# Patient Record
Sex: MALE | Race: WHITE | Employment: OTHER | URBAN - METROPOLITAN AREA
[De-identification: names, ages, dates, MRNs, and addresses within clinical notes are randomized per-mention and may not be internally consistent; named-entity substitution may affect disease eponyms.]

---

## 2023-04-01 LAB
ALANINE AMINOTRANSFERASE (SGPT) (U/L) IN SER/PLAS: 15 U/L (ref 10–52)
ANION GAP IN SER/PLAS: 12 MMOL/L (ref 10–20)
ASPARTATE AMINOTRANSFERASE (SGOT) (U/L) IN SER/PLAS: 20 U/L (ref 9–39)
CALCIUM (MG/DL) IN SER/PLAS: 8.7 MG/DL (ref 8.6–10.3)
CARBON DIOXIDE, TOTAL (MMOL/L) IN SER/PLAS: 27 MMOL/L (ref 21–32)
CHLORIDE (MMOL/L) IN SER/PLAS: 106 MMOL/L (ref 98–107)
CHOLESTEROL (MG/DL) IN SER/PLAS: 131 MG/DL (ref 0–199)
CHOLESTEROL IN HDL (MG/DL) IN SER/PLAS: 45.6 MG/DL
CHOLESTEROL/HDL RATIO: 2.9
CREATININE (MG/DL) IN SER/PLAS: 1.09 MG/DL (ref 0.5–1.3)
DIGOXIN (NG/ML) IN SER/PLAS: 0.62 NG/ML (ref 0.8–2)
ERYTHROCYTE DISTRIBUTION WIDTH (RATIO) BY AUTOMATED COUNT: 14.6 % (ref 11.5–14.5)
ERYTHROCYTE MEAN CORPUSCULAR HEMOGLOBIN CONCENTRATION (G/DL) BY AUTOMATED: 33 G/DL (ref 32–36)
ERYTHROCYTE MEAN CORPUSCULAR VOLUME (FL) BY AUTOMATED COUNT: 94 FL (ref 80–100)
ERYTHROCYTES (10*6/UL) IN BLOOD BY AUTOMATED COUNT: 4.29 X10E12/L (ref 4.5–5.9)
GFR MALE: 75 ML/MIN/1.73M2
GLUCOSE (MG/DL) IN SER/PLAS: 86 MG/DL (ref 74–99)
HEMATOCRIT (%) IN BLOOD BY AUTOMATED COUNT: 40.3 % (ref 41–52)
HEMOGLOBIN (G/DL) IN BLOOD: 13.3 G/DL (ref 13.5–17.5)
LDL: 39 MG/DL (ref 0–99)
LEUKOCYTES (10*3/UL) IN BLOOD BY AUTOMATED COUNT: 9 X10E9/L (ref 4.4–11.3)
NON HDL CHOLESTEROL: 85 MG/DL
PLATELETS (10*3/UL) IN BLOOD AUTOMATED COUNT: 140 X10E9/L (ref 150–450)
POTASSIUM (MMOL/L) IN SER/PLAS: 4.7 MMOL/L (ref 3.5–5.3)
SODIUM (MMOL/L) IN SER/PLAS: 140 MMOL/L (ref 136–145)
TRIGLYCERIDE (MG/DL) IN SER/PLAS: 232 MG/DL (ref 0–149)
UREA NITROGEN (MG/DL) IN SER/PLAS: 18 MG/DL (ref 6–23)
VLDL: 46 MG/DL (ref 0–40)

## 2023-08-14 ENCOUNTER — PATIENT OUTREACH (OUTPATIENT)
Dept: PRIMARY CARE | Facility: CLINIC | Age: 68
End: 2023-08-14

## 2023-08-14 ENCOUNTER — TELEPHONE (OUTPATIENT)
Dept: PRIMARY CARE | Facility: CLINIC | Age: 68
End: 2023-08-14

## 2023-08-14 NOTE — TELEPHONE ENCOUNTER
----- Message from Anand Jones RN sent at 8/14/2023  1:17 PM EDT -----  Regarding: Unsuccessful discharge outreach after 2 attempts.  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  Impression 5: Paroxysmal Atrial Fibrillation  Plan for Impression 5: Continued on home Digoxin and Toprol-XL and Eliquis  Impression 6: Hyponatremia  Impression 7: HFrEF, Coronary Artery Disease  Impression 8: Diverticulitis  Impression 9: GERD     Date of discharge: 13 Aug 23      Unsuccessful attempts x2 to reach patient for PCP Follow-up  Please have office staff reach out to patient and schedule an appointment within 7-13 days from discharge date.

## 2023-08-14 NOTE — PROGRESS NOTES
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  Impression 5: Paroxysmal Atrial Fibrillation  Plan for Impression 5: Continued on home Digoxin and Toprol-XL and Eliquis  Impression 6: Hyponatremia  Impression 7: HFrEF, Coronary Artery Disease  Impression 8: Diverticulitis  Impression 9: GERD  Admission Date:  Discharge Date:     PCP Appointment Date: Tasked to PCP office for scheduling.   Specialist Appointment Date: N/A  Hospital Encounter and Summary: Linked    No contact on discharge outreach. Tasked to PCP office for scheduling. Will attempt contact again in 2 wks.

## 2023-08-29 ENCOUNTER — PATIENT OUTREACH (OUTPATIENT)
Dept: PRIMARY CARE | Facility: CLINIC | Age: 68
End: 2023-08-29

## 2023-09-07 ENCOUNTER — TELEPHONE (OUTPATIENT)
Dept: PRIMARY CARE | Facility: CLINIC | Age: 68
End: 2023-09-07

## 2023-09-07 DIAGNOSIS — I10 PRIMARY HYPERTENSION: ICD-10-CM

## 2023-09-07 RX ORDER — CLOPIDOGREL BISULFATE 75 MG/1
75 TABLET ORAL DAILY
Qty: 90 TABLET | Refills: 0 | Status: SHIPPED | OUTPATIENT
Start: 2023-09-07 | End: 2023-09-26 | Stop reason: ALTCHOICE

## 2023-09-07 RX ORDER — FENOFIBRATE 145 MG/1
1 TABLET, FILM COATED ORAL DAILY
COMMUNITY
Start: 2023-06-22 | End: 2023-09-26 | Stop reason: ALTCHOICE

## 2023-09-07 RX ORDER — SACUBITRIL AND VALSARTAN 24; 26 MG/1; MG/1
TABLET, FILM COATED ORAL 2 TIMES DAILY
COMMUNITY
Start: 2019-02-06 | End: 2023-10-18 | Stop reason: SDUPTHER

## 2023-09-07 RX ORDER — CLOPIDOGREL BISULFATE 75 MG/1
1 TABLET ORAL DAILY
COMMUNITY
Start: 2023-08-13 | End: 2023-09-07 | Stop reason: SDUPTHER

## 2023-09-07 RX ORDER — SPIRONOLACTONE 25 MG/1
12.5 TABLET ORAL DAILY
COMMUNITY
Start: 2019-02-06 | End: 2023-10-18 | Stop reason: SDUPTHER

## 2023-09-07 RX ORDER — METOPROLOL SUCCINATE 100 MG/1
1 TABLET, EXTENDED RELEASE ORAL DAILY
COMMUNITY
Start: 2023-08-13 | End: 2023-10-11 | Stop reason: SDUPTHER

## 2023-09-07 RX ORDER — ROSUVASTATIN CALCIUM 5 MG/1
1 TABLET, COATED ORAL DAILY
COMMUNITY
Start: 2021-08-26 | End: 2023-10-18 | Stop reason: SDUPTHER

## 2023-09-07 RX ORDER — PANTOPRAZOLE SODIUM 40 MG/1
1 TABLET, DELAYED RELEASE ORAL DAILY
COMMUNITY
Start: 2023-08-01 | End: 2023-09-26 | Stop reason: ALTCHOICE

## 2023-09-07 RX ORDER — DIGOXIN 125 MCG
1 TABLET ORAL DAILY
COMMUNITY
Start: 2019-02-06 | End: 2023-10-18 | Stop reason: SDUPTHER

## 2023-09-07 RX ORDER — APIXABAN 5 MG/1
5 TABLET, FILM COATED ORAL 2 TIMES DAILY
COMMUNITY
End: 2024-03-11 | Stop reason: ALTCHOICE

## 2023-09-07 RX ORDER — LOPERAMIDE HCL 2 MG
TABLET ORAL EVERY 4 HOURS PRN
COMMUNITY
Start: 2023-08-01 | End: 2023-09-26 | Stop reason: ALTCHOICE

## 2023-09-07 NOTE — TELEPHONE ENCOUNTER
Pt left vm asking for refill of his clopidigrel 75mg take 1 daily. Pt also asking to reschedule his apt coming up this month.    Kevin [phone]

## 2023-09-13 ENCOUNTER — APPOINTMENT (OUTPATIENT)
Dept: PRIMARY CARE | Facility: CLINIC | Age: 68
End: 2023-09-13

## 2023-09-22 PROBLEM — I77.4 CELIAC ARTERY STENOSIS: Status: ACTIVE | Noted: 2023-09-22

## 2023-09-22 PROBLEM — R10.13 EPIGASTRIC ABDOMINAL PAIN OF UNKNOWN ETIOLOGY: Status: ACTIVE | Noted: 2023-09-22

## 2023-09-22 PROBLEM — I77.1 CELIAC ARTERY STENOSIS (CMS-HCC): Status: ACTIVE | Noted: 2023-09-22

## 2023-09-22 PROBLEM — E78.5 HLD (HYPERLIPIDEMIA): Status: ACTIVE | Noted: 2023-09-22

## 2023-09-22 PROBLEM — I50.30 HEART FAILURE WITH PRESERVED EJECTION FRACTION (MULTI): Status: ACTIVE | Noted: 2023-09-22

## 2023-09-22 PROBLEM — R59.0 MEDIASTINAL LYMPHADENOPATHY: Status: ACTIVE | Noted: 2023-09-22

## 2023-09-22 PROBLEM — I25.10 CAD (CORONARY ARTERY DISEASE): Status: ACTIVE | Noted: 2023-09-22

## 2023-09-22 PROBLEM — K55.1 SUPERIOR MESENTERIC ARTERY STENOSIS (MULTI): Status: ACTIVE | Noted: 2023-09-22

## 2023-09-22 PROBLEM — R10.9 ABDOMINAL PAIN: Status: ACTIVE | Noted: 2023-09-22

## 2023-09-22 PROBLEM — I10 ESSENTIAL HYPERTENSION: Status: ACTIVE | Noted: 2023-09-22

## 2023-09-22 PROBLEM — H61.20 CERUMEN IMPACTION: Status: ACTIVE | Noted: 2023-09-22

## 2023-09-22 PROBLEM — R91.1 PULMONARY NODULE: Status: ACTIVE | Noted: 2023-09-22

## 2023-09-22 PROBLEM — C34.90: Status: ACTIVE | Noted: 2023-09-22

## 2023-09-22 PROBLEM — C34.90 MALIGNANT NEOPLASM OF BRONCHUS AND LUNG (MULTI): Status: ACTIVE | Noted: 2023-09-22

## 2023-09-22 PROBLEM — R10.13 CHRONIC EPIGASTRIC PAIN: Status: ACTIVE | Noted: 2023-09-22

## 2023-09-22 PROBLEM — R11.2 INTRACTABLE NAUSEA AND VOMITING: Status: ACTIVE | Noted: 2023-09-22

## 2023-09-22 PROBLEM — R91.8 LUNG NODULES: Status: ACTIVE | Noted: 2023-09-22

## 2023-09-22 PROBLEM — G89.29 CHRONIC EPIGASTRIC PAIN: Status: ACTIVE | Noted: 2023-09-22

## 2023-09-22 PROBLEM — K55.059: Status: ACTIVE | Noted: 2023-09-22

## 2023-09-22 PROBLEM — H26.9 CATARACT: Status: ACTIVE | Noted: 2023-09-22

## 2023-09-22 PROBLEM — F17.210 CIGARETTE SMOKER: Status: ACTIVE | Noted: 2023-09-22

## 2023-09-22 PROBLEM — I48.91 ATRIAL FIBRILLATION (MULTI): Status: ACTIVE | Noted: 2023-09-22

## 2023-09-22 PROBLEM — N39.0 URINARY TRACT INFECTION, ACUTE: Status: ACTIVE | Noted: 2023-09-22

## 2023-09-22 PROBLEM — I50.22 CHRONIC SYSTOLIC CONGESTIVE HEART FAILURE (MULTI): Status: ACTIVE | Noted: 2023-09-22

## 2023-09-22 PROBLEM — R91.8 MASS OF LOWER LOBE OF LEFT LUNG: Status: ACTIVE | Noted: 2023-09-22

## 2023-09-22 PROBLEM — I71.21 ASCENDING AORTIC ANEURYSM (CMS-HCC): Status: ACTIVE | Noted: 2023-09-22

## 2023-09-22 PROBLEM — K55.069 SUPERIOR MESENTERIC ARTERY THROMBOSIS (MULTI): Status: ACTIVE | Noted: 2023-09-22

## 2023-09-22 PROBLEM — I25.10 MILD CAD: Status: ACTIVE | Noted: 2023-09-22

## 2023-09-26 ENCOUNTER — OFFICE VISIT (OUTPATIENT)
Dept: PRIMARY CARE | Facility: CLINIC | Age: 68
End: 2023-09-26
Payer: MEDICARE

## 2023-09-26 VITALS
HEIGHT: 72 IN | BODY MASS INDEX: 25.87 KG/M2 | WEIGHT: 191 LBS | DIASTOLIC BLOOD PRESSURE: 86 MMHG | HEART RATE: 64 BPM | SYSTOLIC BLOOD PRESSURE: 124 MMHG

## 2023-09-26 DIAGNOSIS — I25.10 MILD CAD: ICD-10-CM

## 2023-09-26 DIAGNOSIS — Z12.5 PROSTATE CANCER SCREENING: ICD-10-CM

## 2023-09-26 DIAGNOSIS — Z00.00 WELCOME TO MEDICARE PREVENTIVE VISIT: ICD-10-CM

## 2023-09-26 DIAGNOSIS — I50.22 CHRONIC SYSTOLIC CONGESTIVE HEART FAILURE (MULTI): Primary | ICD-10-CM

## 2023-09-26 DIAGNOSIS — I77.1 CELIAC ARTERY STENOSIS (CMS-HCC): Primary | ICD-10-CM

## 2023-09-26 DIAGNOSIS — I50.30 HEART FAILURE WITH PRESERVED EJECTION FRACTION, UNSPECIFIED HF CHRONICITY (MULTI): ICD-10-CM

## 2023-09-26 DIAGNOSIS — I10 PRIMARY HYPERTENSION: ICD-10-CM

## 2023-09-26 DIAGNOSIS — E78.2 MIXED HYPERLIPIDEMIA: ICD-10-CM

## 2023-09-26 DIAGNOSIS — Z23 NEED FOR PNEUMOCOCCAL 20-VALENT CONJUGATE VACCINATION: ICD-10-CM

## 2023-09-26 DIAGNOSIS — I25.10 CORONARY ARTERY DISEASE INVOLVING NATIVE CORONARY ARTERY, UNSPECIFIED WHETHER ANGINA PRESENT, UNSPECIFIED WHETHER NATIVE OR TRANSPLANTED HEART: ICD-10-CM

## 2023-09-26 DIAGNOSIS — I10 ESSENTIAL HYPERTENSION: ICD-10-CM

## 2023-09-26 DIAGNOSIS — Z12.11 COLON CANCER SCREENING: ICD-10-CM

## 2023-09-26 PROBLEM — N39.0 URINARY TRACT INFECTION, ACUTE: Status: RESOLVED | Noted: 2023-09-22 | Resolved: 2023-09-26

## 2023-09-26 PROBLEM — G47.00 INSOMNIA: Status: ACTIVE | Noted: 2023-09-26

## 2023-09-26 PROBLEM — H61.20 CERUMEN IMPACTION: Status: RESOLVED | Noted: 2023-09-22 | Resolved: 2023-09-26

## 2023-09-26 PROCEDURE — 1159F MED LIST DOCD IN RCRD: CPT | Performed by: CLINICAL NURSE SPECIALIST

## 2023-09-26 PROCEDURE — G0009 ADMIN PNEUMOCOCCAL VACCINE: HCPCS | Performed by: CLINICAL NURSE SPECIALIST

## 2023-09-26 PROCEDURE — 1036F TOBACCO NON-USER: CPT | Performed by: CLINICAL NURSE SPECIALIST

## 2023-09-26 PROCEDURE — 99214 OFFICE O/P EST MOD 30 MIN: CPT | Performed by: CLINICAL NURSE SPECIALIST

## 2023-09-26 PROCEDURE — 1160F RVW MEDS BY RX/DR IN RCRD: CPT | Performed by: CLINICAL NURSE SPECIALIST

## 2023-09-26 PROCEDURE — 3074F SYST BP LT 130 MM HG: CPT | Performed by: CLINICAL NURSE SPECIALIST

## 2023-09-26 PROCEDURE — G0444 DEPRESSION SCREEN ANNUAL: HCPCS | Performed by: CLINICAL NURSE SPECIALIST

## 2023-09-26 PROCEDURE — 90677 PCV20 VACCINE IM: CPT | Performed by: CLINICAL NURSE SPECIALIST

## 2023-09-26 PROCEDURE — 1170F FXNL STATUS ASSESSED: CPT | Performed by: CLINICAL NURSE SPECIALIST

## 2023-09-26 PROCEDURE — 3079F DIAST BP 80-89 MM HG: CPT | Performed by: CLINICAL NURSE SPECIALIST

## 2023-09-26 PROCEDURE — G0439 PPPS, SUBSEQ VISIT: HCPCS | Performed by: CLINICAL NURSE SPECIALIST

## 2023-09-26 RX ORDER — CLOPIDOGREL BISULFATE 75 MG/1
TABLET ORAL DAILY
COMMUNITY
End: 2023-10-11 | Stop reason: SDUPTHER

## 2023-09-26 ASSESSMENT — ENCOUNTER SYMPTOMS
CHILLS: 0
BLOOD IN STOOL: 0
CHEST TIGHTNESS: 0
APPETITE CHANGE: 0
MYALGIAS: 0
NECK PAIN: 0
FATIGUE: 1
COUGH: 0
SORE THROAT: 0
LOSS OF SENSATION IN FEET: 0
HEADACHES: 0
DIZZINESS: 0
TROUBLE SWALLOWING: 0
ARTHRALGIAS: 0
BACK PAIN: 0
NAUSEA: 0
OCCASIONAL FEELINGS OF UNSTEADINESS: 0
WOUND: 0
VOMITING: 0
FEVER: 0
PALPITATIONS: 0
WHEEZING: 0
EYE PAIN: 0
UNEXPECTED WEIGHT CHANGE: 0
SEIZURES: 0
CONSTIPATION: 0
ACTIVITY CHANGE: 0
BRUISES/BLEEDS EASILY: 0
HEMATURIA: 0
PHOTOPHOBIA: 0
CONFUSION: 0
JOINT SWELLING: 0
SHORTNESS OF BREATH: 0
ABDOMINAL PAIN: 0
POLYDIPSIA: 0
DEPRESSION: 0
DIARRHEA: 0
SLEEP DISTURBANCE: 1
DYSURIA: 0
FLANK PAIN: 0

## 2023-09-26 ASSESSMENT — ACTIVITIES OF DAILY LIVING (ADL)
MANAGING_FINANCES: INDEPENDENT
BATHING: INDEPENDENT
GROCERY_SHOPPING: INDEPENDENT
DRESSING: INDEPENDENT
TAKING_MEDICATION: INDEPENDENT
DOING_HOUSEWORK: INDEPENDENT

## 2023-09-26 ASSESSMENT — COLUMBIA-SUICIDE SEVERITY RATING SCALE - C-SSRS
2. HAVE YOU ACTUALLY HAD ANY THOUGHTS OF KILLING YOURSELF?: NO
1. IN THE PAST MONTH, HAVE YOU WISHED YOU WERE DEAD OR WISHED YOU COULD GO TO SLEEP AND NOT WAKE UP?: NO
6. HAVE YOU EVER DONE ANYTHING, STARTED TO DO ANYTHING, OR PREPARED TO DO ANYTHING TO END YOUR LIFE?: NO

## 2023-09-26 ASSESSMENT — PATIENT HEALTH QUESTIONNAIRE - PHQ9
1. LITTLE INTEREST OR PLEASURE IN DOING THINGS: NOT AT ALL
SUM OF ALL RESPONSES TO PHQ9 QUESTIONS 1 AND 2: 0
2. FEELING DOWN, DEPRESSED OR HOPELESS: NOT AT ALL

## 2023-09-26 NOTE — PROGRESS NOTES
Subjective   Patient ID: Kevin Rubi is a 67 y.o. male who presents for Welcome To Medicare (Welcome to medicare ).  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 Recent 3cm Left lung mass.     Here today as a follow up appointment, due for a Welcome to Medicare. Last seen in 2021. Recently admitted to  with Left Lung Mass, diagnosed with malignant neoplasm of bronchus or lung. Bronch during admission.     Biopsy was concerning for non small cell lung cancer. Plan to follow up with Oncology outpatient. Follow up scheduled for tomorrow.     Has continued on medications as prescribed.     Patient states that he has been having a hard time falling asleep. Patient has been trying to lay down around 9:30-10. Trouble falling asleep, finally fell asleep around midnight.     Review of Systems   Constitutional:  Positive for fatigue. Negative for activity change, appetite change, chills, fever and unexpected weight change.   HENT:  Negative for ear pain, hearing loss, nosebleeds, sore throat, tinnitus and trouble swallowing.    Eyes:  Negative for photophobia, pain and visual disturbance.   Respiratory:  Negative for cough, chest tightness, shortness of breath and wheezing.    Cardiovascular:  Negative for chest pain, palpitations and leg swelling.   Gastrointestinal:  Negative for abdominal pain, blood in stool, constipation, diarrhea, nausea and vomiting.   Endocrine: Negative for cold intolerance, heat intolerance, polydipsia and polyuria.   Genitourinary:  Negative for dysuria, flank pain and hematuria.   Musculoskeletal:  Negative for arthralgias, back pain, joint swelling, myalgias and neck pain.   Skin:  Negative for pallor, rash and wound.   Allergic/Immunologic: Negative for immunocompromised state.   Neurological:  Negative for dizziness, seizures and headaches.   Hematological:  Does not bruise/bleed easily.    Psychiatric/Behavioral:  Positive for sleep disturbance. Negative for confusion.        Objective   Physical Exam  Vitals and nursing note reviewed.   Constitutional:       General: He is not in acute distress.     Appearance: Normal appearance.   HENT:      Head: Normocephalic.      Nose: Nose normal.   Eyes:      Conjunctiva/sclera: Conjunctivae normal.   Neck:      Vascular: No carotid bruit.   Cardiovascular:      Rate and Rhythm: Normal rate and regular rhythm.      Pulses: Normal pulses.      Heart sounds: Normal heart sounds.   Pulmonary:      Effort: Pulmonary effort is normal.      Breath sounds: Normal breath sounds.   Abdominal:      General: Bowel sounds are normal.      Palpations: Abdomen is soft.   Musculoskeletal:         General: Normal range of motion.      Cervical back: Normal range of motion.   Skin:     General: Skin is warm and dry.   Neurological:      Mental Status: He is alert and oriented to person, place, and time. Mental status is at baseline.   Psychiatric:         Mood and Affect: Mood normal.         Behavior: Behavior normal.         Assessment/Plan       Additional lab work ordered at [facility] today.     HFpEF, pAfib, Hypertension: Following with Cardiology. Blood pressure controlled at [facility] today. Continue to monitor. Continue medications as prescribed.  CAD, Celiac Artery Stenosis: Continue to follow with Specialists as previously determined.   Hyperlipidemia: Updated lab work ordered. Will follow up pending results.   Medicare Wellness: Routine and age appropriate recommendations discussed with the patient today and patient verbalized understanding of the recommendations.  Questions answered.  Age appropriate immunizations and preventative screenings discussed with the patient and ordered as appropriate. Labs updated and ordered as indicated. Recommend healthy diet and daily exercise to maintain healthy body weight.   Insomnia: Discussed Melatonin.     Prostate Cancer Screening:  Ordered.    Flu Vaccine: September 2023.   Prevnar: September 2023.   COVID Vaccine: January 2022.   Medicare Wellness: September 2023.   Cologuard ordered.   Discussed Tdap, Shingrix, COVID, RSV Vaccinations.

## 2023-10-02 ENCOUNTER — ANCILLARY PROCEDURE (OUTPATIENT)
Dept: RADIOLOGY | Facility: CLINIC | Age: 68
End: 2023-10-02
Payer: MEDICARE

## 2023-10-02 DIAGNOSIS — C34.90 MALIGNANT NEOPLASM OF UNSPECIFIED PART OF UNSPECIFIED BRONCHUS OR LUNG (MULTI): ICD-10-CM

## 2023-10-02 PROCEDURE — 70553 MRI BRAIN STEM W/O & W/DYE: CPT | Performed by: RADIOLOGY

## 2023-10-02 PROCEDURE — 70553 MRI BRAIN STEM W/O & W/DYE: CPT

## 2023-10-02 PROCEDURE — 2500000004 HC RX 250 GENERAL PHARMACY W/ HCPCS (ALT 636 FOR OP/ED): Performed by: INTERNAL MEDICINE

## 2023-10-02 PROCEDURE — A9575 INJ GADOTERATE MEGLUMI 0.1ML: HCPCS | Performed by: INTERNAL MEDICINE

## 2023-10-02 RX ADMIN — GADOTERATE MEGLUMINE 17 ML: 376.9 INJECTION INTRAVENOUS at 14:33

## 2023-10-04 ENCOUNTER — OFFICE VISIT (OUTPATIENT)
Dept: HEMATOLOGY/ONCOLOGY | Facility: CLINIC | Age: 68
End: 2023-10-04
Payer: MEDICARE

## 2023-10-04 VITALS
SYSTOLIC BLOOD PRESSURE: 135 MMHG | OXYGEN SATURATION: 97 % | TEMPERATURE: 98.1 F | DIASTOLIC BLOOD PRESSURE: 83 MMHG | WEIGHT: 190.04 LBS | HEART RATE: 62 BPM | RESPIRATION RATE: 18 BRPM | BODY MASS INDEX: 25.77 KG/M2

## 2023-10-04 DIAGNOSIS — C34.32 PRIMARY CANCER OF LEFT LOWER LOBE OF LUNG (MULTI): Primary | ICD-10-CM

## 2023-10-04 PROCEDURE — 1036F TOBACCO NON-USER: CPT | Performed by: INTERNAL MEDICINE

## 2023-10-04 PROCEDURE — 3075F SYST BP GE 130 - 139MM HG: CPT | Performed by: INTERNAL MEDICINE

## 2023-10-04 PROCEDURE — 99215 OFFICE O/P EST HI 40 MIN: CPT | Performed by: INTERNAL MEDICINE

## 2023-10-04 PROCEDURE — 1159F MED LIST DOCD IN RCRD: CPT | Performed by: INTERNAL MEDICINE

## 2023-10-04 PROCEDURE — 1126F AMNT PAIN NOTED NONE PRSNT: CPT | Performed by: INTERNAL MEDICINE

## 2023-10-04 PROCEDURE — 3079F DIAST BP 80-89 MM HG: CPT | Performed by: INTERNAL MEDICINE

## 2023-10-04 PROCEDURE — 1160F RVW MEDS BY RX/DR IN RCRD: CPT | Performed by: INTERNAL MEDICINE

## 2023-10-04 RX ORDER — FAMOTIDINE 10 MG/ML
20 INJECTION INTRAVENOUS ONCE AS NEEDED
Status: CANCELLED | OUTPATIENT
Start: 2023-10-23

## 2023-10-04 RX ORDER — PROCHLORPERAZINE EDISYLATE 5 MG/ML
10 INJECTION INTRAMUSCULAR; INTRAVENOUS EVERY 6 HOURS PRN
Status: CANCELLED | OUTPATIENT
Start: 2023-10-23

## 2023-10-04 RX ORDER — ALBUTEROL SULFATE 0.83 MG/ML
3 SOLUTION RESPIRATORY (INHALATION) AS NEEDED
Status: CANCELLED | OUTPATIENT
Start: 2023-10-23

## 2023-10-04 RX ORDER — PROCHLORPERAZINE MALEATE 10 MG
10 TABLET ORAL EVERY 6 HOURS PRN
Status: CANCELLED | OUTPATIENT
Start: 2023-10-23

## 2023-10-04 RX ORDER — EPINEPHRINE 0.3 MG/.3ML
0.3 INJECTION SUBCUTANEOUS EVERY 5 MIN PRN
Status: CANCELLED | OUTPATIENT
Start: 2023-10-23

## 2023-10-04 RX ORDER — CYANOCOBALAMIN 1000 UG/ML
1000 INJECTION, SOLUTION INTRAMUSCULAR; SUBCUTANEOUS ONCE
Status: CANCELLED | OUTPATIENT
Start: 2023-10-23

## 2023-10-04 RX ORDER — PALONOSETRON 0.05 MG/ML
0.25 INJECTION, SOLUTION INTRAVENOUS ONCE
Status: CANCELLED | OUTPATIENT
Start: 2023-10-23

## 2023-10-04 RX ORDER — DIPHENHYDRAMINE HYDROCHLORIDE 50 MG/ML
50 INJECTION INTRAMUSCULAR; INTRAVENOUS AS NEEDED
Status: CANCELLED | OUTPATIENT
Start: 2023-10-23

## 2023-10-04 ASSESSMENT — ENCOUNTER SYMPTOMS
LOSS OF SENSATION IN FEET: 0
DEPRESSION: 0
OCCASIONAL FEELINGS OF UNSTEADINESS: 0

## 2023-10-04 ASSESSMENT — PAIN SCALES - GENERAL: PAINLEVEL: 0-NO PAIN

## 2023-10-05 ENCOUNTER — SOCIAL WORK (OUTPATIENT)
Dept: CASE MANAGEMENT | Facility: HOSPITAL | Age: 68
End: 2023-10-05

## 2023-10-05 NOTE — PROGRESS NOTES
SW met with patient, spouse an an adult son in the clinic following an appointment with Dr. Longoria. Treatment plan being finalized. Patient will have concurrent chemotherapy and radiation therapy. Radiation to be performed at [facility]; [facility].     SW introduced self and explained role. Mr. Rubi is a 67 year old gentleman alert oriented X 3 and independent in self care. Resides in a single family home with spouse. Retired in April 2023; was a ; spouse also retired. Patient has 2 adult sons who reside locally. We discussed options for patient when radiation begins. We discussed the possibility of patient and spouse staying at [facility]. SW to make referral. Information on [facility] provided. Referral faxed today. Spouse also inquired about getting transportation to the [facility] from [place] when radiation begins as she does not drive on the freeway. SW offered to investigate.     SW encouraged patient and family to take this one day at a time in order to attempt to reduce anxiety. We discussed being gently with self as this process is new and can be frightening. Patient not easily engaged; with overall sad affect. We discussed the importance of asking questions in order to further understanding of diagnosis and treatment plan and to become comfortable with treatment team.    Support offered. SW provided my contact information and will continue to follow for ongoing support and resource development. NPV with radiation scheduled for 10.19.2023. Initial chemotherapy 10.23.2023 with the hope of radiation starting 11.6 or 11.13.2023.

## 2023-10-11 ENCOUNTER — TELEPHONE (OUTPATIENT)
Dept: RADIATION ONCOLOGY | Facility: HOSPITAL | Age: 68
End: 2023-10-11

## 2023-10-11 DIAGNOSIS — I77.1 CELIAC ARTERY STENOSIS (CMS-HCC): ICD-10-CM

## 2023-10-11 RX ORDER — CLOPIDOGREL BISULFATE 75 MG/1
75 TABLET ORAL DAILY
Qty: 90 TABLET | Refills: 1 | Status: ON HOLD | OUTPATIENT
Start: 2023-10-11 | End: 2024-04-16

## 2023-10-11 RX ORDER — CLOPIDOGREL BISULFATE 75 MG/1
75 TABLET ORAL DAILY
Qty: 90 TABLET | Refills: 1 | Status: CANCELLED | OUTPATIENT
Start: 2023-10-11 | End: 2024-04-08

## 2023-10-11 NOTE — TELEPHONE ENCOUNTER
Called pt to remind of NPV appointment on 10/19/23 at 1:30. Pt's phone went to voicemail left number if needs to reschedule.

## 2023-10-14 RX ORDER — ALBUTEROL SULFATE 0.83 MG/ML
3 SOLUTION RESPIRATORY (INHALATION) AS NEEDED
Status: CANCELLED | OUTPATIENT
Start: 2023-11-13

## 2023-10-14 RX ORDER — PALONOSETRON 0.05 MG/ML
0.25 INJECTION, SOLUTION INTRAVENOUS ONCE
Status: CANCELLED | OUTPATIENT
Start: 2023-11-13

## 2023-10-14 RX ORDER — DIPHENHYDRAMINE HYDROCHLORIDE 50 MG/ML
50 INJECTION INTRAMUSCULAR; INTRAVENOUS AS NEEDED
Status: CANCELLED | OUTPATIENT
Start: 2023-11-13

## 2023-10-14 RX ORDER — HEPARIN 100 UNIT/ML
500 SYRINGE INTRAVENOUS AS NEEDED
Status: CANCELLED | OUTPATIENT
Start: 2023-10-16

## 2023-10-14 RX ORDER — FAMOTIDINE 10 MG/ML
20 INJECTION INTRAVENOUS ONCE AS NEEDED
Status: CANCELLED | OUTPATIENT
Start: 2023-11-13

## 2023-10-14 RX ORDER — HEPARIN SODIUM,PORCINE/PF 10 UNIT/ML
50 SYRINGE (ML) INTRAVENOUS AS NEEDED
Status: CANCELLED | OUTPATIENT
Start: 2023-10-16

## 2023-10-14 RX ORDER — PROCHLORPERAZINE EDISYLATE 5 MG/ML
10 INJECTION INTRAMUSCULAR; INTRAVENOUS EVERY 6 HOURS PRN
Status: CANCELLED | OUTPATIENT
Start: 2023-11-13

## 2023-10-14 RX ORDER — EPINEPHRINE 0.3 MG/.3ML
0.3 INJECTION SUBCUTANEOUS EVERY 5 MIN PRN
Status: CANCELLED | OUTPATIENT
Start: 2023-11-13

## 2023-10-14 RX ORDER — PROCHLORPERAZINE MALEATE 10 MG
10 TABLET ORAL EVERY 6 HOURS PRN
Status: CANCELLED | OUTPATIENT
Start: 2023-11-13

## 2023-10-14 ASSESSMENT — ENCOUNTER SYMPTOMS
NERVOUS/ANXIOUS: 1
NEUROLOGICAL NEGATIVE: 1
DEPRESSION: 1
CHEST TIGHTNESS: 0
HEMATURIA: 0
GASTROINTESTINAL NEGATIVE: 1
FATIGUE: 1
COUGH: 0
DIAPHORESIS: 1
SORE THROAT: 0
MUSCULOSKELETAL NEGATIVE: 1
HEMATOLOGIC/LYMPHATIC NEGATIVE: 1
APPETITE CHANGE: 0
FEVER: 1
SHORTNESS OF BREATH: 0

## 2023-10-14 NOTE — PROGRESS NOTES
Patient ID: Kevin Rubi is a 67 y.o. male    Primary Care Provider: Chen Olea, RUKHSANA-CNS    DIAGNOSIS AND STAGING  cT3pN3 NSCLC (adenocarcinoma, TTF1+) of the LLL - dx on 09/15/23  Primary tumor measures 3.5 cm (+satellite nodule)  2R+ for adenocarcinoma cells        SITES OF DISEASE  LLL  Mediastinal nodes (including contralateral)  Has a couple of other spiculated nodules (0.8 cm WILDA and 0.7 cm RUL) that are potential synchronous primaries and will be addressed when needed      MOLECULAR GENOMICS  KRAS G12D mutation     PD-L1 TPS 95%        PRIOR THERAPIES  None         CURRENT THERAPY  Anticipating carboplatin/pemetrexed with concurrent RT        CURRENT ONCOLOGICAL PROBLEMS  Fatigue         HISTORY OF PRESENT ILLNESS:  Former smoker, (quit in 2000), who while undergoing w/u for abdominal pain related to SMA occlusion, was found to have a LLL nodule -   Based on his records he was noted to have abnormal imaging back in 2021, was seen by pulmonary and was told to complete w/u, including PET scan and potential biopsy but did not follow recommendations.    A CT chest on 09/12/23 showed a 3.5 cm spiculated LLL nodule abutting the pericardium. There was also a satellite nodule suspicious for disease and a couple of other spiculated nodules in the WILDA and contralateral right lung.4R node measured 1.4 cm and level 7 2.4 cm.   PET scan on 08/04/23 showed no findings concerning for extra-thoracic disease. LLL nodule was FDG avid as well as multiple mediastinal nodes (including contralateral nodes). The WILDA nodule has mild hypermetabolic activity.  An EBUS procedure on 09/15/23 demonstrated 2R to be involved with adenocarcinoma cells, TTF1+. KRAS G12D +, TPS 95%.  The pt sees medical oncology on 09/27/23 -   States he is feeling well and denies any systemic sx related to this malignancy - including fatigue and weight loss. The abdominal pain resolved after vascular procedure/stenting performed and he is now on  Xarelto and Plavix. Denies any h/a or cough. Denies dyspnea.   States he is very stressed about this ongoing dx and his wife corroborates it.            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  HFpEF - EF 60%   Atrial fibrillation - on Eliquis  Ascending aortic aneurysm   PUD (gastric)  HLD        SOCIAL HISTORY  + Tobacco - quit in  - mostly 1/2 ppd starting at age 22  Occasional ETOH intake  Worked as a  for 35 years    for 43 years and has 2 children - son who is 39 yo and daughter who is 35  He has just retired        CURRENT MEDS REVIEWED     ALLERGIES  NKDA     FAMILY HISTORY  Mother seems to have had H/N or lung cancer - unclear   Father may also have had cancer   1 sister  from complications of GSW  1 brother  of CA (?liver CA)    SUBJECTIVE:  Doing well, aside from anxiety related to diagnosis and concerns regarding demands in therapy and burden inflicted on family members   Here with wife Priya and son -   Both very supportive   Endorses fatigue that does not affect ADLs and denies new symptoms     Review of Systems   Constitutional:  Positive for diaphoresis, fatigue and fever. Negative for appetite change.   HENT:   Negative for mouth sores and sore throat.    Respiratory:  Negative for chest tightness, cough and shortness of breath.    Cardiovascular:  Positive for chest pain.   Gastrointestinal: Negative.    Genitourinary:  Negative for hematuria.    Musculoskeletal: Negative.    Skin: Negative.    Neurological: Negative.    Hematological: Negative.    Psychiatric/Behavioral:  Positive for depression. The patient is nervous/anxious.           OBJECTIVE:    Vitals:    10/04/23 1357   BP: 135/83   Pulse: 62   Resp: 18   Temp: 36.7 °C (98.1 °F)   SpO2: 97%      Body surface area is 2.09 meters squared.     Physical Exam  Constitutional:       Appearance: Normal appearance. He is normal weight.   HENT:      Head: Normocephalic and atraumatic.       Nose: Nose normal.   Eyes:      Extraocular Movements: Extraocular movements intact.      Conjunctiva/sclera: Conjunctivae normal.   Cardiovascular:      Rate and Rhythm: Normal rate and regular rhythm.   Pulmonary:      Effort: Pulmonary effort is normal.      Breath sounds: Normal breath sounds.   Abdominal:      General: Abdomen is flat. Bowel sounds are normal.      Palpations: Abdomen is soft.   Musculoskeletal:      Cervical back: Normal range of motion and neck supple.   Skin:     General: Skin is warm and dry.   Neurological:      General: No focal deficit present.      Mental Status: He is alert and oriented to person, place, and time. Mental status is at baseline.   Psychiatric:         Behavior: Behavior normal.       Diagnostic Results         Results:  Labs:  Lab Results   Component Value Date    WBC CANCELED 09/16/2023    HGB CANCELED 09/16/2023    HCT CANCELED 09/16/2023    MCV CANCELED 09/16/2023    PLT CANCELED 09/16/2023      Lab Results   Component Value Date    NEUTROABS 21.97 (H) 08/09/2023        Brain MRI 10/02/23:  IMPRESSION:  No abnormal intracranial enhancing mass lesion to suggest brain  metastasis is noted.      There is an 8 mm focus of nonspecific abnormal diminished bone marrow  signal on the T1 images demonstrating a component of enhancement on  the post gadolinium images along the left frontal bone as seen on  axial slice 10 of 27. The lesion is nonspecific and could be benign  or malignant in etiology. Given the patient's clinical history, an  osseous metastasis can not be excluded. Correlation with any previous  outside MRI studies if available would be helpful to assess stability  of this finding. If none are available, a follow-up MRI in 3 months  would be recommended.      There is moderate brain parenchymal volume loss.      There is an area of encephalomalacia/gliosis along the lateral left  frontal lobe.      There are scattered nonspecific white matter changes within  the  cerebral hemispheres bilaterally which while nonspecific, given the  patient's age, likely represent sequelae of more remote small-vessel  ischemic change.    Thoracic TB Recs:  67, former smoker - quit in 2000  CT chest 09/12/23: 3.5 cm LLL nodule, abutting the pericardium + satellite nodule. There are other nodules, including a 9 mm spiculated lesion in the WILDA c/f synchronous primary lung CA (present as mostly a GGO back in 2021)  PET reviewed   EBUS: TTF1+ adenocarcinoma, p40-, KRAS G12D, TPS 95%  2R +     Impression/Plan:   cT3 pN3 cMx   MRI bran is pending - if negative for ICM, proceed with concurrent chemo-RT and consider BX/SBRT WILDA nodule in the near future     Assessment/Plan   Discussed with patient his final stage: IIIC ([id number]) NSCLC of the LLL -   Decision made at  to treated with definitive intent -   Referrals for rad onc provided. Sees Dr. Perrin on 10/29   Consent provided for 2-3 cycles of carboplatin and pemetrexed -   Anticipating C1 on 10/23 -   We anticipate C2 at [facility] with concurrent RT on 11/13  Not a candidate for CASE 1522 due to other potential synchronous primary lung cancers.    SMA occlusion s/p stenting of celiac artery 08/10/23:  On Eliquis and Plavix     Atrial fibrillation, HFpEF 60%  On Digoxin, metoprolol, spironolactone, Entresto    Hyperlipidemia:  On Spenser Longoria MD, MS  Thoracic Medical Oncology   [address] [place] [address]  Phone: [phone]

## 2023-10-16 ENCOUNTER — LAB (OUTPATIENT)
Dept: LAB | Facility: LAB | Age: 68
End: 2023-10-16
Payer: MEDICARE

## 2023-10-16 DIAGNOSIS — I48.91 ATRIAL FIBRILLATION, UNSPECIFIED TYPE (MULTI): ICD-10-CM

## 2023-10-16 DIAGNOSIS — C34.32 PRIMARY CANCER OF LEFT LOWER LOBE OF LUNG (MULTI): ICD-10-CM

## 2023-10-16 LAB
BASOPHILS # BLD AUTO: 0.07 X10*3/UL (ref 0–0.1)
BASOPHILS NFR BLD AUTO: 0.9 %
EOSINOPHIL # BLD AUTO: 0.26 X10*3/UL (ref 0–0.7)
EOSINOPHIL NFR BLD AUTO: 3.2 %
ERYTHROCYTE [DISTWIDTH] IN BLOOD BY AUTOMATED COUNT: 15.3 % (ref 11.5–14.5)
HBV CORE AB SER QL: NONREACTIVE
HBV SURFACE AB SER-ACNC: <3.1 MIU/ML
HBV SURFACE AG SERPL QL IA: NONREACTIVE
HCT VFR BLD AUTO: 39.6 % (ref 41–52)
HGB BLD-MCNC: 12.6 G/DL (ref 13.5–17.5)
IMM GRANULOCYTES # BLD AUTO: 0.03 X10*3/UL (ref 0–0.7)
IMM GRANULOCYTES NFR BLD AUTO: 0.4 % (ref 0–0.9)
LYMPHOCYTES # BLD AUTO: 2.46 X10*3/UL (ref 1.2–4.8)
LYMPHOCYTES NFR BLD AUTO: 30.6 %
MCH RBC QN AUTO: 28.5 PG (ref 26–34)
MCHC RBC AUTO-ENTMCNC: 31.8 G/DL (ref 32–36)
MCV RBC AUTO: 90 FL (ref 80–100)
MONOCYTES # BLD AUTO: 0.59 X10*3/UL (ref 0.1–1)
MONOCYTES NFR BLD AUTO: 7.3 %
NEUTROPHILS # BLD AUTO: 4.62 X10*3/UL (ref 1.2–7.7)
NEUTROPHILS NFR BLD AUTO: 57.6 %
NRBC BLD-RTO: 0 /100 WBCS (ref 0–0)
PLATELET # BLD AUTO: 192 X10*3/UL (ref 150–450)
PMV BLD AUTO: 11.6 FL (ref 7.5–11.5)
RBC # BLD AUTO: 4.42 X10*6/UL (ref 4.5–5.9)
WBC # BLD AUTO: 8 X10*3/UL (ref 4.4–11.3)

## 2023-10-16 PROCEDURE — 86706 HEP B SURFACE ANTIBODY: CPT

## 2023-10-16 PROCEDURE — 86704 HEP B CORE ANTIBODY TOTAL: CPT

## 2023-10-16 PROCEDURE — 85025 COMPLETE CBC W/AUTO DIFF WBC: CPT

## 2023-10-16 PROCEDURE — 87340 HEPATITIS B SURFACE AG IA: CPT

## 2023-10-16 PROCEDURE — 80162 ASSAY OF DIGOXIN TOTAL: CPT

## 2023-10-16 PROCEDURE — 36415 COLL VENOUS BLD VENIPUNCTURE: CPT

## 2023-10-17 ENCOUNTER — TELEPHONE (OUTPATIENT)
Dept: PRIMARY CARE | Facility: CLINIC | Age: 68
End: 2023-10-17

## 2023-10-17 DIAGNOSIS — I50.22 CHRONIC SYSTOLIC CONGESTIVE HEART FAILURE (MULTI): ICD-10-CM

## 2023-10-17 LAB — NONINV COLON CA DNA+OCC BLD SCRN STL QL: POSITIVE

## 2023-10-17 RX ORDER — FENOFIBRATE 145 MG/1
145 TABLET, FILM COATED ORAL DAILY
COMMUNITY
Start: 2023-10-17

## 2023-10-17 RX ORDER — SACUBITRIL AND VALSARTAN 24; 26 MG/1; MG/1
1 TABLET, FILM COATED ORAL 2 TIMES DAILY
Qty: 180 TABLET | Refills: 0 | OUTPATIENT
Start: 2023-10-17 | End: 2024-01-15

## 2023-10-17 NOTE — TELEPHONE ENCOUNTER
----- Message from RUKHSANA Fried-CNS sent at 10/17/2023 11:25 AM EDT -----  Cologuard is positive. Will need to discuss with Oncology when it is appropriate for him to consider Colonoscopy pending timing of his treatments.

## 2023-10-18 ENCOUNTER — OFFICE VISIT (OUTPATIENT)
Dept: CARDIOLOGY | Facility: CLINIC | Age: 68
End: 2023-10-18
Payer: MEDICARE

## 2023-10-18 VITALS
DIASTOLIC BLOOD PRESSURE: 50 MMHG | WEIGHT: 195 LBS | SYSTOLIC BLOOD PRESSURE: 98 MMHG | BODY MASS INDEX: 26.45 KG/M2 | HEART RATE: 68 BPM

## 2023-10-18 DIAGNOSIS — I25.10 MILD CAD: ICD-10-CM

## 2023-10-18 DIAGNOSIS — I48.91 ATRIAL FIBRILLATION, UNSPECIFIED TYPE (MULTI): ICD-10-CM

## 2023-10-18 DIAGNOSIS — E78.5 HYPERLIPIDEMIA, UNSPECIFIED HYPERLIPIDEMIA TYPE: ICD-10-CM

## 2023-10-18 DIAGNOSIS — I50.22 CHRONIC SYSTOLIC CONGESTIVE HEART FAILURE (MULTI): ICD-10-CM

## 2023-10-18 DIAGNOSIS — Q23.1 BICUSPID AORTIC VALVE (HHS-HCC): ICD-10-CM

## 2023-10-18 DIAGNOSIS — I48.91 ATRIAL FIBRILLATION, UNSPECIFIED TYPE (MULTI): Primary | ICD-10-CM

## 2023-10-18 DIAGNOSIS — I71.21 ASCENDING AORTIC ANEURYSM, UNSPECIFIED WHETHER RUPTURED (CMS-HCC): ICD-10-CM

## 2023-10-18 PROBLEM — Q23.81 BICUSPID AORTIC VALVE: Status: ACTIVE | Noted: 2023-10-18

## 2023-10-18 LAB — DIGOXIN SERPL-MCNC: 0.58 NG/ML (ref 0.8–?)

## 2023-10-18 PROCEDURE — 3074F SYST BP LT 130 MM HG: CPT | Performed by: NURSE PRACTITIONER

## 2023-10-18 PROCEDURE — 99214 OFFICE O/P EST MOD 30 MIN: CPT | Performed by: NURSE PRACTITIONER

## 2023-10-18 PROCEDURE — 1036F TOBACCO NON-USER: CPT | Performed by: NURSE PRACTITIONER

## 2023-10-18 PROCEDURE — 1159F MED LIST DOCD IN RCRD: CPT | Performed by: NURSE PRACTITIONER

## 2023-10-18 PROCEDURE — 3078F DIAST BP <80 MM HG: CPT | Performed by: NURSE PRACTITIONER

## 2023-10-18 PROCEDURE — 1126F AMNT PAIN NOTED NONE PRSNT: CPT | Performed by: NURSE PRACTITIONER

## 2023-10-18 PROCEDURE — 1160F RVW MEDS BY RX/DR IN RCRD: CPT | Performed by: NURSE PRACTITIONER

## 2023-10-18 RX ORDER — METOPROLOL SUCCINATE 25 MG/1
25 TABLET, EXTENDED RELEASE ORAL DAILY
COMMUNITY
End: 2023-10-18 | Stop reason: SDUPTHER

## 2023-10-18 RX ORDER — METOPROLOL SUCCINATE 25 MG/1
25 TABLET, EXTENDED RELEASE ORAL DAILY
Qty: 90 TABLET | Refills: 3 | Status: SHIPPED | OUTPATIENT
Start: 2023-10-18 | End: 2024-10-17

## 2023-10-18 RX ORDER — DIGOXIN 125 MCG
125 TABLET ORAL DAILY
Qty: 90 TABLET | Refills: 1 | Status: SHIPPED | OUTPATIENT
Start: 2023-10-18 | End: 2024-03-18

## 2023-10-18 RX ORDER — ROSUVASTATIN CALCIUM 5 MG/1
5 TABLET, COATED ORAL DAILY
Qty: 90 TABLET | Refills: 1 | Status: SHIPPED | OUTPATIENT
Start: 2023-10-18 | End: 2024-10-17

## 2023-10-18 RX ORDER — METOPROLOL SUCCINATE 50 MG/1
50 TABLET, EXTENDED RELEASE ORAL DAILY
COMMUNITY
End: 2023-10-18 | Stop reason: SDUPTHER

## 2023-10-18 RX ORDER — METOPROLOL SUCCINATE 50 MG/1
50 TABLET, EXTENDED RELEASE ORAL DAILY
Qty: 90 TABLET | Refills: 3 | Status: SHIPPED | OUTPATIENT
Start: 2023-10-18 | End: 2024-06-06 | Stop reason: HOSPADM

## 2023-10-18 RX ORDER — DIGOXIN 125 MCG
125 TABLET ORAL DAILY
Qty: 30 TABLET | Refills: 0 | Status: SHIPPED | OUTPATIENT
Start: 2023-10-18 | End: 2023-10-18 | Stop reason: SDUPTHER

## 2023-10-18 RX ORDER — SACUBITRIL AND VALSARTAN 24; 26 MG/1; MG/1
1 TABLET, FILM COATED ORAL 2 TIMES DAILY
Qty: 180 TABLET | Refills: 1 | Status: SHIPPED | OUTPATIENT
Start: 2023-10-18 | End: 2024-06-03 | Stop reason: SDUPTHER

## 2023-10-18 RX ORDER — SPIRONOLACTONE 25 MG/1
12.5 TABLET ORAL DAILY
Qty: 45 TABLET | Refills: 1 | Status: SHIPPED | OUTPATIENT
Start: 2023-10-18 | End: 2024-06-03

## 2023-10-18 ASSESSMENT — ENCOUNTER SYMPTOMS
PALPITATIONS: 0
NAUSEA: 0
NEAR-SYNCOPE: 0
COUGH: 0
ALTERED MENTAL STATUS: 0
HEMATOCHEZIA: 0
SHORTNESS OF BREATH: 0
DYSPNEA ON EXERTION: 0
WHEEZING: 0
ORTHOPNEA: 0
CHILLS: 0
FEVER: 0
SYNCOPE: 0
HEMATURIA: 0
VOMITING: 0
IRREGULAR HEARTBEAT: 0

## 2023-10-18 NOTE — PROGRESS NOTES
Chief Complaint/Reason for Visit:  No chief complaint on file. 3 month follow up    History Of Present Illness:    Kevin Rubi is a 68 y.o. male that presents to the office for 3 month follow up.  Taking medications as prescribed.     Patient reports that he has been newly diagnosed with lung cancer and will be undergoing chemotherapy and radiation soon.  He states there are no surgical procedures planned at this time.  He presents today for follow-up and medication refills with his wife.  They report that they have been approved for financial assistance for Eliquis and are working on approval for Entresto. Overall feeling well.    Past Medical History:  He has a past medical history of Impacted cerumen, bilateral (08/14/2016), Otitis media, unspecified, right ear (08/14/2016), Personal history of peptic ulcer disease, and Tinnitus, bilateral (08/14/2016).    Past Surgical History:  He has a past surgical history that includes Other surgical history (02/13/2019); Other surgical history (02/13/2019); and CT angio abdomen pelvis w and or wo IV IV contrast (8/8/2023).      Social History:  He reports that he has never smoked. He has never used smokeless tobacco. He reports that he does not drink alcohol and does not use drugs.    Family History:  No family history on file.     Allergies:  Patient has no known allergies.    Review of Systems   Constitutional: Negative for chills and fever.   Cardiovascular:  Negative for chest pain, dyspnea on exertion, irregular heartbeat, leg swelling, near-syncope, orthopnea, palpitations and syncope.   Respiratory:  Negative for cough, shortness of breath and wheezing.    Gastrointestinal:  Negative for hematochezia, melena, nausea and vomiting.   Genitourinary:  Negative for hematuria.   Psychiatric/Behavioral:  Negative for altered mental status.        Objective      Vitals reviewed.   Constitutional:       Appearance: Healthy appearance.   Pulmonary:      Effort:  Pulmonary effort is normal.      Breath sounds: Normal breath sounds.   Cardiovascular:      PMI at left midclavicular line. Normal rate. Regular rhythm. S1 with normal intensity. S2 with normal intensity.       Murmurs: There is no murmur.   Edema:     Peripheral edema absent.   Abdominal:      General: Bowel sounds are normal.   Skin:     General: Skin is warm and dry.   Psychiatric:         Attention and Perception: Attention normal.         Mood and Affect: Mood normal.         Behavior: Behavior is cooperative.         Current Outpatient Medications   Medication Instructions    apixaban (Eliquis) 5 mg tablet TAKE 1 TABLET BY MOUTH TWO TIMES A DAY    clopidogrel (PLAVIX) 75 mg, oral, Daily    digoxin (Lanoxin) 125 MCG tablet 1 tablet, oral, Daily    Eliquis 5 mg, oral, 2 times daily    Entresto 24-26 mg tablet oral, 2 times daily    fenofibrate (Tricor) 145 mg tablet     metoprolol succinate XL (Toprol-XL) 100 mg 24 hr tablet TAKE 1 TABLET BY MOUTH ONCE DAILY    metoprolol succinate XL (TOPROL-XL) 25 mg, oral, Daily, Do not crush or chew.    metoprolol succinate XL (TOPROL-XL) 50 mg, oral, Daily, Do not crush or chew.    rosuvastatin (Crestor) 5 mg tablet 1 tablet, oral, Daily    spironolactone (ALDACTONE) 12.5 mg, oral, Daily        Last Labs:  CBC -  Lab Results   Component Value Date    WBC 8.0 10/16/2023    HGB 12.6 (L) 10/16/2023    HCT 39.6 (L) 10/16/2023    MCV 90 10/16/2023     10/16/2023       CMP -  Lab Results   Component Value Date    CALCIUM CANCELED 09/16/2023    PHOS CANCELED 09/16/2023    PROT 6.0 (L) 09/12/2023    ALBUMIN CANCELED 09/16/2023    AST 15 09/12/2023    ALT 11 09/12/2023    ALKPHOS 51 09/12/2023    BILITOT 0.4 09/12/2023       LIPID PANEL -   Lab Results   Component Value Date    CHOL 131 04/01/2023    TRIG 232 (H) 04/01/2023    HDL 45.6 04/01/2023    CHHDL 2.9 04/01/2023    LDLF 39 04/01/2023    VLDL 46 (H) 04/01/2023    Critical access hospital 85 04/01/2023       RENAL FUNCTION PANEL -   Lab  Results   Component Value Date    GLUCOSE CANCELED 09/16/2023    NA CANCELED 09/16/2023    K CANCELED 09/16/2023    CL CANCELED 09/16/2023    CO2 CANCELED 09/16/2023    ANIONGAP CANCELED 09/16/2023    BUN CANCELED 09/16/2023    CREATININE CANCELED 09/16/2023    GFRMALE CANCELED 09/16/2023    CALCIUM CANCELED 09/16/2023    PHOS CANCELED 09/16/2023    ALBUMIN CANCELED 09/16/2023        Lab Results   Component Value Date    HGBA1C 7.3 (A) 08/08/2023       Reviewed BMP 9/15/23  No results found for this or any previous visit.     Last Cardiology Tests:    Echo 6/14/19:   LVEF 60%. Probable bicuspid aortic valve with mild AS      Visit Vitals  BP 98/50   Pulse 68   Wt 88.5 kg (195 lb)   BMI 26.45 kg/m²   Smoking Status Never   BSA 2.12 m²       Assessment/Plan   There were no encounter diagnoses.    1. Chronic systolic heart failure  NICM EF 20% > repeat TTE 6/14/19 EF 60%, stage I DD  Patient initially had an ejection fraction of 20% which was suspected as alcohol related cardiomyopathy.   Continue current GDMT: entresto, toprol XL, spironolactone, digoxin  Check digoxin level     2. Mild nonobstructive CAD  [facility] in 2019 with mild CAD  not on ASA d/t taking Eliquis  Continue rosuvastatin 5 mg daily     3. Afib, paroxysmal  Continue BB/digoxin for rate control, and apixaban of OAC  Plan is to rate control.  Patient has declined rhythm control strategy in the past.  Check digoxin level - given 30 days of digoxin > needs lab work prior to further refills  [id number] score is at least 3 (h/o CHF, HTN, age)  Continue apixaban 5 mg BID      4. Ascending aortic aneurysm (also has bicuspid aortic valve)  CTA chest in 2021 with ascending aorta 4.4 x 4.3 cm  Check repeat TTE for surveillance     5. Bicuspid aortic valve  Noted to have bicuspid aortic valve in 2019 on TTE with mild AS  Repeat echocardiogram     6. Newly diagnosed lung cancer  Management per oncology - patient reports plan for chemo and radiation.    Tressa MICHELE  RUKHSANA Khalil-CNP

## 2023-10-18 NOTE — PATIENT INSTRUCTIONS
Recommend Mediterranean style of eating  Continue current medications  Check echocardiogram  Check lab work for your digoxin level  Follow-up with Dr. Rosario in 6 months  If you have any questions or cardiac concerns, please call our office at [phone].

## 2023-10-19 ENCOUNTER — HOSPITAL ENCOUNTER (OUTPATIENT)
Dept: RADIATION ONCOLOGY | Facility: HOSPITAL | Age: 68
Setting detail: RADIATION/ONCOLOGY SERIES
Discharge: STILL ADMITTED OR ADMINISTRATIVE | End: 2023-10-19
Payer: MEDICARE

## 2023-10-19 VITALS
RESPIRATION RATE: 18 BRPM | DIASTOLIC BLOOD PRESSURE: 81 MMHG | BODY MASS INDEX: 26.46 KG/M2 | HEIGHT: 72 IN | OXYGEN SATURATION: 94 % | WEIGHT: 195.33 LBS | SYSTOLIC BLOOD PRESSURE: 128 MMHG | TEMPERATURE: 97 F | HEART RATE: 97 BPM

## 2023-10-19 DIAGNOSIS — C34.32 PRIMARY CANCER OF LEFT LOWER LOBE OF LUNG (MULTI): Primary | Chronic | ICD-10-CM

## 2023-10-19 DIAGNOSIS — C34.90 MALIGNANT NEOPLASM OF BRONCHUS AND LUNG (MULTI): ICD-10-CM

## 2023-10-19 PROCEDURE — 99205 OFFICE O/P NEW HI 60 MIN: CPT | Performed by: RADIOLOGY

## 2023-10-19 PROCEDURE — 99215 OFFICE O/P EST HI 40 MIN: CPT | Mod: GC | Performed by: RADIOLOGY

## 2023-10-19 ASSESSMENT — PATIENT HEALTH QUESTIONNAIRE - PHQ9
1. LITTLE INTEREST OR PLEASURE IN DOING THINGS: SEVERAL DAYS
4. FEELING TIRED OR HAVING LITTLE ENERGY: SEVERAL DAYS
5. POOR APPETITE OR OVEREATING: NOT AT ALL
3. TROUBLE FALLING OR STAYING ASLEEP OR SLEEPING TOO MUCH: MORE THAN HALF THE DAYS
2. FEELING DOWN, DEPRESSED OR HOPELESS: MORE THAN HALF THE DAYS
8. MOVING OR SPEAKING SO SLOWLY THAT OTHER PEOPLE COULD HAVE NOTICED. OR THE OPPOSITE, BEING SO FIGETY OR RESTLESS THAT YOU HAVE BEEN MOVING AROUND A LOT MORE THAN USUAL: NOT AT ALL
9. THOUGHTS THAT YOU WOULD BE BETTER OFF DEAD, OR OF HURTING YOURSELF: NOT AT ALL
SUM OF ALL RESPONSES TO PHQ9 QUESTIONS 1 AND 2: 3
10. IF YOU CHECKED OFF ANY PROBLEMS, HOW DIFFICULT HAVE THESE PROBLEMS MADE IT FOR YOU TO DO YOUR WORK, TAKE CARE OF THINGS AT HOME, OR GET ALONG WITH OTHER PEOPLE: NOT DIFFICULT AT ALL
7. TROUBLE CONCENTRATING ON THINGS, SUCH AS READING THE NEWSPAPER OR WATCHING TELEVISION: NOT AT ALL
SUM OF ALL RESPONSES TO PHQ QUESTIONS 1-9: 8
6. FEELING BAD ABOUT YOURSELF - OR THAT YOU ARE A FAILURE OR HAVE LET YOURSELF OR YOUR FAMILY DOWN: MORE THAN HALF THE DAYS

## 2023-10-19 ASSESSMENT — COLUMBIA-SUICIDE SEVERITY RATING SCALE - C-SSRS
6. HAVE YOU EVER DONE ANYTHING, STARTED TO DO ANYTHING, OR PREPARED TO DO ANYTHING TO END YOUR LIFE?: NO
2. HAVE YOU ACTUALLY HAD ANY THOUGHTS OF KILLING YOURSELF?: NO
1. IN THE PAST MONTH, HAVE YOU WISHED YOU WERE DEAD OR WISHED YOU COULD GO TO SLEEP AND NOT WAKE UP?: NO

## 2023-10-19 ASSESSMENT — PAIN SCALES - GENERAL: PAINLEVEL: 0-NO PAIN

## 2023-10-19 NOTE — PROGRESS NOTES
Radiation Oncology Outpatient Consult    Patient Name:  Kevin Rubi  MRN:  [id number]  :  1955    Referring Provider: Janell Longoria MD  Primary Care Provider: DANIEL Fried  Care Team: Patient Care Team:  DANIEL Fried as PCP - General (Internal Medicine)  DANIEL Fried as PCP - [facility] PCP  Wisam Latham MD as PCP - [employer] PCP  Janell Longoria MD as Consulting Physician (Hematology and Oncology)    Date of Service: 10/19/2023     SUBJECTIVE  History of Present Illness:  Kevin Rubi is a 68 y.o. male who was referred by Janell Longoria MD for a consultation to the [facility] Department of Radiation Oncology.  He is presenting for evaluation and management of Primary cancer of left lower lobe of lung (CMS/HCC), Clinical: Stage IIIC (cT3, cN3, cM0) .     Mr. Up was noted to have abnormal imaging back in , was seen by pulmonary and was told to complete w/u, including PET scan and potential biopsy but did not follow recommendations.       He developed abdominal pain in 2023. He was seen at [facility] where he underwent a CT of his chest, abdomen and pelvis, and was noted to have a 3.7 x 2.9 x 2.9 cm irregular mass in the left lower lobe medially along the left heart border suspicious for bronchogenic malignancy. Enlarged subcarinal lymph nodes suspicious of metastatic disease were also noted.     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     He was admitted again to [facility] for a  bronchoscopy with EBUS on 09/15/2023. Pathology from this procedure demonstrated minute fragments of non-small cell carcinoma consistent with adenocarcinoma with a PD-L1 of 95% and a KRAS G12D mutation. Biopsy of a 2R lymph node demonstrated malignant cells from an adenocarcinoma. Biopsy of an 11R lymph node did not demonstrate any malignant cells.     He underwent a repeat CT chest with IV contrast on 09/12/2023 that demonstrated enlargement of a 3.35 x 3.4 x 2.5 cm left lower lobe pulmonary mass, enlargement of mediastinal lymph nodes concerning for josiah metastases, and similar appearance of multiple bilateral speculated pulmonary nodules and patchy bilateral nodular and ground-glass opacities.     MRI brain on 09/27/2023 that did not demonstrate any evidence of intracranial metastatic disease.     He was seen by medical oncology on 10/04/2023 and was staged as a [id number], stage IIIC NSCLC adenocarcinoma of the left lower lobe with recommendation with chemotherapy carboplatin and pemetrexed along with radiation.     He now presents for outpatient radiation oncology consultation with his wife. He reports a persistent non-productive cough. He denies any chest pain, shortness of breath, weight loss, fatigue, abdominal pain, back pain, headaches, vision changes, nausea, vomiting, fevers, or chills.     Prior Radiotherapy:  No    Current Systemic Treatment:  No     Presence of Pacemaker or ICD:  No    Past Medical History:    Past Medical History:   Diagnosis Date    Aortic aneurysm (CMS/HCC)     Atrial fibrillation (CMS/HCC)     Coronary artery disease     Hyperlipidemia     Hypertension     Lung cancer (CMS/HCC)     Personal history of peptic ulcer disease     History of gastric ulcer    Superior mesenteric artery stenosis (CMS/HCC)     Tinnitus, bilateral 08/14/2016    Tinnitus of both ears         Past Surgical History:    Past Surgical History:   Procedure Laterality Date    CT ABDOMEN PELVIS ANGIOGRAM W AND/OR  WO IV CONTRAST  08/08/2023    CT ABDOMEN PELVIS ANGIOGRAM W AND/OR WO IV CONTRAST 8/8/2023 [facility] CT    OTHER SURGICAL HISTORY  02/13/2019    Esophagogastroduodenoscopy    OTHER SURGICAL HISTORY  02/13/2019    Stomach surgery    SUPERIOR MESTENTERIC ARTERY STENT          Family History:  Cancer-related family history includes Cancer in his mother.    Social History:    Social History     Tobacco Use    Smoking status: Former     Packs/day: 0.50     Years: 30.00     Additional pack years: 0.00     Total pack years: 15.00     Types: Cigarettes     Quit date: 2013     Years since quitting: 10.8    Smokeless tobacco: Never   Vaping Use    Vaping Use: Never used   Substance Use Topics    Alcohol use: Not Currently    Drug use: Never       Allergies:  No Known Allergies     Medications:    Current Outpatient Medications:     clopidogrel (Plavix) 75 mg tablet, Take 1 tablet (75 mg) by mouth once daily., Disp: 90 tablet, Rfl: 1    digoxin (Lanoxin) 125 MCG tablet, Take 1 tablet (125 mcg) by mouth once daily for 180 doses., Disp: 90 tablet, Rfl: 1    Eliquis 5 mg tablet, Take 1 tablet (5 mg) by mouth 2 times a day., Disp: , Rfl:     Entresto 24-26 mg tablet, Take 1 tablet by mouth 2 times a day., Disp: 180 tablet, Rfl: 1    fenofibrate (Tricor) 145 mg tablet, , Disp: , Rfl:     metoprolol succinate XL (Toprol-XL) 25 mg 24 hr tablet, Take 1 tablet (25 mg) by mouth once daily. Do not crush or chew., Disp: 90 tablet, Rfl: 3    metoprolol succinate XL (Toprol-XL) 50 mg 24 hr tablet, Take 1 tablet (50 mg) by mouth once daily. Do not crush or chew., Disp: 90 tablet, Rfl: 3    rosuvastatin (Crestor) 5 mg tablet, Take 1 tablet (5 mg) by mouth once daily., Disp: 90 tablet, Rfl: 1    spironolactone (Aldactone) 25 mg tablet, Take 0.5 tablets (12.5 mg) by mouth once daily., Disp: 45 tablet, Rfl: 1    apixaban (Eliquis) 5 mg tablet, Take 1 tablet (5 mg) by mouth 2 times a day. (Patient not taking: Reported on 10/19/2023), Disp: 180 tablet,  Rfl: 1      Review of Systems:  Review of Systems   All other systems reviewed and are negative.    The patient's current pain level was assessed.  They report currently having a pain of 0 out of 10.  They feel their pain is under control without the use of pain medications.    Performance Status:  The Karnofsky performance scale today is 90, Able to carry on normal activity; minor signs or symptoms of disease (ECOG equivalent 0).      OBJECTIVE  Physical Exam:  /81 (BP Location: Right arm, Patient Position: Sitting, BP Cuff Size: Large adult)   Pulse 97   Temp 36.1 °C (97 °F) (Skin)   Resp 18   Ht 1.829 m (6')   Wt 88.6 kg (195 lb 5.2 oz)   SpO2 94%   BMI 26.49 kg/m²    Physical Exam  Constitutional:       Appearance: Normal appearance. He is not ill-appearing.   HENT:      Head: Normocephalic and atraumatic.      Mouth/Throat:      Mouth: Mucous membranes are moist.   Eyes:      Extraocular Movements: Extraocular movements intact.      Pupils: Pupils are equal, round, and reactive to light.   Cardiovascular:      Rate and Rhythm: Normal rate and regular rhythm.   Pulmonary:      Effort: Pulmonary effort is normal.      Breath sounds: Normal breath sounds.   Abdominal:      General: Abdomen is flat. Bowel sounds are normal.      Palpations: Abdomen is soft.   Musculoskeletal:         General: Normal range of motion.      Cervical back: Normal range of motion and neck supple.      Right lower leg: No edema.      Left lower leg: No edema.   Skin:     General: Skin is warm.   Neurological:      General: No focal deficit present.      Mental Status: He is alert.   Psychiatric:         Mood and Affect: Mood normal.        Laboratory Review:  There are no laboratory contraindications to radiation therapy.    The pertinent lab results were reviewed and discussed with the patient.  Notably,     Results from last 7 days   Lab Units 10/16/23  0814   WBC AUTO x10*3/uL 8.0   HEMOGLOBIN g/dL 12.6*   HEMATOCRIT %  39.6*   PLATELETS AUTO x10*3/uL 192   NEUTROS PCT AUTO % 57.6   LYMPHS PCT AUTO % 30.6   MONOS PCT AUTO % 7.3   EOS PCT AUTO % 3.2      Pathology Review:  The pertinent pathology results were reviewed and discussed with the patient.  Notably,     Accession #: [id number]            Pathologist:                   Asha Lange MD, Ph.D.  Date of Procedure:    9/15/2023  Date Received:          9/15/2023  Date Reported           9/21/2023  Submitting Physician:   JACKIE TURNER MD  Location:                    [facility]  Other External #    Procedures/Addenda Present    FINAL DIAGNOSIS  2R,  CORE BIOPSY:  -- MINUTE FRAGMENTS OF NON-SMALL CELL CARCINOMA CONSISTENT WITH ADENOCARCINOMA.   SEE NOTE  Note:  Neoplastic cells are immunoreactive with TTF-1.    Viable tumor nuclei %:  <20  Comments:  (note if necrosis percentage is >20%)      Addendum Diagnosis  PD-L1 22C3  by Immunohistochemistry with Interpretation, pembrolizumab  (KEYTRUDA)  Interpretation: High Expression  Tumor Proportion Score (TPS): 95%    Imaging:  The pertinent imaging results were reviewed and discussed with the patient.  Notably,    MRI brain 09/27/2023  IMPRESSION:  No abnormal intracranial enhancing mass lesion to suggest brain metastasis is noted. There is an 8 mm focus of nonspecific abnormal diminished bone marrow signal on the T1 images demonstrating a component of enhancement on the post gadolinium images along the left frontal bone as seen on axial slice 10 of 27. The lesion is nonspecific and could be benign or malignant in etiology. Given the patient's clinical history, an osseous metastasis can not be excluded. Correlation with any previous outside MRI studies if available would be helpful to assess stability  of this finding. If none are available, a follow-up MRI in 3 months would be recommended. There is moderate brain parenchymal volume loss. There is an area of encephalomalacia/gliosis along the lateral left frontal lobe.  There are  scattered nonspecific white matter changes within the cerebral hemispheres bilaterally which while nonspecific, given the patient's age, likely represent sequelae of more remote small-vessel ischemic change.       CT chest 09/12/2023  IMPRESSION:  1. Enlargement of a 3.5 x 3.4 x 2.5 cm left lower lobe pulmonary mass in keeping with suspected primary pulmonary neoplasm.  2. Enlargement of mediastinal lymph nodes concerning for josiah metastases.  3. Similar appearance of multiple bilateral spiculated pulmonary nodules and patchy bilateral nodular and ground-glass opacities.  4. Severe coronary artery calcifications. The study is not optimized for evaluation of coronary arteries.  5. Ectatic ascending thoracic aorta moderate atherosclerotic  calcifications.    NM PET CT lung SPN 08/03/2023  IMPRESSION:  1. Intensely hypermetabolic left lower lobe mass compatible with a primary lung malignancy.  2. Hypermetabolic left hilar, mediastinal, and right supraclavicular lymphadenopathy compatible with metastatic josiah disease.  3. Nonspecific mild hypermetabolic activity within two subcentimeter left upper lobe nodular opacities which have been present dating back to 01/24/2019. Slow growing malignancy is not excluded.  4. Indeterminate subcentimeter mildly hypermetabolic focus in the right parotid gland which could reflect a parotid lesion or intraparotid lymph node.     ASSESSMENT:  Kevin Rubi is a 68 y.o. male with Primary cancer of left lower lobe of lung (CMS/HCC), Clinical: Stage IIIC (cT3, cN3, cM0). He now presents for outpatient radiation oncology consultation to discuss radiation treatment for his locally advanced lung cancer. He is being planned for chemotherapy with carboplatin and pemetrexed with cycle 1 to commence on 10/23/2023 at  [facility]. Cycles 2 and 3 are being planned to be concurrent with his radiation treatment.     We discussed radiation treatment for lung cancer. We discussed that radiation  treatment is a locally directed treatment designed to address both disease in the lungs and lymph nodes.     We also discussed the technical aspects of radiation treatment, including obtaining a radiation treatment planning CT scan for target volume delineation. We discussed the technical challenges posed by his tumor. We discussed that location of his tumor in his left lower lobe along with contralateral high mediastinal/ supraclavicular disease might lead to the treatment of a large area of the lung with low dose radiation when using a conventional x-ray based treatment plan. We discussed the possibility of proton beam therapy as a means to reduce doses to the OARs, and have already engaged our physics team to determine if proton beam therapy will have any advantages for this patient.    The patient [place] [facility] given his history of Afib, and thus we will treat him with radiation treatment [place] [facility]. He has already been made aware of lodging through [facility]. We informed him that radiation treatment is given on a daily basis Monday through Friday, and we anticipate about six weeks of treatment to achieve a total dose of 60 Gy.    We discussed the potential side effects of radiation treatment, including fatigue, cough, shortness of breath, esophagitis, chest pain. These side effects may lead to weight loss, and thus we encouraged him to pursue a high caloric intake to offset any weight loss. We discussed late effects of radiation treatment, including the increased likelihood of developing radiation induced pneumonitis.    His wife and him had the opportunity to ask many questions. All of their questions were answered to their satisfaction, and informed consent was obtained today.    PLAN:   -Schedule radiation treatment planning CT scan.  -Plan to treat to 60 Gy in 30 fractions over the course of 6 weeks with five daily treatments given Monday through Friday.  -Confirm patient has  accommodations at [facility]    NCCN Guidelines were applicable to guide this patients treatment plan.     Thank you for allowing us to participate in the care of this kind patient.  Patient seen and discussed with attending physician Dr. Aydin Rich MD PGY5 Radiation Oncology   For  Madelyn Perrin MD, M  [facility]  Faculty, [employer]  [email or web address]  Our [place]: “To Heal, To Teach, To Discover.”  RN partner: [email or web address]    Phone (scheduling): [phone]/ [email or web address]  Phone (office): [phone]  Phone (after hours): [phone]      ATTENDING ADDENDUM:  I saw and evaluated the patient with the resident. I personally obtained the key and critical portions of the history and physical exam and directly counseled the patient of the treatment plan. I reviewed the resident documentation and discussed the patient with the resident. I agree with the resident's medical decision making as documented in the note.

## 2023-10-23 ENCOUNTER — LAB (OUTPATIENT)
Dept: LAB | Facility: LAB | Age: 68
End: 2023-10-23
Payer: MEDICARE

## 2023-10-23 ENCOUNTER — TELEPHONE (OUTPATIENT)
Dept: RADIATION ONCOLOGY | Facility: HOSPITAL | Age: 68
End: 2023-10-23

## 2023-10-23 ENCOUNTER — TELEPHONE (OUTPATIENT)
Dept: PRIMARY CARE | Facility: CLINIC | Age: 68
End: 2023-10-23

## 2023-10-23 ENCOUNTER — INFUSION (OUTPATIENT)
Dept: HEMATOLOGY/ONCOLOGY | Facility: CLINIC | Age: 68
End: 2023-10-23
Payer: MEDICARE

## 2023-10-23 ENCOUNTER — OFFICE VISIT (OUTPATIENT)
Dept: HEMATOLOGY/ONCOLOGY | Facility: CLINIC | Age: 68
End: 2023-10-23
Payer: MEDICARE

## 2023-10-23 VITALS
TEMPERATURE: 97.3 F | HEART RATE: 82 BPM | DIASTOLIC BLOOD PRESSURE: 60 MMHG | SYSTOLIC BLOOD PRESSURE: 108 MMHG | BODY MASS INDEX: 26.52 KG/M2 | OXYGEN SATURATION: 97 % | RESPIRATION RATE: 18 BRPM | WEIGHT: 195.55 LBS

## 2023-10-23 DIAGNOSIS — C34.32 PRIMARY CANCER OF LEFT LOWER LOBE OF LUNG (MULTI): ICD-10-CM

## 2023-10-23 DIAGNOSIS — C34.90: ICD-10-CM

## 2023-10-23 DIAGNOSIS — Z00.00 WELCOME TO MEDICARE PREVENTIVE VISIT: ICD-10-CM

## 2023-10-23 DIAGNOSIS — E78.2 MIXED HYPERLIPIDEMIA: ICD-10-CM

## 2023-10-23 DIAGNOSIS — Z12.5 PROSTATE CANCER SCREENING: ICD-10-CM

## 2023-10-23 DIAGNOSIS — Z12.11 COLON CANCER SCREENING: ICD-10-CM

## 2023-10-23 DIAGNOSIS — R73.09 ELEVATED GLUCOSE: ICD-10-CM

## 2023-10-23 DIAGNOSIS — Z51.11 ENCOUNTER FOR ANTINEOPLASTIC CHEMOTHERAPY: Primary | ICD-10-CM

## 2023-10-23 DIAGNOSIS — I10 PRIMARY HYPERTENSION: ICD-10-CM

## 2023-10-23 DIAGNOSIS — I25.10 CORONARY ARTERY DISEASE INVOLVING NATIVE CORONARY ARTERY, UNSPECIFIED WHETHER ANGINA PRESENT, UNSPECIFIED WHETHER NATIVE OR TRANSPLANTED HEART: ICD-10-CM

## 2023-10-23 DIAGNOSIS — C34.90: Primary | ICD-10-CM

## 2023-10-23 DIAGNOSIS — I77.1 CELIAC ARTERY STENOSIS (CMS-HCC): ICD-10-CM

## 2023-10-23 LAB
ALBUMIN SERPL BCP-MCNC: 4.1 G/DL (ref 3.4–5)
ALP SERPL-CCNC: 47 U/L (ref 33–136)
ALT SERPL W P-5'-P-CCNC: 10 U/L (ref 10–52)
ANION GAP SERPL CALC-SCNC: 14 MMOL/L (ref 10–20)
AST SERPL W P-5'-P-CCNC: 15 U/L (ref 9–39)
BASOPHILS # BLD AUTO: 0.07 X10*3/UL (ref 0–0.1)
BASOPHILS NFR BLD AUTO: 0.8 %
BILIRUB SERPL-MCNC: 0.5 MG/DL (ref 0–1.2)
BUN SERPL-MCNC: 21 MG/DL (ref 6–23)
CALCIUM SERPL-MCNC: 9.1 MG/DL (ref 8.6–10.3)
CHLORIDE SERPL-SCNC: 106 MMOL/L (ref 98–107)
CHOLEST SERPL-MCNC: 140 MG/DL (ref 0–199)
CHOLESTEROL/HDL RATIO: 3.4
CO2 SERPL-SCNC: 23 MMOL/L (ref 21–32)
CREAT SERPL-MCNC: 1.24 MG/DL (ref 0.5–1.3)
EOSINOPHIL # BLD AUTO: 0.28 X10*3/UL (ref 0–0.7)
EOSINOPHIL NFR BLD AUTO: 3.1 %
ERYTHROCYTE [DISTWIDTH] IN BLOOD BY AUTOMATED COUNT: 15.1 % (ref 11.5–14.5)
GFR SERPL CREATININE-BSD FRML MDRD: 64 ML/MIN/1.73M*2
GLUCOSE SERPL-MCNC: 179 MG/DL (ref 74–99)
HCT VFR BLD AUTO: 39 % (ref 41–52)
HDLC SERPL-MCNC: 41.3 MG/DL
HGB BLD-MCNC: 12.2 G/DL (ref 13.5–17.5)
IMM GRANULOCYTES # BLD AUTO: 0.05 X10*3/UL (ref 0–0.7)
IMM GRANULOCYTES NFR BLD AUTO: 0.6 % (ref 0–0.9)
LDLC SERPL CALC-MCNC: 74 MG/DL
LYMPHOCYTES # BLD AUTO: 2.34 X10*3/UL (ref 1.2–4.8)
LYMPHOCYTES NFR BLD AUTO: 26.3 %
MCH RBC QN AUTO: 27.9 PG (ref 26–34)
MCHC RBC AUTO-ENTMCNC: 31.3 G/DL (ref 32–36)
MCV RBC AUTO: 89 FL (ref 80–100)
MONOCYTES # BLD AUTO: 0.55 X10*3/UL (ref 0.1–1)
MONOCYTES NFR BLD AUTO: 6.2 %
NEUTROPHILS # BLD AUTO: 5.61 X10*3/UL (ref 1.2–7.7)
NEUTROPHILS NFR BLD AUTO: 63 %
NON HDL CHOLESTEROL: 99 MG/DL (ref 0–149)
NRBC BLD-RTO: 0 /100 WBCS (ref 0–0)
PLATELET # BLD AUTO: 194 X10*3/UL (ref 150–450)
PMV BLD AUTO: 11.4 FL (ref 7.5–11.5)
POTASSIUM SERPL-SCNC: 4.5 MMOL/L (ref 3.5–5.3)
PROT SERPL-MCNC: 6.8 G/DL (ref 6.4–8.2)
PSA SERPL-MCNC: 0.21 NG/ML
RBC # BLD AUTO: 4.37 X10*6/UL (ref 4.5–5.9)
SODIUM SERPL-SCNC: 138 MMOL/L (ref 136–145)
TRIGL SERPL-MCNC: 122 MG/DL (ref 0–149)
TSH SERPL-ACNC: 2.66 MIU/L (ref 0.44–3.98)
VIT B12 SERPL-MCNC: 178 PG/ML (ref 211–911)
VLDL: 24 MG/DL (ref 0–40)
WBC # BLD AUTO: 8.9 X10*3/UL (ref 4.4–11.3)

## 2023-10-23 PROCEDURE — 2500000004 HC RX 250 GENERAL PHARMACY W/ HCPCS (ALT 636 FOR OP/ED): Performed by: INTERNAL MEDICINE

## 2023-10-23 PROCEDURE — 96375 TX/PRO/DX INJ NEW DRUG ADDON: CPT | Mod: INF

## 2023-10-23 PROCEDURE — 1160F RVW MEDS BY RX/DR IN RCRD: CPT | Performed by: NURSE PRACTITIONER

## 2023-10-23 PROCEDURE — 96367 TX/PROPH/DG ADDL SEQ IV INF: CPT

## 2023-10-23 PROCEDURE — G0103 PSA SCREENING: HCPCS

## 2023-10-23 PROCEDURE — 36415 COLL VENOUS BLD VENIPUNCTURE: CPT

## 2023-10-23 PROCEDURE — 1036F TOBACCO NON-USER: CPT | Performed by: NURSE PRACTITIONER

## 2023-10-23 PROCEDURE — 84075 ASSAY ALKALINE PHOSPHATASE: CPT | Performed by: NURSE PRACTITIONER

## 2023-10-23 PROCEDURE — 80061 LIPID PANEL: CPT | Performed by: CLINICAL NURSE SPECIALIST

## 2023-10-23 PROCEDURE — 84443 ASSAY THYROID STIM HORMONE: CPT

## 2023-10-23 PROCEDURE — 2500000004 HC RX 250 GENERAL PHARMACY W/ HCPCS (ALT 636 FOR OP/ED): Mod: JZ,JG | Performed by: INTERNAL MEDICINE

## 2023-10-23 PROCEDURE — 1159F MED LIST DOCD IN RCRD: CPT | Performed by: NURSE PRACTITIONER

## 2023-10-23 PROCEDURE — 99212 OFFICE O/P EST SF 10 MIN: CPT | Performed by: NURSE PRACTITIONER

## 2023-10-23 PROCEDURE — 96413 CHEMO IV INFUSION 1 HR: CPT

## 2023-10-23 PROCEDURE — 82607 VITAMIN B-12: CPT

## 2023-10-23 PROCEDURE — 96372 THER/PROPH/DIAG INJ SC/IM: CPT

## 2023-10-23 PROCEDURE — 1126F AMNT PAIN NOTED NONE PRSNT: CPT | Performed by: NURSE PRACTITIONER

## 2023-10-23 PROCEDURE — 85025 COMPLETE CBC W/AUTO DIFF WBC: CPT | Performed by: INTERNAL MEDICINE

## 2023-10-23 PROCEDURE — 96411 CHEMO IV PUSH ADDL DRUG: CPT

## 2023-10-23 RX ORDER — PROCHLORPERAZINE MALEATE 10 MG
10 TABLET ORAL EVERY 6 HOURS PRN
Status: DISCONTINUED | OUTPATIENT
Start: 2023-10-23 | End: 2023-10-23 | Stop reason: HOSPADM

## 2023-10-23 RX ORDER — FAMOTIDINE 10 MG/ML
20 INJECTION INTRAVENOUS ONCE AS NEEDED
Status: DISCONTINUED | OUTPATIENT
Start: 2023-10-23 | End: 2023-10-23 | Stop reason: HOSPADM

## 2023-10-23 RX ORDER — PALONOSETRON 0.05 MG/ML
0.25 INJECTION, SOLUTION INTRAVENOUS ONCE
Status: COMPLETED | OUTPATIENT
Start: 2023-10-23 | End: 2023-10-23

## 2023-10-23 RX ORDER — EPINEPHRINE 0.3 MG/.3ML
0.3 INJECTION SUBCUTANEOUS EVERY 5 MIN PRN
Status: DISCONTINUED | OUTPATIENT
Start: 2023-10-23 | End: 2023-10-23 | Stop reason: HOSPADM

## 2023-10-23 RX ORDER — DIPHENHYDRAMINE HYDROCHLORIDE 50 MG/ML
50 INJECTION INTRAMUSCULAR; INTRAVENOUS AS NEEDED
Status: DISCONTINUED | OUTPATIENT
Start: 2023-10-23 | End: 2023-10-23 | Stop reason: HOSPADM

## 2023-10-23 RX ORDER — CYANOCOBALAMIN 1000 UG/ML
1000 INJECTION, SOLUTION INTRAMUSCULAR; SUBCUTANEOUS ONCE
Status: COMPLETED | OUTPATIENT
Start: 2023-10-23 | End: 2023-10-23

## 2023-10-23 RX ORDER — ALBUTEROL SULFATE 0.83 MG/ML
3 SOLUTION RESPIRATORY (INHALATION) AS NEEDED
Status: DISCONTINUED | OUTPATIENT
Start: 2023-10-23 | End: 2023-10-23 | Stop reason: HOSPADM

## 2023-10-23 RX ORDER — PROCHLORPERAZINE EDISYLATE 5 MG/ML
10 INJECTION INTRAMUSCULAR; INTRAVENOUS EVERY 6 HOURS PRN
Status: DISCONTINUED | OUTPATIENT
Start: 2023-10-23 | End: 2023-10-23 | Stop reason: HOSPADM

## 2023-10-23 RX ADMIN — PEMETREXED DISODIUM 1000 MG: 100 INJECTION, POWDER, LYOPHILIZED, FOR SOLUTION INTRAVENOUS at 15:21

## 2023-10-23 RX ADMIN — PALONOSETRON HYDROCHLORIDE 250 MCG: 0.25 INJECTION INTRAVENOUS at 14:03

## 2023-10-23 RX ADMIN — CYANOCOBALAMIN 1000 MCG: 1000 INJECTION INTRAMUSCULAR; SUBCUTANEOUS at 14:09

## 2023-10-23 RX ADMIN — CARBOPLATIN 508.5 MG: 10 INJECTION, SOLUTION INTRAVENOUS at 15:37

## 2023-10-23 RX ADMIN — DEXAMETHASONE SODIUM PHOSPHATE 12 MG: 10 INJECTION, SOLUTION INTRAMUSCULAR; INTRAVENOUS at 14:05

## 2023-10-23 RX ADMIN — FOSAPREPITANT 150 MG: 150 INJECTION, POWDER, LYOPHILIZED, FOR SOLUTION INTRAVENOUS at 14:36

## 2023-10-23 ASSESSMENT — PAIN SCALES - GENERAL: PAINLEVEL: 0-NO PAIN

## 2023-10-23 NOTE — TELEPHONE ENCOUNTER
Called pt to remind of ct sim appointment on 10/26/23 at 12:30. Pt's phone went to voicemail left number if needs to reschedule.

## 2023-10-23 NOTE — PROGRESS NOTES
Oncology History   Primary cancer of left lower lobe of lung (CMS/HCC)   10/4/2023 Initial Diagnosis    Primary cancer of left lower lobe of lung (CMS/HCC)     10/4/2023 Cancer Staged    Staging form: Lung, AJCC 8th Edition, Clinical stage from 10/4/2023: Stage IIIC (cT3, cN3, cM0) - Signed by Janell Longoria MD on 10/14/2023, Prognostic indicators: KRAS G12D mutant   PD-L1 95%     10/23/2023 -  Chemotherapy    PEMEtrexed / CARBOplatin, 21 Day Cycles - Thoracic       Patient ID: Kevin Rubi is a 67 y.o. male     Primary Care Provider: RUKHSANA Fried-CNS     DIAGNOSIS AND STAGING  cT3pN3 NSCLC (adenocarcinoma, TTF1+) of the LLL - dx on 09/15/23  Primary tumor measures 3.5 cm (+satellite nodule)  2R+ for adenocarcinoma cells        SITES OF DISEASE  LLL  Mediastinal nodes (including contralateral)  Has a couple of other spiculated nodules (0.8 cm WILDA and 0.7 cm RUL) that are potential synchronous primaries and will be addressed when needed      MOLECULAR GENOMICS  KRAS G12D mutation      PD-L1 TPS 95%        PRIOR THERAPIES  None         CURRENT THERAPY  Anticipating carboplatin/pemetrexed with concurrent RT         CURRENT ONCOLOGICAL PROBLEMS  Fatigue         HISTORY OF PRESENT ILLNESS:  Former smoker, (quit in 2000), who while undergoing w/u for abdominal pain related to SMA occlusion, was found to have a LLL nodule -   Based on his records he was noted to have abnormal imaging back in 2021, was seen by pulmonary and was told to complete w/u, including PET scan and potential biopsy but did not follow recommendations.    A CT chest on 09/12/23 showed a 3.5 cm spiculated LLL nodule abutting the pericardium. There was also a satellite nodule suspicious for disease and a couple of other spiculated nodules in the WILDA and contralateral right lung.4R node measured 1.4 cm and level 7 2.4 cm.   PET scan on 08/04/23 showed no findings concerning for extra-thoracic disease. LLL nodule was FDG avid as well as  multiple mediastinal nodes (including contralateral nodes). The WILDA nodule has mild hypermetabolic activity.  An EBUS procedure on 09/15/23 demonstrated 2R to be involved with adenocarcinoma cells, TTF1+. KRAS G12D +, TPS 95%.  The pt sees medical oncology on 23 -   States he is feeling well and denies any systemic sx related to this malignancy - including fatigue and weight loss. The abdominal pain resolved after vascular procedure/stenting performed and he is now on Xarelto and Plavix. Denies any h/a or cough. Denies dyspnea.   States he is very stressed about this ongoing dx and his wife corroborates it.            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  HFpEF - EF 60%   Atrial fibrillation - on Eliquis  Ascending aortic aneurysm   PUD (gastric)  HLD        SOCIAL HISTORY  + Tobacco - quit in  - mostly 1/2 ppd starting at age 22  Occasional ETOH intake  Worked as a  for 35 years    for 43 years and has 2 children - son who is 39 yo and daughter who is 35  He has just retired         CURRENT MEDS REVIEWED     ALLERGIES  NKDA     FAMILY HISTORY  Mother seems to have had H/N or lung cancer - unclear   Father may also have had cancer   1 sister  from complications of GSW  1 brother  of CA (?liver CA)         Chief Concern: Phone visit follow-up prior to treatment     HPI Spoke to patient/spouse via phone visit for follow-up prior to treatment today with Carboplatin/Pemetrexed, prior to starting concurrent RT, CT Sim anticipated 10/26/23. He is feeling well for treatment. Denies pain. Denies fatigue. States breathing is good. Denies N/V/D/C. No other concerns or complaints.         Meds (Current):    Current Outpatient Medications:     apixaban (Eliquis) 5 mg tablet, Take 1 tablet (5 mg) by mouth 2 times a day. (Patient not taking: Reported on 10/19/2023), Disp: 180 tablet, Rfl: 1    clopidogrel (Plavix) 75 mg tablet, Take 1 tablet (75 mg) by mouth  once daily., Disp: 90 tablet, Rfl: 1    digoxin (Lanoxin) 125 MCG tablet, Take 1 tablet (125 mcg) by mouth once daily for 180 doses., Disp: 90 tablet, Rfl: 1    Eliquis 5 mg tablet, Take 1 tablet (5 mg) by mouth 2 times a day., Disp: , Rfl:     Entresto 24-26 mg tablet, Take 1 tablet by mouth 2 times a day., Disp: 180 tablet, Rfl: 1    fenofibrate (Tricor) 145 mg tablet, , Disp: , Rfl:     metoprolol succinate XL (Toprol-XL) 25 mg 24 hr tablet, Take 1 tablet (25 mg) by mouth once daily. Do not crush or chew., Disp: 90 tablet, Rfl: 3    metoprolol succinate XL (Toprol-XL) 50 mg 24 hr tablet, Take 1 tablet (50 mg) by mouth once daily. Do not crush or chew., Disp: 90 tablet, Rfl: 3    rosuvastatin (Crestor) 5 mg tablet, Take 1 tablet (5 mg) by mouth once daily., Disp: 90 tablet, Rfl: 1    spironolactone (Aldactone) 25 mg tablet, Take 0.5 tablets (12.5 mg) by mouth once daily., Disp: 45 tablet, Rfl: 1  No current facility-administered medications for this visit.    Facility-Administered Medications Ordered in Other Visits:     albuterol 2.5 mg /3 mL (0.083 %) nebulizer solution 3 mL, 3 mL, nebulization, PRN, Janell Longoria MD    CARBOplatin (Paraplatin) 508.5 mg in sodium chloride 0.9% 150.85 mL IV, 508.5 mg, intravenous, Once, Janell Longoria MD    dextrose 5 % in water (D5W) bolus, 500 mL, intravenous, PRN, Janell Longoria MD    diphenhydrAMINE (BENADryl) injection 50 mg, 50 mg, intravenous, PRN, Janell Longoria MD    EPINEPHrine (Epipen) injection syringe 0.3 mg, 0.3 mg, intramuscular, q5 min PRN, Janell Longoria MD    famotidine PF (Pepcid) injection 20 mg, 20 mg, intravenous, Once PRN, Janell Longoria MD    fosaprepitant (Emend) 150 mg in sodium chloride 0.9% 250 mL IV, 150 mg, intravenous, Once, Janell Longoria MD, 150 mg at 10/23/23 1436    methylPREDNISolone sod succinate (PF) (SOLU-Medrol) 40 mg/mL injection 40 mg, 40 mg, intravenous, PRN, Janell Longoria MD    PEMEtrexed disodium (Alimta) 1,000 mg in sodium  chloride 0.9% 140 mL IV, 500 mg/m2 (Treatment Plan Recorded), intravenous, Once, Janell Longoria MD    prochlorperazine (Compazine) injection 10 mg, 10 mg, intravenous, q6h PRN, Janell Longoria MD    prochlorperazine (Compazine) tablet 10 mg, 10 mg, oral, q6h PRN, Janell Longoria MD    sodium chloride 0.9 % bolus 500 mL, 500 mL, intravenous, PRN, Janell Longoria MD    No Known Allergies    Review of Systems - Oncology 12 point ROS was obtained and negative unless otherwise mentioned in the above HPI.      Objective   BSA: There is no height or weight on file to calculate BSA.  There were no vitals taken for this visit.       Physical Exam Not completed d/t the nature of this visit.    Results:  Labs:  Lab Results   Component Value Date    WBC 8.9 10/23/2023    HGB 12.2 (L) 10/23/2023    HCT 39.0 (L) 10/23/2023    MCV 89 10/23/2023     10/23/2023      Lab Results   Component Value Date    NEUTROABS 5.61 10/23/2023      Lab Results   Component Value Date    GLUCOSE 179 (H) 10/23/2023    CALCIUM 9.1 10/23/2023     10/23/2023    K 4.5 10/23/2023    CO2 23 10/23/2023     10/23/2023    BUN 21 10/23/2023    CREATININE 1.24 10/23/2023    MG CANCELED 09/16/2023     Lab Results   Component Value Date    ALT 10 10/23/2023    AST 15 10/23/2023    ALKPHOS 47 10/23/2023    BILITOT 0.5 10/23/2023      Lab Results   Component Value Date    TSH 2.66 10/23/2023         MR brain w and wo IV contrast    Result Date: 10/2/2023  Impression: No abnormal intracranial enhancing mass lesion to suggest brain metastasis is noted.   There is an 8 mm focus of nonspecific abnormal diminished bone marrow signal on the T1 images demonstrating a component of enhancement on the post gadolinium images along the left frontal bone as seen on axial slice 10 of 27. The lesion is nonspecific and could be benign or malignant in etiology. Given the patient's clinical history, an osseous metastasis can not be excluded. Correlation with any  previous outside MRI studies if available would be helpful to assess stability of this finding. If none are available, a follow-up MRI in 3 months would be recommended.   There is moderate brain parenchymal volume loss.   There is an area of encephalomalacia/gliosis along the lateral left frontal lobe.   There are scattered nonspecific white matter changes within the cerebral hemispheres bilaterally which while nonspecific, given the patient's age, likely represent sequelae of more remote small-vessel ischemic change.   Critical Finding:  See findings. Notification was initiated on 10/2/2023 at 3:10 pm by  Rito Fitzpatrick.  (**-YCF-**)       The study was interpreted at [facility].   Signed by: Rito Fitzpatrick 10/2/2023 3:10 PM Dictation workstation:   [id number]      === Results for orders placed in visit on 10/02/23 ===    MR brain w and wo IV contrast [[id number]] 10/02/2023    Status: Normal  No abnormal intracranial enhancing mass lesion to suggest brain  metastasis is noted.    There is an 8 mm focus of nonspecific abnormal diminished bone marrow  signal on the T1 images demonstrating a component of enhancement on  the post gadolinium images along the left frontal bone as seen on  axial slice 10 of 27. The lesion is nonspecific and could be benign  or malignant in etiology. Given the patient's clinical history, an  osseous metastasis can not be excluded. Correlation with any previous  outside MRI studies if available would be helpful to assess stability  of this finding. If none are available, a follow-up MRI in 3 months  would be recommended.    There is moderate brain parenchymal volume loss.    There is an area of encephalomalacia/gliosis along the lateral left  frontal lobe.    There are scattered nonspecific white matter changes within the  cerebral hemispheres bilaterally which while nonspecific, given the  patient's age, likely represent sequelae of more remote  small-vessel  ischemic change.    Critical Finding:  See findings. Notification was initiated on  10/2/2023 at 3:10 pm by  Rito Fitzpatrick.  (**-YCF-**)        The study was interpreted at [facility].    Signed by: Rito Fitzpatrick 10/2/2023 3:10 PM  Dictation workstation:   [id number]    Assessment/Plan      Kevin Rubi is a 68 y.o. male here for follow up     Discussed with patient his final stage: IIIC ([id number]) NSCLC of the LLL -   Decision made at  to treated with definitive intent -   Referrals for rad onc provided. Sees Dr. Perrin on 10/29   Consent provided for 2-3 cycles of carboplatin and pemetrexed -    C1 on 10/23  We anticipate C2 at [facility] with concurrent RT on 11/13  Not a candidate for CASE 1522 due to other potential synchronous primary lung cancers.     SMA occlusion s/p stenting of celiac artery 08/10/23:  On Eliquis and Plavix      Atrial fibrillation, HFpEF 60%  On Digoxin, metoprolol, spironolactone, Entresto     Hyperlipidemia:  On Crestor

## 2023-10-23 NOTE — PROGRESS NOTES
Pt here for first dose alimta/carboplatin infusion. Tolerated well, discharged in stable condition, no further questions or concerns. Has schedule for follow up. Provided education on signs/symptoms of hypersensitivity, nausea management, and fever and when to call/report to ED. Pt and wife verbalized understanding.

## 2023-10-23 NOTE — TELEPHONE ENCOUNTER
----- Message from RUKHSANA Fried-CNS sent at 10/23/2023 12:55 PM EDT -----  B12 is low. Start/increase Vitamin B12 OTC. Glucose is elevated, would like to check A1C.

## 2023-10-24 NOTE — TELEPHONE ENCOUNTER
Left message for patient to return call. Not have been able to get ahold of patient or a return call.

## 2023-10-26 ENCOUNTER — HOSPITAL ENCOUNTER (OUTPATIENT)
Dept: RADIOLOGY | Facility: EXTERNAL LOCATION | Age: 68
Discharge: HOME | End: 2023-10-26

## 2023-10-26 DIAGNOSIS — C34.92 MALIGNANT NEOPLASM OF UNSPECIFIED PART OF LEFT BRONCHUS OR LUNG (MULTI): ICD-10-CM

## 2023-11-02 ENCOUNTER — OFFICE VISIT (OUTPATIENT)
Dept: HEMATOLOGY/ONCOLOGY | Facility: HOSPITAL | Age: 68
End: 2023-11-02
Payer: MEDICARE

## 2023-11-02 DIAGNOSIS — C34.32 PRIMARY CANCER OF LEFT LOWER LOBE OF LUNG (MULTI): ICD-10-CM

## 2023-11-02 DIAGNOSIS — Z51.11 ENCOUNTER FOR ANTINEOPLASTIC CHEMOTHERAPY: ICD-10-CM

## 2023-11-02 PROCEDURE — 1036F TOBACCO NON-USER: CPT | Performed by: NURSE PRACTITIONER

## 2023-11-02 PROCEDURE — 1126F AMNT PAIN NOTED NONE PRSNT: CPT | Performed by: NURSE PRACTITIONER

## 2023-11-02 PROCEDURE — 99441 PR PHYS/QHP TELEPHONE EVALUATION 5-10 MIN: CPT | Performed by: NURSE PRACTITIONER

## 2023-11-02 PROCEDURE — 1160F RVW MEDS BY RX/DR IN RCRD: CPT | Performed by: NURSE PRACTITIONER

## 2023-11-02 PROCEDURE — 1159F MED LIST DOCD IN RCRD: CPT | Performed by: NURSE PRACTITIONER

## 2023-11-02 NOTE — PROGRESS NOTES
[facility] - Medical Oncology Follow-Up Visit    Patient ID: Kevin Rubi is a 68 y.o. male      Current therapy: cyanocobalamin (Vitamin B-12) injection 1,000 mcg, 1,000 mcg, intramuscular, Once, 1 of 4 cycles    Administration: 1,000 mcg (10/23/2023)        fosaprepitant (Emend) 150 mg in sodium chloride 0.9% 250 mL IV, 150 mg, intravenous, Once, 1 of 6 cycles    Administration: 150 mg (10/23/2023)        CARBOplatin (Paraplatin) 508.5 mg in sodium chloride 0.9% 150.85 mL IV, 508.5 mg (100 % of original dose 508.5 mg), intravenous, Once, 1 of 6 cycles    Dose modification: 508.5 mg (original dose 508.5 mg, Cycle 1, Reason: Protocol Driven, Comment: NA)    Administration: 508.5 mg (10/23/2023)        palonosetron (Aloxi) injection 250 mcg, 250 mcg, intravenous, Once, 1 of 6 cycles    Administration: 250 mcg (10/23/2023)        PEMEtrexed disodium (Alimta) 1,000 mg in sodium chloride 0.9% 140 mL IV, 500 mg/m2 = 1,000 mg, intravenous, Once, 1 of 6 cycles    Administration: 1,000 mg (10/23/2023)      Oncologic History:   DIAGNOSIS AND STAGING  cT3pN3 NSCLC (adenocarcinoma, TTF1+) of the LLL - dx on 09/15/23  Primary tumor measures 3.5 cm (+satellite nodule)  2R+ for adenocarcinoma cells        SITES OF DISEASE  LLL  Mediastinal nodes (including contralateral)  Has a couple of other spiculated nodules (0.8 cm WILDA and 0.7 cm RUL) that are potential synchronous primaries and will be addressed when needed      MOLECULAR GENOMICS  KRAS G12D mutation      PD-L1 TPS 95%        PRIOR THERAPIES  None         CURRENT THERAPY  Anticipating carboplatin/pemetrexed with concurrent RT         CURRENT ONCOLOGICAL PROBLEMS  Fatigue         HISTORY OF PRESENT ILLNESS:  Former smoker, (quit in 2000), who while undergoing w/u for abdominal pain related to SMA occlusion, was found to have a LLL nodule -   Based on his records he was noted to have abnormal imaging back in 2021, was seen by pulmonary  and was told to complete w/u, including PET scan and potential biopsy but did not follow recommendations.    A CT chest on 23 showed a 3.5 cm spiculated LLL nodule abutting the pericardium. There was also a satellite nodule suspicious for disease and a couple of other spiculated nodules in the WILDA and contralateral right lung.4R node measured 1.4 cm and level 7 2.4 cm.   PET scan on 23 showed no findings concerning for extra-thoracic disease. LLL nodule was FDG avid as well as multiple mediastinal nodes (including contralateral nodes). The WILDA nodule has mild hypermetabolic activity.  An EBUS procedure on 09/15/23 demonstrated 2R to be involved with adenocarcinoma cells, TTF1+. KRAS G12D +, TPS 95%.  The pt sees medical oncology on 23 -   States he is feeling well and denies any systemic sx related to this malignancy - including fatigue and weight loss. The abdominal pain resolved after vascular procedure/stenting performed and he is now on Xarelto and Plavix. Denies any h/a or cough. Denies dyspnea.   States he is very stressed about this ongoing dx and his wife corroborates it.            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  HFpEF - EF 60%   Atrial fibrillation - on Eliquis  Ascending aortic aneurysm   PUD (gastric)  HLD        SOCIAL HISTORY  + Tobacco - quit in  - mostly 1/2 ppd starting at age 22  Occasional ETOH intake  Worked as a  for 35 years    for 43 years and has 2 children - son who is 41 yo and daughter who is 35  He has just retired         CURRENT MEDS REVIEWED     ALLERGIES  NKDA     FAMILY HISTORY  Mother seems to have had H/N or lung cancer - unclear   Father may also have had cancer   1 sister  from complications of GSW  1 brother  of CA (?liver CA)           Chief Concern: Phone visit follow-up on concurrent chemo/RT with Carboplatin/Pemetrexed.    HPI Spoke to patient/wife Priya for follow-up. S/p C1 Carbo/Pem  on 10/23/23 prior to start of concurrent chemo/RT (anticipated start 11/13/23) He is stressed about having to travel back and forth for treatment since he has  along commute. Social work is currently working on lodging at [employer] and will reach out to patient when details are finalized. Breathing stable. No GI symptoms. No fever, chills, or cold symptoms.       Meds (Current):    Current Outpatient Medications:     apixaban (Eliquis) 5 mg tablet, Take 1 tablet (5 mg) by mouth 2 times a day. (Patient not taking: Reported on 10/19/2023), Disp: 180 tablet, Rfl: 1    clopidogrel (Plavix) 75 mg tablet, Take 1 tablet (75 mg) by mouth once daily., Disp: 90 tablet, Rfl: 1    digoxin (Lanoxin) 125 MCG tablet, Take 1 tablet (125 mcg) by mouth once daily for 180 doses., Disp: 90 tablet, Rfl: 1    Eliquis 5 mg tablet, Take 1 tablet (5 mg) by mouth 2 times a day., Disp: , Rfl:     Entresto 24-26 mg tablet, Take 1 tablet by mouth 2 times a day., Disp: 180 tablet, Rfl: 1    fenofibrate (Tricor) 145 mg tablet, , Disp: , Rfl:     metoprolol succinate XL (Toprol-XL) 25 mg 24 hr tablet, Take 1 tablet (25 mg) by mouth once daily. Do not crush or chew., Disp: 90 tablet, Rfl: 3    metoprolol succinate XL (Toprol-XL) 50 mg 24 hr tablet, Take 1 tablet (50 mg) by mouth once daily. Do not crush or chew., Disp: 90 tablet, Rfl: 3    rosuvastatin (Crestor) 5 mg tablet, Take 1 tablet (5 mg) by mouth once daily., Disp: 90 tablet, Rfl: 1    spironolactone (Aldactone) 25 mg tablet, Take 0.5 tablets (12.5 mg) by mouth once daily., Disp: 45 tablet, Rfl: 1    Review of Systems - Oncology 12 point ROS was obtained and negative unless otherwise mentioned in the above HPI.      Objective   BSA: There is no height or weight on file to calculate BSA.  Wt Readings from Last 5 Encounters:   10/23/23 88.7 kg (195 lb 8.8 oz)   10/19/23 88.6 kg (195 lb 5.2 oz)   10/18/23 88.5 kg (195 lb)   10/04/23 86.2 kg (190 lb 0.6 oz)   10/02/23 86.2 kg (190 lb)      There were no vitals taken for this visit.         Physical Exam Not performed due to visit type.      Results:  Labs:  Lab Results   Component Value Date    WBC 8.9 10/23/2023    HGB 12.2 (L) 10/23/2023    HCT 39.0 (L) 10/23/2023    MCV 89 10/23/2023     10/23/2023      Lab Results   Component Value Date    NEUTROABS 5.61 10/23/2023      Lab Results   Component Value Date    GLUCOSE 179 (H) 10/23/2023    CALCIUM 9.1 10/23/2023     10/23/2023    K 4.5 10/23/2023    CO2 23 10/23/2023     10/23/2023    BUN 21 10/23/2023    CREATININE 1.24 10/23/2023    MG CANCELED 09/16/2023     Lab Results   Component Value Date    ALT 10 10/23/2023    AST 15 10/23/2023    ALKPHOS 47 10/23/2023    BILITOT 0.5 10/23/2023      Lab Results   Component Value Date    TSH 2.66 10/23/2023       Imaging:           === Results for orders placed in visit on 10/02/23 ===    MR brain w and wo IV contrast [[id number]] 10/02/2023    Status: Normal  No abnormal intracranial enhancing mass lesion to suggest brain  metastasis is noted.    There is an 8 mm focus of nonspecific abnormal diminished bone marrow  signal on the T1 images demonstrating a component of enhancement on  the post gadolinium images along the left frontal bone as seen on  axial slice 10 of 27. The lesion is nonspecific and could be benign  or malignant in etiology. Given the patient's clinical history, an  osseous metastasis can not be excluded. Correlation with any previous  outside MRI studies if available would be helpful to assess stability  of this finding. If none are available, a follow-up MRI in 3 months  would be recommended.    There is moderate brain parenchymal volume loss.    There is an area of encephalomalacia/gliosis along the lateral left  frontal lobe.    There are scattered nonspecific white matter changes within the  cerebral hemispheres bilaterally which while nonspecific, given the  patient's age, likely represent sequelae of more remote  small-vessel  ischemic change.    Critical Finding:  See findings. Notification was initiated on  10/2/2023 at 3:10 pm by  Rito Fitzpatrick.  (**-YCF-**)        The study was interpreted at [facility].    Signed by: Rito Fitzpatrick 10/2/2023 3:10 PM  Dictation workstation:   [id number]    Assessment/Plan      Kevin Rubi is a 68 y.o. male here for follow up     Discussed with patient his final stage: IIIC ([id number]) NSCLC of the LLL -   Decision made at  to treated with definitive intent -   Referrals for rad onc provided. Sees Dr. Perrin on 10/29   Consent provided for 2-3 cycles of carboplatin and pemetrexed -    C1 on 10/23  We anticipate C2 at [facility] with concurrent RT on 11/13  Not a candidate for CASE 1522 due to other potential synchronous primary lung cancers.     SMA occlusion s/p stenting of celiac artery 08/10/23:  On Eliquis and Plavix      Atrial fibrillation, HFpEF 60%  On Digoxin, metoprolol, spironolactone, Entresto     Hyperlipidemia:  On Crestor

## 2023-11-07 ENCOUNTER — OFFICE VISIT (OUTPATIENT)
Dept: BEHAVIORAL HEALTH | Facility: HOSPITAL | Age: 68
End: 2023-11-07
Payer: MEDICARE

## 2023-11-07 VITALS
OXYGEN SATURATION: 100 % | BODY MASS INDEX: 25.35 KG/M2 | HEART RATE: 73 BPM | RESPIRATION RATE: 20 BRPM | DIASTOLIC BLOOD PRESSURE: 90 MMHG | WEIGHT: 186.95 LBS | SYSTOLIC BLOOD PRESSURE: 138 MMHG | TEMPERATURE: 96.8 F

## 2023-11-07 DIAGNOSIS — C34.90 MALIGNANT NEOPLASM OF BRONCHUS AND LUNG (MULTI): ICD-10-CM

## 2023-11-07 DIAGNOSIS — F41.9 ANXIETY: ICD-10-CM

## 2023-11-07 PROCEDURE — 99215 OFFICE O/P EST HI 40 MIN: CPT

## 2023-11-07 PROCEDURE — 3080F DIAST BP >= 90 MM HG: CPT

## 2023-11-07 PROCEDURE — 1126F AMNT PAIN NOTED NONE PRSNT: CPT

## 2023-11-07 PROCEDURE — 99205 OFFICE O/P NEW HI 60 MIN: CPT

## 2023-11-07 PROCEDURE — 1036F TOBACCO NON-USER: CPT

## 2023-11-07 PROCEDURE — 3075F SYST BP GE 130 - 139MM HG: CPT

## 2023-11-07 PROCEDURE — 1160F RVW MEDS BY RX/DR IN RCRD: CPT

## 2023-11-07 PROCEDURE — 1159F MED LIST DOCD IN RCRD: CPT

## 2023-11-07 RX ORDER — GABAPENTIN 300 MG/1
300 CAPSULE ORAL NIGHTLY
Qty: 60 CAPSULE | Refills: 2 | Status: SHIPPED | OUTPATIENT
Start: 2023-11-07 | End: 2024-11-06

## 2023-11-07 ASSESSMENT — ENCOUNTER SYMPTOMS
HYPERACTIVE: 0
HALLUCINATIONS: 0
STRIDOR: 0
SPEECH DIFFICULTY: 0
WEAKNESS: 0
APPETITE CHANGE: 1
DIARRHEA: 0
AGITATION: 0
CONSTIPATION: 0
DIZZINESS: 0
DECREASED CONCENTRATION: 0
ABDOMINAL PAIN: 0
SEIZURES: 0
ACTIVITY CHANGE: 1
ALLERGIC/IMMUNOLOGIC NEGATIVE: 1
CONFUSION: 0
MYALGIAS: 1
NUMBNESS: 0
SHORTNESS OF BREATH: 1
APNEA: 0
NAUSEA: 0
PALPITATIONS: 1
SLEEP DISTURBANCE: 1
CHOKING: 0
FACIAL ASYMMETRY: 0
ARTHRALGIAS: 1
FATIGUE: 1
ENDOCRINE NEGATIVE: 1
HEMATOLOGIC/LYMPHATIC NEGATIVE: 1
EYES NEGATIVE: 1
CHEST TIGHTNESS: 0
COUGH: 1
HEADACHES: 1
DYSPHORIC MOOD: 1
NERVOUS/ANXIOUS: 1
ABDOMINAL DISTENTION: 0
BACK PAIN: 0
VOMITING: 0
TREMORS: 0

## 2023-11-07 ASSESSMENT — PAIN SCALES - GENERAL: PAINLEVEL: 0-NO PAIN

## 2023-11-07 NOTE — PROGRESS NOTES
"Subjective   Patient ID: Kevin Rubi is a 68 y.o. male with past medical history of HTN, HLD, CADm afib, GERD, and LLL cancer presenting to psych-oncology for evaluation and treatment of anxiety    Chief Complaint - Anxiety     Patient lives in a single family home in [place] with  his wife Magi. He an Chichi have been  for 43 years and have 2 son's Christopher (age 43) and Anand (33). Christopher lives in [place] and Anand lives in [place]. Each of the boys ae  and have 2 children each.     Ganesh  - lives in home Magi 43 years.  2 boys - Christopher - 43 - Minojuju Michel lives in [place] - 33.   - 2 kids each     Patient reports that he is undergoing significant stress and he experiences daily worries, higher than usual baseline anxiety, and daily worries. He denies panic attacks.     Patient reports that his mood is \"a bit low\" but he denies suicidal or homicidal ideation. He denies experiencing hallucinatory phenomena and he doesn't appear to be responding to internal stimuli. Patient denies symptoms of hypomania or dylan and he exhibits no signs for concern during today's assessment.     Patient reports difficult falling asleep and he experiences occasional night time awakenings. He describes his sleep as non-restorative.     Patient has lost weight and patient reports that his appetite is down.     Background History     Patient was born in [place] to parents Stephen and Marli and the family relocated to [facility] when he was 8 years old.    Siblings - oldest sister isacc - - suicide at 25 Sister - Gail is the youngest of the kids.    Bill is 67 - liver problems     Patient had friends growing up and he did OK in school. No major issues during his family upbringing.     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     Following his  service, he returned to [place] and went to [employer] school, transitioned to his new career, and  at age 28. " "    Substance Use History     Patient reports that he was a heavy beer drinker and estimates that he averaged 6 drinks per day. He has now tapered off of alcohol.      Patient is a forme smoker an smoked 1/2 packs of cigarettes per day.    Limited caffeine and no other drug use history     Abuse History     Denies sexual, physical, emotional, or verbal abuse history     Famly Psychiatric History     Sister - depression and completed suicide    Psychiatric History     ? PTSD   Anxiety     Mental Status Examination  General Appearance: Well groomed, appropriate eye contact.  Gait/Station: Within normal limits  Speech: Normal rate, volume, prosody  Mood: \"Anxious\"  Affect: Blunted  Thought Process: Linear, goal directed  Thought Associations: No loosening of associations  Thought Content: normal  Perception: No perceptual abnormalities noted  Level of Consciousness: Alert  Orientation: Alert and oriented to person, place, time and situation  Attention and Concentration: Intact  Recent Memory: Intact as evidenced by ability to recall details from the past 24 hours   Remote Memory: Intact as evidenced by ability to recall previous medical issues   Executive function: Intact  Language: Naming intact  Fund of knowledge: Good  Insight:  Intact   Judgment: Intact, as evidenced by help-seeking behavior, ability to reason through medical decision making, and compliance with treatment recommendations  Anxiety  Presents for initial visit. Onset was 1 to 6 months ago. The problem has been gradually worsening. Symptoms include excessive worry, nervous/anxious behavior, palpitations, restlessness and shortness of breath. Patient reports no chest pain, compulsions, confusion, decreased concentration, depressed mood, dizziness, feeling of choking, insomnia, irritability, muscle tension, nausea, obsessions, panic or suicidal ideas. Symptoms occur most days. The severity of symptoms is moderate. The quality of sleep is fair. Nighttime " awakenings: occasional.       Review of Systems   Constitutional:  Positive for activity change, appetite change and fatigue. Negative for irritability.   HENT: Negative.     Eyes: Negative.    Respiratory:  Positive for cough and shortness of breath. Negative for apnea, choking, chest tightness and stridor.    Cardiovascular:  Positive for palpitations. Negative for chest pain.   Gastrointestinal:  Negative for abdominal distention, abdominal pain, constipation, diarrhea, nausea and vomiting.   Endocrine: Negative.    Genitourinary: Negative.    Musculoskeletal:  Positive for arthralgias and myalgias. Negative for back pain and gait problem.   Skin:  Negative for pallor.   Allergic/Immunologic: Negative.    Neurological:  Positive for headaches. Negative for dizziness, tremors, seizures, syncope, facial asymmetry, speech difficulty, weakness and numbness.   Hematological: Negative.    Psychiatric/Behavioral:  Positive for dysphoric mood and sleep disturbance. Negative for agitation, behavioral problems, confusion, decreased concentration, hallucinations, self-injury and suicidal ideas. The patient is nervous/anxious. The patient does not have insomnia and is not hyperactive.      Lab Review:   Lab on 10/23/2023   Component Date Value    Cholesterol 10/23/2023 140     HDL-Cholesterol 10/23/2023 41.3     Cholesterol/HDL Ratio 10/23/2023 3.4     LDL Calculated 10/23/2023 74     VLDL 10/23/2023 24     Triglycerides 10/23/2023 122     Non HDL Cholesterol 10/23/2023 99     Thyroid Stimulating Horm* 10/23/2023 2.66     Vitamin B12 10/23/2023 178 (L)     Prostate Specific Antige* 10/23/2023 0.21     WBC 10/23/2023 8.9     nRBC 10/23/2023 0.0     RBC 10/23/2023 4.37 (L)     Hemoglobin 10/23/2023 12.2 (L)     Hematocrit 10/23/2023 39.0 (L)     MCV 10/23/2023 89     MCH 10/23/2023 27.9     MCHC 10/23/2023 31.3 (L)     RDW 10/23/2023 15.1 (H)     Platelets 10/23/2023 194     MPV 10/23/2023 11.4     Neutrophils % 10/23/2023  63.0     Immature Granulocytes %,* 10/23/2023 0.6     Lymphocytes % 10/23/2023 26.3     Monocytes % 10/23/2023 6.2     Eosinophils % 10/23/2023 3.1     Basophils % 10/23/2023 0.8     Neutrophils Absolute 10/23/2023 5.61     Immature Granulocytes Ab* 10/23/2023 0.05     Lymphocytes Absolute 10/23/2023 2.34     Monocytes Absolute 10/23/2023 0.55     Eosinophils Absolute 10/23/2023 0.28     Basophils Absolute 10/23/2023 0.07     Glucose 10/23/2023 179 (H)     Sodium 10/23/2023 138     Potassium 10/23/2023 4.5     Chloride 10/23/2023 106     Bicarbonate 10/23/2023 23     Anion Gap 10/23/2023 14     Urea Nitrogen 10/23/2023 21     Creatinine 10/23/2023 1.24     eGFR 10/23/2023 64     Calcium 10/23/2023 9.1     Albumin 10/23/2023 4.1     Alkaline Phosphatase 10/23/2023 47     Total Protein 10/23/2023 6.8     AST 10/23/2023 15     Bilirubin, Total 10/23/2023 0.5     ALT 10/23/2023 10    Lab on 10/16/2023   Component Date Value    WBC 10/16/2023 8.0     nRBC 10/16/2023 0.0     RBC 10/16/2023 4.42 (L)     Hemoglobin 10/16/2023 12.6 (L)     Hematocrit 10/16/2023 39.6 (L)     MCV 10/16/2023 90     MCH 10/16/2023 28.5     MCHC 10/16/2023 31.8 (L)     RDW 10/16/2023 15.3 (H)     Platelets 10/16/2023 192     MPV 10/16/2023 11.6 (H)     Neutrophils % 10/16/2023 57.6     Immature Granulocytes %,* 10/16/2023 0.4     Lymphocytes % 10/16/2023 30.6     Monocytes % 10/16/2023 7.3     Eosinophils % 10/16/2023 3.2     Basophils % 10/16/2023 0.9     Neutrophils Absolute 10/16/2023 4.62     Immature Granulocytes Ab* 10/16/2023 0.03     Lymphocytes Absolute 10/16/2023 2.46     Monocytes Absolute 10/16/2023 0.59     Eosinophils Absolute 10/16/2023 0.26     Basophils Absolute 10/16/2023 0.07     Hepatitis B Surface AG 10/16/2023 Nonreactive     Hepatitis B Core AB- Tot* 10/16/2023 Nonreactive     Hepatitis B Surface AB 10/16/2023 <3.1     Digoxin  10/16/2023 0.58 (L)    Office Visit on 09/26/2023   Component Date Value    NONINV COLON CA  DNA+OCC * 10/10/2023 Positive (A)        Assessment/Plan   Safety : no remote or recent history of SI, no prior SA, no self-harm; RF alcohol use history, cancer diagnosis and treatment course, family member suicide, male gender, PF connected family and friends. Risk : moderate   Diagnoses and all orders for this visit: patient describes significant anxiety due to cancer diagnosis and uncertainty with regard to future events, difficult treatment course, etc. Patient having some difficulty falling asleep. Will start night time gabapentin since patient does not want to start antidepressant therapy and gabapentin can be useful for treating anxiety and to help him sleep. Will follow up in one month to reassess.     Greater than 50% of appointment consisted of supportive therapy, care coordination, counseling, and psych-education.   Anxiety  -     gabapentin (Neurontin) 300 mg capsule; Take 1 capsule (300 mg) by mouth once daily at bedtime.     Follow up in one month    Time     2 minute chart review  55 minutes direct patient contact  10 minutes for charting.     Total time 67 minutes

## 2023-11-08 ENCOUNTER — OFFICE VISIT (OUTPATIENT)
Dept: HEMATOLOGY/ONCOLOGY | Facility: CLINIC | Age: 68
End: 2023-11-08
Payer: MEDICARE

## 2023-11-08 VITALS
SYSTOLIC BLOOD PRESSURE: 121 MMHG | RESPIRATION RATE: 18 BRPM | TEMPERATURE: 97.7 F | WEIGHT: 191.58 LBS | DIASTOLIC BLOOD PRESSURE: 69 MMHG | HEART RATE: 84 BPM | BODY MASS INDEX: 25.98 KG/M2 | OXYGEN SATURATION: 100 %

## 2023-11-08 DIAGNOSIS — C34.32 PRIMARY CANCER OF LEFT LOWER LOBE OF LUNG (MULTI): Primary | ICD-10-CM

## 2023-11-08 DIAGNOSIS — Z51.11 ENCOUNTER FOR ANTINEOPLASTIC CHEMOTHERAPY: ICD-10-CM

## 2023-11-08 PROCEDURE — 3074F SYST BP LT 130 MM HG: CPT | Performed by: NURSE PRACTITIONER

## 2023-11-08 PROCEDURE — 1159F MED LIST DOCD IN RCRD: CPT | Performed by: NURSE PRACTITIONER

## 2023-11-08 PROCEDURE — 1036F TOBACCO NON-USER: CPT | Performed by: NURSE PRACTITIONER

## 2023-11-08 PROCEDURE — 1160F RVW MEDS BY RX/DR IN RCRD: CPT | Performed by: NURSE PRACTITIONER

## 2023-11-08 PROCEDURE — 99215 OFFICE O/P EST HI 40 MIN: CPT | Performed by: NURSE PRACTITIONER

## 2023-11-08 PROCEDURE — 1126F AMNT PAIN NOTED NONE PRSNT: CPT | Performed by: NURSE PRACTITIONER

## 2023-11-08 PROCEDURE — 3078F DIAST BP <80 MM HG: CPT | Performed by: NURSE PRACTITIONER

## 2023-11-08 ASSESSMENT — PAIN SCALES - GENERAL: PAINLEVEL: 0-NO PAIN

## 2023-11-08 NOTE — PROGRESS NOTES
[facility] - Medical Oncology Follow-Up Visit    Patient ID: Kevin Rubi is a 68 y.o. male     Current therapy: cyanocobalamin (Vitamin B-12) injection 1,000 mcg, 1,000 mcg, intramuscular, Once, 1 of 4 cycles    Administration: 1,000 mcg (10/23/2023)        fosaprepitant (Emend) 150 mg in sodium chloride 0.9% 250 mL IV, 150 mg, intravenous, Once, 1 of 6 cycles    Administration: 150 mg (10/23/2023)        CARBOplatin (Paraplatin) 508.5 mg in sodium chloride 0.9% 150.85 mL IV, 508.5 mg (100 % of original dose 508.5 mg), intravenous, Once, 1 of 6 cycles    Dose modification: 508.5 mg (original dose 508.5 mg, Cycle 1, Reason: Protocol Driven, Comment: NA)    Administration: 508.5 mg (10/23/2023)        palonosetron (Aloxi) injection 250 mcg, 250 mcg, intravenous, Once, 1 of 6 cycles    Administration: 250 mcg (10/23/2023)        PEMEtrexed disodium (Alimta) 1,000 mg in sodium chloride 0.9% 140 mL IV, 500 mg/m2 = 1,000 mg, intravenous, Once, 1 of 6 cycles    Administration: 1,000 mg (10/23/2023)      Oncologic History:   Oncologic History:   DIAGNOSIS AND STAGING  cT3pN3 NSCLC (adenocarcinoma, TTF1+) of the LLL - dx on 09/15/23  Primary tumor measures 3.5 cm (+satellite nodule)  2R+ for adenocarcinoma cells        SITES OF DISEASE  LLL  Mediastinal nodes (including contralateral)  Has a couple of other spiculated nodules (0.8 cm WILDA and 0.7 cm RUL) that are potential synchronous primaries and will be addressed when needed      MOLECULAR GENOMICS  KRAS G12D mutation      PD-L1 TPS 95%        PRIOR THERAPIES  None         CURRENT THERAPY  Anticipating carboplatin/pemetrexed with concurrent RT ; Anticipated start date 11/13/23        CURRENT ONCOLOGICAL PROBLEMS  Fatigue         HISTORY OF PRESENT ILLNESS:  Former smoker, (quit in 2000), who while undergoing w/u for abdominal pain related to SMA occlusion, was found to have a LLL nodule -   Based on his records he was noted to have  abnormal imaging back in , was seen by pulmonary and was told to complete w/u, including PET scan and potential biopsy but did not follow recommendations.    A CT chest on 23 showed a 3.5 cm spiculated LLL nodule abutting the pericardium. There was also a satellite nodule suspicious for disease and a couple of other spiculated nodules in the WILDA and contralateral right lung.4R node measured 1.4 cm and level 7 2.4 cm.   PET scan on 23 showed no findings concerning for extra-thoracic disease. LLL nodule was FDG avid as well as multiple mediastinal nodes (including contralateral nodes). The WILDA nodule has mild hypermetabolic activity.  An EBUS procedure on 09/15/23 demonstrated 2R to be involved with adenocarcinoma cells, TTF1+. KRAS G12D +, TPS 95%.  The pt sees medical oncology on 23 -   States he is feeling well and denies any systemic sx related to this malignancy - including fatigue and weight loss. The abdominal pain resolved after vascular procedure/stenting performed and he is now on Xarelto and Plavix. Denies any h/a or cough. Denies dyspnea.   States he is very stressed about this ongoing dx and his wife corroborates it.            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  HFpEF - EF 60%   Atrial fibrillation - on Eliquis  Ascending aortic aneurysm   PUD (gastric)  HLD        SOCIAL HISTORY  + Tobacco - quit in  - mostly 1/2 ppd starting at age 22  Occasional ETOH intake  Worked as a  for 35 years    for 43 years and has 2 children - son who is 41 yo and daughter who is 35  He has just retired         CURRENT MEDS REVIEWED     ALLERGIES  NKDA     FAMILY HISTORY  Mother seems to have had H/N or lung cancer - unclear   Father may also have had cancer   1 sister  from complications of GSW  1 brother  of CA (?liver CA)              Chief Concern: Here in clinic for follow-up prior to treatment with concurrent chemo/RT with  "Carbo/Taxol (21 day cycles) s/p C1 prior to RT start  HPI \"Antonino\" is here today in clinic with his wife Priya for follow-up prior to treatment with concurrent chemo/RT with Carbo/Taxol starting 11/13/23. He is s/p C1 Carbo/Taxol 10/23/23 given prior to RT start. He is feeling well for treatment. Breathing stable. No GI symptoms. No fever, chills, or colds symptoms. No other concerns or complaints.      Meds (Current):    Current Outpatient Medications:     apixaban (Eliquis) 5 mg tablet, Take 1 tablet (5 mg) by mouth 2 times a day., Disp: 180 tablet, Rfl: 1    clopidogrel (Plavix) 75 mg tablet, Take 1 tablet (75 mg) by mouth once daily., Disp: 90 tablet, Rfl: 1    digoxin (Lanoxin) 125 MCG tablet, Take 1 tablet (125 mcg) by mouth once daily for 180 doses., Disp: 90 tablet, Rfl: 1    Eliquis 5 mg tablet, Take 1 tablet (5 mg) by mouth 2 times a day., Disp: , Rfl:     Entresto 24-26 mg tablet, Take 1 tablet by mouth 2 times a day., Disp: 180 tablet, Rfl: 1    fenofibrate (Tricor) 145 mg tablet, , Disp: , Rfl:     gabapentin (Neurontin) 300 mg capsule, Take 1 capsule (300 mg) by mouth once daily at bedtime., Disp: 60 capsule, Rfl: 2    metoprolol succinate XL (Toprol-XL) 25 mg 24 hr tablet, Take 1 tablet (25 mg) by mouth once daily. Do not crush or chew., Disp: 90 tablet, Rfl: 3    metoprolol succinate XL (Toprol-XL) 50 mg 24 hr tablet, Take 1 tablet (50 mg) by mouth once daily. Do not crush or chew., Disp: 90 tablet, Rfl: 3    rosuvastatin (Crestor) 5 mg tablet, Take 1 tablet (5 mg) by mouth once daily., Disp: 90 tablet, Rfl: 1    spironolactone (Aldactone) 25 mg tablet, Take 0.5 tablets (12.5 mg) by mouth once daily., Disp: 45 tablet, Rfl: 1    Review of Systems - Oncology 12 point ROS was obtained and negative unless otherwise mentioned in the above HPI.      Objective   BSA: 2.1 meters squared  Wt Readings from Last 5 Encounters:   11/08/23 86.9 kg (191 lb 9.3 oz)   11/07/23 84.8 kg (186 lb 15.2 oz)   10/23/23 88.7 " kg (195 lb 8.8 oz)   10/19/23 88.6 kg (195 lb 5.2 oz)   10/18/23 88.5 kg (195 lb)     /69   Pulse 84   Temp 36.5 °C (97.7 °F)   Resp 18   Wt 86.9 kg (191 lb 9.3 oz)   SpO2 100%   BMI 25.98 kg/m²          Physical Exam  Constitutional:       Appearance: Normal appearance.   Eyes:      Pupils: Pupils are equal, round, and reactive to light.   Cardiovascular:      Rate and Rhythm: Normal rate and regular rhythm.   Pulmonary:      Effort: Pulmonary effort is normal.      Breath sounds: Normal breath sounds.   Abdominal:      Palpations: Abdomen is soft.   Musculoskeletal:         General: Normal range of motion.   Skin:     General: Skin is warm and dry.   Neurological:      General: No focal deficit present.      Mental Status: He is alert and oriented to person, place, and time.   Psychiatric:         Mood and Affect: Mood normal.         Behavior: Behavior normal.          Results:  Labs:  Lab Results   Component Value Date    WBC 8.9 10/23/2023    HGB 12.2 (L) 10/23/2023    HCT 39.0 (L) 10/23/2023    MCV 89 10/23/2023     10/23/2023      Lab Results   Component Value Date    NEUTROABS 5.61 10/23/2023      Lab Results   Component Value Date    GLUCOSE 179 (H) 10/23/2023    CALCIUM 9.1 10/23/2023     10/23/2023    K 4.5 10/23/2023    CO2 23 10/23/2023     10/23/2023    BUN 21 10/23/2023    CREATININE 1.24 10/23/2023    MG CANCELED 09/16/2023     Lab Results   Component Value Date    ALT 10 10/23/2023    AST 15 10/23/2023    ALKPHOS 47 10/23/2023    BILITOT 0.5 10/23/2023      Lab Results   Component Value Date    TSH 2.66 10/23/2023       Imaging:           === Results for orders placed in visit on 10/02/23 ===    MR brain w and wo IV contrast [[id number]] 10/02/2023    Status: Normal  No abnormal intracranial enhancing mass lesion to suggest brain  metastasis is noted.    There is an 8 mm focus of nonspecific abnormal diminished bone marrow  signal on the T1 images demonstrating a component  of enhancement on  the post gadolinium images along the left frontal bone as seen on  axial slice 10 of 27. The lesion is nonspecific and could be benign  or malignant in etiology. Given the patient's clinical history, an  osseous metastasis can not be excluded. Correlation with any previous  outside MRI studies if available would be helpful to assess stability  of this finding. If none are available, a follow-up MRI in 3 months  would be recommended.    There is moderate brain parenchymal volume loss.    There is an area of encephalomalacia/gliosis along the lateral left  frontal lobe.    There are scattered nonspecific white matter changes within the  cerebral hemispheres bilaterally which while nonspecific, given the  patient's age, likely represent sequelae of more remote small-vessel  ischemic change.    Critical Finding:  See findings. Notification was initiated on  10/2/2023 at 3:10 pm by  Rito Fitzpatrick.  (**-YCF-**)        The study was interpreted at [facility].    Signed by: Rito Fitzpatrick 10/2/2023 3:10 PM  Dictation workstation:   [id number]    Assessment/Plan      Kevin Rubi is a 68 y.o. male here for follow up     Discussed with patient his final stage: IIIC ([id number]) NSCLC of the LLL -   Decision made at  to treated with definitive intent -   Referrals for rad onc provided. Sees Dr. Perrin on 10/29   Consent provided for 2-3 cycles of carboplatin and pemetrexed -    C1 on 10/23  We anticipate C2 at [facility] with concurrent RT on 11/13  Not a candidate for CASE 1522 due to other potential synchronous primary lung cancers.  Following with onco-psych (SHANELLE Brown)        SMA occlusion s/p stenting of celiac artery 08/10/23:  On Eliquis and Plavix      Atrial fibrillation, HFpEF 60%  On Digoxin, metoprolol, spironolactone, Entresto     Hyperlipidemia:  On Crestor

## 2023-11-09 ENCOUNTER — DOCUMENTATION (OUTPATIENT)
Dept: CASE MANAGEMENT | Facility: HOSPITAL | Age: 68
End: 2023-11-09

## 2023-11-09 NOTE — PROGRESS NOTES
RACHEL contacted [facility] confirming his stay from 11/13-thru mid December. Patient is aware an will be there on 11/13/23.

## 2023-11-10 ENCOUNTER — HOSPITAL ENCOUNTER (OUTPATIENT)
Dept: RADIATION ONCOLOGY | Facility: HOSPITAL | Age: 68
Setting detail: RADIATION/ONCOLOGY SERIES
Discharge: HOME | End: 2023-11-10
Payer: MEDICARE

## 2023-11-10 PROCEDURE — 77338 DESIGN MLC DEVICE FOR IMRT: CPT | Performed by: RADIOLOGY

## 2023-11-10 PROCEDURE — 77301 RADIOTHERAPY DOSE PLAN IMRT: CPT | Performed by: RADIOLOGY

## 2023-11-10 PROCEDURE — 77300 RADIATION THERAPY DOSE PLAN: CPT | Performed by: RADIOLOGY

## 2023-11-10 PROCEDURE — 77293 RESPIRATOR MOTION MGMT SIMUL: CPT | Performed by: RADIOLOGY

## 2023-11-13 ENCOUNTER — APPOINTMENT (OUTPATIENT)
Dept: RADIATION ONCOLOGY | Facility: HOSPITAL | Age: 68
End: 2023-11-13
Payer: MEDICARE

## 2023-11-13 ENCOUNTER — RADIATION ONCOLOGY OTV (OUTPATIENT)
Dept: RADIATION ONCOLOGY | Facility: HOSPITAL | Age: 68
End: 2023-11-13

## 2023-11-13 ENCOUNTER — INFUSION (OUTPATIENT)
Dept: HEMATOLOGY/ONCOLOGY | Facility: HOSPITAL | Age: 68
End: 2023-11-13
Payer: MEDICARE

## 2023-11-13 VITALS
DIASTOLIC BLOOD PRESSURE: 75 MMHG | BODY MASS INDEX: 25.38 KG/M2 | HEART RATE: 63 BPM | TEMPERATURE: 97.2 F | SYSTOLIC BLOOD PRESSURE: 141 MMHG | RESPIRATION RATE: 18 BRPM | HEIGHT: 72 IN | WEIGHT: 187.4 LBS | OXYGEN SATURATION: 98 %

## 2023-11-13 DIAGNOSIS — C34.32 PRIMARY CANCER OF LEFT LOWER LOBE OF LUNG (MULTI): ICD-10-CM

## 2023-11-13 LAB
ALBUMIN SERPL BCP-MCNC: 4.2 G/DL (ref 3.4–5)
ALP SERPL-CCNC: 52 U/L (ref 33–136)
ALT SERPL W P-5'-P-CCNC: 22 U/L (ref 10–52)
ANION GAP SERPL CALC-SCNC: 14 MMOL/L (ref 10–20)
AST SERPL W P-5'-P-CCNC: 27 U/L (ref 9–39)
BASOPHILS # BLD AUTO: 0.05 X10*3/UL (ref 0–0.1)
BASOPHILS NFR BLD AUTO: 0.7 %
BILIRUB SERPL-MCNC: 0.3 MG/DL (ref 0–1.2)
BUN SERPL-MCNC: 19 MG/DL (ref 6–23)
CALCIUM SERPL-MCNC: 9.7 MG/DL (ref 8.6–10.3)
CHLORIDE SERPL-SCNC: 103 MMOL/L (ref 98–107)
CO2 SERPL-SCNC: 23 MMOL/L (ref 21–32)
CREAT SERPL-MCNC: 1.07 MG/DL (ref 0.5–1.3)
EOSINOPHIL # BLD AUTO: 0.06 X10*3/UL (ref 0–0.7)
EOSINOPHIL NFR BLD AUTO: 0.8 %
ERYTHROCYTE [DISTWIDTH] IN BLOOD BY AUTOMATED COUNT: 15.4 % (ref 11.5–14.5)
GFR SERPL CREATININE-BSD FRML MDRD: 77 ML/MIN/1.73M*2
GLUCOSE SERPL-MCNC: 248 MG/DL (ref 74–99)
HCT VFR BLD AUTO: 35.5 % (ref 41–52)
HGB BLD-MCNC: 11.6 G/DL (ref 13.5–17.5)
IMM GRANULOCYTES # BLD AUTO: 0.1 X10*3/UL (ref 0–0.7)
IMM GRANULOCYTES NFR BLD AUTO: 1.3 % (ref 0–0.9)
LYMPHOCYTES # BLD AUTO: 2.07 X10*3/UL (ref 1.2–4.8)
LYMPHOCYTES NFR BLD AUTO: 27.6 %
MCH RBC QN AUTO: 28.6 PG (ref 26–34)
MCHC RBC AUTO-ENTMCNC: 32.7 G/DL (ref 32–36)
MCV RBC AUTO: 88 FL (ref 80–100)
MONOCYTES # BLD AUTO: 0.66 X10*3/UL (ref 0.1–1)
MONOCYTES NFR BLD AUTO: 8.8 %
NEUTROPHILS # BLD AUTO: 4.55 X10*3/UL (ref 1.2–7.7)
NEUTROPHILS NFR BLD AUTO: 60.8 %
NRBC BLD-RTO: 0 /100 WBCS (ref 0–0)
PLATELET # BLD AUTO: 258 X10*3/UL (ref 150–450)
POTASSIUM SERPL-SCNC: 4.4 MMOL/L (ref 3.5–5.3)
PROT SERPL-MCNC: 7.3 G/DL (ref 6.4–8.2)
RBC # BLD AUTO: 4.05 X10*6/UL (ref 4.5–5.9)
SODIUM SERPL-SCNC: 136 MMOL/L (ref 136–145)
WBC # BLD AUTO: 7.5 X10*3/UL (ref 4.4–11.3)

## 2023-11-13 PROCEDURE — 96375 TX/PRO/DX INJ NEW DRUG ADDON: CPT | Mod: INF

## 2023-11-13 PROCEDURE — 96365 THER/PROPH/DIAG IV INF INIT: CPT | Mod: INF

## 2023-11-13 PROCEDURE — 96413 CHEMO IV INFUSION 1 HR: CPT

## 2023-11-13 PROCEDURE — 36415 COLL VENOUS BLD VENIPUNCTURE: CPT

## 2023-11-13 PROCEDURE — 2500000004 HC RX 250 GENERAL PHARMACY W/ HCPCS (ALT 636 FOR OP/ED): Mod: JZ | Performed by: INTERNAL MEDICINE

## 2023-11-13 PROCEDURE — 80053 COMPREHEN METABOLIC PANEL: CPT

## 2023-11-13 PROCEDURE — 85025 COMPLETE CBC W/AUTO DIFF WBC: CPT

## 2023-11-13 PROCEDURE — 2500000004 HC RX 250 GENERAL PHARMACY W/ HCPCS (ALT 636 FOR OP/ED): Performed by: INTERNAL MEDICINE

## 2023-11-13 PROCEDURE — 96411 CHEMO IV PUSH ADDL DRUG: CPT

## 2023-11-13 PROCEDURE — 96409 CHEMO IV PUSH SNGL DRUG: CPT

## 2023-11-13 RX ORDER — EPINEPHRINE 0.3 MG/.3ML
0.3 INJECTION SUBCUTANEOUS EVERY 5 MIN PRN
Status: DISCONTINUED | OUTPATIENT
Start: 2023-11-13 | End: 2023-11-13 | Stop reason: HOSPADM

## 2023-11-13 RX ORDER — FAMOTIDINE 10 MG/ML
20 INJECTION INTRAVENOUS ONCE AS NEEDED
Status: DISCONTINUED | OUTPATIENT
Start: 2023-11-13 | End: 2023-11-13 | Stop reason: HOSPADM

## 2023-11-13 RX ORDER — DIPHENHYDRAMINE HYDROCHLORIDE 50 MG/ML
50 INJECTION INTRAMUSCULAR; INTRAVENOUS AS NEEDED
Status: DISCONTINUED | OUTPATIENT
Start: 2023-11-13 | End: 2023-11-13 | Stop reason: HOSPADM

## 2023-11-13 RX ORDER — ALBUTEROL SULFATE 0.83 MG/ML
3 SOLUTION RESPIRATORY (INHALATION) AS NEEDED
Status: DISCONTINUED | OUTPATIENT
Start: 2023-11-13 | End: 2023-11-13 | Stop reason: HOSPADM

## 2023-11-13 RX ORDER — PROCHLORPERAZINE EDISYLATE 5 MG/ML
10 INJECTION INTRAMUSCULAR; INTRAVENOUS EVERY 6 HOURS PRN
Status: DISCONTINUED | OUTPATIENT
Start: 2023-11-13 | End: 2023-11-13 | Stop reason: HOSPADM

## 2023-11-13 RX ORDER — PALONOSETRON 0.05 MG/ML
0.25 INJECTION, SOLUTION INTRAVENOUS ONCE
Status: COMPLETED | OUTPATIENT
Start: 2023-11-13 | End: 2023-11-13

## 2023-11-13 RX ORDER — PROCHLORPERAZINE MALEATE 10 MG
10 TABLET ORAL EVERY 6 HOURS PRN
Status: DISCONTINUED | OUTPATIENT
Start: 2023-11-13 | End: 2023-11-13 | Stop reason: HOSPADM

## 2023-11-13 RX ADMIN — CARBOPLATIN 508.5 MG: 600 INJECTION, SOLUTION INTRAVENOUS at 13:21

## 2023-11-13 RX ADMIN — DEXAMETHASONE SODIUM PHOSPHATE 12 MG: 10 INJECTION, SOLUTION INTRAMUSCULAR; INTRAVENOUS at 12:15

## 2023-11-13 RX ADMIN — PALONOSETRON HYDROCHLORIDE 250 MCG: 0.25 INJECTION INTRAVENOUS at 12:15

## 2023-11-13 RX ADMIN — PEMETREXED DISODIUM 1000 MG: 500 INJECTION, POWDER, LYOPHILIZED, FOR SOLUTION INTRAVENOUS at 13:07

## 2023-11-13 RX ADMIN — FOSAPREPITANT DIMEGLUMINE 150 MG: 150 INJECTION, POWDER, LYOPHILIZED, FOR SOLUTION INTRAVENOUS at 12:15

## 2023-11-13 ASSESSMENT — PAIN SCALES - GENERAL: PAINLEVEL: 0-NO PAIN

## 2023-11-13 NOTE — PROGRESS NOTES
Radiation Oncology On Treatment Visit    Patient Name:  Kevin Rubi  MRN:  [id number]  :  1955    Referring Provider: Sabrina Longoria MD  Primary Care Provider: DANIEL Freid  Care Team: Patient Care Team:  DANIEL Fried as PCP - General (Internal Medicine)  DANIEL Fried as PCP - [facility] PCP  Wisam Latham MD as PCP - [employer] PCP  Janell Longoria MD as Consulting Physician (Hematology and Oncology)    Date of Service: 2023     Diagnosis:   Specialty Problems          Radiation Oncology Problems    Primary cancer of left lower lobe of lung (CMS/HCC)        Malignant neoplasm of bronchus and lung (CMS/HCC)        Primary malignant neoplasm of lung of unknown cell type (CMS/HCC)         Treatment Summary:  Radiation Treatments       Active   Left lung_Med (Started on 2023)   Most recent fraction: 200 cGy given on 2023   Total given: 200 cGy / 6,000 cGy  (1 of 30 fractions)   Elapsed Days: 0   Technique: IMRT                   SUBJECTIVE: Mr. Rubi received his first fraction of radiation today. To receive 1st cycle of chemotherapy today. Reports slightly decreased appetite but denies weight loss. Denies other symptoms. [facility].     OBJECTIVE:   Vital Signs:  /75 (BP Location: Right arm, Patient Position: Sitting, BP Cuff Size: Adult)   Pulse 63   Temp 36.2 °C (97.2 °F) (Temporal)   Resp 18   Ht 1.829 m (6')   Wt 85 kg (187 lb 6.4 oz)   SpO2 98%   BMI 25.42 kg/m²    Pain Scale: The patient's current pain level was assessed.  They report currently having a pain of 0 out of 10.    Other Pertinent Findings: Sitting comfortably. Alert, oriented. No wheezing. Breathing well on room air.    Toxicity Assessment          2023    09:39   Toxicity Assessment   Adverse Events Reviewed (WDL) No (Exceptions to WDL)   Treatment Site Thoracic   Anorexia Grade 0   Anxiety Grade 0   Dehydration Grade 0   Depression  Grade 0   Dermatitis Radiation Grade 0   Diarrhea Grade 0   Fatigue Grade 0   Nausea Grade 0   Pain Grade 0   Vomiting Grade 0   Constipation Grade 0   Dyspepsia Grade 0   Dysphagia Grade 0   Esophagitis Grade 0   Mucositis Oral Grade 0   Pneumonitis Grade 0   Aspiration Grade 0   Hoarseness Grade 0   Laryngeal Edema Grade 0   Myocardial Infarction Grade 0   Pericardial Effusion Grade 0   Pericarditis Grade 0   Esophageal Obstruction Grade 0   Esophageal Pain Grade 0   Esophageal Stenosis Grade 0   Cough Grade 0   Dyspnea Grade 0   Epistaxis Grade 0   Hiccups Grade 0   Hypoxia Grade 0   Pulmonary Fibrosis Grade 0   Lymphedema Grade 0   Thromboembolic Event Grade 0   Hot Flashes Grade 0        Assessment / Plan:  The patient is tolerating radiation therapy as anticipated. Patient was informed of treatment schedule during Thanksgiving week. Continue per current treatment plan.       The patient was assessed and plan discussed with the attending radiation oncologist Dr. Perrin.    Shanda Tong MD  PGY-2 Radiation Oncology Resident  For  Madelyn Perrin MD, [facility] [place]  Faculty, [employer]  [email or web address]  Our [place]: “To Heal, To Teach, To Discover.”  RN partner: [email or web address]    Phone (scheduling): [phone]/ [email or web address]  Phone (office): [phone]  Phone (after hours): [phone]      ATTENDING ADDENDUM:  I saw and evaluated the patient with the resident. I personally obtained the key and critical portions of the history and physical exam, reviewed IGRT/dosimetry and directly counseled the patient of the treatment plan. I reviewed the resident documentation and discussed the patient with the resident. I agree with the resident/fellow's medical decision making as documented in the note.

## 2023-11-14 ENCOUNTER — HOSPITAL ENCOUNTER (OUTPATIENT)
Dept: RADIATION ONCOLOGY | Facility: HOSPITAL | Age: 68
Setting detail: RADIATION/ONCOLOGY SERIES
Discharge: HOME | End: 2023-11-14
Payer: MEDICARE

## 2023-11-14 DIAGNOSIS — C34.32 MALIGNANT NEOPLASM OF LOWER LOBE, LEFT BRONCHUS OR LUNG (MULTI): ICD-10-CM

## 2023-11-14 DIAGNOSIS — Z51.0 ENCOUNTER FOR ANTINEOPLASTIC RADIATION THERAPY: ICD-10-CM

## 2023-11-14 LAB
RAD ONC MSQ ACTUAL FRACTIONS DELIVERED: 2
RAD ONC MSQ ACTUAL SESSION DELIVERED DOSE: 200 CGRAY
RAD ONC MSQ ACTUAL TOTAL DOSE: 400 CGRAY
RAD ONC MSQ ELAPSED DAYS: 1
RAD ONC MSQ LAST DATE: NORMAL
RAD ONC MSQ PRESCRIBED FRACTIONAL DOSE: 200 CGRAY
RAD ONC MSQ PRESCRIBED NUMBER OF FRACTIONS: 30
RAD ONC MSQ PRESCRIBED TECHNIQUE: NORMAL
RAD ONC MSQ PRESCRIBED TOTAL DOSE: 6000 CGRAY
RAD ONC MSQ PRESCRIPTION PATTERN COMMENT: NORMAL
RAD ONC MSQ START DATE: NORMAL
RAD ONC MSQ TREATMENT COURSE NUMBER: 1
RAD ONC MSQ TREATMENT SITE: NORMAL

## 2023-11-14 PROCEDURE — 77014 CHG CT GUIDANCE RADIATION THERAPY FLDS PLACEMENT: CPT | Performed by: RADIOLOGY

## 2023-11-14 PROCEDURE — 77336 RADIATION PHYSICS CONSULT: CPT | Performed by: RADIOLOGY

## 2023-11-14 PROCEDURE — 77386 HC INTENSITY-MODULATED RADIATION THERAPY (IMRT), COMPLEX: CPT | Performed by: RADIOLOGY

## 2023-11-15 ENCOUNTER — HOSPITAL ENCOUNTER (OUTPATIENT)
Dept: RADIATION ONCOLOGY | Facility: HOSPITAL | Age: 68
Setting detail: RADIATION/ONCOLOGY SERIES
Discharge: HOME | End: 2023-11-15
Payer: MEDICARE

## 2023-11-15 DIAGNOSIS — C34.32 MALIGNANT NEOPLASM OF LOWER LOBE, LEFT BRONCHUS OR LUNG (MULTI): ICD-10-CM

## 2023-11-15 DIAGNOSIS — Z51.0 ENCOUNTER FOR ANTINEOPLASTIC RADIATION THERAPY: ICD-10-CM

## 2023-11-15 LAB
RAD ONC MSQ ACTUAL FRACTIONS DELIVERED: 3
RAD ONC MSQ ACTUAL SESSION DELIVERED DOSE: 200 CGRAY
RAD ONC MSQ ACTUAL TOTAL DOSE: 600 CGRAY
RAD ONC MSQ ELAPSED DAYS: 2
RAD ONC MSQ LAST DATE: NORMAL
RAD ONC MSQ PRESCRIBED FRACTIONAL DOSE: 200 CGRAY
RAD ONC MSQ PRESCRIBED NUMBER OF FRACTIONS: 30
RAD ONC MSQ PRESCRIBED TECHNIQUE: NORMAL
RAD ONC MSQ PRESCRIBED TOTAL DOSE: 6000 CGRAY
RAD ONC MSQ PRESCRIPTION PATTERN COMMENT: NORMAL
RAD ONC MSQ START DATE: NORMAL
RAD ONC MSQ TREATMENT COURSE NUMBER: 1
RAD ONC MSQ TREATMENT SITE: NORMAL

## 2023-11-15 PROCEDURE — 77386 HC INTENSITY-MODULATED RADIATION THERAPY (IMRT), COMPLEX: CPT | Performed by: RADIOLOGY

## 2023-11-15 PROCEDURE — 77014 CHG CT GUIDANCE RADIATION THERAPY FLDS PLACEMENT: CPT | Performed by: RADIOLOGY

## 2023-11-16 ENCOUNTER — HOSPITAL ENCOUNTER (OUTPATIENT)
Dept: RADIATION ONCOLOGY | Facility: HOSPITAL | Age: 68
Setting detail: RADIATION/ONCOLOGY SERIES
Discharge: HOME | End: 2023-11-16
Payer: MEDICARE

## 2023-11-16 ENCOUNTER — OFFICE VISIT (OUTPATIENT)
Dept: HEMATOLOGY/ONCOLOGY | Facility: HOSPITAL | Age: 68
End: 2023-11-16
Payer: MEDICARE

## 2023-11-16 VITALS
HEIGHT: 71 IN | HEART RATE: 72 BPM | OXYGEN SATURATION: 99 % | TEMPERATURE: 97.5 F | DIASTOLIC BLOOD PRESSURE: 57 MMHG | BODY MASS INDEX: 26.14 KG/M2 | WEIGHT: 186.73 LBS | SYSTOLIC BLOOD PRESSURE: 116 MMHG | RESPIRATION RATE: 20 BRPM

## 2023-11-16 DIAGNOSIS — C34.32 MALIGNANT NEOPLASM OF LOWER LOBE, LEFT BRONCHUS OR LUNG (MULTI): ICD-10-CM

## 2023-11-16 DIAGNOSIS — Z51.11 ENCOUNTER FOR CHEMOTHERAPY MANAGEMENT: Primary | ICD-10-CM

## 2023-11-16 DIAGNOSIS — Z51.0 ENCOUNTER FOR ANTINEOPLASTIC RADIATION THERAPY: ICD-10-CM

## 2023-11-16 DIAGNOSIS — C34.32 PRIMARY CANCER OF LEFT LOWER LOBE OF LUNG (MULTI): ICD-10-CM

## 2023-11-16 LAB
RAD ONC MSQ ACTUAL FRACTIONS DELIVERED: 4
RAD ONC MSQ ACTUAL SESSION DELIVERED DOSE: 200 CGRAY
RAD ONC MSQ ACTUAL TOTAL DOSE: 800 CGRAY
RAD ONC MSQ ELAPSED DAYS: 3
RAD ONC MSQ LAST DATE: NORMAL
RAD ONC MSQ PRESCRIBED FRACTIONAL DOSE: 200 CGRAY
RAD ONC MSQ PRESCRIBED NUMBER OF FRACTIONS: 30
RAD ONC MSQ PRESCRIBED TECHNIQUE: NORMAL
RAD ONC MSQ PRESCRIBED TOTAL DOSE: 6000 CGRAY
RAD ONC MSQ PRESCRIPTION PATTERN COMMENT: NORMAL
RAD ONC MSQ START DATE: NORMAL
RAD ONC MSQ TREATMENT COURSE NUMBER: 1
RAD ONC MSQ TREATMENT SITE: NORMAL

## 2023-11-16 PROCEDURE — 77386 HC INTENSITY-MODULATED RADIATION THERAPY (IMRT), COMPLEX: CPT | Performed by: RADIOLOGY

## 2023-11-16 PROCEDURE — 1036F TOBACCO NON-USER: CPT | Performed by: NURSE PRACTITIONER

## 2023-11-16 PROCEDURE — 77014 CHG CT GUIDANCE RADIATION THERAPY FLDS PLACEMENT: CPT | Performed by: RADIOLOGY

## 2023-11-16 PROCEDURE — 99214 OFFICE O/P EST MOD 30 MIN: CPT | Performed by: NURSE PRACTITIONER

## 2023-11-16 PROCEDURE — 1159F MED LIST DOCD IN RCRD: CPT | Performed by: NURSE PRACTITIONER

## 2023-11-16 PROCEDURE — 3074F SYST BP LT 130 MM HG: CPT | Performed by: NURSE PRACTITIONER

## 2023-11-16 PROCEDURE — 3078F DIAST BP <80 MM HG: CPT | Performed by: NURSE PRACTITIONER

## 2023-11-16 PROCEDURE — 1160F RVW MEDS BY RX/DR IN RCRD: CPT | Performed by: NURSE PRACTITIONER

## 2023-11-16 PROCEDURE — 1126F AMNT PAIN NOTED NONE PRSNT: CPT | Performed by: NURSE PRACTITIONER

## 2023-11-16 ASSESSMENT — ENCOUNTER SYMPTOMS
OCCASIONAL FEELINGS OF UNSTEADINESS: 0
DEPRESSION: 0
LOSS OF SENSATION IN FEET: 0

## 2023-11-16 ASSESSMENT — COLUMBIA-SUICIDE SEVERITY RATING SCALE - C-SSRS
1. IN THE PAST MONTH, HAVE YOU WISHED YOU WERE DEAD OR WISHED YOU COULD GO TO SLEEP AND NOT WAKE UP?: NO
6. HAVE YOU EVER DONE ANYTHING, STARTED TO DO ANYTHING, OR PREPARED TO DO ANYTHING TO END YOUR LIFE?: NO
2. HAVE YOU ACTUALLY HAD ANY THOUGHTS OF KILLING YOURSELF?: NO

## 2023-11-16 ASSESSMENT — PATIENT HEALTH QUESTIONNAIRE - PHQ9
2. FEELING DOWN, DEPRESSED OR HOPELESS: NOT AT ALL
SUM OF ALL RESPONSES TO PHQ9 QUESTIONS 1 AND 2: 0
1. LITTLE INTEREST OR PLEASURE IN DOING THINGS: NOT AT ALL

## 2023-11-16 ASSESSMENT — PAIN SCALES - GENERAL: PAINLEVEL: 0-NO PAIN

## 2023-11-16 NOTE — PROGRESS NOTES
[facility] - Medical Oncology Follow-Up Visit    Patient ID: Kevin Rubi is a 68 y.o. male      Current therapy: cyanocobalamin (Vitamin B-12) injection 1,000 mcg, 1,000 mcg, intramuscular, Once, 1 of 4 cycles    Administration: 1,000 mcg (10/23/2023)        fosaprepitant (Emend) 150 mg in sodium chloride 0.9% 250 mL IV, 150 mg, intravenous, Once, 2 of 6 cycles    Administration: 150 mg (10/23/2023), 150 mg (11/13/2023)        CARBOplatin (Paraplatin) 508.5 mg in sodium chloride 0.9% 150.85 mL IV, 508.5 mg (100 % of original dose 508.5 mg), intravenous, Once, 2 of 6 cycles    Dose modification: 508.5 mg (original dose 508.5 mg, Cycle 1, Reason: Protocol Driven, Comment: NA)    Administration: 508.5 mg (10/23/2023), 508.5 mg (11/13/2023)        palonosetron (Aloxi) injection 250 mcg, 250 mcg, intravenous, Once, 2 of 6 cycles    Administration: 250 mcg (10/23/2023), 250 mcg (11/13/2023)        PEMEtrexed disodium (Alimta) 1,000 mg in sodium chloride 0.9% 140 mL IV, 500 mg/m2 = 1,000 mg, intravenous, Once, 2 of 6 cycles    Administration: 1,000 mg (10/23/2023), 1,000 mg (11/13/2023)      Oncologic History:      Chief Concern: Here in clinic for follow-up on concurrent chemo/RT with Carboplatin/Pemetrexed (21 day cycles)     HPI Patient is here today in clinic with his wife for follow-up on concurrent chemo/RT with Carboplatin/Pemetrexed (21 day cycles). He is on week 1 RT, s/p C2 Carbo/Pem (C2 11/13/23). He is feeling well overall. Breathing stable. Endorses mild fatigue. No GI symptoms He is eating/drinking ok. Staying very active, he and his wife are walking 3 miles/day. No fever, chills, or cold symptoms. No other concerns or complaints.       Meds (Current):    Current Outpatient Medications:     apixaban (Eliquis) 5 mg tablet, Take 1 tablet (5 mg) by mouth 2 times a day., Disp: 180 tablet, Rfl: 1    clopidogrel (Plavix) 75 mg tablet, Take 1 tablet (75 mg) by mouth once  "daily., Disp: 90 tablet, Rfl: 1    digoxin (Lanoxin) 125 MCG tablet, Take 1 tablet (125 mcg) by mouth once daily for 180 doses., Disp: 90 tablet, Rfl: 1    Entresto 24-26 mg tablet, Take 1 tablet by mouth 2 times a day., Disp: 180 tablet, Rfl: 1    fenofibrate (Tricor) 145 mg tablet, , Disp: , Rfl:     gabapentin (Neurontin) 300 mg capsule, Take 1 capsule (300 mg) by mouth once daily at bedtime., Disp: 60 capsule, Rfl: 2    metoprolol succinate XL (Toprol-XL) 25 mg 24 hr tablet, Take 1 tablet (25 mg) by mouth once daily. Do not crush or chew., Disp: 90 tablet, Rfl: 3    metoprolol succinate XL (Toprol-XL) 50 mg 24 hr tablet, Take 1 tablet (50 mg) by mouth once daily. Do not crush or chew., Disp: 90 tablet, Rfl: 3    rosuvastatin (Crestor) 5 mg tablet, Take 1 tablet (5 mg) by mouth once daily., Disp: 90 tablet, Rfl: 1    spironolactone (Aldactone) 25 mg tablet, Take 0.5 tablets (12.5 mg) by mouth once daily., Disp: 45 tablet, Rfl: 1    Eliquis 5 mg tablet, Take 1 tablet (5 mg) by mouth 2 times a day., Disp: , Rfl:     Review of Systems - Oncology 12 point ROS was obtained and negative unless otherwise mentioned in the above HPI.      Objective   BSA: 2.05 meters squared  Wt Readings from Last 5 Encounters:   11/16/23 84.7 kg (186 lb 11.7 oz)   11/13/23 85 kg (187 lb 6.4 oz)   11/08/23 86.9 kg (191 lb 9.3 oz)   11/07/23 84.8 kg (186 lb 15.2 oz)   10/23/23 88.7 kg (195 lb 8.8 oz)     /57   Pulse 72   Temp 36.4 °C (97.5 °F) (Core)   Resp 20   Ht 1.793 m (5' 10.59\")   Wt 84.7 kg (186 lb 11.7 oz)   SpO2 99%   BMI 26.35 kg/m²          Physical Exam  Constitutional:       Appearance: Normal appearance.   Eyes:      Pupils: Pupils are equal, round, and reactive to light.   Cardiovascular:      Rate and Rhythm: Normal rate and regular rhythm.   Pulmonary:      Effort: Pulmonary effort is normal.      Breath sounds: Normal breath sounds.   Abdominal:      General: Bowel sounds are normal.      Palpations: Abdomen " is soft.   Musculoskeletal:         General: Normal range of motion.   Skin:     General: Skin is warm and dry.   Neurological:      General: No focal deficit present.      Mental Status: He is alert and oriented to person, place, and time.   Psychiatric:         Mood and Affect: Mood normal.         Behavior: Behavior normal.          Results:  Labs:  Lab Results   Component Value Date    WBC 7.5 11/13/2023    HGB 11.6 (L) 11/13/2023    HCT 35.5 (L) 11/13/2023    MCV 88 11/13/2023     11/13/2023      Lab Results   Component Value Date    NEUTROABS 4.55 11/13/2023      Lab Results   Component Value Date    GLUCOSE 248 (H) 11/13/2023    CALCIUM 9.7 11/13/2023     11/13/2023    K 4.4 11/13/2023    CO2 23 11/13/2023     11/13/2023    BUN 19 11/13/2023    CREATININE 1.07 11/13/2023    MG CANCELED 09/16/2023     Lab Results   Component Value Date    ALT 22 11/13/2023    AST 27 11/13/2023    ALKPHOS 52 11/13/2023    BILITOT 0.3 11/13/2023      Lab Results   Component Value Date    TSH 2.66 10/23/2023       Imaging:           === Results for orders placed in visit on 10/02/23 ===    MR brain w and wo IV contrast [[id number]] 10/02/2023    Status: Normal  No abnormal intracranial enhancing mass lesion to suggest brain  metastasis is noted.    There is an 8 mm focus of nonspecific abnormal diminished bone marrow  signal on the T1 images demonstrating a component of enhancement on  the post gadolinium images along the left frontal bone as seen on  axial slice 10 of 27. The lesion is nonspecific and could be benign  or malignant in etiology. Given the patient's clinical history, an  osseous metastasis can not be excluded. Correlation with any previous  outside MRI studies if available would be helpful to assess stability  of this finding. If none are available, a follow-up MRI in 3 months  would be recommended.    There is moderate brain parenchymal volume loss.    There is an area of encephalomalacia/gliosis  along the lateral left  frontal lobe.    There are scattered nonspecific white matter changes within the  cerebral hemispheres bilaterally which while nonspecific, given the  patient's age, likely represent sequelae of more remote small-vessel  ischemic change.    Critical Finding:  See findings. Notification was initiated on  10/2/2023 at 3:10 pm by  Rito Fitzpatrick.  (**-YCF-**)        The study was interpreted at [facility].    Signed by: Rito Fitzpatrick 10/2/2023 3:10 PM  Dictation workstation:   [id number]    Assessment/Plan      Keivn Rubi is a 68 y.o. 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 Sees Dr. Perrin on 10/29   Consent provided for 2-3 cycles of carboplatin and pemetrexed -    C1 on 10/23  We anticipate C2 at [facility] with concurrent RT on 11/13  Not a candidate for CASE 1522 due to other potential synchronous primary lung cancers.  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

## 2023-11-17 ENCOUNTER — HOSPITAL ENCOUNTER (OUTPATIENT)
Dept: RADIATION ONCOLOGY | Facility: HOSPITAL | Age: 68
Setting detail: RADIATION/ONCOLOGY SERIES
Discharge: HOME | End: 2023-11-17
Payer: MEDICARE

## 2023-11-17 DIAGNOSIS — C34.32 MALIGNANT NEOPLASM OF LOWER LOBE, LEFT BRONCHUS OR LUNG (MULTI): ICD-10-CM

## 2023-11-17 DIAGNOSIS — Z51.0 ENCOUNTER FOR ANTINEOPLASTIC RADIATION THERAPY: ICD-10-CM

## 2023-11-17 LAB
RAD ONC MSQ ACTUAL FRACTIONS DELIVERED: 5
RAD ONC MSQ ACTUAL SESSION DELIVERED DOSE: 200 CGRAY
RAD ONC MSQ ACTUAL TOTAL DOSE: 1000 CGRAY
RAD ONC MSQ ELAPSED DAYS: 4
RAD ONC MSQ LAST DATE: NORMAL
RAD ONC MSQ PRESCRIBED FRACTIONAL DOSE: 200 CGRAY
RAD ONC MSQ PRESCRIBED NUMBER OF FRACTIONS: 30
RAD ONC MSQ PRESCRIBED TECHNIQUE: NORMAL
RAD ONC MSQ PRESCRIBED TOTAL DOSE: 6000 CGRAY
RAD ONC MSQ PRESCRIPTION PATTERN COMMENT: NORMAL
RAD ONC MSQ START DATE: NORMAL
RAD ONC MSQ TREATMENT COURSE NUMBER: 1
RAD ONC MSQ TREATMENT SITE: NORMAL

## 2023-11-17 PROCEDURE — 77014 CHG CT GUIDANCE RADIATION THERAPY FLDS PLACEMENT: CPT | Performed by: RADIOLOGY

## 2023-11-17 PROCEDURE — 77386 HC INTENSITY-MODULATED RADIATION THERAPY (IMRT), COMPLEX: CPT | Performed by: RADIOLOGY

## 2023-11-17 PROCEDURE — 77300 RADIATION THERAPY DOSE PLAN: CPT | Performed by: RADIOLOGY

## 2023-11-19 ENCOUNTER — HOSPITAL ENCOUNTER (OUTPATIENT)
Dept: RADIATION ONCOLOGY | Facility: HOSPITAL | Age: 68
Setting detail: RADIATION/ONCOLOGY SERIES
Discharge: HOME | End: 2023-11-19
Payer: MEDICARE

## 2023-11-19 DIAGNOSIS — C34.32 MALIGNANT NEOPLASM OF LOWER LOBE, LEFT BRONCHUS OR LUNG (MULTI): ICD-10-CM

## 2023-11-19 DIAGNOSIS — Z51.0 ENCOUNTER FOR ANTINEOPLASTIC RADIATION THERAPY: ICD-10-CM

## 2023-11-19 LAB
RAD ONC MSQ ACTUAL FRACTIONS DELIVERED: 6
RAD ONC MSQ ACTUAL SESSION DELIVERED DOSE: 200 CGRAY
RAD ONC MSQ ACTUAL TOTAL DOSE: 1200 CGRAY
RAD ONC MSQ ELAPSED DAYS: 6
RAD ONC MSQ LAST DATE: NORMAL
RAD ONC MSQ PRESCRIBED FRACTIONAL DOSE: 200 CGRAY
RAD ONC MSQ PRESCRIBED NUMBER OF FRACTIONS: 7
RAD ONC MSQ PRESCRIBED TECHNIQUE: NORMAL
RAD ONC MSQ PRESCRIBED TOTAL DOSE: 1400 CGRAY
RAD ONC MSQ PRESCRIPTION PATTERN COMMENT: NORMAL
RAD ONC MSQ START DATE: NORMAL
RAD ONC MSQ TREATMENT COURSE NUMBER: 1
RAD ONC MSQ TREATMENT SITE: NORMAL

## 2023-11-19 PROCEDURE — 77014 CHG CT GUIDANCE RADIATION THERAPY FLDS PLACEMENT: CPT | Performed by: STUDENT IN AN ORGANIZED HEALTH CARE EDUCATION/TRAINING PROGRAM

## 2023-11-19 PROCEDURE — 77386 HC INTENSITY-MODULATED RADIATION THERAPY (IMRT), COMPLEX: CPT | Performed by: RADIOLOGY

## 2023-11-20 ENCOUNTER — HOSPITAL ENCOUNTER (OUTPATIENT)
Dept: RADIATION ONCOLOGY | Facility: HOSPITAL | Age: 68
Setting detail: RADIATION/ONCOLOGY SERIES
Discharge: HOME | End: 2023-11-20
Payer: MEDICARE

## 2023-11-20 DIAGNOSIS — C34.32 PRIMARY CANCER OF LEFT LOWER LOBE OF LUNG (MULTI): Primary | Chronic | ICD-10-CM

## 2023-11-20 DIAGNOSIS — C34.32 MALIGNANT NEOPLASM OF LOWER LOBE, LEFT BRONCHUS OR LUNG (MULTI): ICD-10-CM

## 2023-11-20 DIAGNOSIS — Z51.0 ENCOUNTER FOR ANTINEOPLASTIC RADIATION THERAPY: ICD-10-CM

## 2023-11-20 LAB
RAD ONC MSQ ACTUAL FRACTIONS DELIVERED: 7
RAD ONC MSQ ACTUAL SESSION DELIVERED DOSE: 200 CGRAY
RAD ONC MSQ ACTUAL TOTAL DOSE: 1400 CGRAY
RAD ONC MSQ ELAPSED DAYS: 7
RAD ONC MSQ LAST DATE: NORMAL
RAD ONC MSQ PRESCRIBED FRACTIONAL DOSE: 200 CGRAY
RAD ONC MSQ PRESCRIBED NUMBER OF FRACTIONS: 7
RAD ONC MSQ PRESCRIBED TECHNIQUE: NORMAL
RAD ONC MSQ PRESCRIBED TOTAL DOSE: 1400 CGRAY
RAD ONC MSQ PRESCRIPTION PATTERN COMMENT: NORMAL
RAD ONC MSQ START DATE: NORMAL
RAD ONC MSQ TREATMENT COURSE NUMBER: 1
RAD ONC MSQ TREATMENT SITE: NORMAL

## 2023-11-20 PROCEDURE — 77014 CHG CT GUIDANCE RADIATION THERAPY FLDS PLACEMENT: CPT | Performed by: RADIOLOGY

## 2023-11-20 PROCEDURE — 77386 HC INTENSITY-MODULATED RADIATION THERAPY (IMRT), COMPLEX: CPT | Performed by: RADIOLOGY

## 2023-11-20 RX ORDER — SUCRALFATE 1 G/10ML
1 SUSPENSION ORAL AS NEEDED
Qty: 300 ML | Refills: 1 | Status: SHIPPED | OUTPATIENT
Start: 2023-11-20 | End: 2024-04-09 | Stop reason: WASHOUT

## 2023-11-20 ASSESSMENT — ENCOUNTER SYMPTOMS
RESTLESSNESS: 1
IRRITABILITY: 0
THOUGHT CONTENT - OBSESSIONS: 0
PANIC: 0
MUSCLE TENSION: 0
FEELING OF CHOKING: 0
INSOMNIA: 0
COMPULSIONS: 0
DEPRESSED MOOD: 0

## 2023-11-20 NOTE — PROGRESS NOTES
Radiation Oncology On Treatment Visit    Patient Name:  Kevin Rubi  MRN:  [id number]  :  1955    Referring Provider: Janell Longoria MD  Primary Care Provider: DANIEL Fried  Care Team: Patient Care Team:  DANIEL Fried as PCP - General (Internal Medicine)  DANIEL Fried as PCP - [facility] PCP  Wisam Latham MD as PCP - [employer] PCP  Janell Longoria MD as Consulting Physician (Hematology and Oncology)    Date of Service: 2023     Diagnosis:   Specialty Problems          Radiation Oncology Problems    Primary cancer of left lower lobe of lung (CMS/HCC)        Malignant neoplasm of bronchus and lung (CMS/HCC)        Primary malignant neoplasm of lung of unknown cell type (CMS/HCC)         Treatment Summary:  Left lung_Med (Started on 2023)   Most recent fraction: 200 cGy given on 2023   Total given: 1400 cGy / 6,000 cGy  (7 of 30 fractions)   Elapsed Days: 0   Technique: IMRT        SUBJECTIVE: Mr. Rubi presents for on-treatment visit accompanied by his wife. He continues to endorse loss of appetite and endorses mild fatigue today. Per his wife he only has been eating one meal a day. Endorses mild sore throat and feels food going down his esophagus when he eats. Denies pain when eating or drinking.      OBJECTIVE:   Vital Signs:  There were no vitals taken for this visit.   Pain Scale: The patient's current pain level was assessed.  They report currently having a pain of 0 out of 10.    Other Pertinent Findings:     Toxicity Assessment          2023    09:39 2023    12:12   Toxicity Assessment   Adverse Events Reviewed (WDL) No (Exceptions to WDL) Yes (Within Defined Limits)   Treatment Site Thoracic Thoracic   Anorexia Grade 0 Grade 3       eats a meal a day. and grazes the rest of the day.   Anxiety Grade 0 Grade 2   Dehydration Grade 0 Grade 0   Depression Grade 0 Grade 0   Dermatitis Radiation Grade 0 Grade 0    Diarrhea Grade 0 Grade 0   Fatigue Grade 0 Grade 1       pt states he is tired   Nausea Grade 0 Grade 0   Pain Grade 0 Grade 1       Pain  in fingertips and toes   Treatment Related Secondary Malignancy  Grade 0   Tumor Pain  Grade 0   Vomiting Grade 0 Grade 0   Constipation Grade 0    Dyspepsia Grade 0    Dysphagia Grade 0    Esophagitis Grade 0    Mucositis Oral Grade 0    Pneumonitis Grade 0 Grade 0   Aspiration Grade 0 Grade 0   Hoarseness Grade 0 Grade 0   Laryngeal Edema Grade 0 Grade 0   Myocardial Infarction Grade 0    Pericardial Effusion Grade 0    Pericarditis Grade 0    Esophageal Obstruction Grade 0    Esophageal Pain Grade 0    Esophageal Stenosis Grade 0    Cough Grade 0    Dyspnea Grade 0    Epistaxis Grade 0    Hiccups Grade 0    Hypoxia Grade 0    Pulmonary Fibrosis Grade 0    Lymphedema Grade 0    Thromboembolic Event Grade 0    Hot Flashes Grade 0         Assessment / Plan:  The patient is tolerating radiation therapy as anticipated. He was advised to use over the counter cough drops or throat drops for sore throat and to have smaller meals more frequently throughout the day. Was advised to eat softer consistency foods and to use carafate suspension ordered today if eating or drinking becomes uncomfortable. Continue per current treatment plan.       The patient was assessed and plan discussed with the attending radiation oncologist Dr. Durand.    Shanda Tong MD  PGY-2 Radiation Oncology Resident  On-call pager [phone]  Available on Epic Secure Chat    I personally saw and evaluated the patient with the resident and agree with documentation as per above. Well appearing, NAD on exam. Continue RT.    Lien Durand MD  11/20/2023  , Radiation Oncology

## 2023-11-21 NOTE — PATIENT INSTRUCTIONS
1. Reviewed diagnostic impression including subjective and objective data and provided education about generalized anxiety disorder, etiology, treatment recommendations including medication, therapy, course of treatment and prognosis. Patient amendable to treatment plan.    2. Recommendations - Consider antidepressant therapy and/or therapy     START: 300 mg Gabapentin (Neurontin) - take one tablet at bedtime for anxiety/insomnia    3. Reviewed r/b/a, possible side effects of the medication(s). Client is aware benefit/risks     4. Labs reviewed and discussed - no new orders    5. You can look up therapists in your area by accessing the following web site and putting in your zip code.   <[email or web address]>    6. Follow up with physical health providers as scheduled    7. Follow up in a month.   .   May follow up sooner if experiences worsening symptoms by calling  Psychiatry at [phone] or [phone] ([place])    Patient verbalized an understanding to call [facility] at [phone] ([facility]), [phone], or 369/go to the nearest emergency room if experiences thoughts of harm to self or others.

## 2023-11-22 ENCOUNTER — HOSPITAL ENCOUNTER (OUTPATIENT)
Dept: RADIATION ONCOLOGY | Facility: HOSPITAL | Age: 68
Setting detail: RADIATION/ONCOLOGY SERIES
Discharge: HOME | End: 2023-11-22
Payer: MEDICARE

## 2023-11-22 DIAGNOSIS — Z51.0 ENCOUNTER FOR ANTINEOPLASTIC RADIATION THERAPY: ICD-10-CM

## 2023-11-22 DIAGNOSIS — C34.32 MALIGNANT NEOPLASM OF LOWER LOBE, LEFT BRONCHUS OR LUNG (MULTI): ICD-10-CM

## 2023-11-22 LAB
RAD ONC MSQ ACTUAL FRACTIONS DELIVERED: 2
RAD ONC MSQ ACTUAL SESSION DELIVERED DOSE: 200 CGRAY
RAD ONC MSQ ACTUAL TOTAL DOSE: 400 CGRAY
RAD ONC MSQ ELAPSED DAYS: 1
RAD ONC MSQ LAST DATE: NORMAL
RAD ONC MSQ PRESCRIBED FRACTIONAL DOSE: 200 CGRAY
RAD ONC MSQ PRESCRIBED NUMBER OF FRACTIONS: 23
RAD ONC MSQ PRESCRIBED TECHNIQUE: NORMAL
RAD ONC MSQ PRESCRIBED TOTAL DOSE: 4600 CGRAY
RAD ONC MSQ PRESCRIPTION PATTERN COMMENT: NORMAL
RAD ONC MSQ START DATE: NORMAL
RAD ONC MSQ TREATMENT COURSE NUMBER: 1
RAD ONC MSQ TREATMENT SITE: NORMAL

## 2023-11-22 PROCEDURE — 77386 HC INTENSITY-MODULATED RADIATION THERAPY (IMRT), COMPLEX: CPT | Performed by: RADIOLOGY

## 2023-11-22 PROCEDURE — 77014 CHG CT GUIDANCE RADIATION THERAPY FLDS PLACEMENT: CPT | Performed by: RADIOLOGY

## 2023-11-27 ENCOUNTER — HOSPITAL ENCOUNTER (OUTPATIENT)
Dept: RADIATION ONCOLOGY | Facility: HOSPITAL | Age: 68
Setting detail: RADIATION/ONCOLOGY SERIES
Discharge: HOME | End: 2023-11-27
Payer: MEDICARE

## 2023-11-27 DIAGNOSIS — C34.32 MALIGNANT NEOPLASM OF LOWER LOBE, LEFT BRONCHUS OR LUNG (MULTI): ICD-10-CM

## 2023-11-27 DIAGNOSIS — Z51.0 ENCOUNTER FOR ANTINEOPLASTIC RADIATION THERAPY: ICD-10-CM

## 2023-11-27 LAB
RAD ONC MSQ ACTUAL FRACTIONS DELIVERED: 3
RAD ONC MSQ ACTUAL SESSION DELIVERED DOSE: 200 CGRAY
RAD ONC MSQ ACTUAL TOTAL DOSE: 600 CGRAY
RAD ONC MSQ ELAPSED DAYS: 6
RAD ONC MSQ LAST DATE: NORMAL
RAD ONC MSQ PRESCRIBED FRACTIONAL DOSE: 200 CGRAY
RAD ONC MSQ PRESCRIBED NUMBER OF FRACTIONS: 23
RAD ONC MSQ PRESCRIBED TECHNIQUE: NORMAL
RAD ONC MSQ PRESCRIBED TOTAL DOSE: 4600 CGRAY
RAD ONC MSQ PRESCRIPTION PATTERN COMMENT: NORMAL
RAD ONC MSQ START DATE: NORMAL
RAD ONC MSQ TREATMENT COURSE NUMBER: 1
RAD ONC MSQ TREATMENT SITE: NORMAL

## 2023-11-27 PROCEDURE — 77014 CHG CT GUIDANCE RADIATION THERAPY FLDS PLACEMENT: CPT | Performed by: RADIOLOGY

## 2023-11-27 PROCEDURE — 77386 HC INTENSITY-MODULATED RADIATION THERAPY (IMRT), COMPLEX: CPT | Performed by: RADIOLOGY

## 2023-11-28 ENCOUNTER — HOSPITAL ENCOUNTER (OUTPATIENT)
Dept: RADIATION ONCOLOGY | Facility: HOSPITAL | Age: 68
Setting detail: RADIATION/ONCOLOGY SERIES
Discharge: HOME | End: 2023-11-28
Payer: MEDICARE

## 2023-11-28 DIAGNOSIS — Z51.0 ENCOUNTER FOR ANTINEOPLASTIC RADIATION THERAPY: ICD-10-CM

## 2023-11-28 DIAGNOSIS — C34.32 MALIGNANT NEOPLASM OF LOWER LOBE, LEFT BRONCHUS OR LUNG (MULTI): ICD-10-CM

## 2023-11-28 LAB
RAD ONC MSQ ACTUAL FRACTIONS DELIVERED: 4
RAD ONC MSQ ACTUAL SESSION DELIVERED DOSE: 200 CGRAY
RAD ONC MSQ ACTUAL TOTAL DOSE: 800 CGRAY
RAD ONC MSQ ELAPSED DAYS: 7
RAD ONC MSQ LAST DATE: NORMAL
RAD ONC MSQ PRESCRIBED FRACTIONAL DOSE: 200 CGRAY
RAD ONC MSQ PRESCRIBED NUMBER OF FRACTIONS: 23
RAD ONC MSQ PRESCRIBED TECHNIQUE: NORMAL
RAD ONC MSQ PRESCRIBED TOTAL DOSE: 4600 CGRAY
RAD ONC MSQ PRESCRIPTION PATTERN COMMENT: NORMAL
RAD ONC MSQ START DATE: NORMAL
RAD ONC MSQ TREATMENT COURSE NUMBER: 1
RAD ONC MSQ TREATMENT SITE: NORMAL

## 2023-11-28 PROCEDURE — 77386 HC INTENSITY-MODULATED RADIATION THERAPY (IMRT), COMPLEX: CPT | Performed by: RADIOLOGY

## 2023-11-28 PROCEDURE — 77014 CHG CT GUIDANCE RADIATION THERAPY FLDS PLACEMENT: CPT | Performed by: RADIOLOGY

## 2023-11-29 ENCOUNTER — NUTRITION (OUTPATIENT)
Dept: HEMATOLOGY/ONCOLOGY | Facility: HOSPITAL | Age: 68
End: 2023-11-29

## 2023-11-29 ENCOUNTER — HOSPITAL ENCOUNTER (OUTPATIENT)
Dept: RADIATION ONCOLOGY | Facility: HOSPITAL | Age: 68
Setting detail: RADIATION/ONCOLOGY SERIES
Discharge: HOME | End: 2023-11-29
Payer: MEDICARE

## 2023-11-29 ENCOUNTER — RADIATION ONCOLOGY OTV (OUTPATIENT)
Dept: RADIATION ONCOLOGY | Facility: HOSPITAL | Age: 68
End: 2023-11-29

## 2023-11-29 VITALS
BODY MASS INDEX: 25.02 KG/M2 | SYSTOLIC BLOOD PRESSURE: 114 MMHG | OXYGEN SATURATION: 97 % | HEART RATE: 55 BPM | DIASTOLIC BLOOD PRESSURE: 66 MMHG | WEIGHT: 184.75 LBS | RESPIRATION RATE: 18 BRPM | HEIGHT: 72 IN | TEMPERATURE: 96.8 F

## 2023-11-29 DIAGNOSIS — Z51.0 ENCOUNTER FOR ANTINEOPLASTIC RADIATION THERAPY: ICD-10-CM

## 2023-11-29 DIAGNOSIS — Z51.0 ENCOUNTER FOR ANTINEOPLASTIC RADIATION THERAPY: Primary | ICD-10-CM

## 2023-11-29 DIAGNOSIS — C34.32 MALIGNANT NEOPLASM OF LOWER LOBE, LEFT BRONCHUS OR LUNG (MULTI): ICD-10-CM

## 2023-11-29 LAB
RAD ONC MSQ ACTUAL FRACTIONS DELIVERED: 5
RAD ONC MSQ ACTUAL SESSION DELIVERED DOSE: 200 CGRAY
RAD ONC MSQ ACTUAL TOTAL DOSE: 1000 CGRAY
RAD ONC MSQ ELAPSED DAYS: 8
RAD ONC MSQ LAST DATE: NORMAL
RAD ONC MSQ PRESCRIBED FRACTIONAL DOSE: 200 CGRAY
RAD ONC MSQ PRESCRIBED NUMBER OF FRACTIONS: 23
RAD ONC MSQ PRESCRIBED TECHNIQUE: NORMAL
RAD ONC MSQ PRESCRIBED TOTAL DOSE: 4600 CGRAY
RAD ONC MSQ PRESCRIPTION PATTERN COMMENT: NORMAL
RAD ONC MSQ START DATE: NORMAL
RAD ONC MSQ TREATMENT COURSE NUMBER: 1
RAD ONC MSQ TREATMENT SITE: NORMAL

## 2023-11-29 PROCEDURE — 77014 CHG CT GUIDANCE RADIATION THERAPY FLDS PLACEMENT: CPT | Performed by: RADIOLOGY

## 2023-11-29 PROCEDURE — 77427 RADIATION TX MANAGEMENT X5: CPT | Performed by: RADIOLOGY

## 2023-11-29 PROCEDURE — 77336 RADIATION PHYSICS CONSULT: CPT | Performed by: RADIOLOGY

## 2023-11-29 PROCEDURE — 77386 HC INTENSITY-MODULATED RADIATION THERAPY (IMRT), COMPLEX: CPT | Performed by: RADIOLOGY

## 2023-11-29 ASSESSMENT — PAIN SCALES - GENERAL: PAINLEVEL: 0-NO PAIN

## 2023-11-29 NOTE — PROGRESS NOTES
Radiation Oncology On Treatment Visit    Patient Name:  Kevin Rubi  MRN:  [id number]  :  1955    Referring Provider: Sabrina Longoria MD  Primary Care Provider: RUKHSANA Fried-CNS  Care Team: Patient Care Team:  RUKHSANA Fried-CNS as PCP - General (Internal Medicine)  Wisam Latham MD as PCP - [employer] PCP  Janell Longoria MD as Consulting Physician (Hematology and Oncology)    Date of Service: 2023     Diagnosis:   Specialty Problems          Radiation Oncology Problems    Primary cancer of left lower lobe of lung (CMS/HCC)        Malignant neoplasm of bronchus and lung (CMS/HCC)        Primary malignant neoplasm of lung of unknown cell type (CMS/HCC)         Treatment Summary: Replan needed from 8th fraction due to Tumor motion changes of left lower lobe primary  Radiation Treatments       Active   Replan Lt Lung/Med (Started on 2023)   Most recent fraction: 200 cGy given on 2023   Total given: 1,000 cGy / 4,600 cGy  (5 of 23 fractions)   Elapsed Days: 8   Technique: IMRT              Previous   Lt Lung/Med (Started on 2023)   Most recent fraction: 200 cGy given on 2023   Total given: 1,400 cGy / 6,000 cGy  (7 of 30 fractions)   Elapsed Days: 7   Technique: IMRT           SUBJECTIVE: Mr. Rubi endorses a sensation of food getting stuck in his chest while eating. Carafate liquid did not provide relief. Also endorses bilateral breast tenderness, mild fatigue, and some muscle aches in his right calf.      OBJECTIVE:   Vital Signs:  /66 (BP Location: Right arm, Patient Position: Sitting, BP Cuff Size: Adult)   Pulse 55   Temp 36 °C (96.8 °F) (Temporal)   Resp 18   Ht 1.829 m (6')   Wt 83.8 kg (184 lb 11.9 oz)   SpO2 97%   BMI 25.06 kg/m²    Pain Scale: The patient's current pain level was assessed.  They report currently having a pain of 0 out of 10.    Other Pertinent Findings: Sitting comfortably. Alert, oriented. No wheezing,  SoB. Ambulatory.    Toxicity Assessment          11/13/2023    09:39 11/20/2023    12:12 11/29/2023    15:23   Toxicity Assessment   Adverse Events Reviewed (WDL) No (Exceptions to WDL) Yes (Within Defined Limits) No (Exceptions to WDL)   Treatment Site Thoracic Thoracic Thoracic   Anorexia Grade 0 Grade 3       eats a meal a day. and grazes the rest of the day. Grade 1       pt. c/o little appetite; dietician to see pt   Anxiety Grade 0 Grade 2    Dehydration Grade 0 Grade 0 Grade 0   Depression Grade 0 Grade 0    Dermatitis Radiation Grade 0 Grade 0 Grade 0   Diarrhea Grade 0 Grade 0    Fatigue Grade 0 Grade 1       pt states he is tired Grade 1       pt. continues to c/o feeling fatigued   Nausea Grade 0 Grade 0 Grade 0   Pain Grade 0 Grade 1       Pain  in fingertips and toes Grade 0   Treatment Related Secondary Malignancy  Grade 0    Tumor Pain  Grade 0    Vomiting Grade 0 Grade 0    Constipation Grade 0     Dyspepsia Grade 0     Dysphagia Grade 0     Esophagitis Grade 0     Mucositis Oral Grade 0     Pneumonitis Grade 0 Grade 0    Aspiration Grade 0 Grade 0    Hoarseness Grade 0 Grade 0    Laryngeal Edema Grade 0 Grade 0    Myocardial Infarction Grade 0     Pericardial Effusion Grade 0     Pericarditis Grade 0     Esophageal Obstruction Grade 0  Grade 1       pt. c/o feeling like food is getting stuck in his esophagus; pt. tried Sucralfate but stated it didn't help   Esophageal Pain Grade 0     Esophageal Stenosis Grade 0     Cough Grade 0  Grade 1       mild occasional nonproductive cough   Dyspnea Grade 0  Grade 0   Epistaxis Grade 0     Hiccups Grade 0     Hypoxia Grade 0     Pulmonary Fibrosis Grade 0     Lymphedema Grade 0     Thromboembolic Event Grade 0     Hot Flashes Grade 0          Assessment / Plan:  The patient is tolerating radiation therapy as anticipated.  Suspect that bilateral breast aches are due to weight loss, and that calf pain may be due to dehydration. No concerns for DVT. Magic  mouthwash order sent to pharmacy. Continue per current treatment plan.       The patient was assessed and plan discussed with the attending radiation oncologist Dr. Perrin.    Shanda Tong MD  PGY-2 Radiation Oncology Resident  On-call pager [phone]  Available on Epic Secure Chat  For  Madelyn Perrin MD, MMM  [facility]  Faculty, [employer]  [email or web address]  Our [place]: “To Heal, To Teach, To Discover.”  RN partner: [email or web address]    Phone (scheduling): [phone]/ [email or web address]  Phone (office): [phone]  Phone (after hours): [phone]      ATTENDING ADDENDUM:  I saw and evaluated the patient with the resident. I personally obtained the key and critical portions of the history and physical exam, reviewed IGRT/dosimetry and directly counseled the patient of the treatment plan. I reviewed the resident documentation and discussed the patient with the resident. I agree with the resident/fellow's medical decision making as documented in the note.

## 2023-11-29 NOTE — PROGRESS NOTES
"NUTRITION Assessment NOTE  Reason for Visit:  Kevin Rubi is a 68 y.o. male with NSCLC of the LLL who presents for nutrition assessment.    One more infusion of carboplatin. Spoke with patient and wife in radiation. Has completed 12/30 treatments.     Recommend soft, high calorie diet in the meantime. Maintain fluid intake. Recommend ONS once per day. Will reevaluate in 1 week to see how magic mouth wash is improving pain.    Provided contact information.    Lab Results   Component Value Date/Time    GLUCOSE 248 (H) 11/13/2023 1051     11/13/2023 1051    K 4.4 11/13/2023 1051     11/13/2023 1051    CO2 23 11/13/2023 1051    ANIONGAP 14 11/13/2023 1051    BUN 19 11/13/2023 1051    CREATININE 1.07 11/13/2023 1051    EGFR 77 11/13/2023 1051    CALCIUM 9.7 11/13/2023 1051    ALBUMIN 4.2 11/13/2023 1051    ALKPHOS 52 11/13/2023 1051    PROT 7.3 11/13/2023 1051    AST 27 11/13/2023 1051    BILITOT 0.3 11/13/2023 1051    ALT 22 11/13/2023 1051     No results found for: \"VITD25\"    Nutrition Assessment     Anthropometrics:  Anthropometrics  IBW/kg (Dietitian Calculated): 80.9 kg  Percent of IBW: 103.6 %  Weight Change  Weight History / % Weight Change: 6% weight loss in 1 month (10/23).  Significant Weight Loss: Yes  Interpretation of Weight Loss: >5% in 1 month        Wt Readings from Last 10 Encounters:   11/29/23 83.8 kg (184 lb 11.9 oz)   11/20/23 84 kg (185 lb 3 oz)   11/16/23 84.7 kg (186 lb 11.7 oz)   11/13/23 85 kg (187 lb 6.4 oz)   11/08/23 86.9 kg (191 lb 9.3 oz)   11/07/23 84.8 kg (186 lb 15.2 oz)   10/23/23 88.7 kg (195 lb 8.8 oz)   10/19/23 88.6 kg (195 lb 5.2 oz)   10/18/23 88.5 kg (195 lb)   10/04/23 86.2 kg (190 lb 0.6 oz)        Food And Nutrient Intake:  Food and Nutrient History  Food and Nutrient History: Patient has started experiencing esophagitis following 2nd infusion. Patient avoiding bread. Primarily eating soup, mashed potatoes, some cereal but it has began to irritate, cream of " wheat. Avoiding hot foods.  Energy Intake: Poor < 50 %  Fluid Intake: Drinking water often.  Food Allergy: NKFA.  GI Symptoms: early satiety  Oral Problems: esophagitis, dysphagia (States it feels like sandpaper; sporadic pain. Experiences swallowing difficult no  matter pain level.)     Food Preparation  Cooking: Spouse/Significant Other  Grocery Shopping: Spouse/Significant Other    Food Supplement Intake  Oral Nutrition Supplements: Ensure (Once per day. Patient does not like texture.)      Nutrition Focused Physical Exam:  Subcutaneous Fat Loss  Orbital Fat Pads: Defer  Buccal Fat Pads: Defer  Triceps: Defer  Ribs: Defer  Muscle Wasting  Temporalis: Defer  Pectoralis (Clavicular Region): Defer  Deltoid/Trapezius: Defer  Interosseous: Defer  Trapezius/Infraspinatus/Supraspinatus (Scapular Region): Defer  Quadriceps: Defer  Gastrocnemius: Defer        Energy Needs  Estimated Energy Needs  Total Energy Estimated Needs (kCal): 2400 kCal  Method for Estimating Needs: 30 kcal/kg IBW  Estimated Fluid Needs  Total Fluid Estimated Needs (mL): 2400 mL  Method for Estimating Needs: 1 ml/kcal or per team  Estimated Protein Needs  Total Protein Estimated Needs (g): 80 g  Method for Estimating Needs: 1 g/kg IBW        Nutrition Diagnosis   Malnutrition Diagnosis  Patient has Malnutrition Diagnosis: No  Nutrition Diagnosis  Patient has Nutrition Diagnosis: Yes  Diagnosis Status (1): New  Nutrition Diagnosis 1: Swallowing difficulity  Related to (1): esophagitis from radiation treatment  As Evidenced by (1): reports from patient and unintended weight loss.    Nutrition Interventions/Recommendations   Food and Nutrition Delivery  Meals & Snacks: Energy-modified diet, Texture-Modified Diet, Modify schedule of foods/fluids  Goals: High calorie soft foods; eat every 2 hours.  Medical Food Supplement: Ensure  Goals: Ensure or Fair Life 2x/day.  Coordination of Nutrition Care by a Nutrition Professional  Collaboration and referral of  nutrition care: Team meeting involving nutrition professional  Goals: Patient will include Magic Mouth Wash in mouth care.        There are no Patient Instructions on file for this visit.    Nutrition Monitoring and Evaluation   Food/Nutrient Related History Monitoring  Monitoring and Evaluation Plan: Energy intake, Meal/snack pattern  Energy Intake: Estimated energy intake  Meal/Snack Pattern: Estimated meal and snack pattern  Nutrition Focused Physical Findings  Monitoring and Evaluation Plan: Digestive System  Digestive System:  (Swallowing)        Time Spent  Prep time on day of patient encounter: 10 minutes  Time spent directly with patient, family or caregiver: 15 minutes  Additional Time Spent on Patient Care Activities: 0 minutes  Documentation Time: 20 minutes  Other Time Spent: 0 minutes  Total: 45 minutes

## 2023-11-30 ENCOUNTER — PHARMACY VISIT (OUTPATIENT)
Dept: PHARMACY | Facility: CLINIC | Age: 68
End: 2023-11-30
Payer: COMMERCIAL

## 2023-11-30 ENCOUNTER — OFFICE VISIT (OUTPATIENT)
Dept: HEMATOLOGY/ONCOLOGY | Facility: HOSPITAL | Age: 68
End: 2023-11-30
Payer: MEDICARE

## 2023-11-30 ENCOUNTER — HOSPITAL ENCOUNTER (OUTPATIENT)
Dept: RADIATION ONCOLOGY | Facility: HOSPITAL | Age: 68
Setting detail: RADIATION/ONCOLOGY SERIES
Discharge: HOME | End: 2023-11-30
Payer: MEDICARE

## 2023-11-30 VITALS
HEART RATE: 77 BPM | OXYGEN SATURATION: 100 % | SYSTOLIC BLOOD PRESSURE: 122 MMHG | RESPIRATION RATE: 20 BRPM | DIASTOLIC BLOOD PRESSURE: 72 MMHG | TEMPERATURE: 97 F | WEIGHT: 184.08 LBS | BODY MASS INDEX: 24.97 KG/M2

## 2023-11-30 DIAGNOSIS — C34.32 MALIGNANT NEOPLASM OF LOWER LOBE, LEFT BRONCHUS OR LUNG (MULTI): ICD-10-CM

## 2023-11-30 DIAGNOSIS — Z51.0 ENCOUNTER FOR ANTINEOPLASTIC RADIATION THERAPY: ICD-10-CM

## 2023-11-30 DIAGNOSIS — C34.32 PRIMARY CANCER OF LEFT LOWER LOBE OF LUNG (MULTI): ICD-10-CM

## 2023-11-30 DIAGNOSIS — Z51.11 ENCOUNTER FOR CHEMOTHERAPY MANAGEMENT: Primary | ICD-10-CM

## 2023-11-30 LAB
RAD ONC MSQ ACTUAL FRACTIONS DELIVERED: 6
RAD ONC MSQ ACTUAL SESSION DELIVERED DOSE: 200 CGRAY
RAD ONC MSQ ACTUAL TOTAL DOSE: 1200 CGRAY
RAD ONC MSQ ELAPSED DAYS: 9
RAD ONC MSQ LAST DATE: NORMAL
RAD ONC MSQ PRESCRIBED FRACTIONAL DOSE: 200 CGRAY
RAD ONC MSQ PRESCRIBED NUMBER OF FRACTIONS: 23
RAD ONC MSQ PRESCRIBED TECHNIQUE: NORMAL
RAD ONC MSQ PRESCRIBED TOTAL DOSE: 4600 CGRAY
RAD ONC MSQ PRESCRIPTION PATTERN COMMENT: NORMAL
RAD ONC MSQ START DATE: NORMAL
RAD ONC MSQ TREATMENT COURSE NUMBER: 1
RAD ONC MSQ TREATMENT SITE: NORMAL

## 2023-11-30 PROCEDURE — 99214 OFFICE O/P EST MOD 30 MIN: CPT | Performed by: NURSE PRACTITIONER

## 2023-11-30 PROCEDURE — 3074F SYST BP LT 130 MM HG: CPT | Performed by: NURSE PRACTITIONER

## 2023-11-30 PROCEDURE — 1159F MED LIST DOCD IN RCRD: CPT | Performed by: NURSE PRACTITIONER

## 2023-11-30 PROCEDURE — 1160F RVW MEDS BY RX/DR IN RCRD: CPT | Performed by: NURSE PRACTITIONER

## 2023-11-30 PROCEDURE — 77386 HC INTENSITY-MODULATED RADIATION THERAPY (IMRT), COMPLEX: CPT | Performed by: RADIOLOGY

## 2023-11-30 PROCEDURE — 3078F DIAST BP <80 MM HG: CPT | Performed by: NURSE PRACTITIONER

## 2023-11-30 PROCEDURE — 1125F AMNT PAIN NOTED PAIN PRSNT: CPT | Performed by: NURSE PRACTITIONER

## 2023-11-30 PROCEDURE — 1036F TOBACCO NON-USER: CPT | Performed by: NURSE PRACTITIONER

## 2023-11-30 PROCEDURE — RXMED WILLOW AMBULATORY MEDICATION CHARGE

## 2023-11-30 PROCEDURE — 77014 CHG CT GUIDANCE RADIATION THERAPY FLDS PLACEMENT: CPT | Performed by: RADIOLOGY

## 2023-11-30 ASSESSMENT — PAIN SCALES - GENERAL: PAINLEVEL: 0-NO PAIN

## 2023-11-30 ASSESSMENT — ENCOUNTER SYMPTOMS
LOSS OF SENSATION IN FEET: 0
DEPRESSION: 0
OCCASIONAL FEELINGS OF UNSTEADINESS: 0

## 2023-12-01 ENCOUNTER — HOSPITAL ENCOUNTER (OUTPATIENT)
Dept: RADIATION ONCOLOGY | Facility: HOSPITAL | Age: 68
Setting detail: RADIATION/ONCOLOGY SERIES
Discharge: HOME | End: 2023-12-01
Payer: MEDICARE

## 2023-12-01 DIAGNOSIS — C34.32 MALIGNANT NEOPLASM OF LOWER LOBE, LEFT BRONCHUS OR LUNG (MULTI): ICD-10-CM

## 2023-12-01 DIAGNOSIS — Z51.0 ENCOUNTER FOR ANTINEOPLASTIC RADIATION THERAPY: ICD-10-CM

## 2023-12-01 LAB
RAD ONC MSQ ACTUAL FRACTIONS DELIVERED: 7
RAD ONC MSQ ACTUAL SESSION DELIVERED DOSE: 200 CGRAY
RAD ONC MSQ ACTUAL TOTAL DOSE: 1400 CGRAY
RAD ONC MSQ ELAPSED DAYS: 10
RAD ONC MSQ LAST DATE: NORMAL
RAD ONC MSQ PRESCRIBED FRACTIONAL DOSE: 200 CGRAY
RAD ONC MSQ PRESCRIBED NUMBER OF FRACTIONS: 23
RAD ONC MSQ PRESCRIBED TECHNIQUE: NORMAL
RAD ONC MSQ PRESCRIBED TOTAL DOSE: 4600 CGRAY
RAD ONC MSQ PRESCRIPTION PATTERN COMMENT: NORMAL
RAD ONC MSQ START DATE: NORMAL
RAD ONC MSQ TREATMENT COURSE NUMBER: 1
RAD ONC MSQ TREATMENT SITE: NORMAL

## 2023-12-01 PROCEDURE — 77386 HC INTENSITY-MODULATED RADIATION THERAPY (IMRT), COMPLEX: CPT | Performed by: RADIOLOGY

## 2023-12-01 PROCEDURE — 77014 CHG CT GUIDANCE RADIATION THERAPY FLDS PLACEMENT: CPT | Performed by: RADIOLOGY

## 2023-12-04 ENCOUNTER — INFUSION (OUTPATIENT)
Dept: HEMATOLOGY/ONCOLOGY | Facility: HOSPITAL | Age: 68
End: 2023-12-04
Payer: MEDICARE

## 2023-12-04 ENCOUNTER — HOSPITAL ENCOUNTER (OUTPATIENT)
Dept: RADIATION ONCOLOGY | Facility: HOSPITAL | Age: 68
Setting detail: RADIATION/ONCOLOGY SERIES
Discharge: HOME | End: 2023-12-04
Payer: MEDICARE

## 2023-12-04 VITALS
TEMPERATURE: 97.5 F | HEART RATE: 85 BPM | OXYGEN SATURATION: 100 % | RESPIRATION RATE: 18 BRPM | HEIGHT: 71 IN | DIASTOLIC BLOOD PRESSURE: 48 MMHG | WEIGHT: 183.64 LBS | SYSTOLIC BLOOD PRESSURE: 100 MMHG | BODY MASS INDEX: 25.71 KG/M2

## 2023-12-04 DIAGNOSIS — C34.32 PRIMARY CANCER OF LEFT LOWER LOBE OF LUNG (MULTI): ICD-10-CM

## 2023-12-04 DIAGNOSIS — Z51.0 ENCOUNTER FOR ANTINEOPLASTIC RADIATION THERAPY: ICD-10-CM

## 2023-12-04 DIAGNOSIS — C34.32 MALIGNANT NEOPLASM OF LOWER LOBE, LEFT BRONCHUS OR LUNG (MULTI): ICD-10-CM

## 2023-12-04 LAB
ALBUMIN SERPL BCP-MCNC: 3.9 G/DL (ref 3.4–5)
ALP SERPL-CCNC: 43 U/L (ref 33–136)
ALT SERPL W P-5'-P-CCNC: 15 U/L (ref 10–52)
ANION GAP SERPL CALC-SCNC: 13 MMOL/L (ref 10–20)
AST SERPL W P-5'-P-CCNC: 16 U/L (ref 9–39)
BASOPHILS # BLD AUTO: 0.01 X10*3/UL (ref 0–0.1)
BASOPHILS NFR BLD AUTO: 0.3 %
BILIRUB SERPL-MCNC: 0.3 MG/DL (ref 0–1.2)
BUN SERPL-MCNC: 21 MG/DL (ref 6–23)
CALCIUM SERPL-MCNC: 9.2 MG/DL (ref 8.6–10.3)
CHLORIDE SERPL-SCNC: 103 MMOL/L (ref 98–107)
CO2 SERPL-SCNC: 24 MMOL/L (ref 21–32)
CREAT SERPL-MCNC: 1.05 MG/DL (ref 0.5–1.3)
EOSINOPHIL # BLD AUTO: 0.08 X10*3/UL (ref 0–0.7)
EOSINOPHIL NFR BLD AUTO: 2.2 %
ERYTHROCYTE [DISTWIDTH] IN BLOOD BY AUTOMATED COUNT: 15.4 % (ref 11.5–14.5)
GFR SERPL CREATININE-BSD FRML MDRD: 78 ML/MIN/1.73M*2
GLUCOSE SERPL-MCNC: 250 MG/DL (ref 74–99)
HCT VFR BLD AUTO: 30.4 % (ref 41–52)
HGB BLD-MCNC: 9.9 G/DL (ref 13.5–17.5)
IMM GRANULOCYTES # BLD AUTO: 0.01 X10*3/UL (ref 0–0.7)
IMM GRANULOCYTES NFR BLD AUTO: 0.3 % (ref 0–0.9)
LYMPHOCYTES # BLD AUTO: 0.68 X10*3/UL (ref 1.2–4.8)
LYMPHOCYTES NFR BLD AUTO: 18.4 %
MCH RBC QN AUTO: 28.7 PG (ref 26–34)
MCHC RBC AUTO-ENTMCNC: 32.6 G/DL (ref 32–36)
MCV RBC AUTO: 88 FL (ref 80–100)
MONOCYTES # BLD AUTO: 0.45 X10*3/UL (ref 0.1–1)
MONOCYTES NFR BLD AUTO: 12.2 %
NEUTROPHILS # BLD AUTO: 2.47 X10*3/UL (ref 1.2–7.7)
NEUTROPHILS NFR BLD AUTO: 66.6 %
NRBC BLD-RTO: 0 /100 WBCS (ref 0–0)
PLATELET # BLD AUTO: 192 X10*3/UL (ref 150–450)
POTASSIUM SERPL-SCNC: 4 MMOL/L (ref 3.5–5.3)
PROT SERPL-MCNC: 6.8 G/DL (ref 6.4–8.2)
RAD ONC MSQ ACTUAL FRACTIONS DELIVERED: 8
RAD ONC MSQ ACTUAL SESSION DELIVERED DOSE: 200 CGRAY
RAD ONC MSQ ACTUAL TOTAL DOSE: 1600 CGRAY
RAD ONC MSQ ELAPSED DAYS: 13
RAD ONC MSQ LAST DATE: NORMAL
RAD ONC MSQ PRESCRIBED FRACTIONAL DOSE: 200 CGRAY
RAD ONC MSQ PRESCRIBED NUMBER OF FRACTIONS: 23
RAD ONC MSQ PRESCRIBED TECHNIQUE: NORMAL
RAD ONC MSQ PRESCRIBED TOTAL DOSE: 4600 CGRAY
RAD ONC MSQ PRESCRIPTION PATTERN COMMENT: NORMAL
RAD ONC MSQ START DATE: NORMAL
RAD ONC MSQ TREATMENT COURSE NUMBER: 1
RAD ONC MSQ TREATMENT SITE: NORMAL
RBC # BLD AUTO: 3.45 X10*6/UL (ref 4.5–5.9)
SODIUM SERPL-SCNC: 136 MMOL/L (ref 136–145)
WBC # BLD AUTO: 3.7 X10*3/UL (ref 4.4–11.3)

## 2023-12-04 PROCEDURE — 96413 CHEMO IV INFUSION 1 HR: CPT

## 2023-12-04 PROCEDURE — 2500000004 HC RX 250 GENERAL PHARMACY W/ HCPCS (ALT 636 FOR OP/ED): Performed by: STUDENT IN AN ORGANIZED HEALTH CARE EDUCATION/TRAINING PROGRAM

## 2023-12-04 PROCEDURE — 2500000004 HC RX 250 GENERAL PHARMACY W/ HCPCS (ALT 636 FOR OP/ED): Mod: JZ | Performed by: STUDENT IN AN ORGANIZED HEALTH CARE EDUCATION/TRAINING PROGRAM

## 2023-12-04 PROCEDURE — 77014 CHG CT GUIDANCE RADIATION THERAPY FLDS PLACEMENT: CPT | Performed by: RADIOLOGY

## 2023-12-04 PROCEDURE — 84075 ASSAY ALKALINE PHOSPHATASE: CPT

## 2023-12-04 PROCEDURE — 36415 COLL VENOUS BLD VENIPUNCTURE: CPT

## 2023-12-04 PROCEDURE — 77386 HC INTENSITY-MODULATED RADIATION THERAPY (IMRT), COMPLEX: CPT | Performed by: RADIOLOGY

## 2023-12-04 PROCEDURE — 85025 COMPLETE CBC W/AUTO DIFF WBC: CPT

## 2023-12-04 PROCEDURE — 96411 CHEMO IV PUSH ADDL DRUG: CPT

## 2023-12-04 PROCEDURE — 96367 TX/PROPH/DG ADDL SEQ IV INF: CPT

## 2023-12-04 PROCEDURE — 96375 TX/PRO/DX INJ NEW DRUG ADDON: CPT | Mod: INF

## 2023-12-04 RX ORDER — EPINEPHRINE 0.3 MG/.3ML
0.3 INJECTION SUBCUTANEOUS EVERY 5 MIN PRN
Status: CANCELLED | OUTPATIENT
Start: 2023-12-04

## 2023-12-04 RX ORDER — PROCHLORPERAZINE MALEATE 10 MG
10 TABLET ORAL EVERY 6 HOURS PRN
Status: DISCONTINUED | OUTPATIENT
Start: 2023-12-04 | End: 2023-12-04 | Stop reason: HOSPADM

## 2023-12-04 RX ORDER — PALONOSETRON 0.05 MG/ML
0.25 INJECTION, SOLUTION INTRAVENOUS ONCE
Status: COMPLETED | OUTPATIENT
Start: 2023-12-04 | End: 2023-12-04

## 2023-12-04 RX ORDER — DIPHENHYDRAMINE HYDROCHLORIDE 50 MG/ML
50 INJECTION INTRAMUSCULAR; INTRAVENOUS AS NEEDED
Status: CANCELLED | OUTPATIENT
Start: 2023-12-04

## 2023-12-04 RX ORDER — FAMOTIDINE 10 MG/ML
20 INJECTION INTRAVENOUS ONCE AS NEEDED
Status: CANCELLED | OUTPATIENT
Start: 2023-12-04

## 2023-12-04 RX ORDER — PROCHLORPERAZINE EDISYLATE 5 MG/ML
10 INJECTION INTRAMUSCULAR; INTRAVENOUS EVERY 6 HOURS PRN
Status: DISCONTINUED | OUTPATIENT
Start: 2023-12-04 | End: 2023-12-04 | Stop reason: HOSPADM

## 2023-12-04 RX ORDER — ALBUTEROL SULFATE 0.83 MG/ML
3 SOLUTION RESPIRATORY (INHALATION) AS NEEDED
Status: CANCELLED | OUTPATIENT
Start: 2023-12-04

## 2023-12-04 RX ADMIN — CARBOPLATIN 508.5 MG: 10 INJECTION INTRAVENOUS at 13:02

## 2023-12-04 RX ADMIN — FOSAPREPITANT 150 MG: 150 INJECTION, POWDER, LYOPHILIZED, FOR SOLUTION INTRAVENOUS at 12:08

## 2023-12-04 RX ADMIN — DEXAMETHASONE SODIUM PHOSPHATE 12 MG: 10 INJECTION, SOLUTION INTRAMUSCULAR; INTRAVENOUS at 11:45

## 2023-12-04 RX ADMIN — PEMETREXED DISODIUM 1000 MG: 500 INJECTION, POWDER, LYOPHILIZED, FOR SOLUTION INTRAVENOUS at 12:45

## 2023-12-04 RX ADMIN — PALONOSETRON HYDROCHLORIDE 250 MCG: 0.25 INJECTION INTRAVENOUS at 11:42

## 2023-12-05 ENCOUNTER — HOSPITAL ENCOUNTER (OUTPATIENT)
Dept: RADIATION ONCOLOGY | Facility: HOSPITAL | Age: 68
Setting detail: RADIATION/ONCOLOGY SERIES
Discharge: HOME | End: 2023-12-05
Payer: MEDICARE

## 2023-12-05 DIAGNOSIS — C34.32 MALIGNANT NEOPLASM OF LOWER LOBE, LEFT BRONCHUS OR LUNG (MULTI): ICD-10-CM

## 2023-12-05 DIAGNOSIS — Z51.0 ENCOUNTER FOR ANTINEOPLASTIC RADIATION THERAPY: ICD-10-CM

## 2023-12-05 LAB
RAD ONC MSQ ACTUAL FRACTIONS DELIVERED: 10
RAD ONC MSQ ACTUAL FRACTIONS DELIVERED: 9
RAD ONC MSQ ACTUAL SESSION DELIVERED DOSE: 200 CGRAY
RAD ONC MSQ ACTUAL SESSION DELIVERED DOSE: 200 CGRAY
RAD ONC MSQ ACTUAL TOTAL DOSE: 1800 CGRAY
RAD ONC MSQ ACTUAL TOTAL DOSE: 2000 CGRAY
RAD ONC MSQ ELAPSED DAYS: 14
RAD ONC MSQ ELAPSED DAYS: 14
RAD ONC MSQ LAST DATE: NORMAL
RAD ONC MSQ LAST DATE: NORMAL
RAD ONC MSQ PRESCRIBED FRACTIONAL DOSE: 200 CGRAY
RAD ONC MSQ PRESCRIBED FRACTIONAL DOSE: 200 CGRAY
RAD ONC MSQ PRESCRIBED NUMBER OF FRACTIONS: 23
RAD ONC MSQ PRESCRIBED NUMBER OF FRACTIONS: 23
RAD ONC MSQ PRESCRIBED TECHNIQUE: NORMAL
RAD ONC MSQ PRESCRIBED TECHNIQUE: NORMAL
RAD ONC MSQ PRESCRIBED TOTAL DOSE: 4600 CGRAY
RAD ONC MSQ PRESCRIBED TOTAL DOSE: 4600 CGRAY
RAD ONC MSQ PRESCRIPTION PATTERN COMMENT: NORMAL
RAD ONC MSQ PRESCRIPTION PATTERN COMMENT: NORMAL
RAD ONC MSQ START DATE: NORMAL
RAD ONC MSQ START DATE: NORMAL
RAD ONC MSQ TREATMENT COURSE NUMBER: 1
RAD ONC MSQ TREATMENT COURSE NUMBER: 1
RAD ONC MSQ TREATMENT SITE: NORMAL
RAD ONC MSQ TREATMENT SITE: NORMAL

## 2023-12-05 PROCEDURE — 77014 CHG CT GUIDANCE RADIATION THERAPY FLDS PLACEMENT: CPT | Performed by: RADIOLOGY

## 2023-12-05 PROCEDURE — 77386 HC INTENSITY-MODULATED RADIATION THERAPY (IMRT), COMPLEX: CPT | Performed by: RADIOLOGY

## 2023-12-06 ENCOUNTER — RADIATION ONCOLOGY OTV (OUTPATIENT)
Dept: RADIATION ONCOLOGY | Facility: HOSPITAL | Age: 68
End: 2023-12-06

## 2023-12-06 ENCOUNTER — HOSPITAL ENCOUNTER (OUTPATIENT)
Dept: RADIATION ONCOLOGY | Facility: HOSPITAL | Age: 68
Setting detail: RADIATION/ONCOLOGY SERIES
Discharge: HOME | End: 2023-12-06
Payer: MEDICARE

## 2023-12-06 ENCOUNTER — NUTRITION (OUTPATIENT)
Dept: HEMATOLOGY/ONCOLOGY | Facility: HOSPITAL | Age: 68
End: 2023-12-06

## 2023-12-06 VITALS
OXYGEN SATURATION: 96 % | SYSTOLIC BLOOD PRESSURE: 104 MMHG | DIASTOLIC BLOOD PRESSURE: 64 MMHG | WEIGHT: 184.75 LBS | RESPIRATION RATE: 18 BRPM | HEART RATE: 86 BPM | HEIGHT: 70 IN | TEMPERATURE: 96.8 F | BODY MASS INDEX: 26.45 KG/M2

## 2023-12-06 PROCEDURE — 77014 CHG CT GUIDANCE RADIATION THERAPY FLDS PLACEMENT: CPT | Performed by: RADIOLOGY

## 2023-12-06 PROCEDURE — 77386 HC INTENSITY-MODULATED RADIATION THERAPY (IMRT), COMPLEX: CPT | Performed by: RADIOLOGY

## 2023-12-06 PROCEDURE — 77427 RADIATION TX MANAGEMENT X5: CPT | Performed by: RADIOLOGY

## 2023-12-06 PROCEDURE — 77336 RADIATION PHYSICS CONSULT: CPT | Performed by: RADIOLOGY

## 2023-12-06 ASSESSMENT — PAIN SCALES - GENERAL: PAINLEVEL: 0-NO PAIN

## 2023-12-06 NOTE — PROGRESS NOTES
"NUTRITION Follow-up NOTE    Nutrition Assessment     Reason for Visit:  Kevin Rubi is a 68 y.o. male with NSCLC of the LLL who presents for nutrition assessment.     Patient is finished with infusion. Has completed 19/30 treatments.    Patient continues to force himself to eat despite esophagitis.     Recommend soft, high calorie diet in the meantime. Maintain fluid intake. Recommend ONS once per day.     Will keep an eye on weight to ensure intake remains acceptable.     Lab Results   Component Value Date/Time    GLUCOSE 250 (H) 12/04/2023 0957     12/04/2023 0957    K 4.0 12/04/2023 0957     12/04/2023 0957    CO2 24 12/04/2023 0957    ANIONGAP 13 12/04/2023 0957    BUN 21 12/04/2023 0957    CREATININE 1.05 12/04/2023 0957    EGFR 78 12/04/2023 0957    CALCIUM 9.2 12/04/2023 0957    ALBUMIN 3.9 12/04/2023 0957    ALKPHOS 43 12/04/2023 0957    PROT 6.8 12/04/2023 0957    AST 16 12/04/2023 0957    BILITOT 0.3 12/04/2023 0957    ALT 15 12/04/2023 0957     No results found for: \"VITD25\"    Anthropometrics:  Anthropometrics  IBW/kg (Dietitian Calculated): 80.9 kg  Percent of IBW: 101.9 %  Weight Change  Weight History / % Weight Change: 3% weight loss in 1 month (11/7).  Significant Weight Loss: No        Wt Readings from Last 10 Encounters:   12/07/23 82.5 kg (181 lb 14.1 oz)   12/06/23 83.8 kg (184 lb 11.9 oz)   12/04/23 83.3 kg (183 lb 10.3 oz)   11/30/23 83.5 kg (184 lb 1.4 oz)   11/29/23 83.8 kg (184 lb 11.9 oz)   11/20/23 84 kg (185 lb 3 oz)   11/16/23 84.7 kg (186 lb 11.7 oz)   11/13/23 85 kg (187 lb 6.4 oz)   11/08/23 86.9 kg (191 lb 9.3 oz)   11/07/23 84.8 kg (186 lb 15.2 oz)        Food And Nutrient Intake:  Food and Nutrient History  Food and Nutrient History: Patient continues to experience esophagitis. Has been using BMX intermittently, but not interested in using consistently. Still primarily eating soft foods such as mashed potatoes and soup. Eating 3 times perday with snacks in " between. Today ate a cinnamon roll.  Energy Intake: Fair 50-75 %  Fluid Intake: Drinking water often.  Food Allergy: NKFA.  GI Symptoms: early satiety  Oral Problems: esophagitis, dysphagia (States it feels like sandpaper; sporadic pain. Experiences swallowing difficult no  matter pain level.)     Food Preparation  Cooking: Spouse/Significant Other  Grocery Shopping: Spouse/Significant Other    Food Supplement Intake  Oral Nutrition Supplements: Ensure (Once per day, but patient doesn't like the texture.)    Nutrition Focused Physical Exam Findings:  defer: Tx                        Energy Needs  Estimated Energy Needs  Total Energy Estimated Needs (kCal): 2400 kCal  Method for Estimating Needs: 30 kcal/kg IBW  Estimated Fluid Needs  Total Fluid Estimated Needs (mL): 2400 mL  Method for Estimating Needs: 1 ml/kcal or per team  Estimated Protein Needs  Total Protein Estimated Needs (g): 80 g  Method for Estimating Needs: 1 g/kg IBW        Nutrition Diagnosis   Malnutrition Diagnosis  Patient has Malnutrition Diagnosis: No    Nutrition Diagnosis  Patient has Nutrition Diagnosis: Yes  Diagnosis Status (1): Ongoing  Nutrition Diagnosis 1: Swallowing difficulity  Related to (1): esophagitis from radiation treatment  As Evidenced by (1): reports from patient and unintended weight loss.    Nutrition Interventions/Recommendations   Food and Nutrition Delivery  Food and Nutrition Delivery  Meals & Snacks: Energy-modified diet, Texture-Modified Diet, Modify schedule of foods/fluids  Goals: High calorie soft foods; eat every 2 hours.  Medical Food Supplement: Ensure  Goals: Ensure or Fair Life 2x/day.    Nutrition Education       Coordination of Care  Coordination of Nutrition Care by a Nutrition Professional  Collaboration and referral of nutrition care: Team meeting involving nutrition professional  Goals: Patient will include Magic Mouth Wash in mouth care.    There are no Patient Instructions on file for this  visit.    Nutrition Monitoring and Evaluation   Food/Nutrient Related History Monitoring  Monitoring and Evaluation Plan: Energy intake, Meal/snack pattern  Energy Intake: Estimated energy intake  Meal/Snack Pattern: Estimated meal and snack pattern  Nutrition Focused Physical Findings  Monitoring and Evaluation Plan: Digestive System  Digestive System:  (Swallowing)        Time Spent  Prep time on day of patient encounter: 5 minutes  Time spent directly with patient, family or caregiver: 10 minutes  Additional Time Spent on Patient Care Activities: 0 minutes  Documentation Time: 10 minutes  Other Time Spent: 0 minutes  Total: 25 minutes        Readiness to Change : Good  Level of Understanding : Good  Anticipated Compliant : Good

## 2023-12-06 NOTE — PROGRESS NOTES
"Radiation Oncology On Treatment Visit    Patient Name:  Kevin Rubi  MRN:  [id number]  :  1955    Referring Provider: Sabrina Longoria MD  Primary Care Provider: RUKHSANA Fried-CNS  Care Team: Patient Care Team:  BRENDON FriedCNS as PCP - General (Internal Medicine)  Wisam Latham MD as PCP - [employer] PCP  Janell Longoria MD as Consulting Physician (Hematology and Oncology)  Madelyn Perrin MD as Radiation Oncologist (Radiation Oncology)    Date of Service: 2023     Diagnosis:   Specialty Problems          Radiation Oncology Problems    Primary cancer of left lower lobe of lung (CMS/HCC)        Malignant neoplasm of bronchus and lung (CMS/HCC)        Primary malignant neoplasm of lung of unknown cell type (CMS/HCC)         Treatment Summary:  Radiation Treatments       Active   Replan Lt Lung/Med (Started on 2023)   Most recent fraction: 200 cGy given on 2023   Total given: 2,200 cGy / 4,600 cGy  (11 of 23 fractions)   Elapsed Days: 14   Technique: IMRT           Completed   Left lung_Med (Started on 2023)   Most recent fraction: 200 cGy given on 2023   Total given: 1,400 cGy / 1,400 cGy  (7 of 7 fractions)   Elapsed Days: 7   Technique: IMRT                   SUBJECTIVE: Tolerating well. Mild esophagitis symptoms. Used Magic mouthwash, some effect. Doesn't feel need to use it regularly. Breathing stable. No new aches/pains. Looking forward to going home for this weekend.      OBJECTIVE:   Vital Signs:  /64 (BP Location: Right arm, Patient Position: Sitting, BP Cuff Size: Adult)   Pulse 86   Temp 36 °C (96.8 °F) (Skin)   Resp 18   Ht 1.778 m (5' 10\")   Wt 83.8 kg (184 lb 11.9 oz)   SpO2 96%   BMI 26.51 kg/m²    Pain Scale: The patient's current pain level was assessed.  They report currently having a pain of 0 out of 10.    Other Pertinent Findings: Sitting comfortably. Alert, oriented. No wheezing, SoB. Ambulatory.     Toxicity " Assessment          11/13/2023    09:39 11/20/2023    12:12 11/29/2023    15:23 12/6/2023    14:38   Toxicity Assessment   Adverse Events Reviewed (WDL) No (Exceptions to WDL) Yes (Within Defined Limits) No (Exceptions to WDL) No (Exceptions to WDL)   Treatment Site Thoracic Thoracic Thoracic Thoracic   Anorexia Grade 0 Grade 3       eats a meal a day. and grazes the rest of the day. Grade 1       pt. c/o little appetite; dietician to see pt Grade 1       pt. with decreased appetite   Anxiety Grade 0 Grade 2     Dehydration Grade 0 Grade 0 Grade 0    Depression Grade 0 Grade 0     Dermatitis Radiation Grade 0 Grade 0 Grade 0 Grade 0   Diarrhea Grade 0 Grade 0     Fatigue Grade 0 Grade 1       pt states he is tired Grade 1       pt. continues to c/o feeling fatigued Grade 0   Nausea Grade 0 Grade 0 Grade 0 Grade 0   Pain Grade 0 Grade 1       Pain  in fingertips and toes Grade 0 Grade 0   Treatment Related Secondary Malignancy  Grade 0     Tumor Pain  Grade 0     Vomiting Grade 0 Grade 0     Constipation Grade 0      Dyspepsia Grade 0      Dysphagia Grade 0      Esophagitis Grade 0      Mucositis Oral Grade 0      Pneumonitis Grade 0 Grade 0     Aspiration Grade 0 Grade 0     Hoarseness Grade 0 Grade 0     Laryngeal Edema Grade 0 Grade 0     Myocardial Infarction Grade 0      Pericardial Effusion Grade 0      Pericarditis Grade 0      Esophageal Obstruction Grade 0  Grade 1       pt. c/o feeling like food is getting stuck in his esophagus; pt. tried Sucralfate but stated it didn't help    Esophageal Pain Grade 0   Grade 1       pt. with intermittent esophagitis pain; pt. taking BMX as needed   Esophageal Stenosis Grade 0      Cough Grade 0  Grade 1       mild occasional nonproductive cough Grade 0   Dyspnea Grade 0  Grade 0 Grade 0   Epistaxis Grade 0      Hiccups Grade 0      Hypoxia Grade 0   Grade 0   Pulmonary Fibrosis Grade 0      Lymphedema Grade 0      Thromboembolic Event Grade 0      Hot Flashes Grade 0            Assessment / Plan:  Dosimetry/IGRT reviewed.  The patient is tolerating radiation therapy as anticipated.  Continue per current treatment plan.         Madelyn Perrin MD, MMM  [facility]  Faculty, [employer] [facility] [employer] [facility] [employer]  [email or web address]  Our [place]: “To Heal, To Teach, To Discover.”  RN partner: [email or web address]    Phone (scheduling): [phone]/ [email or web address]  Phone (office): [phone]  Phone (after hours): [phone]

## 2023-12-07 ENCOUNTER — OFFICE VISIT (OUTPATIENT)
Dept: HEMATOLOGY/ONCOLOGY | Facility: HOSPITAL | Age: 68
End: 2023-12-07
Payer: MEDICARE

## 2023-12-07 ENCOUNTER — HOSPITAL ENCOUNTER (OUTPATIENT)
Dept: RADIATION ONCOLOGY | Facility: HOSPITAL | Age: 68
Setting detail: RADIATION/ONCOLOGY SERIES
Discharge: HOME | End: 2023-12-07
Payer: MEDICARE

## 2023-12-07 VITALS
SYSTOLIC BLOOD PRESSURE: 100 MMHG | OXYGEN SATURATION: 99 % | DIASTOLIC BLOOD PRESSURE: 60 MMHG | TEMPERATURE: 97.3 F | RESPIRATION RATE: 20 BRPM | WEIGHT: 181.88 LBS | BODY MASS INDEX: 26.1 KG/M2 | HEART RATE: 92 BPM

## 2023-12-07 DIAGNOSIS — Z51.0 ENCOUNTER FOR ANTINEOPLASTIC RADIATION THERAPY: ICD-10-CM

## 2023-12-07 DIAGNOSIS — C34.32 PRIMARY CANCER OF LEFT LOWER LOBE OF LUNG (MULTI): Primary | ICD-10-CM

## 2023-12-07 DIAGNOSIS — C34.32 MALIGNANT NEOPLASM OF LOWER LOBE, LEFT BRONCHUS OR LUNG (MULTI): ICD-10-CM

## 2023-12-07 LAB
RAD ONC MSQ ACTUAL FRACTIONS DELIVERED: 12
RAD ONC MSQ ACTUAL SESSION DELIVERED DOSE: 200 CGRAY
RAD ONC MSQ ACTUAL TOTAL DOSE: 2400 CGRAY
RAD ONC MSQ ELAPSED DAYS: 16
RAD ONC MSQ LAST DATE: NORMAL
RAD ONC MSQ PRESCRIBED FRACTIONAL DOSE: 200 CGRAY
RAD ONC MSQ PRESCRIBED NUMBER OF FRACTIONS: 23
RAD ONC MSQ PRESCRIBED TECHNIQUE: NORMAL
RAD ONC MSQ PRESCRIBED TOTAL DOSE: 4600 CGRAY
RAD ONC MSQ PRESCRIPTION PATTERN COMMENT: NORMAL
RAD ONC MSQ START DATE: NORMAL
RAD ONC MSQ TREATMENT COURSE NUMBER: 1
RAD ONC MSQ TREATMENT SITE: NORMAL

## 2023-12-07 PROCEDURE — 77014 CHG CT GUIDANCE RADIATION THERAPY FLDS PLACEMENT: CPT | Performed by: RADIOLOGY

## 2023-12-07 PROCEDURE — 99215 OFFICE O/P EST HI 40 MIN: CPT | Performed by: INTERNAL MEDICINE

## 2023-12-07 PROCEDURE — 1126F AMNT PAIN NOTED NONE PRSNT: CPT | Performed by: INTERNAL MEDICINE

## 2023-12-07 PROCEDURE — 3074F SYST BP LT 130 MM HG: CPT | Performed by: INTERNAL MEDICINE

## 2023-12-07 PROCEDURE — 1036F TOBACCO NON-USER: CPT | Performed by: INTERNAL MEDICINE

## 2023-12-07 PROCEDURE — 99215 OFFICE O/P EST HI 40 MIN: CPT | Mod: GC | Performed by: INTERNAL MEDICINE

## 2023-12-07 PROCEDURE — 1159F MED LIST DOCD IN RCRD: CPT | Performed by: INTERNAL MEDICINE

## 2023-12-07 PROCEDURE — 1160F RVW MEDS BY RX/DR IN RCRD: CPT | Performed by: INTERNAL MEDICINE

## 2023-12-07 PROCEDURE — 77386 HC INTENSITY-MODULATED RADIATION THERAPY (IMRT), COMPLEX: CPT | Performed by: RADIOLOGY

## 2023-12-07 PROCEDURE — 3078F DIAST BP <80 MM HG: CPT | Performed by: INTERNAL MEDICINE

## 2023-12-07 ASSESSMENT — PATIENT HEALTH QUESTIONNAIRE - PHQ9
SUM OF ALL RESPONSES TO PHQ9 QUESTIONS 1 AND 2: 0
1. LITTLE INTEREST OR PLEASURE IN DOING THINGS: NOT AT ALL
2. FEELING DOWN, DEPRESSED OR HOPELESS: NOT AT ALL

## 2023-12-07 ASSESSMENT — ENCOUNTER SYMPTOMS
DEPRESSION: 0
LOSS OF SENSATION IN FEET: 0
OCCASIONAL FEELINGS OF UNSTEADINESS: 0

## 2023-12-07 ASSESSMENT — PAIN SCALES - GENERAL: PAINLEVEL: 0-NO PAIN

## 2023-12-07 NOTE — PROGRESS NOTES
Patient ID: Kevin Rubi is a 68 y.o. male    Primary Care Provider: Chen Olea, APRN-CNS    DIAGNOSIS AND STAGING   cT3pN3 NSCLC (adenocarcinoma, TTF1+) of the LLL - dx on 09/15/23   Primary tumor measures 3.5 cm (+satellite nodule)   2R+ for adenocarcinoma cells     SITES OF DISEASE   LLL   Mediastinal nodes (including contralateral)    Has a couple of other spiculated nodules (0.8 cm WILDA and 0.7 cm RUL) that are potential  synchronous primaries and will be addressed when needed      MOLECULAR GENOMICS   KRAS G12D mutation    PD-L1 TPS 95%     PRIOR THERAPIES   None     CURRENT THERAPY  Carboplatin/pemetrexed with concurrent RT    C1 10/23, C2 11/13, C3 12/4  RT goal of 30 fractions, 60 Gy     CURRENT ONCOLOGICAL PROBLEMS   Fatigue     HISTORY OF PRESENT ILLNESS   Former smoker, (quit in 2000), who while undergoing w/u for abdominal pain related to SMA occlusion, was found to have a LLL nodule -   Based on his records he was noted to have abnormal imaging back in 2021, was seen by pulmonary and was told to complete w/u, including PET scan and potential biopsy but did not follow recommendations.    A CT chest on 09/12/23 showed a 3.5 cm spiculated LLL nodule abutting the pericardium. There was also a satellite nodule suspicious for disease and a couple of other spiculated nodules in the WILDA and contralateral right lung.4R node measured 1.4 cm and level 7 2.4 cm.   PET scan on 08/04/23 showed no findings concerning for extra-thoracic disease. LLL nodule was FDG avid as well as multiple mediastinal nodes (including contralateral nodes). The WILDA nodule has mild hypermetabolic activity.  An EBUS procedure on 09/15/23 demonstrated 2R to be involved with adenocarcinoma cells, TTF1+. KRAS G12D +, TPS 95%.  The pt sees medical oncology on 09/27/23 -   States he is feeling well and denies any systemic sx related to this malignancy - including fatigue and weight loss. The abdominal pain resolved after vascular  procedure/stenting performed and he is now on Xarelto and Plavix. Denies any h/a or cough. Denies dyspnea.     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 States that he has some fatigue with treatment and the few days after. Has intermittent nerve discomfort which he describes as a few minute episodes with pain and burning in fingers, has not caused him to drop anything and has not been severe but is noticeable, comes and goes several times a day. Also with worse mood per pt and spouse, feels sad and often as a burden, does not like to talk to others about his cancer but is opening up to others at [facility] where they are staying for 30 RT treatments - misses being home but they try to go on weekends. No fevers or chills, no night sweats, no CP or SOB, no n/v/d does have hard stools but 1 formed bm/day. No rashes or edema.     A 13 point review of systems was performed, with significant findings documented above in subjective history.    OBJECTIVE:  Vitals:    23 1011   BP: 100/60   Pulse: 92   Resp: 20   Temp: 36.3 °C (97.3 °F)   SpO2: 99%      Body surface area is 2.02 meters squared.     Wt Readings  from Last 5 Encounters:   12/07/23 82.5 kg (181 lb 14.1 oz)   12/06/23 83.8 kg (184 lb 11.9 oz)   12/04/23 83.3 kg (183 lb 10.3 oz)   11/30/23 83.5 kg (184 lb 1.4 oz)   11/29/23 83.8 kg (184 lb 11.9 oz)       ECOGSCORE: 1- Restricted in physically strenuous activity.  Carries out light duty.  GEN: NAD  HEENT: NC/AT, MMM  CV: RRR no m/r/g  Chest: CTAB  GI: soft, NTND  MSK: no edema  Skin: no obvious rashes  Neuro: AOx3, moves all limbs, face symmetric      Diagnostic Results   Results:  Labs:  Lab Results   Component Value Date    WBC 3.7 (L) 12/04/2023    HGB 9.9 (L) 12/04/2023    HCT 30.4 (L) 12/04/2023    MCV 88 12/04/2023     12/04/2023      Lab Results   Component Value Date    NEUTROABS 2.47 12/04/2023        Lab Results   Component Value Date    GLUCOSE 250 (H) 12/04/2023    CALCIUM 9.2 12/04/2023     12/04/2023    K 4.0 12/04/2023    CO2 24 12/04/2023     12/04/2023    BUN 21 12/04/2023    CREATININE 1.05 12/04/2023    MG CANCELED 09/16/2023     Lab Results   Component Value Date    ALT 15 12/04/2023    AST 16 12/04/2023    ALKPHOS 43 12/04/2023    BILITOT 0.3 12/04/2023      Lab Results   Component Value Date    TSH 2.66 10/23/2023         No images are attached to the encounter or orders placed in the encounter.     Assessment/Plan     Primary cancer of left lower lobe of lung (CMS/HCC), Clinical: Stage IIIC (cT3, cN3, cM0)    67yo M with PMH of afib, HFpEF (60%), SMA occlusion s/p stenting of celiac artery (8/10/23) on eliquis and plavix and NSCLC stage IIIC ([id number]) initiated on definitive chemoradiation 10/23/23. Completed 3 cycles of chemo, now still with two weeks left of RT. Pt eager to be done and home with family for holidays, tolerating treatment with minimal AE's, wt has been relatively stable, will plan for repeat imaging 2wks post completion of RT and then if disease is stable or improved, continue with durvalumab consolidation per PACIFIC Trial. Follows with  Onco-psych.    #stage: IIIC ([id number]) NSCLC of the LLL -   -Decision made at  to treated with definitive intent  -Sees Dr. Aydin Reed, undergoing RT goal of 30 fractions, 60 Gy, tentative end date 12/22  -carboplatin and pemetrexed - C1 10/23, C2 11/13, C3 12/4   -plan for repeat CT 2 weeks after completing RT  -Following with onco-psych (SHANELLE Brown)     #SMA occlusion s/p stenting of celiac artery  08/10/23:  On eliquis and plavix     #Atrial fibrillation, HFpEF 60%  On digoxin, metoprolol, spironolactone, enestro     #Hyperlipidemia:  On Crestor    Pt seen and discussed with Dr. Longoria.    Rito Aguirre, DO  Hematology- Oncology Fellow, PGY-6    I saw and evaluated the patient. I personally obtained the key and critical portions of the history and physical exam or was physically present for key and critical portions performed by the resident/fellow. I reviewed the resident/fellow's documentation and discussed the patient with the resident/fellow. I agree with the resident/fellow's medical decision making as documented in the note.

## 2023-12-08 ENCOUNTER — HOSPITAL ENCOUNTER (OUTPATIENT)
Dept: RADIATION ONCOLOGY | Facility: HOSPITAL | Age: 68
Setting detail: RADIATION/ONCOLOGY SERIES
Discharge: HOME | End: 2023-12-08
Payer: MEDICARE

## 2023-12-08 DIAGNOSIS — C34.32 MALIGNANT NEOPLASM OF LOWER LOBE, LEFT BRONCHUS OR LUNG (MULTI): ICD-10-CM

## 2023-12-08 DIAGNOSIS — Z51.0 ENCOUNTER FOR ANTINEOPLASTIC RADIATION THERAPY: ICD-10-CM

## 2023-12-08 LAB
RAD ONC MSQ ACTUAL FRACTIONS DELIVERED: 13
RAD ONC MSQ ACTUAL SESSION DELIVERED DOSE: 200 CGRAY
RAD ONC MSQ ACTUAL TOTAL DOSE: 2600 CGRAY
RAD ONC MSQ ELAPSED DAYS: 17
RAD ONC MSQ LAST DATE: NORMAL
RAD ONC MSQ PRESCRIBED FRACTIONAL DOSE: 200 CGRAY
RAD ONC MSQ PRESCRIBED NUMBER OF FRACTIONS: 23
RAD ONC MSQ PRESCRIBED TECHNIQUE: NORMAL
RAD ONC MSQ PRESCRIBED TOTAL DOSE: 4600 CGRAY
RAD ONC MSQ PRESCRIPTION PATTERN COMMENT: NORMAL
RAD ONC MSQ START DATE: NORMAL
RAD ONC MSQ TREATMENT COURSE NUMBER: 1
RAD ONC MSQ TREATMENT SITE: NORMAL

## 2023-12-08 PROCEDURE — 77014 CHG CT GUIDANCE RADIATION THERAPY FLDS PLACEMENT: CPT | Performed by: RADIOLOGY

## 2023-12-08 PROCEDURE — 77386 HC INTENSITY-MODULATED RADIATION THERAPY (IMRT), COMPLEX: CPT | Performed by: RADIOLOGY

## 2023-12-11 ENCOUNTER — HOSPITAL ENCOUNTER (OUTPATIENT)
Dept: RADIATION ONCOLOGY | Facility: HOSPITAL | Age: 68
Setting detail: RADIATION/ONCOLOGY SERIES
Discharge: HOME | End: 2023-12-11
Payer: MEDICARE

## 2023-12-11 ENCOUNTER — PHARMACY VISIT (OUTPATIENT)
Dept: PHARMACY | Facility: CLINIC | Age: 68
End: 2023-12-11
Payer: COMMERCIAL

## 2023-12-11 ENCOUNTER — RADIATION ONCOLOGY OTV (OUTPATIENT)
Dept: RADIATION ONCOLOGY | Facility: HOSPITAL | Age: 68
End: 2023-12-11

## 2023-12-11 VITALS
HEART RATE: 84 BPM | RESPIRATION RATE: 18 BRPM | DIASTOLIC BLOOD PRESSURE: 63 MMHG | TEMPERATURE: 96.8 F | SYSTOLIC BLOOD PRESSURE: 92 MMHG | BODY MASS INDEX: 25.56 KG/M2 | OXYGEN SATURATION: 97 % | HEIGHT: 70 IN | WEIGHT: 178.57 LBS

## 2023-12-11 DIAGNOSIS — C34.92: Primary | ICD-10-CM

## 2023-12-11 PROCEDURE — 77427 RADIATION TX MANAGEMENT X5: CPT | Performed by: RADIOLOGY

## 2023-12-11 PROCEDURE — 77014 CHG CT GUIDANCE RADIATION THERAPY FLDS PLACEMENT: CPT | Performed by: RADIOLOGY

## 2023-12-11 PROCEDURE — RXMED WILLOW AMBULATORY MEDICATION CHARGE

## 2023-12-11 PROCEDURE — 77386 HC INTENSITY-MODULATED RADIATION THERAPY (IMRT), COMPLEX: CPT | Performed by: RADIOLOGY

## 2023-12-11 RX ORDER — OXYCODONE HYDROCHLORIDE 5 MG/1
5 TABLET ORAL EVERY 6 HOURS PRN
Qty: 15 TABLET | Refills: 0 | Status: SHIPPED | OUTPATIENT
Start: 2023-12-11 | End: 2023-12-18 | Stop reason: SDUPTHER

## 2023-12-11 ASSESSMENT — PAIN SCALES - GENERAL: PAINLEVEL: 0-NO PAIN

## 2023-12-11 NOTE — PROGRESS NOTES
"Radiation Oncology On Treatment Visit    Patient Name:  Kevin Rubi  MRN:  [id number]  :  1955    Referring Provider: Sabrina Longoria MD  Primary Care Provider: RUKHSANA Fried-CNS  Care Team: Patient Care Team:  BRENDON FriedCNS as PCP - General (Internal Medicine)  Wisam Latham MD as PCP - [employer]age PCP  Janell Longoria MD as Consulting Physician (Hematology and Oncology)  Madelyn Perrin MD as Radiation Oncologist (Radiation Oncology)    Date of Service: 2023     Diagnosis:   Specialty Problems          Radiation Oncology Problems    Primary cancer of left lower lobe of lung (CMS/HCC)        Malignant neoplasm of bronchus and lung (CMS/HCC)        Primary malignant neoplasm of lung of unknown cell type (CMS/HCC)         Treatment Summary:  Radiation Treatments       Active   Replan Lt Lung/Med (Started on 2023)   Most recent fraction: 200 cGy given on 2023   Total given: 2,600 cGy / 4,600 cGy  (13 of 23 fractions)   Elapsed Days: 17   Technique: IMRT           Completed   Left lung_Med (Started on 2023)   Most recent fraction: 200 cGy given on 2023   Total given: 1,400 cGy / 1,400 cGy  (7 of 7 fractions)   Elapsed Days: 7   Technique: IMRT                   SUBJECTIVE: Mr. Rubi endorses fatigue and decreased PO intake. Continues to have esophagitis symptoms; found some relief with carafate and magic mouthwash, which also caused changes in the taste of food, so he has not been able to take it regularly. His wife also states that he has an occasional mild cough. Has lost about 5lbs since start of treatment.      OBJECTIVE:   Vital Signs:  BP 92/63 (BP Location: Right arm, Patient Position: Sitting, BP Cuff Size: Adult)   Pulse 84   Temp 36 °C (96.8 °F) (Temporal)   Resp 18   Ht 1.778 m (5' 10\")   Wt 81 kg (178 lb 9.2 oz)   SpO2 97%   BMI 25.62 kg/m²    Pain Scale: The patient's current pain level was assessed.  They report currently " "having a pain of 0 out of 10.    Other Pertinent Findings: Sitting comfortably. Alert, oriented. No wheezing. Breathing well on room air.    Toxicity Assessment          11/13/2023    09:39 11/20/2023    12:12 11/29/2023    15:23 12/6/2023    14:38 12/11/2023    15:54   Toxicity Assessment   Adverse Events Reviewed (WDL) No (Exceptions to WDL) Yes (Within Defined Limits) No (Exceptions to WDL) No (Exceptions to WDL) No (Exceptions to WDL)   Treatment Site Thoracic Thoracic Thoracic Thoracic Thoracic   Anorexia Grade 0 Grade 3       eats a meal a day. and grazes the rest of the day. Grade 1       pt. c/o little appetite; dietician to see pt Grade 1       pt. with decreased appetite Grade 1       pt. with decreased appetite d/t poor taste buds   Anxiety Grade 0 Grade 2      Dehydration Grade 0 Grade 0 Grade 0  Grade 0   Depression Grade 0 Grade 0      Dermatitis Radiation Grade 0 Grade 0 Grade 0 Grade 0 Grade 0   Diarrhea Grade 0 Grade 0      Fatigue Grade 0 Grade 1       pt states he is tired Grade 1       pt. continues to c/o feeling fatigued Grade 0    Nausea Grade 0 Grade 0 Grade 0 Grade 0 Grade 0   Pain Grade 0 Grade 1       Pain  in fingertips and toes Grade 0 Grade 0 Grade 0   Treatment Related Secondary Malignancy  Grade 0      Tumor Pain  Grade 0      Vomiting Grade 0 Grade 0   Grade 0   Constipation Grade 0       Dyspepsia Grade 0       Dysphagia Grade 0       Esophagitis Grade 0       Mucositis Oral Grade 0       Pneumonitis Grade 0 Grade 0      Aspiration Grade 0 Grade 0      Hoarseness Grade 0 Grade 0      Laryngeal Edema Grade 0 Grade 0      Myocardial Infarction Grade 0       Pericardial Effusion Grade 0       Pericarditis Grade 0       Esophageal Obstruction Grade 0  Grade 1       pt. c/o feeling like food is getting stuck in his esophagus; pt. tried Sucralfate but stated it didn't help  Grade 1       c/o \"food getting stuck in throat\"   Esophageal Pain Grade 0   Grade 1       pt. with intermittent " esophagitis pain; pt. taking BMX as needed Grade 1   Esophageal Stenosis Grade 0       Cough Grade 0  Grade 1       mild occasional nonproductive cough Grade 0 Grade 1       mild cough   Dyspnea Grade 0  Grade 0 Grade 0 Grade 0   Epistaxis Grade 0       Hiccups Grade 0       Hypoxia Grade 0   Grade 0 Grade 0   Pulmonary Fibrosis Grade 0       Lymphedema Grade 0       Thromboembolic Event Grade 0       Hot Flashes Grade 0            Assessment / Plan:  The patient is tolerating radiation therapy as anticipated. Weight loss and decreased PO intake appears to be secondary to esophagitis symptoms. Oxycodone 5mg QID was sent to pharmacy; recommended starting BID dose to alleviate esophagitis pain. Patient has home miralax that he plans to use while using oxycodone. Encouraged to increase PO intake. Continue per current treatment plan.        The patient was assessed and plan discussed with the attending radiation oncologist Dr. Perrin.    Shanda Tong MD  PGY-2 Radiation Oncology Resident  On-call pager [phone]  Available on Epic Secure Chat    For  Madelyn Perrin MD, MMM  [facility]  Faculty, [employer]  [email or web address]  Our [place]: “To Heal, To Teach, To Discover.”  RN partner: [email or web address]    Phone (scheduling): [phone]/ [email or web address]  Phone (office): [phone]  Phone (after hours): [phone]      ATTENDING ADDENDUM:  I saw and evaluated the patient with the resident. I personally obtained the key and critical portions of the history and physical exam, reviewed IGRT/dosimetry and directly counseled the patient of the treatment plan. I reviewed the resident documentation and discussed the patient with the resident. I agree with the resident/fellow's medical decision making as documented in the note.

## 2023-12-12 ENCOUNTER — HOSPITAL ENCOUNTER (OUTPATIENT)
Dept: RADIATION ONCOLOGY | Facility: HOSPITAL | Age: 68
Setting detail: RADIATION/ONCOLOGY SERIES
Discharge: HOME | End: 2023-12-12
Payer: MEDICARE

## 2023-12-12 DIAGNOSIS — Z51.0 ENCOUNTER FOR ANTINEOPLASTIC RADIATION THERAPY: ICD-10-CM

## 2023-12-12 DIAGNOSIS — C34.32 MALIGNANT NEOPLASM OF LOWER LOBE, LEFT BRONCHUS OR LUNG (MULTI): ICD-10-CM

## 2023-12-12 LAB
RAD ONC MSQ ACTUAL FRACTIONS DELIVERED: 15
RAD ONC MSQ ACTUAL SESSION DELIVERED DOSE: 200 CGRAY
RAD ONC MSQ ACTUAL TOTAL DOSE: 3000 CGRAY
RAD ONC MSQ ELAPSED DAYS: 21
RAD ONC MSQ LAST DATE: NORMAL
RAD ONC MSQ PRESCRIBED FRACTIONAL DOSE: 200 CGRAY
RAD ONC MSQ PRESCRIBED NUMBER OF FRACTIONS: 23
RAD ONC MSQ PRESCRIBED TECHNIQUE: NORMAL
RAD ONC MSQ PRESCRIBED TOTAL DOSE: 4600 CGRAY
RAD ONC MSQ PRESCRIPTION PATTERN COMMENT: NORMAL
RAD ONC MSQ START DATE: NORMAL
RAD ONC MSQ TREATMENT COURSE NUMBER: 1
RAD ONC MSQ TREATMENT SITE: NORMAL

## 2023-12-12 PROCEDURE — 77014 CHG CT GUIDANCE RADIATION THERAPY FLDS PLACEMENT: CPT | Performed by: RADIOLOGY

## 2023-12-12 PROCEDURE — 77386 HC INTENSITY-MODULATED RADIATION THERAPY (IMRT), COMPLEX: CPT | Performed by: RADIOLOGY

## 2023-12-13 ENCOUNTER — HOSPITAL ENCOUNTER (OUTPATIENT)
Dept: RADIATION ONCOLOGY | Facility: HOSPITAL | Age: 68
Setting detail: RADIATION/ONCOLOGY SERIES
Discharge: HOME | End: 2023-12-13
Payer: MEDICARE

## 2023-12-13 DIAGNOSIS — C34.32 MALIGNANT NEOPLASM OF LOWER LOBE, LEFT BRONCHUS OR LUNG (MULTI): ICD-10-CM

## 2023-12-13 DIAGNOSIS — Z51.0 ENCOUNTER FOR ANTINEOPLASTIC RADIATION THERAPY: ICD-10-CM

## 2023-12-13 LAB
RAD ONC MSQ ACTUAL FRACTIONS DELIVERED: 16
RAD ONC MSQ ACTUAL SESSION DELIVERED DOSE: 200 CGRAY
RAD ONC MSQ ACTUAL TOTAL DOSE: 3200 CGRAY
RAD ONC MSQ ELAPSED DAYS: 22
RAD ONC MSQ LAST DATE: NORMAL
RAD ONC MSQ PRESCRIBED FRACTIONAL DOSE: 200 CGRAY
RAD ONC MSQ PRESCRIBED NUMBER OF FRACTIONS: 23
RAD ONC MSQ PRESCRIBED TECHNIQUE: NORMAL
RAD ONC MSQ PRESCRIBED TOTAL DOSE: 4600 CGRAY
RAD ONC MSQ PRESCRIPTION PATTERN COMMENT: NORMAL
RAD ONC MSQ START DATE: NORMAL
RAD ONC MSQ TREATMENT COURSE NUMBER: 1
RAD ONC MSQ TREATMENT SITE: NORMAL

## 2023-12-13 PROCEDURE — 77014 CHG CT GUIDANCE RADIATION THERAPY FLDS PLACEMENT: CPT | Performed by: RADIOLOGY

## 2023-12-13 PROCEDURE — 77336 RADIATION PHYSICS CONSULT: CPT | Performed by: RADIOLOGY

## 2023-12-13 PROCEDURE — 77386 HC INTENSITY-MODULATED RADIATION THERAPY (IMRT), COMPLEX: CPT | Performed by: RADIOLOGY

## 2023-12-14 ENCOUNTER — HOSPITAL ENCOUNTER (OUTPATIENT)
Dept: RADIATION ONCOLOGY | Facility: HOSPITAL | Age: 68
Setting detail: RADIATION/ONCOLOGY SERIES
Discharge: HOME | End: 2023-12-14
Payer: MEDICARE

## 2023-12-14 ENCOUNTER — OFFICE VISIT (OUTPATIENT)
Dept: HEMATOLOGY/ONCOLOGY | Facility: HOSPITAL | Age: 68
End: 2023-12-14
Payer: MEDICARE

## 2023-12-14 VITALS
WEIGHT: 175.4 LBS | SYSTOLIC BLOOD PRESSURE: 119 MMHG | DIASTOLIC BLOOD PRESSURE: 68 MMHG | HEART RATE: 76 BPM | BODY MASS INDEX: 25.17 KG/M2 | OXYGEN SATURATION: 100 % | RESPIRATION RATE: 18 BRPM | TEMPERATURE: 96.1 F

## 2023-12-14 DIAGNOSIS — C34.32 PRIMARY CANCER OF LEFT LOWER LOBE OF LUNG (MULTI): ICD-10-CM

## 2023-12-14 DIAGNOSIS — C34.32 MALIGNANT NEOPLASM OF LOWER LOBE, LEFT BRONCHUS OR LUNG (MULTI): ICD-10-CM

## 2023-12-14 DIAGNOSIS — C34.32 PRIMARY CANCER OF LEFT LOWER LOBE OF LUNG (MULTI): Primary | ICD-10-CM

## 2023-12-14 DIAGNOSIS — Z51.0 ENCOUNTER FOR ANTINEOPLASTIC RADIATION THERAPY: ICD-10-CM

## 2023-12-14 DIAGNOSIS — R63.8 DECREASED ORAL INTAKE: ICD-10-CM

## 2023-12-14 LAB
RAD ONC MSQ ACTUAL FRACTIONS DELIVERED: 17
RAD ONC MSQ ACTUAL SESSION DELIVERED DOSE: 200 CGRAY
RAD ONC MSQ ACTUAL TOTAL DOSE: 3400 CGRAY
RAD ONC MSQ ELAPSED DAYS: 23
RAD ONC MSQ LAST DATE: NORMAL
RAD ONC MSQ PRESCRIBED FRACTIONAL DOSE: 200 CGRAY
RAD ONC MSQ PRESCRIBED NUMBER OF FRACTIONS: 23
RAD ONC MSQ PRESCRIBED TECHNIQUE: NORMAL
RAD ONC MSQ PRESCRIBED TOTAL DOSE: 4600 CGRAY
RAD ONC MSQ PRESCRIPTION PATTERN COMMENT: NORMAL
RAD ONC MSQ START DATE: NORMAL
RAD ONC MSQ TREATMENT COURSE NUMBER: 1
RAD ONC MSQ TREATMENT SITE: NORMAL

## 2023-12-14 PROCEDURE — 99214 OFFICE O/P EST MOD 30 MIN: CPT | Performed by: NURSE PRACTITIONER

## 2023-12-14 PROCEDURE — 1036F TOBACCO NON-USER: CPT | Performed by: NURSE PRACTITIONER

## 2023-12-14 PROCEDURE — 1160F RVW MEDS BY RX/DR IN RCRD: CPT | Performed by: NURSE PRACTITIONER

## 2023-12-14 PROCEDURE — 77014 CHG CT GUIDANCE RADIATION THERAPY FLDS PLACEMENT: CPT | Performed by: RADIOLOGY

## 2023-12-14 PROCEDURE — 1126F AMNT PAIN NOTED NONE PRSNT: CPT | Performed by: NURSE PRACTITIONER

## 2023-12-14 PROCEDURE — 3078F DIAST BP <80 MM HG: CPT | Performed by: NURSE PRACTITIONER

## 2023-12-14 PROCEDURE — 2500000004 HC RX 250 GENERAL PHARMACY W/ HCPCS (ALT 636 FOR OP/ED): Performed by: NURSE PRACTITIONER

## 2023-12-14 PROCEDURE — 96360 HYDRATION IV INFUSION INIT: CPT | Mod: INF

## 2023-12-14 PROCEDURE — 1159F MED LIST DOCD IN RCRD: CPT | Performed by: NURSE PRACTITIONER

## 2023-12-14 PROCEDURE — 3074F SYST BP LT 130 MM HG: CPT | Performed by: NURSE PRACTITIONER

## 2023-12-14 PROCEDURE — 77386 HC INTENSITY-MODULATED RADIATION THERAPY (IMRT), COMPLEX: CPT | Performed by: RADIOLOGY

## 2023-12-14 RX ORDER — DIPHENHYDRAMINE HYDROCHLORIDE 50 MG/ML
50 INJECTION INTRAMUSCULAR; INTRAVENOUS AS NEEDED
Status: CANCELLED | OUTPATIENT
Start: 2023-12-21

## 2023-12-14 RX ORDER — ALBUTEROL SULFATE 0.83 MG/ML
3 SOLUTION RESPIRATORY (INHALATION) AS NEEDED
Status: CANCELLED | OUTPATIENT
Start: 2023-12-21

## 2023-12-14 RX ORDER — ALBUTEROL SULFATE 0.83 MG/ML
3 SOLUTION RESPIRATORY (INHALATION) AS NEEDED
Status: CANCELLED | OUTPATIENT
Start: 2023-12-14

## 2023-12-14 RX ORDER — DIPHENHYDRAMINE HYDROCHLORIDE 50 MG/ML
50 INJECTION INTRAMUSCULAR; INTRAVENOUS AS NEEDED
Status: CANCELLED | OUTPATIENT
Start: 2023-12-14

## 2023-12-14 RX ORDER — HEPARIN 100 UNIT/ML
500 SYRINGE INTRAVENOUS AS NEEDED
Status: CANCELLED | OUTPATIENT
Start: 2023-12-14

## 2023-12-14 RX ORDER — EPINEPHRINE 0.3 MG/.3ML
0.3 INJECTION SUBCUTANEOUS EVERY 5 MIN PRN
Status: CANCELLED | OUTPATIENT
Start: 2023-12-14

## 2023-12-14 RX ORDER — HEPARIN SODIUM,PORCINE/PF 10 UNIT/ML
50 SYRINGE (ML) INTRAVENOUS AS NEEDED
Status: CANCELLED | OUTPATIENT
Start: 2023-12-14

## 2023-12-14 RX ORDER — EPINEPHRINE 0.3 MG/.3ML
0.3 INJECTION SUBCUTANEOUS EVERY 5 MIN PRN
Status: CANCELLED | OUTPATIENT
Start: 2023-12-21

## 2023-12-14 RX ORDER — FAMOTIDINE 10 MG/ML
20 INJECTION INTRAVENOUS ONCE AS NEEDED
Status: CANCELLED | OUTPATIENT
Start: 2023-12-14

## 2023-12-14 RX ORDER — FAMOTIDINE 10 MG/ML
20 INJECTION INTRAVENOUS ONCE AS NEEDED
Status: CANCELLED | OUTPATIENT
Start: 2023-12-21

## 2023-12-14 RX ADMIN — SODIUM CHLORIDE 1000 ML: 9 INJECTION, SOLUTION INTRAVENOUS at 12:31

## 2023-12-14 ASSESSMENT — PAIN SCALES - GENERAL: PAINLEVEL: 8

## 2023-12-14 NOTE — PROGRESS NOTES
Patient ID: Kevin Rubi is a 68 y.o. male    Primary Care Provider: Chen Olea, APRN-CNS    DIAGNOSIS AND STAGING   cT3pN3 NSCLC (adenocarcinoma, TTF1+) of the LLL - dx on 09/15/23   Primary tumor measures 3.5 cm (+satellite nodule)   2R+ for adenocarcinoma cells     SITES OF DISEASE   LLL   Mediastinal nodes (including contralateral)    Has a couple of other spiculated nodules (0.8 cm WILDA and 0.7 cm RUL) that are potential  synchronous primaries and will be addressed when needed      MOLECULAR GENOMICS   KRAS G12D mutation    PD-L1 TPS 95%     PRIOR THERAPIES   None     CURRENT THERAPY  Carboplatin/pemetrexed with concurrent RT    C1 10/23, C2 11/13, C3 12/4  RT goal of 30 fractions, 60 Gy     CURRENT ONCOLOGICAL PROBLEMS   Fatigue     HISTORY OF PRESENT ILLNESS   Former smoker, (quit in 2000), who while undergoing w/u for abdominal pain related to SMA occlusion, was found to have a LLL nodule -   Based on his records he was noted to have abnormal imaging back in 2021, was seen by pulmonary and was told to complete w/u, including PET scan and potential biopsy but did not follow recommendations.    A CT chest on 09/12/23 showed a 3.5 cm spiculated LLL nodule abutting the pericardium. There was also a satellite nodule suspicious for disease and a couple of other spiculated nodules in the WILDA and contralateral right lung.4R node measured 1.4 cm and level 7 2.4 cm.   PET scan on 08/04/23 showed no findings concerning for extra-thoracic disease. LLL nodule was FDG avid as well as multiple mediastinal nodes (including contralateral nodes). The WILDA nodule has mild hypermetabolic activity.  An EBUS procedure on 09/15/23 demonstrated 2R to be involved with adenocarcinoma cells, TTF1+. KRAS G12D +, TPS 95%.  The pt sees medical oncology on 09/27/23 -   States he is feeling well and denies any systemic sx related to this malignancy - including fatigue and weight loss. The abdominal pain resolved after vascular  procedure/stenting performed and he is now on Xarelto and Plavix. Denies any h/a or cough. Denies dyspnea.     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  HFpEF - EF 60%   Atrial fibrillation - on Eliquis  Ascending aortic aneurysm   PUD (gastric)  HLD       SOCIAL HISTORY  + Tobacco - quit in  - mostly 1/2 ppd starting at age 22  Occasional ETOH intake  Worked as a  for 35 years    for 43 years and has 2 children - son who is 41 yo and daughter who is 35  He has just retired      FAMILY HISTORY  Mother seems to have had H/N or lung cancer - unclear   Father may also have had cancer   1 sister  from complications of GSW  1 brother  of CA (?liver CA)    CURRENT MEDS REVIEWED       ALLERGIES REVIEWED   NKDA     SUBJECTIVE:  Here with his wife for follow-up  Endorses fatigue  Breathing stable, no respiratory symptoms  He admits he is not drinking as much fluid as he should, discussed IVF in infusion today and he is agreeable  Denies abdominal pain, N/V/D/C  No neurological symptoms  No other concerns or complaints    A 13 point review of systems was performed, with significant findings documented above in subjective history.    OBJECTIVE:  Vitals:    23 1011   BP: 119/68   Pulse: 76   Resp: 18   Temp: 35.6 °C (96.1 °F)   SpO2: 100%      Body surface area is 1.98 meters squared.     Wt Readings from Last 5 Encounters:   23 79.6 kg (175 lb 6.4 oz)   23 81 kg (178 lb 9.2 oz)   23 82.5 kg (181 lb 14.1 oz)   23 83.8 kg (184 lb 11.9 oz)   23 83.3 kg (183 lb 10.3 oz)       ECOGSCORE: 1- Restricted in physically strenuous activity.  Carries out light duty.      Diagnostic Results   Results:  Labs:  Lab Results   Component Value Date    WBC 3.7 (L) 2023    HGB 9.9 (L)  12/04/2023    HCT 30.4 (L) 12/04/2023    MCV 88 12/04/2023     12/04/2023      Lab Results   Component Value Date    NEUTROABS 2.47 12/04/2023        Lab Results   Component Value Date    GLUCOSE 250 (H) 12/04/2023    CALCIUM 9.2 12/04/2023     12/04/2023    K 4.0 12/04/2023    CO2 24 12/04/2023     12/04/2023    BUN 21 12/04/2023    CREATININE 1.05 12/04/2023    MG CANCELED 09/16/2023     Lab Results   Component Value Date    ALT 15 12/04/2023    AST 16 12/04/2023    ALKPHOS 43 12/04/2023    BILITOT 0.3 12/04/2023      Lab Results   Component Value Date    TSH 2.66 10/23/2023         No images are attached to the encounter or orders placed in the encounter.     Assessment/Plan     Primary cancer of left lower lobe of lung (CMS/HCC), Clinical: Stage IIIC (cT3, cN3, cM0)    69yo M with PMH of afib, HFpEF (60%), SMA occlusion s/p stenting of celiac artery (8/10/23) on eliquis and plavix and NSCLC stage IIIC ([id number]) initiated on definitive chemoradiation 10/23/23. Completed 3 cycles of chemo, now still with two weeks left of RT. Pt eager to be done and home with family for holidays, tolerating treatment with minimal AE's, wt has been relatively stable, will plan for repeat imaging 2wks post completion of RT and then if disease is stable or improved, continue with durvalumab consolidation per PACIFIC Trial. Follows with Onco-psych.    #stage: IIIC ([id number]) NSCLC of the LLL -   -Decision made at  to treated with definitive intent  -Sees Dr. Aydin Reed, undergoing RT goal of 30 fractions, 60 Gy, tentative end date 12/22  -carboplatin and pemetrexed - C1 10/23, C2 11/13, C3 12/4   -plan for repeat CT 2 weeks after completing RT  -Following with onco-psych (SHANELLE Brown)   - IVF today for decreased oral intake, will discuss additional appointments if he is agreeable      #SMA occlusion s/p stenting of celiac artery  08/10/23:  On eliquis and plavix     #Atrial fibrillation, HFpEF 60%  On  digoxin, metoprolol, spironolactone, enestro     #Hyperlipidemia:  On Crestor      Radha Martins, APRN-CNP

## 2023-12-15 ENCOUNTER — TELEPHONE (OUTPATIENT)
Dept: ADMISSION | Facility: HOSPITAL | Age: 68
End: 2023-12-15

## 2023-12-15 ENCOUNTER — HOSPITAL ENCOUNTER (OUTPATIENT)
Dept: RADIATION ONCOLOGY | Facility: HOSPITAL | Age: 68
Setting detail: RADIATION/ONCOLOGY SERIES
Discharge: HOME | End: 2023-12-15
Payer: MEDICARE

## 2023-12-15 ENCOUNTER — NUTRITION (OUTPATIENT)
Dept: HEMATOLOGY/ONCOLOGY | Facility: HOSPITAL | Age: 68
End: 2023-12-15

## 2023-12-15 DIAGNOSIS — Z51.0 ENCOUNTER FOR ANTINEOPLASTIC RADIATION THERAPY: ICD-10-CM

## 2023-12-15 DIAGNOSIS — C34.32 MALIGNANT NEOPLASM OF LOWER LOBE, LEFT BRONCHUS OR LUNG (MULTI): ICD-10-CM

## 2023-12-15 LAB
RAD ONC MSQ ACTUAL FRACTIONS DELIVERED: 18
RAD ONC MSQ ACTUAL SESSION DELIVERED DOSE: 200 CGRAY
RAD ONC MSQ ACTUAL TOTAL DOSE: 3600 CGRAY
RAD ONC MSQ ELAPSED DAYS: 24
RAD ONC MSQ LAST DATE: NORMAL
RAD ONC MSQ PRESCRIBED FRACTIONAL DOSE: 200 CGRAY
RAD ONC MSQ PRESCRIBED NUMBER OF FRACTIONS: 23
RAD ONC MSQ PRESCRIBED TECHNIQUE: NORMAL
RAD ONC MSQ PRESCRIBED TOTAL DOSE: 4600 CGRAY
RAD ONC MSQ PRESCRIPTION PATTERN COMMENT: NORMAL
RAD ONC MSQ START DATE: NORMAL
RAD ONC MSQ TREATMENT COURSE NUMBER: 1
RAD ONC MSQ TREATMENT SITE: NORMAL

## 2023-12-15 PROCEDURE — 77386 HC INTENSITY-MODULATED RADIATION THERAPY (IMRT), COMPLEX: CPT | Performed by: STUDENT IN AN ORGANIZED HEALTH CARE EDUCATION/TRAINING PROGRAM

## 2023-12-15 PROCEDURE — 77014 CHG CT GUIDANCE RADIATION THERAPY FLDS PLACEMENT: CPT | Performed by: STUDENT IN AN ORGANIZED HEALTH CARE EDUCATION/TRAINING PROGRAM

## 2023-12-15 NOTE — TELEPHONE ENCOUNTER
Spouse asking if its ok for him to go home to [place] this weekend since he had to have IVF yesterday?

## 2023-12-15 NOTE — PROGRESS NOTES
"NUTRITION Follow-up NOTE    Nutrition Assessment     Reason for Visit:  Kevin Rubi is a 68 y.o. male with NSCLC of the LLL who presents for nutrition follow up.    Patient is finished with infusion. Has completed 25/30 radiation treatments.     Recommend soft, high calorie diet in the meantime. Maintain fluid intake. Recommend ONS 2-3x/day.      Will keep an eye on weight to ensure intake remains acceptable.        Lab Results   Component Value Date/Time    GLUCOSE 250 (H) 12/04/2023 0957     12/04/2023 0957    K 4.0 12/04/2023 0957     12/04/2023 0957    CO2 24 12/04/2023 0957    ANIONGAP 13 12/04/2023 0957    BUN 21 12/04/2023 0957    CREATININE 1.05 12/04/2023 0957    EGFR 78 12/04/2023 0957    CALCIUM 9.2 12/04/2023 0957    ALBUMIN 3.9 12/04/2023 0957    ALKPHOS 43 12/04/2023 0957    PROT 6.8 12/04/2023 0957    AST 16 12/04/2023 0957    BILITOT 0.3 12/04/2023 0957    ALT 15 12/04/2023 0957     No results found for: \"VITD25\"    Anthropometrics:  Weight Change  Weight History / % Weight Change: 7% weight loss in 1 month (since 11/16).  Significant Weight Loss: Yes  Interpretation of Weight Loss: >5% in 1 month        Wt Readings from Last 10 Encounters:   12/14/23 79.6 kg (175 lb 6.4 oz)   12/11/23 81 kg (178 lb 9.2 oz)   12/07/23 82.5 kg (181 lb 14.1 oz)   12/06/23 83.8 kg (184 lb 11.9 oz)   12/04/23 83.3 kg (183 lb 10.3 oz)   11/30/23 83.5 kg (184 lb 1.4 oz)   11/29/23 83.8 kg (184 lb 11.9 oz)   11/20/23 84 kg (185 lb 3 oz)   11/16/23 84.7 kg (186 lb 11.7 oz)   11/13/23 85 kg (187 lb 6.4 oz)        Food And Nutrient Intake:  Food and Nutrient History  Food and Nutrient History: Patient continues on soft diet. Patient tried Cubey[employer] shakes shich he enjoyed.  Energy Intake: Poor < 50 %  Fluid Intake: Drinking water often.  Food Allergy: NKFA.  GI Symptoms: early satiety  Oral Problems: esophagitis, dysphagia (States it feels like sandpaper; sporadic pain. Experiences swallowing difficult no  " matter pain level.)     Food Preparation  Cooking: Spouse/Significant Other  Grocery Shopping: Spouse/Significant Other    Food Supplement Intake  Oral Nutrition Supplements:  ([employer])      Nutrition Focused Physical Exam Findings:  defer: Rad                        Energy Needs  Estimated Energy Needs  Total Energy Estimated Needs (kCal): 2400 kCal  Method for Estimating Needs: 30 kcal/kg IBW  Estimated Fluid Needs  Total Fluid Estimated Needs (mL): 2400 mL  Method for Estimating Needs: 1 ml/kcal or per team  Estimated Protein Needs  Total Protein Estimated Needs (g): 80 g  Method for Estimating Needs: 1 g/kg IBW        Nutrition Diagnosis   Malnutrition Diagnosis  Patient has Malnutrition Diagnosis: Yes  Diagnosis Status: New  Malnutrition Diagnosis: Severe malnutrition related to acute disease or injury  As Evidenced by: 7% weight loss in 1 month and less than 50% intake of estimated energy requirements for greater than 5 days.  Additional Assessment Information: Related to trouble swallowing from radiation treatment    Nutrition Diagnosis  Patient has Nutrition Diagnosis: Yes  Diagnosis Status (1): Ongoing  Nutrition Diagnosis 1: Swallowing difficulity  Related to (1): esophagitis from radiation treatment  As Evidenced by (1): reports from patient and unintended weight loss.    Nutrition Interventions/Recommendations   Nutrition Prescription       Food and Nutrition Delivery  Food and Nutrition Delivery  Meals & Snacks: Energy-modified diet, Texture-Modified Diet, Modify schedule of foods/fluids  Goals: High calorie soft foods, eat every two hours  Medical Food Supplement: Boost Very High Calorie, Boost Plus  Goals: Boost very high calorie 2x/day OR Boost Plus 3x/day    Nutrition Education       Coordination of Care       There are no Patient Instructions on file for this visit.    Nutrition Monitoring and Evaluation   Food/Nutrient Related History Monitoring  Monitoring and Evaluation Plan: Energy  intake, Meal/snack pattern  Energy Intake: Estimated energy intake  Meal/Snack Pattern: Estimated meal and snack pattern  Nutrition Focused Physical Findings  Monitoring and Evaluation Plan: Digestive System  Digestive System:  (Swallowing)        Time Spent  Prep time on day of patient encounter: 5 minutes  Time spent directly with patient, family or caregiver: 10 minutes  Additional Time Spent on Patient Care Activities: 5 minutes  Documentation Time: 15 minutes  Other Time Spent: 0 minutes  Total: 35 minutes        Readiness to Change : Good  Level of Understanding : Good  Anticipated Compliant : Good

## 2023-12-17 NOTE — PROGRESS NOTES
[facility] - Medical Oncology Follow-Up Visit    Patient ID: Kevin Rubi is a 68 y.o. male      Current therapy: cyanocobalamin (Vitamin B-12) injection 1,000 mcg, 1,000 mcg, intramuscular, Once, 1 of 4 cycles    Administration: 1,000 mcg (10/23/2023)        fosaprepitant (Emend) 150 mg in sodium chloride 0.9% 250 mL IV, 150 mg, intravenous, Once, 3 of 6 cycles    Administration: 150 mg (10/23/2023), 150 mg (11/13/2023), 150 mg (12/4/2023)        CARBOplatin (Paraplatin) 508.5 mg in sodium chloride 0.9% 150.85 mL IV, 508.5 mg (100 % of original dose 508.5 mg), intravenous, Once, 3 of 6 cycles    Dose modification: 508.5 mg (original dose 508.5 mg, Cycle 1, Reason: Protocol Driven, Comment: NA)    Administration: 508.5 mg (10/23/2023), 508.5 mg (11/13/2023), 508.5 mg (12/4/2023)        palonosetron (Aloxi) injection 250 mcg, 250 mcg, intravenous, Once, 3 of 6 cycles    Administration: 250 mcg (10/23/2023), 250 mcg (11/13/2023), 250 mcg (12/4/2023)        PEMEtrexed disodium (Alimta) 1,000 mg in sodium chloride 0.9% 140 mL IV, 500 mg/m2 = 1,000 mg, intravenous, Once, 3 of 6 cycles    Administration: 1,000 mg (10/23/2023), 1,000 mg (11/13/2023), 1,000 mg (12/4/2023)      Oncologic History:      Chief Concern: Here in clinic for follow-up on concurrent chemo/RT with Carboplatin/Pemetrexed (21 day cycles)     HPI Patient is here today in clinic with his wife for follow-up on concurrent chemo/RT with Carboplatin/Pemetrexed (21 day cycles). He is on week 2 RT, s/p C2 Carbo/Pem (C2 11/13/23). He continues to feel well overall. Breathing stable. Mild fatigue continues. No GI symptoms He is eating/drinking ok. Staying active. No fever, chills, or cold symptoms. No other concerns or complaints.       Meds (Current):    Current Outpatient Medications:     apixaban (Eliquis) 5 mg tablet, Take 1 tablet (5 mg) by mouth 2 times a day., Disp: 180 tablet, Rfl: 1    BMX ORAL SUSPENSION (1:1:1),  Swish and swallow 15-30 mL every 2 hours if needed for throat pain., Disp: 360 mL, Rfl: 2    clopidogrel (Plavix) 75 mg tablet, Take 1 tablet (75 mg) by mouth once daily., Disp: 90 tablet, Rfl: 1    digoxin (Lanoxin) 125 MCG tablet, Take 1 tablet (125 mcg) by mouth once daily for 180 doses., Disp: 90 tablet, Rfl: 1    Eliquis 5 mg tablet, Take 1 tablet (5 mg) by mouth 2 times a day., Disp: , Rfl:     Entresto 24-26 mg tablet, Take 1 tablet by mouth 2 times a day., Disp: 180 tablet, Rfl: 1    fenofibrate (Tricor) 145 mg tablet, , Disp: , Rfl:     gabapentin (Neurontin) 300 mg capsule, Take 1 capsule (300 mg) by mouth once daily at bedtime., Disp: 60 capsule, Rfl: 2    magic mouthwash (lidocaine, diphenhydrAMINE, Maalox 1:1:1), Swish and swallow 10 mL every 6 hours if needed for mucositis for up to 25 days., Disp: 1000 mL, Rfl: 0    metoprolol succinate XL (Toprol-XL) 25 mg 24 hr tablet, Take 1 tablet (25 mg) by mouth once daily. Do not crush or chew., Disp: 90 tablet, Rfl: 3    metoprolol succinate XL (Toprol-XL) 50 mg 24 hr tablet, Take 1 tablet (50 mg) by mouth once daily. Do not crush or chew., Disp: 90 tablet, Rfl: 3    oxyCODONE (Roxicodone) 5 mg immediate release tablet, Take 1 tablet (5 mg) by mouth every 6 hours if needed for severe pain (7 - 10)., Disp: 15 tablet, Rfl: 0    rosuvastatin (Crestor) 5 mg tablet, Take 1 tablet (5 mg) by mouth once daily., Disp: 90 tablet, Rfl: 1    spironolactone (Aldactone) 25 mg tablet, Take 0.5 tablets (12.5 mg) by mouth once daily., Disp: 45 tablet, Rfl: 1    sucralfate (Carafate) 100 mg/mL suspension, Take 10 mL (1 g) by mouth if needed (take before meals if swallowing is uncomfortable) for up to 30 doses., Disp: 300 mL, Rfl: 1    Review of Systems - Oncology 12 point ROS was obtained and negative unless otherwise mentioned in the above HPI.      Objective   BSA: 2.06 meters squared  Weights reviewed.    /72   Pulse 77   Temp 36.1 °C (97 °F) (Core)   Resp 20   Wt  83.5 kg (184 lb 1.4 oz)   SpO2 100%   BMI 24.97 kg/m²          Physical Exam  Constitutional:       Appearance: Normal appearance.   Eyes:      Pupils: Pupils are equal, round, and reactive to light.   Cardiovascular:      Rate and Rhythm: Normal rate and regular rhythm.   Pulmonary:      Effort: Pulmonary effort is normal.      Breath sounds: Normal breath sounds.   Abdominal:      General: Bowel sounds are normal.      Palpations: Abdomen is soft.   Musculoskeletal:         General: Normal range of motion.   Skin:     General: Skin is warm and dry.   Neurological:      General: No focal deficit present.      Mental Status: He is alert and oriented to person, place, and time.   Psychiatric:         Mood and Affect: Mood normal.         Behavior: Behavior normal.          Results:  Labs reviewed.      Imaging:           === Results for orders placed in visit on 10/02/23 ===    MR brain w and wo IV contrast [[id number]] 10/02/2023    Status: Normal  No abnormal intracranial enhancing mass lesion to suggest brain  metastasis is noted.    There is an 8 mm focus of nonspecific abnormal diminished bone marrow  signal on the T1 images demonstrating a component of enhancement on  the post gadolinium images along the left frontal bone as seen on  axial slice 10 of 27. The lesion is nonspecific and could be benign  or malignant in etiology. Given the patient's clinical history, an  osseous metastasis can not be excluded. Correlation with any previous  outside MRI studies if available would be helpful to assess stability  of this finding. If none are available, a follow-up MRI in 3 months  would be recommended.    There is moderate brain parenchymal volume loss.    There is an area of encephalomalacia/gliosis along the lateral left  frontal lobe.    There are scattered nonspecific white matter changes within the  cerebral hemispheres bilaterally which while nonspecific, given the  patient's age, likely represent sequelae of  more remote small-vessel  ischemic change.    Critical Finding:  See findings. Notification was initiated on  10/2/2023 at 3:10 pm by  Rito Fitzpatrick.  (**-YCF-**)        The study was interpreted at [facility].    Signed by: Rito Fitzpatrick 10/2/2023 3:10 PM  Dictation workstation:   [id number]    Assessment/Plan      Kevin Rubi is a 68 y.o. male here for follow up        Discussed with patient his final stage: IIIC ([id number]) NSCLC of the LLL -   Decision made at  to treated with definitive intent -   Referrals for rad onc provided. Sees Dr. Perrin on 10/29   Consent provided for 2-3 cycles of carboplatin and pemetrexed -    C1 on 10/23  C2 with concurrent RT on 11/13  C3 anticipated on 12/4/23 with concurrent chemo/RT    Not a candidate for CASE 1522 due to other potential synchronous primary lung cancers.  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

## 2023-12-18 ENCOUNTER — ANCILLARY PROCEDURE (OUTPATIENT)
Dept: EMERGENCY MEDICINE | Facility: HOSPITAL | Age: 68
End: 2023-12-18
Payer: MEDICARE

## 2023-12-18 ENCOUNTER — APPOINTMENT (OUTPATIENT)
Dept: RADIOLOGY | Facility: HOSPITAL | Age: 68
End: 2023-12-18
Payer: MEDICARE

## 2023-12-18 ENCOUNTER — HOSPITAL ENCOUNTER (OUTPATIENT)
Dept: RADIATION ONCOLOGY | Facility: HOSPITAL | Age: 68
Setting detail: RADIATION/ONCOLOGY SERIES
Discharge: HOME | End: 2023-12-18
Payer: MEDICARE

## 2023-12-18 ENCOUNTER — HOSPITAL ENCOUNTER (EMERGENCY)
Facility: HOSPITAL | Age: 68
Discharge: HOME | End: 2023-12-18
Attending: STUDENT IN AN ORGANIZED HEALTH CARE EDUCATION/TRAINING PROGRAM
Payer: MEDICARE

## 2023-12-18 ENCOUNTER — RADIATION ONCOLOGY OTV (OUTPATIENT)
Dept: RADIATION ONCOLOGY | Facility: HOSPITAL | Age: 68
End: 2023-12-18

## 2023-12-18 VITALS
SYSTOLIC BLOOD PRESSURE: 70 MMHG | RESPIRATION RATE: 18 BRPM | TEMPERATURE: 96.8 F | OXYGEN SATURATION: 92 % | BODY MASS INDEX: 25.07 KG/M2 | DIASTOLIC BLOOD PRESSURE: 51 MMHG | WEIGHT: 175.1 LBS | HEIGHT: 70 IN | HEART RATE: 100 BPM

## 2023-12-18 VITALS
BODY MASS INDEX: 24.38 KG/M2 | SYSTOLIC BLOOD PRESSURE: 119 MMHG | HEART RATE: 99 BPM | RESPIRATION RATE: 15 BRPM | WEIGHT: 180 LBS | DIASTOLIC BLOOD PRESSURE: 81 MMHG | OXYGEN SATURATION: 100 % | HEIGHT: 72 IN | TEMPERATURE: 98.3 F

## 2023-12-18 DIAGNOSIS — T66.XXXA RADIATION ESOPHAGITIS: Primary | ICD-10-CM

## 2023-12-18 DIAGNOSIS — E83.42 HYPOMAGNESEMIA: ICD-10-CM

## 2023-12-18 DIAGNOSIS — E87.6 HYPOKALEMIA: ICD-10-CM

## 2023-12-18 DIAGNOSIS — K20.80 RADIATION ESOPHAGITIS: Primary | ICD-10-CM

## 2023-12-18 DIAGNOSIS — C34.92: ICD-10-CM

## 2023-12-18 DIAGNOSIS — E86.1 HYPOTENSION DUE TO HYPOVOLEMIA: Primary | ICD-10-CM

## 2023-12-18 DIAGNOSIS — I95.89 HYPOTENSION DUE TO HYPOVOLEMIA: Primary | ICD-10-CM

## 2023-12-18 LAB
ALBUMIN SERPL BCP-MCNC: 4 G/DL (ref 3.4–5)
ALP SERPL-CCNC: 53 U/L (ref 33–136)
ALT SERPL W P-5'-P-CCNC: 16 U/L (ref 10–52)
ANION GAP BLDV CALCULATED.4IONS-SCNC: 13 MMOL/L (ref 10–25)
ANION GAP SERPL CALC-SCNC: 14 MMOL/L (ref 10–20)
AST SERPL W P-5'-P-CCNC: 20 U/L (ref 9–39)
BASE EXCESS BLDV CALC-SCNC: -0.3 MMOL/L (ref -2–3)
BASOPHILS # BLD AUTO: 0.01 X10*3/UL (ref 0–0.1)
BASOPHILS NFR BLD AUTO: 0.3 %
BILIRUB SERPL-MCNC: 0.4 MG/DL (ref 0–1.2)
BODY TEMPERATURE: 37 DEGREES CELSIUS
BUN SERPL-MCNC: 22 MG/DL (ref 6–23)
CA-I BLDV-SCNC: 1.14 MMOL/L (ref 1.1–1.33)
CALCIUM SERPL-MCNC: 9.2 MG/DL (ref 8.6–10.6)
CARDIAC TROPONIN I PNL SERPL HS: 30 NG/L (ref 0–53)
CARDIAC TROPONIN I PNL SERPL HS: 34 NG/L (ref 0–53)
CHLORIDE BLDV-SCNC: 101 MMOL/L (ref 98–107)
CHLORIDE SERPL-SCNC: 101 MMOL/L (ref 98–107)
CO2 SERPL-SCNC: 23 MMOL/L (ref 21–32)
CREAT SERPL-MCNC: 1.11 MG/DL (ref 0.5–1.3)
EOSINOPHIL # BLD AUTO: 0.06 X10*3/UL (ref 0–0.7)
EOSINOPHIL NFR BLD AUTO: 1.8 %
ERYTHROCYTE [DISTWIDTH] IN BLOOD BY AUTOMATED COUNT: 14.7 % (ref 11.5–14.5)
GFR SERPL CREATININE-BSD FRML MDRD: 73 ML/MIN/1.73M*2
GLUCOSE BLDV-MCNC: 142 MG/DL (ref 74–99)
GLUCOSE SERPL-MCNC: 138 MG/DL (ref 74–99)
HCO3 BLDV-SCNC: 24 MMOL/L (ref 22–26)
HCT VFR BLD AUTO: 29.5 % (ref 41–52)
HCT VFR BLD EST: 29 % (ref 41–52)
HGB BLD-MCNC: 9.9 G/DL (ref 13.5–17.5)
HGB BLDV-MCNC: 9.7 G/DL (ref 13.5–17.5)
IMM GRANULOCYTES # BLD AUTO: 0.02 X10*3/UL (ref 0–0.7)
IMM GRANULOCYTES NFR BLD AUTO: 0.6 % (ref 0–0.9)
INHALED O2 CONCENTRATION: 21 %
LACTATE BLDV-SCNC: 1.4 MMOL/L (ref 0.4–2)
LYMPHOCYTES # BLD AUTO: 0.38 X10*3/UL (ref 1.2–4.8)
LYMPHOCYTES NFR BLD AUTO: 11.4 %
MAGNESIUM SERPL-MCNC: 1.44 MG/DL (ref 1.6–2.4)
MCH RBC QN AUTO: 28.8 PG (ref 26–34)
MCHC RBC AUTO-ENTMCNC: 33.6 G/DL (ref 32–36)
MCV RBC AUTO: 86 FL (ref 80–100)
MONOCYTES # BLD AUTO: 0.51 X10*3/UL (ref 0.1–1)
MONOCYTES NFR BLD AUTO: 15.3 %
NEUTROPHILS # BLD AUTO: 2.36 X10*3/UL (ref 1.2–7.7)
NEUTROPHILS NFR BLD AUTO: 70.6 %
NRBC BLD-RTO: 0 /100 WBCS (ref 0–0)
OXYHGB MFR BLDV: 18.2 % (ref 45–75)
PCO2 BLDV: 37 MM HG (ref 41–51)
PH BLDV: 7.42 PH (ref 7.33–7.43)
PLATELET # BLD AUTO: 58 X10*3/UL (ref 150–450)
PO2 BLDV: 17 MM HG (ref 35–45)
POTASSIUM BLDV-SCNC: 3.5 MMOL/L (ref 3.5–5.3)
POTASSIUM SERPL-SCNC: 3.4 MMOL/L (ref 3.5–5.3)
PROT SERPL-MCNC: 7.2 G/DL (ref 6.4–8.2)
RBC # BLD AUTO: 3.44 X10*6/UL (ref 4.5–5.9)
SAO2 % BLDV: 18 % (ref 45–75)
SODIUM BLDV-SCNC: 134 MMOL/L (ref 136–145)
SODIUM SERPL-SCNC: 135 MMOL/L (ref 136–145)
WBC # BLD AUTO: 3.3 X10*3/UL (ref 4.4–11.3)

## 2023-12-18 PROCEDURE — 2500000004 HC RX 250 GENERAL PHARMACY W/ HCPCS (ALT 636 FOR OP/ED): Performed by: STUDENT IN AN ORGANIZED HEALTH CARE EDUCATION/TRAINING PROGRAM

## 2023-12-18 PROCEDURE — 93005 ELECTROCARDIOGRAM TRACING: CPT

## 2023-12-18 PROCEDURE — RXMED WILLOW AMBULATORY MEDICATION CHARGE

## 2023-12-18 PROCEDURE — 99285 EMERGENCY DEPT VISIT HI MDM: CPT | Performed by: STUDENT IN AN ORGANIZED HEALTH CARE EDUCATION/TRAINING PROGRAM

## 2023-12-18 PROCEDURE — 85025 COMPLETE CBC W/AUTO DIFF WBC: CPT | Performed by: STUDENT IN AN ORGANIZED HEALTH CARE EDUCATION/TRAINING PROGRAM

## 2023-12-18 PROCEDURE — 36415 COLL VENOUS BLD VENIPUNCTURE: CPT | Performed by: STUDENT IN AN ORGANIZED HEALTH CARE EDUCATION/TRAINING PROGRAM

## 2023-12-18 PROCEDURE — 84132 ASSAY OF SERUM POTASSIUM: CPT | Performed by: STUDENT IN AN ORGANIZED HEALTH CARE EDUCATION/TRAINING PROGRAM

## 2023-12-18 PROCEDURE — 96365 THER/PROPH/DIAG IV INF INIT: CPT

## 2023-12-18 PROCEDURE — 71045 X-RAY EXAM CHEST 1 VIEW: CPT | Performed by: INTERNAL MEDICINE

## 2023-12-18 PROCEDURE — 99284 EMERGENCY DEPT VISIT MOD MDM: CPT | Performed by: STUDENT IN AN ORGANIZED HEALTH CARE EDUCATION/TRAINING PROGRAM

## 2023-12-18 PROCEDURE — 96361 HYDRATE IV INFUSION ADD-ON: CPT

## 2023-12-18 PROCEDURE — 83735 ASSAY OF MAGNESIUM: CPT | Performed by: STUDENT IN AN ORGANIZED HEALTH CARE EDUCATION/TRAINING PROGRAM

## 2023-12-18 PROCEDURE — 84484 ASSAY OF TROPONIN QUANT: CPT | Performed by: STUDENT IN AN ORGANIZED HEALTH CARE EDUCATION/TRAINING PROGRAM

## 2023-12-18 PROCEDURE — 93010 ELECTROCARDIOGRAM REPORT: CPT | Performed by: STUDENT IN AN ORGANIZED HEALTH CARE EDUCATION/TRAINING PROGRAM

## 2023-12-18 PROCEDURE — 71045 X-RAY EXAM CHEST 1 VIEW: CPT

## 2023-12-18 PROCEDURE — 2500000001 HC RX 250 WO HCPCS SELF ADMINISTERED DRUGS (ALT 637 FOR MEDICARE OP): Performed by: STUDENT IN AN ORGANIZED HEALTH CARE EDUCATION/TRAINING PROGRAM

## 2023-12-18 PROCEDURE — 96366 THER/PROPH/DIAG IV INF ADDON: CPT

## 2023-12-18 RX ORDER — POTASSIUM CHLORIDE 1.5 G/1.58G
40 POWDER, FOR SOLUTION ORAL ONCE
Status: COMPLETED | OUTPATIENT
Start: 2023-12-18 | End: 2023-12-18

## 2023-12-18 RX ORDER — POTASSIUM CHLORIDE 14.9 MG/ML
20 INJECTION INTRAVENOUS ONCE
Status: COMPLETED | OUTPATIENT
Start: 2023-12-18 | End: 2023-12-18

## 2023-12-18 RX ORDER — POTASSIUM CHLORIDE 1.5 G/1.58G
20 POWDER, FOR SOLUTION ORAL ONCE
Status: DISCONTINUED | OUTPATIENT
Start: 2023-12-18 | End: 2023-12-18

## 2023-12-18 RX ORDER — MAGNESIUM SULFATE HEPTAHYDRATE 40 MG/ML
2 INJECTION, SOLUTION INTRAVENOUS ONCE
Status: COMPLETED | OUTPATIENT
Start: 2023-12-18 | End: 2023-12-18

## 2023-12-18 RX ORDER — OXYCODONE HYDROCHLORIDE 5 MG/1
5 TABLET ORAL EVERY 6 HOURS PRN
Qty: 40 TABLET | Refills: 0 | Status: SHIPPED | OUTPATIENT
Start: 2023-12-18 | End: 2023-12-29

## 2023-12-18 RX ADMIN — POTASSIUM CHLORIDE 40 MEQ: 1.5 POWDER, FOR SOLUTION ORAL at 13:25

## 2023-12-18 RX ADMIN — SODIUM CHLORIDE, POTASSIUM CHLORIDE, SODIUM LACTATE AND CALCIUM CHLORIDE 1000 ML: 600; 310; 30; 20 INJECTION, SOLUTION INTRAVENOUS at 11:12

## 2023-12-18 RX ADMIN — MAGNESIUM SULFATE HEPTAHYDRATE 2 G: 40 INJECTION, SOLUTION INTRAVENOUS at 12:22

## 2023-12-18 RX ADMIN — POTASSIUM CHLORIDE 20 MEQ: 14.9 INJECTION, SOLUTION INTRAVENOUS at 13:44

## 2023-12-18 ASSESSMENT — PAIN SCALES - GENERAL
PAINLEVEL: 0-NO PAIN
PAINLEVEL_OUTOF10: 2
PAINLEVEL_OUTOF10: 0 - NO PAIN

## 2023-12-18 ASSESSMENT — LIFESTYLE VARIABLES
EVER FELT BAD OR GUILTY ABOUT YOUR DRINKING: NO
EVER HAD A DRINK FIRST THING IN THE MORNING TO STEADY YOUR NERVES TO GET RID OF A HANGOVER: NO
HAVE YOU EVER FELT YOU SHOULD CUT DOWN ON YOUR DRINKING: NO
REASON UNABLE TO ASSESS: NO
HAVE PEOPLE ANNOYED YOU BY CRITICIZING YOUR DRINKING: NO

## 2023-12-18 ASSESSMENT — PAIN - FUNCTIONAL ASSESSMENT: PAIN_FUNCTIONAL_ASSESSMENT: 0-10

## 2023-12-18 ASSESSMENT — PAIN DESCRIPTION - DESCRIPTORS: DESCRIPTORS: BURNING

## 2023-12-18 ASSESSMENT — PAIN DESCRIPTION - LOCATION: LOCATION: THROAT

## 2023-12-18 NOTE — ED TRIAGE NOTES
Pt has hx of lung ca was at radiation and his bp was low when she stood him up and did another set of vitals bp dropped ot 70/50 pt states he has been feeling more fatigue and weak as of lately

## 2023-12-18 NOTE — DISCHARGE INSTRUCTIONS
You came to the emergency department because you were not feeling well while at the infusion center.  I talked to your radiation oncologist who plans to continue radiation this week as well as the infusions this week.  You do not need another infusion today.  Your electrolytes were off as well as your blood pressure however improved after we repleted your electrolytes and your blood pressure.  If this happens again, we are happy to reevaluate here you here in the emergency room.  Otherwise make sure that you are drinking plenty of fluids and staying well-hydrated.

## 2023-12-18 NOTE — PROGRESS NOTES
"Radiation Oncology On Treatment Visit    Patient Name:  Kevin Rubi  MRN:  [id number]  :  1955    Referring Provider: Sabrina Longoria MD  Primary Care Provider: RUKHSAAN Fried-CNS  Care Team: Patient Care Team:  RUKHSANA Fried-CNS as PCP - General (Internal Medicine)  Wisam Latham MD as PCP - [employer] PCP  Janell Longoria MD as Consulting Physician (Hematology and Oncology)  Madelyn Perrin MD as Radiation Oncologist (Radiation Oncology)    Date of Service: 2023     Diagnosis:   Specialty Problems          Radiation Oncology Problems    Primary cancer of left lower lobe of lung (CMS/HCC)        Malignant neoplasm of bronchus and lung (CMS/HCC)        Primary malignant neoplasm of lung of unknown cell type (CMS/HCC)         Treatment Summary:  Radiation Treatments       Active   Replan Lt Lung/Med (Started on 2023)   Most recent fraction: 200 cGy given on 12/15/2023   Total given: 3,600 cGy / 4,600 cGy  (18 of 23 fractions)   Elapsed Days: 24   Technique: IMRT              Completed   Left lung_Med (Started on 2023)   Most recent fraction: 200 cGy given on 2023   Total given: 1,400 cGy / 1,400 cGy  (7 of 7 fractions)   Elapsed Days: 7   Technique: IMRT                SUBJECTIVE: Increased esophagitis symptoms causing retrosternal discomfort. Was prescribed Oxycodone last week, which he took 5 mg BID with some effect. Also taking Magic Mouthwash.     Was able to eat better this weekend, however, had trouble with the heat in the room at [facility] through the night and felt uncomfortable with BM this morning. Taking Miralax.  Walking to clinic today felt uncomfortable and needed wheelchair.    Denies significant skin changes or breathing difficult.  Last chemotherapy dose was last week.    OBJECTIVE:   Vital Signs:  BP 70/51   Pulse 100   Temp 36 °C (96.8 °F) (Temporal)   Resp 18   Ht 1.778 m (5' 10\")   Wt 79.4 kg (175 lb 1.6 oz)   SpO2 92%   BMI " "25.12 kg/m²    Orthostat vitals 99/65 - 100 sitting; 70/51 - 100 standing   Pain Scale: The patient's current pain level was assessed.  They report currently having a pain of 5 out of 10.    Other Pertinent Findings: Sitting on wheelchair. Feels more fatigued and frail than last week. Otherwise alert, oriented. Breathing well on room air. No wheezing.     Toxicity Assessment          11/20/2023    12:12 11/29/2023    15:23 12/6/2023    14:38 12/11/2023    15:54 12/18/2023    09:34   Toxicity Assessment   Adverse Events Reviewed (WDL) Yes (Within Defined Limits) No (Exceptions to WDL) No (Exceptions to WDL) No (Exceptions to WDL) No (Exceptions to WDL)   Treatment Site Thoracic Thoracic Thoracic Thoracic Thoracic   Anorexia Grade 3       eats a meal a day. and grazes the rest of the day. Grade 1       pt. c/o little appetite; dietician to see pt Grade 1       pt. with decreased appetite Grade 1       pt. with decreased appetite d/t poor taste buds Grade 1       decreased appetite   Anxiety Grade 2       Dehydration Grade 0 Grade 0  Grade 0 Grade 1       BP down   Depression Grade 0       Dermatitis Radiation Grade 0 Grade 0 Grade 0 Grade 0 Grade 0   Diarrhea Grade 0       Fatigue Grade 1       pt states he is tired Grade 1       pt. continues to c/o feeling fatigued Grade 0  Grade 1       pt. tired most of the time   Nausea Grade 0 Grade 0 Grade 0 Grade 0 Grade 0   Pain Grade 1       Pain  in fingertips and toes Grade 0 Grade 0 Grade 0 Grade 0   Treatment Related Secondary Malignancy Grade 0       Tumor Pain Grade 0       Vomiting Grade 0   Grade 0 Grade 0   Pneumonitis Grade 0       Aspiration Grade 0       Hoarseness Grade 0       Laryngeal Edema Grade 0       Esophageal Obstruction  Grade 1       pt. c/o feeling like food is getting stuck in his esophagus; pt. tried Sucralfate but stated it didn't help  Grade 1       c/o \"food getting stuck in throat\"    Esophageal Pain   Grade 1       pt. with intermittent " esophagitis pain; pt. taking BMX as needed Grade 1 Grade 1       pt. still with esophagitis   Cough  Grade 1       mild occasional nonproductive cough Grade 0 Grade 1       mild cough Grade 0   Dyspnea  Grade 0 Grade 0 Grade 0 Grade 0   Hypoxia   Grade 0 Grade 0           Assessment / Plan:  Discussed options of getting IV fluids as out-pt this week vs ED evaluation.  In consultation with Dr. Longoria and her team, plan is to get evaluation through ED for labs/ IV fluids.  He will be running out of his oxycodone prescription so will send a refill. If discharged from ED, suggested increasing oxycodone 5 mg to TID for next two days and then going to four times a day if needed.    Suggested, holding off radiation treatment today to allow additional recovery of esophagitis symptoms.    Will follow up on evaluation results form Ed.    Madelyn Perrin MD, MMM  [facility]  Faculty, [employer]  [email or web address]  Our [place]: “To Heal, To Teach, To Discover.”  RN partner: [email or web address]    Phone (scheduling): [phone]/ [email or web address]  Phone (office): [phone]  Phone (after hours): [phone]

## 2023-12-18 NOTE — PROGRESS NOTES
Patient was signed out to me by Dr. Lawson at approximately 1500. For full history, physical, and prior ED course, please see previous provider note prior to patient handoff. This is an addendum to the record.     HOSPITAL COURSE/MEDICAL DECISION MAKING:  Kevin Rubi is a 68 y.o. male presenting to the ED due to hypotension while at infusion center. The patient was signed out to me pending reevaluation after fluid bolus and electrolyte repletion.  Patient was able to ambulate without any difficulty.  Initially, he was in A-fib with RVR with some hypotension however that has since improved.  Following this, he was stable for discharge home.  Oncology was contacted and they are amenable to this plan.  They will continue with radiation tomorrow and infusions later this week.  Patient is comfortable with this plan.    Final diagnoses:   [I95.89, E86.1] Hypotension due to hypovolemia   [E83.42] Hypomagnesemia   [E87.6] Hypokalemia       DISPOSITION:   Discharge    Shabnam Houser MD   Emergency Medicine Resident, PGY3  [facility]    Procedure  Procedures

## 2023-12-18 NOTE — ED PROVIDER NOTES
"HPI   Chief Complaint   Patient presents with    Hypotension       Patient is a 68-year-old male with past medical history of lung cancer on active radiation here from his radiation clinic for concern of asymptomatic hypotension.  Patient states that if he was not told his blood pressure was low, he would not have known, as he denies any fevers, chills, cough, rhinorrhea, vomiting, diarrhea, hematochezia or melena, abdominal pain, chest pain or shortness of breath.  He states he is \"feeling pretty good\".  His wife notes he has had decreased p.o. intake, and that this continues to be an issue for him.      History provided by:  Patient, medical records and spouse  History limited by:  Unstable vital signs   used: No                        Idalmis Coma Scale Score: 15                  Patient History   Past Medical History:   Diagnosis Date    Aortic aneurysm (CMS/HCC)     Atrial fibrillation (CMS/HCC)     Coronary artery disease     Hyperlipidemia     Hypertension     Lung cancer (CMS/HCC)     Personal history of peptic ulcer disease     History of gastric ulcer    Superior mesenteric artery stenosis (CMS/HCC)     Tinnitus, bilateral 08/14/2016    Tinnitus of both ears     Past Surgical History:   Procedure Laterality Date    CT ABDOMEN PELVIS ANGIOGRAM W AND/OR WO IV CONTRAST  08/08/2023    CT ABDOMEN PELVIS ANGIOGRAM W AND/OR WO IV CONTRAST 8/8/2023 [facility] CT    OTHER SURGICAL HISTORY  02/13/2019    Esophagogastroduodenoscopy    OTHER SURGICAL HISTORY  02/13/2019    Stomach surgery    SUPERIOR MESTENTERIC ARTERY STENT       Family History   Problem Relation Name Age of Onset    Cancer Mother          ?larynx or lung     Social History     Tobacco Use    Smoking status: Former     Packs/day: 0.50     Years: 30.00     Additional pack years: 0.00     Total pack years: 15.00     Types: Cigarettes     Quit date: 2013     Years since quitting: 10.9     Passive exposure: Past    Smokeless tobacco: Never "   Vaping Use    Vaping Use: Never used   Substance Use Topics    Alcohol use: Not Currently    Drug use: Never       Physical Exam   ED Triage Vitals   Temp Heart Rate Resp BP   12/18/23 1045 12/18/23 1045 12/18/23 1045 12/18/23 1045   36.3 °C (97.3 °F) 75 16 84/55      SpO2 Temp Source Heart Rate Source Patient Position   12/18/23 1045 12/18/23 1045 12/18/23 1105 --   98 % Temporal Monitor       BP Location FiO2 (%)     -- --             Physical Exam  Constitutional:       Appearance: Normal appearance.   HENT:      Head: Normocephalic and atraumatic.      Right Ear: External ear normal.      Left Ear: External ear normal.      Nose: No congestion or rhinorrhea.      Mouth/Throat:      Mouth: Mucous membranes are dry.      Pharynx: Oropharynx is clear.   Eyes:      General: No scleral icterus.     Extraocular Movements: Extraocular movements intact.      Conjunctiva/sclera: Conjunctivae normal.      Pupils: Pupils are equal, round, and reactive to light.   Cardiovascular:      Rate and Rhythm: Normal rate and regular rhythm.      Pulses: Normal pulses.      Heart sounds: Normal heart sounds.   Pulmonary:      Effort: Pulmonary effort is normal. No respiratory distress.      Breath sounds: Normal breath sounds.   Abdominal:      Palpations: Abdomen is soft.      Tenderness: There is no abdominal tenderness.   Musculoskeletal:         General: Normal range of motion.      Cervical back: Normal range of motion and neck supple. No rigidity.   Skin:     General: Skin is warm and dry.      Capillary Refill: Capillary refill takes less than 2 seconds.   Neurological:      General: No focal deficit present.      Mental Status: He is alert and oriented to person, place, and time.   Psychiatric:         Mood and Affect: Mood normal.         Behavior: Behavior normal.         ED Course & MDM        Medical Decision Making  Patient is a 68-year-old male here as a critical in the setting of asymptomatic hypotension.  Patient  presents hypotensive in the 80s over 50s, otherwise hemodynamically stable, no acute distress, mentating appropriately, afebrile and saturating well on room air.  Physical exam notable for chronically ill but acutely nontoxic-appearing male, no sequelae of volume overload state, no overt signs of sepsis necessitating empiric antibiotics.  Patient neurologically intact, no recent trauma or signs of trauma to be concern for hemorrhagic shock.  Bedside POCUS disclosed relatively normal EF, collapsible IVC consistent with volume downstate, given a liter of lactated Ringer's with improvement in blood pressure, dehydration is consistent with patient's presentation, as well as clinical history as patient has had decreased p.o. intake over the past few days, this has been an ongoing problem for which his oncologist is working with him.  Rest of his blood work is remarkable for mild hypomagnesemia and hypokalemia which were repleted in the emergency department, his white blood cell count was baseline for him, and his lactate was within normal limits further going against his shock state of an acute origin.  Patient does have a known history of atrial fibrillation, is compliant with his home anticoagulation without overt signs of GI bleeding on exam, his hemoglobin is also at his baseline.  I am not suspecting unstable atrial fibrillation given his degree of mentation, improvement with fluids, and rate controlled state.  Exam not consistent with pulmonary embolism, aortic dissection, chest x-ray negative for pneumonia.  Ambulated at his baseline, felt comfortable with going home, and wished to be discharged with return precautions and follow-up.    Amount and/or Complexity of Data Reviewed  External Data Reviewed: notes.  Labs: ordered.  Radiology: ordered and independent interpretation performed.  ECG/medicine tests: ordered and independent interpretation performed.     Details: Atrial fibrillation with RVR, no ST changes,  normal axis        Procedure  Procedures     Christiano Do MD  Resident  12/18/23 0448

## 2023-12-19 ENCOUNTER — PHARMACY VISIT (OUTPATIENT)
Dept: PHARMACY | Facility: CLINIC | Age: 68
End: 2023-12-19
Payer: COMMERCIAL

## 2023-12-19 ENCOUNTER — HOSPITAL ENCOUNTER (OUTPATIENT)
Dept: RADIATION ONCOLOGY | Facility: HOSPITAL | Age: 68
Setting detail: RADIATION/ONCOLOGY SERIES
Discharge: HOME | End: 2023-12-19
Payer: MEDICARE

## 2023-12-19 ENCOUNTER — NUTRITION (OUTPATIENT)
Dept: HEMATOLOGY/ONCOLOGY | Facility: HOSPITAL | Age: 68
End: 2023-12-19

## 2023-12-19 DIAGNOSIS — Z51.0 ENCOUNTER FOR ANTINEOPLASTIC RADIATION THERAPY: ICD-10-CM

## 2023-12-19 DIAGNOSIS — C34.32 MALIGNANT NEOPLASM OF LOWER LOBE, LEFT BRONCHUS OR LUNG (MULTI): ICD-10-CM

## 2023-12-19 LAB
Q ONSET: 229 MS
QRS COUNT: 18 BEATS
QRS DURATION: 74 MS
QT INTERVAL: 318 MS
QTC CALCULATION(BAZETT): 428 MS
QTC FREDERICIA: 387 MS
R AXIS: 68 DEGREES
RAD ONC MSQ ACTUAL FRACTIONS DELIVERED: 19
RAD ONC MSQ ACTUAL SESSION DELIVERED DOSE: 200 CGRAY
RAD ONC MSQ ACTUAL TOTAL DOSE: 3800 CGRAY
RAD ONC MSQ ELAPSED DAYS: 28
RAD ONC MSQ LAST DATE: NORMAL
RAD ONC MSQ PRESCRIBED FRACTIONAL DOSE: 200 CGRAY
RAD ONC MSQ PRESCRIBED NUMBER OF FRACTIONS: 23
RAD ONC MSQ PRESCRIBED TECHNIQUE: NORMAL
RAD ONC MSQ PRESCRIBED TOTAL DOSE: 4600 CGRAY
RAD ONC MSQ PRESCRIPTION PATTERN COMMENT: NORMAL
RAD ONC MSQ START DATE: NORMAL
RAD ONC MSQ TREATMENT COURSE NUMBER: 1
RAD ONC MSQ TREATMENT SITE: NORMAL
T AXIS: 13 DEGREES
T OFFSET: 388 MS
VENTRICULAR RATE: 109 BPM

## 2023-12-19 PROCEDURE — 77014 CHG CT GUIDANCE RADIATION THERAPY FLDS PLACEMENT: CPT | Performed by: RADIOLOGY

## 2023-12-19 PROCEDURE — 77386 HC INTENSITY-MODULATED RADIATION THERAPY (IMRT), COMPLEX: CPT | Performed by: RADIOLOGY

## 2023-12-19 PROCEDURE — RXOTC WILLOW AMBULATORY OTC CHARGE

## 2023-12-19 NOTE — PROGRESS NOTES
"NUTRITION Follow-up NOTE    Nutrition Assessment     Reason for Visit:  Kevin Rubi is a 68 y.o. male with NSCLC of the LLL who presents for nutrition follow up.     Finishes treatment next Tuesday (12/26). Missed yesterday as had to go to ED for change in blood pressure. Will continue to monitor weight and PO intake.       Lab Results   Component Value Date/Time    GLUCOSE 138 (H) 12/18/2023 1112     (L) 12/18/2023 1112    K 3.4 (L) 12/18/2023 1112     12/18/2023 1112    CO2 23 12/18/2023 1112    ANIONGAP 14 12/18/2023 1112    BUN 22 12/18/2023 1112    CREATININE 1.11 12/18/2023 1112    EGFR 73 12/18/2023 1112    CALCIUM 9.2 12/18/2023 1112    ALBUMIN 4.0 12/18/2023 1112    ALKPHOS 53 12/18/2023 1112    PROT 7.2 12/18/2023 1112    AST 20 12/18/2023 1112    BILITOT 0.4 12/18/2023 1112    ALT 16 12/18/2023 1112     No results found for: \"VITD25\"    Anthropometrics:  Anthropometrics  IBW/kg (Dietitian Calculated): 80.9 kg  Percent of IBW: 100.01 %  Weight Change  Weight History / % Weight Change: No further weight loss from 12/14 to 12/18. 6% weight loss in 1 month (since 11/20).  Significant Weight Loss: Yes  Interpretation of Weight Loss: >5% in 1 month        Wt Readings from Last 10 Encounters:   12/18/23 81.6 kg (180 lb)   12/18/23 79.4 kg (175 lb 1.6 oz)   12/14/23 79.6 kg (175 lb 6.4 oz)   12/11/23 81 kg (178 lb 9.2 oz)   12/07/23 82.5 kg (181 lb 14.1 oz)   12/06/23 83.8 kg (184 lb 11.9 oz)   12/04/23 83.3 kg (183 lb 10.3 oz)   11/30/23 83.5 kg (184 lb 1.4 oz)   11/29/23 83.8 kg (184 lb 11.9 oz)   11/20/23 84 kg (185 lb 3 oz)        Food And Nutrient Intake:  Food and Nutrient History  Food and Nutrient History: Provided patient with two more [facility]. Patient did not enjoy the vanilla flavor. Recommended using chocolate sauce and putting in a  with milk to thin it out. Overall PO intake still remains poor but will continue to work on it.  Energy Intake: Poor < 50 %  Fluid Intake: " Drinking water often.  Food Allergy: NKFA.  GI Symptoms: early satiety  Oral Problems: esophagitis, dysphagia (States it feels like sandpaper; sporadic pain. Experiences swallowing difficult no  matter pain level.)     Food Preparation  Cooking: Spouse/Significant Other  Grocery Shopping: Spouse/Significant Other    Food Supplement Intake  Oral Nutrition Supplements: Ensure Plus, Boost Very High Calorie    Nutrition Focused Physical Exam Findings:  defer: Spoke in radiation waiting room                        Energy Needs  Estimated Energy Needs  Total Energy Estimated Needs (kCal): 2400 kCal  Method for Estimating Needs: 30 kcal/kg IBW  Estimated Fluid Needs  Total Fluid Estimated Needs (mL): 2400 mL  Method for Estimating Needs: 1 ml/kcal or per team  Estimated Protein Needs  Total Protein Estimated Needs (g): 80 g  Method for Estimating Needs: 1 g/kg IBW        Nutrition Diagnosis   Malnutrition Diagnosis  Patient has Malnutrition Diagnosis: Yes  Diagnosis Status: Ongoing  Malnutrition Diagnosis: Severe malnutrition related to acute disease or injury  As Evidenced by: 6% weight loss in 1 month and less than 50% intake of estimated energy requirement for greater than 5 days.  Additional Assessment Information: Related to esophagitis causing dysphagia from radiation treatment    Nutrition Diagnosis  Patient has Nutrition Diagnosis: Yes  Diagnosis Status (1): Ongoing  Nutrition Diagnosis 1: Swallowing difficulity  Related to (1): esophagitis from radiation treatment  As Evidenced by (1): reports from patient and unintended weight loss.    Nutrition Interventions/Recommendations   Nutrition Prescription       Food and Nutrition Delivery  Food and Nutrition Delivery  Meals & Snacks: Energy-modified diet, Texture-Modified Diet, Modify schedule of foods/fluids  Goals: High calorie soft foods, eat every two hours  Medical Food Supplement: Boost Very High Calorie, Boost Plus  Goals: Boost very high calorie 2x/day OR Boost  Plus 3x/day    Nutrition Education       Coordination of Care       There are no Patient Instructions on file for this visit.    Nutrition Monitoring and Evaluation   Food/Nutrient Related History Monitoring  Monitoring and Evaluation Plan: Energy intake, Meal/snack pattern  Energy Intake: Estimated energy intake  Meal/Snack Pattern: Estimated meal and snack pattern  Nutrition Focused Physical Findings  Monitoring and Evaluation Plan: Digestive System  Digestive System:  (Swallowing)        Time Spent  Prep time on day of patient encounter: 10 minutes  Time spent directly with patient, family or caregiver: 10 minutes  Additional Time Spent on Patient Care Activities: 5 minutes  Documentation Time: 10 minutes  Other Time Spent: 0 minutes  Total: 35 minutes        Readiness to Change : Good  Level of Understanding : Good  Anticipated Compliant : Good

## 2023-12-20 ENCOUNTER — HOSPITAL ENCOUNTER (OUTPATIENT)
Dept: RADIATION ONCOLOGY | Facility: HOSPITAL | Age: 68
Setting detail: RADIATION/ONCOLOGY SERIES
Discharge: HOME | End: 2023-12-20
Payer: MEDICARE

## 2023-12-20 DIAGNOSIS — C34.32 MALIGNANT NEOPLASM OF LOWER LOBE, LEFT BRONCHUS OR LUNG (MULTI): ICD-10-CM

## 2023-12-20 DIAGNOSIS — Z51.0 ENCOUNTER FOR ANTINEOPLASTIC RADIATION THERAPY: ICD-10-CM

## 2023-12-20 LAB
RAD ONC MSQ ACTUAL FRACTIONS DELIVERED: 20
RAD ONC MSQ ACTUAL SESSION DELIVERED DOSE: 200 CGRAY
RAD ONC MSQ ACTUAL TOTAL DOSE: 4000 CGRAY
RAD ONC MSQ ELAPSED DAYS: 29
RAD ONC MSQ LAST DATE: NORMAL
RAD ONC MSQ PRESCRIBED FRACTIONAL DOSE: 200 CGRAY
RAD ONC MSQ PRESCRIBED NUMBER OF FRACTIONS: 23
RAD ONC MSQ PRESCRIBED TECHNIQUE: NORMAL
RAD ONC MSQ PRESCRIBED TOTAL DOSE: 4600 CGRAY
RAD ONC MSQ PRESCRIPTION PATTERN COMMENT: NORMAL
RAD ONC MSQ START DATE: NORMAL
RAD ONC MSQ TREATMENT COURSE NUMBER: 1
RAD ONC MSQ TREATMENT SITE: NORMAL

## 2023-12-20 PROCEDURE — 77336 RADIATION PHYSICS CONSULT: CPT | Performed by: RADIOLOGY

## 2023-12-20 PROCEDURE — 77014 CHG CT GUIDANCE RADIATION THERAPY FLDS PLACEMENT: CPT | Performed by: RADIOLOGY

## 2023-12-20 PROCEDURE — 77386 HC INTENSITY-MODULATED RADIATION THERAPY (IMRT), COMPLEX: CPT | Performed by: RADIOLOGY

## 2023-12-21 ENCOUNTER — OFFICE VISIT (OUTPATIENT)
Dept: HEMATOLOGY/ONCOLOGY | Facility: HOSPITAL | Age: 68
End: 2023-12-21
Payer: MEDICARE

## 2023-12-21 ENCOUNTER — HOSPITAL ENCOUNTER (OUTPATIENT)
Dept: RADIATION ONCOLOGY | Facility: HOSPITAL | Age: 68
Setting detail: RADIATION/ONCOLOGY SERIES
Discharge: HOME | End: 2023-12-21
Payer: MEDICARE

## 2023-12-21 ENCOUNTER — INFUSION (OUTPATIENT)
Dept: HEMATOLOGY/ONCOLOGY | Facility: HOSPITAL | Age: 68
End: 2023-12-21
Payer: MEDICARE

## 2023-12-21 ENCOUNTER — APPOINTMENT (OUTPATIENT)
Dept: HEMATOLOGY/ONCOLOGY | Facility: HOSPITAL | Age: 68
End: 2023-12-21
Payer: MEDICARE

## 2023-12-21 ENCOUNTER — RADIATION ONCOLOGY OTV (OUTPATIENT)
Dept: RADIATION ONCOLOGY | Facility: HOSPITAL | Age: 68
End: 2023-12-21

## 2023-12-21 VITALS
DIASTOLIC BLOOD PRESSURE: 56 MMHG | WEIGHT: 192.02 LBS | SYSTOLIC BLOOD PRESSURE: 116 MMHG | HEART RATE: 89 BPM | TEMPERATURE: 97 F | RESPIRATION RATE: 18 BRPM | BODY MASS INDEX: 27.09 KG/M2 | OXYGEN SATURATION: 100 %

## 2023-12-21 VITALS
SYSTOLIC BLOOD PRESSURE: 92 MMHG | BODY MASS INDEX: 25.2 KG/M2 | RESPIRATION RATE: 18 BRPM | DIASTOLIC BLOOD PRESSURE: 42 MMHG | TEMPERATURE: 96.6 F | HEART RATE: 88 BPM | WEIGHT: 180 LBS | OXYGEN SATURATION: 100 % | HEIGHT: 71 IN

## 2023-12-21 VITALS
WEIGHT: 179.9 LBS | SYSTOLIC BLOOD PRESSURE: 108 MMHG | DIASTOLIC BLOOD PRESSURE: 56 MMHG | RESPIRATION RATE: 18 BRPM | HEART RATE: 58 BPM | BODY MASS INDEX: 25.38 KG/M2 | OXYGEN SATURATION: 98 % | TEMPERATURE: 97.2 F

## 2023-12-21 DIAGNOSIS — C34.32 MALIGNANT NEOPLASM OF LOWER LOBE, LEFT BRONCHUS OR LUNG (MULTI): ICD-10-CM

## 2023-12-21 DIAGNOSIS — Z51.11 ENCOUNTER FOR CHEMOTHERAPY MANAGEMENT: ICD-10-CM

## 2023-12-21 DIAGNOSIS — C34.32 PRIMARY CANCER OF LEFT LOWER LOBE OF LUNG (MULTI): ICD-10-CM

## 2023-12-21 DIAGNOSIS — C34.90 MALIGNANT NEOPLASM OF BRONCHUS AND LUNG (MULTI): Primary | ICD-10-CM

## 2023-12-21 DIAGNOSIS — Z51.0 ENCOUNTER FOR ANTINEOPLASTIC RADIATION THERAPY: ICD-10-CM

## 2023-12-21 LAB
RAD ONC MSQ ACTUAL FRACTIONS DELIVERED: 21
RAD ONC MSQ ACTUAL SESSION DELIVERED DOSE: 200 CGRAY
RAD ONC MSQ ACTUAL TOTAL DOSE: 4200 CGRAY
RAD ONC MSQ ELAPSED DAYS: 30
RAD ONC MSQ LAST DATE: NORMAL
RAD ONC MSQ PRESCRIBED FRACTIONAL DOSE: 200 CGRAY
RAD ONC MSQ PRESCRIBED NUMBER OF FRACTIONS: 23
RAD ONC MSQ PRESCRIBED TECHNIQUE: NORMAL
RAD ONC MSQ PRESCRIBED TOTAL DOSE: 4600 CGRAY
RAD ONC MSQ PRESCRIPTION PATTERN COMMENT: NORMAL
RAD ONC MSQ START DATE: NORMAL
RAD ONC MSQ TREATMENT COURSE NUMBER: 1
RAD ONC MSQ TREATMENT SITE: NORMAL

## 2023-12-21 PROCEDURE — 96360 HYDRATION IV INFUSION INIT: CPT | Mod: INF

## 2023-12-21 PROCEDURE — 99214 OFFICE O/P EST MOD 30 MIN: CPT | Performed by: NURSE PRACTITIONER

## 2023-12-21 PROCEDURE — 99214 OFFICE O/P EST MOD 30 MIN: CPT | Mod: 25 | Performed by: NURSE PRACTITIONER

## 2023-12-21 PROCEDURE — 1160F RVW MEDS BY RX/DR IN RCRD: CPT | Performed by: NURSE PRACTITIONER

## 2023-12-21 PROCEDURE — 1126F AMNT PAIN NOTED NONE PRSNT: CPT | Performed by: NURSE PRACTITIONER

## 2023-12-21 PROCEDURE — 77427 RADIATION TX MANAGEMENT X5: CPT | Performed by: RADIOLOGY

## 2023-12-21 PROCEDURE — 3074F SYST BP LT 130 MM HG: CPT | Performed by: NURSE PRACTITIONER

## 2023-12-21 PROCEDURE — 77386 HC INTENSITY-MODULATED RADIATION THERAPY (IMRT), COMPLEX: CPT | Performed by: RADIOLOGY

## 2023-12-21 PROCEDURE — 77014 CHG CT GUIDANCE RADIATION THERAPY FLDS PLACEMENT: CPT | Performed by: RADIOLOGY

## 2023-12-21 PROCEDURE — 1036F TOBACCO NON-USER: CPT | Performed by: NURSE PRACTITIONER

## 2023-12-21 PROCEDURE — 1159F MED LIST DOCD IN RCRD: CPT | Performed by: NURSE PRACTITIONER

## 2023-12-21 PROCEDURE — 2500000004 HC RX 250 GENERAL PHARMACY W/ HCPCS (ALT 636 FOR OP/ED): Performed by: NURSE PRACTITIONER

## 2023-12-21 PROCEDURE — 3078F DIAST BP <80 MM HG: CPT | Performed by: NURSE PRACTITIONER

## 2023-12-21 RX ORDER — HEPARIN 100 UNIT/ML
500 SYRINGE INTRAVENOUS AS NEEDED
Status: CANCELLED | OUTPATIENT
Start: 2023-12-21

## 2023-12-21 RX ORDER — ALBUTEROL SULFATE 0.83 MG/ML
3 SOLUTION RESPIRATORY (INHALATION) AS NEEDED
OUTPATIENT
Start: 2023-12-28

## 2023-12-21 RX ORDER — DIPHENHYDRAMINE HYDROCHLORIDE 50 MG/ML
50 INJECTION INTRAMUSCULAR; INTRAVENOUS AS NEEDED
OUTPATIENT
Start: 2023-12-28

## 2023-12-21 RX ORDER — HEPARIN SODIUM,PORCINE/PF 10 UNIT/ML
50 SYRINGE (ML) INTRAVENOUS AS NEEDED
Status: CANCELLED | OUTPATIENT
Start: 2023-12-21

## 2023-12-21 RX ORDER — EPINEPHRINE 0.3 MG/.3ML
0.3 INJECTION SUBCUTANEOUS EVERY 5 MIN PRN
OUTPATIENT
Start: 2023-12-28

## 2023-12-21 RX ORDER — FAMOTIDINE 10 MG/ML
20 INJECTION INTRAVENOUS ONCE AS NEEDED
OUTPATIENT
Start: 2023-12-28

## 2023-12-21 RX ADMIN — SODIUM CHLORIDE 1000 ML: 9 INJECTION, SOLUTION INTRAVENOUS at 15:41

## 2023-12-21 ASSESSMENT — PAIN SCALES - GENERAL
PAINLEVEL: 6
PAINLEVEL: 0-NO PAIN
PAINLEVEL: 0-NO PAIN

## 2023-12-21 ASSESSMENT — ENCOUNTER SYMPTOMS
LOSS OF SENSATION IN FEET: 0
OCCASIONAL FEELINGS OF UNSTEADINESS: 0
DEPRESSION: 0

## 2023-12-21 NOTE — PROGRESS NOTES
Pt seen by provider earlier in the day and was having hypotension. Pt arrived ambulatory to infusion for treatment of hydration.  Denies any new or worsening symptoms. Assessment unchanged from appointment. Tolerated infusion without issue. Discharged in stable condition. Dr Martins messaged and updated with post bolus vital signs.

## 2023-12-21 NOTE — PROGRESS NOTES
Patient ID: Kevin Rubi is a 68 y.o. male    Primary Care Provider: Chen Olea, APRN-CNS    DIAGNOSIS AND STAGING   cT3pN3 NSCLC (adenocarcinoma, TTF1+) of the LLL - dx on 09/15/23   Primary tumor measures 3.5 cm (+satellite nodule)   2R+ for adenocarcinoma cells     SITES OF DISEASE   LLL   Mediastinal nodes (including contralateral)    Has a couple of other spiculated nodules (0.8 cm WILDA and 0.7 cm RUL) that are potential  synchronous primaries and will be addressed when needed      MOLECULAR GENOMICS   KRAS G12D mutation    PD-L1 TPS 95%     PRIOR THERAPIES   None     CURRENT THERAPY  Carboplatin/pemetrexed with concurrent RT    C1 10/23, C2 11/13, C3 12/4  RT goal of 30 fractions, 60 Gy     CURRENT ONCOLOGICAL PROBLEMS   Fatigue     HISTORY OF PRESENT ILLNESS   Former smoker, (quit in 2000), who while undergoing w/u for abdominal pain related to SMA occlusion, was found to have a LLL nodule -   Based on his records he was noted to have abnormal imaging back in 2021, was seen by pulmonary and was told to complete w/u, including PET scan and potential biopsy but did not follow recommendations.    A CT chest on 09/12/23 showed a 3.5 cm spiculated LLL nodule abutting the pericardium. There was also a satellite nodule suspicious for disease and a couple of other spiculated nodules in the WILDA and contralateral right lung.4R node measured 1.4 cm and level 7 2.4 cm.   PET scan on 08/04/23 showed no findings concerning for extra-thoracic disease. LLL nodule was FDG avid as well as multiple mediastinal nodes (including contralateral nodes). The WILDA nodule has mild hypermetabolic activity.  An EBUS procedure on 09/15/23 demonstrated 2R to be involved with adenocarcinoma cells, TTF1+. KRAS G12D +, TPS 95%.  The pt sees medical oncology on 09/27/23 -   States he is feeling well and denies any systemic sx related to this malignancy - including fatigue and weight loss. The abdominal pain resolved after vascular  procedure/stenting performed and he is now on Xarelto and Plavix. Denies any h/a or cough. Denies dyspnea.     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  HFpEF - EF 60%   Atrial fibrillation - on Eliquis  Ascending aortic aneurysm   PUD (gastric)  HLD       SOCIAL HISTORY  + Tobacco - quit in  - mostly 1/2 ppd starting at age 22  Occasional ETOH intake  Worked as a  for 35 years    for 43 years and has 2 children - son who is 39 yo and daughter who is 35  He has just retired      FAMILY HISTORY  Mother seems to have had H/N or lung cancer - unclear   Father may also have had cancer   1 sister  from complications of GSW  1 brother  of CA (?liver CA)    CURRENT MEDS REVIEWED       ALLERGIES REVIEWED   NKDA     SUBJECTIVE:  Here with his wife for follow-up  Had recent ED visit/discharge for hypotension, treated with IVF, and potassium and magnesium repleted  He is feeling well today, discussed IVF again today for mild hypotension, he is agreeable, will plan for IVF again next week with follow-up and agreeable  He states he has been eating/drinking better  Denies any GI symptoms  No fevers, chills, or cold symptoms  No other concerns or complaints      A 13 point review of systems was performed, with significant findings documented above in subjective history.    OBJECTIVE:  There were no vitals filed for this visit.     There is no height or weight on file to calculate BSA.     Wt Readings from Last 5 Encounters:   23 81.6 kg (180 lb)   23 79.4 kg (175 lb 1.6 oz)   23 79.6 kg (175 lb 6.4 oz)   23 81 kg (178 lb 9.2 oz)   23 82.5 kg (181 lb 14.1 oz)       ECOGSCORE: 1- Restricted in physically strenuous activity.  Carries out light duty.      Diagnostic Results    Results:  Labs:  Lab Results   Component Value Date    WBC 3.3 (L) 12/18/2023    HGB 9.9 (L) 12/18/2023    HCT 29.5 (L) 12/18/2023    MCV 86 12/18/2023    PLT 58 (L) 12/18/2023      Lab Results   Component Value Date    NEUTROABS 2.36 12/18/2023        Lab Results   Component Value Date    GLUCOSE 138 (H) 12/18/2023    CALCIUM 9.2 12/18/2023     (L) 12/18/2023    K 3.4 (L) 12/18/2023    CO2 23 12/18/2023     12/18/2023    BUN 22 12/18/2023    CREATININE 1.11 12/18/2023    MG 1.44 (L) 12/18/2023     Lab Results   Component Value Date    ALT 16 12/18/2023    AST 20 12/18/2023    ALKPHOS 53 12/18/2023    BILITOT 0.4 12/18/2023      Lab Results   Component Value Date    TSH 2.66 10/23/2023         No images are attached to the encounter or orders placed in the encounter.     Assessment/Plan     Primary cancer of left lower lobe of lung (CMS/HCC), Clinical: Stage IIIC (cT3, cN3, cM0)    67yo M with PMH of afib, HFpEF (60%), SMA occlusion s/p stenting of celiac artery (8/10/23) on eliquis and plavix and NSCLC stage IIIC ([id number]) initiated on definitive chemoradiation 10/23/23. Completed 3 cycles of chemo, now still with two weeks left of RT. Pt eager to be done and home with family for holidays, tolerating treatment with minimal AE's, wt has been relatively stable, will plan for repeat imaging 2wks post completion of RT and then if disease is stable or improved, continue with durvalumab consolidation per PACIFIC Trial. Follows with Onco-psych.    #stage: IIIC ([id number]) NSCLC of the LLL -   -Decision made at  to treated with definitive intent  -Sees Dr. Aydin Reed, undergoing RT goal of 30 fractions, 60 Gy, tentative end date 12/22  -carboplatin and pemetrexed - C1 10/23, C2 11/13, C3 12/4   -plan for repeat CT 2 weeks after completing RT  -Following with onco-psych (SHANELLE Brown)   - IVF today and will repeat next week with follow-up, he is agreeable      #SMA occlusion s/p stenting of celiac  artery  08/10/23:  On eliquis and plavix     #Atrial fibrillation, HFpEF 60%  On digoxin, metoprolol, spironolactone, enestro     #Hyperlipidemia:  On Crestor      Radha Martins, RUKHSANA-CNP

## 2023-12-22 ENCOUNTER — HOSPITAL ENCOUNTER (OUTPATIENT)
Dept: RADIATION ONCOLOGY | Facility: HOSPITAL | Age: 68
Setting detail: RADIATION/ONCOLOGY SERIES
Discharge: HOME | End: 2023-12-22
Payer: MEDICARE

## 2023-12-22 DIAGNOSIS — C34.32 MALIGNANT NEOPLASM OF LOWER LOBE, LEFT BRONCHUS OR LUNG (MULTI): ICD-10-CM

## 2023-12-22 DIAGNOSIS — Z51.0 ENCOUNTER FOR ANTINEOPLASTIC RADIATION THERAPY: ICD-10-CM

## 2023-12-22 LAB
RAD ONC MSQ ACTUAL FRACTIONS DELIVERED: 22
RAD ONC MSQ ACTUAL SESSION DELIVERED DOSE: 200 CGRAY
RAD ONC MSQ ACTUAL TOTAL DOSE: 4400 CGRAY
RAD ONC MSQ ELAPSED DAYS: 31
RAD ONC MSQ LAST DATE: NORMAL
RAD ONC MSQ PRESCRIBED FRACTIONAL DOSE: 200 CGRAY
RAD ONC MSQ PRESCRIBED NUMBER OF FRACTIONS: 23
RAD ONC MSQ PRESCRIBED TECHNIQUE: NORMAL
RAD ONC MSQ PRESCRIBED TOTAL DOSE: 4600 CGRAY
RAD ONC MSQ PRESCRIPTION PATTERN COMMENT: NORMAL
RAD ONC MSQ START DATE: NORMAL
RAD ONC MSQ TREATMENT COURSE NUMBER: 1
RAD ONC MSQ TREATMENT SITE: NORMAL

## 2023-12-22 PROCEDURE — 77386 HC INTENSITY-MODULATED RADIATION THERAPY (IMRT), COMPLEX: CPT | Performed by: RADIOLOGY

## 2023-12-22 PROCEDURE — 77014 CHG CT GUIDANCE RADIATION THERAPY FLDS PLACEMENT: CPT | Performed by: RADIOLOGY

## 2023-12-22 NOTE — PROGRESS NOTES
Radiation Oncology On Treatment Visit    Patient Name:  Kevin Rubi  MRN:  [id number]  :  1955    Referring Provider: Sabrina Longoria MD  Primary Care Provider: RUKHSANA Fried-CNS  Care Team: Patient Care Team:  RUKHSANA Fried-CNS as PCP - General (Internal Medicine)  Wisam Latham MD as PCP - [employer] PCP  Janell Longoria MD as Consulting Physician (Hematology and Oncology)  Madelyn Perrin MD as Radiation Oncologist (Radiation Oncology)    Date of Service: 2023     Diagnosis:   Specialty Problems          Radiation Oncology Problems    Primary cancer of left lower lobe of lung (CMS/HCC)        Malignant neoplasm of bronchus and lung (CMS/HCC)        Primary malignant neoplasm of lung of unknown cell type (CMS/HCC)         Treatment Summary:  Radiation Treatments       Active   Replan Lt Lung/Med (Started on 2023)   Most recent fraction: 200 cGy given on 2023   Total given: 4,200 cGy / 4,600 cGy  (21 of 23 fractions)   Elapsed Days: 30   Technique: IMRT           Completed   Left lung_Med (Started on 2023)   Most recent fraction: 200 cGy given on 2023   Total given: 1,400 cGy / 1,400 cGy  (7 of 7 fractions)   Elapsed Days: 7   Technique: IMRT                   SUBJECTIVE: Patient presents with his wife. He had been evaluated in emergency room on  with treatment holiday on that day due to hypotension, found to have hypomagnesemia and hypokalemia and given magnesium and potassium repletion 1L lactated ringer's bolus. Endorses better PO intake with use of magic mouthwash.     OBJECTIVE:   Vital Signs:  /56   Pulse 58   Temp 36.2 °C (97.2 °F)   Resp 18   Wt 81.6 kg (179 lb 14.3 oz)   SpO2 98%   BMI 25.38 kg/m²    Pain Scale: The patient's current pain level was assessed.  They report currently having a pain of 0 out of 10.    Other Pertinent Findings: Appears better than last week. Felt improved from IV hydration. No wheezing.  "Alert, oriented. Breathing well on room air.      Toxicity Assessment          11/29/2023    15:23 12/6/2023    14:38 12/11/2023    15:54 12/18/2023    09:34 12/21/2023    17:11   Toxicity Assessment   Adverse Events Reviewed (WDL) No (Exceptions to WDL) No (Exceptions to WDL) No (Exceptions to WDL) No (Exceptions to WDL) No (Exceptions to WDL)   Treatment Site Thoracic Thoracic Thoracic Thoracic Thoracic   Anorexia Grade 1       pt. c/o little appetite; dietician to see pt Grade 1       pt. with decreased appetite Grade 1       pt. with decreased appetite d/t poor taste buds Grade 1       decreased appetite Grade 2   Dehydration Grade 0  Grade 0 Grade 1       BP down Grade 1   Dermatitis Radiation Grade 0 Grade 0 Grade 0 Grade 0 Grade 0   Fatigue Grade 1       pt. continues to c/o feeling fatigued Grade 0  Grade 1       pt. tired most of the time Grade 1   Nausea Grade 0 Grade 0 Grade 0 Grade 0 Grade 0   Pain Grade 0 Grade 0 Grade 0 Grade 0 Grade 0   Vomiting   Grade 0 Grade 0    Esophageal Obstruction Grade 1       pt. c/o feeling like food is getting stuck in his esophagus; pt. tried Sucralfate but stated it didn't help  Grade 1       c/o \"food getting stuck in throat\"  Grade 1   Esophageal Pain  Grade 1       pt. with intermittent esophagitis pain; pt. taking BMX as needed Grade 1 Grade 1       pt. still with esophagitis Grade 1   Cough Grade 1       mild occasional nonproductive cough Grade 0 Grade 1       mild cough Grade 0 Grade 0   Dyspnea Grade 0 Grade 0 Grade 0 Grade 0 Grade 0   Hypoxia  Grade 0 Grade 0  Grade 0        Assessment / Plan:  The patient is tolerating radiation therapy as anticipated. Patient to receive 1L normal saline at medical oncology acute care clinic today. Continue per current treatment plan.       The patient was assessed and plan discussed with the attending radiation oncologist Dr. Perrin.    Shanda Tong MD  PGY-2 Radiation Oncology Resident  On-call pager [phone]  Available on Epic " Secure Chat    For  Madelyn Perrin MD, MMM  [facility]  Faculty, [employer]  [email or web address]  [place]: “To Heal, To Teach, To Discover.”  RN partner: [email or web address]    Phone (scheduling): [phone]/ [email or web address]  Phone (office): [phone]  Phone (after hours): [phone]      ATTENDING ADDENDUM:  I saw and evaluated the patient with the resident. I personally obtained the key and critical portions of the history and physical exam, reviewed IGRT/dosimetry and directly counseled the patient of the treatment plan. I reviewed the resident documentation and discussed the patient with the resident. I agree with the resident/fellow's medical decision making as documented in the note.

## 2023-12-26 ENCOUNTER — APPOINTMENT (OUTPATIENT)
Dept: RADIATION ONCOLOGY | Facility: HOSPITAL | Age: 68
End: 2023-12-26
Payer: MEDICARE

## 2023-12-26 ENCOUNTER — HOSPITAL ENCOUNTER (OUTPATIENT)
Dept: RADIATION ONCOLOGY | Facility: HOSPITAL | Age: 68
Setting detail: RADIATION/ONCOLOGY SERIES
Discharge: HOME | End: 2023-12-26
Payer: MEDICARE

## 2023-12-26 ENCOUNTER — OFFICE VISIT (OUTPATIENT)
Dept: HEMATOLOGY/ONCOLOGY | Facility: HOSPITAL | Age: 68
End: 2023-12-26
Payer: MEDICARE

## 2023-12-26 ENCOUNTER — DOCUMENTATION (OUTPATIENT)
Dept: RADIATION ONCOLOGY | Facility: HOSPITAL | Age: 68
End: 2023-12-26

## 2023-12-26 VITALS
BODY MASS INDEX: 24.55 KG/M2 | RESPIRATION RATE: 18 BRPM | OXYGEN SATURATION: 100 % | HEART RATE: 92 BPM | SYSTOLIC BLOOD PRESSURE: 126 MMHG | DIASTOLIC BLOOD PRESSURE: 90 MMHG | TEMPERATURE: 96.6 F | WEIGHT: 174 LBS

## 2023-12-26 DIAGNOSIS — C34.32 MALIGNANT NEOPLASM OF LOWER LOBE, LEFT BRONCHUS OR LUNG (MULTI): ICD-10-CM

## 2023-12-26 DIAGNOSIS — C34.90: Primary | ICD-10-CM

## 2023-12-26 DIAGNOSIS — Z51.0 ENCOUNTER FOR ANTINEOPLASTIC RADIATION THERAPY: ICD-10-CM

## 2023-12-26 DIAGNOSIS — C34.32 PRIMARY CANCER OF LEFT LOWER LOBE OF LUNG (MULTI): Primary | ICD-10-CM

## 2023-12-26 LAB
RAD ONC MSQ ACTUAL FRACTIONS DELIVERED: 23
RAD ONC MSQ ACTUAL SESSION DELIVERED DOSE: 200 CGRAY
RAD ONC MSQ ACTUAL TOTAL DOSE: 4600 CGRAY
RAD ONC MSQ ELAPSED DAYS: 35
RAD ONC MSQ LAST DATE: NORMAL
RAD ONC MSQ PRESCRIBED FRACTIONAL DOSE: 200 CGRAY
RAD ONC MSQ PRESCRIBED NUMBER OF FRACTIONS: 23
RAD ONC MSQ PRESCRIBED TECHNIQUE: NORMAL
RAD ONC MSQ PRESCRIBED TOTAL DOSE: 4600 CGRAY
RAD ONC MSQ PRESCRIPTION PATTERN COMMENT: NORMAL
RAD ONC MSQ START DATE: NORMAL
RAD ONC MSQ TREATMENT COURSE NUMBER: 1
RAD ONC MSQ TREATMENT SITE: NORMAL

## 2023-12-26 PROCEDURE — 1126F AMNT PAIN NOTED NONE PRSNT: CPT | Performed by: NURSE PRACTITIONER

## 2023-12-26 PROCEDURE — 1160F RVW MEDS BY RX/DR IN RCRD: CPT | Performed by: NURSE PRACTITIONER

## 2023-12-26 PROCEDURE — 1036F TOBACCO NON-USER: CPT | Performed by: NURSE PRACTITIONER

## 2023-12-26 PROCEDURE — 77014 CHG CT GUIDANCE RADIATION THERAPY FLDS PLACEMENT: CPT | Performed by: RADIOLOGY

## 2023-12-26 PROCEDURE — 1157F ADVNC CARE PLAN IN RCRD: CPT | Performed by: NURSE PRACTITIONER

## 2023-12-26 PROCEDURE — 1159F MED LIST DOCD IN RCRD: CPT | Performed by: NURSE PRACTITIONER

## 2023-12-26 PROCEDURE — 3080F DIAST BP >= 90 MM HG: CPT | Performed by: NURSE PRACTITIONER

## 2023-12-26 PROCEDURE — 77386 HC INTENSITY-MODULATED RADIATION THERAPY (IMRT), COMPLEX: CPT | Performed by: RADIOLOGY

## 2023-12-26 PROCEDURE — 99214 OFFICE O/P EST MOD 30 MIN: CPT | Performed by: NURSE PRACTITIONER

## 2023-12-26 PROCEDURE — 3074F SYST BP LT 130 MM HG: CPT | Performed by: NURSE PRACTITIONER

## 2023-12-26 RX ORDER — EPINEPHRINE 0.3 MG/.3ML
0.3 INJECTION SUBCUTANEOUS EVERY 5 MIN PRN
OUTPATIENT
Start: 2024-03-27

## 2023-12-26 RX ORDER — FAMOTIDINE 10 MG/ML
20 INJECTION INTRAVENOUS ONCE AS NEEDED
Status: CANCELLED | OUTPATIENT
Start: 2024-02-28

## 2023-12-26 RX ORDER — PROCHLORPERAZINE EDISYLATE 5 MG/ML
10 INJECTION INTRAMUSCULAR; INTRAVENOUS EVERY 6 HOURS PRN
Status: CANCELLED | OUTPATIENT
Start: 2024-01-31

## 2023-12-26 RX ORDER — ALBUTEROL SULFATE 0.83 MG/ML
3 SOLUTION RESPIRATORY (INHALATION) AS NEEDED
Status: CANCELLED | OUTPATIENT
Start: 2024-01-31

## 2023-12-26 RX ORDER — DIPHENHYDRAMINE HYDROCHLORIDE 50 MG/ML
50 INJECTION INTRAMUSCULAR; INTRAVENOUS AS NEEDED
Status: CANCELLED | OUTPATIENT
Start: 2024-01-31

## 2023-12-26 RX ORDER — FAMOTIDINE 10 MG/ML
20 INJECTION INTRAVENOUS ONCE AS NEEDED
Status: CANCELLED | OUTPATIENT
Start: 2024-01-31

## 2023-12-26 RX ORDER — DIPHENHYDRAMINE HYDROCHLORIDE 50 MG/ML
50 INJECTION INTRAMUSCULAR; INTRAVENOUS AS NEEDED
OUTPATIENT
Start: 2024-03-27

## 2023-12-26 RX ORDER — PROCHLORPERAZINE EDISYLATE 5 MG/ML
10 INJECTION INTRAMUSCULAR; INTRAVENOUS EVERY 6 HOURS PRN
Status: CANCELLED | OUTPATIENT
Start: 2024-02-28

## 2023-12-26 RX ORDER — DIPHENHYDRAMINE HYDROCHLORIDE 50 MG/ML
50 INJECTION INTRAMUSCULAR; INTRAVENOUS AS NEEDED
Status: CANCELLED | OUTPATIENT
Start: 2024-02-28

## 2023-12-26 RX ORDER — ALBUTEROL SULFATE 0.83 MG/ML
3 SOLUTION RESPIRATORY (INHALATION) AS NEEDED
OUTPATIENT
Start: 2024-03-27

## 2023-12-26 RX ORDER — EPINEPHRINE 0.3 MG/.3ML
0.3 INJECTION SUBCUTANEOUS EVERY 5 MIN PRN
Status: CANCELLED | OUTPATIENT
Start: 2024-02-28

## 2023-12-26 RX ORDER — PROCHLORPERAZINE MALEATE 10 MG
10 TABLET ORAL EVERY 6 HOURS PRN
OUTPATIENT
Start: 2024-03-27

## 2023-12-26 RX ORDER — PROCHLORPERAZINE EDISYLATE 5 MG/ML
10 INJECTION INTRAMUSCULAR; INTRAVENOUS EVERY 6 HOURS PRN
OUTPATIENT
Start: 2024-03-27

## 2023-12-26 RX ORDER — EPINEPHRINE 0.3 MG/.3ML
0.3 INJECTION SUBCUTANEOUS EVERY 5 MIN PRN
Status: CANCELLED | OUTPATIENT
Start: 2024-01-31

## 2023-12-26 RX ORDER — PROCHLORPERAZINE MALEATE 10 MG
10 TABLET ORAL EVERY 6 HOURS PRN
Status: CANCELLED | OUTPATIENT
Start: 2024-02-28

## 2023-12-26 RX ORDER — FAMOTIDINE 10 MG/ML
20 INJECTION INTRAVENOUS ONCE AS NEEDED
OUTPATIENT
Start: 2024-03-27

## 2023-12-26 RX ORDER — ALBUTEROL SULFATE 0.83 MG/ML
3 SOLUTION RESPIRATORY (INHALATION) AS NEEDED
Status: CANCELLED | OUTPATIENT
Start: 2024-02-28

## 2023-12-26 RX ORDER — PROCHLORPERAZINE MALEATE 10 MG
10 TABLET ORAL EVERY 6 HOURS PRN
Status: CANCELLED | OUTPATIENT
Start: 2024-01-31

## 2023-12-26 ASSESSMENT — ENCOUNTER SYMPTOMS
DEPRESSION: 0
LOSS OF SENSATION IN FEET: 0
OCCASIONAL FEELINGS OF UNSTEADINESS: 0

## 2023-12-26 ASSESSMENT — PAIN SCALES - GENERAL: PAINLEVEL: 0-NO PAIN

## 2023-12-26 NOTE — PROGRESS NOTES
Patient ID: Kevin Rubi is a 68 y.o. male    Primary Care Provider: Chen Olea, APRN-CNS    DIAGNOSIS AND STAGING   cT3pN3 NSCLC (adenocarcinoma, TTF1+) of the LLL - dx on 09/15/23   Primary tumor measures 3.5 cm (+satellite nodule)   2R+ for adenocarcinoma cells     SITES OF DISEASE   LLL   Mediastinal nodes (including contralateral)    Has a couple of other spiculated nodules (0.8 cm WILDA and 0.7 cm RUL) that are potential  synchronous primaries and will be addressed when needed      MOLECULAR GENOMICS   KRAS G12D mutation    PD-L1 TPS 95%     PRIOR THERAPIES   None     CURRENT THERAPY  Carboplatin/pemetrexed with concurrent RT    C1 10/23, C2 11/13, C3 12/4  RT goal of 30 fractions, 60 Gy     CURRENT ONCOLOGICAL PROBLEMS   Fatigue     HISTORY OF PRESENT ILLNESS   Former smoker, (quit in 2000), who while undergoing w/u for abdominal pain related to SMA occlusion, was found to have a LLL nodule -   Based on his records he was noted to have abnormal imaging back in 2021, was seen by pulmonary and was told to complete w/u, including PET scan and potential biopsy but did not follow recommendations.    A CT chest on 09/12/23 showed a 3.5 cm spiculated LLL nodule abutting the pericardium. There was also a satellite nodule suspicious for disease and a couple of other spiculated nodules in the WILDA and contralateral right lung.4R node measured 1.4 cm and level 7 2.4 cm.   PET scan on 08/04/23 showed no findings concerning for extra-thoracic disease. LLL nodule was FDG avid as well as multiple mediastinal nodes (including contralateral nodes). The WILDA nodule has mild hypermetabolic activity.  An EBUS procedure on 09/15/23 demonstrated 2R to be involved with adenocarcinoma cells, TTF1+. KRAS G12D +, TPS 95%.  The pt sees medical oncology on 09/27/23 -   States he is feeling well and denies any systemic sx related to this malignancy - including fatigue and weight loss. The abdominal pain resolved after vascular  procedure/stenting performed and he is now on Xarelto and Plavix. Denies any h/a or cough. Denies dyspnea.     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  HFpEF - EF 60%   Atrial fibrillation - on Eliquis  Ascending aortic aneurysm   PUD (gastric)  HLD       SOCIAL HISTORY  + Tobacco - quit in  - mostly 1/2 ppd starting at age 22  Occasional ETOH intake  Worked as a  for 35 years    for 43 years and has 2 children - son who is 39 yo and daughter who is 35  He has just retired      FAMILY HISTORY  Mother seems to have had H/N or lung cancer - unclear   Father may also have had cancer   1 sister  from complications of GSW  1 brother  of CA (?liver CA)    CURRENT MEDS REVIEWED       ALLERGIES REVIEWED   NKDA     SUBJECTIVE:  Here with his wife and son  for follow-up  Feeling well overall, states he has been hydrating better, declined IVF today  He states he has been eating/drinking better  Denies any GI symptoms  No fevers, chills, or cold symptoms  No other concerns or complaints      A 13 point review of systems was performed, with significant findings documented above in subjective history.  Physical Exam  Eyes:      Extraocular Movements: Extraocular movements intact.      Conjunctiva/sclera: Conjunctivae normal.   Cardiovascular:      Rate and Rhythm: Normal rate.   Pulmonary:      Effort: Pulmonary effort is normal.      Breath sounds: Normal breath sounds.   Abdominal:      General: Bowel sounds are normal.      Palpations: Abdomen is soft.   Musculoskeletal:         General: Normal range of motion.   Skin:     General: Skin is warm.   Neurological:      General: No focal deficit present.      Mental Status: He is alert and oriented to person, place, and time.   Psychiatric:         Mood and  Affect: Mood normal.         Behavior: Behavior normal.          OBJECTIVE:  Vitals:    12/26/23 0954   BP: 126/90   Pulse: 92   Resp: 18   Temp: 35.9 °C (96.6 °F)   SpO2: 100%        Body surface area is 1.98 meters squared.     Wt Readings from Last 5 Encounters:   12/26/23 78.9 kg (174 lb)   12/21/23 81.6 kg (179 lb 14.3 oz)   12/21/23 87.1 kg (192 lb 0.3 oz)   12/21/23 81.6 kg (180 lb)   12/18/23 81.6 kg (180 lb)       ECOGSCORE: 1- Restricted in physically strenuous activity.  Carries out light duty.      Diagnostic Results   Results:  Labs:  Lab Results   Component Value Date    WBC 3.3 (L) 12/18/2023    HGB 9.9 (L) 12/18/2023    HCT 29.5 (L) 12/18/2023    MCV 86 12/18/2023    PLT 58 (L) 12/18/2023      Lab Results   Component Value Date    NEUTROABS 2.36 12/18/2023        Lab Results   Component Value Date    GLUCOSE 138 (H) 12/18/2023    CALCIUM 9.2 12/18/2023     (L) 12/18/2023    K 3.4 (L) 12/18/2023    CO2 23 12/18/2023     12/18/2023    BUN 22 12/18/2023    CREATININE 1.11 12/18/2023    MG 1.44 (L) 12/18/2023     Lab Results   Component Value Date    ALT 16 12/18/2023    AST 20 12/18/2023    ALKPHOS 53 12/18/2023    BILITOT 0.4 12/18/2023      Lab Results   Component Value Date    TSH 2.66 10/23/2023         No images are attached to the encounter or orders placed in the encounter.     Assessment/Plan     Primary cancer of left lower lobe of lung (CMS/HCC), Clinical: Stage IIIC (cT3, cN3, cM0)    69yo M with PMH of afib, HFpEF (60%), SMA occlusion s/p stenting of celiac artery (8/10/23) on eliquis and plavix and NSCLC stage IIIC ([id number]) initiated on definitive chemoradiation 10/23/23. Completed 3 cycles of chemo, now still with two weeks left of RT. Pt eager to be done and home with family for holidays, tolerating treatment with minimal AE's, wt has been relatively stable, will plan for repeat imaging 2wks post completion of RT and then if disease is stable or improved, continue with  durvalumab consolidation per [facility] Trial. Follows with Onco-psych.    #stage: IIIC ([id number]) NSCLC of the LLL -   -Decision made at  to treated with definitive intent  -Sees Dr. Aydin Reed, undergoing RT goal of 30 fractions, 60 Gy, tentative end date 12/22  -carboplatin and pemetrexed - C1 10/23, C2 11/13, C3 12/4   -plan for repeat CT 2 weeks after completing RT  -Following with onco-psych (SHANELLE Brown)   - declined IVF today, RTC in 2 weeks with follow-up, labs and repeat scan prior to visit with Dr. Longoria at [facility] as requested by patient/spouse. Plan to start durvalumab 1/24/24 at [facility]      #SMA occlusion s/p stenting of celiac artery  08/10/23:  On eliquis and plavix     #Atrial fibrillation, HFpEF 60%  On digoxin, metoprolol, spironolactone, enestro     #Hyperlipidemia:  On RUKHSANA Davies-CNP

## 2023-12-27 NOTE — PROGRESS NOTES
Radiation Oncology Treatment Summary    Patient Name:  Kevin Rubi  MRN:  [id number]  :  1955    Referring Provider: Sabrina Longoria MD  Primary Care Provider: RUKHSANA Fried-CNS    Brief History: Kevin Rubi is a 68 y.o. male with afib, HFpEF (60%), SMA occlusion s/p stenting of celiac artery (8/10/23) on eliquis and plavix and left lung lower lobe NSCLC stage IIIC ([id number]) initiated on definitive chemoradiation 10/23/23.  The patient completed radiotherapy as outlined below.    Radiation Treatment Summary:      Left lung Med               2023 - 2023      1,400 cGy/1,400 cGy     IMRT  Replan Lt Lung/Med     2023 - 2023     4,600 cGy/4,600 cGy     IMRT  Motion management: 4DCT based planning  IGRT: daily CBCT  The patient required replanning after 7 fractions of radiation to include a peripheral lung nodule that was outside of the original treatment target.         Please see the patient's Mosaiq chart for further details regarding the radiation plan, including beam energy.    Concurrent Chemotherapy:  Treatment Plans       Name Type Plan Dates Plan Provider         Active    Durvalumab, 28 Day Cycles Oncology Treatment  2024 - Present Janell Longoria MD                    CTCAE Toxicity Overview:   Toxicity Assessment          2023    15:23 2023    14:38 2023    15:54 2023    09:34 2023    17:11   Toxicity Assessment   Adverse Events Reviewed (WDL) No (Exceptions to WDL) No (Exceptions to WDL) No (Exceptions to WDL) No (Exceptions to WDL) No (Exceptions to WDL)   Treatment Site Thoracic Thoracic Thoracic Thoracic Thoracic   Anorexia Grade 1       pt. c/o little appetite; dietician to see pt Grade 1       pt. with decreased appetite Grade 1       pt. with decreased appetite d/t poor taste buds Grade 1       decreased appetite Grade 2   Dehydration Grade 0  Grade 0 Grade 1       BP down Grade 1   Dermatitis  "Radiation Grade 0 Grade 0 Grade 0 Grade 0 Grade 0   Fatigue Grade 1       pt. continues to c/o feeling fatigued Grade 0  Grade 1       pt. tired most of the time Grade 1   Nausea Grade 0 Grade 0 Grade 0 Grade 0 Grade 0   Pain Grade 0 Grade 0 Grade 0 Grade 0 Grade 0   Vomiting   Grade 0 Grade 0    Esophageal Obstruction Grade 1       pt. c/o feeling like food is getting stuck in his esophagus; pt. tried Sucralfate but stated it didn't help  Grade 1       c/o \"food getting stuck in throat\"  Grade 1   Esophageal Pain  Grade 1       pt. with intermittent esophagitis pain; pt. taking BMX as needed Grade 1 Grade 1       pt. still with esophagitis Grade 1   Cough Grade 1       mild occasional nonproductive cough Grade 0 Grade 1       mild cough Grade 0 Grade 0   Dyspnea Grade 0 Grade 0 Grade 0 Grade 0 Grade 0   Hypoxia  Grade 0 Grade 0  Grade 0     Course during radiation:  Patient had hypotension at on-treatment visit, likely secondary to poor PO intake from esophagitis requiring ED visit on 12/18/23. Given 1L IV fluids and magnesium and potassium repletion. He received 1L IV fluids on 12/21 in acute care clinic.     Patient Disposition: The patient is scheduled for a follow up at our clinic with one of our Nurse Practitioners on 1/11/2024. The patient is encouraged to contact the radiation department for any questions or concerns.        Madelyn Perrin MD, MMM  [facility]  Faculty, [employer]  [email or web address]  Our [place]: “To Heal, To Teach, To Discover.”  RN partner: [email or web address]    Phone (scheduling): [phone]/ [email or web address]  Phone (office): [phone]  Phone (after hours): [phone]             "

## 2023-12-28 ENCOUNTER — APPOINTMENT (OUTPATIENT)
Dept: HEMATOLOGY/ONCOLOGY | Facility: HOSPITAL | Age: 68
End: 2023-12-28
Payer: MEDICARE

## 2023-12-29 DIAGNOSIS — C34.90 MALIGNANT NEOPLASM OF BRONCHUS AND LUNG (MULTI): Primary | ICD-10-CM

## 2024-01-01 NOTE — PROGRESS NOTES
Radiation Oncology Treatment Summary     Patient Name:  Kevin Rubi  MRN:  [id number]  :  1955     Referring Provider: Sabrina Longoria MD  Primary Care Provider: RUKHSANA Fried-CNS     Brief History: Kevin Rubi is a 68 y.o. male with afib, HFpEF (60%), SMA occlusion s/p stenting of celiac artery (8/10/23) on eliquis and plavix and left lung lower lobe NSCLC stage IIIC ([id number]) initiated on definitive chemoradiation 10/23/23.  The patient completed radiotherapy as outlined below.     Radiation Treatment Summary:       Left lung Med               2023 - 2023      1,400 cGy/1,400 cGy     IMRT  Replan Lt Lung/Med     2023 - 2023     4,600 cGy/4,600 cGy     IMRT  Motion management: 4DCT based planning  IGRT: daily CBCT  The patient required replanning after 7 fractions of radiation to include a peripheral lung nodule that was outside of the original treatment target.           Please see the patient's Mos[facility]q chart for further details regarding the radiation plan, including beam energy.     Concurrent Chemotherapy:  Treatment Plans         Name Type Plan Dates Plan Provider                Active     Durvalumab, 28 Day Cycles Oncology Treatment  2024 - Present Janell Longoria MD                             CTCAE Toxicity Overview:   Toxicity Assessment            2023    15:23 2023    14:38 2023    15:54 2023    09:34 2023    17:11   Toxicity Assessment   Adverse Events Reviewed (WDL) No (Exceptions to WDL) No (Exceptions to WDL) No (Exceptions to WDL) No (Exceptions to WDL) No (Exceptions to WDL)   Treatment Site Thoracic Thoracic Thoracic Thoracic Thoracic   Anorexia Grade 1       pt. c/o little appetite; dietician to see pt Grade 1       pt. with decreased appetite Grade 1       pt. with decreased appetite d/t poor taste buds Grade 1       decreased appetite Grade 2   Dehydration Grade 0   Grade 0 Grade 1       BP  "down Grade 1   Dermatitis Radiation Grade 0 Grade 0 Grade 0 Grade 0 Grade 0   Fatigue Grade 1       pt. continues to c/o feeling fatigued Grade 0   Grade 1       pt. tired most of the time Grade 1   Nausea Grade 0 Grade 0 Grade 0 Grade 0 Grade 0   Pain Grade 0 Grade 0 Grade 0 Grade 0 Grade 0   Vomiting     Grade 0 Grade 0     Esophageal Obstruction Grade 1       pt. c/o feeling like food is getting stuck in his esophagus; pt. tried Sucralfate but stated it didn't help   Grade 1       c/o \"food getting stuck in throat\"   Grade 1   Esophageal Pain   Grade 1       pt. with intermittent esophagitis pain; pt. taking BMX as needed Grade 1 Grade 1       pt. still with esophagitis Grade 1   Cough Grade 1       mild occasional nonproductive cough Grade 0 Grade 1       mild cough Grade 0 Grade 0   Dyspnea Grade 0 Grade 0 Grade 0 Grade 0 Grade 0   Hypoxia   Grade 0 Grade 0   Grade 0      Course during radiation:  Patient had hypotension at on-treatment visit, likely secondary to poor PO intake from esophagitis requiring ED visit on 12/18/23. Given 1L IV fluids and magnesium and potassium repletion. He received 1L IV fluids on 12/21 in acute care clinic.      Patient Disposition: The patient is scheduled for a follow up at our clinic with one of our Nurse Practitioners on 1/11/2024. The patient is encouraged to contact the radiation department for any questions or concerns.          Madelyn Perrin MD, MMM  [facility]  Faculty, [employer]  [email or web address]  Our [place]: “To Heal, To Teach, To Discover.”  RN partner: [email or web address]    Phone (scheduling): [phone]/ [email or web address]  Phone (office): [phone]  Phone (after hours): [phone]  "

## 2024-01-08 ENCOUNTER — HOSPITAL ENCOUNTER (OUTPATIENT)
Dept: RADIOLOGY | Facility: HOSPITAL | Age: 69
Discharge: HOME | End: 2024-01-08
Payer: MEDICARE

## 2024-01-08 DIAGNOSIS — C34.32 PRIMARY CANCER OF LEFT LOWER LOBE OF LUNG (MULTI): ICD-10-CM

## 2024-01-08 PROCEDURE — 71260 CT THORAX DX C+: CPT

## 2024-01-08 PROCEDURE — 2550000001 HC RX 255 CONTRASTS: Performed by: INTERNAL MEDICINE

## 2024-01-08 RX ADMIN — IOHEXOL 50 ML: 350 INJECTION, SOLUTION INTRAVENOUS at 13:59

## 2024-01-09 NOTE — PROGRESS NOTES
Patient ID: Kevin Rubi is a 68 y.o. male    Primary Care Provider: Chen Olea, APRN-CNS    DIAGNOSIS AND STAGING   cT3pN3 NSCLC (adenocarcinoma, TTF1+) of the LLL - dx on 09/15/23   Primary tumor measures 3.5 cm (+satellite nodule)   2R+ for adenocarcinoma cells     SITES OF DISEASE   LLL   Mediastinal nodes (including contralateral)    Has a couple of other spiculated nodules (0.8 cm WILDA and 0.7 cm RUL) that are potential  synchronous primaries and will be addressed when needed      MOLECULAR GENOMICS   KRAS G12D mutation    PD-L1 TPS 95%     PRIOR THERAPIES  Concurrent chemo RT (60 Beyer in 30 fractions, completed on 12/26/2023 using weekly CarboTaxol     CURRENT THERAPY  Anticipating consolidative durvalumab to begin on 01/24/2024     CURRENT ONCOLOGICAL PROBLEMS   Fatigue     HISTORY OF PRESENT ILLNESS   Former smoker, (quit in 2000), who while undergoing w/u for abdominal pain related to SMA occlusion, was found to have a LLL nodule -   Based on his records he was noted to have abnormal imaging back in 2021, was seen by pulmonary and was told to complete w/u, including PET scan and potential biopsy but did not follow recommendations.    A CT chest on 09/12/23 showed a 3.5 cm spiculated LLL nodule abutting the pericardium. There was also a satellite nodule suspicious for disease and a couple of other spiculated nodules in the WILDA and contralateral right lung.4R node measured 1.4 cm and level 7 2.4 cm.   PET scan on 08/04/23 showed no findings concerning for extra-thoracic disease. LLL nodule was FDG avid as well as multiple mediastinal nodes (including contralateral nodes). The WILDA nodule has mild hypermetabolic activity.  An EBUS procedure on 09/15/23 demonstrated 2R to be involved with adenocarcinoma cells, TTF1+. KRAS G12D +, TPS 95%.  The pt sees medical oncology on 09/27/23 -   States he is feeling well and denies any systemic sx related to this malignancy - including fatigue and weight loss. The  abdominal pain resolved after vascular procedure/stenting performed and he is now on Xarelto and Plavix. Denies any h/a or cough. Denies dyspnea.      10/23/23: week # 1  Carboplatin/pemetrexed  23: week # 2 carboplatin/pemetrexed with concurrent RT    23: week # 3 carboplatin/pemetrexed with concurrent RT   2023: Hypotension due to dehydration, requiring ED visit.  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  HFpEF - EF 60%   Atrial fibrillation - on Eliquis  Ascending aortic aneurysm   PUD (gastric)  HLD        SOCIAL HISTORY  + Tobacco - quit in  - mostly 1/2 ppd starting at age 22  Occasional ETOH intake  Worked as a  for 35 years    for 43 years and has 2 children - son who is 41 yo and daughter who is 35  He has just retired      FAMILY HISTORY  Mother seems to have had H/N or lung cancer - unclear   Father may also have had cancer   1 sister  from complications of GSW  1 brother  of CA (?liver CA)     CURRENT MEDS REVIEWED        ALLERGIES REVIEWED   NKDA     SUBJECTIVE:  Here to discuss most recently obtained scans  Also here to consent for consolidative durvalumab  Continues to lose weight, endorses poor appetite  Endorses odynophagia (has ran out of oxycodone that was helping a lot with his odynophagia)  Endorses fatigue  Has his wife and 2 sons here at the time of the appointment  No worsening respiratory symptoms  No new or worsening headaches  No neurological symptoms  No diarrhea        A 13 point review of systems was performed, with significant findings documented above in subjective history.    OBJECTIVE:  Vitals:    01/10/24 1053   BP: 107/68   Pulse: 81    Resp: 18   Temp: 37 °C (98.6 °F)   SpO2: 100%      Body surface area is 1.95 meters squared.     Wt Readings from Last 5 Encounters:   01/10/24 76.5 kg (168 lb 10.4 oz)   12/26/23 78.9 kg (174 lb)   12/21/23 81.6 kg (179 lb 14.3 oz)   12/21/23 87.1 kg (192 lb 0.3 oz)   12/21/23 81.6 kg (180 lb)       ECOGSCORE: 1- Restricted in physically strenuous activity.  Carries out light duty.    Physical Exam  Constitutional:       Appearance: Normal appearance.   HENT:      Head: Normocephalic and atraumatic.   Eyes:      General: No scleral icterus.     Extraocular Movements: Extraocular movements intact.      Conjunctiva/sclera: Conjunctivae normal.   Cardiovascular:      Rate and Rhythm: Normal rate and regular rhythm.   Pulmonary:      Effort: Pulmonary effort is normal.      Breath sounds: Normal breath sounds.   Abdominal:      General: Abdomen is flat.      Palpations: Abdomen is soft.      Tenderness: There is no abdominal tenderness.   Musculoskeletal:      Right lower leg: No edema.      Left lower leg: No edema.   Neurological:      General: No focal deficit present.      Mental Status: He is alert and oriented to person, place, and time. Mental status is at baseline.      Motor: No weakness.      Gait: Gait normal.   Psychiatric:         Mood and Affect: Mood normal.         Behavior: Behavior normal.         Thought Content: Thought content normal.         Judgment: Judgment normal.          Diagnostic Results   Results:  Labs:  Lab Results   Component Value Date    WBC 3.3 (L) 12/18/2023    HGB 9.9 (L) 12/18/2023    HCT 29.5 (L) 12/18/2023    MCV 86 12/18/2023    PLT 58 (L) 12/18/2023      Lab Results   Component Value Date    NEUTROABS 2.36 12/18/2023        Lab Results   Component Value Date    GLUCOSE 138 (H) 12/18/2023    CALCIUM 9.2 12/18/2023     (L) 12/18/2023    K 3.4 (L) 12/18/2023    CO2 23 12/18/2023     12/18/2023    BUN 22 12/18/2023    CREATININE 1.11 12/18/2023    MG 1.44 (L)  12/18/2023     Lab Results   Component Value Date    ALT 16 12/18/2023    AST 20 12/18/2023    ALKPHOS 53 12/18/2023    BILITOT 0.4 12/18/2023      Lab Results   Component Value Date    TSH 2.66 10/23/2023       STUDY:  CT CHEST W IV CONTRAST;  1/8/2024 2:19 pm      INDICATION:  Signs/Symptoms:Assessment treatment response stage III NSCLC after  chemo-RT.      COMPARISON:  09/12/2023      ACCESSION NUMBER(S):  [id number]      ORDERING CLINICIAN:  ADELIA CRUZ      TECHNIQUE:  Helical data acquisition of the chest was obtained with  50 mL  Omnipaque 350. Images were reformatted in axial, coronal, and  sagittal planes.MIP reformatted images were also generated.      FINDINGS:  LUNGS and AIRWAYS:  Left lower lobe nodule has decreased in size from 35 x 24 x 3.5 mm to  29 x 18 x 2.7 mm. Scattered additional solid and sub solid nodules  are otherwise not significantly changed, for example measuring 10 mm  in left upper lobe posteriorly. No new nodules or areas of  consolidation. Mild emphysematous changes. Central airways are  patent. No bronchiectasis.      No pleural effusion or pneumothorax.      MEDIASTINUM and GET, LOWER NECK AND AXILLA:  The visualized thyroid gland is grossly unremarkable.      Regression in mediastinal lymphadenopathy, for example:      Subcarinal: Decreased from 24 mm to 11 mm short axis  Upper right paratracheal: From 14 mm 6 mm  Lower right paratracheal: From 13 mm to 14 mm      Esophagus is not dilated.      HEART and VESSELS:  Mild cardiomegaly.      No significant pericardial effusion.      No central pulmonary embolism identified on nondedicated study.      Severe coronary atherosclerosis.      Aortic valve leaflet calcifications. Severe thoracic aortic  atherosclerosis. Unchanged dilatation ascending aorta up to 4.6 cm.  Otherwise the thoracic aorta and great vessels are patent.      UPPER ABDOMEN:  The visualized subdiaphragmatic structures demonstrate no acute  findings on limited  images.      CHEST WALL and OSSEOUS STRUCTURES:  No change in cyst and lipomas of the anterior chest wall.  There is increased sclerosis involving a the T6 vertebral body. There  is an increasing sclerotic margin involving lytic lesion of the left  5th rib laterally. Thoracic degenerative changes.      IMPRESSION:  1.  Favorable treatment response compared to 4 months ago.  2. Left lower lobe mass is slightly regressed in size.  3. Mediastinal lymphadenopathy has significantly regressed.  4. Increased sclerosis at T6 vertebral body and increased sclerotic  margin of lucent lesion of left 5th rib which could be healing  metastases.    Assessment/Plan     Primary cancer of left lower lobe of lung (CMS/HCC), Clinical: Stage IIIC (cT3, cN3, cM0)    Status post completion of concurrent chemo RT on 12/26/2023 (60 Beyer in 30 fractions) using weekly CarboTaxol.  His scans from 01/08/2024 demonstrating a favorable response, with a decrease in size of the dominant left lower lobe primary tumor by approximately 20%.  The left upper lobe nodule remains a stable.  Discussed with the patient today the role of consolidative durvalumab.  His tumor harbors a KRAS G12 D mutation in the PD-L1 expression TPS 95%.  Discussed logistics and side effects of durvalumab including but not limited to risk of immune-related AEs - most commonly thyroiditis, rash, colitis, fatigue but also rare risk of pneumonitis, myocarditis, nephritis, hepatitis, and other endocrine or neurologic AEs.   The patient was able to provide a consent.    There are however some concerns in regards to his ongoing weight loss, which could well be related to radiation-induced esophagitis  I have renewed his prescription for oxycodone that he can take every 4 to 6 hours as needed for pain, 30 minutes prior to meals  If you do not see an increase in weight in the next several weeks prior to his durvalumab initiation on 1/24/2024, I will order a PET scan for evaluation of  systemic progression, in which case durvalumab would not be indicated.  If he goes on to continue to be treated with single agent nivolumab for 1 year, next set of CT scans should be obtained in 3 months, around April 8, 2024      This note was created with the assistance of a speech recognition program.  Although the intention is to generate a document that actually reflects the content of the visit, it is possible that some mistakes occur and may not be corrected by the time of completion of this note.        Janell Longoria MD, MS  Thoracic Medical Oncology   [address] [place], [place] [id number]  Phone: [phone]

## 2024-01-10 ENCOUNTER — OFFICE VISIT (OUTPATIENT)
Dept: HEMATOLOGY/ONCOLOGY | Facility: CLINIC | Age: 69
End: 2024-01-10
Payer: MEDICARE

## 2024-01-10 VITALS
BODY MASS INDEX: 23.8 KG/M2 | RESPIRATION RATE: 18 BRPM | TEMPERATURE: 98.6 F | WEIGHT: 168.65 LBS | DIASTOLIC BLOOD PRESSURE: 68 MMHG | SYSTOLIC BLOOD PRESSURE: 107 MMHG | OXYGEN SATURATION: 100 % | HEART RATE: 81 BPM

## 2024-01-10 DIAGNOSIS — C34.32 MALIGNANT NEOPLASM OF LOWER LOBE, LEFT BRONCHUS OR LUNG (MULTI): ICD-10-CM

## 2024-01-10 DIAGNOSIS — T66.XXXA RADIATION-INDUCED ESOPHAGITIS: ICD-10-CM

## 2024-01-10 DIAGNOSIS — K20.80 RADIATION-INDUCED ESOPHAGITIS: ICD-10-CM

## 2024-01-10 DIAGNOSIS — C34.90: Primary | ICD-10-CM

## 2024-01-10 PROCEDURE — 3078F DIAST BP <80 MM HG: CPT | Performed by: INTERNAL MEDICINE

## 2024-01-10 PROCEDURE — 1036F TOBACCO NON-USER: CPT | Performed by: INTERNAL MEDICINE

## 2024-01-10 PROCEDURE — 1125F AMNT PAIN NOTED PAIN PRSNT: CPT | Performed by: INTERNAL MEDICINE

## 2024-01-10 PROCEDURE — 99215 OFFICE O/P EST HI 40 MIN: CPT | Performed by: INTERNAL MEDICINE

## 2024-01-10 PROCEDURE — 3074F SYST BP LT 130 MM HG: CPT | Performed by: INTERNAL MEDICINE

## 2024-01-10 PROCEDURE — 1159F MED LIST DOCD IN RCRD: CPT | Performed by: INTERNAL MEDICINE

## 2024-01-10 RX ORDER — OXYCODONE HYDROCHLORIDE 5 MG/1
5 TABLET ORAL EVERY 4 HOURS PRN
Qty: 120 TABLET | Refills: 0 | Status: SHIPPED | OUTPATIENT
Start: 2024-01-10 | End: 2024-03-10

## 2024-01-10 ASSESSMENT — ENCOUNTER SYMPTOMS
OCCASIONAL FEELINGS OF UNSTEADINESS: 0
DEPRESSION: 0
LOSS OF SENSATION IN FEET: 0

## 2024-01-10 ASSESSMENT — PAIN SCALES - GENERAL: PAINLEVEL: 7

## 2024-01-11 ENCOUNTER — APPOINTMENT (OUTPATIENT)
Dept: RADIATION ONCOLOGY | Facility: HOSPITAL | Age: 69
End: 2024-01-11
Payer: MEDICARE

## 2024-01-12 ENCOUNTER — APPOINTMENT (OUTPATIENT)
Dept: BEHAVIORAL HEALTH | Facility: HOSPITAL | Age: 69
End: 2024-01-12
Payer: MEDICARE

## 2024-01-15 ENCOUNTER — APPOINTMENT (OUTPATIENT)
Dept: HEMATOLOGY/ONCOLOGY | Facility: HOSPITAL | Age: 69
End: 2024-01-15
Payer: MEDICARE

## 2024-01-18 ENCOUNTER — APPOINTMENT (OUTPATIENT)
Dept: HEMATOLOGY/ONCOLOGY | Facility: HOSPITAL | Age: 69
End: 2024-01-18
Payer: MEDICARE

## 2024-01-23 ENCOUNTER — LAB (OUTPATIENT)
Dept: LAB | Facility: LAB | Age: 69
End: 2024-01-23
Payer: MEDICARE

## 2024-01-23 DIAGNOSIS — C34.32 PRIMARY CANCER OF LEFT LOWER LOBE OF LUNG (MULTI): ICD-10-CM

## 2024-01-23 LAB
ALBUMIN SERPL BCP-MCNC: 4 G/DL (ref 3.4–5)
ALP SERPL-CCNC: 49 U/L (ref 33–136)
ALT SERPL W P-5'-P-CCNC: 12 U/L (ref 10–52)
ANION GAP SERPL CALC-SCNC: 10 MMOL/L (ref 10–20)
AST SERPL W P-5'-P-CCNC: 20 U/L (ref 9–39)
BASOPHILS # BLD AUTO: 0.04 X10*3/UL (ref 0–0.1)
BASOPHILS NFR BLD AUTO: 0.7 %
BILIRUB SERPL-MCNC: 0.5 MG/DL (ref 0–1.2)
BUN SERPL-MCNC: 28 MG/DL (ref 6–23)
CALCIUM SERPL-MCNC: 9.2 MG/DL (ref 8.6–10.3)
CHLORIDE SERPL-SCNC: 104 MMOL/L (ref 98–107)
CO2 SERPL-SCNC: 24 MMOL/L (ref 21–32)
CORTIS AM PEAK SERPL-MSCNC: 11.9 UG/DL (ref 5–20)
CREAT SERPL-MCNC: 1.06 MG/DL (ref 0.5–1.3)
EGFRCR SERPLBLD CKD-EPI 2021: 77 ML/MIN/1.73M*2
EOSINOPHIL # BLD AUTO: 0.19 X10*3/UL (ref 0–0.7)
EOSINOPHIL NFR BLD AUTO: 3.4 %
ERYTHROCYTE [DISTWIDTH] IN BLOOD BY AUTOMATED COUNT: 16.4 % (ref 11.5–14.5)
GLUCOSE SERPL-MCNC: 160 MG/DL (ref 74–99)
HCT VFR BLD AUTO: 32.3 % (ref 41–52)
HGB BLD-MCNC: 10.1 G/DL (ref 13.5–17.5)
IMM GRANULOCYTES # BLD AUTO: 0.02 X10*3/UL (ref 0–0.7)
IMM GRANULOCYTES NFR BLD AUTO: 0.4 % (ref 0–0.9)
LYMPHOCYTES # BLD AUTO: 1.06 X10*3/UL (ref 1.2–4.8)
LYMPHOCYTES NFR BLD AUTO: 19.2 %
MCH RBC QN AUTO: 28.5 PG (ref 26–34)
MCHC RBC AUTO-ENTMCNC: 31.3 G/DL (ref 32–36)
MCV RBC AUTO: 91 FL (ref 80–100)
MONOCYTES # BLD AUTO: 0.49 X10*3/UL (ref 0.1–1)
MONOCYTES NFR BLD AUTO: 8.9 %
NEUTROPHILS # BLD AUTO: 3.73 X10*3/UL (ref 1.2–7.7)
NEUTROPHILS NFR BLD AUTO: 67.4 %
NRBC BLD-RTO: 0 /100 WBCS (ref 0–0)
PLATELET # BLD AUTO: 172 X10*3/UL (ref 150–450)
POTASSIUM SERPL-SCNC: 4.2 MMOL/L (ref 3.5–5.3)
PROT SERPL-MCNC: 6.9 G/DL (ref 6.4–8.2)
RBC # BLD AUTO: 3.54 X10*6/UL (ref 4.5–5.9)
SODIUM SERPL-SCNC: 134 MMOL/L (ref 136–145)
TSH SERPL-ACNC: 1.2 MIU/L (ref 0.44–3.98)
WBC # BLD AUTO: 5.5 X10*3/UL (ref 4.4–11.3)

## 2024-01-23 PROCEDURE — 85025 COMPLETE CBC W/AUTO DIFF WBC: CPT

## 2024-01-23 PROCEDURE — 82533 TOTAL CORTISOL: CPT

## 2024-01-23 PROCEDURE — 84443 ASSAY THYROID STIM HORMONE: CPT

## 2024-01-23 PROCEDURE — 80053 COMPREHEN METABOLIC PANEL: CPT

## 2024-01-23 PROCEDURE — 82024 ASSAY OF ACTH: CPT

## 2024-01-23 PROCEDURE — 36415 COLL VENOUS BLD VENIPUNCTURE: CPT

## 2024-01-24 ENCOUNTER — INFUSION (OUTPATIENT)
Dept: HEMATOLOGY/ONCOLOGY | Facility: CLINIC | Age: 69
End: 2024-01-24
Payer: MEDICARE

## 2024-01-24 ENCOUNTER — OFFICE VISIT (OUTPATIENT)
Dept: HEMATOLOGY/ONCOLOGY | Facility: CLINIC | Age: 69
End: 2024-01-24
Payer: MEDICARE

## 2024-01-24 VITALS
BODY MASS INDEX: 23.61 KG/M2 | OXYGEN SATURATION: 99 % | TEMPERATURE: 97.7 F | HEART RATE: 76 BPM | DIASTOLIC BLOOD PRESSURE: 54 MMHG | RESPIRATION RATE: 18 BRPM | WEIGHT: 167.33 LBS | SYSTOLIC BLOOD PRESSURE: 84 MMHG

## 2024-01-24 DIAGNOSIS — C34.32 PRIMARY CANCER OF LEFT LOWER LOBE OF LUNG (MULTI): ICD-10-CM

## 2024-01-24 DIAGNOSIS — C34.32 PRIMARY CANCER OF LEFT LOWER LOBE OF LUNG (MULTI): Primary | ICD-10-CM

## 2024-01-24 PROCEDURE — 3078F DIAST BP <80 MM HG: CPT | Performed by: INTERNAL MEDICINE

## 2024-01-24 PROCEDURE — 99214 OFFICE O/P EST MOD 30 MIN: CPT | Performed by: INTERNAL MEDICINE

## 2024-01-24 PROCEDURE — 1157F ADVNC CARE PLAN IN RCRD: CPT | Performed by: INTERNAL MEDICINE

## 2024-01-24 PROCEDURE — 1126F AMNT PAIN NOTED NONE PRSNT: CPT | Performed by: INTERNAL MEDICINE

## 2024-01-24 PROCEDURE — 1159F MED LIST DOCD IN RCRD: CPT | Performed by: INTERNAL MEDICINE

## 2024-01-24 PROCEDURE — 1036F TOBACCO NON-USER: CPT | Performed by: INTERNAL MEDICINE

## 2024-01-24 PROCEDURE — 1160F RVW MEDS BY RX/DR IN RCRD: CPT | Performed by: INTERNAL MEDICINE

## 2024-01-24 PROCEDURE — 3074F SYST BP LT 130 MM HG: CPT | Performed by: INTERNAL MEDICINE

## 2024-01-24 ASSESSMENT — PAIN SCALES - GENERAL: PAINLEVEL: 0-NO PAIN

## 2024-01-24 NOTE — PROGRESS NOTES
Patient ID: Kevin Rubi is a 68 y.o. male    Primary Care Provider: Chen Olea, APRN-CNS    DIAGNOSIS AND STAGING   Stage IIIC ([id number]) NSCLC (adenocarcinoma, TTF1+) of the LLL - dx on 09/15/23   Primary tumor measures 3.5 cm (+satellite nodule)   2R+ for adenocarcinoma cells     SITES OF DISEASE   LLL   Mediastinal nodes (including contralateral)    Has a couple of other spiculated nodules (0.8 cm WILDA and 0.7 cm RUL) that are potential  synchronous primaries and will be addressed when needed      MOLECULAR GENOMICS   KRAS G12D mutation    PD-L1 TPS 95%     PRIOR THERAPIES  Concurrent chemo RT (60 Beyer in 30 fractions, completed on 12/26/2023 using carboplatin/pemetrexed      CURRENT THERAPY  Anticipating consolidative durvalumab to begin on 01/24/2024     CURRENT ONCOLOGICAL PROBLEMS   Fatigue     HISTORY OF PRESENT ILLNESS   Former smoker, (quit in 2000), who while undergoing w/u for abdominal pain related to SMA occlusion, was found to have a LLL nodule -   Based on his records he was noted to have abnormal imaging back in 2021, was seen by pulmonary and was told to complete w/u, including PET scan and potential biopsy but did not follow recommendations.    A CT chest on 09/12/23 showed a 3.5 cm spiculated LLL nodule abutting the pericardium. There was also a satellite nodule suspicious for disease and a couple of other spiculated nodules in the WILDA and contralateral right lung.4R node measured 1.4 cm and level 7 2.4 cm.   PET scan on 08/04/23 showed no findings concerning for extra-thoracic disease. LLL nodule was FDG avid as well as multiple mediastinal nodes (including contralateral nodes). The WILDA nodule has mild hypermetabolic activity.  An EBUS procedure on 09/15/23 demonstrated 2R to be involved with adenocarcinoma cells, TTF1+. KRAS G12D +, TPS 95%.  The pt sees medical oncology on 09/27/23 -   States he is feeling well and denies any systemic sx related to this malignancy - including fatigue  and weight loss. The abdominal pain resolved after vascular procedure/stenting performed and he is now on Xarelto and Plavix. Denies any h/a or cough. Denies dyspnea.   10/23/23: C1  Carboplatin/pemetrexed  23: C2 carboplatin/pemetrexed with concurrent RT    23: C3 carboplatin/pemetrexed with concurrent RT   2023: Hypotension due to dehydration, requiring ED visit.  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  HFpEF - EF 60%   Atrial fibrillation - on Eliquis  Ascending aortic aneurysm   PUD (gastric)  HLD        SOCIAL HISTORY  + Tobacco - quit in  - mostly 1/2 ppd starting at age 22  Occasional ETOH intake  Worked as a  for 35 years    for 43 years and has 2 children - son who is 39 yo and daughter who is 35  He has just retired      FAMILY HISTORY  Mother seems to have had H/N or lung cancer - unclear   Father may also have had cancer   1 sister  from complications of GSW  1 brother  of CA (?liver CA)     CURRENT MEDS REVIEWED        ALLERGIES REVIEWED   NKDA     SUBJECTIVE:  Continues to lose weight and feel tired  He was hoping he could blow the snow the other day but felt unable to do so   The weight loss continues despite the improvement of odynophagia   No headaches or new neuro sx   No N/V  Here with his wife Priya   A 13 point review of systems was performed, with significant findings documented above in subjective history.    OBJECTIVE:  Vitals:    24 1335   BP: 84/54   Pulse: 76   Resp: 18   Temp: 36.5 °C (97.7 °F)   SpO2: 99%      Body surface area is 1.94 meters squared.     Wt Readings from Last 5 Encounters:   24 75.9 kg (167 lb 5.3 oz)   01/10/24 76.5 kg (168 lb 10.4 oz)    12/26/23 78.9 kg (174 lb)   12/21/23 81.6 kg (179 lb 14.3 oz)   12/21/23 87.1 kg (192 lb 0.3 oz)       ECOGSCORE: 2    Physical Exam  Constitutional:       Appearance: Normal appearance.   HENT:      Head: Normocephalic and atraumatic.   Eyes:      General: No scleral icterus.     Extraocular Movements: Extraocular movements intact.      Conjunctiva/sclera: Conjunctivae normal.   Cardiovascular:      Rate and Rhythm: Normal rate and regular rhythm.   Pulmonary:      Effort: Pulmonary effort is normal.      Breath sounds: Normal breath sounds.   Abdominal:      General: Abdomen is flat.      Palpations: Abdomen is soft.      Tenderness: There is no abdominal tenderness.   Musculoskeletal:      Right lower leg: No edema.      Left lower leg: No edema.   Neurological:      General: No focal deficit present.      Mental Status: He is alert and oriented to person, place, and time. Mental status is at baseline.      Motor: No weakness.      Gait: Gait normal.   Psychiatric:         Mood and Affect: Mood normal.         Behavior: Behavior normal.         Thought Content: Thought content normal.         Judgment: Judgment normal.        Diagnostic Results   Results:  Labs:  Lab Results   Component Value Date    WBC 5.5 01/23/2024    HGB 10.1 (L) 01/23/2024    HCT 32.3 (L) 01/23/2024    MCV 91 01/23/2024     01/23/2024      Lab Results   Component Value Date    NEUTROABS 3.73 01/23/2024        Lab Results   Component Value Date    GLUCOSE 160 (H) 01/23/2024    CALCIUM 9.2 01/23/2024     (L) 01/23/2024    K 4.2 01/23/2024    CO2 24 01/23/2024     01/23/2024    BUN 28 (H) 01/23/2024    CREATININE 1.06 01/23/2024    MG 1.44 (L) 12/18/2023     Lab Results   Component Value Date    ALT 12 01/23/2024    AST 20 01/23/2024    ALKPHOS 49 01/23/2024    BILITOT 0.5 01/23/2024      Lab Results   Component Value Date    CORTISOL 11.9 01/23/2024    TSH 1.20 01/23/2024       STUDY:  CT CHEST W IV CONTRAST;  1/8/2024  2:19 pm      INDICATION:  Signs/Symptoms:Assessment treatment response stage III NSCLC after  chemo-RT.      COMPARISON:  09/12/2023      ACCESSION NUMBER(S):  [id number]      ORDERING CLINICIAN:  ADELIA CRUZ      TECHNIQUE:  Helical data acquisition of the chest was obtained with  50 mL  Omnipaque 350. Images were reformatted in axial, coronal, and  sagittal planes.MIP reformatted images were also generated.      FINDINGS:  LUNGS and AIRWAYS:  Left lower lobe nodule has decreased in size from 35 x 24 x 3.5 mm to  29 x 18 x 2.7 mm. Scattered additional solid and sub solid nodules  are otherwise not significantly changed, for example measuring 10 mm  in left upper lobe posteriorly. No new nodules or areas of  consolidation. Mild emphysematous changes. Central airways are  patent. No bronchiectasis.      No pleural effusion or pneumothorax.      MEDIASTINUM and GET, LOWER NECK AND AXILLA:  The visualized thyroid gland is grossly unremarkable.      Regression in mediastinal lymphadenopathy, for example:      Subcarinal: Decreased from 24 mm to 11 mm short axis  Upper right paratracheal: From 14 mm 6 mm  Lower right paratracheal: From 13 mm to 14 mm      Esophagus is not dilated.      HEART and VESSELS:  Mild cardiomegaly.      No significant pericardial effusion.      No central pulmonary embolism identified on nondedicated study.      Severe coronary atherosclerosis.      Aortic valve leaflet calcifications. Severe thoracic aortic  atherosclerosis. Unchanged dilatation ascending aorta up to 4.6 cm.  Otherwise the thoracic aorta and great vessels are patent.      UPPER ABDOMEN:  The visualized subdiaphragmatic structures demonstrate no acute  findings on limited images.      CHEST WALL and OSSEOUS STRUCTURES:  No change in cyst and lipomas of the anterior chest wall.  There is increased sclerosis involving a the T6 vertebral body. There  is an increasing sclerotic margin involving lytic lesion of the left  5th rib  laterally. Thoracic degenerative changes.      IMPRESSION:  1.  Favorable treatment response compared to 4 months ago.  2. Left lower lobe mass is slightly regressed in size.  3. Mediastinal lymphadenopathy has significantly regressed.  4. Increased sclerosis at T6 vertebral body and increased sclerotic  margin of lucent lesion of left 5th rib which could be healing  metastases.    Assessment/Plan     Primary cancer of left lower lobe of lung (CMS/HCC), Clinical: Stage IIIC (cT3, cN3, cM0)  Status post completion of concurrent chemo RT on 12/26/2023 (60 Beyer in 30 fractions) using carboplatin/pemetrexed (s/p 3 cycles). His tumor harbors a KRAS G12 D mutation in the PD-L1 expression TPS 95%.  His scans from 01/08/2024 demonstrating a favorable response, with a decrease in size of the dominant left lower lobe primary tumor by approximately 20%, compatible with stable disease.   The left upper lobe nodule remains a stable.  He has however continued to lose weight despite recovery from his odynophagia   His appetite has not recovered and he remains fatigued.  These are very concerning symptoms for disease progression and I am ordering a PET scan STAT.  I will have a follow up appointment by phone to discuss results.       This note was created with the assistance of a speech recognition program.  Although the intention is to generate a document that actually reflects the content of the visit, it is possible that some mistakes occur and may not be corrected by the time of completion of this note.        Janell Longoria MD, MS  Thoracic Medical Oncology   [address] [id number]  Phone: [phone]

## 2024-01-25 LAB — ACTH PLAS-MCNC: 11.8 PG/ML (ref 7.2–63.3)

## 2024-01-26 ENCOUNTER — HOSPITAL ENCOUNTER (OUTPATIENT)
Dept: RADIOLOGY | Facility: HOSPITAL | Age: 69
Discharge: HOME | End: 2024-01-26
Payer: MEDICARE

## 2024-01-26 DIAGNOSIS — C34.32 PRIMARY CANCER OF LEFT LOWER LOBE OF LUNG (MULTI): ICD-10-CM

## 2024-01-26 PROCEDURE — 78815 PET IMAGE W/CT SKULL-THIGH: CPT | Mod: PET TUMOR SUBSQ TX STRATEGY | Performed by: NUCLEAR MEDICINE

## 2024-01-26 PROCEDURE — 78815 PET IMAGE W/CT SKULL-THIGH: CPT | Mod: PS

## 2024-01-26 PROCEDURE — 3430000001 HC RX 343 DIAGNOSTIC RADIOPHARMACEUTICALS: Mod: MUE | Performed by: INTERNAL MEDICINE

## 2024-01-26 PROCEDURE — A9552 F18 FDG: HCPCS | Mod: MUE | Performed by: INTERNAL MEDICINE

## 2024-01-26 RX ORDER — FLUDEOXYGLUCOSE F 18 200 MCI/ML
12.3 INJECTION, SOLUTION INTRAVENOUS
Status: COMPLETED | OUTPATIENT
Start: 2024-01-26 | End: 2024-01-26

## 2024-01-26 RX ADMIN — FLUDEOXYGLUCOSE F 18 12.3 MILLICURIE: 200 INJECTION, SOLUTION INTRAVENOUS at 08:56

## 2024-01-31 ENCOUNTER — INFUSION (OUTPATIENT)
Dept: HEMATOLOGY/ONCOLOGY | Facility: CLINIC | Age: 69
End: 2024-01-31
Payer: MEDICARE

## 2024-01-31 VITALS
RESPIRATION RATE: 18 BRPM | OXYGEN SATURATION: 97 % | HEART RATE: 80 BPM | BODY MASS INDEX: 23.89 KG/M2 | DIASTOLIC BLOOD PRESSURE: 57 MMHG | WEIGHT: 169.3 LBS | SYSTOLIC BLOOD PRESSURE: 91 MMHG | TEMPERATURE: 96.8 F

## 2024-01-31 DIAGNOSIS — C34.32 PRIMARY CANCER OF LEFT LOWER LOBE OF LUNG (MULTI): ICD-10-CM

## 2024-01-31 PROCEDURE — 2500000004 HC RX 250 GENERAL PHARMACY W/ HCPCS (ALT 636 FOR OP/ED): Performed by: INTERNAL MEDICINE

## 2024-01-31 PROCEDURE — 96413 CHEMO IV INFUSION 1 HR: CPT

## 2024-01-31 RX ORDER — EPINEPHRINE 0.3 MG/.3ML
0.3 INJECTION SUBCUTANEOUS EVERY 5 MIN PRN
Status: DISCONTINUED | OUTPATIENT
Start: 2024-01-31 | End: 2024-01-31 | Stop reason: HOSPADM

## 2024-01-31 RX ORDER — ALBUTEROL SULFATE 0.83 MG/ML
3 SOLUTION RESPIRATORY (INHALATION) AS NEEDED
Status: DISCONTINUED | OUTPATIENT
Start: 2024-01-31 | End: 2024-01-31 | Stop reason: HOSPADM

## 2024-01-31 RX ORDER — DIPHENHYDRAMINE HYDROCHLORIDE 50 MG/ML
50 INJECTION INTRAMUSCULAR; INTRAVENOUS AS NEEDED
Status: DISCONTINUED | OUTPATIENT
Start: 2024-01-31 | End: 2024-01-31 | Stop reason: HOSPADM

## 2024-01-31 RX ORDER — FAMOTIDINE 10 MG/ML
20 INJECTION INTRAVENOUS ONCE AS NEEDED
Status: DISCONTINUED | OUTPATIENT
Start: 2024-01-31 | End: 2024-01-31 | Stop reason: HOSPADM

## 2024-01-31 RX ORDER — PROCHLORPERAZINE EDISYLATE 5 MG/ML
10 INJECTION INTRAMUSCULAR; INTRAVENOUS EVERY 6 HOURS PRN
Status: DISCONTINUED | OUTPATIENT
Start: 2024-01-31 | End: 2024-01-31 | Stop reason: HOSPADM

## 2024-01-31 RX ORDER — PROCHLORPERAZINE MALEATE 10 MG
10 TABLET ORAL EVERY 6 HOURS PRN
Status: DISCONTINUED | OUTPATIENT
Start: 2024-01-31 | End: 2024-01-31 | Stop reason: HOSPADM

## 2024-01-31 RX ADMIN — SODIUM CHLORIDE 1500 MG: 9 INJECTION, SOLUTION INTRAVENOUS at 17:01

## 2024-01-31 ASSESSMENT — PAIN SCALES - GENERAL: PAINLEVEL: 0-NO PAIN

## 2024-02-05 ENCOUNTER — APPOINTMENT (OUTPATIENT)
Dept: HEMATOLOGY/ONCOLOGY | Facility: HOSPITAL | Age: 69
End: 2024-02-05
Payer: MEDICARE

## 2024-02-08 ENCOUNTER — APPOINTMENT (OUTPATIENT)
Dept: HEMATOLOGY/ONCOLOGY | Facility: HOSPITAL | Age: 69
End: 2024-02-08
Payer: MEDICARE

## 2024-02-20 ENCOUNTER — TELEPHONE (OUTPATIENT)
Dept: PRIMARY CARE | Facility: CLINIC | Age: 69
End: 2024-02-20
Payer: COMMERCIAL

## 2024-02-20 ENCOUNTER — NURSE TRIAGE (OUTPATIENT)
Dept: ADMISSION | Facility: HOSPITAL | Age: 69
End: 2024-02-20
Payer: COMMERCIAL

## 2024-02-20 NOTE — TELEPHONE ENCOUNTER
I called the pt's wife back and let him know. He is agreeable with seeing his PCP per recommendation from Dr. Longoria's team. Pt refuses ER/urgent care. Including his PCP on this message as an FYI that he is going to be calling her to get an appt.

## 2024-02-20 NOTE — TELEPHONE ENCOUNTER
Patient is requesting appointment to be currently undergoing cancer treatment. No immediate appointment please advise. Oncologist Dr. Longoria  will sending appointment request

## 2024-02-20 NOTE — TELEPHONE ENCOUNTER
Additional Information   Commented on: Where is your swelling?     R groin/top of thigh    Protocols used: Swelling/Deep Venous Thrombosis (DVT)    Pt's wife called reporting that this morning Kevin was in the shower and when he got out they noticed he has a large lump in his R groin/upper thigh area. Per the wife, it is about the size of her hand and is soft to the touch. She said the area is not red/warm and his leg is not swollen. Denies fever, chest pain, SOB. She said he has complained of an upset stomach a few times today but she isn't sure what he meant by that. She said he already told her he didn't want to call us/go to the ER, but she is really concerned so decided to call. She wants to know Dr. Longoria's recommendations. Message & secure chat sent to the team.

## 2024-02-21 ENCOUNTER — APPOINTMENT (OUTPATIENT)
Dept: HEMATOLOGY/ONCOLOGY | Facility: CLINIC | Age: 69
End: 2024-02-21
Payer: MEDICARE

## 2024-02-22 ENCOUNTER — HOSPITAL ENCOUNTER (EMERGENCY)
Facility: HOSPITAL | Age: 69
Discharge: HOME | End: 2024-02-22
Attending: EMERGENCY MEDICINE
Payer: COMMERCIAL

## 2024-02-22 VITALS
TEMPERATURE: 98 F | WEIGHT: 165 LBS | SYSTOLIC BLOOD PRESSURE: 95 MMHG | OXYGEN SATURATION: 97 % | HEIGHT: 72 IN | HEART RATE: 53 BPM | RESPIRATION RATE: 18 BRPM | BODY MASS INDEX: 22.35 KG/M2 | DIASTOLIC BLOOD PRESSURE: 62 MMHG

## 2024-02-22 DIAGNOSIS — K40.90 UNILATERAL INGUINAL HERNIA WITHOUT OBSTRUCTION OR GANGRENE, RECURRENCE NOT SPECIFIED: Primary | ICD-10-CM

## 2024-02-22 PROCEDURE — 99282 EMERGENCY DEPT VISIT SF MDM: CPT

## 2024-02-22 ASSESSMENT — PAIN DESCRIPTION - ONSET: ONSET: ONGOING

## 2024-02-22 ASSESSMENT — PAIN SCALES - GENERAL: PAINLEVEL_OUTOF10: 7

## 2024-02-22 ASSESSMENT — PAIN DESCRIPTION - FREQUENCY: FREQUENCY: CONSTANT/CONTINUOUS

## 2024-02-22 ASSESSMENT — PAIN - FUNCTIONAL ASSESSMENT: PAIN_FUNCTIONAL_ASSESSMENT: 0-10

## 2024-02-22 ASSESSMENT — PAIN DESCRIPTION - PROGRESSION: CLINICAL_PROGRESSION: GRADUALLY WORSENING

## 2024-02-22 ASSESSMENT — PAIN DESCRIPTION - ORIENTATION: ORIENTATION: RIGHT

## 2024-02-22 ASSESSMENT — PAIN DESCRIPTION - DESCRIPTORS: DESCRIPTORS: ACHING

## 2024-02-22 ASSESSMENT — PAIN DESCRIPTION - LOCATION: LOCATION: GROIN

## 2024-02-22 NOTE — ED PROVIDER NOTES
"Chief Complaint   Patient presents with   • Hernia   • Groin Pain       HPI       68 year old male presents to the Emergency Department today complaining of a pain in the right groin region that he describes as sharp in nature, constant, non-radiating, and varies in intensity. Notes to feeling a \"lump\" in that region that he first noticed after straining while having a bowel movement several days ago. States that the \"lump\" is intermittent. Has had occasional nausea associated with the above. Denies any associated fever, chills, headache, neck pain, chest pain, shortness of breath, abdominal pain, vomiting, diarrhea, constipation, or urinary symptoms.       History provided by:  Patient             Patient History   Past Medical History:   Diagnosis Date   • Aortic aneurysm (CMS/HCC)    • Atrial fibrillation (CMS/HCC)    • Coronary artery disease    • Hyperlipidemia    • Hypertension    • Lung cancer (CMS/HCC)    • Personal history of peptic ulcer disease     History of gastric ulcer   • Superior mesenteric artery stenosis (CMS/HCC)    • Tinnitus, bilateral 2016    Tinnitus of both ears     Past Surgical History:   Procedure Laterality Date   • CT ABDOMEN PELVIS ANGIOGRAM W AND/OR WO IV CONTRAST  2023    CT ABDOMEN PELVIS ANGIOGRAM W AND/OR WO IV CONTRAST 2023 POR CT   • OTHER SURGICAL HISTORY  2019    Esophagogastroduodenoscopy   • OTHER SURGICAL HISTORY  2019    Stomach surgery   • SUPERIOR MESTENTERIC ARTERY STENT       Family History   Problem Relation Name Age of Onset   • Cancer Mother          ?larynx or lung     Social History     Tobacco Use   • Smoking status: Former     Packs/day: 0.50     Years: 30.00     Additional pack years: 0.00     Total pack years: 15.00     Types: Cigarettes     Quit date:      Years since quittin.1     Passive exposure: Past   • Smokeless tobacco: Never   Vaping Use   • Vaping Use: Never used   Substance Use Topics   • Alcohol use: Not " Currently   • Drug use: Never           Physical Exam  Constitutional:       Appearance: Normal appearance.   HENT:      Head: Normocephalic.      Right Ear: Tympanic membrane, ear canal and external ear normal.      Left Ear: Tympanic membrane, ear canal and external ear normal.      Nose: Nose normal.      Mouth/Throat:      Mouth: Mucous membranes are moist.      Pharynx: Oropharynx is clear. No oropharyngeal exudate or posterior oropharyngeal erythema.   Eyes:      Conjunctiva/sclera: Conjunctivae normal.      Pupils: Pupils are equal, round, and reactive to light.   Cardiovascular:      Rate and Rhythm: Normal rate and regular rhythm.      Pulses:           Radial pulses are 3+ on the right side and 3+ on the left side.        Dorsalis pedis pulses are 3+ on the right side and 3+ on the left side.      Heart sounds: Normal heart sounds. No murmur heard.     No friction rub. No gallop.   Pulmonary:      Effort: Pulmonary effort is normal. No respiratory distress.      Breath sounds: Normal breath sounds. No wheezing, rhonchi or rales.   Abdominal:      General: Abdomen is flat. Bowel sounds are normal.      Palpations: Abdomen is soft.      Tenderness: There is no abdominal tenderness. There is no right CVA tenderness, left CVA tenderness, guarding or rebound. Negative signs include Pablo's sign and McBurney's sign.      Hernia: A hernia is present. Hernia is present in the right inguinal area.   Musculoskeletal:         General: No swelling or deformity.      Cervical back: Full passive range of motion without pain.      Right lower leg: No edema.      Left lower leg: No edema.   Lymphadenopathy:      Cervical: No cervical adenopathy.   Skin:     Capillary Refill: Capillary refill takes less than 2 seconds.      Coloration: Skin is not jaundiced.      Findings: No rash.   Neurological:      General: No focal deficit present.      Mental Status: He is alert and oriented to person, place, and time. Mental status  is at baseline.      Gait: Gait is intact.   Psychiatric:         Mood and Affect: Mood normal.         Behavior: Behavior is cooperative.         Labs Reviewed - No data to display    No orders to display            ED Course & MDM   ED Course as of 02/22/24 1354   Thu Feb 22, 2024   1341 This patient was seen by the advanced practice provider.  I have personally performed a substantive portion of the encounter.  I have seen and examined the patient; agree with the workup, evaluation, MDM, management and diagnosis.  The care plan has been discussed.      I personally saw the patient and made/approved the management plan and take responsibility for the patient management.    History: Patient presents with right lower groin pain concern for hernia  Exam: Easily reducible hernia on exam  MDM: Patient with easily reducible hernia.  Will refer to surgery.  No indications for CT at this time.    No indication for admission.  Discussed findings and diagnosis with the patient, follow-up and return to ED precautions given, patient voiced understanding, agrees with plan, questions answered, patient was discharged in stable condition. [JH]      ED Course User Index  [JH] Mani Najera MD         Diagnoses as of 02/22/24 1354   Unilateral inguinal hernia without obstruction or gangrene, recurrence not specified           Medical Decision Making  Patient was seen and evaluated by Dr. Najera. Right groin/inguinal hernia was present, but easily reducible. There were no signs of incarceration. Spoke with the patient and his wife at length about the diagnosis and given strict return precautions. Educated on the need to increase the fiber in his diet and use a stool softener. Referred to surgery for follow up. Discharged in stable condition with computer instructions.     Diagnostic Impression:    1. Acute right groin/inguinal hernia           Your medication list        ASK your doctor about these medications        Instructions Last  Dose Given Next Dose Due   BMX ORAL SUSPENSION (1:1:1)      Swish and swallow 15-30 mL every 2 hours if needed for throat pain.       clopidogrel 75 mg tablet  Commonly known as: Plavix      Take 1 tablet (75 mg) by mouth once daily.       digoxin 125 MCG tablet  Commonly known as: Lanoxin      Take 1 tablet (125 mcg) by mouth once daily for 180 doses.       Eliquis 5 mg tablet  Generic drug: apixaban           apixaban 5 mg tablet  Commonly known as: Eliquis      Take 1 tablet (5 mg) by mouth 2 times a day.       Entresto 24-26 mg tablet  Generic drug: sacubitriL-valsartan      Take 1 tablet by mouth 2 times a day.       fenofibrate 145 mg tablet  Commonly known as: Tricor           gabapentin 300 mg capsule  Commonly known as: Neurontin      Take 1 capsule (300 mg) by mouth once daily at bedtime.       metoprolol succinate XL 50 mg 24 hr tablet  Commonly known as: Toprol-XL      Take 1 tablet (50 mg) by mouth once daily. Do not crush or chew.       metoprolol succinate XL 25 mg 24 hr tablet  Commonly known as: Toprol-XL      Take 1 tablet (25 mg) by mouth once daily. Do not crush or chew.       oxyCODONE 5 mg immediate release tablet  Commonly known as: Roxicodone      Take 1 tablet (5 mg) by mouth every 4 hours if needed for moderate pain (4 - 6) (Take it half an hour before meals to help with pain when swallowing).       rosuvastatin 5 mg tablet  Commonly known as: Crestor      Take 1 tablet (5 mg) by mouth once daily.       spironolactone 25 mg tablet  Commonly known as: Aldactone      Take 0.5 tablets (12.5 mg) by mouth once daily.       sucralfate 100 mg/mL suspension  Commonly known as: Carafate      Take 10 mL (1 g) by mouth if needed (take before meals if swallowing is uncomfortable) for up to 30 doses.                  Procedure  Procedures     Osmel Navarro, RUKHSANA-CNP  02/22/24 [id number]

## 2024-02-23 ENCOUNTER — APPOINTMENT (OUTPATIENT)
Dept: PRIMARY CARE | Facility: CLINIC | Age: 69
End: 2024-02-23
Payer: COMMERCIAL

## 2024-02-27 ENCOUNTER — LAB (OUTPATIENT)
Dept: LAB | Facility: LAB | Age: 69
End: 2024-02-27
Payer: MEDICARE

## 2024-02-27 DIAGNOSIS — R73.09 ELEVATED GLUCOSE: ICD-10-CM

## 2024-02-27 DIAGNOSIS — C34.32 PRIMARY CANCER OF LEFT LOWER LOBE OF LUNG (MULTI): ICD-10-CM

## 2024-02-27 LAB
ALBUMIN SERPL BCP-MCNC: 4 G/DL (ref 3.4–5)
ALP SERPL-CCNC: 59 U/L (ref 33–136)
ALT SERPL W P-5'-P-CCNC: 18 U/L (ref 10–52)
ANION GAP SERPL CALC-SCNC: 12 MMOL/L (ref 10–20)
AST SERPL W P-5'-P-CCNC: 22 U/L (ref 9–39)
BASOPHILS # BLD AUTO: 0.04 X10*3/UL (ref 0–0.1)
BASOPHILS NFR BLD AUTO: 0.6 %
BILIRUB SERPL-MCNC: 0.3 MG/DL (ref 0–1.2)
BUN SERPL-MCNC: 31 MG/DL (ref 6–23)
CALCIUM SERPL-MCNC: 9.3 MG/DL (ref 8.6–10.3)
CHLORIDE SERPL-SCNC: 103 MMOL/L (ref 98–107)
CO2 SERPL-SCNC: 24 MMOL/L (ref 21–32)
CREAT SERPL-MCNC: 1.11 MG/DL (ref 0.5–1.3)
EGFRCR SERPLBLD CKD-EPI 2021: 73 ML/MIN/1.73M*2
EOSINOPHIL # BLD AUTO: 0.35 X10*3/UL (ref 0–0.7)
EOSINOPHIL NFR BLD AUTO: 5.4 %
ERYTHROCYTE [DISTWIDTH] IN BLOOD BY AUTOMATED COUNT: 13.7 % (ref 11.5–14.5)
EST. AVERAGE GLUCOSE BLD GHB EST-MCNC: 128 MG/DL
GLUCOSE SERPL-MCNC: 156 MG/DL (ref 74–99)
HBA1C MFR BLD: 6.1 %
HCT VFR BLD AUTO: 31.7 % (ref 41–52)
HGB BLD-MCNC: 9.8 G/DL (ref 13.5–17.5)
IMM GRANULOCYTES # BLD AUTO: 0.02 X10*3/UL (ref 0–0.7)
IMM GRANULOCYTES NFR BLD AUTO: 0.3 % (ref 0–0.9)
LYMPHOCYTES # BLD AUTO: 1.27 X10*3/UL (ref 1.2–4.8)
LYMPHOCYTES NFR BLD AUTO: 19.6 %
MCH RBC QN AUTO: 28.2 PG (ref 26–34)
MCHC RBC AUTO-ENTMCNC: 30.9 G/DL (ref 32–36)
MCV RBC AUTO: 91 FL (ref 80–100)
MONOCYTES # BLD AUTO: 0.69 X10*3/UL (ref 0.1–1)
MONOCYTES NFR BLD AUTO: 10.6 %
NEUTROPHILS # BLD AUTO: 4.12 X10*3/UL (ref 1.2–7.7)
NEUTROPHILS NFR BLD AUTO: 63.5 %
NRBC BLD-RTO: 0 /100 WBCS (ref 0–0)
PLATELET # BLD AUTO: 250 X10*3/UL (ref 150–450)
POTASSIUM SERPL-SCNC: 3.6 MMOL/L (ref 3.5–5.3)
PROT SERPL-MCNC: 7.6 G/DL (ref 6.4–8.2)
RBC # BLD AUTO: 3.47 X10*6/UL (ref 4.5–5.9)
SODIUM SERPL-SCNC: 135 MMOL/L (ref 136–145)
WBC # BLD AUTO: 6.5 X10*3/UL (ref 4.4–11.3)

## 2024-02-27 PROCEDURE — 36415 COLL VENOUS BLD VENIPUNCTURE: CPT

## 2024-02-27 PROCEDURE — 83036 HEMOGLOBIN GLYCOSYLATED A1C: CPT

## 2024-02-27 PROCEDURE — 80053 COMPREHEN METABOLIC PANEL: CPT

## 2024-02-27 PROCEDURE — 85025 COMPLETE CBC W/AUTO DIFF WBC: CPT

## 2024-02-28 ENCOUNTER — INFUSION (OUTPATIENT)
Dept: HEMATOLOGY/ONCOLOGY | Facility: CLINIC | Age: 69
End: 2024-02-28
Payer: MEDICARE

## 2024-02-28 ENCOUNTER — TELEPHONE (OUTPATIENT)
Dept: PRIMARY CARE | Facility: CLINIC | Age: 69
End: 2024-02-28
Payer: COMMERCIAL

## 2024-02-28 ENCOUNTER — OFFICE VISIT (OUTPATIENT)
Dept: HEMATOLOGY/ONCOLOGY | Facility: CLINIC | Age: 69
End: 2024-02-28
Payer: MEDICARE

## 2024-02-28 VITALS
SYSTOLIC BLOOD PRESSURE: 90 MMHG | HEART RATE: 86 BPM | OXYGEN SATURATION: 100 % | RESPIRATION RATE: 18 BRPM | TEMPERATURE: 98.4 F | DIASTOLIC BLOOD PRESSURE: 57 MMHG | BODY MASS INDEX: 22.04 KG/M2 | WEIGHT: 162.48 LBS

## 2024-02-28 DIAGNOSIS — C34.32 PRIMARY CANCER OF LEFT LOWER LOBE OF LUNG (MULTI): ICD-10-CM

## 2024-02-28 PROCEDURE — 1126F AMNT PAIN NOTED NONE PRSNT: CPT | Performed by: NURSE PRACTITIONER

## 2024-02-28 PROCEDURE — 3074F SYST BP LT 130 MM HG: CPT | Performed by: NURSE PRACTITIONER

## 2024-02-28 PROCEDURE — 99214 OFFICE O/P EST MOD 30 MIN: CPT | Performed by: NURSE PRACTITIONER

## 2024-02-28 PROCEDURE — 2500000004 HC RX 250 GENERAL PHARMACY W/ HCPCS (ALT 636 FOR OP/ED): Performed by: INTERNAL MEDICINE

## 2024-02-28 PROCEDURE — 96413 CHEMO IV INFUSION 1 HR: CPT

## 2024-02-28 PROCEDURE — 1157F ADVNC CARE PLAN IN RCRD: CPT | Performed by: NURSE PRACTITIONER

## 2024-02-28 PROCEDURE — 1160F RVW MEDS BY RX/DR IN RCRD: CPT | Performed by: NURSE PRACTITIONER

## 2024-02-28 PROCEDURE — 1159F MED LIST DOCD IN RCRD: CPT | Performed by: NURSE PRACTITIONER

## 2024-02-28 PROCEDURE — 3078F DIAST BP <80 MM HG: CPT | Performed by: NURSE PRACTITIONER

## 2024-02-28 PROCEDURE — 1036F TOBACCO NON-USER: CPT | Performed by: NURSE PRACTITIONER

## 2024-02-28 RX ORDER — HEPARIN SODIUM,PORCINE/PF 10 UNIT/ML
50 SYRINGE (ML) INTRAVENOUS AS NEEDED
Status: DISCONTINUED | OUTPATIENT
Start: 2024-02-28 | End: 2024-02-28 | Stop reason: HOSPADM

## 2024-02-28 RX ORDER — FAMOTIDINE 10 MG/ML
20 INJECTION INTRAVENOUS ONCE AS NEEDED
Status: DISCONTINUED | OUTPATIENT
Start: 2024-02-28 | End: 2024-02-28 | Stop reason: HOSPADM

## 2024-02-28 RX ORDER — HEPARIN 100 UNIT/ML
500 SYRINGE INTRAVENOUS AS NEEDED
OUTPATIENT
Start: 2024-02-28

## 2024-02-28 RX ORDER — ALBUTEROL SULFATE 0.83 MG/ML
3 SOLUTION RESPIRATORY (INHALATION) AS NEEDED
Status: DISCONTINUED | OUTPATIENT
Start: 2024-02-28 | End: 2024-02-28 | Stop reason: HOSPADM

## 2024-02-28 RX ORDER — HEPARIN 100 UNIT/ML
500 SYRINGE INTRAVENOUS AS NEEDED
Status: DISCONTINUED | OUTPATIENT
Start: 2024-02-28 | End: 2024-02-28 | Stop reason: HOSPADM

## 2024-02-28 RX ORDER — HEPARIN SODIUM,PORCINE/PF 10 UNIT/ML
50 SYRINGE (ML) INTRAVENOUS AS NEEDED
OUTPATIENT
Start: 2024-02-28

## 2024-02-28 RX ORDER — DIPHENHYDRAMINE HYDROCHLORIDE 50 MG/ML
50 INJECTION INTRAMUSCULAR; INTRAVENOUS AS NEEDED
Status: DISCONTINUED | OUTPATIENT
Start: 2024-02-28 | End: 2024-02-28 | Stop reason: HOSPADM

## 2024-02-28 RX ORDER — PROCHLORPERAZINE EDISYLATE 5 MG/ML
10 INJECTION INTRAMUSCULAR; INTRAVENOUS EVERY 6 HOURS PRN
Status: DISCONTINUED | OUTPATIENT
Start: 2024-02-28 | End: 2024-02-28 | Stop reason: HOSPADM

## 2024-02-28 RX ORDER — PROCHLORPERAZINE MALEATE 10 MG
10 TABLET ORAL EVERY 6 HOURS PRN
Status: DISCONTINUED | OUTPATIENT
Start: 2024-02-28 | End: 2024-02-28 | Stop reason: HOSPADM

## 2024-02-28 RX ORDER — EPINEPHRINE 0.3 MG/.3ML
0.3 INJECTION SUBCUTANEOUS EVERY 5 MIN PRN
Status: DISCONTINUED | OUTPATIENT
Start: 2024-02-28 | End: 2024-02-28 | Stop reason: HOSPADM

## 2024-02-28 RX ADMIN — SODIUM CHLORIDE 1500 MG: 9 INJECTION, SOLUTION INTRAVENOUS at 14:56

## 2024-02-28 ASSESSMENT — ENCOUNTER SYMPTOMS
LOSS OF SENSATION IN FEET: 0
OCCASIONAL FEELINGS OF UNSTEADINESS: 0
DEPRESSION: 0

## 2024-02-28 ASSESSMENT — PAIN SCALES - GENERAL: PAINLEVEL: 0-NO PAIN

## 2024-02-28 NOTE — TELEPHONE ENCOUNTER
----- Message from RUKHSANA Fried-CNS sent at 2/27/2024  7:41 PM EST -----  Please let patient know that A1C has shown improvement at 6.1%, improved from 7.3%.

## 2024-02-28 NOTE — PROGRESS NOTES
Patient ID: Kevin Rubi is a 68 y.o. male    Primary Care Provider: Chen Olea, APRN-CNS    DIAGNOSIS AND STAGING   Stage IIIC ([id number]) NSCLC (adenocarcinoma, TTF1+) of the LLL - dx on 09/15/23   Primary tumor measures 3.5 cm (+satellite nodule)   2R+ for adenocarcinoma cells     SITES OF DISEASE   LLL   Mediastinal nodes (including contralateral)    Has a couple of other spiculated nodules (0.8 cm WILDA and 0.7 cm RUL) that are potential  synchronous primaries and will be addressed when needed      MOLECULAR GENOMICS   KRAS G12D mutation    PD-L1 TPS 95%     PRIOR THERAPIES  Concurrent chemo RT (60 Beyer in 30 fractions, completed on 12/26/2023 using carboplatin/pemetrexed      CURRENT THERAPY  Anticipating consolidative durvalumab to begin on 01/24/2024     CURRENT ONCOLOGICAL PROBLEMS   Fatigue     HISTORY OF PRESENT ILLNESS   Former smoker, (quit in 2000), who while undergoing w/u for abdominal pain related to SMA occlusion, was found to have a LLL nodule -   Based on his records he was noted to have abnormal imaging back in 2021, was seen by pulmonary and was told to complete w/u, including PET scan and potential biopsy but did not follow recommendations.    A CT chest on 09/12/23 showed a 3.5 cm spiculated LLL nodule abutting the pericardium. There was also a satellite nodule suspicious for disease and a couple of other spiculated nodules in the WILDA and contralateral right lung.4R node measured 1.4 cm and level 7 2.4 cm.   PET scan on 08/04/23 showed no findings concerning for extra-thoracic disease. LLL nodule was FDG avid as well as multiple mediastinal nodes (including contralateral nodes). The WILDA nodule has mild hypermetabolic activity.  An EBUS procedure on 09/15/23 demonstrated 2R to be involved with adenocarcinoma cells, TTF1+. KRAS G12D +, TPS 95%.  The pt sees medical oncology on 09/27/23 -   States he is feeling well and denies any systemic sx related to this malignancy - including fatigue  and weight loss. The abdominal pain resolved after vascular procedure/stenting performed and he is now on Xarelto and Plavix. Denies any h/a or cough. Denies dyspnea.   10/23/23: C1  Carboplatin/pemetrexed  23: C2 carboplatin/pemetrexed with concurrent RT    23: C3 carboplatin/pemetrexed with concurrent RT   2023: Hypotension due to dehydration, requiring ED visit.  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  HFpEF - EF 60%   Atrial fibrillation - on Eliquis  Ascending aortic aneurysm   PUD (gastric)  HLD        SOCIAL HISTORY  + Tobacco - quit in  - mostly 1/2 ppd starting at age 22  Occasional ETOH intake  Worked as a  for 35 years    for 43 years and has 2 children - son who is 39 yo and daughter who is 35  He has just retired      FAMILY HISTORY  Mother seems to have had H/N or lung cancer - unclear   Father may also have had cancer   1 sister  from complications of GSW  1 brother  of CA (?liver CA)     CURRENT MEDS REVIEWED        ALLERGIES REVIEWED   NKDA     SUBJECTIVE:  Patient here today with his wife for follow-up and durvalumab  Feeling well for treatment  Breathing good  No GI symptoms  24 Seen in ED for right groin pain, was referred to surgery for right inguinal hernia, no surgical intervention at this time  No new aches/pains  No other concerns or complaints    LAST VISIT:  Continues to lose weight and feel tired  He was hoping he could blow the snow the other day but felt unable to do so   The weight loss continues despite the improvement of odynophagia   No headaches or new neuro sx   No N/V  Here with his wife Priya   A 13 point review of systems was performed, with significant  findings documented above in subjective history.    OBJECTIVE:  There were no vitals filed for this visit.     There is no height or weight on file to calculate BSA.     Wt Readings from Last 5 Encounters:   02/22/24 74.8 kg (165 lb)   01/31/24 76.8 kg (169 lb 4.8 oz)   01/24/24 75.9 kg (167 lb 5.3 oz)   01/10/24 76.5 kg (168 lb 10.4 oz)   12/26/23 78.9 kg (174 lb)       ECOGSCORE: 2    Physical Exam  Constitutional:       Appearance: Normal appearance.   HENT:      Head: Normocephalic and atraumatic.   Eyes:      General: No scleral icterus.     Extraocular Movements: Extraocular movements intact.      Conjunctiva/sclera: Conjunctivae normal.   Cardiovascular:      Rate and Rhythm: Normal rate and regular rhythm.   Pulmonary:      Effort: Pulmonary effort is normal.      Breath sounds: Normal breath sounds.   Abdominal:      General: Abdomen is flat.      Palpations: Abdomen is soft.      Tenderness: There is no abdominal tenderness.   Musculoskeletal:         General: Normal range of motion.      Right lower leg: No edema.      Left lower leg: No edema.   Neurological:      General: No focal deficit present.      Mental Status: He is alert and oriented to person, place, and time. Mental status is at baseline.      Motor: No weakness.      Gait: Gait normal.   Psychiatric:         Mood and Affect: Mood normal.         Behavior: Behavior normal.         Thought Content: Thought content normal.         Judgment: Judgment normal.          Diagnostic Results   Results:  Labs:  Lab Results   Component Value Date    WBC 6.5 02/27/2024    HGB 9.8 (L) 02/27/2024    HCT 31.7 (L) 02/27/2024    MCV 91 02/27/2024     02/27/2024      Lab Results   Component Value Date    NEUTROABS 4.12 02/27/2024        Lab Results   Component Value Date    GLUCOSE 156 (H) 02/27/2024    CALCIUM 9.3 02/27/2024     (L) 02/27/2024    K 3.6 02/27/2024    CO2 24 02/27/2024     02/27/2024    BUN 31 (H) 02/27/2024    CREATININE 1.11  02/27/2024    MG 1.44 (L) 12/18/2023     Lab Results   Component Value Date    ALT 18 02/27/2024    AST 22 02/27/2024    ALKPHOS 59 02/27/2024    BILITOT 0.3 02/27/2024      Lab Results   Component Value Date    ACTH 11.8 01/23/2024    CORTISOL 11.9 01/23/2024    TSH 1.20 01/23/2024       STUDY:  CT CHEST W IV CONTRAST;  1/8/2024 2:19 pm      INDICATION:  Signs/Symptoms:Assessment treatment response stage III NSCLC after  chemo-RT.      COMPARISON:  09/12/2023      ACCESSION NUMBER(S):  [id number]      ORDERING CLINICIAN:  ADELIA CRUZ      TECHNIQUE:  Helical data acquisition of the chest was obtained with  50 mL  Omnipaque 350. Images were reformatted in axial, coronal, and  sagittal planes.MIP reformatted images were also generated.      FINDINGS:  LUNGS and AIRWAYS:  Left lower lobe nodule has decreased in size from 35 x 24 x 3.5 mm to  29 x 18 x 2.7 mm. Scattered additional solid and sub solid nodules  are otherwise not significantly changed, for example measuring 10 mm  in left upper lobe posteriorly. No new nodules or areas of  consolidation. Mild emphysematous changes. Central airways are  patent. No bronchiectasis.      No pleural effusion or pneumothorax.      MEDIASTINUM and GET, LOWER NECK AND AXILLA:  The visualized thyroid gland is grossly unremarkable.      Regression in mediastinal lymphadenopathy, for example:      Subcarinal: Decreased from 24 mm to 11 mm short axis  Upper right paratracheal: From 14 mm 6 mm  Lower right paratracheal: From 13 mm to 14 mm      Esophagus is not dilated.      HEART and VESSELS:  Mild cardiomegaly.      No significant pericardial effusion.      No central pulmonary embolism identified on nondedicated study.      Severe coronary atherosclerosis.      Aortic valve leaflet calcifications. Severe thoracic aortic  atherosclerosis. Unchanged dilatation ascending aorta up to 4.6 cm.  Otherwise the thoracic aorta and great vessels are patent.      UPPER ABDOMEN:  The  visualized subdiaphragmatic structures demonstrate no acute  findings on limited images.      CHEST WALL and OSSEOUS STRUCTURES:  No change in cyst and lipomas of the anterior chest wall.  There is increased sclerosis involving a the T6 vertebral body. There  is an increasing sclerotic margin involving lytic lesion of the left  5th rib laterally. Thoracic degenerative changes.      IMPRESSION:  1.  Favorable treatment response compared to 4 months ago.  2. Left lower lobe mass is slightly regressed in size.  3. Mediastinal lymphadenopathy has significantly regressed.  4. Increased sclerosis at T6 vertebral body and increased sclerotic  margin of lucent lesion of left 5th rib which could be healing  metastases.    Assessment/Plan     Primary cancer of left lower lobe of lung (CMS/HCC), Clinical: Stage IIIC (cT3, cN3, cM0)  Status post completion of concurrent chemo RT on 12/26/2023 (60 Beyer in 30 fractions) using carboplatin/pemetrexed (s/p 3 cycles). His tumor harbors a KRAS G12 D mutation in the PD-L1 expression TPS 95%.  His scans from 01/08/2024 demonstrating a favorable response, with a decrease in size of the dominant left lower lobe primary tumor by approximately 20%, compatible with stable disease.   The left upper lobe nodule remains a stable.  STAT PET noted decreases size LLL nodule, resolution of mediatstinal lymphadenopathy, new WILDA nodule too small to characterize

## 2024-03-04 ENCOUNTER — DOCUMENTATION (OUTPATIENT)
Dept: CASE MANAGEMENT | Facility: HOSPITAL | Age: 69
End: 2024-03-04
Payer: COMMERCIAL

## 2024-03-04 NOTE — PROGRESS NOTES
SW met with patient and his wife on 2/28/24 for needs assessment and supportive counseling. They discussed concerns about unpaid hospital bills., SW discussed  assistance program and believe they will qualify due to their income. Patient received a letter from the [employer] for an 8,000 adrianne for  pharmaceutical co-pays.  SW on scrible to go over specifics with patient's adrianne.  Patient and spouse will drop off SS income letters within the next few days. Will follow up.

## 2024-03-08 ENCOUNTER — TELEPHONE (OUTPATIENT)
Dept: PRIMARY CARE | Facility: CLINIC | Age: 69
End: 2024-03-08
Payer: COMMERCIAL

## 2024-03-08 ENCOUNTER — NURSE TRIAGE (OUTPATIENT)
Dept: ADMISSION | Facility: HOSPITAL | Age: 69
End: 2024-03-08
Payer: COMMERCIAL

## 2024-03-08 NOTE — TELEPHONE ENCOUNTER
Spouse called the office because Kevin is c/o abdominal pain. He describes it as lower abdominal pain; intermittent; states it has been going on for about two weeks now. The pain subsides with rest and comes on with activity.   At rest, no pain, however with movement it gets up to about a 7/10.   No bowel issues per patient; denies any N/V/D/C.  No abdominal bloating.    He does have a hernia in his right groin, however, his pain is higher up than this.    He is also s/p stenting to celiac artery in August 2023.  He does state the pain does feel similar to when it was blocked in the past requiring the stent placement though not as intense.  Wondering if somehow this became blocked again.    In addition to the pain he does feel like he is eating and drinking less as his appetite has been decreased. No dizziness/headache or signs of dehydration noted. BP today 117/78.    Patient denies any fever, shortness of breath or chest pain.           Additional Information   Commented on: Where is the pain? Is there more than one place where you're having pain?     Lower abdomen   Commented on: What helps these problems?     TUMS also help slightly    Protocols used: Pain

## 2024-03-08 NOTE — TELEPHONE ENCOUNTER
I called Priya back and let her know that per team, since eKvin did not follow up with his vascular surgeon, he should either contact his PCP or report to the ED as this is not related to his cancer.     She did call his PCP and they are calling him Monday to set up an appointment.    She knows to get him to the ED if pain worsens or if he develops any N/V or any other concerning symptoms.    No further questions or concerns at this time.

## 2024-03-08 NOTE — TELEPHONE ENCOUNTER
He started yesterday having stomach pain on & off , Priya said it started after his treatment. No nausea or vomiting.   He wants to schedule appointment with you.    She will also call oncology & said if continues , she will take him to ER

## 2024-03-11 ENCOUNTER — OFFICE VISIT (OUTPATIENT)
Dept: PRIMARY CARE | Facility: CLINIC | Age: 69
End: 2024-03-11
Payer: MEDICARE

## 2024-03-11 VITALS
HEART RATE: 64 BPM | BODY MASS INDEX: 21.13 KG/M2 | WEIGHT: 156 LBS | SYSTOLIC BLOOD PRESSURE: 100 MMHG | HEIGHT: 72 IN | DIASTOLIC BLOOD PRESSURE: 60 MMHG

## 2024-03-11 DIAGNOSIS — I77.1 CELIAC ARTERY STENOSIS (CMS-HCC): Primary | ICD-10-CM

## 2024-03-11 DIAGNOSIS — R30.0 DYSURIA: ICD-10-CM

## 2024-03-11 DIAGNOSIS — R10.30 LOWER ABDOMINAL PAIN: ICD-10-CM

## 2024-03-11 LAB
POC APPEARANCE, URINE: CLEAR
POC BILIRUBIN, URINE: NEGATIVE
POC BLOOD, URINE: NEGATIVE
POC COLOR, URINE: YELLOW
POC GLUCOSE, URINE: NEGATIVE MG/DL
POC KETONES, URINE: NEGATIVE MG/DL
POC LEUKOCYTES, URINE: NEGATIVE
POC NITRITE,URINE: NEGATIVE
POC PH, URINE: 6.5 PH
POC PROTEIN, URINE: ABNORMAL MG/DL
POC SPECIFIC GRAVITY, URINE: 1.01
POC UROBILINOGEN, URINE: 0.2 EU/DL

## 2024-03-11 PROCEDURE — 3078F DIAST BP <80 MM HG: CPT | Performed by: CLINICAL NURSE SPECIALIST

## 2024-03-11 PROCEDURE — 1157F ADVNC CARE PLAN IN RCRD: CPT | Performed by: CLINICAL NURSE SPECIALIST

## 2024-03-11 PROCEDURE — 87086 URINE CULTURE/COLONY COUNT: CPT

## 2024-03-11 PROCEDURE — 99214 OFFICE O/P EST MOD 30 MIN: CPT | Performed by: CLINICAL NURSE SPECIALIST

## 2024-03-11 PROCEDURE — 1159F MED LIST DOCD IN RCRD: CPT | Performed by: CLINICAL NURSE SPECIALIST

## 2024-03-11 PROCEDURE — 1160F RVW MEDS BY RX/DR IN RCRD: CPT | Performed by: CLINICAL NURSE SPECIALIST

## 2024-03-11 PROCEDURE — 81002 URINALYSIS NONAUTO W/O SCOPE: CPT | Performed by: CLINICAL NURSE SPECIALIST

## 2024-03-11 PROCEDURE — 1126F AMNT PAIN NOTED NONE PRSNT: CPT | Performed by: CLINICAL NURSE SPECIALIST

## 2024-03-11 PROCEDURE — 3074F SYST BP LT 130 MM HG: CPT | Performed by: CLINICAL NURSE SPECIALIST

## 2024-03-11 PROCEDURE — 1036F TOBACCO NON-USER: CPT | Performed by: CLINICAL NURSE SPECIALIST

## 2024-03-11 ASSESSMENT — ENCOUNTER SYMPTOMS
CHILLS: 0
BRUISES/BLEEDS EASILY: 0
FEVER: 0
CHEST TIGHTNESS: 0
ACTIVITY CHANGE: 0
POLYDIPSIA: 0
ARTHRALGIAS: 0
CONSTIPATION: 0
MYALGIAS: 0
EYE PAIN: 0
FLANK PAIN: 0
PALPITATIONS: 0
APPETITE CHANGE: 0
DIARRHEA: 0
VOMITING: 0
NAUSEA: 0
UNEXPECTED WEIGHT CHANGE: 0
ABDOMINAL PAIN: 1
LOSS OF SENSATION IN FEET: 0
TROUBLE SWALLOWING: 0
DEPRESSION: 0
WOUND: 0
DIZZINESS: 0
CONFUSION: 0
JOINT SWELLING: 0
SEIZURES: 0
SHORTNESS OF BREATH: 0
PHOTOPHOBIA: 0
BLOOD IN STOOL: 0
FATIGUE: 0
SLEEP DISTURBANCE: 0
WHEEZING: 0
HEADACHES: 0
SORE THROAT: 0
COUGH: 0
BACK PAIN: 0
NECK PAIN: 0
DYSURIA: 1
OCCASIONAL FEELINGS OF UNSTEADINESS: 0
HEMATURIA: 0

## 2024-03-11 ASSESSMENT — PATIENT HEALTH QUESTIONNAIRE - PHQ9
1. LITTLE INTEREST OR PLEASURE IN DOING THINGS: NOT AT ALL
2. FEELING DOWN, DEPRESSED OR HOPELESS: NOT AT ALL
SUM OF ALL RESPONSES TO PHQ9 QUESTIONS 1 AND 2: 0

## 2024-03-11 NOTE — PROGRESS NOTES
Subjective   Patient ID: Kevin Rubi is a 68 y.o. male who presents for acute visit (Lower Abdominal pain ).  HPI    Patient presents in the office today with complaints of lower abdominal pain for the past few weeks. Started with intermittent issues with abdominal pain now more persistent. Worse with activity. Denies any nausea/vomiting. Lower abdominal discomfort has also been affecting his appetite. Weight loss over the past two weeks, 6 pounds due to decreased appetite and increased pain. Does have some burning with urination. Did have Celiac Artery stenting from August 2023 with Dr. Hayes. Concerned that it is blocked again as the pain feels similar.     History of Hypertension, Hyperlipidema, CAD, pAfib, PFpEF, SMA occlusion s/p stending to celiac artery, GERD, and pulmonary nodules. Recent 3cm Left lung mass undergoing treatment. Reached out to Oncology with current pain/discomfort and was recommended to see Vascular/PCP.      Has continued on medications as prescribed.      Review of Systems   Constitutional:  Negative for activity change, appetite change, chills, fatigue, fever and unexpected weight change.   HENT:  Negative for ear pain, hearing loss, nosebleeds, sore throat, tinnitus and trouble swallowing.    Eyes:  Negative for photophobia, pain and visual disturbance.   Respiratory:  Negative for cough, chest tightness, shortness of breath and wheezing.    Cardiovascular:  Negative for chest pain, palpitations and leg swelling.   Gastrointestinal:  Positive for abdominal pain. Negative for blood in stool, constipation, diarrhea, nausea and vomiting.   Endocrine: Negative for cold intolerance, heat intolerance, polydipsia and polyuria.   Genitourinary:  Positive for dysuria. Negative for flank pain and hematuria.   Musculoskeletal:  Negative for arthralgias, back pain, joint swelling, myalgias and neck pain.   Skin:  Negative for pallor, rash and wound.   Allergic/Immunologic: Negative for  immunocompromised state.   Neurological:  Negative for dizziness, seizures and headaches.   Hematological:  Does not bruise/bleed easily.   Psychiatric/Behavioral:  Negative for confusion and sleep disturbance.        Objective   Physical Exam  Constitutional:       General: He is not in acute distress.     Appearance: Normal appearance.   HENT:      Head: Normocephalic.      Nose: Nose normal.   Eyes:      Conjunctiva/sclera: Conjunctivae normal.   Neck:      Vascular: No carotid bruit.   Cardiovascular:      Rate and Rhythm: Normal rate and regular rhythm.      Pulses: Normal pulses.      Heart sounds: Normal heart sounds.   Pulmonary:      Effort: Pulmonary effort is normal.      Breath sounds: Normal breath sounds.   Abdominal:      General: Bowel sounds are normal.      Palpations: Abdomen is soft.      Tenderness: There is abdominal tenderness.   Musculoskeletal:         General: Normal range of motion.      Cervical back: Normal range of motion.   Skin:     General: Skin is warm and dry.   Neurological:      Mental Status: He is alert and oriented to person, place, and time. Mental status is at baseline.   Psychiatric:         Mood and Affect: Mood normal.         Behavior: Behavior normal.         Assessment/Plan       Recent lab work completed two weeks ago.      HFpEF, pAfib, Hypertension: Following with Cardiology. Blood pressure controlled at OV today. Continue to monitor. Continue medications as prescribed.  CAD, Celiac Artery Stenosis, Abdominal Pain, Dysuria: Increased pain now persistent with weight loss and decreased appetite. Will order Stat CT and follow up pending results. Continue to follow with Specialists as previously determined. Urine dip done in the office. Will send for Culture.   Hyperlipidemia: Updated lab work ordered at last OV. Will follow up pending results.   Insomnia: Discussed Melatonin at last OV.      Prostate Cancer Screening: October 2023, normal.    Flu Vaccine: September  2023.   Prevnar 20: September 2023.   COVID Vaccine: January 2022.   RSV Vaccine: October 2023.   Medicare Wellness: September 2023.   Cologuard: October 2023, positive.   Discussed Tdap, Shingrix, COVID.     Chen Olea, APRN-CNS 03/11/24 1:36 PM

## 2024-03-12 ENCOUNTER — TELEPHONE (OUTPATIENT)
Dept: CASE MANAGEMENT | Facility: HOSPITAL | Age: 69
End: 2024-03-12
Payer: COMMERCIAL

## 2024-03-12 ENCOUNTER — TELEPHONE (OUTPATIENT)
Dept: PRIMARY CARE | Facility: CLINIC | Age: 69
End: 2024-03-12
Payer: COMMERCIAL

## 2024-03-12 ENCOUNTER — HOSPITAL ENCOUNTER (OUTPATIENT)
Dept: RADIOLOGY | Facility: CLINIC | Age: 69
Discharge: HOME | End: 2024-03-12
Payer: MEDICARE

## 2024-03-12 DIAGNOSIS — R10.30 LOWER ABDOMINAL PAIN: ICD-10-CM

## 2024-03-12 DIAGNOSIS — I50.22 CHRONIC SYSTOLIC CONGESTIVE HEART FAILURE (MULTI): ICD-10-CM

## 2024-03-12 DIAGNOSIS — I48.91 ATRIAL FIBRILLATION, UNSPECIFIED TYPE (MULTI): ICD-10-CM

## 2024-03-12 DIAGNOSIS — R10.84 GENERALIZED ABDOMINAL PAIN: Primary | ICD-10-CM

## 2024-03-12 DIAGNOSIS — I77.1 CELIAC ARTERY STENOSIS (CMS-HCC): ICD-10-CM

## 2024-03-12 LAB — BACTERIA UR CULT: NO GROWTH

## 2024-03-12 PROCEDURE — 74177 CT ABD & PELVIS W/CONTRAST: CPT

## 2024-03-12 PROCEDURE — 2550000001 HC RX 255 CONTRASTS: Performed by: CLINICAL NURSE SPECIALIST

## 2024-03-12 PROCEDURE — 74177 CT ABD & PELVIS W/CONTRAST: CPT | Performed by: RADIOLOGY

## 2024-03-12 RX ORDER — DICYCLOMINE HYDROCHLORIDE 10 MG/1
10 CAPSULE ORAL 4 TIMES DAILY PRN
Qty: 30 CAPSULE | Refills: 0 | Status: SHIPPED | OUTPATIENT
Start: 2024-03-12 | End: 2024-04-09 | Stop reason: WASHOUT

## 2024-03-12 RX ADMIN — IOHEXOL 75 ML: 350 INJECTION, SOLUTION INTRAVENOUS at 09:02

## 2024-03-12 NOTE — TELEPHONE ENCOUNTER
----- Message from RUKHSANA Fried-CNS sent at 3/12/2024 12:57 PM EDT -----  Voicemail left for patient to discuss CT. Please let patient know that CT showed Celiac Artery is Patent, stent is intact without blockage. Did show Pneumonitis consistent with inflammation is his left lower lobe of lung. They recommended follow up CT in 3 months, can discuss further with Pulmonology.

## 2024-03-18 RX ORDER — DIGOXIN 125 MCG
125 TABLET ORAL DAILY
Qty: 90 TABLET | Refills: 0 | Status: SHIPPED | OUTPATIENT
Start: 2024-03-18

## 2024-03-19 ENCOUNTER — TELEPHONE (OUTPATIENT)
Dept: PRIMARY CARE | Facility: CLINIC | Age: 69
End: 2024-03-19
Payer: COMMERCIAL

## 2024-03-19 NOTE — TELEPHONE ENCOUNTER
Kevin was here on 3/11 and is wanting to know if he needs to keep the appointment on 4/2/24.  Please advise

## 2024-03-19 NOTE — TELEPHONE ENCOUNTER
We only discussed his acute visit concerns, would recommend that he keep his follow up appointment.

## 2024-03-20 ENCOUNTER — APPOINTMENT (OUTPATIENT)
Dept: HEMATOLOGY/ONCOLOGY | Facility: CLINIC | Age: 69
End: 2024-03-20
Payer: MEDICARE

## 2024-03-26 ENCOUNTER — LAB (OUTPATIENT)
Dept: LAB | Facility: LAB | Age: 69
End: 2024-03-26
Payer: MEDICARE

## 2024-03-26 ENCOUNTER — HOSPITAL ENCOUNTER (OUTPATIENT)
Dept: CARDIOLOGY | Facility: HOSPITAL | Age: 69
Discharge: HOME | End: 2024-03-26
Payer: MEDICARE

## 2024-03-26 DIAGNOSIS — I50.22 CHRONIC SYSTOLIC CONGESTIVE HEART FAILURE (MULTI): ICD-10-CM

## 2024-03-26 DIAGNOSIS — C34.32 PRIMARY CANCER OF LEFT LOWER LOBE OF LUNG (MULTI): ICD-10-CM

## 2024-03-26 DIAGNOSIS — Q23.1 BICUSPID AORTIC VALVE (HHS-HCC): ICD-10-CM

## 2024-03-26 LAB
ALBUMIN SERPL BCP-MCNC: 3.9 G/DL (ref 3.4–5)
ALP SERPL-CCNC: 55 U/L (ref 33–136)
ALT SERPL W P-5'-P-CCNC: 24 U/L (ref 10–52)
ANION GAP SERPL CALC-SCNC: 14 MMOL/L (ref 10–20)
AST SERPL W P-5'-P-CCNC: 24 U/L (ref 9–39)
BASOPHILS # BLD AUTO: 0.04 X10*3/UL (ref 0–0.1)
BASOPHILS NFR BLD AUTO: 0.5 %
BILIRUB SERPL-MCNC: 0.3 MG/DL (ref 0–1.2)
BUN SERPL-MCNC: 39 MG/DL (ref 6–23)
CALCIUM SERPL-MCNC: 9.5 MG/DL (ref 8.6–10.3)
CHLORIDE SERPL-SCNC: 103 MMOL/L (ref 98–107)
CO2 SERPL-SCNC: 21 MMOL/L (ref 21–32)
CORTIS AM PEAK SERPL-MSCNC: 16.6 UG/DL (ref 5–20)
CREAT SERPL-MCNC: 1.02 MG/DL (ref 0.5–1.3)
EGFRCR SERPLBLD CKD-EPI 2021: 81 ML/MIN/1.73M*2
EOSINOPHIL # BLD AUTO: 0.18 X10*3/UL (ref 0–0.7)
EOSINOPHIL NFR BLD AUTO: 2.3 %
ERYTHROCYTE [DISTWIDTH] IN BLOOD BY AUTOMATED COUNT: 13.9 % (ref 11.5–14.5)
GLUCOSE SERPL-MCNC: 92 MG/DL (ref 74–99)
HCT VFR BLD AUTO: 33.6 % (ref 41–52)
HGB BLD-MCNC: 10.9 G/DL (ref 13.5–17.5)
IMM GRANULOCYTES # BLD AUTO: 0.02 X10*3/UL (ref 0–0.7)
IMM GRANULOCYTES NFR BLD AUTO: 0.3 % (ref 0–0.9)
LYMPHOCYTES # BLD AUTO: 1.54 X10*3/UL (ref 1.2–4.8)
LYMPHOCYTES NFR BLD AUTO: 19.8 %
MCH RBC QN AUTO: 27.4 PG (ref 26–34)
MCHC RBC AUTO-ENTMCNC: 32.4 G/DL (ref 32–36)
MCV RBC AUTO: 84 FL (ref 80–100)
MONOCYTES # BLD AUTO: 0.56 X10*3/UL (ref 0.1–1)
MONOCYTES NFR BLD AUTO: 7.2 %
NEUTROPHILS # BLD AUTO: 5.44 X10*3/UL (ref 1.2–7.7)
NEUTROPHILS NFR BLD AUTO: 69.9 %
NRBC BLD-RTO: 0 /100 WBCS (ref 0–0)
PLATELET # BLD AUTO: 227 X10*3/UL (ref 150–450)
POTASSIUM SERPL-SCNC: 4.6 MMOL/L (ref 3.5–5.3)
PROT SERPL-MCNC: 7 G/DL (ref 6.4–8.2)
RBC # BLD AUTO: 3.98 X10*6/UL (ref 4.5–5.9)
SODIUM SERPL-SCNC: 133 MMOL/L (ref 136–145)
T4 FREE SERPL-MCNC: 1.48 NG/DL (ref 0.61–1.12)
TSH SERPL-ACNC: 0.01 MIU/L (ref 0.44–3.98)
WBC # BLD AUTO: 7.8 X10*3/UL (ref 4.4–11.3)

## 2024-03-26 PROCEDURE — 93306 TTE W/DOPPLER COMPLETE: CPT

## 2024-03-26 PROCEDURE — 82533 TOTAL CORTISOL: CPT

## 2024-03-26 PROCEDURE — 84443 ASSAY THYROID STIM HORMONE: CPT

## 2024-03-26 PROCEDURE — 84439 ASSAY OF FREE THYROXINE: CPT

## 2024-03-26 PROCEDURE — 85025 COMPLETE CBC W/AUTO DIFF WBC: CPT

## 2024-03-26 PROCEDURE — 93306 TTE W/DOPPLER COMPLETE: CPT | Performed by: INTERNAL MEDICINE

## 2024-03-26 PROCEDURE — 80053 COMPREHEN METABOLIC PANEL: CPT

## 2024-03-26 PROCEDURE — 36415 COLL VENOUS BLD VENIPUNCTURE: CPT

## 2024-03-27 ENCOUNTER — INFUSION (OUTPATIENT)
Dept: HEMATOLOGY/ONCOLOGY | Facility: CLINIC | Age: 69
End: 2024-03-27
Payer: MEDICARE

## 2024-03-27 ENCOUNTER — TELEPHONE (OUTPATIENT)
Dept: HEMATOLOGY/ONCOLOGY | Facility: HOSPITAL | Age: 69
End: 2024-03-27
Payer: COMMERCIAL

## 2024-03-27 ENCOUNTER — OFFICE VISIT (OUTPATIENT)
Dept: HEMATOLOGY/ONCOLOGY | Facility: CLINIC | Age: 69
End: 2024-03-27
Payer: MEDICARE

## 2024-03-27 ENCOUNTER — TELEPHONE (OUTPATIENT)
Dept: ADMISSION | Facility: HOSPITAL | Age: 69
End: 2024-03-27
Payer: COMMERCIAL

## 2024-03-27 VITALS
BODY MASS INDEX: 20.72 KG/M2 | WEIGHT: 152.78 LBS | SYSTOLIC BLOOD PRESSURE: 90 MMHG | DIASTOLIC BLOOD PRESSURE: 59 MMHG | OXYGEN SATURATION: 97 % | HEART RATE: 81 BPM | RESPIRATION RATE: 18 BRPM | TEMPERATURE: 97.7 F

## 2024-03-27 DIAGNOSIS — C34.32 PRIMARY CANCER OF LEFT LOWER LOBE OF LUNG (MULTI): ICD-10-CM

## 2024-03-27 DIAGNOSIS — J98.4 PNEUMONITIS: Primary | ICD-10-CM

## 2024-03-27 LAB
AORTIC VALVE MEAN GRADIENT: 9.9 MMHG
AORTIC VALVE PEAK VELOCITY: 2.19 M/S
AV PEAK GRADIENT: 19.3 MMHG
EJECTION FRACTION APICAL 4 CHAMBER: 59.2
EJECTION FRACTION: 58 %
GLOBAL LONGITUDINAL STRAIN: 15.2 %
LEFT ATRIUM VOLUME AREA LENGTH INDEX BSA: 48.3 ML/M2
LEFT VENTRICLE INTERNAL DIMENSION DIASTOLE: 4.28 CM (ref 3.5–6)
LEFT VENTRICULAR OUTFLOW TRACT DIAMETER: 2.2 CM
MITRAL VALVE E/A RATIO: 3.98
MITRAL VALVE E/E' RATIO: 6.96
RIGHT VENTRICLE FREE WALL PEAK S': 13.51 CM/S
RIGHT VENTRICLE PEAK SYSTOLIC PRESSURE: 32.9 MMHG
TRICUSPID ANNULAR PLANE SYSTOLIC EXCURSION: 2.1 CM

## 2024-03-27 PROCEDURE — 1159F MED LIST DOCD IN RCRD: CPT | Performed by: NURSE PRACTITIONER

## 2024-03-27 PROCEDURE — 1157F ADVNC CARE PLAN IN RCRD: CPT | Performed by: NURSE PRACTITIONER

## 2024-03-27 PROCEDURE — 1160F RVW MEDS BY RX/DR IN RCRD: CPT | Performed by: NURSE PRACTITIONER

## 2024-03-27 PROCEDURE — 1125F AMNT PAIN NOTED PAIN PRSNT: CPT | Performed by: NURSE PRACTITIONER

## 2024-03-27 PROCEDURE — 3074F SYST BP LT 130 MM HG: CPT | Performed by: NURSE PRACTITIONER

## 2024-03-27 PROCEDURE — 3078F DIAST BP <80 MM HG: CPT | Performed by: NURSE PRACTITIONER

## 2024-03-27 PROCEDURE — 99214 OFFICE O/P EST MOD 30 MIN: CPT | Performed by: NURSE PRACTITIONER

## 2024-03-27 RX ORDER — PANTOPRAZOLE SODIUM 20 MG/1
20 TABLET, DELAYED RELEASE ORAL
Qty: 30 TABLET | Refills: 11 | Status: SHIPPED | OUTPATIENT
Start: 2024-03-27 | End: 2024-05-13 | Stop reason: ALTCHOICE

## 2024-03-27 RX ORDER — PREDNISONE 10 MG/1
70 TABLET ORAL DAILY
Qty: 196 TABLET | Refills: 0 | Status: SHIPPED | OUTPATIENT
Start: 2024-03-27 | End: 2024-04-16 | Stop reason: HOSPADM

## 2024-03-27 ASSESSMENT — ENCOUNTER SYMPTOMS
LOSS OF SENSATION IN FEET: 0
DEPRESSION: 0
OCCASIONAL FEELINGS OF UNSTEADINESS: 0

## 2024-03-27 ASSESSMENT — PAIN SCALES - GENERAL: PAINLEVEL: 3

## 2024-03-27 NOTE — TELEPHONE ENCOUNTER
Patient's spouse calls to state that they are at [facility] and they will not fill the Prednisone script.    Called [facility]. Per Haseeb, Pharmacist, script is missing quantity and taper instructions. They can be reached at [phone] to get a verbal order to change the script.    Message sent to team and to evening shift nurse.

## 2024-03-27 NOTE — PROGRESS NOTES
Patient ID: Kevin Rubi is a 68 y.o. male    Primary Care Provider: Chen Olea, APRN-CNS    DIAGNOSIS AND STAGING   Stage IIIC ([id number]) NSCLC (adenocarcinoma, TTF1+) of the LLL - dx on 09/15/23   Primary tumor measures 3.5 cm (+satellite nodule)   2R+ for adenocarcinoma cells     SITES OF DISEASE   LLL   Mediastinal nodes (including contralateral)    Has a couple of other spiculated nodules (0.8 cm WILDA and 0.7 cm RUL) that are potential  synchronous primaries and will be addressed when needed      MOLECULAR GENOMICS   KRAS G12D mutation    PD-L1 TPS 95%     PRIOR THERAPIES  Concurrent chemo RT (60 Beyer in 30 fractions, completed on 12/26/2023 using carboplatin/pemetrexed      CURRENT THERAPY  Anticipating consolidative durvalumab to begin on 01/24/2024     CURRENT ONCOLOGICAL PROBLEMS   Fatigue     HISTORY OF PRESENT ILLNESS   Former smoker, (quit in 2000), who while undergoing w/u for abdominal pain related to SMA occlusion, was found to have a LLL nodule -   Based on his records he was noted to have abnormal imaging back in 2021, was seen by pulmonary and was told to complete w/u, including PET scan and potential biopsy but did not follow recommendations.    A CT chest on 09/12/23 showed a 3.5 cm spiculated LLL nodule abutting the pericardium. There was also a satellite nodule suspicious for disease and a couple of other spiculated nodules in the WILDA and contralateral right lung.4R node measured 1.4 cm and level 7 2.4 cm.   PET scan on 08/04/23 showed no findings concerning for extra-thoracic disease. LLL nodule was FDG avid as well as multiple mediastinal nodes (including contralateral nodes). The WILDA nodule has mild hypermetabolic activity.  An EBUS procedure on 09/15/23 demonstrated 2R to be involved with adenocarcinoma cells, TTF1+. KRAS G12D +, TPS 95%.  The pt sees medical oncology on 09/27/23 -   States he is feeling well and denies any systemic sx related to this malignancy - including fatigue  and weight loss. The abdominal pain resolved after vascular procedure/stenting performed and he is now on Xarelto and Plavix. Denies any h/a or cough. Denies dyspnea.   10/23/23: C1  Carboplatin/pemetrexed  23: C2 carboplatin/pemetrexed with concurrent RT    23: C3 carboplatin/pemetrexed with concurrent RT   2023: Hypotension due to dehydration, requiring ED visit.  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  HFpEF - EF 60%   Atrial fibrillation - on Eliquis  Ascending aortic aneurysm   PUD (gastric)  HLD        SOCIAL HISTORY  + Tobacco - quit in  - mostly 1/2 ppd starting at age 22  Occasional ETOH intake  Worked as a  for 35 years    for 43 years and has 2 children - son who is 39 yo and daughter who is 35  He has just retired      FAMILY HISTORY  Mother seems to have had H/N or lung cancer - unclear   Father may also have had cancer   1 sister  from complications of GSW  1 brother  of CA (?liver CA)     CURRENT MEDS REVIEWED        ALLERGIES REVIEWED   NKDA     SUBJECTIVE:  Patient is seen today for follow-up and treatment, he is accompanied by his wife and son Harris  Notes increased SOB, without any other associated sign/symptoms, concern for irAE pneumonitis s/p radiation, will initiate prednisone at 1mg/kg and re-evaluate next week, instructed patient/spouse to call if symptoms persist/worsen   No fevers or chills  Ongoing fatigue   No other concerns      LAST VISIT:  Patient here today with his wife for follow-up and durvalumab  Feeling well for treatment  Breathing good  No GI symptoms  24 Seen in ED for right groin pain, was referred to surgery for right inguinal hernia, no surgical  intervention at this time  No new aches/pains  No other concerns or complaints      OBJECTIVE:  Vitals:    03/27/24 1439   BP: 90/59   Pulse:    Resp:    Temp:    SpO2:         Body surface area is 1.88 meters squared.     Wt Readings from Last 5 Encounters:   03/27/24 69.3 kg (152 lb 12.5 oz)   03/11/24 70.8 kg (156 lb)   02/28/24 73.7 kg (162 lb 7.7 oz)   02/22/24 74.8 kg (165 lb)   01/31/24 76.8 kg (169 lb 4.8 oz)       ECOGSCORE: 2    Physical Exam  Constitutional:       Appearance: Normal appearance.   HENT:      Head: Normocephalic and atraumatic.   Eyes:      General: No scleral icterus.     Extraocular Movements: Extraocular movements intact.      Conjunctiva/sclera: Conjunctivae normal.   Cardiovascular:      Rate and Rhythm: Normal rate and regular rhythm.   Pulmonary:      Effort: Pulmonary effort is normal.      Breath sounds: Normal breath sounds.   Abdominal:      General: Abdomen is flat.      Palpations: Abdomen is soft.      Tenderness: There is no abdominal tenderness.   Musculoskeletal:         General: Normal range of motion.      Right lower leg: No edema.      Left lower leg: No edema.   Neurological:      General: No focal deficit present.      Mental Status: He is alert and oriented to person, place, and time. Mental status is at baseline.      Motor: No weakness.      Gait: Gait normal.   Psychiatric:         Mood and Affect: Mood normal.         Behavior: Behavior normal.         Thought Content: Thought content normal.         Judgment: Judgment normal.        Diagnostic Results   Results:  Labs:  Lab Results   Component Value Date    WBC 7.8 03/26/2024    HGB 10.9 (L) 03/26/2024    HCT 33.6 (L) 03/26/2024    MCV 84 03/26/2024     03/26/2024      Lab Results   Component Value Date    NEUTROABS 5.44 03/26/2024        Lab Results   Component Value Date    GLUCOSE 92 03/26/2024    CALCIUM 9.5 03/26/2024     (L) 03/26/2024    K 4.6 03/26/2024    CO2 21 03/26/2024      03/26/2024    BUN 39 (H) 03/26/2024    CREATININE 1.02 03/26/2024    MG 1.44 (L) 12/18/2023     Lab Results   Component Value Date    ALT 24 03/26/2024    AST 24 03/26/2024    ALKPHOS 55 03/26/2024    BILITOT 0.3 03/26/2024      Lab Results   Component Value Date    ACTH 11.8 01/23/2024    CORTISOL 16.6 03/26/2024    TSH 0.01 (L) 03/26/2024    FREET4 1.48 (H) 03/26/2024       STUDY:  CT CHEST W IV CONTRAST;  1/8/2024 2:19 pm      INDICATION:  Signs/Symptoms:Assessment treatment response stage III NSCLC after  chemo-RT.      COMPARISON:  09/12/2023      ACCESSION NUMBER(S):  [id number]      ORDERING CLINICIAN:  ADELIA CRUZ      TECHNIQUE:  Helical data acquisition of the chest was obtained with  50 mL  Omnipaque 350. Images were reformatted in axial, coronal, and  sagittal planes.MIP reformatted images were also generated.      FINDINGS:  LUNGS and AIRWAYS:  Left lower lobe nodule has decreased in size from 35 x 24 x 3.5 mm to  29 x 18 x 2.7 mm. Scattered additional solid and sub solid nodules  are otherwise not significantly changed, for example measuring 10 mm  in left upper lobe posteriorly. No new nodules or areas of  consolidation. Mild emphysematous changes. Central airways are  patent. No bronchiectasis.      No pleural effusion or pneumothorax.      MEDIASTINUM and GET, LOWER NECK AND AXILLA:  The visualized thyroid gland is grossly unremarkable.      Regression in mediastinal lymphadenopathy, for example:      Subcarinal: Decreased from 24 mm to 11 mm short axis  Upper right paratracheal: From 14 mm 6 mm  Lower right paratracheal: From 13 mm to 14 mm      Esophagus is not dilated.      HEART and VESSELS:  Mild cardiomegaly.      No significant pericardial effusion.      No central pulmonary embolism identified on nondedicated study.      Severe coronary atherosclerosis.      Aortic valve leaflet calcifications. Severe thoracic aortic  atherosclerosis. Unchanged dilatation ascending aorta up to 4.6  cm.  Otherwise the thoracic aorta and great vessels are patent.      UPPER ABDOMEN:  The visualized subdiaphragmatic structures demonstrate no acute  findings on limited images.      CHEST WALL and OSSEOUS STRUCTURES:  No change in cyst and lipomas of the anterior chest wall.  There is increased sclerosis involving a the T6 vertebral body. There  is an increasing sclerotic margin involving lytic lesion of the left  5th rib laterally. Thoracic degenerative changes.      IMPRESSION:  1.  Favorable treatment response compared to 4 months ago.  2. Left lower lobe mass is slightly regressed in size.  3. Mediastinal lymphadenopathy has significantly regressed.  4. Increased sclerosis at T6 vertebral body and increased sclerotic  margin of lucent lesion of left 5th rib which could be healing  metastases.    Assessment/Plan     Primary cancer of left lower lobe of lung (CMS/HCC), Clinical: Stage IIIC (cT3, cN3, cM0)  Status post completion of concurrent chemo RT on 12/26/2023 (60 Beyer in 30 fractions) using carboplatin/pemetrexed (s/p 3 cycles). His tumor harbors a KRAS G12 D mutation in the PD-L1 expression TPS 95%.  His scans from 01/08/2024 demonstrating a favorable response, with a decrease in size of the dominant left lower lobe primary tumor by approximately 20%, compatible with stable disease.   The left upper lobe nodule remains a stable.  STAT PET noted decreases size LLL nodule, resolution of mediatstinal lymphadenopathy, new WILDA nodule too small to characterize  - Concern for irAE pneumonitis in the context of SOB s/p radiation now on durvalumab, will initiate prednisone 1mg/kg (70mg) and follow-up in 1 week, will start a prophylactic PPI as well

## 2024-03-27 NOTE — TELEPHONE ENCOUNTER
Notified per team and as rx is written, quantity 196 and taper as instructed by provider. Pharmacy verbalized understanding and states that is all they needed at this time.

## 2024-03-27 NOTE — TELEPHONE ENCOUNTER
The patient wife called in, Kevin has an apt today with Radha Martins and treatment, was hoping to have a quick work with Dr. Longoria as well however she was made aware that Dr. Longoria is on vacation, wife bushra discuss concerns then with Radha

## 2024-03-27 NOTE — PROGRESS NOTES
Pt not seen by infusion team, notified from provider of Pneumonitis and steroid prescription, Pt to be re-evaluated next week by oncology team.

## 2024-04-01 DIAGNOSIS — C34.90: Primary | ICD-10-CM

## 2024-04-02 ENCOUNTER — HOSPITAL ENCOUNTER (OUTPATIENT)
Dept: RADIOLOGY | Facility: HOSPITAL | Age: 69
Discharge: HOME | End: 2024-04-02
Payer: MEDICARE

## 2024-04-02 ENCOUNTER — OFFICE VISIT (OUTPATIENT)
Dept: PRIMARY CARE | Facility: CLINIC | Age: 69
End: 2024-04-02
Payer: MEDICARE

## 2024-04-02 VITALS
SYSTOLIC BLOOD PRESSURE: 106 MMHG | OXYGEN SATURATION: 99 % | WEIGHT: 153 LBS | HEART RATE: 86 BPM | BODY MASS INDEX: 20.75 KG/M2 | DIASTOLIC BLOOD PRESSURE: 68 MMHG

## 2024-04-02 DIAGNOSIS — I50.22 CHRONIC SYSTOLIC CONGESTIVE HEART FAILURE (MULTI): ICD-10-CM

## 2024-04-02 DIAGNOSIS — Z00.00 MEDICARE ANNUAL WELLNESS VISIT, SUBSEQUENT: Primary | ICD-10-CM

## 2024-04-02 DIAGNOSIS — I48.91 ATRIAL FIBRILLATION, UNSPECIFIED TYPE (MULTI): ICD-10-CM

## 2024-04-02 DIAGNOSIS — I77.1 CELIAC ARTERY STENOSIS (CMS-HCC): ICD-10-CM

## 2024-04-02 DIAGNOSIS — C34.90: ICD-10-CM

## 2024-04-02 DIAGNOSIS — Z12.11 COLON CANCER SCREENING: ICD-10-CM

## 2024-04-02 DIAGNOSIS — Z23 NEED FOR PNEUMOCOCCAL 20-VALENT CONJUGATE VACCINATION: ICD-10-CM

## 2024-04-02 DIAGNOSIS — I10 PRIMARY HYPERTENSION: ICD-10-CM

## 2024-04-02 DIAGNOSIS — I25.10 CORONARY ARTERY DISEASE INVOLVING NATIVE CORONARY ARTERY, UNSPECIFIED WHETHER ANGINA PRESENT, UNSPECIFIED WHETHER NATIVE OR TRANSPLANTED HEART: ICD-10-CM

## 2024-04-02 DIAGNOSIS — R73.03 PREDIABETES: ICD-10-CM

## 2024-04-02 DIAGNOSIS — E78.2 MIXED HYPERLIPIDEMIA: ICD-10-CM

## 2024-04-02 PROBLEM — K55.069 SUPERIOR MESENTERIC ARTERY THROMBOSIS (MULTI): Status: RESOLVED | Noted: 2023-09-22 | Resolved: 2024-04-02

## 2024-04-02 PROBLEM — K55.059: Status: RESOLVED | Noted: 2023-09-22 | Resolved: 2024-04-02

## 2024-04-02 PROCEDURE — 99214 OFFICE O/P EST MOD 30 MIN: CPT | Performed by: CLINICAL NURSE SPECIALIST

## 2024-04-02 PROCEDURE — 71250 CT THORAX DX C-: CPT

## 2024-04-02 PROCEDURE — G0439 PPPS, SUBSEQ VISIT: HCPCS | Performed by: CLINICAL NURSE SPECIALIST

## 2024-04-02 PROCEDURE — G0444 DEPRESSION SCREEN ANNUAL: HCPCS | Performed by: CLINICAL NURSE SPECIALIST

## 2024-04-02 PROCEDURE — 1170F FXNL STATUS ASSESSED: CPT | Performed by: CLINICAL NURSE SPECIALIST

## 2024-04-02 PROCEDURE — 3078F DIAST BP <80 MM HG: CPT | Performed by: CLINICAL NURSE SPECIALIST

## 2024-04-02 PROCEDURE — 3074F SYST BP LT 130 MM HG: CPT | Performed by: CLINICAL NURSE SPECIALIST

## 2024-04-02 PROCEDURE — 1160F RVW MEDS BY RX/DR IN RCRD: CPT | Performed by: CLINICAL NURSE SPECIALIST

## 2024-04-02 PROCEDURE — 1157F ADVNC CARE PLAN IN RCRD: CPT | Performed by: CLINICAL NURSE SPECIALIST

## 2024-04-02 PROCEDURE — 1159F MED LIST DOCD IN RCRD: CPT | Performed by: CLINICAL NURSE SPECIALIST

## 2024-04-02 PROCEDURE — 1158F ADVNC CARE PLAN TLK DOCD: CPT | Performed by: CLINICAL NURSE SPECIALIST

## 2024-04-02 PROCEDURE — 1123F ACP DISCUSS/DSCN MKR DOCD: CPT | Performed by: CLINICAL NURSE SPECIALIST

## 2024-04-02 PROCEDURE — 1036F TOBACCO NON-USER: CPT | Performed by: CLINICAL NURSE SPECIALIST

## 2024-04-02 PROCEDURE — 71250 CT THORAX DX C-: CPT | Performed by: STUDENT IN AN ORGANIZED HEALTH CARE EDUCATION/TRAINING PROGRAM

## 2024-04-02 RX ORDER — TIZANIDINE 2 MG/1
2 TABLET ORAL EVERY 8 HOURS PRN
Qty: 30 TABLET | Refills: 0 | Status: SHIPPED | OUTPATIENT
Start: 2024-04-02 | End: 2024-05-03 | Stop reason: HOSPADM

## 2024-04-02 ASSESSMENT — ENCOUNTER SYMPTOMS
WHEEZING: 0
SLEEP DISTURBANCE: 0
NECK PAIN: 0
ARTHRALGIAS: 0
EYE PAIN: 0
DIZZINESS: 0
BLOOD IN STOOL: 0
NAUSEA: 0
BACK PAIN: 0
JOINT SWELLING: 0
MYALGIAS: 0
SEIZURES: 0
DYSURIA: 0
FATIGUE: 0
COUGH: 0
POLYDIPSIA: 0
SORE THROAT: 0
CHEST TIGHTNESS: 0
HEADACHES: 0
PHOTOPHOBIA: 0
OCCASIONAL FEELINGS OF UNSTEADINESS: 0
DEPRESSION: 0
FEVER: 0
WOUND: 0
CONFUSION: 0
PALPITATIONS: 0
SHORTNESS OF BREATH: 0
LOSS OF SENSATION IN FEET: 0
FLANK PAIN: 0
UNEXPECTED WEIGHT CHANGE: 0
VOMITING: 0
APPETITE CHANGE: 0
ABDOMINAL PAIN: 1
CHILLS: 0
DIARRHEA: 0
TROUBLE SWALLOWING: 0
HEMATURIA: 0
CONSTIPATION: 0
ACTIVITY CHANGE: 0
BRUISES/BLEEDS EASILY: 0

## 2024-04-02 ASSESSMENT — PATIENT HEALTH QUESTIONNAIRE - PHQ9
SUM OF ALL RESPONSES TO PHQ9 QUESTIONS 1 AND 2: 0
2. FEELING DOWN, DEPRESSED OR HOPELESS: NOT AT ALL
1. LITTLE INTEREST OR PLEASURE IN DOING THINGS: NOT AT ALL

## 2024-04-02 ASSESSMENT — ACTIVITIES OF DAILY LIVING (ADL)
DRESSING: INDEPENDENT
DOING_HOUSEWORK: INDEPENDENT
TAKING_MEDICATION: INDEPENDENT
BATHING: INDEPENDENT
GROCERY_SHOPPING: INDEPENDENT
MANAGING_FINANCES: INDEPENDENT

## 2024-04-02 NOTE — PROGRESS NOTES
Subjective   Reason for Visit: Kevin Rubi is an 68 y.o. male here for a Medicare Wellness visit.     Past Medical, Surgical, and Family History reviewed and updated in chart.    Reviewed all medications by prescribing practitioner or clinical pharmacist (such as prescriptions, OTCs, herbal therapies and supplements) and documented in the medical record.    HPI    Here today as a follow up appointment. Due for Medicare Wellness.      History of Hypertension, Hyperlipidema, CAD, pAfib, PFpEF, SMA occlusion s/p stending to celiac artery, GERD, and pulmonary nodules.      Has continued on medications as prescribed.     Primary Cancer of LLL. Completed Chemo and RT on 12/26/2023. Stable on most recent imaging.     Currently being treated for inflammation with Prednisone. Follow up CT ordered and planning to have done today.     Patient Care Team:  DANIEL Fried as PCP - General (Internal Medicine)  DANIEL Fried as PCP - [employer] PCP  Janell Longoria MD as Consulting Physician (Hematology and Oncology)  Madelyn Perrin MD as Radiation Oncologist (Radiation Oncology)     Review of Systems   Constitutional:  Negative for activity change, appetite change, chills, fatigue, fever and unexpected weight change.   HENT:  Negative for ear pain, hearing loss, nosebleeds, sore throat, tinnitus and trouble swallowing.    Eyes:  Negative for photophobia, pain and visual disturbance.   Respiratory:  Negative for cough, chest tightness, shortness of breath and wheezing.    Cardiovascular:  Negative for chest pain, palpitations and leg swelling.   Gastrointestinal:  Positive for abdominal pain. Negative for blood in stool, constipation, diarrhea, nausea and vomiting.   Endocrine: Negative for cold intolerance, heat intolerance, polydipsia and polyuria.   Genitourinary:  Negative for dysuria, flank pain and hematuria.   Musculoskeletal:  Negative for arthralgias, back pain, joint swelling,  myalgias and neck pain.   Skin:  Negative for pallor, rash and wound.   Allergic/Immunologic: Negative for immunocompromised state.   Neurological:  Negative for dizziness, seizures and headaches.   Hematological:  Does not bruise/bleed easily.   Psychiatric/Behavioral:  Negative for confusion and sleep disturbance.        Objective   Vitals:  /68   Pulse 86   Wt 69.4 kg (153 lb)   SpO2 99%   BMI 20.75 kg/m²       Physical Exam  Vitals and nursing note reviewed.   Constitutional:       General: He is not in acute distress.     Appearance: Normal appearance.   HENT:      Head: Normocephalic.      Nose: Nose normal.   Eyes:      Conjunctiva/sclera: Conjunctivae normal.   Neck:      Vascular: No carotid bruit.   Cardiovascular:      Rate and Rhythm: Normal rate and regular rhythm.      Pulses: Normal pulses.      Heart sounds: Normal heart sounds.   Pulmonary:      Effort: Pulmonary effort is normal.      Breath sounds: Normal breath sounds.   Abdominal:      General: Bowel sounds are normal.      Palpations: Abdomen is soft.   Musculoskeletal:         General: Normal range of motion.      Cervical back: Normal range of motion.   Skin:     General: Skin is warm and dry.   Neurological:      Mental Status: He is alert and oriented to person, place, and time. Mental status is at baseline.   Psychiatric:         Mood and Affect: Mood normal.         Behavior: Behavior normal.         Assessment/Plan   Problem List Items Addressed This Visit       CAD (coronary artery disease)    Celiac artery stenosis (CMS/HCC)    HLD (hyperlipidemia)     Other Visit Diagnoses       Welcome to Medicare preventive visit        Primary hypertension        Colon cancer screening        Need for pneumococcal 20-valent conjugate vaccination              Recent lab work completed.      HFpEF, pAfib, Hypertension: Following with Cardiology. Blood pressure controlled at OV today. Continue to monitor. Continue medications as  prescribed.  CAD, Celiac Artery Stenosis, Abdominal Pain, Dysuria: Recent CT completed. Continue to follow with Specialists as previously determined.    Hyperlipidemia: Updated lab work ordered at last OV. Will follow up pending results.   Insomnia: Discussed Melatonin at last OV.   Medicare Wellness: Routine and age appropriate recommendations discussed with the patient today and patient verbalized understanding of the recommendations.  Questions answered.  Age appropriate immunizations and preventative screenings discussed with the patient and ordered as appropriate. Labs updated and ordered as indicated. Recommend healthy diet and daily exercise to maintain healthy body weight.      Prostate Cancer Screening: October 2023, normal.    Flu Vaccine: September 2023.   Prevnar 20: September 2023.   COVID Vaccine: January 2022.   RSV Vaccine: October 2023.   Medicare Wellness: April 2024.   Cologuard: October 2023, positive.   Discussed Tdap, Shingrix, COVID.

## 2024-04-03 ENCOUNTER — OFFICE VISIT (OUTPATIENT)
Dept: HEMATOLOGY/ONCOLOGY | Facility: CLINIC | Age: 69
End: 2024-04-03
Payer: MEDICARE

## 2024-04-03 VITALS
SYSTOLIC BLOOD PRESSURE: 107 MMHG | HEART RATE: 83 BPM | DIASTOLIC BLOOD PRESSURE: 66 MMHG | WEIGHT: 155.2 LBS | TEMPERATURE: 97.3 F | RESPIRATION RATE: 18 BRPM | OXYGEN SATURATION: 98 % | BODY MASS INDEX: 21.05 KG/M2

## 2024-04-03 DIAGNOSIS — J98.4 PNEUMONITIS: ICD-10-CM

## 2024-04-03 DIAGNOSIS — C34.32 PRIMARY CANCER OF LEFT LOWER LOBE OF LUNG (MULTI): ICD-10-CM

## 2024-04-03 PROCEDURE — 1160F RVW MEDS BY RX/DR IN RCRD: CPT | Performed by: NURSE PRACTITIONER

## 2024-04-03 PROCEDURE — 3078F DIAST BP <80 MM HG: CPT | Performed by: NURSE PRACTITIONER

## 2024-04-03 PROCEDURE — 1157F ADVNC CARE PLAN IN RCRD: CPT | Performed by: NURSE PRACTITIONER

## 2024-04-03 PROCEDURE — 1159F MED LIST DOCD IN RCRD: CPT | Performed by: NURSE PRACTITIONER

## 2024-04-03 PROCEDURE — 3074F SYST BP LT 130 MM HG: CPT | Performed by: NURSE PRACTITIONER

## 2024-04-03 PROCEDURE — 99214 OFFICE O/P EST MOD 30 MIN: CPT | Performed by: NURSE PRACTITIONER

## 2024-04-03 PROCEDURE — 1126F AMNT PAIN NOTED NONE PRSNT: CPT | Performed by: NURSE PRACTITIONER

## 2024-04-03 ASSESSMENT — PAIN SCALES - GENERAL: PAINLEVEL: 0-NO PAIN

## 2024-04-04 NOTE — PROGRESS NOTES
Patient ID: Kevin Rubi is a 68 y.o. male    Primary Care Provider: Chen Olea, APRN-CNS    DIAGNOSIS AND STAGING   Stage IIIC ([id number]) NSCLC (adenocarcinoma, TTF1+) of the LLL - dx on 09/15/23   Primary tumor measures 3.5 cm (+satellite nodule)   2R+ for adenocarcinoma cells     SITES OF DISEASE   LLL   Mediastinal nodes (including contralateral)    Has a couple of other spiculated nodules (0.8 cm WILDA and 0.7 cm RUL) that are potential  synchronous primaries and will be addressed when needed      MOLECULAR GENOMICS   KRAS G12D mutation    PD-L1 TPS 95%     PRIOR THERAPIES  Concurrent chemo RT (60 Beyer in 30 fractions, completed on 12/26/2023 using carboplatin/pemetrexed      CURRENT THERAPY  Anticipating consolidative durvalumab to begin on 01/24/2024     CURRENT ONCOLOGICAL PROBLEMS   Fatigue     HISTORY OF PRESENT ILLNESS   Former smoker, (quit in 2000), who while undergoing w/u for abdominal pain related to SMA occlusion, was found to have a LLL nodule -   Based on his records he was noted to have abnormal imaging back in 2021, was seen by pulmonary and was told to complete w/u, including PET scan and potential biopsy but did not follow recommendations.    A CT chest on 09/12/23 showed a 3.5 cm spiculated LLL nodule abutting the pericardium. There was also a satellite nodule suspicious for disease and a couple of other spiculated nodules in the WILDA and contralateral right lung.4R node measured 1.4 cm and level 7 2.4 cm.   PET scan on 08/04/23 showed no findings concerning for extra-thoracic disease. LLL nodule was FDG avid as well as multiple mediastinal nodes (including contralateral nodes). The WILDA nodule has mild hypermetabolic activity.  An EBUS procedure on 09/15/23 demonstrated 2R to be involved with adenocarcinoma cells, TTF1+. KRAS G12D +, TPS 95%.  The pt sees medical oncology on 09/27/23 -   States he is feeling well and denies any systemic sx related to this malignancy - including fatigue  and weight loss. The abdominal pain resolved after vascular procedure/stenting performed and he is now on Xarelto and Plavix. Denies any h/a or cough. Denies dyspnea.   10/23/23: C1  Carboplatin/pemetrexed  23: C2 carboplatin/pemetrexed with concurrent RT    23: C3 carboplatin/pemetrexed with concurrent RT   2023: Hypotension due to dehydration, requiring ED visit.  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  HFpEF - EF 60%   Atrial fibrillation - on Eliquis  Ascending aortic aneurysm   PUD (gastric)  HLD        SOCIAL HISTORY  + Tobacco - quit in  - mostly 1/2 ppd starting at age 22  Occasional ETOH intake  Worked as a  for 35 years    for 43 years and has 2 children - son who is 39 yo and daughter who is 35  He has just retired      FAMILY HISTORY  Mother seems to have had H/N or lung cancer - unclear   Father may also have had cancer   1 sister  from complications of GSW  1 brother  of CA (?liver CA)     CURRENT MEDS REVIEWED        ALLERGIES REVIEWED   NKDA     SUBJECTIVE:  Patient here today in clinic with his wife for follow-up, scan reviewed  He is feeling better since starting prednisone  States breathing is back to baseline, SOB resolved, discussed decreasing prednisone to 60mg/day starting tomorrow 24  Appetite has improved  Denies N/V/D/C  No fevers, chills, or cold symptoms  No new aches/pains  No rash or skin issues  No headaches or neurological issues  No other concerns      LAST VISIT:  Patient is seen today for follow-up and treatment, he is accompanied by his wife and son Harris  Notes increased SOB, without any other associated sign/symptoms, concern for irAE pneumonitis s/p  radiation, will initiate prednisone at 1mg/kg and re-evaluate next week, instructed patient/spouse to call if symptoms persist/worsen   No fevers or chills  Ongoing fatigue   No other concerns      OBJECTIVE:  Vitals:    04/03/24 1041   BP: 107/66   Pulse: 83   Resp: 18   Temp: 36.3 °C (97.3 °F)   SpO2: 98%        Body surface area is 1.89 meters squared.     Wt Readings from Last 5 Encounters:   04/03/24 70.4 kg (155 lb 3.3 oz)   04/02/24 69.4 kg (153 lb)   03/27/24 69.3 kg (152 lb 12.5 oz)   03/11/24 70.8 kg (156 lb)   02/28/24 73.7 kg (162 lb 7.7 oz)       ECOGSCORE: 2    Physical Exam  Constitutional:       Appearance: Normal appearance.   HENT:      Head: Normocephalic and atraumatic.   Eyes:      General: No scleral icterus.     Extraocular Movements: Extraocular movements intact.      Conjunctiva/sclera: Conjunctivae normal.   Cardiovascular:      Rate and Rhythm: Normal rate and regular rhythm.   Pulmonary:      Effort: Pulmonary effort is normal. No respiratory distress.      Breath sounds: Normal breath sounds. No stridor. No wheezing, rhonchi or rales.   Abdominal:      General: Abdomen is flat.      Palpations: Abdomen is soft.      Tenderness: There is no abdominal tenderness.   Musculoskeletal:         General: Normal range of motion.      Right lower leg: No edema.      Left lower leg: No edema.   Neurological:      General: No focal deficit present.      Mental Status: He is alert and oriented to person, place, and time. Mental status is at baseline.      Motor: No weakness.      Gait: Gait normal.   Psychiatric:         Mood and Affect: Mood normal.         Behavior: Behavior normal.         Thought Content: Thought content normal.         Judgment: Judgment normal.          Diagnostic Results   Results:  Labs:  Lab Results   Component Value Date    WBC 7.8 03/26/2024    HGB 10.9 (L) 03/26/2024    HCT 33.6 (L) 03/26/2024    MCV 84 03/26/2024     03/26/2024      Lab Results   Component Value  Date    NEUTROABS 5.44 03/26/2024        Lab Results   Component Value Date    GLUCOSE 92 03/26/2024    CALCIUM 9.5 03/26/2024     (L) 03/26/2024    K 4.6 03/26/2024    CO2 21 03/26/2024     03/26/2024    BUN 39 (H) 03/26/2024    CREATININE 1.02 03/26/2024    MG 1.44 (L) 12/18/2023     Lab Results   Component Value Date    ALT 24 03/26/2024    AST 24 03/26/2024    ALKPHOS 55 03/26/2024    BILITOT 0.3 03/26/2024      Lab Results   Component Value Date    ACTH 11.8 01/23/2024    CORTISOL 16.6 03/26/2024    TSH 0.01 (L) 03/26/2024    FREET4 1.48 (H) 03/26/2024       STUDY:  CT CHEST W IV CONTRAST;  1/8/2024 2:19 pm      INDICATION:  Signs/Symptoms:Assessment treatment response stage III NSCLC after  chemo-RT.      COMPARISON:  09/12/2023      ACCESSION NUMBER(S):  [id number]      ORDERING CLINICIAN:  ADELIA CRUZ      TECHNIQUE:  Helical data acquisition of the chest was obtained with  50 mL  Omnipaque 350. Images were reformatted in axial, coronal, and  sagittal planes.MIP reformatted images were also generated.      FINDINGS:  LUNGS and AIRWAYS:  Left lower lobe nodule has decreased in size from 35 x 24 x 3.5 mm to  29 x 18 x 2.7 mm. Scattered additional solid and sub solid nodules  are otherwise not significantly changed, for example measuring 10 mm  in left upper lobe posteriorly. No new nodules or areas of  consolidation. Mild emphysematous changes. Central airways are  patent. No bronchiectasis.      No pleural effusion or pneumothorax.      MEDIASTINUM and GET, LOWER NECK AND AXILLA:  The visualized thyroid gland is grossly unremarkable.      Regression in mediastinal lymphadenopathy, for example:      Subcarinal: Decreased from 24 mm to 11 mm short axis  Upper right paratracheal: From 14 mm 6 mm  Lower right paratracheal: From 13 mm to 14 mm      Esophagus is not dilated.      HEART and VESSELS:  Mild cardiomegaly.      No significant pericardial effusion.      No central pulmonary embolism  identified on nondedicated study.      Severe coronary atherosclerosis.      Aortic valve leaflet calcifications. Severe thoracic aortic  atherosclerosis. Unchanged dilatation ascending aorta up to 4.6 cm.  Otherwise the thoracic aorta and great vessels are patent.      UPPER ABDOMEN:  The visualized subdiaphragmatic structures demonstrate no acute  findings on limited images.      CHEST WALL and OSSEOUS STRUCTURES:  No change in cyst and lipomas of the anterior chest wall.  There is increased sclerosis involving a the T6 vertebral body. There  is an increasing sclerotic margin involving lytic lesion of the left  5th rib laterally. Thoracic degenerative changes.      IMPRESSION:  1.  Favorable treatment response compared to 4 months ago.  2. Left lower lobe mass is slightly regressed in size.  3. Mediastinal lymphadenopathy has significantly regressed.  4. Increased sclerosis at T6 vertebral body and increased sclerotic  margin of lucent lesion of left 5th rib which could be healing  metastases.    Assessment/Plan     Primary cancer of left lower lobe of lung (CMS/HCC), Clinical: Stage IIIC (cT3, cN3, cM0)  Status post completion of concurrent chemo RT on 12/26/2023 (60 Beyer in 30 fractions) using carboplatin/pemetrexed (s/p 3 cycles). His tumor harbors a KRAS G12 D mutation in the PD-L1 expression TPS 95%.  His scans from 01/08/2024 demonstrating a favorable response, with a decrease in size of the dominant left lower lobe primary tumor by approximately 20%, compatible with stable disease.   The left upper lobe nodule remains a stable.  STAT PET noted decreases size LLL nodule, resolution of mediatstinal lymphadenopathy, new WILDA nodule too small to characterize  - 3/27/24 Concern for irAE pneumonitis in the context of SOB s/p radiation now on durvalumab, initiated prednisone 1mg/kg (70mg) and started prophylactic PPI as well   -4/3/24 Discussed most recent scan and noted decrease in LLL nodule and new ground glass  opacities. Breathing has returned to baseline, will start prednisone taper, will decrease to 60mg/day starting 4/4/24 and follow-up weekly for close monitoring, instructed to call if symptoms persist/worsen.

## 2024-04-08 PROBLEM — J06.9 ACUTE UPPER RESPIRATORY INFECTION: Status: ACTIVE | Noted: 2024-04-08

## 2024-04-08 PROBLEM — R73.03 PREDIABETES: Status: ACTIVE | Noted: 2024-02-27

## 2024-04-08 PROBLEM — K20.80 RADIATION-INDUCED ESOPHAGITIS: Status: ACTIVE | Noted: 2024-01-10

## 2024-04-08 PROBLEM — I71.40 ABDOMINAL AORTIC ANEURYSM, WITHOUT RUPTURE, UNSPECIFIED (CMS-HCC): Status: ACTIVE | Noted: 2023-08-13

## 2024-04-08 PROBLEM — R30.0 DYSURIA: Status: ACTIVE | Noted: 2024-04-08

## 2024-04-08 PROBLEM — F41.9 ANXIETY: Status: ACTIVE | Noted: 2023-11-07

## 2024-04-08 PROBLEM — T66.XXXA RADIATION-INDUCED ESOPHAGITIS: Status: ACTIVE | Noted: 2024-01-10

## 2024-04-08 PROBLEM — K40.90 INGUINAL HERNIA: Status: ACTIVE | Noted: 2024-04-08

## 2024-04-09 ENCOUNTER — OFFICE VISIT (OUTPATIENT)
Dept: CARDIOLOGY | Facility: CLINIC | Age: 69
End: 2024-04-09
Payer: MEDICARE

## 2024-04-09 ENCOUNTER — ANESTHESIA (OUTPATIENT)
Dept: OPERATING ROOM | Facility: HOSPITAL | Age: 69
DRG: 329 | End: 2024-04-09
Payer: MEDICARE

## 2024-04-09 ENCOUNTER — APPOINTMENT (OUTPATIENT)
Dept: RADIOLOGY | Facility: HOSPITAL | Age: 69
DRG: 329 | End: 2024-04-09
Payer: MEDICARE

## 2024-04-09 ENCOUNTER — HOSPITAL ENCOUNTER (INPATIENT)
Facility: HOSPITAL | Age: 69
LOS: 7 days | Discharge: HOME | DRG: 329 | End: 2024-04-16
Attending: STUDENT IN AN ORGANIZED HEALTH CARE EDUCATION/TRAINING PROGRAM | Admitting: SURGERY
Payer: MEDICARE

## 2024-04-09 ENCOUNTER — ANESTHESIA EVENT (OUTPATIENT)
Dept: OPERATING ROOM | Facility: HOSPITAL | Age: 69
DRG: 329 | End: 2024-04-09
Payer: MEDICARE

## 2024-04-09 ENCOUNTER — APPOINTMENT (OUTPATIENT)
Dept: CARDIOLOGY | Facility: HOSPITAL | Age: 69
DRG: 329 | End: 2024-04-09
Payer: MEDICARE

## 2024-04-09 VITALS
BODY MASS INDEX: 20.86 KG/M2 | DIASTOLIC BLOOD PRESSURE: 58 MMHG | SYSTOLIC BLOOD PRESSURE: 84 MMHG | HEART RATE: 75 BPM | HEIGHT: 72 IN | OXYGEN SATURATION: 80 % | WEIGHT: 154 LBS

## 2024-04-09 DIAGNOSIS — I48.91 ATRIAL FIBRILLATION, UNSPECIFIED TYPE (MULTI): Primary | ICD-10-CM

## 2024-04-09 DIAGNOSIS — K40.30 INCARCERATED INGUINAL HERNIA: ICD-10-CM

## 2024-04-09 DIAGNOSIS — I77.1 CELIAC ARTERY STENOSIS (CMS-HCC): ICD-10-CM

## 2024-04-09 DIAGNOSIS — I25.10 CORONARY ARTERY DISEASE INVOLVING NATIVE CORONARY ARTERY OF NATIVE HEART WITHOUT ANGINA PECTORIS: ICD-10-CM

## 2024-04-09 DIAGNOSIS — I50.22 CHRONIC SYSTOLIC CONGESTIVE HEART FAILURE (MULTI): ICD-10-CM

## 2024-04-09 DIAGNOSIS — R42 DIZZINESS: ICD-10-CM

## 2024-04-09 DIAGNOSIS — K40.30 INCARCERATED RIGHT INGUINAL HERNIA: Primary | ICD-10-CM

## 2024-04-09 DIAGNOSIS — I95.81 POSTOPERATIVE HYPOTENSION: ICD-10-CM

## 2024-04-09 DIAGNOSIS — C34.32 PRIMARY CANCER OF LEFT LOWER LOBE OF LUNG (MULTI): ICD-10-CM

## 2024-04-09 DIAGNOSIS — J98.4 PNEUMONITIS: ICD-10-CM

## 2024-04-09 LAB
ALBUMIN SERPL BCP-MCNC: 3.7 G/DL (ref 3.4–5)
ALP SERPL-CCNC: 46 U/L (ref 33–136)
ALT SERPL W P-5'-P-CCNC: 13 U/L (ref 10–52)
ANION GAP SERPL CALC-SCNC: 12 MMOL/L (ref 10–20)
AST SERPL W P-5'-P-CCNC: 11 U/L (ref 9–39)
BASOPHILS # BLD AUTO: 0.02 X10*3/UL (ref 0–0.1)
BASOPHILS NFR BLD AUTO: 0.1 %
BILIRUB DIRECT SERPL-MCNC: 0.1 MG/DL (ref 0–0.3)
BILIRUB SERPL-MCNC: 0.6 MG/DL (ref 0–1.2)
BUN SERPL-MCNC: 35 MG/DL (ref 6–23)
CALCIUM SERPL-MCNC: 8.8 MG/DL (ref 8.6–10.3)
CARDIAC TROPONIN I PNL SERPL HS: 22 NG/L (ref 0–20)
CHLORIDE SERPL-SCNC: 96 MMOL/L (ref 98–107)
CO2 SERPL-SCNC: 26 MMOL/L (ref 21–32)
CREAT SERPL-MCNC: 1.28 MG/DL (ref 0.5–1.3)
EGFRCR SERPLBLD CKD-EPI 2021: 62 ML/MIN/1.73M*2
EOSINOPHIL # BLD AUTO: 0 X10*3/UL (ref 0–0.7)
EOSINOPHIL NFR BLD AUTO: 0 %
ERYTHROCYTE [DISTWIDTH] IN BLOOD BY AUTOMATED COUNT: 15.8 % (ref 11.5–14.5)
GLUCOSE SERPL-MCNC: 166 MG/DL (ref 74–99)
HCT VFR BLD AUTO: 36.2 % (ref 41–52)
HGB BLD-MCNC: 12.3 G/DL (ref 13.5–17.5)
IMM GRANULOCYTES # BLD AUTO: 0.13 X10*3/UL (ref 0–0.7)
IMM GRANULOCYTES NFR BLD AUTO: 0.6 % (ref 0–0.9)
INR PPP: 1.8 (ref 0.9–1.1)
LACTATE SERPL-SCNC: 1.4 MMOL/L (ref 0.4–2)
LIPASE SERPL-CCNC: 7 U/L (ref 9–82)
LYMPHOCYTES # BLD AUTO: 0.92 X10*3/UL (ref 1.2–4.8)
LYMPHOCYTES NFR BLD AUTO: 4.3 %
MAGNESIUM SERPL-MCNC: 1.8 MG/DL (ref 1.6–2.4)
MCH RBC QN AUTO: 27.5 PG (ref 26–34)
MCHC RBC AUTO-ENTMCNC: 34 G/DL (ref 32–36)
MCV RBC AUTO: 81 FL (ref 80–100)
MONOCYTES # BLD AUTO: 0.41 X10*3/UL (ref 0.1–1)
MONOCYTES NFR BLD AUTO: 1.9 %
NEUTROPHILS # BLD AUTO: 19.91 X10*3/UL (ref 1.2–7.7)
NEUTROPHILS NFR BLD AUTO: 93.1 %
NRBC BLD-RTO: 0 /100 WBCS (ref 0–0)
PLATELET # BLD AUTO: 135 X10*3/UL (ref 150–450)
POTASSIUM SERPL-SCNC: 4 MMOL/L (ref 3.5–5.3)
PROT SERPL-MCNC: 6.9 G/DL (ref 6.4–8.2)
PROTHROMBIN TIME: 20.7 SECONDS (ref 9.8–12.8)
RBC # BLD AUTO: 4.48 X10*6/UL (ref 4.5–5.9)
SODIUM SERPL-SCNC: 130 MMOL/L (ref 136–145)
WBC # BLD AUTO: 21.4 X10*3/UL (ref 4.4–11.3)

## 2024-04-09 PROCEDURE — 3074F SYST BP LT 130 MM HG: CPT | Performed by: INTERNAL MEDICINE

## 2024-04-09 PROCEDURE — 3600000003 HC OR TIME - INITIAL BASE CHARGE - PROCEDURE LEVEL THREE: Performed by: SURGERY

## 2024-04-09 PROCEDURE — 2500000004 HC RX 250 GENERAL PHARMACY W/ HCPCS (ALT 636 FOR OP/ED): Performed by: STUDENT IN AN ORGANIZED HEALTH CARE EDUCATION/TRAINING PROGRAM

## 2024-04-09 PROCEDURE — 3700000001 HC GENERAL ANESTHESIA TIME - INITIAL BASE CHARGE: Performed by: SURGERY

## 2024-04-09 PROCEDURE — 1036F TOBACCO NON-USER: CPT | Performed by: INTERNAL MEDICINE

## 2024-04-09 PROCEDURE — 3078F DIAST BP <80 MM HG: CPT | Performed by: INTERNAL MEDICINE

## 2024-04-09 PROCEDURE — 99285 EMERGENCY DEPT VISIT HI MDM: CPT | Mod: 25

## 2024-04-09 PROCEDURE — 2500000004 HC RX 250 GENERAL PHARMACY W/ HCPCS (ALT 636 FOR OP/ED)

## 2024-04-09 PROCEDURE — 99214 OFFICE O/P EST MOD 30 MIN: CPT | Performed by: INTERNAL MEDICINE

## 2024-04-09 PROCEDURE — 83605 ASSAY OF LACTIC ACID: CPT | Performed by: STUDENT IN AN ORGANIZED HEALTH CARE EDUCATION/TRAINING PROGRAM

## 2024-04-09 PROCEDURE — 49507 PRP I/HERN INIT BLOCK >5 YR: CPT | Performed by: SURGERY

## 2024-04-09 PROCEDURE — 44120 REMOVAL OF SMALL INTESTINE: CPT | Performed by: SURGERY

## 2024-04-09 PROCEDURE — 7100000001 HC RECOVERY ROOM TIME - INITIAL BASE CHARGE: Performed by: SURGERY

## 2024-04-09 PROCEDURE — 80053 COMPREHEN METABOLIC PANEL: CPT | Performed by: STUDENT IN AN ORGANIZED HEALTH CARE EDUCATION/TRAINING PROGRAM

## 2024-04-09 PROCEDURE — 74177 CT ABD & PELVIS W/CONTRAST: CPT | Mod: FOREIGN READ | Performed by: RADIOLOGY

## 2024-04-09 PROCEDURE — 2500000004 HC RX 250 GENERAL PHARMACY W/ HCPCS (ALT 636 FOR OP/ED): Performed by: LICENSED PRACTICAL NURSE

## 2024-04-09 PROCEDURE — 96375 TX/PRO/DX INJ NEW DRUG ADDON: CPT

## 2024-04-09 PROCEDURE — 82040 ASSAY OF SERUM ALBUMIN: CPT | Performed by: STUDENT IN AN ORGANIZED HEALTH CARE EDUCATION/TRAINING PROGRAM

## 2024-04-09 PROCEDURE — 74177 CT ABD & PELVIS W/CONTRAST: CPT

## 2024-04-09 PROCEDURE — 83735 ASSAY OF MAGNESIUM: CPT | Performed by: STUDENT IN AN ORGANIZED HEALTH CARE EDUCATION/TRAINING PROGRAM

## 2024-04-09 PROCEDURE — 36415 COLL VENOUS BLD VENIPUNCTURE: CPT | Performed by: STUDENT IN AN ORGANIZED HEALTH CARE EDUCATION/TRAINING PROGRAM

## 2024-04-09 PROCEDURE — 2720000007 HC OR 272 NO HCPCS: Performed by: SURGERY

## 2024-04-09 PROCEDURE — 88302 TISSUE EXAM BY PATHOLOGIST: CPT | Performed by: STUDENT IN AN ORGANIZED HEALTH CARE EDUCATION/TRAINING PROGRAM

## 2024-04-09 PROCEDURE — 3700000002 HC GENERAL ANESTHESIA TIME - EACH INCREMENTAL 1 MINUTE: Performed by: SURGERY

## 2024-04-09 PROCEDURE — 87075 CULTR BACTERIA EXCEPT BLOOD: CPT | Mod: PORLAB,91 | Performed by: SURGERY

## 2024-04-09 PROCEDURE — 2020000001 HC ICU ROOM DAILY

## 2024-04-09 PROCEDURE — 1160F RVW MEDS BY RX/DR IN RCRD: CPT | Performed by: INTERNAL MEDICINE

## 2024-04-09 PROCEDURE — 1159F MED LIST DOCD IN RCRD: CPT | Performed by: INTERNAL MEDICINE

## 2024-04-09 PROCEDURE — 83690 ASSAY OF LIPASE: CPT | Performed by: STUDENT IN AN ORGANIZED HEALTH CARE EDUCATION/TRAINING PROGRAM

## 2024-04-09 PROCEDURE — 88307 TISSUE EXAM BY PATHOLOGIST: CPT | Mod: TC,PORLAB,91 | Performed by: SURGERY

## 2024-04-09 PROCEDURE — 1157F ADVNC CARE PLAN IN RCRD: CPT | Performed by: INTERNAL MEDICINE

## 2024-04-09 PROCEDURE — 88307 TISSUE EXAM BY PATHOLOGIST: CPT | Performed by: STUDENT IN AN ORGANIZED HEALTH CARE EDUCATION/TRAINING PROGRAM

## 2024-04-09 PROCEDURE — 84484 ASSAY OF TROPONIN QUANT: CPT | Performed by: STUDENT IN AN ORGANIZED HEALTH CARE EDUCATION/TRAINING PROGRAM

## 2024-04-09 PROCEDURE — 2500000004 HC RX 250 GENERAL PHARMACY W/ HCPCS (ALT 636 FOR OP/ED): Performed by: SURGERY

## 2024-04-09 PROCEDURE — 85025 COMPLETE CBC W/AUTO DIFF WBC: CPT | Performed by: STUDENT IN AN ORGANIZED HEALTH CARE EDUCATION/TRAINING PROGRAM

## 2024-04-09 PROCEDURE — 96376 TX/PRO/DX INJ SAME DRUG ADON: CPT

## 2024-04-09 PROCEDURE — 85610 PROTHROMBIN TIME: CPT | Performed by: SURGERY

## 2024-04-09 PROCEDURE — A4217 STERILE WATER/SALINE, 500 ML: HCPCS | Performed by: SURGERY

## 2024-04-09 PROCEDURE — 3600000008 HC OR TIME - EACH INCREMENTAL 1 MINUTE - PROCEDURE LEVEL THREE: Performed by: SURGERY

## 2024-04-09 PROCEDURE — 93005 ELECTROCARDIOGRAM TRACING: CPT

## 2024-04-09 PROCEDURE — 99223 1ST HOSP IP/OBS HIGH 75: CPT | Performed by: INTERNAL MEDICINE

## 2024-04-09 PROCEDURE — 2550000001 HC RX 255 CONTRASTS: Performed by: STUDENT IN AN ORGANIZED HEALTH CARE EDUCATION/TRAINING PROGRAM

## 2024-04-09 PROCEDURE — 2500000001 HC RX 250 WO HCPCS SELF ADMINISTERED DRUGS (ALT 637 FOR MEDICARE OP)

## 2024-04-09 PROCEDURE — 96365 THER/PROPH/DIAG IV INF INIT: CPT

## 2024-04-09 PROCEDURE — 6360000002 HC RX 636 FACTOR: Mod: JZ | Performed by: STUDENT IN AN ORGANIZED HEALTH CARE EDUCATION/TRAINING PROGRAM

## 2024-04-09 PROCEDURE — A4217 STERILE WATER/SALINE, 500 ML: HCPCS | Performed by: STUDENT IN AN ORGANIZED HEALTH CARE EDUCATION/TRAINING PROGRAM

## 2024-04-09 PROCEDURE — 7100000002 HC RECOVERY ROOM TIME - EACH INCREMENTAL 1 MINUTE: Performed by: SURGERY

## 2024-04-09 PROCEDURE — 0YQ50ZZ REPAIR RIGHT INGUINAL REGION, OPEN APPROACH: ICD-10-PCS | Performed by: SURGERY

## 2024-04-09 PROCEDURE — 2500000004 HC RX 250 GENERAL PHARMACY W/ HCPCS (ALT 636 FOR OP/ED): Mod: JZ | Performed by: SURGERY

## 2024-04-09 PROCEDURE — 2500000005 HC RX 250 GENERAL PHARMACY W/O HCPCS: Performed by: LICENSED PRACTICAL NURSE

## 2024-04-09 PROCEDURE — 93000 ELECTROCARDIOGRAM COMPLETE: CPT | Performed by: INTERNAL MEDICINE

## 2024-04-09 PROCEDURE — 36415 COLL VENOUS BLD VENIPUNCTURE: CPT | Performed by: SURGERY

## 2024-04-09 PROCEDURE — 0DB80ZZ EXCISION OF SMALL INTESTINE, OPEN APPROACH: ICD-10-PCS | Performed by: SURGERY

## 2024-04-09 PROCEDURE — 2500000005 HC RX 250 GENERAL PHARMACY W/O HCPCS: Performed by: INTERNAL MEDICINE

## 2024-04-09 PROCEDURE — 99223 1ST HOSP IP/OBS HIGH 75: CPT | Performed by: SURGERY

## 2024-04-09 RX ORDER — METOPROLOL TARTRATE 1 MG/ML
5 INJECTION, SOLUTION INTRAVENOUS EVERY 6 HOURS PRN
Status: DISCONTINUED | OUTPATIENT
Start: 2024-04-09 | End: 2024-04-13

## 2024-04-09 RX ORDER — HYDRALAZINE HYDROCHLORIDE 20 MG/ML
5 INJECTION INTRAMUSCULAR; INTRAVENOUS EVERY 30 MIN PRN
Status: DISCONTINUED | OUTPATIENT
Start: 2024-04-09 | End: 2024-04-09 | Stop reason: HOSPADM

## 2024-04-09 RX ORDER — PROPOFOL 10 MG/ML
INJECTION, EMULSION INTRAVENOUS AS NEEDED
Status: DISCONTINUED | OUTPATIENT
Start: 2024-04-09 | End: 2024-04-09

## 2024-04-09 RX ORDER — NOREPINEPHRINE BITARTRATE/D5W 8 MG/250ML
.01-.5 PLASTIC BAG, INJECTION (ML) INTRAVENOUS CONTINUOUS
Status: DISCONTINUED | OUTPATIENT
Start: 2024-04-09 | End: 2024-04-10

## 2024-04-09 RX ORDER — FAMOTIDINE 10 MG/ML
20 INJECTION INTRAVENOUS ONCE
Status: DISCONTINUED | OUTPATIENT
Start: 2024-04-09 | End: 2024-04-09 | Stop reason: HOSPADM

## 2024-04-09 RX ORDER — LABETALOL HYDROCHLORIDE 5 MG/ML
5 INJECTION, SOLUTION INTRAVENOUS ONCE AS NEEDED
Status: DISCONTINUED | OUTPATIENT
Start: 2024-04-09 | End: 2024-04-09 | Stop reason: HOSPADM

## 2024-04-09 RX ORDER — FENTANYL CITRATE 50 UG/ML
INJECTION, SOLUTION INTRAMUSCULAR; INTRAVENOUS AS NEEDED
Status: DISCONTINUED | OUTPATIENT
Start: 2024-04-09 | End: 2024-04-09

## 2024-04-09 RX ORDER — BUPIVACAINE HYDROCHLORIDE 2.5 MG/ML
INJECTION, SOLUTION EPIDURAL; INFILTRATION; INTRACAUDAL AS NEEDED
Status: DISCONTINUED | OUTPATIENT
Start: 2024-04-09 | End: 2024-04-09 | Stop reason: HOSPADM

## 2024-04-09 RX ORDER — ROCURONIUM BROMIDE 50 MG/5 ML
SYRINGE (ML) INTRAVENOUS AS NEEDED
Status: DISCONTINUED | OUTPATIENT
Start: 2024-04-09 | End: 2024-04-09

## 2024-04-09 RX ORDER — ESOMEPRAZOLE MAGNESIUM 40 MG/1
40 GRANULE, DELAYED RELEASE ORAL
Status: DISCONTINUED | OUTPATIENT
Start: 2024-04-10 | End: 2024-04-12

## 2024-04-09 RX ORDER — DIGOXIN 125 MCG
125 TABLET ORAL DAILY
Status: DISCONTINUED | OUTPATIENT
Start: 2024-04-10 | End: 2024-04-16 | Stop reason: HOSPADM

## 2024-04-09 RX ORDER — METRONIDAZOLE 500 MG/100ML
500 INJECTION, SOLUTION INTRAVENOUS ONCE
Status: COMPLETED | OUTPATIENT
Start: 2024-04-09 | End: 2024-04-09

## 2024-04-09 RX ORDER — ONDANSETRON 4 MG/1
4 TABLET, ORALLY DISINTEGRATING ORAL EVERY 8 HOURS PRN
Status: DISCONTINUED | OUTPATIENT
Start: 2024-04-09 | End: 2024-04-13

## 2024-04-09 RX ORDER — ONDANSETRON HYDROCHLORIDE 2 MG/ML
4 INJECTION, SOLUTION INTRAVENOUS EVERY 8 HOURS PRN
Status: DISCONTINUED | OUTPATIENT
Start: 2024-04-09 | End: 2024-04-15

## 2024-04-09 RX ORDER — PHENYLEPHRINE 10 MG/250 ML(40 MCG/ML)IN 0.9 % SOD.CHLORIDE INTRAVENOUS
CONTINUOUS PRN
Status: DISCONTINUED | OUTPATIENT
Start: 2024-04-09 | End: 2024-04-09

## 2024-04-09 RX ORDER — ONDANSETRON HYDROCHLORIDE 2 MG/ML
4 INJECTION, SOLUTION INTRAVENOUS ONCE AS NEEDED
Status: DISCONTINUED | OUTPATIENT
Start: 2024-04-09 | End: 2024-04-09 | Stop reason: HOSPADM

## 2024-04-09 RX ORDER — ONDANSETRON HYDROCHLORIDE 2 MG/ML
INJECTION, SOLUTION INTRAVENOUS AS NEEDED
Status: DISCONTINUED | OUTPATIENT
Start: 2024-04-09 | End: 2024-04-09

## 2024-04-09 RX ORDER — MEPERIDINE HYDROCHLORIDE 25 MG/ML
12.5 INJECTION INTRAMUSCULAR; INTRAVENOUS; SUBCUTANEOUS EVERY 10 MIN PRN
Status: DISCONTINUED | OUTPATIENT
Start: 2024-04-09 | End: 2024-04-09 | Stop reason: HOSPADM

## 2024-04-09 RX ORDER — HYDROMORPHONE HYDROCHLORIDE 1 MG/ML
1 INJECTION, SOLUTION INTRAMUSCULAR; INTRAVENOUS; SUBCUTANEOUS ONCE
Status: COMPLETED | OUTPATIENT
Start: 2024-04-09 | End: 2024-04-09

## 2024-04-09 RX ORDER — ACETAMINOPHEN 10 MG/ML
1000 INJECTION, SOLUTION INTRAVENOUS EVERY 8 HOURS
Status: DISCONTINUED | OUTPATIENT
Start: 2024-04-09 | End: 2024-04-10

## 2024-04-09 RX ORDER — LIDOCAINE HYDROCHLORIDE 10 MG/ML
0.1 INJECTION, SOLUTION EPIDURAL; INFILTRATION; INTRACAUDAL; PERINEURAL ONCE
Status: DISCONTINUED | OUTPATIENT
Start: 2024-04-09 | End: 2024-04-09 | Stop reason: HOSPADM

## 2024-04-09 RX ORDER — LIDOCAINE HCL/PF 100 MG/5ML
SYRINGE (ML) INTRAVENOUS AS NEEDED
Status: DISCONTINUED | OUTPATIENT
Start: 2024-04-09 | End: 2024-04-09

## 2024-04-09 RX ORDER — SODIUM CHLORIDE, SODIUM LACTATE, POTASSIUM CHLORIDE, CALCIUM CHLORIDE 600; 310; 30; 20 MG/100ML; MG/100ML; MG/100ML; MG/100ML
100 INJECTION, SOLUTION INTRAVENOUS CONTINUOUS
Status: DISCONTINUED | OUTPATIENT
Start: 2024-04-09 | End: 2024-04-09

## 2024-04-09 RX ORDER — KETOROLAC TROMETHAMINE 30 MG/ML
15 INJECTION, SOLUTION INTRAMUSCULAR; INTRAVENOUS ONCE
Status: COMPLETED | OUTPATIENT
Start: 2024-04-09 | End: 2024-04-09

## 2024-04-09 RX ORDER — MIDAZOLAM HYDROCHLORIDE 1 MG/ML
INJECTION, SOLUTION INTRAMUSCULAR; INTRAVENOUS AS NEEDED
Status: DISCONTINUED | OUTPATIENT
Start: 2024-04-09 | End: 2024-04-09

## 2024-04-09 RX ORDER — SODIUM CHLORIDE, SODIUM LACTATE, POTASSIUM CHLORIDE, CALCIUM CHLORIDE 600; 310; 30; 20 MG/100ML; MG/100ML; MG/100ML; MG/100ML
INJECTION, SOLUTION INTRAVENOUS CONTINUOUS PRN
Status: DISCONTINUED | OUTPATIENT
Start: 2024-04-09 | End: 2024-04-09

## 2024-04-09 RX ORDER — MORPHINE SULFATE 2 MG/ML
2 INJECTION, SOLUTION INTRAMUSCULAR; INTRAVENOUS EVERY 5 MIN PRN
Status: DISCONTINUED | OUTPATIENT
Start: 2024-04-09 | End: 2024-04-09 | Stop reason: HOSPADM

## 2024-04-09 RX ORDER — ONDANSETRON HYDROCHLORIDE 2 MG/ML
4 INJECTION, SOLUTION INTRAVENOUS ONCE
Status: COMPLETED | OUTPATIENT
Start: 2024-04-09 | End: 2024-04-09

## 2024-04-09 RX ORDER — ROSUVASTATIN CALCIUM 10 MG/1
5 TABLET, COATED ORAL DAILY
Status: DISCONTINUED | OUTPATIENT
Start: 2024-04-10 | End: 2024-04-10

## 2024-04-09 RX ORDER — GABAPENTIN 300 MG/1
300 CAPSULE ORAL NIGHTLY
Status: DISCONTINUED | OUTPATIENT
Start: 2024-04-09 | End: 2024-04-10

## 2024-04-09 RX ORDER — DIPHENHYDRAMINE HYDROCHLORIDE 50 MG/ML
12.5 INJECTION INTRAMUSCULAR; INTRAVENOUS ONCE AS NEEDED
Status: DISCONTINUED | OUTPATIENT
Start: 2024-04-09 | End: 2024-04-09 | Stop reason: HOSPADM

## 2024-04-09 RX ORDER — SODIUM CHLORIDE, SODIUM LACTATE, POTASSIUM CHLORIDE, CALCIUM CHLORIDE 600; 310; 30; 20 MG/100ML; MG/100ML; MG/100ML; MG/100ML
100 INJECTION, SOLUTION INTRAVENOUS CONTINUOUS
Status: DISCONTINUED | OUTPATIENT
Start: 2024-04-09 | End: 2024-04-09 | Stop reason: HOSPADM

## 2024-04-09 RX ORDER — MORPHINE SULFATE 4 MG/ML
4 INJECTION INTRAVENOUS EVERY 4 HOURS PRN
Status: DISCONTINUED | OUTPATIENT
Start: 2024-04-09 | End: 2024-04-10

## 2024-04-09 RX ORDER — ALBUTEROL SULFATE 0.83 MG/ML
2.5 SOLUTION RESPIRATORY (INHALATION) ONCE AS NEEDED
Status: DISCONTINUED | OUTPATIENT
Start: 2024-04-09 | End: 2024-04-09 | Stop reason: HOSPADM

## 2024-04-09 RX ORDER — DROPERIDOL 2.5 MG/ML
0.62 INJECTION, SOLUTION INTRAMUSCULAR; INTRAVENOUS ONCE AS NEEDED
Status: DISCONTINUED | OUTPATIENT
Start: 2024-04-09 | End: 2024-04-09 | Stop reason: HOSPADM

## 2024-04-09 RX ORDER — HYDROMORPHONE HYDROCHLORIDE 1 MG/ML
INJECTION, SOLUTION INTRAMUSCULAR; INTRAVENOUS; SUBCUTANEOUS
Status: COMPLETED
Start: 2024-04-09 | End: 2024-04-09

## 2024-04-09 RX ORDER — MORPHINE SULFATE 2 MG/ML
2 INJECTION, SOLUTION INTRAMUSCULAR; INTRAVENOUS EVERY 4 HOURS PRN
Status: DISCONTINUED | OUTPATIENT
Start: 2024-04-09 | End: 2024-04-10

## 2024-04-09 RX ORDER — SODIUM CHLORIDE 0.9 G/100ML
IRRIGANT IRRIGATION AS NEEDED
Status: DISCONTINUED | OUTPATIENT
Start: 2024-04-09 | End: 2024-04-09 | Stop reason: HOSPADM

## 2024-04-09 RX ORDER — METRONIDAZOLE 500 MG/100ML
500 INJECTION, SOLUTION INTRAVENOUS EVERY 8 HOURS
Status: DISCONTINUED | OUTPATIENT
Start: 2024-04-10 | End: 2024-04-10

## 2024-04-09 RX ORDER — FENOFIBRATE 160 MG/1
160 TABLET ORAL DAILY
Status: DISCONTINUED | OUTPATIENT
Start: 2024-04-10 | End: 2024-04-10

## 2024-04-09 RX ORDER — LIDOCAINE 560 MG/1
2 PATCH PERCUTANEOUS; TOPICAL; TRANSDERMAL DAILY
Status: DISCONTINUED | OUTPATIENT
Start: 2024-04-10 | End: 2024-04-16 | Stop reason: HOSPADM

## 2024-04-09 RX ORDER — PANTOPRAZOLE SODIUM 40 MG/1
40 TABLET, DELAYED RELEASE ORAL
Status: DISCONTINUED | OUTPATIENT
Start: 2024-04-10 | End: 2024-04-16 | Stop reason: HOSPADM

## 2024-04-09 RX ORDER — PANTOPRAZOLE SODIUM 40 MG/10ML
40 INJECTION, POWDER, LYOPHILIZED, FOR SOLUTION INTRAVENOUS
Status: DISCONTINUED | OUTPATIENT
Start: 2024-04-10 | End: 2024-04-12

## 2024-04-09 RX ORDER — TIZANIDINE 2 MG/1
2 TABLET ORAL EVERY 8 HOURS PRN
Status: DISCONTINUED | OUTPATIENT
Start: 2024-04-09 | End: 2024-04-16 | Stop reason: HOSPADM

## 2024-04-09 RX ADMIN — IOHEXOL 75 ML: 350 INJECTION, SOLUTION INTRAVENOUS at 18:13

## 2024-04-09 RX ADMIN — ESMOLOL HYDROCHLORIDE 20 MG: 10 INJECTION, SOLUTION INTRAVENOUS at 19:29

## 2024-04-09 RX ADMIN — Medication 0.4 MCG/KG/MIN: at 19:30

## 2024-04-09 RX ADMIN — ESMOLOL HYDROCHLORIDE 20 MG: 10 INJECTION, SOLUTION INTRAVENOUS at 19:20

## 2024-04-09 RX ADMIN — KETOROLAC TROMETHAMINE 15 MG: 30 INJECTION, SOLUTION INTRAMUSCULAR at 17:05

## 2024-04-09 RX ADMIN — NOREPINEPHRINE BITARTRATE 0.01 MCG/KG/MIN: 8 INJECTION, SOLUTION INTRAVENOUS at 23:39

## 2024-04-09 RX ADMIN — FENTANYL CITRATE 50 MCG: 50 INJECTION INTRAMUSCULAR; INTRAVENOUS at 19:18

## 2024-04-09 RX ADMIN — SODIUM CHLORIDE, POTASSIUM CHLORIDE, SODIUM LACTATE AND CALCIUM CHLORIDE: 600; 310; 30; 20 INJECTION, SOLUTION INTRAVENOUS at 19:11

## 2024-04-09 RX ADMIN — Medication 25 MG: at 19:30

## 2024-04-09 RX ADMIN — PROPOFOL 200 MG: 10 INJECTION, EMULSION INTRAVENOUS at 19:18

## 2024-04-09 RX ADMIN — DEXAMETHASONE SODIUM PHOSPHATE 12 MG: 4 INJECTION INTRA-ARTICULAR; INTRALESIONAL; INTRAMUSCULAR; INTRAVENOUS; SOFT TISSUE at 19:17

## 2024-04-09 RX ADMIN — HYDROMORPHONE HYDROCHLORIDE 1 MG: 1 INJECTION, SOLUTION INTRAMUSCULAR; INTRAVENOUS; SUBCUTANEOUS at 17:05

## 2024-04-09 RX ADMIN — FENTANYL CITRATE 50 MCG: 50 INJECTION INTRAMUSCULAR; INTRAVENOUS at 19:53

## 2024-04-09 RX ADMIN — SODIUM CHLORIDE, POTASSIUM CHLORIDE, SODIUM LACTATE AND CALCIUM CHLORIDE 100 ML/HR: 600; 310; 30; 20 INJECTION, SOLUTION INTRAVENOUS at 22:51

## 2024-04-09 RX ADMIN — PIPERACILLIN SODIUM AND TAZOBACTAM SODIUM 3.38 G: 3; .375 INJECTION, SOLUTION INTRAVENOUS at 23:12

## 2024-04-09 RX ADMIN — GABAPENTIN 300 MG: 300 CAPSULE ORAL at 22:45

## 2024-04-09 RX ADMIN — Medication 2000 UNITS: at 18:26

## 2024-04-09 RX ADMIN — ONDANSETRON 4 MG: 2 INJECTION INTRAMUSCULAR; INTRAVENOUS at 21:04

## 2024-04-09 RX ADMIN — ONDANSETRON 4 MG: 2 INJECTION INTRAMUSCULAR; INTRAVENOUS at 17:05

## 2024-04-09 RX ADMIN — METRONIDAZOLE 500 MG: 500 INJECTION, SOLUTION INTRAVENOUS at 19:10

## 2024-04-09 RX ADMIN — MIDAZOLAM 2 MG: 1 INJECTION INTRAMUSCULAR; INTRAVENOUS at 19:12

## 2024-04-09 RX ADMIN — SODIUM CHLORIDE 1000 ML: 9 INJECTION, SOLUTION INTRAVENOUS at 18:08

## 2024-04-09 RX ADMIN — PIPERACILLIN SODIUM AND TAZOBACTAM SODIUM 3.38 G: 3; .375 INJECTION, SOLUTION INTRAVENOUS at 18:08

## 2024-04-09 RX ADMIN — HYDROMORPHONE HYDROCHLORIDE 0.4 MG: 1 INJECTION, SOLUTION INTRAMUSCULAR; INTRAVENOUS; SUBCUTANEOUS at 18:50

## 2024-04-09 RX ADMIN — MORPHINE SULFATE 4 MG: 4 INJECTION, SOLUTION INTRAMUSCULAR; INTRAVENOUS at 23:12

## 2024-04-09 RX ADMIN — LIDOCAINE HYDROCHLORIDE 100 MG: 20 INJECTION, SOLUTION INTRAVENOUS at 19:18

## 2024-04-09 RX ADMIN — Medication 25 MG: at 20:01

## 2024-04-09 SDOH — HEALTH STABILITY: MENTAL HEALTH: CURRENT SMOKER: 0

## 2024-04-09 ASSESSMENT — ENCOUNTER SYMPTOMS
CHEST TIGHTNESS: 0
HEADACHES: 0
NAUSEA: 1
FEVER: 0
LIGHT-HEADEDNESS: 0
CHILLS: 0
ABDOMINAL PAIN: 1
DIFFICULTY URINATING: 0
VOMITING: 1
SHORTNESS OF BREATH: 0

## 2024-04-09 ASSESSMENT — PAIN - FUNCTIONAL ASSESSMENT
PAIN_FUNCTIONAL_ASSESSMENT: 0-10

## 2024-04-09 ASSESSMENT — LIFESTYLE VARIABLES
TOTAL SCORE: 0
HAVE YOU EVER FELT YOU SHOULD CUT DOWN ON YOUR DRINKING: NO
EVER HAD A DRINK FIRST THING IN THE MORNING TO STEADY YOUR NERVES TO GET RID OF A HANGOVER: NO
HAVE PEOPLE ANNOYED YOU BY CRITICIZING YOUR DRINKING: NO
EVER FELT BAD OR GUILTY ABOUT YOUR DRINKING: NO

## 2024-04-09 ASSESSMENT — PAIN SCALES - GENERAL
PAINLEVEL_OUTOF10: 7
PAINLEVEL_OUTOF10: 0 - NO PAIN
PAINLEVEL_OUTOF10: 0 - NO PAIN
PAINLEVEL_OUTOF10: 8
PAINLEVEL_OUTOF10: 0 - NO PAIN

## 2024-04-09 ASSESSMENT — PAIN DESCRIPTION - LOCATION: LOCATION: ABDOMEN

## 2024-04-09 ASSESSMENT — PAIN DESCRIPTION - DESCRIPTORS: DESCRIPTORS: BURNING

## 2024-04-09 ASSESSMENT — PAIN DESCRIPTION - FREQUENCY: FREQUENCY: CONSTANT/CONTINUOUS

## 2024-04-09 NOTE — PROGRESS NOTES
Chief Complaint:   No chief complaint on file.     History Of Present Illness:    Kevin Rubi is a 68 y.o. male presenting for 6 month follow up   H/o NICM EF 20% (suspect EtOH-related cardiomyopathy) > 60% on TTE 6/19, paroxysmal Afib on apixaban, mild nonobst CAD, pAfib on apixaban, ascending aortic aneurysm (measured 4.4 cm on CT 3/2021), former tobacco use, mediastinal LAD on CT scan who presents for scheduled follow up.   Last visit with Tressa Khalil the patient reported he had just been diagnosed with lung CA and was to undergo CTX and XRT.    Presents for routine follow up, and to review TTE ordered by Tressa Khalil in 10/2023.  TTE demonstrated LVEF 55-60%, no rWMA, mild AI.  He finished CTX 12/2023.    Not feeling well for last 3 days.  BP in office today 60-80's/50's.  Feels weak, difficulty walking due to weakness.  Has not been eating or drinking, and has been throwing up (yellow / bilious fluid) x 3 days.  Having abdominal pain.  No associated diarrhea.  No dysuria.  No f/c.    No chest pain/pressure, significant SOB/wheezing.      ROS:  The remainder of the review of systems was obtained, as was negative as pertains to the chief complaint.    CV testing reviewed:  CT chest  4/2024    HEART AND VESSELS:  Mild atherosclerotic calcifications of the thoracic aorta with stable  aneurysmal dilation of the ascending segment measuring 4.5 cm. Main pulmonary artery is normal in caliber.  Moderate to severe coronary artery calcifications in the LAD and  circumflex arteries. The study is not optimized for evaluation of  coronary arteries.  Stable biatrial enlargement.  Stable trace pericardial effusion.    H&H 3/2024   10.9  33.6  CMP  K 3.6  BUN 39  crea 1.02  ast 24  alt24    TTE 3/2024  EF 55-60% LV global longitudinal strain -15.2% , stage II DD , left atrium is mild to moderately dilated, right atrium mildly dilated, aortic valve appears bicuspid, mils aortic valve regurg, trace to mild mitral  valve regurg, mild tricuspid regurg,     Lipid labs 10/2023  chol 140  HDL 41.3  LDL 74 trig 122  Dig level 0.58    8/2023  Peripheral artery JUSTIN moderate disease in the RLE, mild disease in LLE.      [facility] in 2019 - mild CAD    Stress test 2/2019  nondiagnostic d/t inadequate heart rate    Prior history:  I first met Kevin when he was admitted to [facility] in 1/19 with progressive SOB. He was found to have new-onset Afib that we treated with rate control and anticoagulation. TTE 1/2019 demonstrated increased LV cavity size, moderate cLVH, severe generalized LV dysfunction EF 20%, stage II-IV diastolic dysfcn, dilated RV with moderate dysfcn, mod MR, ? bicuspid aortic valve with fused R and noncoronary cusps (peak grad 17/mean 11), trace AR, mild PHTN. I performed a coronary angiogram which demonstrated mild nonobstructive CAD. Plan was made for FARZANA-DCCV which was performed by Dr. Soto, however patient unable to be cardioverted successfully. He was started on amiodarone PO with plan for 6 weeks of loading. He is now off of amiodarone    Last Recorded Vitals:  There were no vitals filed for this visit.    Past Medical History:  He has a past medical history of Aortic aneurysm (CMS/HCC), Atrial fibrillation (CMS/HCC), Coronary artery disease, Hyperlipidemia, Hypertension, Lung cancer (CMS/HCC), Personal history of peptic ulcer disease, Superior mesenteric artery stenosis (CMS/HCC), and Tinnitus, bilateral (08/14/2016).    Past Surgical History:  He has a past surgical history that includes Other surgical history (02/13/2019); Other surgical history (02/13/2019); CT angio abdomen pelvis w and or wo IV IV contrast (08/08/2023); and Superior mestenteric artery stent.      Social History:  He reports that he quit smoking about 11 years ago. His smoking use included cigarettes. He has a 15.00 pack-year smoking history. He has been exposed to tobacco smoke. He has never used smokeless tobacco. He reports that he does not  currently use alcohol. He reports that he does not use drugs.    Family History:  Family History   Problem Relation Name Age of Onset    Cancer Mother          ?larynx or lung        Allergies:  Patient has no known allergies.    Outpatient Medications:  Current Outpatient Medications   Medication Instructions    apixaban (ELIQUIS) 5 mg, oral, 2 times daily    BMX ORAL SUSPENSION (1:1:1) Swish and swallow 15-30 mL every 2 hours if needed for throat pain.    dicyclomine (BENTYL) 10 mg, oral, 4 times daily PRN    digoxin (LANOXIN) 125 mcg, oral, Daily    Entresto 24-26 mg tablet 1 tablet, oral, 2 times daily    fenofibrate (Tricor) 145 mg tablet     gabapentin (NEURONTIN) 300 mg, oral, Nightly    metoprolol succinate XL (TOPROL-XL) 50 mg, oral, Daily, Do not crush or chew.    metoprolol succinate XL (TOPROL-XL) 25 mg, oral, Daily, Do not crush or chew.    pantoprazole (PROTONIX) 20 mg, oral, Daily before breakfast, Do not crush, chew, or split.    predniSONE (DELTASONE) 70 mg, oral, Daily, Take in the morning with food. Taper as instructed by provider.    rosuvastatin (CRESTOR) 5 mg, oral, Daily    spironolactone (ALDACTONE) 12.5 mg, oral, Daily    sucralfate (CARAFATE) 1 g, oral, As needed    tiZANidine (ZANAFLEX) 2 mg, oral, Every 8 hours PRN       Physical Exam:  Physical Exam  HENT:      Head: Normocephalic.      Nose: Nose normal.      Mouth/Throat:      Mouth: Mucous membranes are dry.   Cardiovascular:      Rate and Rhythm: Tachycardia present. Rhythm irregularly irregular.      Comments: TR murmur   Pulmonary:      Effort: Pulmonary effort is normal.      Breath sounds: Normal breath sounds.   Abdominal:      Palpations: Abdomen is soft.   Musculoskeletal:         General: Normal range of motion.      Cervical back: Normal range of motion.      Comments: In wheelchair because of generalized weakness    Skin:     General: Skin is warm and dry.   Neurological:      General: No focal deficit present.      Mental  Status: He is alert.   Psychiatric:         Mood and Affect: Mood normal.            Last Labs:  CBC -  Lab Results   Component Value Date    WBC 7.8 03/26/2024    HGB 10.9 (L) 03/26/2024    HCT 33.6 (L) 03/26/2024    MCV 84 03/26/2024     03/26/2024       CMP -  Lab Results   Component Value Date    CALCIUM 9.5 03/26/2024    PHOS CANCELED 09/16/2023    PROT 7.0 03/26/2024    ALBUMIN 3.9 03/26/2024    AST 24 03/26/2024    ALT 24 03/26/2024    ALKPHOS 55 03/26/2024    BILITOT 0.3 03/26/2024       LIPID PANEL -   Lab Results   Component Value Date    CHOL 140 10/23/2023    TRIG 122 10/23/2023    HDL 41.3 10/23/2023    CHHDL 3.4 10/23/2023    LDLF 39 04/01/2023    VLDL 24 10/23/2023    NHDL 99 10/23/2023       RENAL FUNCTION PANEL -   Lab Results   Component Value Date    GLUCOSE 92 03/26/2024     (L) 03/26/2024    K 4.6 03/26/2024     03/26/2024    CO2 21 03/26/2024    ANIONGAP 14 03/26/2024    BUN 39 (H) 03/26/2024    CREATININE 1.02 03/26/2024    GFRMALE CANCELED 09/16/2023    CALCIUM 9.5 03/26/2024    PHOS CANCELED 09/16/2023    ALBUMIN 3.9 03/26/2024        Lab Results   Component Value Date    HGBA1C 6.1 (H) 02/27/2024       Assessment/Plan   NICM EF 20% (suspect EtOH-related cardiomyopathy) > 60% on TTE 6/19  paroxysmal Afib on apixaban  mild nonobst CAD  ascending aortic aneurysm (measured 4.4 cm on CT 3/2021)  Lung CA s/p CTX/XRT completed in 12/23  former tobacco use    Very low BP in office today, feeling weak, with nausea/vomiting and abdominal pain.      Telling us he is on a tapering dose of prednisone, and recently went from 70mg daily > 60mg daily.  EKG in office today is Afib , with ST abnormality in anterior leads and leads II and aVF.  Had recent TTE ~ 2 weeks ago with normal LVEF and no rWMA  Recs:  -advised patient to go to ED for labs and evaluation of hypotension, possible admission

## 2024-04-09 NOTE — ANESTHESIA PREPROCEDURE EVALUATION
Patient: Kevin Rubi    Procedure Information       Date/Time: 04/09/24 1900    Procedure: Open repair of right inguinal hernia, possible laparotomy, possible bowel resection (Right)    Location: [facility] / [facility]    Surgeons: Vale Valladares MD            Relevant Problems   Cardiac   (+) Abdominal aortic aneurysm, without rupture, unspecified (CMS/HCC)   (+) Ascending aortic aneurysm (CMS/HCC)   (+) Atrial fibrillation (CMS/HCC)   (+) Bicuspid aortic valve   (+) CAD (coronary artery disease)   (+) Chronic systolic congestive heart failure (CMS/HCC)   (+) Essential hypertension   (+) HLD (hyperlipidemia)   (+) Mild CAD   (+) Superior mesenteric artery stenosis (CMS/HCC)      Pulmonary   (+) Lung nodules   (+) Primary cancer of left lower lobe of lung (CMS/HCC)   (+) Primary malignant neoplasm of lung of unknown cell type (CMS/HCC)      Neuro   (+) Anxiety      ID   (+) Acute upper respiratory infection       Clinical information reviewed:   Tobacco  Allergies    Med Hx  Surg Hx   Fam Hx  Soc Hx        NPO Detail:  No data recorded     Physical Exam    Airway  Mallampati: II  TM distance: >3 FB  Neck ROM: full     Cardiovascular - normal exam     Dental - normal exam     Pulmonary - normal exam     Abdominal   Abdomen: tender         Anesthesia Plan    History of general anesthesia?: yes  History of complications of general anesthesia?: no    ASA 3     general     The patient is not a current smoker.    intravenous induction   Postoperative administration of opioids is intended.  Anesthetic plan and risks discussed with patient.  Use of blood products discussed with patient who.    Plan discussed with CRNA.

## 2024-04-09 NOTE — ANESTHESIA PROCEDURE NOTES
Arterial Line:    Date/Time: 4/9/2024 7:57 PM    Staffing  Performed: CRNA   Authorized by: JONATHAN Wong    Performed by: RUKHSANA Wong-CRNA    An arterial line was placed. Procedure performed using ultrasound guidance and surface landmarks.in the OR for the following indication(s): continuous blood pressure monitoring, blood sampling needed and unable to use non-invasive cuff.    A 20 gauge (size), 1 and 3/4 inch (length), Angiocath (type) catheter was placed into the Left axillary artery, secured by Tegaderm,   Seldinger technique used.  Events:  patient tolerated procedure well with no complications.

## 2024-04-09 NOTE — PATIENT INSTRUCTIONS
Thanks for following up in office today.    1)  I am recommending that you be seen in the ER because of your low blood pressures and some changes that I am seeing on your EKG.  You are in atrial fib today.      2)  I am not changing any of your medications today.      3)  Please continue your cardiac medications as prescribed.    Follow up with LENY Khalil NP in 4-6 weeks  If you have any questions, please call [phone] and choose option for Dr. Rosario's nurse Janneth Rothman

## 2024-04-09 NOTE — H&P
History Of Present Illness  Kevin Rubi is a 68 y.o. male with history of stage IIIC LLL lung cancer s/p chemo/RT, recent pneumonitis on prednisone daily, HTN, A-fib on Eliquis, ascending aortic aneurysm, HFpEF with EF 60%, hx SMA occlusion s/p stenting, PUD, HLD presenting with concern for right incarcerated inguinal hernia. Patient reports that he has had this hernia for some time, that it often will come out but usually reduces on its own. Recently he has noticed it has been more difficult to reduce. Over the last few days, he has had worsening pain in the right groin as well as abdominal pain. He has also had nausea with emesis over the last 48 hours and has been unable to eat much. He has not passed flatus or had a bowel movement in the last 48 hours. He typically has a bowel movement daily. Denies fevers, chills, chest pain, shortness of breath, or urinary symptoms.    PMH: Stage IIIC LLL adenocarcinoma s/p chemo/RT, recent pneumonitis on prednisone, HTN, A-fib on Eliquis, ascending aortic aneurysm, HFpEF with EF 60%. Hx SMA occlusion s/p stent, PUD, HLD  PSH: SMA stent, EGD  Social: Former smoker, occasional EtOH use     Past Medical History  Past Medical History:   Diagnosis Date    Aortic aneurysm (CMS/HCC)     Atrial fibrillation (CMS/HCC)     Coronary artery disease     Hyperlipidemia     Hypertension     Lung cancer (CMS/HCC)     Personal history of peptic ulcer disease     History of gastric ulcer    Superior mesenteric artery stenosis (CMS/HCC)     Tinnitus, bilateral 08/14/2016    Tinnitus of both ears       Surgical History  Past Surgical History:   Procedure Laterality Date    CT ABDOMEN PELVIS ANGIOGRAM W AND/OR WO IV CONTRAST  08/08/2023    CT ABDOMEN PELVIS ANGIOGRAM W AND/OR WO IV CONTRAST 8/8/2023 [facility] CT    OTHER SURGICAL HISTORY  02/13/2019    Esophagogastroduodenoscopy    OTHER SURGICAL HISTORY  02/13/2019    Stomach surgery    SUPERIOR MESTENTERIC ARTERY STENT         Medications  No  current facility-administered medications on file prior to encounter.     Current Outpatient Medications on File Prior to Encounter   Medication Sig Dispense Refill    apixaban (Eliquis) 5 mg tablet Take 1 tablet (5 mg) by mouth 2 times a day. 180 tablet 1    [] clopidogrel (Plavix) 75 mg tablet Take 1 tablet (75 mg) by mouth once daily. 90 tablet 1    digoxin (Lanoxin) 125 MCG tablet TAKE 1 TABLET DAILY 90 tablet 0    Entresto 24-26 mg tablet Take 1 tablet by mouth 2 times a day. 180 tablet 1    fenofibrate (Tricor) 145 mg tablet       gabapentin (Neurontin) 300 mg capsule Take 1 capsule (300 mg) by mouth once daily at bedtime. 60 capsule 2    metoprolol succinate XL (Toprol-XL) 25 mg 24 hr tablet Take 1 tablet (25 mg) by mouth once daily. Do not crush or chew. 90 tablet 3    metoprolol succinate XL (Toprol-XL) 50 mg 24 hr tablet Take 1 tablet (50 mg) by mouth once daily. Do not crush or chew. 90 tablet 3    pantoprazole (ProtoNix) 20 mg EC tablet Take 1 tablet (20 mg) by mouth once daily in the morning. Take before meals. Do not crush, chew, or split. 30 tablet 11    predniSONE (Deltasone) 10 mg tablet Take 7 tablets (70 mg) by mouth once daily. Take in the morning with food. Taper as instructed by provider. 196 tablet 0    rosuvastatin (Crestor) 5 mg tablet Take 1 tablet (5 mg) by mouth once daily. 90 tablet 1    spironolactone (Aldactone) 25 mg tablet Take 0.5 tablets (12.5 mg) by mouth once daily. 45 tablet 1    tiZANidine (Zanaflex) 2 mg tablet Take 1 tablet (2 mg) by mouth every 8 hours if needed for muscle spasms for up to 10 days. 30 tablet 0    [DISCONTINUED] BMX ORAL SUSPENSION (1:1:1) Swish and swallow 15-30 mL every 2 hours if needed for throat pain. (Patient not taking: Reported on 2024) 360 mL 2    [DISCONTINUED] dicyclomine (Bentyl) 10 mg capsule Take 1 capsule (10 mg) by mouth 4 times a day as needed (abdominal pain or cramps). (Patient not taking: Reported on 2024) 30 capsule 0     [DISCONTINUED] sucralfate (Carafate) 100 mg/mL suspension Take 10 mL (1 g) by mouth if needed (take before meals if swallowing is uncomfortable) for up to 30 doses. (Patient not taking: Reported on 4/9/2024) 300 mL 1       Allergies  Patient has no known allergies.     Social History  He reports that he quit smoking about 11 years ago. His smoking use included cigarettes. He has a 15.00 pack-year smoking history. He has been exposed to tobacco smoke. He has never used smokeless tobacco. He reports that he does not currently use alcohol. He reports that he does not use drugs.    Family History  Family History   Problem Relation Name Age of Onset    Cancer Mother          ?larynx or lung        Review of Systems   Constitutional:  Negative for chills and fever.   Respiratory:  Negative for chest tightness and shortness of breath.    Cardiovascular:  Negative for chest pain and leg swelling.   Gastrointestinal:  Positive for abdominal pain, nausea and vomiting.   Genitourinary:  Negative for difficulty urinating.   Skin:  Negative for rash.   Neurological:  Negative for light-headedness and headaches.       Last Recorded Vitals  Blood pressure 137/85, pulse 59, temperature 36.6 °C (97.8 °F), temperature source Temporal, resp. rate 18, height 1.829 m (6'), weight 69.9 kg (154 lb), SpO2 99 %.     Physical Exam  Constitutional:       Appearance: He is normal weight.   HENT:      Head: Normocephalic and atraumatic.   Eyes:      Conjunctiva/sclera: Conjunctivae normal.   Cardiovascular:      Rate and Rhythm: Normal rate and regular rhythm.      Pulses: Normal pulses.   Pulmonary:      Effort: Pulmonary effort is normal. No respiratory distress.   Abdominal:      General: There is no distension.      Palpations: Abdomen is soft.      Comments: Abdomen non tender. Right inguinal hernia present, tender to palpation. No overlying erythema or skin changes, some increased redness and swelling of the right scrotum. Unable to fully  reduce hernia on exam   Musculoskeletal:         General: No swelling.      Cervical back: Neck supple.   Skin:     General: Skin is warm and dry.   Neurological:      General: No focal deficit present.      Mental Status: He is alert and oriented to person, place, and time.          Relevant Results:  Labs:  Results for orders placed or performed during the hospital encounter of 04/09/24 (from the past 24 hour(s))   CBC and Auto Differential   Result Value Ref Range    WBC 21.4 (H) 4.4 - 11.3 x10*3/uL    nRBC 0.0 0.0 - 0.0 /100 WBCs    RBC 4.48 (L) 4.50 - 5.90 x10*6/uL    Hemoglobin 12.3 (L) 13.5 - 17.5 g/dL    Hematocrit 36.2 (L) 41.0 - 52.0 %    MCV 81 80 - 100 fL    MCH 27.5 26.0 - 34.0 pg    MCHC 34.0 32.0 - 36.0 g/dL    RDW 15.8 (H) 11.5 - 14.5 %    Platelets 135 (L) 150 - 450 x10*3/uL    Neutrophils % 93.1 40.0 - 80.0 %    Immature Granulocytes %, Automated 0.6 0.0 - 0.9 %    Lymphocytes % 4.3 13.0 - 44.0 %    Monocytes % 1.9 2.0 - 10.0 %    Eosinophils % 0.0 0.0 - 6.0 %    Basophils % 0.1 0.0 - 2.0 %    Neutrophils Absolute 19.91 (H) 1.20 - 7.70 x10*3/uL    Immature Granulocytes Absolute, Automated 0.13 0.00 - 0.70 x10*3/uL    Lymphocytes Absolute 0.92 (L) 1.20 - 4.80 x10*3/uL    Monocytes Absolute 0.41 0.10 - 1.00 x10*3/uL    Eosinophils Absolute 0.00 0.00 - 0.70 x10*3/uL    Basophils Absolute 0.02 0.00 - 0.10 x10*3/uL   Basic metabolic panel   Result Value Ref Range    Glucose 166 (H) 74 - 99 mg/dL    Sodium 130 (L) 136 - 145 mmol/L    Potassium 4.0 3.5 - 5.3 mmol/L    Chloride 96 (L) 98 - 107 mmol/L    Bicarbonate 26 21 - 32 mmol/L    Anion Gap 12 10 - 20 mmol/L    Urea Nitrogen 35 (H) 6 - 23 mg/dL    Creatinine 1.28 0.50 - 1.30 mg/dL    eGFR 62 >60 mL/min/1.73m*2    Calcium 8.8 8.6 - 10.3 mg/dL   Magnesium   Result Value Ref Range    Magnesium 1.80 1.60 - 2.40 mg/dL   Lipase   Result Value Ref Range    Lipase 7 (L) 9 - 82 U/L   Hepatic function panel   Result Value Ref Range    Albumin 3.7 3.4 - 5.0 g/dL     Bilirubin, Total 0.6 0.0 - 1.2 mg/dL    Bilirubin, Direct 0.1 0.0 - 0.3 mg/dL    Alkaline Phosphatase 46 33 - 136 U/L    ALT 13 10 - 52 U/L    AST 11 9 - 39 U/L    Total Protein 6.9 6.4 - 8.2 g/dL   Lactate   Result Value Ref Range    Lactate 1.4 0.4 - 2.0 mmol/L   Troponin I, High Sensitivity   Result Value Ref Range    Troponin I, High Sensitivity 22 (H) 0 - 20 ng/L       Imaging:  ECG 12 Lead  Afib , RVR, marked ST abnormality in the anterior leads and leads II   and aVF - possible subendocardial injury      Assessment and Plan  Active Problems:  There are no active Hospital Problems.    68 y.o. male presenting with right inguinal pain concerning for incarcerated hernia.    Plan:  -Obtain CT AP with IV stat to assess for possible bowel in hernia  -OR for open inguinal hernia reduction and repair   -ED ordered Kcentra for Eliquis reversal  -NPO, IVF    Discussed with attending Dr. Blaine Haskins DO - PGY3  General Surgery

## 2024-04-09 NOTE — ED PROVIDER NOTES
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  He describes the pain as just below his umbilicus, crampy in nature, associated with nausea and vomiting.  No diarrhea or constipation.  No trauma.  He does have a right inguinal hernia which she has been battling with for a while but tends to reduce on its own.  Has never seen a surgeon for this.  He has not tried anything at home for the abdominal pain..      ROS: Complete 12 point review of systems performed, otherwise negative except as noted in the history of present illness    PMH: Reviewed, documented below in note. Pertinents in HPI  PSH: Reviewed and documented below in note. Pertinents in HPI  SH: Former tobacco, history of alcohol, no illicits  Fam: Reviewed, noncontributory to patients current complaint  MEDS: Reviewed and documented below in note. Pertinents in HPI  ALLERGIES: Reviewed and documented below in note.        History provided by:  Patient and medical records   used: No                          [place] Coma Scale Score: 15                  Patient History   Past Medical History:   Diagnosis Date    Aortic aneurysm (CMS/HCC)     Atrial fibrillation (CMS/HCC)     Coronary artery disease     Hyperlipidemia     Hypertension     Lung cancer (CMS/HCC)     Personal history of peptic ulcer disease     History of gastric ulcer    Superior mesenteric artery stenosis (CMS/HCC)     Tinnitus, bilateral 08/14/2016    Tinnitus of both ears     Past Surgical History:   Procedure Laterality Date    CT ABDOMEN PELVIS ANGIOGRAM W  AND/OR WO IV CONTRAST  2023    CT ABDOMEN PELVIS ANGIOGRAM W AND/OR WO IV CONTRAST 2023 [facility] CT    OTHER SURGICAL HISTORY  2019    Esophagogastroduodenoscopy    OTHER SURGICAL HISTORY  2019    Stomach surgery    SUPERIOR MESTENTERIC ARTERY STENT       Family History   Problem Relation Name Age of Onset    Cancer Mother          ?larynx or lung     Social History     Tobacco Use    Smoking status: Former     Current packs/day: 0.00     Average packs/day: 0.5 packs/day for 30.0 years (15.0 ttl pk-yrs)     Types: Cigarettes     Start date:      Quit date:      Years since quittin.2     Passive exposure: Past    Smokeless tobacco: Never   Vaping Use    Vaping status: Never Used   Substance Use Topics    Alcohol use: Not Currently    Drug use: Never       Physical Exam   Visit Vitals  BP 90/52   Pulse 85   Temp 36.4 °C (97.5 °F)   Resp 11   Ht 1.829 m (6')   Wt 71.8 kg (158 lb 4.6 oz)   SpO2 97%   BMI 21.47 kg/m²   Smoking Status Former   BSA 1.91 m²      Physical Exam  Vitals and nursing note reviewed.   Constitutional:       Appearance: Normal appearance.   HENT:      Head: Normocephalic and atraumatic.   Neck:      Vascular: No carotid bruit.   Cardiovascular:      Rate and Rhythm: Normal rate and regular rhythm.      Pulses: Normal pulses.      Heart sounds: Normal heart sounds.   Pulmonary:      Effort: Pulmonary effort is normal.      Breath sounds: Normal breath sounds.   Abdominal:      General: There is no distension.      Palpations: Abdomen is soft.      Tenderness: There is abdominal tenderness. There is no right CVA tenderness, left CVA tenderness, guarding or rebound.      Hernia: A hernia is present.   Musculoskeletal:         General: No tenderness, deformity or signs of injury.      Cervical back: Normal range of motion. No rigidity.   Skin:     General: Skin is warm and dry.      Capillary Refill: Capillary refill takes less than 2 seconds.   Neurological:      General:  No focal deficit present.      Mental Status: He is alert and oriented to person, place, and time.      Sensory: No sensory deficit.      Motor: No weakness.   Psychiatric:         Mood and Affect: Mood normal.         Behavior: Behavior normal.       CT abdomen pelvis w IV contrast   Final Result   1.  Multiple loops of dilated small bowel consistent with a small   bowel obstruction.  There is a loop of small bowel located within a   right inguinal hernia likely incarcerated and acting as a transition   point for the obstruction.   2.  Ill-defined consolidative changes within the left lower lobe which   may represent a possible pneumonitis.   3.  Findings consistent with hepatic steatosis.   4.  No abdominal/pelvic fluid collections or pneumoperitoneum.   5.  Colonic diverticulosis without definite evidence of   diverticulitis.   6.  Stable mild prostatomegaly..   Result was given to Dr. Christiano Flanagan on 4/9/2024 at 1936.   Signed by Ward Tong MD          Labs Reviewed   CBC WITH AUTO DIFFERENTIAL - Abnormal       Result Value    WBC 21.4 (*)     nRBC 0.0      RBC 4.48 (*)     Hemoglobin 12.3 (*)     Hematocrit 36.2 (*)     MCV 81      MCH 27.5      MCHC 34.0      RDW 15.8 (*)     Platelets 135 (*)     Neutrophils % 93.1      Immature Granulocytes %, Automated 0.6      Lymphocytes % 4.3      Monocytes % 1.9      Eosinophils % 0.0      Basophils % 0.1      Neutrophils Absolute 19.91 (*)     Immature Granulocytes Absolute, Automated 0.13      Lymphocytes Absolute 0.92 (*)     Monocytes Absolute 0.41      Eosinophils Absolute 0.00      Basophils Absolute 0.02     BASIC METABOLIC PANEL - Abnormal    Glucose 166 (*)     Sodium 130 (*)     Potassium 4.0      Chloride 96 (*)     Bicarbonate 26      Anion Gap 12      Urea Nitrogen 35 (*)     Creatinine 1.28      eGFR 62      Calcium 8.8     LIPASE - Abnormal    Lipase 7 (*)     Narrative:     Venipuncture immediately after or during the administration of  Metamizole may lead to falsely low results. Testing should be performed immediately prior to Metamizole dosing.   TROPONIN I, HIGH SENSITIVITY - Abnormal    Troponin I, High Sensitivity 22 (*)     Narrative:     Less than 99th percentile of normal range cutoff-  Female and children under 18 years old <14 ng/L; Male <21 ng/L: Negative  Repeat testing should be performed if clinically indicated.     Female and children under 18 years old 14-50 ng/L; Male 21-50 ng/L:  Consistent with possible cardiac damage and possible increased clinical   risk. Serial measurements may help to assess extent of myocardial damage.     >50 ng/L: Consistent with cardiac damage, increased clinical risk and  myocardial infarction. Serial measurements may help assess extent of   myocardial damage.      NOTE: Children less than 1 year old may have higher baseline troponin   levels and results should be interpreted in conjunction with the overall   clinical context.     NOTE: Troponin I testing is performed using a different   testing methodology at [facility] than at other   [facility]. Direct result comparisons should only   be made within the same method.   URINALYSIS WITH REFLEX CULTURE AND MICROSCOPIC - Abnormal    Color, Urine Yellow      Appearance, Urine Hazy (*)     Specific Gravity, Urine 1.047 (*)     pH, Urine 5.0      Protein, Urine NEGATIVE      Glucose, Urine NEGATIVE      Blood, Urine SMALL (1+) (*)     Ketones, Urine 5 (TRACE) (*)     Bilirubin, Urine NEGATIVE      Urobilinogen, Urine <2.0      Nitrite, Urine NEGATIVE      Leukocyte Esterase, Urine NEGATIVE     PROTIME-INR - Abnormal    Protime 20.7 (*)     INR 1.8 (*)    BASIC METABOLIC PANEL - Abnormal    Glucose 173 (*)     Sodium 131 (*)     Potassium 4.0      Chloride 100      Bicarbonate 19 (*)     Anion Gap 16      Urea Nitrogen 33 (*)     Creatinine 1.08      eGFR 76      Calcium 7.8 (*)    CBC WITH AUTO DIFFERENTIAL - Abnormal    WBC 33.5 (*)      nRBC 0.0      RBC 4.06 (*)     Hemoglobin 10.9 (*)     Hematocrit 33.1 (*)     MCV 82      MCH 26.8      MCHC 32.9      RDW 15.9 (*)     Platelets 156      Neutrophils % 95.3      Immature Granulocytes %, Automated 1.0 (*)     Lymphocytes % 2.1      Monocytes % 1.4      Eosinophils % 0.0      Basophils % 0.2      Neutrophils Absolute 31.88 (*)     Immature Granulocytes Absolute, Automated 0.33      Lymphocytes Absolute 0.70 (*)     Monocytes Absolute 0.48      Eosinophils Absolute 0.00      Basophils Absolute 0.06     MAGNESIUM - Normal    Magnesium 1.80     HEPATIC FUNCTION PANEL - Normal    Albumin 3.7      Bilirubin, Total 0.6      Bilirubin, Direct 0.1      Alkaline Phosphatase 46      ALT 13      AST 11      Total Protein 6.9     LACTATE - Normal    Lactate 1.4      Narrative:     Venipuncture immediately after or during the administration of Metamizole may lead to falsely low results. Testing should be performed immediately  prior to Metamizole dosing.   TISSUE/WOUND CULTURE/SMEAR    Gram Stain (1+) Rare Polymorphonuclear leukocytes      Gram Stain No organisms seen     URINALYSIS WITH REFLEX CULTURE AND MICROSCOPIC    Narrative:     The following orders were created for panel order Urinalysis with Reflex Culture and Microscopic.  Procedure                               Abnormality         Status                     ---------                               -----------         ------                     Urinalysis with Reflex C...[[id number]]  Abnormal            Final result               Extra Urine Gray Tube[[id number]]                            In process                   Please view results for these tests on the individual orders.   EXTRA URINE GRAY TUBE   SURGICAL PATHOLOGY EXAM   URINALYSIS MICROSCOPIC WITH REFLEX CULTURE    WBC, Urine NONE      RBC, Urine 1-2      Mucus, Urine 1+           ED Course & MDM   ED Course as of 04/10/24 1545   Tue Apr 09, 2024   1641 EKG as interpreted by myself demonstrates  A-fib with a rate of 89, occasional PVC noted, normal QRS and QTc intervals, there are some T wave inversions in the lateral leads without any evidence of ST elevation or STEMI. [NS]      ED Course User Index  [NS] Elie Flanagan MD         Diagnoses as of 04/10/24 1545   Incarcerated inguinal hernia           Medical Decision Making  All mentioned lab results, ECGs, and imaging were independently reviewed by myself  - Patient evaluated. Patient is presenting to the emergency department today for abdominal pain.  On exam he has a rather large inguinal hernia, right-sided, there is no overlying skin changes but it is tender to palpation in the area and is not reducible.  Reductions were attempted by myself and the medical student both initially on exam followed by having the patient lying in Trendelenburg with an ice pack for 20 minutes, both of them unsuccessfully.  Because of the firm and tender nature of this I am concerned that it could possibly be incarcerated.  Surgery was consulted.  Basic labs and imaging were ordered on the patient.  Leukocytosis was noted at 21.4, improving anemia from prior noted, he was given analgesia as well as started on antibiotics.  Surgery came to see the patient and would like to take the patient to surgery, however he did recently take his Eliquis as early as this morning.  They are recommending reversal with Kcentra which was ordered prior to surgery.  The CT resulted after the patient was already taken to the operating room by surgery.  She has multiple loops of dilated small bowel consistent with a small bowel obstruction with this loop of small bowel located within the right inguinal hernia likely incarcerated and acting as a transition point for the obstruction.  Patient in the OR with surgery and will be admitted to the floor for monitoring after  - Monitored for any changes in stability or symptomatology. Patient remained stable.   - Counseled regarding labs,  imaging, diagnosis, and plan. Patient was agreeable. All questions were answered. The patient was receptive and agreeable to the plan of care.       *Disclaimer: This note was dictated by speech recognition. Minor errors in transcription may be present. Please call with questions.    David Flanagan MD             Your medication list        ASK your doctor about these medications        Instructions Last Dose Given Next Dose Due   apixaban 5 mg tablet  Commonly known as: Eliquis      Take 1 tablet (5 mg) by mouth 2 times a day.       clopidogrel 75 mg tablet  Commonly known as: Plavix  Ask about: Should I take this medication?      Take 1 tablet (75 mg) by mouth once daily.       digoxin 125 MCG tablet  Commonly known as: Lanoxin      TAKE 1 TABLET DAILY       Entresto 24-26 mg tablet  Generic drug: sacubitriL-valsartan      Take 1 tablet by mouth 2 times a day.       fenofibrate 145 mg tablet  Commonly known as: Tricor           gabapentin 300 mg capsule  Commonly known as: Neurontin      Take 1 capsule (300 mg) by mouth once daily at bedtime.       metoprolol succinate XL 50 mg 24 hr tablet  Commonly known as: Toprol-XL      Take 1 tablet (50 mg) by mouth once daily. Do not crush or chew.       metoprolol succinate XL 25 mg 24 hr tablet  Commonly known as: Toprol-XL      Take 1 tablet (25 mg) by mouth once daily. Do not crush or chew.       pantoprazole 20 mg EC tablet  Commonly known as: ProtoNix      Take 1 tablet (20 mg) by mouth once daily in the morning. Take before meals. Do not crush, chew, or split.       predniSONE 10 mg tablet  Commonly known as: Deltasone      Take 7 tablets (70 mg) by mouth once daily. Take in the morning with food. Taper as instructed by provider.       rosuvastatin 5 mg tablet  Commonly known as: Crestor      Take 1 tablet (5 mg) by mouth once daily.       spironolactone 25 mg tablet  Commonly known as: Aldactone      Take 0.5 tablets (12.5 mg) by mouth once daily.        tiZANidine 2 mg tablet  Commonly known as: Zanaflex      Take 1 tablet (2 mg) by mouth every 8 hours if needed for muscle spasms for up to 10 days.                Procedure  Procedures     *This report was transcribed using voice recognition software.  Every effort was made to ensure accuracy; however, inadvertent computerized transcription errors may be present.*  Elie Flanagan MD  04/10/24         Elie Flanagan MD  04/10/24 [id number]

## 2024-04-09 NOTE — ANESTHESIA PROCEDURE NOTES
Airway  Date/Time: 4/9/2024 7:18 PM  Urgency: elective    Airway not difficult    Staffing  Performed: CRNA   Authorized by: JONATHAN Wong    Performed by: JONATHAN Wong  Patient location during procedure: OR    Indications and Patient Condition  Indications for airway management: anesthesia  Spontaneous ventilation: present  Sedation level: deep  Preoxygenated: yes  Patient position: sniffing  Mask difficulty assessment: 1 - vent by mask  Planned trial extubation    Final Airway Details  Final airway type: endotracheal airway      Successful airway: ETT  Cuffed: yes   Successful intubation technique: video laryngoscopy  Facilitating devices/methods: intubating stylet and cricoid pressure  Endotracheal tube insertion site: oral  Blade: Viry  Blade size: #4  ETT size (mm): 7.5  Cormack-Lehane Classification: grade I - full view of glottis  Placement verified by: chest auscultation and capnometry   Measured from: lips  ETT to lips (cm): 23  Number of attempts at approach: 1

## 2024-04-10 LAB
ANION GAP SERPL CALC-SCNC: 16 MMOL/L (ref 10–20)
APPEARANCE UR: ABNORMAL
BASOPHILS # BLD AUTO: 0.06 X10*3/UL (ref 0–0.1)
BASOPHILS NFR BLD AUTO: 0.2 %
BILIRUB UR STRIP.AUTO-MCNC: NEGATIVE MG/DL
BUN SERPL-MCNC: 33 MG/DL (ref 6–23)
CALCIUM SERPL-MCNC: 7.8 MG/DL (ref 8.6–10.3)
CHLORIDE SERPL-SCNC: 100 MMOL/L (ref 98–107)
CO2 SERPL-SCNC: 19 MMOL/L (ref 21–32)
COLOR UR: YELLOW
CREAT SERPL-MCNC: 1.08 MG/DL (ref 0.5–1.3)
EGFRCR SERPLBLD CKD-EPI 2021: 76 ML/MIN/1.73M*2
EOSINOPHIL # BLD AUTO: 0 X10*3/UL (ref 0–0.7)
EOSINOPHIL NFR BLD AUTO: 0 %
ERYTHROCYTE [DISTWIDTH] IN BLOOD BY AUTOMATED COUNT: 15.9 % (ref 11.5–14.5)
GLUCOSE SERPL-MCNC: 173 MG/DL (ref 74–99)
GLUCOSE UR STRIP.AUTO-MCNC: NEGATIVE MG/DL
HCT VFR BLD AUTO: 33.1 % (ref 41–52)
HGB BLD-MCNC: 10.9 G/DL (ref 13.5–17.5)
HOLD SPECIMEN: NORMAL
IMM GRANULOCYTES # BLD AUTO: 0.33 X10*3/UL (ref 0–0.7)
IMM GRANULOCYTES NFR BLD AUTO: 1 % (ref 0–0.9)
KETONES UR STRIP.AUTO-MCNC: ABNORMAL MG/DL
LEUKOCYTE ESTERASE UR QL STRIP.AUTO: NEGATIVE
LYMPHOCYTES # BLD AUTO: 0.7 X10*3/UL (ref 1.2–4.8)
LYMPHOCYTES NFR BLD AUTO: 2.1 %
MCH RBC QN AUTO: 26.8 PG (ref 26–34)
MCHC RBC AUTO-ENTMCNC: 32.9 G/DL (ref 32–36)
MCV RBC AUTO: 82 FL (ref 80–100)
MONOCYTES # BLD AUTO: 0.48 X10*3/UL (ref 0.1–1)
MONOCYTES NFR BLD AUTO: 1.4 %
MUCOUS THREADS #/AREA URNS AUTO: NORMAL /LPF
NEUTROPHILS # BLD AUTO: 31.88 X10*3/UL (ref 1.2–7.7)
NEUTROPHILS NFR BLD AUTO: 95.3 %
NITRITE UR QL STRIP.AUTO: NEGATIVE
NRBC BLD-RTO: 0 /100 WBCS (ref 0–0)
PH UR STRIP.AUTO: 5 [PH]
PLATELET # BLD AUTO: 156 X10*3/UL (ref 150–450)
POTASSIUM SERPL-SCNC: 4 MMOL/L (ref 3.5–5.3)
PROT UR STRIP.AUTO-MCNC: NEGATIVE MG/DL
RBC # BLD AUTO: 4.06 X10*6/UL (ref 4.5–5.9)
RBC # UR STRIP.AUTO: ABNORMAL /UL
RBC #/AREA URNS AUTO: NORMAL /HPF
SODIUM SERPL-SCNC: 131 MMOL/L (ref 136–145)
SP GR UR STRIP.AUTO: 1.05
UROBILINOGEN UR STRIP.AUTO-MCNC: <2 MG/DL
WBC # BLD AUTO: 33.5 X10*3/UL (ref 4.4–11.3)
WBC #/AREA URNS AUTO: NORMAL /HPF

## 2024-04-10 PROCEDURE — 81001 URINALYSIS AUTO W/SCOPE: CPT | Performed by: STUDENT IN AN ORGANIZED HEALTH CARE EDUCATION/TRAINING PROGRAM

## 2024-04-10 PROCEDURE — 2500000004 HC RX 250 GENERAL PHARMACY W/ HCPCS (ALT 636 FOR OP/ED)

## 2024-04-10 PROCEDURE — 99024 POSTOP FOLLOW-UP VISIT: CPT | Performed by: SURGERY

## 2024-04-10 PROCEDURE — 37799 UNLISTED PX VASCULAR SURGERY: CPT

## 2024-04-10 PROCEDURE — 2500000004 HC RX 250 GENERAL PHARMACY W/ HCPCS (ALT 636 FOR OP/ED): Performed by: INTERNAL MEDICINE

## 2024-04-10 PROCEDURE — 99232 SBSQ HOSP IP/OBS MODERATE 35: CPT | Performed by: INTERNAL MEDICINE

## 2024-04-10 PROCEDURE — 2020000001 HC ICU ROOM DAILY

## 2024-04-10 PROCEDURE — 99291 CRITICAL CARE FIRST HOUR: CPT | Performed by: INTERNAL MEDICINE

## 2024-04-10 PROCEDURE — 85025 COMPLETE CBC W/AUTO DIFF WBC: CPT

## 2024-04-10 PROCEDURE — C9113 INJ PANTOPRAZOLE SODIUM, VIA: HCPCS

## 2024-04-10 PROCEDURE — 80048 BASIC METABOLIC PNL TOTAL CA: CPT

## 2024-04-10 PROCEDURE — 2500000004 HC RX 250 GENERAL PHARMACY W/ HCPCS (ALT 636 FOR OP/ED): Performed by: SURGERY

## 2024-04-10 PROCEDURE — 2500000005 HC RX 250 GENERAL PHARMACY W/O HCPCS

## 2024-04-10 PROCEDURE — 99291 CRITICAL CARE FIRST HOUR: CPT | Performed by: SURGERY

## 2024-04-10 RX ORDER — SODIUM CHLORIDE, SODIUM LACTATE, POTASSIUM CHLORIDE, CALCIUM CHLORIDE 600; 310; 30; 20 MG/100ML; MG/100ML; MG/100ML; MG/100ML
50 INJECTION, SOLUTION INTRAVENOUS CONTINUOUS
Status: DISCONTINUED | OUTPATIENT
Start: 2024-04-10 | End: 2024-04-13

## 2024-04-10 RX ORDER — MORPHINE SULFATE 2 MG/ML
2 INJECTION, SOLUTION INTRAMUSCULAR; INTRAVENOUS EVERY 2 HOUR PRN
Status: DISCONTINUED | OUTPATIENT
Start: 2024-04-10 | End: 2024-04-12

## 2024-04-10 RX ORDER — MORPHINE SULFATE 4 MG/ML
4 INJECTION INTRAVENOUS EVERY 2 HOUR PRN
Status: DISCONTINUED | OUTPATIENT
Start: 2024-04-10 | End: 2024-04-12

## 2024-04-10 RX ADMIN — SODIUM CHLORIDE, POTASSIUM CHLORIDE, SODIUM LACTATE AND CALCIUM CHLORIDE 100 ML/HR: 600; 310; 30; 20 INJECTION, SOLUTION INTRAVENOUS at 19:51

## 2024-04-10 RX ADMIN — MORPHINE SULFATE 4 MG: 4 INJECTION, SOLUTION INTRAMUSCULAR; INTRAVENOUS at 20:00

## 2024-04-10 RX ADMIN — MORPHINE SULFATE 4 MG: 4 INJECTION, SOLUTION INTRAMUSCULAR; INTRAVENOUS at 22:05

## 2024-04-10 RX ADMIN — SODIUM CHLORIDE, POTASSIUM CHLORIDE, SODIUM LACTATE AND CALCIUM CHLORIDE 75 ML/HR: 600; 310; 30; 20 INJECTION, SOLUTION INTRAVENOUS at 10:15

## 2024-04-10 RX ADMIN — HYDROCORTISONE SODIUM SUCCINATE 100 MG: 100 INJECTION, POWDER, FOR SOLUTION INTRAMUSCULAR; INTRAVENOUS at 17:32

## 2024-04-10 RX ADMIN — LIDOCAINE 4% 2 PATCH: 40 PATCH TOPICAL at 08:30

## 2024-04-10 RX ADMIN — PIPERACILLIN SODIUM AND TAZOBACTAM SODIUM 3.38 G: 3; .375 INJECTION, SOLUTION INTRAVENOUS at 12:47

## 2024-04-10 RX ADMIN — HYDROCORTISONE SODIUM SUCCINATE 75 MG: 100 INJECTION, POWDER, FOR SOLUTION INTRAMUSCULAR; INTRAVENOUS at 05:06

## 2024-04-10 RX ADMIN — ACETAMINOPHEN 1000 MG: 10 INJECTION INTRAVENOUS at 10:11

## 2024-04-10 RX ADMIN — HYDROCORTISONE SODIUM SUCCINATE 75 MG: 100 INJECTION, POWDER, FOR SOLUTION INTRAMUSCULAR; INTRAVENOUS at 00:24

## 2024-04-10 RX ADMIN — SODIUM CHLORIDE, POTASSIUM CHLORIDE, SODIUM LACTATE AND CALCIUM CHLORIDE 500 ML: 600; 310; 30; 20 INJECTION, SOLUTION INTRAVENOUS at 08:30

## 2024-04-10 RX ADMIN — MORPHINE SULFATE 4 MG: 4 INJECTION, SOLUTION INTRAMUSCULAR; INTRAVENOUS at 14:10

## 2024-04-10 RX ADMIN — MORPHINE SULFATE 4 MG: 4 INJECTION, SOLUTION INTRAMUSCULAR; INTRAVENOUS at 05:12

## 2024-04-10 RX ADMIN — HYDROCORTISONE SODIUM SUCCINATE 100 MG: 100 INJECTION, POWDER, FOR SOLUTION INTRAMUSCULAR; INTRAVENOUS at 23:33

## 2024-04-10 RX ADMIN — SODIUM CHLORIDE, POTASSIUM CHLORIDE, SODIUM LACTATE AND CALCIUM CHLORIDE 500 ML: 600; 310; 30; 20 INJECTION, SOLUTION INTRAVENOUS at 06:39

## 2024-04-10 RX ADMIN — HYDROCORTISONE SODIUM SUCCINATE 100 MG: 100 INJECTION, POWDER, FOR SOLUTION INTRAMUSCULAR; INTRAVENOUS at 12:47

## 2024-04-10 RX ADMIN — PANTOPRAZOLE SODIUM 40 MG: 40 INJECTION, POWDER, FOR SOLUTION INTRAVENOUS at 05:06

## 2024-04-10 RX ADMIN — PIPERACILLIN SODIUM AND TAZOBACTAM SODIUM 3.38 G: 3; .375 INJECTION, SOLUTION INTRAVENOUS at 17:31

## 2024-04-10 RX ADMIN — ACETAMINOPHEN 1000 MG: 10 INJECTION INTRAVENOUS at 02:30

## 2024-04-10 RX ADMIN — MORPHINE SULFATE 4 MG: 4 INJECTION, SOLUTION INTRAMUSCULAR; INTRAVENOUS at 17:35

## 2024-04-10 RX ADMIN — MORPHINE SULFATE 4 MG: 4 INJECTION, SOLUTION INTRAMUSCULAR; INTRAVENOUS at 09:20

## 2024-04-10 RX ADMIN — PIPERACILLIN SODIUM AND TAZOBACTAM SODIUM 3.38 G: 3; .375 INJECTION, SOLUTION INTRAVENOUS at 05:06

## 2024-04-10 RX ADMIN — METRONIDAZOLE 500 MG: 500 INJECTION, SOLUTION INTRAVENOUS at 10:54

## 2024-04-10 RX ADMIN — METRONIDAZOLE 500 MG: 500 INJECTION, SOLUTION INTRAVENOUS at 02:30

## 2024-04-10 RX ADMIN — PIPERACILLIN SODIUM AND TAZOBACTAM SODIUM 3.38 G: 3; .375 INJECTION, SOLUTION INTRAVENOUS at 23:33

## 2024-04-10 SDOH — SOCIAL STABILITY: SOCIAL INSECURITY: ABUSE: ADULT

## 2024-04-10 SDOH — SOCIAL STABILITY: SOCIAL INSECURITY: DO YOU FEEL UNSAFE GOING BACK TO THE PLACE WHERE YOU ARE LIVING?: NO

## 2024-04-10 SDOH — SOCIAL STABILITY: SOCIAL INSECURITY: ARE YOU OR HAVE YOU BEEN THREATENED OR ABUSED PHYSICALLY, EMOTIONALLY, OR SEXUALLY BY ANYONE?: NO

## 2024-04-10 SDOH — SOCIAL STABILITY: SOCIAL INSECURITY: DO YOU FEEL ANYONE HAS EXPLOITED OR TAKEN ADVANTAGE OF YOU FINANCIALLY OR OF YOUR PERSONAL PROPERTY?: NO

## 2024-04-10 SDOH — SOCIAL STABILITY: SOCIAL INSECURITY: WERE YOU ABLE TO COMPLETE ALL THE BEHAVIORAL HEALTH SCREENINGS?: YES

## 2024-04-10 SDOH — SOCIAL STABILITY: SOCIAL INSECURITY: HAS ANYONE EVER THREATENED TO HURT YOUR FAMILY OR YOUR PETS?: NO

## 2024-04-10 SDOH — SOCIAL STABILITY: SOCIAL INSECURITY: HAVE YOU HAD THOUGHTS OF HARMING ANYONE ELSE?: NO

## 2024-04-10 SDOH — SOCIAL STABILITY: SOCIAL INSECURITY: ARE THERE ANY APPARENT SIGNS OF INJURIES/BEHAVIORS THAT COULD BE RELATED TO ABUSE/NEGLECT?: NO

## 2024-04-10 SDOH — SOCIAL STABILITY: SOCIAL INSECURITY: DOES ANYONE TRY TO KEEP YOU FROM HAVING/CONTACTING OTHER FRIENDS OR DOING THINGS OUTSIDE YOUR HOME?: NO

## 2024-04-10 ASSESSMENT — COGNITIVE AND FUNCTIONAL STATUS - GENERAL
DAILY ACTIVITIY SCORE: 24
MOBILITY SCORE: 24
DAILY ACTIVITIY SCORE: 24
MOBILITY SCORE: 24
MOBILITY SCORE: 24
DAILY ACTIVITIY SCORE: 24
PATIENT BASELINE BEDBOUND: NO

## 2024-04-10 ASSESSMENT — ACTIVITIES OF DAILY LIVING (ADL)
ADEQUATE_TO_COMPLETE_ADL: YES
HEARING - LEFT EAR: FUNCTIONAL
LACK_OF_TRANSPORTATION: NO
DRESSING YOURSELF: INDEPENDENT
HEARING - RIGHT EAR: FUNCTIONAL
WALKS IN HOME: INDEPENDENT
GROOMING: INDEPENDENT
BATHING: INDEPENDENT
PATIENT'S MEMORY ADEQUATE TO SAFELY COMPLETE DAILY ACTIVITIES?: YES
FEEDING YOURSELF: INDEPENDENT
LACK_OF_TRANSPORTATION: NO
JUDGMENT_ADEQUATE_SAFELY_COMPLETE_DAILY_ACTIVITIES: YES
TOILETING: INDEPENDENT

## 2024-04-10 ASSESSMENT — ENCOUNTER SYMPTOMS
FEVER: 0
SHORTNESS OF BREATH: 0
FATIGUE: 1
JOINT SWELLING: 0
NAUSEA: 1
APPETITE CHANGE: 1
CHILLS: 0
ABDOMINAL PAIN: 0
CHEST TIGHTNESS: 0
ABDOMINAL PAIN: 1
VOMITING: 1
NAUSEA: 0
HEADACHES: 0
DIZZINESS: 1
UNEXPECTED WEIGHT CHANGE: 1
SHORTNESS OF BREATH: 1

## 2024-04-10 ASSESSMENT — LIFESTYLE VARIABLES
SKIP TO QUESTIONS 9-10: 1
HOW OFTEN DO YOU HAVE 6 OR MORE DRINKS ON ONE OCCASION: NEVER
HOW OFTEN DO YOU HAVE A DRINK CONTAINING ALCOHOL: NEVER
AUDIT-C TOTAL SCORE: 0
AUDIT-C TOTAL SCORE: 0
HOW MANY STANDARD DRINKS CONTAINING ALCOHOL DO YOU HAVE ON A TYPICAL DAY: PATIENT DOES NOT DRINK

## 2024-04-10 ASSESSMENT — PATIENT HEALTH QUESTIONNAIRE - PHQ9
2. FEELING DOWN, DEPRESSED OR HOPELESS: NOT AT ALL
1. LITTLE INTEREST OR PLEASURE IN DOING THINGS: NOT AT ALL
SUM OF ALL RESPONSES TO PHQ9 QUESTIONS 1 & 2: 0

## 2024-04-10 ASSESSMENT — PAIN SCALES - GENERAL
PAINLEVEL_OUTOF10: 2
PAINLEVEL_OUTOF10: 7
PAINLEVEL_OUTOF10: 3
PAINLEVEL_OUTOF10: 7
PAINLEVEL_OUTOF10: 0 - NO PAIN
PAINLEVEL_OUTOF10: 8
PAINLEVEL_OUTOF10: 8
PAINLEVEL_OUTOF10: 0 - NO PAIN
PAINLEVEL_OUTOF10: 0 - NO PAIN
PAINLEVEL_OUTOF10: 7
PAINLEVEL_OUTOF10: 2
PAINLEVEL_OUTOF10: 7
PAINLEVEL_OUTOF10: 3
PAINLEVEL_OUTOF10: 2

## 2024-04-10 ASSESSMENT — PAIN DESCRIPTION - LOCATION
LOCATION: ABDOMEN

## 2024-04-10 NOTE — PROGRESS NOTES
GENERAL SURGERY PROGRESS NOTE    Kevin Rubi   1955   [id number]     Kevin Rubi is a 68 y.o. male  with history of stage IIIC LLL lung cancer s/p chemo/RT, recent pneumonitis on prednisone daily, HTN, A-fib on Eliquis, ascending aortic aneurysm, HFpEF with EF 60%, hx SMA occlusion s/p stenting, PUD, HLD on day 1 of admission presenting with Incarcerated right inguinal hernia.    Open repair of right inguinal hernia, Exploratory  laparotomy,small  bowel resection on 4/9/24, 1 Day Post-Op    Subjective  No acute events overnight. Patient remains on pressors, currently at 0.06 of levophed. NSR. Having adequate urine output at 50 ml/hr. Currently resting comfortably, reports improvement in abdominal pain. Denies nausea. Has not passed flatus yet.    Review of Systems:  Review of Systems   Constitutional:  Negative for chills and fever.   Respiratory:  Negative for chest tightness and shortness of breath.    Cardiovascular:  Negative for chest pain.   Gastrointestinal:  Negative for abdominal pain and nausea.   Genitourinary:  Negative for decreased urine volume.   Musculoskeletal:  Negative for joint swelling.   Skin:  Negative for rash.   Neurological:  Negative for headaches.       Objective    Last Recorded Vitals  Blood pressure 94/59, pulse 75, temperature 36.4 °C (97.5 °F), temperature source Temporal, resp. rate 14, height 1.829 m (6'), weight 71.8 kg (158 lb 4.6 oz), SpO2 96%.    Intake/Output last 3 Shifts:  I/O last 3 completed shifts:  In: 4593.2 (64 mL/kg) [I.V.:3263.2 (45.4 mL/kg); NG/GT:50; IV Piggyback:1280]  Out: 1795 (25 mL/kg) [Urine:550 (0.2 mL/kg/hr); Emesis/NG output:1225; Blood:20]  Weight: 71.8 kg     Intake/Output Summary (Last 24 hours) at 4/10/2024 0754  Last data filed at 4/10/2024 0645  Gross per 24 hour   Intake 4593.24 ml   Output 1795 ml   Net 2798.24 ml       Physical Exam  Vitals reviewed.   Constitutional:       General: He is not in acute distress.  HENT:      Head:  Normocephalic and atraumatic.      Nose:      Comments: NG tube in place with 400cc of gastric non-bilious output in canister. NG tube functioning.   Eyes:      Conjunctiva/sclera: Conjunctivae normal.   Cardiovascular:      Rate and Rhythm: Normal rate and regular rhythm.      Pulses: Normal pulses.      Comments: On 0.06 levophed  Pulmonary:      Effort: Pulmonary effort is normal. No respiratory distress.   Abdominal:      General: There is no distension.      Palpations: Abdomen is soft.      Comments: Appropriately tender near incisions. Incision dressings in place with minimal drainage.    Genitourinary:     Comments: Bower in place draining clear yellow urine  Musculoskeletal:         General: No swelling.      Cervical back: Neck supple.   Skin:     General: Skin is warm and dry.   Neurological:      Mental Status: He is alert and oriented to person, place, and time.         Relevant Results  Labs:  Results for orders placed or performed during the hospital encounter of 04/09/24 (from the past 24 hour(s))   ECG 12 lead   Result Value Ref Range    Ventricular Rate 89 BPM    Atrial Rate 93 BPM    SD Interval 137 ms    QRS Duration 100 ms    QT Interval 352 ms    QTC Calculation(Bazett) 429 ms    R Axis -29 degrees    T Axis 243 degrees    QRS Count 14 beats    Q Onset 251 ms    T Offset 427 ms    QTC Fredericia 401 ms   CBC and Auto Differential   Result Value Ref Range    WBC 21.4 (H) 4.4 - 11.3 x10*3/uL    nRBC 0.0 0.0 - 0.0 /100 WBCs    RBC 4.48 (L) 4.50 - 5.90 x10*6/uL    Hemoglobin 12.3 (L) 13.5 - 17.5 g/dL    Hematocrit 36.2 (L) 41.0 - 52.0 %    MCV 81 80 - 100 fL    MCH 27.5 26.0 - 34.0 pg    MCHC 34.0 32.0 - 36.0 g/dL    RDW 15.8 (H) 11.5 - 14.5 %    Platelets 135 (L) 150 - 450 x10*3/uL    Neutrophils % 93.1 40.0 - 80.0 %    Immature Granulocytes %, Automated 0.6 0.0 - 0.9 %    Lymphocytes % 4.3 13.0 - 44.0 %    Monocytes % 1.9 2.0 - 10.0 %    Eosinophils % 0.0 0.0 - 6.0 %    Basophils % 0.1 0.0 - 2.0  %    Neutrophils Absolute 19.91 (H) 1.20 - 7.70 x10*3/uL    Immature Granulocytes Absolute, Automated 0.13 0.00 - 0.70 x10*3/uL    Lymphocytes Absolute 0.92 (L) 1.20 - 4.80 x10*3/uL    Monocytes Absolute 0.41 0.10 - 1.00 x10*3/uL    Eosinophils Absolute 0.00 0.00 - 0.70 x10*3/uL    Basophils Absolute 0.02 0.00 - 0.10 x10*3/uL   Basic metabolic panel   Result Value Ref Range    Glucose 166 (H) 74 - 99 mg/dL    Sodium 130 (L) 136 - 145 mmol/L    Potassium 4.0 3.5 - 5.3 mmol/L    Chloride 96 (L) 98 - 107 mmol/L    Bicarbonate 26 21 - 32 mmol/L    Anion Gap 12 10 - 20 mmol/L    Urea Nitrogen 35 (H) 6 - 23 mg/dL    Creatinine 1.28 0.50 - 1.30 mg/dL    eGFR 62 >60 mL/min/1.73m*2    Calcium 8.8 8.6 - 10.3 mg/dL   Magnesium   Result Value Ref Range    Magnesium 1.80 1.60 - 2.40 mg/dL   Lipase   Result Value Ref Range    Lipase 7 (L) 9 - 82 U/L   Hepatic function panel   Result Value Ref Range    Albumin 3.7 3.4 - 5.0 g/dL    Bilirubin, Total 0.6 0.0 - 1.2 mg/dL    Bilirubin, Direct 0.1 0.0 - 0.3 mg/dL    Alkaline Phosphatase 46 33 - 136 U/L    ALT 13 10 - 52 U/L    AST 11 9 - 39 U/L    Total Protein 6.9 6.4 - 8.2 g/dL   Lactate   Result Value Ref Range    Lactate 1.4 0.4 - 2.0 mmol/L   Troponin I, High Sensitivity   Result Value Ref Range    Troponin I, High Sensitivity 22 (H) 0 - 20 ng/L   Protime-INR   Result Value Ref Range    Protime 20.7 (H) 9.8 - 12.8 seconds    INR 1.8 (H) 0.9 - 1.1   Basic Metabolic Panel   Result Value Ref Range    Glucose 173 (H) 74 - 99 mg/dL    Sodium 131 (L) 136 - 145 mmol/L    Potassium 4.0 3.5 - 5.3 mmol/L    Chloride 100 98 - 107 mmol/L    Bicarbonate 19 (L) 21 - 32 mmol/L    Anion Gap 16 10 - 20 mmol/L    Urea Nitrogen 33 (H) 6 - 23 mg/dL    Creatinine 1.08 0.50 - 1.30 mg/dL    eGFR 76 >60 mL/min/1.73m*2    Calcium 7.8 (L) 8.6 - 10.3 mg/dL   CBC and Auto Differential   Result Value Ref Range    WBC 33.5 (H) 4.4 - 11.3 x10*3/uL    nRBC 0.0 0.0 - 0.0 /100 WBCs    RBC 4.06 (L) 4.50 - 5.90  x10*6/uL    Hemoglobin 10.9 (L) 13.5 - 17.5 g/dL    Hematocrit 33.1 (L) 41.0 - 52.0 %    MCV 82 80 - 100 fL    MCH 26.8 26.0 - 34.0 pg    MCHC 32.9 32.0 - 36.0 g/dL    RDW 15.9 (H) 11.5 - 14.5 %    Platelets 156 150 - 450 x10*3/uL    Neutrophils % 95.3 40.0 - 80.0 %    Immature Granulocytes %, Automated 1.0 (H) 0.0 - 0.9 %    Lymphocytes % 2.1 13.0 - 44.0 %    Monocytes % 1.4 2.0 - 10.0 %    Eosinophils % 0.0 0.0 - 6.0 %    Basophils % 0.2 0.0 - 2.0 %    Neutrophils Absolute 31.88 (H) 1.20 - 7.70 x10*3/uL    Immature Granulocytes Absolute, Automated 0.33 0.00 - 0.70 x10*3/uL    Lymphocytes Absolute 0.70 (L) 1.20 - 4.80 x10*3/uL    Monocytes Absolute 0.48 0.10 - 1.00 x10*3/uL    Eosinophils Absolute 0.00 0.00 - 0.70 x10*3/uL    Basophils Absolute 0.06 0.00 - 0.10 x10*3/uL   Urinalysis with Reflex Culture and Microscopic   Result Value Ref Range    Color, Urine Yellow Straw, Yellow    Appearance, Urine Hazy (N) Clear    Specific Gravity, Urine 1.047 (N) 1.005 - 1.035    pH, Urine 5.0 5.0, 5.5, 6.0, 6.5, 7.0, 7.5, 8.0    Protein, Urine NEGATIVE NEGATIVE mg/dL    Glucose, Urine NEGATIVE NEGATIVE mg/dL    Blood, Urine SMALL (1+) (A) NEGATIVE    Ketones, Urine 5 (TRACE) (A) NEGATIVE mg/dL    Bilirubin, Urine NEGATIVE NEGATIVE    Urobilinogen, Urine <2.0 <2.0 mg/dL    Nitrite, Urine NEGATIVE NEGATIVE    Leukocyte Esterase, Urine NEGATIVE NEGATIVE   Urinalysis Microscopic   Result Value Ref Range    WBC, Urine NONE 1-5, NONE /HPF    RBC, Urine 1-2 NONE, 1-2, 3-5 /HPF    Mucus, Urine 1+ Reference range not established. /LPF       Images:  CT abdomen pelvis w IV contrast   Final Result   1.  Multiple loops of dilated small bowel consistent with a small   bowel obstruction.  There is a loop of small bowel located within a   right inguinal hernia likely incarcerated and acting as a transition   point for the obstruction.   2.  Ill-defined consolidative changes within the left lower lobe which   may represent a possible  pneumonitis.   3.  Findings consistent with hepatic steatosis.   4.  No abdominal/pelvic fluid collections or pneumoperitoneum.   5.  Colonic diverticulosis without definite evidence of   diverticulitis.   6.  Stable mild prostatomegaly..   Result was given to Dr. Christiano Flanagan on 4/9/2024 at 1936.   Signed by Ward Tong MD          Assessment and Plan  Principal Problem:    Incarcerated right inguinal hernia    68 y.o. male with with history of stage IIIC LLL lung cancer s/p chemo/RT, recent pneumonitis on prednisone daily, HTN, A-fib on Eliquis, ascending aortic aneurysm, HFpEF with EF 60% who presented with incarcerated right inguinal hernia now s/p exploratory laparotomy, small bowel resection, and right inguinal hernia repair on 4/9/24. Requiring pressor support, awaiting return of bowel function.     Plan:  -Currently pain controlled: continue IV tylenol and IV morphine as needed  -On levophed, hx HFpEF and a-fib. Cardiology consulted, follow up recommendations. Wean pressors as able.   -Encourage IS use. Hx pneumonitis, on steroids at baseline, received stress dose in OR. Currently on Solu-cortef 100mg q6hr. Pulmonology consulted, follow up recommendations for steroid taper. Hx lung cancer, oncology consulted, ok with quick steroid taper, will follow up additional recommendations  -Continue NG tube, monitor output, await return of bowel function  -Will plan to take down abdominal dressing on POD2  -Maintain stewart in place for strict I/Os. Replace electrolytes as needed. Gentle IVF given history of CHF, will follow up cardiology recommendations  -Perioperative abx: IV zosyn for 24 hour. Will follow up on intra-operative cultures  -SCDs, likely start chemoprophylaxis today  -IMS and CCM consulted, appreciate recommendations  -Continue ICU cares    Discussed with attending Dr. Blaine Haskins, DO - PGY3  General Surgery

## 2024-04-10 NOTE — CONSULTS
Consults  History Of Present Illness:    Kevin Rubi is a 68 y.o. male presenting with abdominal pain.  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   Last visit with Tressa Khalil the patient reported he had just been diagnosed with lung CA and was to undergo CTX and XRT.  Presented to my office yesterday for routine follow up, and to review TTE ordered by Tressa Khalil in 10/2023.  TTE demonstrated LVEF 55-60%, no rWMA, mild AI.  He finished CTX 12/2023.    I sent the patient from my office to the ED yesterday due to hypotension and GI symptoms including vomiting and abdominal pain.  In the office EKG demonstrated Afib with .    Patient seen in ICU, postop after open repair of right inguinal hernia, exlap, small bowel resection on 4/9/24.  Currently has some post-op pain, but this has improved since the surgery.  NO chest pain/pressure, LH/dizziness, palps, SOB.   Tele Afib with HR's 80-90's.    ROS:  The remainder of the review of systems was obtained, as was negative as pertains to the chief complaint.    Last Recorded Vitals:  Vitals:    04/10/24 1230 04/10/24 1239 04/10/24 1245 04/10/24 1300   BP: 87/59  89/52 89/59   Pulse:    77   Resp:    10   Temp:  36.4 °C (97.5 °F)     TempSrc:       SpO2:    100%   Weight:       Height:           Last Labs:  CBC - 4/10/2024:  5:19 AM  33.5 10.9 156    33.1      CMP - 4/10/2024:  5:19 AM  7.8 6.9 11 --- 0.6   CANCELED 3.7 13 46      PTT - 9/12/2023:  7:46 AM  1.8   20.7 32     Troponin I, High Sensitivity   Date/Time Value Ref Range Status   04/09/2024 04:51 PM 22 (H) 0 - 20 ng/L Final   12/18/2023 12:31 PM 34 0 - 53 ng/L Final   12/18/2023 11:12 AM 30 0 - 53 ng/L Final     Hemoglobin A1C   Date/Time Value Ref Range Status    02/27/2024 12:21 PM 6.1 (H) see below % Final   08/08/2023 04:31 AM 7.3 (A) % Final     Comment:          Diagnosis of Diabetes-Adults   Non-Diabetic: < or = 5.6%   Increased risk for developing diabetes: 5.7-6.4%   Diagnostic of diabetes: > or = 6.5%  .       Monitoring of Diabetes                Age (y)     Therapeutic Goal (%)   Adults:          >18           <7.0   Pediatrics:    13-18           <7.5                   7-12           <8.0                   0- 6            7.5-8.5   American Diabetes Association. Diabetes Care 33(S1), Jan 2010.       LDL Calculated   Date/Time Value Ref Range Status   10/23/2023 09:08 AM 74 <=99 mg/dL Final     Comment:                                 Near   Borderline      AGE      Desirable  Optimal    High     High     Very High     0-19 Y     0 - 109     ---    110-129   >/= 130     ----    20-24 Y     0 - 119     ---    120-159   >/= 160     ----      >24 Y     0 -  99   100-129  130-159   160-189     >/=190       VLDL   Date/Time Value Ref Range Status   10/23/2023 09:08 AM 24 0 - 40 mg/dL Final   04/01/2023 08:05 AM 46 (H) 0 - 40 mg/dL Final   07/28/2022 08:24 AM 41 (H) 0 - 40 mg/dL Final   08/14/2021 08:44 AM 31 0 - 40 mg/dL Final      Last I/O:  I/O last 3 completed shifts:  In: 4593.2 (64 mL/kg) [I.V.:3263.2 (45.4 mL/kg); NG/GT:50; IV Piggyback:1280]  Out: 1795 (25 mL/kg) [Urine:550 (0.2 mL/kg/hr); Emesis/NG output:1225; Blood:20]  Weight: 71.8 kg     Past Cardiology Tests (Last 3 Years):  EKG:  ECG 12 lead 04/09/2024 (Preliminary)      ECG 12 Lead 04/09/2024      ECG 12 lead 12/18/2023    Echo:  Transthoracic echo (TTE) complete 03/26/2024    Ejection Fractions:  EF   Date/Time Value Ref Range Status   03/26/2024 01:26 PM 58 %      Cath:  No results found for this or any previous visit from the past 1095 days.    Stress Test:  No results found for this or any previous visit from the past 1095 days.    Cardiac Imaging:  No results found for this or any previous visit  from the past 1095 days.      Past Medical History:  He has a past medical history of Aortic aneurysm (CMS/HCC), Atrial fibrillation (CMS/HCC), Coronary artery disease, Hyperlipidemia, Hypertension, Lung cancer (CMS/HCC), Personal history of peptic ulcer disease, Superior mesenteric artery stenosis (CMS/HCC), and Tinnitus, bilateral (08/14/2016).    Past Surgical History:  He has a past surgical history that includes Other surgical history (02/13/2019); Other surgical history (02/13/2019); CT angio abdomen pelvis w and or wo IV IV contrast (08/08/2023); and Superior mestenteric artery stent.      Social History:  He reports that he quit smoking about 11 years ago. His smoking use included cigarettes. He started smoking about 41 years ago. He has a 15 pack-year smoking history. He has been exposed to tobacco smoke. He has never used smokeless tobacco. He reports that he does not currently use alcohol. He reports that he does not use drugs.    Family History:  Family History   Problem Relation Name Age of Onset    Cancer Mother          ?larynx or lung        Allergies:  Patient has no known allergies.    Inpatient Medications:  Scheduled medications   Medication Dose Route Frequency    [Held by provider] digoxin  125 mcg oral Daily    pantoprazole  40 mg oral Daily before breakfast    Or    esomeprazole  40 mg nasoduodenal tube Daily before breakfast    Or    pantoprazole  40 mg intravenous Daily before breakfast    hydrocortisone sodium succinate  100 mg intravenous q6h    lidocaine  2 patch transdermal Daily    metroNIDAZOLE  500 mg intravenous q8h    piperacillin-tazobactam  3.375 g intravenous q6h     PRN medications   Medication    benzocaine    metoprolol    morphine    morphine    ondansetron ODT    Or    ondansetron    tiZANidine     Continuous Medications   Medication Dose Last Rate    lactated Ringer's  100 mL/hr 100 mL/hr (04/10/24 1156)    norepinephrine  0.01-0.5 mcg/kg/min Stopped (04/10/24 1200)      Outpatient Medications:  Current Outpatient Medications   Medication Instructions    apixaban (ELIQUIS) 5 mg, oral, 2 times daily    digoxin (LANOXIN) 125 mcg, oral, Daily    Entresto 24-26 mg tablet 1 tablet, oral, 2 times daily    fenofibrate (Tricor) 145 mg tablet Take 1 tablet (145 mg) by mouth once daily.    gabapentin (NEURONTIN) 300 mg, oral, Nightly    metoprolol succinate XL (TOPROL-XL) 50 mg, oral, Daily, Do not crush or chew.    metoprolol succinate XL (TOPROL-XL) 25 mg, oral, Daily, Do not crush or chew.    pantoprazole (PROTONIX) 20 mg, oral, Daily before breakfast, Do not crush, chew, or split.    predniSONE (DELTASONE) 70 mg, oral, Daily, Take in the morning with food. Taper as instructed by provider.    rosuvastatin (CRESTOR) 5 mg, oral, Daily    spironolactone (ALDACTONE) 12.5 mg, oral, Daily    tiZANidine (ZANAFLEX) 2 mg, oral, Every 8 hours PRN       Physical Exam:  Physical Exam  HENT:      Head: Normocephalic.      Mouth/Throat:      Mouth: Mucous membranes are moist.   Eyes:      Extraocular Movements: Extraocular movements intact.   Cardiovascular:      Rate and Rhythm: Normal rate. Rhythm irregular.   Pulmonary:      Effort: Pulmonary effort is normal.   Abdominal:      Palpations: Abdomen is soft.   Genitourinary:     Comments: S/p R inguinal hernia open repair  Musculoskeletal:      Cervical back: Neck supple.   Skin:     General: Skin is warm.   Neurological:      General: No focal deficit present.      Mental Status: He is alert.   Psychiatric:      Comments: Tearful mood            Assessment/Plan   Incarcerated R inguinal hernia s/p open repair of hernia / exlap / small bowel resection on 4/9/24  NICM with recovery of EF from 20% > 55-60%  paroxysmal Afib on apixaban  Lung CA s/p CTX/XRT in 12/23  mild nonobst CAD  ascending aortic aneurysm (measured 4.4 cm on CT 3/2021)  former tobacco use    Hypotensive post-op, possibly from post op / anesthesia vasoplegia, blood loss, etc.     Recent TTE with normal EF.  No current chest pain/angina or significant HF symptoms.      Recs:  -hold outpatient CV medications for now  -use norepinephrine as needed for MAPs < 60  -normal EF - can tolerate some IVF administration with goal euvolemia  -currently Afib HR's 80-90's on tele (improved from preop), if develops RVR then would use low doses of IV metoprolol (2.5 - 5mg IV)  -alternatively, if BP does not tolerate metoprolol, can use digoxin for rate control  -once BP improves, will gradually reinitiate home CV meds  -reinitiate apixaban when possible from a surgical perspective  -if continues to have prolonged hypotension, check limited TTE    Peripheral IV 04/09/24 20 G Right Forearm (Active)   Site Assessment Clean;Dry;Intact 04/10/24 0900   Dressing Type Transparent 04/10/24 0900   Line Status Infusing 04/10/24 0900   Dressing Status Clean;Dry;Occlusive 04/10/24 0900   Number of days: 1       Peripheral IV 04/09/24 20 G Right;Posterior Wrist (Active)   Site Assessment Clean;Dry;Intact 04/10/24 0800   Dressing Type Transparent 04/10/24 0800   Line Status Infusing 04/10/24 0800   Dressing Status Clean;Dry;Occlusive 04/10/24 0800   Number of days: 1       Arterial Line 04/09/24 Left Radial (Active)   Site Assessment Clean;Dry;Intact 04/10/24 0800   Line Status Pulsatile blood flow 04/10/24 0800   Art Line Waveform Appropriate 04/10/24 0800   Art Line Interventions Zeroed and calibrated 04/10/24 0800   Color/Movement/Sensation Capillary refill less than 3 sec;Distal pulses palpable 04/10/24 0800   Dressing Type Antimicrobial patch;Transparent 04/10/24 0800   Dressing Status Clean;Dry;Occlusive 04/10/24 0800   Number of days: 1       NG/OG/Feeding Tube Left nostril (Active)   Tube Status Low intermittent suction 04/10/24 0800   Placement Verification Measurements 04/10/24 0800   Distal Tube Measurement 70 cm 04/10/24 0800   Site Assessment Clean;Dry;Intact 04/10/24 0800   Drainage Appearance Bile 04/10/24  0800   Tube Securement Taped to nostril center 04/10/24 0800   Number of days: 1       Urethral Catheter Non-latex 16 Fr. (Active)   Site Assessment Clean;Skin intact 04/10/24 1106   Number of days: 1       Code Status:  Full Code    I spent 30 minutes of critical care time in the professional and overall care of this patient on 4/10/24.        Leann Rosraio MD

## 2024-04-10 NOTE — CONSULTS
Reason For Consult  Hypotension, Lung Cancer    History Of Present Illness  Kevin Rubi is a 68 y.o. male with PMH of stage III NSCLC(adenocarcinoma of LLL) 9/15/23, NICMP with initial EF 20% and repeat at 60% attributed to ETOH related or arrhythmia related cardiomyopathy, ascending aortic aneurysm 4.4 cm, former cigarette smoker, paroxysmal atrial fibrillation, developed Imfinzi related interstitial pneumonitis for which he has been on high dose prednisone presented with c/o abdominal pain, recurrent nausea and vomiting episodes for 3 days was seen in the cardiology office by Dr. Rosario for follow up and noted to have significant hypotension. He was referred to the ER yesterday where the work up revealed incarcerated right inguinal hernia with a SBO. He was taken for laparatomy and open hernia reduction and small bowel resection yesterday 4/9/24.  I discussed case with Dr. Valladares and do during surgery patient was noted to have hypotension requiring phenylephrine.  Patient presented to the ICU around 11 PM and was evaluated by nocturnist.  Patient was noted to have continued hypotension at which time patient was started on norepinephrine.  Critical care was consulted this morning for continued need for Levophed which was at 0.06 mcg.  Patient evaluated and appeared intravascularly dry.  He reported he is not feeling short of breath and abdominal pain is controlled.  He has NG tube to suction and denied any nausea or any recurrence of vomiting.     Past Medical History  He has a past medical history of Aortic aneurysm (CMS/HCC), Atrial fibrillation (CMS/HCC), Coronary artery disease, Hyperlipidemia, Hypertension, Lung cancer (CMS/HCC), Personal history of peptic ulcer disease, Superior mesenteric artery stenosis (CMS/HCC), and Tinnitus, bilateral (08/14/2016).    Surgical History  He has a past surgical history that includes Other surgical history (02/13/2019); Other surgical history (02/13/2019); CT angio  abdomen pelvis w and or wo IV IV contrast (08/08/2023); and Superior mestenteric artery stent.     Social History  He reports that he quit smoking about 11 years ago. His smoking use included cigarettes. He started smoking about 41 years ago. He has a 15 pack-year smoking history. He has been exposed to tobacco smoke. He has never used smokeless tobacco. He reports that he does not currently use alcohol. He reports that he does not use drugs.  He has prior history of alcohol use.   Family History  Family History   Problem Relation Name Age of Onset    Cancer Mother          ?larynx or lung        Allergies  Patient has no known allergies.    Review of Systems  Review of Systems   Constitutional:  Positive for appetite change, fatigue and unexpected weight change.   Respiratory:  Positive for shortness of breath.    Gastrointestinal:  Positive for abdominal pain, nausea and vomiting.   Neurological:  Positive for dizziness.   All other systems reviewed and are negative.         Physical Exam  Physical Exam  Vitals and nursing note reviewed.   Constitutional:       Appearance: Normal appearance.   HENT:      Head: Normocephalic.      Nose: Nose normal.      Mouth/Throat:      Pharynx: Oropharynx is clear.   Eyes:      Extraocular Movements: Extraocular movements intact.      Conjunctiva/sclera: Conjunctivae normal.      Pupils: Pupils are equal, round, and reactive to light.   Cardiovascular:      Rate and Rhythm: Normal rate and regular rhythm.      Pulses: Normal pulses.      Heart sounds: Normal heart sounds.   Pulmonary:      Effort: Pulmonary effort is normal.      Breath sounds: Normal breath sounds.   Abdominal:      General: Bowel sounds are decreased.      Palpations: Abdomen is soft.      Tenderness: There is abdominal tenderness.      Comments: Around the surgical site, mild   Musculoskeletal:         General: Normal range of motion.      Cervical back: Normal range of motion.   Skin:     General: Skin is  warm.   Neurological:      General: No focal deficit present.      Mental Status: He is alert and oriented to person, place, and time. Mental status is at baseline.   Psychiatric:         Mood and Affect: Mood normal.         Behavior: Behavior normal.            Last Recorded Vitals  Blood pressure 95/61, pulse 73, temperature 36.2 °C (97.2 °F), resp. rate 12, height 1.829 m (6'), weight 71.8 kg (158 lb 4.6 oz), SpO2 100%.    Relevant Results  Results for orders placed or performed during the hospital encounter of 04/09/24 (from the past 24 hour(s))   ECG 12 lead   Result Value Ref Range    Ventricular Rate 89 BPM    Atrial Rate 93 BPM    OK Interval 137 ms    QRS Duration 100 ms    QT Interval 352 ms    QTC Calculation(Bazett) 429 ms    R Axis -29 degrees    T Axis 243 degrees    QRS Count 14 beats    Q Onset 251 ms    T Offset 427 ms    QTC Fredericia 401 ms   CBC and Auto Differential   Result Value Ref Range    WBC 21.4 (H) 4.4 - 11.3 x10*3/uL    nRBC 0.0 0.0 - 0.0 /100 WBCs    RBC 4.48 (L) 4.50 - 5.90 x10*6/uL    Hemoglobin 12.3 (L) 13.5 - 17.5 g/dL    Hematocrit 36.2 (L) 41.0 - 52.0 %    MCV 81 80 - 100 fL    MCH 27.5 26.0 - 34.0 pg    MCHC 34.0 32.0 - 36.0 g/dL    RDW 15.8 (H) 11.5 - 14.5 %    Platelets 135 (L) 150 - 450 x10*3/uL    Neutrophils % 93.1 40.0 - 80.0 %    Immature Granulocytes %, Automated 0.6 0.0 - 0.9 %    Lymphocytes % 4.3 13.0 - 44.0 %    Monocytes % 1.9 2.0 - 10.0 %    Eosinophils % 0.0 0.0 - 6.0 %    Basophils % 0.1 0.0 - 2.0 %    Neutrophils Absolute 19.91 (H) 1.20 - 7.70 x10*3/uL    Immature Granulocytes Absolute, Automated 0.13 0.00 - 0.70 x10*3/uL    Lymphocytes Absolute 0.92 (L) 1.20 - 4.80 x10*3/uL    Monocytes Absolute 0.41 0.10 - 1.00 x10*3/uL    Eosinophils Absolute 0.00 0.00 - 0.70 x10*3/uL    Basophils Absolute 0.02 0.00 - 0.10 x10*3/uL   Basic metabolic panel   Result Value Ref Range    Glucose 166 (H) 74 - 99 mg/dL    Sodium 130 (L) 136 - 145 mmol/L    Potassium 4.0 3.5 - 5.3  mmol/L    Chloride 96 (L) 98 - 107 mmol/L    Bicarbonate 26 21 - 32 mmol/L    Anion Gap 12 10 - 20 mmol/L    Urea Nitrogen 35 (H) 6 - 23 mg/dL    Creatinine 1.28 0.50 - 1.30 mg/dL    eGFR 62 >60 mL/min/1.73m*2    Calcium 8.8 8.6 - 10.3 mg/dL   Magnesium   Result Value Ref Range    Magnesium 1.80 1.60 - 2.40 mg/dL   Lipase   Result Value Ref Range    Lipase 7 (L) 9 - 82 U/L   Hepatic function panel   Result Value Ref Range    Albumin 3.7 3.4 - 5.0 g/dL    Bilirubin, Total 0.6 0.0 - 1.2 mg/dL    Bilirubin, Direct 0.1 0.0 - 0.3 mg/dL    Alkaline Phosphatase 46 33 - 136 U/L    ALT 13 10 - 52 U/L    AST 11 9 - 39 U/L    Total Protein 6.9 6.4 - 8.2 g/dL   Lactate   Result Value Ref Range    Lactate 1.4 0.4 - 2.0 mmol/L   Troponin I, High Sensitivity   Result Value Ref Range    Troponin I, High Sensitivity 22 (H) 0 - 20 ng/L   Protime-INR   Result Value Ref Range    Protime 20.7 (H) 9.8 - 12.8 seconds    INR 1.8 (H) 0.9 - 1.1   Basic Metabolic Panel   Result Value Ref Range    Glucose 173 (H) 74 - 99 mg/dL    Sodium 131 (L) 136 - 145 mmol/L    Potassium 4.0 3.5 - 5.3 mmol/L    Chloride 100 98 - 107 mmol/L    Bicarbonate 19 (L) 21 - 32 mmol/L    Anion Gap 16 10 - 20 mmol/L    Urea Nitrogen 33 (H) 6 - 23 mg/dL    Creatinine 1.08 0.50 - 1.30 mg/dL    eGFR 76 >60 mL/min/1.73m*2    Calcium 7.8 (L) 8.6 - 10.3 mg/dL   CBC and Auto Differential   Result Value Ref Range    WBC 33.5 (H) 4.4 - 11.3 x10*3/uL    nRBC 0.0 0.0 - 0.0 /100 WBCs    RBC 4.06 (L) 4.50 - 5.90 x10*6/uL    Hemoglobin 10.9 (L) 13.5 - 17.5 g/dL    Hematocrit 33.1 (L) 41.0 - 52.0 %    MCV 82 80 - 100 fL    MCH 26.8 26.0 - 34.0 pg    MCHC 32.9 32.0 - 36.0 g/dL    RDW 15.9 (H) 11.5 - 14.5 %    Platelets 156 150 - 450 x10*3/uL    Neutrophils % 95.3 40.0 - 80.0 %    Immature Granulocytes %, Automated 1.0 (H) 0.0 - 0.9 %    Lymphocytes % 2.1 13.0 - 44.0 %    Monocytes % 1.4 2.0 - 10.0 %    Eosinophils % 0.0 0.0 - 6.0 %    Basophils % 0.2 0.0 - 2.0 %    Neutrophils  Absolute 31.88 (H) 1.20 - 7.70 x10*3/uL    Immature Granulocytes Absolute, Automated 0.33 0.00 - 0.70 x10*3/uL    Lymphocytes Absolute 0.70 (L) 1.20 - 4.80 x10*3/uL    Monocytes Absolute 0.48 0.10 - 1.00 x10*3/uL    Eosinophils Absolute 0.00 0.00 - 0.70 x10*3/uL    Basophils Absolute 0.06 0.00 - 0.10 x10*3/uL   Urinalysis with Reflex Culture and Microscopic   Result Value Ref Range    Color, Urine Yellow Straw, Yellow    Appearance, Urine Hazy (N) Clear    Specific Gravity, Urine 1.047 (N) 1.005 - 1.035    pH, Urine 5.0 5.0, 5.5, 6.0, 6.5, 7.0, 7.5, 8.0    Protein, Urine NEGATIVE NEGATIVE mg/dL    Glucose, Urine NEGATIVE NEGATIVE mg/dL    Blood, Urine SMALL (1+) (A) NEGATIVE    Ketones, Urine 5 (TRACE) (A) NEGATIVE mg/dL    Bilirubin, Urine NEGATIVE NEGATIVE    Urobilinogen, Urine <2.0 <2.0 mg/dL    Nitrite, Urine NEGATIVE NEGATIVE    Leukocyte Esterase, Urine NEGATIVE NEGATIVE   Urinalysis Microscopic   Result Value Ref Range    WBC, Urine NONE 1-5, NONE /HPF    RBC, Urine 1-2 NONE, 1-2, 3-5 /HPF    Mucus, Urine 1+ Reference range not established. /LPF     Imaging reviewed.      Assessment/Plan   Hypotension  Incarcerated Hernia S/p Open hernia repair and small bowel segmental resection POD #1  Chronic prednisone therapy for Imfinzi (immunotherapy) induced pneumonitis  Stage III Adenocarcinoma of LLL  History of nonischemic cardiomyopathy/chronic systolic CHF  Former cigarette smoker  History of alcohol use  History of atrial fibrillation    Recommendations  Patient's hypotension appears to be secondary to significant intravascular volume depletion.  The other contributors appear to be cardiac medications and risk of sepsis from abdominal source given incarcerated hernia.  Patient is on Levophed 0.06 mcg but on giving fluid resuscitation the requirement came down significantly to 0.02 and we plan to stop pressor support.  We had a plan to put a central line which we discussed with general surgery as well if patient  continues to require pressor support.  Patient's cardiomyopathy has improved and he does not have any active  systolic heart failure, therefore, able to handle further fluid resuscitation.  Patient's pulse pressure appears to be wide and clinical examination reveals dehydration.  Discussed with bedside JENNIFER Singleton.  S/p hernia repair.  Patient is n.p.o. with NG tube to suction.  Defer further management to general surgery regarding nutrition and oral intake.  Continue judicious pain management.  Nonischemic cardiomyopathy with EF 20% initially and now improved to 60%.  Patient has been on Entresto, metoprolol and spironolactone.  Will consult cardiology to further evaluate if he can have modification of his cardiac regimen.  Recommend discontinue spironolactone.  Continue to hold cardiac medications until patient's blood pressure improves.  History of atrial fibrillation.  Patient is on digoxin, metoprolol and Eliquis.  Patient did receive Kcentra prior to surgery yesterday.  Will resume DVT prophylaxis tomorrow morning per discussion with general surgery.  Continue home dose of pantoprazole.  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  Patient has been on prednisone 70 mg daily which has recently been cut down to 60 mg daily.  Patient is currently n.p.o. and will continue hydrocortisone at this time at the dose of 100 mg IV every 6 with gradual reduction and then transition to oral prednisone.  Continue current empiric antibiotic therapy with IV Zosyn.  May discontinue Flagyl.  IV fluid until patient is NPO.    I discussed case with patient's spouse at bedside as well.  Discussed case with Dr. Valladares and Dr. Rosario.  Case was discussed on multidisciplinary rounds.        I spent 45 minutes of critical care time in the professional and overall care of this patient.       Wisam Latham MD

## 2024-04-10 NOTE — BRIEF OP NOTE
Date: 2024  OR Location: [facility]    Name: Kevin Rubi, : 1955, Age: 68 y.o., MRN: [id number], Sex: male    Diagnosis  Pre-op Diagnosis     * Incarcerated right inguinal hernia [K40.30] Post-op Diagnosis     * Incarcerated right inguinal hernia [K40.30]     Procedures  Open repair of right inguinal hernia, Exploratory  laparotomy,small  bowel resection  03460 - AL RPR 1ST INGUN HRNA AGE 5 YRS/> INCARCERATED      Surgeons      * Vale Valladares - Primary    Resident/Fellow/Other Assistant:  Surgeon(s) and Role:  Valentin Mora DO - Assistant      Procedure Summary  Anesthesia: General  ASA: III  Anesthesia Staff: CRNA: RUKHSANA Wong-CRNA  Estimated Blood Loss: 20mL  Intra-op Medications:   Administrations occurring from  to  on 24:   Medication Name Total Dose   metroNIDAZOLE (Flagyl) 500 mg in NaCl (iso-os) 100 mL 500 mg   sodium chloride 0.9 % bolus 1,000 mL Cannot be calculated              Anesthesia Record               Intraprocedure I/O Totals          Intake    Phenylephrine Drip 0.00 mL    The total shown is the total volume documented since Anesthesia Start was filed.    LR infusion 1500.00 mL    Total Intake 1500 mL       Output    Urine 200 mL    Est. Blood Loss 20 mL    NG/OG Tube Output 500 mL    Total Output 720 mL       Net    Net Volume 780 mL          Specimen:   ID Type Source Tests Collected by Time   1 : SMALL BOWEL Tissue SMALL BOWEL /INTESTINE SEGMENTAL RESECTION SURGICAL PATHOLOGY EXAM Vale Valladares MD 2024   2 : HERNIA SAC, RIGHT INGUINAL Tissue HERNIA SAC SURGICAL PATHOLOGY EXAM Vale Valladares MD 2024        Staff:   Circulator: Binta Palomares RN  Scrub Person: Arcelia Hernandez          Findings: Intraoperatively was found to have a right inguinal hernia. The hernia was incarcerated and contained small bowel. The small bowel was determined non viable so the procedure was extended to an exploratory laparotomy.  A small bowel resection and re  anastomosis was performed. Post anastomosis the bowel was pink and viable. The hernia sac was then reduced and the inguinal defect was primarily repaired. Both the open inguinal and laparotomy incisions were closed. Patient also had an NG tube placed intraoperatively.    Complications:  None; patient tolerated the procedure well.     Disposition: ICU - extubated and stable.  Condition: stable  Specimens Collected:   ID Type Source Tests Collected by Time   1 : SMALL BOWEL Tissue SMALL BOWEL /INTESTINE SEGMENTAL RESECTION SURGICAL PATHOLOGY EXAM Vale Valladares MD 4/9/2024 2015   2 : HERNIA SAC, RIGHT INGUINAL Tissue HERNIA SAC SURGICAL PATHOLOGY EXAM Vale Valladares MD 4/9/2024 2045     Attending Attestation:     Valentin Mora DO, PGY-2  General Surgery    Vale Valladares  Phone Number: [phone]

## 2024-04-10 NOTE — PROGRESS NOTES
Kevin Rubi is a 68 y.o. male admitted for Incarcerated right inguinal hernia. Pharmacy reviewed the patient's [id number] medications and allergies for accuracy.    The list below reflects the PTA list prior to pharmacy medication history. A summary a changes to the PTA medication list has been listed below. Please review each medication in order reconciliation for additional clarification and justification.    Source of information:  T2P and Wife    Medications added:    Medications modified:  Fenofibrate 145mg --> every day     Medications to be removed:    Medications of concern:      Prior to Admission Medications   Prescriptions Last Dose Informant Patient Reported? Taking?   Entresto 24-26 mg tablet   No No   Sig: Take 1 tablet by mouth 2 times a day.   apixaban (Eliquis) 5 mg tablet   No No   Sig: Take 1 tablet (5 mg) by mouth 2 times a day.   clopidogrel (Plavix) 75 mg tablet   No No   Sig: Take 1 tablet (75 mg) by mouth once daily.   digoxin (Lanoxin) 125 MCG tablet   No No   Sig: TAKE 1 TABLET DAILY   fenofibrate (Tricor) 145 mg tablet   Yes No   gabapentin (Neurontin) 300 mg capsule   No No   Sig: Take 1 capsule (300 mg) by mouth once daily at bedtime.   metoprolol succinate XL (Toprol-XL) 25 mg 24 hr tablet   No No   Sig: Take 1 tablet (25 mg) by mouth once daily. Do not crush or chew.   metoprolol succinate XL (Toprol-XL) 50 mg 24 hr tablet   No No   Sig: Take 1 tablet (50 mg) by mouth once daily. Do not crush or chew.   pantoprazole (ProtoNix) 20 mg EC tablet   No No   Sig: Take 1 tablet (20 mg) by mouth once daily in the morning. Take before meals. Do not crush, chew, or split.   predniSONE (Deltasone) 10 mg tablet   No No   Sig: Take 7 tablets (70 mg) by mouth once daily. Take in the morning with food. Taper as instructed by provider.   rosuvastatin (Crestor) 5 mg tablet   No No   Sig: Take 1 tablet (5 mg) by mouth once daily.   spironolactone (Aldactone) 25 mg tablet   No No   Sig:  Take 0.5 tablets (12.5 mg) by mouth once daily.   tiZANidine (Zanaflex) 2 mg tablet   No No   Sig: Take 1 tablet (2 mg) by mouth every 8 hours if needed for muscle spasms for up to 10 days.      Facility-Administered Medications: None       Viktoriya Lopez

## 2024-04-10 NOTE — PROGRESS NOTES
Kevin Rubi is a 68 y.o. male on day 1 of admission presenting with Incarcerated right inguinal hernia.      Subjective   Kevin Rubi is a 68 y.o. 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 Patient was in his normal state of health until about two days ago when he started experiencing right groin and abdominal pain. Also with nausea and non-bloody emesis. Originally went to a cardiology appointment today but found to be hypotensive so sent to the ED. In the ED, found to have an incarcerated ventral hernia so taken to surgery for emergent surgical reduction with bowel resection. Hospitalist service consulted for medication management. Currently pain controlled post-op. NGT left in place. Denies SOB or LE edema. Wants numbing spray for throat. BP low-nl.     4/10- No acute events overnight. Pt doing well after surgery. Critical care also following.       Objective     Last Recorded Vitals  BP 90/52   Pulse 85   Temp 36.4 °C (97.5 °F)   Resp 11   Wt 71.8 kg (158 lb 4.6 oz)   SpO2 97%   Intake/Output last 3 Shifts:    Intake/Output Summary (Last 24 hours) at 4/10/2024 1555  Last data filed at 4/10/2024 1400  Gross per 24 hour   Intake 5743.24 ml   Output 2090 ml   Net 3653.24 ml       Admission Weight  Weight: 69.9 kg (154 lb) (04/09/24 1419)    Daily Weight  04/10/24 : 71.8 kg (158 lb 4.6 oz)    Image Results  ECG 12 lead  Atrial fibrillation  Ventricular premature complex  Low voltage, extremity leads  Repol abnrm suggests ischemia, diffuse leads      Physical Exam  CONSTITUTIONAL - alert, frail , ill looking male,  NG tube in place  CHEST -  decreased air entry bilaterally, no obvious wheeze or crackles  CARDIAC - regular rate and regular rhythm, no murmur  ABDOMEN -  no organomegaly, soft, nontender, nondistended, normal bowel sounds, no guarding/rebound/rigidity  EXTREMITIES - no edema, no deformities  NEUROLOGICAL - alert, oriented x3 and no acute focal signs  PSYCHIATRIC - alert, pleasant and cordial, age-appropriate    Relevant Results               Assessment/Plan   This patient currently has cardiac telemetry ordered; if you would like to modify or discontinue the telemetry order, click here to go to the orders activity to modify/discontinue the order.    ASSESSMENT     male with hx of lung cancer s/p chemo/radiation/immunotherapy c/b pneumonitis on prednisone, afib on Eliquis, SMA occlusion s/p stenting, 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     PLAN     Incarcerated Hernia s/p Surgical Reduction w/ Bowel Resection  -Presented with abd pain, nausea, and vomiting and found to have incarcerated hernia s/p emergent surgical reduction with bowel resection  -Post-op with low-nl Bps but otherwise doing well  -Critical care and Surgery managing     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  -Currently on RA  -On pred taper, current dose 70mg  -Will transition to hydrocortisone IV 75mg q6 given patient NPO and for mineralocorticoid effect for stress dosing  -Should probably try to expedite steroid taper for wound healing purposes but will defer this to pulmonology     Chronic HFrEF EF 20% (EF now recovered) 2/2 NICM  -2/2 NICM (alcohol) last EF recovered at 55%  -No signs of decompensated CHF  -Hold home GDMT (Entresto, metop, Aldactone) for tonight given low Bps      Other Issues  -Permanent Atrial Fibrillation: Rates controlled, continue dig but hold oral metop given low-nl BP, hold Eliquis for tonight  -Non-onst CAD: Home Statin. Hold Plavix for tonight  -Lung cancer s/p  chemo/radiation/immunotherapy: Follow-up as an outpatient      DVT Prophy  -SCDs, hold home Eliquis for tonight                 Malnutrition Diagnosis Status: New  Malnutrition Diagnosis: Severe malnutrition related to chronic disease or condition  As Evidenced by: moderate to severe muscle/fat loss per NFPE, and PO intake <75% for >1 month  I agree with the dietitian's malnutrition diagnosis.         Sweta Jefferson MD

## 2024-04-10 NOTE — SIGNIFICANT EVENT
Dr Vivas reported speaking with [facility] physician prior to leaving

## 2024-04-10 NOTE — PROGRESS NOTES
04/10/24 1058   Discharge Planning   Living Arrangements Spouse/significant other   Support Systems Spouse/significant other   Assistance Needed Independent   Type of Residence Private residence   Home or Post Acute Services None   Patient expects to be discharged to: Home   Does the patient need discharge transport arranged? No   Financial Resource Strain   How hard is it for you to pay for the very basics like food, housing, medical care, and heating? Not hard   Housing Stability   In the last 12 months, was there a time when you were not able to pay the mortgage or rent on time? N   In the last 12 months, was there a time when you did not have a steady place to sleep or slept in a shelter (including now)? N   Transportation Needs   In the past 12 months, has lack of transportation kept you from medical appointments or from getting medications? no   In the past 12 months, has lack of transportation kept you from meetings, work, or from getting things needed for daily living? No     PCP is DANIEL Fried. Patient is from home with his wife. Patient is independent with ambulation, self care, shopping, and meals. Patients wife provides transportation for the patient. Patient plans to return home with no needs upon discharge. [facility] will continue to follow for needs if they arise.

## 2024-04-10 NOTE — H&P
[facility]     Hospital Medicine Consults     Assessment & Plan     ASSESSMENT    male with hx of lung cancer s/p chemo/radiation/immunotherapy c/b pneumonitis on prednisone, afib on Eliquis, SMA occlusion s/p stenting, 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  -Currently on RA  -On pred taper, current dose 70mg  -Will transition to hydrocortisone IV 75mg q6 given patient NPO and for mineralocorticoid effect for stress dosing  -Should probably try to expedite steroid taper for wound healing purposes but will defer this to pulmonology    Chronic HFrEF EF 20% (EF now recovered) 2/2 NICM  -2/2 NICM (alcohol) last EF recovered at 55%  -No signs of decompensated CHF  -Hold home GDMT (Entresto, metop, Aldactone) for tonight given low Bps     Other Issues  -Permanent Atrial Fibrillation: Rates controlled, continue dig but hold oral metop given low-nl BP, hold Eliquis for tonight  -Non-onst CAD: Home Statin. Hold Plavix for tonight  -Lung cancer s/p chemo/radiation/immunotherapy: Follow-up as an outpatient     DVT Prophy  -SCDs, hold home Eliquis for tonight    Disposition  -ICU      Cirilo Aleman MD    History of Present Illness     Kevin Rubi is a 68 y.o. male with hx of lung cancer s/p  chemo/radiation/immunotherapy c/b pneumonitis on prednisone, afib on Eliquis, SMA occlusion s/p stenting, HFrEF EF 20% 2/2 NICM (alcohol, EF now recovered), non-onst CAD, and ventral hernia presented with worsening right groin and abdominal pain. Patient was in his normal state of health until about two days ago when he started experiencing right groin and abdominal pain. Also with nausea and non-bloody emesis. Originally went to a cardiology appointment today but found to be hypotensive so sent to the ED. In the ED, found to have an incarcerated ventral hernia so taken to surgery for emergent surgical reduction with bowel resection. Hospitalist service consulted for medication management. Currently pain controlled post-op. NGT left in place. Denies SOB or LE edema. Wants numbing spray for throat. BP low-nl.    Review of Systems     A Comprehensive greater than 10 system review of systems was carried out.  Pertinent positives and negatives are noted above.  Otherwise negative for contributory information.     Past Medical History     Past Medical History:   Diagnosis Date    Aortic aneurysm (CMS/HCC)     Atrial fibrillation (CMS/HCC)     Coronary artery disease     Hyperlipidemia     Hypertension     Lung cancer (CMS/HCC)     Personal history of peptic ulcer disease     History of gastric ulcer    Superior mesenteric artery stenosis (CMS/HCC)     Tinnitus, bilateral 2016    Tinnitus of both ears     Medications     Medications Prior to Admission   Medication Sig Dispense Refill Last Dose    apixaban (Eliquis) 5 mg tablet Take 1 tablet (5 mg) by mouth 2 times a day. 180 tablet 1     [] clopidogrel (Plavix) 75 mg tablet Take 1 tablet (75 mg) by mouth once daily. 90 tablet 1     digoxin (Lanoxin) 125 MCG tablet TAKE 1 TABLET DAILY 90 tablet 0     Entresto 24-26 mg tablet Take 1 tablet by mouth 2 times a day. 180 tablet 1     fenofibrate (Tricor) 145 mg tablet        gabapentin (Neurontin) 300 mg capsule  Take 1 capsule (300 mg) by mouth once daily at bedtime. 60 capsule 2     metoprolol succinate XL (Toprol-XL) 25 mg 24 hr tablet Take 1 tablet (25 mg) by mouth once daily. Do not crush or chew. 90 tablet 3     metoprolol succinate XL (Toprol-XL) 50 mg 24 hr tablet Take 1 tablet (50 mg) by mouth once daily. Do not crush or chew. 90 tablet 3     pantoprazole (ProtoNix) 20 mg EC tablet Take 1 tablet (20 mg) by mouth once daily in the morning. Take before meals. Do not crush, chew, or split. 30 tablet 11     predniSONE (Deltasone) 10 mg tablet Take 7 tablets (70 mg) by mouth once daily. Take in the morning with food. Taper as instructed by provider. 196 tablet 0     rosuvastatin (Crestor) 5 mg tablet Take 1 tablet (5 mg) by mouth once daily. 90 tablet 1     spironolactone (Aldactone) 25 mg tablet Take 0.5 tablets (12.5 mg) by mouth once daily. 45 tablet 1     tiZANidine (Zanaflex) 2 mg tablet Take 1 tablet (2 mg) by mouth every 8 hours if needed for muscle spasms for up to 10 days. 30 tablet 0      Past Surgical History     Past Surgical History:   Procedure Laterality Date    CT ABDOMEN PELVIS ANGIOGRAM W AND/OR WO IV CONTRAST  2023    CT ABDOMEN PELVIS ANGIOGRAM W AND/OR WO IV CONTRAST 2023 [facility]    OTHER SURGICAL HISTORY  2019    Esophagogastroduodenoscopy    OTHER SURGICAL HISTORY  2019    Stomach surgery    SUPERIOR MESTENTERIC ARTERY STENT       Family History   Reviewed and non-contributory to presenting complaints    Allergies   No Known Allergies    Social History     Social History     Tobacco Use    Smoking status: Former     Packs/day: 0.50     Years: 30.00     Additional pack years: 0.00     Total pack years: 15.00     Types: Cigarettes     Quit date:      Years since quittin.2     Passive exposure: Past    Smokeless tobacco: Never   Substance Use Topics    Alcohol use: Not Currently     \  Physical Exam   Blood pressure 102/58, pulse 99, temperature 36.4 °C (97.5 °F),  temperature source Temporal, resp. rate 22, height 1.829 m (6'), weight 72.2 kg (159 lb 2.8 oz), SpO2 95 %.    General: Ill appearing, NGT in place.  HEENT: Atraumatic. No erythema in posterior pharynx.  Lymph: No cervical or inguinal lymphadenopathy.  Cardiac: RRR. No murmurs.  Lungs: CTAB. Nl WOB.  Abd: Surgical dressing in place. No rebound or gaurding. Nl bowel sounds.  Ext: No edema. 2+ pulses.  Skin: No rashes, abrasions, or contusions.  Psych: A&Ox3. Nl affect.  Neuro: 5/5 strength. Sensation intact.    Labs & Imaging     Reviewed and Pertinent results discussed in assessment and plan.

## 2024-04-10 NOTE — OP NOTE
Open repair of right inguinal hernia, exploratory laparotomy, small bowel resection Operative Note     Date: 2024  OR Location: [facility]    Name: Kevin Rubi, : 1955, Age: 68 y.o., MRN: [id number], Sex: male    Diagnosis  Pre-op Diagnosis     * Incarcerated right inguinal hernia [K40.30] Post-op Diagnosis     * Incarcerated right inguinal hernia [K40.30]     Procedures  Open primary repair of incarcerated right inguinal hernia  Exploratory laparotomy  Small bowel resection    Surgeons      * Vale Valladares - Primary    Resident/Fellow/Other Assistant:  Surgeon(s) and Role: Valentin Mora DO    Procedure Summary  Anesthesia: General  ASA: III  Anesthesia Staff: CRNA: RUKHSANA Wong-CRNA  Estimated Blood Loss: 20mL  Intra-op Medications:   Administrations occurring from  to  on 24:   Medication Name Total Dose   metroNIDAZOLE (Flagyl) 500 mg in NaCl (iso-os) 100 mL 500 mg              Anesthesia Record               Intraprocedure I/O Totals          Intake    Phenylephrine Drip 0.00 mL    The total shown is the total volume documented since Anesthesia Start was filed.    LR infusion 1500.00 mL    Total Intake 1500 mL          Specimen:   ID Type Source Tests Collected by Time   1 : SMALL BOWEL Tissue SMALL BOWEL /INTESTINE SEGMENTAL RESECTION SURGICAL PATHOLOGY EXAM Vale Valladares MD 2024   2 : HERNIA SAC, RIGHT INGUINAL Tissue HERNIA SAC SURGICAL PATHOLOGY EXAM Vale Valladares MD 2024    Fluid from hernia sac for culture    Staff:   Circulator: Binta Palomares RN  Scrub Person: Arcelia Hernandez         Drains and/or Catheters:   Urethral Catheter Non-latex 16 Fr. (Active)   NG    Tourniquet Times:       Findings: incarcerated small bowel with ischemia of a non-circumferential segment    Indications: Kevin Rubi is an 68 y.o. male who is having surgery for Incarcerated right inguinal hernia [K40.30].     The patient was seen in the preoperative area. The risks,  benefits, complications, treatment options, non-operative alternatives, expected recovery and outcomes were discussed with the patient. The possibilities of reaction to medication, pulmonary aspiration, injury to surrounding structures, bleeding, recurrent infection, the need for additional procedures, failure to diagnose a condition, and creating a complication requiring transfusion or operation were discussed with the patient. The patient concurred with the proposed plan, giving informed consent.  The site of surgery was properly noted/marked if necessary per policy. The patient has been actively warmed in preoperative area. Preoperative antibiotics have been ordered and given within 1 hours of incision. Venous thrombosis prophylaxis have been ordered including bilateral sequential compression devices    Procedure Details:   Due to the patient's Plavix and Eliquis taken that day and the need for emergency surgery, I felt it was important to use a medication available at our facility to attempt to partially reverse the medications. While Kcentra was not optimal for reversing either medication, it was readily available. This was administered while the patient was in the ED. The patient was taken to the operating room and underwent general anesthesia. Sequential compression devices were placed. Subcutaneous heparin and appropriate antibiotics were administered. A nasogastric tube was placed. The lower abdomen, bilateral groins and genitals were prepped and draped in the usual sterile fashion. A time-out was performed with all parties present.     The Bower catheter was placed in a sterile fashion on the operative field. In the right inguinal region, an oblique incision was made. Blunt dissection and electrocautery were used to dissect through the subcutaneous fat down to the external oblique aponeurosis. Prior to reaching the aponeurosis, the incarcerated inguinal hernia and sac were visualized just lateral to the  pubic tubercle. We were able to identify the aponeurosis just lateral to this. The aponeurosis was entered along its length, opening the external inguinal ring. These flaps were elevated and the underlying fatty tissue was dissected free. The patient was noted to have a moderately sized defect in the indirect space but with a large amount of firm contents extending down into the scrotum. We carefully dissected the cremaster muscle fibers off the hernia sac. We entered the hernia sac and noted a moderate volume of serous fluid. Some of the fluid was sent for culture. After suctioning the fluid, we evaluated the remainder of the contents. There was a short segment of small bowel that was incarcerated. The antimesenteric surface had some swelling and irregularities to the surface. It was difficult to determine whether this was a serosal tear given the edema/swelling, but the underlying bowel was also ischemic in appearance. The ischemic-appearing segment was less than 8cm in length, and did not involve the entire circumference of the small bowel segment. However, after we confirmed that the internal ring was not constricting the small bowel and waited several minutes, the appearance of the bowel did not improve. Therefore, I felt it was necessary to resect this segment to avoid the likely perforation that would follow. We used a clamp on the bowel to avoid losing this segment if it self reduced.    A lower midline laparotomy was made and electrocautery was used to dissect down to the fascia, taking care to maintain hemostasis. The fascia was carefully opened. The preperitoneal fat was elevated and the peritoneum was sharply divided. The abdomen was then widely opened in the lower midline. Upon initial inspection, we noted some dilated and nondilated loops of healthy small bowel. We reduced the previously incarcerated, ischemic segment of small bowel into the abdominal cavity and eviscerated this segment through the  midline wound. We chose proximal and distal points for transecting the small bowel in areas of healthy-appearing tissue. A window was made in the mesentery at either end. A JAIMEE stapler with 55mm tissue loads were used to transect the bowel. A Ligasure device was used to dissect the mesentery. The small bowel segment was sent as a specimen. Enterotomies were made on the anti-mesenteric surfaces near the staple lines. A 55mm tissue load was used to create the anastomosis. Visualization of the anastomosis revealed no bleeding. The enterotomy was closed using a TA stapler with a 60mm tissue load. This staple line was oversewn with a 3-0 running locking Vicryl suture. A 3-0 Vicryl suture was placed externally distal to the anastomosis. The mesentery defect was closed using 3-0 Vicryl in a figure of 8 fashion. The anastomosis was widely patent and the bowel was viable. This was carefully placed back into the abdomen. The remainder of the small bowel appeared viable and healthy. The stomach was palpated and the NG was in appropriate location. There were some omental adhesions to the upper midline that were left in place.     We then returned to the right groin. The opened hernia sac was used as a guide to determine the extent of dissection into the scrotum. The The spermatic cord structures were carefully dissected and the vas deferens was identified. A penrose drain was looped around the hernia sac to aid in retraction. The penrose drain was removed once the sac was fully dissected from the surrounding structures. The majority of the sac was fairly edematous and thickened. I elected to transect it near the internal ring and send it as a specimen. The remaining sac was closed with 2-0 Vicryl in a running fashion. It was easily reduced through the internal ring. 2-0 silk sutures were used to decrease the size of the internal ring, by suturing the conjoint tendon to the transversalis fascia. We continued to identify and  protect the cord structures as well as the ilioinguinal nerve. We evaluated the scrotum and were able to easily palpate both testicles. The space where the herniated contents were previously lying was evaluated. It was not a particularly large space at this point, and I felt that a drain was not necessary.     We switched to the clean closure setup, with new drapes, instruments, gowns and gloves. In the midline, we reevaluated the small bowel anastomosis. It was widely patent, hemostatic and viable. The midline fascia was closed with #1 looped PDS in a running fashion. Local anesthesia was administered with 0.25% Marcaine. Hemostasis was achieved with electrocautery. The incision was closed with staples and a sterile dressing was applied. In the right groin, the external aponeurosis was closed with 2-0 Vicryl in a running fashion. Local anesthesia was administered with 0.25% Marcaine. Hemostasis was achieved with electrocautery. Nayely's fascia was closed with 3-0 Vicryl in an interrupted fashion. The incision was closed with staples. A pressure dressing was applied to the right groin due to the edema and mild oozing throughout the case.    The patient was extubated and transferred to the ICU in somewhat stable condition, given his labile blood pressures. At the time of transport, he was off vasopressor support. All needle, sponge and instrument counts were correct at the end of the case. I was present for the entire case.    Complications:  None; patient tolerated the procedure well.    Disposition:  ICU critically ill due to labile BP but extubated  Condition: unstable     Attending Attestation: I was present and scrubbed for the entire procedure.    Vale Valladares  Phone Number: [phone]

## 2024-04-10 NOTE — PROGRESS NOTES
Nutrition Initial Assessment:   Nutrition Assessment    Reason for Assessment: Dietitian discretion (MST of 4)    Medical history per chart:   68 y.o. male with history of stage IIIC LLL lung cancer s/p chemo/RT, recent pneumonitis on prednisone daily, HTN, A-fib on Eliquis, ascending aortic aneurysm, HFpEF with EF 60%, hx SMA occlusion s/p stenting, PUD, HLD presenting with concern for right incarcerated inguinal hernia.      4/10:  Pt reviewed in IDT rounds.  Pt NPO with NG tube to suction.  Pt is s/p hernia repair, and small bowel resection yesterday 4/9.  Pt reports decreased intake at home of meats since last Chemo in Nov 2023.  Pt was taking Ensure in the past, but dislikes them.  Pt agreeable to trial of Ensure Clear, and Magic cups once diet advances.    Nutrition History:  Food and Nutrient History: Pt reports decreased PO intake especially meats.  Pt dislikes Ensure supplements as he had them in the past while he was on chemo.  Energy Intake: Fair 50-75 %, Poor < 50 %    Current Diet: NPO Diet; Effective now      Nutrition Related Findings:    Teeth: Missing teeth, Dentures upper   GI symptoms: anorexia.   BM:    Wound Type: surgical (nursing/wound notes provide further details)    Food allergies: NKFA. has No Known Allergies.  Meds/Labs reviewed.  [Held by provider] digoxin, 125 mcg, oral, Daily  pantoprazole, 40 mg, oral, Daily before breakfast   Or  esomeprazole, 40 mg, nasoduodenal tube, Daily before breakfast   Or  pantoprazole, 40 mg, intravenous, Daily before breakfast  hydrocortisone sodium succinate, 100 mg, intravenous, q6h  lidocaine, 2 patch, transdermal, Daily  metroNIDAZOLE, 500 mg, intravenous, q8h  piperacillin-tazobactam, 3.375 g, intravenous, q6h       lactated Ringer's, 100 mL/hr, Last Rate: 100 mL/hr (04/10/24 1156)  phenylephrine, 0.1-2 mcg/kg/min         Nutrition Significant Labs:    Results from last 7 days   Lab Units 04/10/24  0519 04/09/24  1651   GLUCOSE mg/dL 173* 166*   SODIUM  mmol/L 131* 130*   POTASSIUM mmol/L 4.0 4.0   CHLORIDE mmol/L 100 96*   CO2 mmol/L 19* 26   BUN mg/dL 33* 35*   CREATININE mg/dL 1.08 1.28   EGFR mL/min/1.73m*2 76 62   CALCIUM mg/dL 7.8* 8.8   MAGNESIUM mg/dL  --  1.80     Lab Results   Component Value Date    HGBA1C 6.1 (H) 02/27/2024    HGBA1C 7.3 (A) 08/08/2023           Anthropometrics:  Height: 182.9 cm (6')   Weight: 71.8 kg (158 lb 4.6 oz)   BMI (Calculated): 21.46  IBW/kg (Dietitian Calculated): 80.9 kg          Weight History:   Wt Readings from Last 10 Encounters:   04/10/24 71.8 kg (158 lb 4.6 oz)   04/09/24 69.9 kg (154 lb)   04/03/24 70.4 kg (155 lb 3.3 oz)   04/02/24 69.4 kg (153 lb)   03/27/24 69.3 kg (152 lb 12.5 oz)   03/11/24 70.8 kg (156 lb)   02/28/24 73.7 kg (162 lb 7.7 oz)   02/22/24 74.8 kg (165 lb)   01/31/24 76.8 kg (169 lb 4.8 oz)   01/24/24 75.9 kg (167 lb 5.3 oz)        Weight Change %:  Weight History / % Weight Change: Pt reports UBW of 185# last year indicating a 14.6% loss x 1 year.  Per records noted a 5.3% loss x 3 months.  Significant Weight Loss: No          Nutrition Focused Physical Exam Findings:    Subcutaneous Fat Loss:   Orbital Fat Pads: Mild-Moderate (slight dark circles and slight hollowing)  Buccal Fat Pads: Mild-Moderate (flat cheeks, minimal bounce)  Triceps: Mild-Moderate (less than ample fat tissue)  Muscle Wasting:  Temporalis: Severe (hollowed scooping depression)  Pectoralis (Clavicular Region): Severe (protruding prominent clavicle)  Deltoid/Trapezius: Mild-Moderate (slight protrusion of acromion process)  Edema:     Physical Findings:  Skin: Positive (Surgical incision)    Estimated Needs:   Total Energy Estimated Needs (kCal): 2160 kCal  Method for Estimating Needs: 30 kcal/kg     Method for Estimating Needs:  gm (1.2-1.5 gm/kg)     Method for Estimating Needs: 1ml/kcal        Nutrition Diagnosis   Nutrition Diagnosis:  Malnutrition Diagnosis  Patient has Malnutrition Diagnosis: Yes  Diagnosis Status:  New  Malnutrition Diagnosis: Severe malnutrition related to chronic disease or condition  As Evidenced by: moderate to severe muscle/fat loss per NFPE, and PO intake <75% for >1 month  Additional Assessment Information: Lung CA s/p chemo/radiation    Nutrition Diagnosis  Patient has Nutrition Diagnosis: Yes  Diagnosis Status (1): New  Nutrition Diagnosis 1: Inadequate oral intake  Related to (1): s/p hernia repair, and ex lap/Small bowel resection  As Evidenced by (1): NPO status       Nutrition Interventions/Recommendations   Nutrition Interventions and Recommendations:        Nutrition Prescription:  Individualized Nutrition Prescription Provided for : Provide supplements once diet advances        Nutrition Interventions:   Food and/or Nutrient Delivery Interventions  Interventions: Medical food supplement  Medical Food Supplement: Commercial beverage  Goal: Provide Magic cups, and Ensure Clear BID    Coordination of Nutrition Care by a Nutrition Professional  Collaboration and Referral of Nutrition Care: Collaboration by nutrition professional with other providers  Goal: JOSE barrett RN, Dr. Latham    Nutrition Education:   Education Documentation  No documentation found.      Nutrition Counseling  Counseling Theoretical Approach: Nutrition counseling based on health belief model  Goal: Reviewed supplements, protein sources, and diet order       Nutrition Monitoring and Evaluation   Monitoring/Evaluation:   Food/Nutrient Related History Monitoring  Monitoring and Evaluation Plan: Energy intake  Energy Intake: Estimated energy intake  Criteria: Follow for diet advancement    Body Composition/Growth/Weight History  Monitoring and Evaluation Plan: Weight  Weight: Measured weight  Criteria: Stable or gradual weight gain    Biochemical Data, Medical Tests and Procedures  Monitoring and Evaluation Plan: Electrolyte/renal panel  Electrolyte and Renal Panel: BUN, Creatinine, Sodium, Potassium  Criteria: WNL    Nutrition  Focused Physical Findings  Monitoring and Evaluation Plan: Skin  Skin: Impaired wound healing  Criteria: Promote wound healing            Time Spent/Follow-up Reminder:   Follow Up  Time Spent (min): 45 minutes  Last Date of Nutrition Visit: 04/10/24  Nutrition Follow-Up Needed?: Dietitian to reassess per policy  Follow up Comment: 4/12-4/15

## 2024-04-11 ENCOUNTER — APPOINTMENT (OUTPATIENT)
Dept: CARDIOLOGY | Facility: HOSPITAL | Age: 69
DRG: 329 | End: 2024-04-11
Payer: MEDICARE

## 2024-04-11 LAB
ANION GAP SERPL CALC-SCNC: 13 MMOL/L (ref 10–20)
BUN SERPL-MCNC: 30 MG/DL (ref 6–23)
CALCIUM SERPL-MCNC: 7.8 MG/DL (ref 8.6–10.3)
CHLORIDE SERPL-SCNC: 100 MMOL/L (ref 98–107)
CO2 SERPL-SCNC: 24 MMOL/L (ref 21–32)
CREAT SERPL-MCNC: 0.97 MG/DL (ref 0.5–1.3)
EGFRCR SERPLBLD CKD-EPI 2021: 86 ML/MIN/1.73M*2
EJECTION FRACTION APICAL 4 CHAMBER: 61.4
ERYTHROCYTE [DISTWIDTH] IN BLOOD BY AUTOMATED COUNT: 15.9 % (ref 11.5–14.5)
GLUCOSE SERPL-MCNC: 191 MG/DL (ref 74–99)
HCT VFR BLD AUTO: 31 % (ref 41–52)
HGB BLD-MCNC: 10.3 G/DL (ref 13.5–17.5)
LEFT ATRIUM VOLUME AREA LENGTH INDEX BSA: 32.6 ML/M2
LEFT VENTRICLE INTERNAL DIMENSION DIASTOLE: 4.35 CM (ref 3.5–6)
LV EJECTION FRACTION BIPLANE: 64 %
MAGNESIUM SERPL-MCNC: 1.88 MG/DL (ref 1.6–2.4)
MCH RBC QN AUTO: 27.1 PG (ref 26–34)
MCHC RBC AUTO-ENTMCNC: 33.2 G/DL (ref 32–36)
MCV RBC AUTO: 82 FL (ref 80–100)
MITRAL VALVE E/E' RATIO: 12.4
NRBC BLD-RTO: 0 /100 WBCS (ref 0–0)
PLATELET # BLD AUTO: 129 X10*3/UL (ref 150–450)
POTASSIUM SERPL-SCNC: 3.8 MMOL/L (ref 3.5–5.3)
RBC # BLD AUTO: 3.8 X10*6/UL (ref 4.5–5.9)
RIGHT VENTRICLE PEAK SYSTOLIC PRESSURE: 32.2 MMHG
SODIUM SERPL-SCNC: 133 MMOL/L (ref 136–145)
WBC # BLD AUTO: 21.8 X10*3/UL (ref 4.4–11.3)

## 2024-04-11 PROCEDURE — 36415 COLL VENOUS BLD VENIPUNCTURE: CPT | Performed by: SURGERY

## 2024-04-11 PROCEDURE — 2500000004 HC RX 250 GENERAL PHARMACY W/ HCPCS (ALT 636 FOR OP/ED): Performed by: SURGERY

## 2024-04-11 PROCEDURE — 2500000001 HC RX 250 WO HCPCS SELF ADMINISTERED DRUGS (ALT 637 FOR MEDICARE OP): Performed by: INTERNAL MEDICINE

## 2024-04-11 PROCEDURE — 2500000004 HC RX 250 GENERAL PHARMACY W/ HCPCS (ALT 636 FOR OP/ED): Performed by: INTERNAL MEDICINE

## 2024-04-11 PROCEDURE — 80048 BASIC METABOLIC PNL TOTAL CA: CPT | Performed by: SURGERY

## 2024-04-11 PROCEDURE — 83735 ASSAY OF MAGNESIUM: CPT | Performed by: SURGERY

## 2024-04-11 PROCEDURE — 2020000001 HC ICU ROOM DAILY

## 2024-04-11 PROCEDURE — 93308 TTE F-UP OR LMTD: CPT

## 2024-04-11 PROCEDURE — 99024 POSTOP FOLLOW-UP VISIT: CPT | Performed by: SURGERY

## 2024-04-11 PROCEDURE — 2500000004 HC RX 250 GENERAL PHARMACY W/ HCPCS (ALT 636 FOR OP/ED)

## 2024-04-11 PROCEDURE — 99232 SBSQ HOSP IP/OBS MODERATE 35: CPT | Performed by: INTERNAL MEDICINE

## 2024-04-11 PROCEDURE — 93308 TTE F-UP OR LMTD: CPT | Performed by: INTERNAL MEDICINE

## 2024-04-11 PROCEDURE — 99291 CRITICAL CARE FIRST HOUR: CPT | Performed by: INTERNAL MEDICINE

## 2024-04-11 PROCEDURE — C9113 INJ PANTOPRAZOLE SODIUM, VIA: HCPCS

## 2024-04-11 PROCEDURE — 85027 COMPLETE CBC AUTOMATED: CPT | Performed by: SURGERY

## 2024-04-11 PROCEDURE — 2500000005 HC RX 250 GENERAL PHARMACY W/O HCPCS

## 2024-04-11 RX ORDER — IPRATROPIUM BROMIDE AND ALBUTEROL SULFATE 2.5; .5 MG/3ML; MG/3ML
3 SOLUTION RESPIRATORY (INHALATION) EVERY 4 HOURS PRN
Status: DISCONTINUED | OUTPATIENT
Start: 2024-04-11 | End: 2024-04-16 | Stop reason: HOSPADM

## 2024-04-11 RX ORDER — DIGOXIN 0.25 MG/ML
125 INJECTION INTRAMUSCULAR; INTRAVENOUS ONCE
Status: COMPLETED | OUTPATIENT
Start: 2024-04-11 | End: 2024-04-11

## 2024-04-11 RX ORDER — HEPARIN SODIUM 5000 [USP'U]/ML
5000 INJECTION, SOLUTION INTRAVENOUS; SUBCUTANEOUS EVERY 8 HOURS
Status: DISCONTINUED | OUTPATIENT
Start: 2024-04-11 | End: 2024-04-13

## 2024-04-11 RX ORDER — MIDODRINE HYDROCHLORIDE 2.5 MG/1
5 TABLET ORAL EVERY 8 HOURS
Status: DISCONTINUED | OUTPATIENT
Start: 2024-04-11 | End: 2024-04-12

## 2024-04-11 RX ADMIN — MORPHINE SULFATE 4 MG: 4 INJECTION, SOLUTION INTRAMUSCULAR; INTRAVENOUS at 20:08

## 2024-04-11 RX ADMIN — MORPHINE SULFATE 2 MG: 2 INJECTION, SOLUTION INTRAMUSCULAR; INTRAVENOUS at 12:30

## 2024-04-11 RX ADMIN — HYDROCORTISONE SODIUM SUCCINATE 75 MG: 100 INJECTION, POWDER, FOR SOLUTION INTRAMUSCULAR; INTRAVENOUS at 12:29

## 2024-04-11 RX ADMIN — HYDROCORTISONE SODIUM SUCCINATE 75 MG: 100 INJECTION, POWDER, FOR SOLUTION INTRAMUSCULAR; INTRAVENOUS at 18:10

## 2024-04-11 RX ADMIN — HEPARIN SODIUM 5000 UNITS: 5000 INJECTION INTRAVENOUS; SUBCUTANEOUS at 23:27

## 2024-04-11 RX ADMIN — HEPARIN SODIUM 5000 UNITS: 5000 INJECTION INTRAVENOUS; SUBCUTANEOUS at 10:14

## 2024-04-11 RX ADMIN — MORPHINE SULFATE 2 MG: 2 INJECTION, SOLUTION INTRAMUSCULAR; INTRAVENOUS at 00:28

## 2024-04-11 RX ADMIN — PHENYLEPHRINE HYDROCHLORIDE 0.5 MCG/KG/MIN: 10 INJECTION INTRAVENOUS at 09:18

## 2024-04-11 RX ADMIN — HEPARIN SODIUM 5000 UNITS: 5000 INJECTION INTRAVENOUS; SUBCUTANEOUS at 18:09

## 2024-04-11 RX ADMIN — SODIUM CHLORIDE, POTASSIUM CHLORIDE, SODIUM LACTATE AND CALCIUM CHLORIDE 100 ML/HR: 600; 310; 30; 20 INJECTION, SOLUTION INTRAVENOUS at 05:20

## 2024-04-11 RX ADMIN — PIPERACILLIN SODIUM AND TAZOBACTAM SODIUM 3.38 G: 3; .375 INJECTION, SOLUTION INTRAVENOUS at 23:27

## 2024-04-11 RX ADMIN — PHENYLEPHRINE HYDROCHLORIDE 0.5 MCG/KG/MIN: 10 INJECTION INTRAVENOUS at 03:14

## 2024-04-11 RX ADMIN — METOPROLOL TARTRATE 5 MG: 5 INJECTION INTRAVENOUS at 08:41

## 2024-04-11 RX ADMIN — MORPHINE SULFATE 2 MG: 2 INJECTION, SOLUTION INTRAMUSCULAR; INTRAVENOUS at 15:58

## 2024-04-11 RX ADMIN — SODIUM CHLORIDE, POTASSIUM CHLORIDE, SODIUM LACTATE AND CALCIUM CHLORIDE 150 ML/HR: 600; 310; 30; 20 INJECTION, SOLUTION INTRAVENOUS at 20:15

## 2024-04-11 RX ADMIN — PHENYLEPHRINE HYDROCHLORIDE 0.5 MCG/KG/MIN: 10 INJECTION INTRAVENOUS at 20:48

## 2024-04-11 RX ADMIN — PIPERACILLIN SODIUM AND TAZOBACTAM SODIUM 3.38 G: 3; .375 INJECTION, SOLUTION INTRAVENOUS at 05:19

## 2024-04-11 RX ADMIN — MORPHINE SULFATE 4 MG: 4 INJECTION, SOLUTION INTRAMUSCULAR; INTRAVENOUS at 03:34

## 2024-04-11 RX ADMIN — MIDODRINE HYDROCHLORIDE 5 MG: 2.5 TABLET ORAL at 10:14

## 2024-04-11 RX ADMIN — MORPHINE SULFATE 2 MG: 2 INJECTION, SOLUTION INTRAMUSCULAR; INTRAVENOUS at 09:22

## 2024-04-11 RX ADMIN — LIDOCAINE 4% 2 PATCH: 40 PATCH TOPICAL at 10:14

## 2024-04-11 RX ADMIN — HYDROCORTISONE SODIUM SUCCINATE 75 MG: 100 INJECTION, POWDER, FOR SOLUTION INTRAMUSCULAR; INTRAVENOUS at 23:26

## 2024-04-11 RX ADMIN — MIDODRINE HYDROCHLORIDE 5 MG: 2.5 TABLET ORAL at 18:10

## 2024-04-11 RX ADMIN — PIPERACILLIN SODIUM AND TAZOBACTAM SODIUM 3.38 G: 3; .375 INJECTION, SOLUTION INTRAVENOUS at 12:30

## 2024-04-11 RX ADMIN — HYDROCORTISONE SODIUM SUCCINATE 100 MG: 100 INJECTION, POWDER, FOR SOLUTION INTRAMUSCULAR; INTRAVENOUS at 05:20

## 2024-04-11 RX ADMIN — MORPHINE SULFATE 4 MG: 4 INJECTION, SOLUTION INTRAMUSCULAR; INTRAVENOUS at 06:34

## 2024-04-11 RX ADMIN — DIGOXIN 125 MCG: 0.25 INJECTION INTRAMUSCULAR; INTRAVENOUS at 14:39

## 2024-04-11 RX ADMIN — SODIUM CHLORIDE, POTASSIUM CHLORIDE, SODIUM LACTATE AND CALCIUM CHLORIDE 150 ML/HR: 600; 310; 30; 20 INJECTION, SOLUTION INTRAVENOUS at 13:12

## 2024-04-11 RX ADMIN — PANTOPRAZOLE SODIUM 40 MG: 40 INJECTION, POWDER, FOR SOLUTION INTRAVENOUS at 07:46

## 2024-04-11 RX ADMIN — PIPERACILLIN SODIUM AND TAZOBACTAM SODIUM 3.38 G: 3; .375 INJECTION, SOLUTION INTRAVENOUS at 18:10

## 2024-04-11 RX ADMIN — PHENYLEPHRINE HYDROCHLORIDE 0.5 MCG/KG/MIN: 10 INJECTION INTRAVENOUS at 11:24

## 2024-04-11 ASSESSMENT — COGNITIVE AND FUNCTIONAL STATUS - GENERAL
DAILY ACTIVITIY SCORE: 24
DAILY ACTIVITIY SCORE: 24
MOBILITY SCORE: 24
MOBILITY SCORE: 24

## 2024-04-11 ASSESSMENT — ENCOUNTER SYMPTOMS
SHORTNESS OF BREATH: 0
FEVER: 0
HEADACHES: 0
JOINT SWELLING: 0
CHILLS: 0
CHEST TIGHTNESS: 0
ABDOMINAL PAIN: 0
NAUSEA: 0

## 2024-04-11 ASSESSMENT — PAIN SCALES - GENERAL
PAINLEVEL_OUTOF10: 0 - NO PAIN
PAINLEVEL_OUTOF10: 6
PAINLEVEL_OUTOF10: 8
PAINLEVEL_OUTOF10: 8
PAINLEVEL_OUTOF10: 6
PAINLEVEL_OUTOF10: 2
PAINLEVEL_OUTOF10: 0 - NO PAIN
PAIN_LEVEL: 2
PAINLEVEL_OUTOF10: 0 - NO PAIN
PAINLEVEL_OUTOF10: 6
PAINLEVEL_OUTOF10: 3
PAINLEVEL_OUTOF10: 5 - MODERATE PAIN
PAINLEVEL_OUTOF10: 4
PAINLEVEL_OUTOF10: 2
PAINLEVEL_OUTOF10: 8

## 2024-04-11 ASSESSMENT — PAIN DESCRIPTION - LOCATION
LOCATION: ABDOMEN

## 2024-04-11 NOTE — PROGRESS NOTES
Subjective Data:  Feeling subjectively well, however continues to remain hypotensive and requiring pressors.  No chest pain/pressure, SOB, LE edema, orthopnea.  Tele:  Afib HR 80's - does have some RVR when standing/walking.    ROS:  The remainder of the review of systems was obtained, as was negative as pertains to the chief complaint.    Overnight Events:    As above     Objective Data:  Last Recorded Vitals:  Vitals:    04/11/24 0730 04/11/24 0800 04/11/24 0825 04/11/24 0827   BP: 101/69 81/61 (!) 64/48 96/63   BP Location:  Right arm Right arm Right arm   Patient Position:  Lying Standing Sitting   Pulse: 89 76     Resp: 10 12     Temp:       TempSrc:       SpO2: 100% 98%     Weight:       Height:           Last Labs:  CBC - 4/11/2024:  4:34 AM  21.8 10.3 129    31.0      CMP - 4/11/2024:  4:34 AM  7.8 6.9 11 --- 0.6   CANCELED 3.7 13 46      PTT - 9/12/2023:  7:46 AM  1.8   20.7 32     TROPHS   Date/Time Value Ref Range Status   04/09/2024 04:51 PM 22 0 - 20 ng/L Final   12/18/2023 12:31 PM 34 0 - 53 ng/L Final   12/18/2023 11:12 AM 30 0 - 53 ng/L Final     HGBA1C   Date/Time Value Ref Range Status   02/27/2024 12:21 PM 6.1 see below % Final   08/08/2023 04:31 AM 7.3 % Final     Comment:          Diagnosis of Diabetes-Adults   Non-Diabetic: < or = 5.6%   Increased risk for developing diabetes: 5.7-6.4%   Diagnostic of diabetes: > or = 6.5%  .       Monitoring of Diabetes                Age (y)     Therapeutic Goal (%)   Adults:          >18           <7.0   Pediatrics:    13-18           <7.5                   7-12           <8.0                   0- 6            7.5-8.5   American Diabetes Association. Diabetes Care 33(S1), Jan 2010.       LDLCALC   Date/Time Value Ref Range Status   10/23/2023 09:08 AM 74 <=99 mg/dL Final     Comment:                                 Near   Borderline      AGE      Desirable  Optimal    High     High     Very High     0-19 Y     0 - 109     ---    110-129   >/= 130     ----     20-24 Y     0 - 119     ---    120-159   >/= 160     ----      >24 Y     0 -  99   100-129  130-159   160-189     >/=190       VLDL   Date/Time Value Ref Range Status   10/23/2023 09:08 AM 24 0 - 40 mg/dL Final   04/01/2023 08:05 AM 46 0 - 40 mg/dL Final   07/28/2022 08:24 AM 41 0 - 40 mg/dL Final   08/14/2021 08:44 AM 31 0 - 40 mg/dL Final      Last I/O:  I/O last 3 completed shifts:  In: 8834.7 (118.6 mL/kg) [I.V.:6104.7 (81.9 mL/kg); NG/GT:50; IV Piggyback:2680]  Out: 3050 (40.9 mL/kg) [Urine:1505 (0.6 mL/kg/hr); Emesis/NG output:1525; Blood:20]  Weight: 74.5 kg     Past Cardiology Tests (Last 3 Years):  EKG:  ECG 12 lead 04/09/2024 (Preliminary)      ECG 12 Lead 04/09/2024      ECG 12 lead 12/18/2023    Echo:  Transthoracic echo (TTE) complete 03/26/2024    Ejection Fractions:  EF   Date/Time Value Ref Range Status   03/26/2024 01:26 PM 58 %      Cath:  No results found for this or any previous visit from the past 1095 days.    Stress Test:  No results found for this or any previous visit from the past 1095 days.    Cardiac Imaging:  No results found for this or any previous visit from the past 1095 days.      Inpatient Medications:  Scheduled medications   Medication Dose Route Frequency    [Held by provider] digoxin  125 mcg oral Daily    pantoprazole  40 mg oral Daily before breakfast    Or    esomeprazole  40 mg nasoduodenal tube Daily before breakfast    Or    pantoprazole  40 mg intravenous Daily before breakfast    heparin (porcine)  5,000 Units subcutaneous q8h    hydrocortisone sodium succinate  75 mg intravenous q6h    lidocaine  2 patch transdermal Daily    piperacillin-tazobactam  3.375 g intravenous q6h     PRN medications   Medication    benzocaine    ipratropium-albuteroL    metoprolol    morphine    morphine    ondansetron ODT    Or    ondansetron    tiZANidine     Continuous Medications   Medication Dose Last Rate    lactated Ringer's  150 mL/hr 150 mL/hr (04/11/24 0834)    phenylephrine  0.1-2  mcg/kg/min Stopped (04/11/24 3765)       Physical Exam:  HENT:      Head: Normocephalic.      Mouth/Throat:      Mouth: Mucous membranes are moist.   Eyes:      Extraocular Movements: Extraocular movements intact.   Cardiovascular:      Rate and Rhythm: Normal rate. Rhythm irregular.   Pulmonary:      Effort: Pulmonary effort is normal.   Abdominal:      Palpations: Abdomen is soft.   Genitourinary:     Comments: S/p R inguinal hernia open repair  Musculoskeletal:      Cervical back: Neck supple.   Skin:     General: Skin is warm.   Neurological:      General: No focal deficit present.      Mental Status: He is alert.   Psychiatric:      Comments: appropriate affect           Assessment/Plan   Incarcerated R inguinal hernia s/p open repair of hernia / exlap / small bowel resection on 4/9/24  NICM with recovery of EF from 20% > 55-60%  paroxysmal Afib on apixaban  Lung CA s/p CTX/XRT in 12/23  mild nonobst CAD  ascending aortic aneurysm (measured 4.4 cm on CT 3/2021)  former tobacco use     Still hypotensive post-op, possibly from post op / post anesthesia vasoplegia, blood loss, etc.    Due to ongoing hypotension, will check limited TTE to reevaluate LV fcn/wall motion.  Some Afib with RVR with walking, but heart rates well controlled at rest.     Recs:  -check limited TTE  -would also evaluate for other causes of hypotension, such as sepsis  -ok to reinitiate digoxin  -hold other outpatient CV medications for now  -use norepinephrine as needed for MAPs < 60  -normal EF - can tolerate some IVF administration with goal euvolemia  -can also use low doses of IV metoprolol (2.5 - 5mg IV)  -once BP improves, will gradually reinitiate home CV meds  -reinitiate apixaban when possible from a surgical perspective    Peripheral IV 04/09/24 20 G Right Forearm (Active)   Site Assessment Clean;Dry;Intact 04/10/24 2100   Dressing Status Clean;Dry;Occlusive 04/10/24 2100   Number of days: 2       Peripheral IV 04/09/24 20 G  Right;Posterior Wrist (Active)   Site Assessment Clean;Dry;Intact 04/10/24 2100   Dressing Status Clean;Dry;Occlusive 04/10/24 2100   Number of days: 2       Code Status:  Full Code    I spent 30 minutes of critical care time in the professional and overall care of this patient.        Leann Rosario MD

## 2024-04-11 NOTE — PROGRESS NOTES
Kevin Rubi is a 68 y.o. male on day 2 of admission presenting with Incarcerated right inguinal hernia.      Subjective   Kevin Rubi is a 68 y.o. 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 Patient was in his normal state of health until about two days ago when he started experiencing right groin and abdominal pain. Also with nausea and non-bloody emesis. Originally went to a cardiology appointment today but found to be hypotensive so sent to the ED. In the ED, found to have an incarcerated ventral hernia so taken to surgery for emergent surgical reduction with bowel resection. Hospitalist service consulted for medication management. Currently pain controlled post-op. NGT left in place. Denies SOB or LE edema. Wants numbing spray for throat. BP low-nl.     4/10- No acute events overnight. Pt doing well after surgery. Critical care also following.  4/11: No acute events overnight.  NG tube was removed.  Patient started clear liquids.  Other than abdominal pain at the site of the surgery patient is overall doing well.  He denies any nausea, vomiting.  He denies any fevers or chills.  We will continue to follow with you blood pressure remains low.  Patient denies any dizziness, palpitations or chest pain.  Patient will likely require at least another 24 to 48 hours of inpatient service.       Objective     Last Recorded Vitals  BP 79/50   Pulse 57   Temp 36.4 °C (97.5 °F)   Resp 20   Wt 74.5 kg (164 lb 3.9 oz)   SpO2 99%   Intake/Output last 3 Shifts:    Intake/Output Summary (Last 24 hours) at 4/11/2024 1459  Last data filed at 4/11/2024 1300  Gross per 24 hour   Intake 3931.5 ml   Output 1060 ml   Net 2871.5 ml       Admission Weight  Weight: 69.9 kg (154 lb)  (04/09/24 1419)    Daily Weight  04/11/24 : 74.5 kg (164 lb 3.9 oz)    Image Results  Transthoracic Echo (TTE) Limited                [place] [id number]       Phone [phone] Fax [phone]    TRANSTHORACIC ECHOCARDIOGRAM REPORT       Patient Name:     VON KELLY   Reading Physician:   [id number] Freddie Weldon DO  Study Date:       4/11/2024            Ordering Provider:   [id number] PHUC HAHN  MRN/PID:          [id number]             Fellow:  Accession#:       [id number]         Nurse:  Date of           1955 / 68      Sonographer:         Kellee Marcial OSWALD  Birth/Age:        years  Gender:           M                    Additional Staff:  Height:           182.88 cm            Admit Date:          4/9/2024  Weight:           70.76 kg             Admission Status:    Inpatient - Routine  BSA / BMI:        1.92 m2 / 21.16      Department Location: [facility]                    kg/m2  Blood Pressure: 95 /62 mmHg    Study Type:    TRANSTHORACIC ECHO (TTE) LIMITED  Diagnosis/ICD: Postprocedural hypotension-I95.81  Indication:    Hypotension  CPT Codes:     Echo Limited-66242    Patient History:  Pertinent History: Chest Pain, CAD and Hyperlipidemia.    Study Detail: The following Echo studies were performed: 2D, M-Mode, Doppler and                color flow.       PHYSICIAN INTERPRETATION:  Left Ventricle: Left ventricular systolic function is normal, with an estimated ejection fraction of 60-65%. There are no regional wall motion abnormalities. The left ventricular cavity size is normal. The left ventricular septal wall thickness is normal. There is normal left ventricular posterior wall thickness. Left ventricular diastolic filling was indeterminate.  Left Atrium: The left atrium was not assessed.  Right Ventricle: The right ventricle was not assessed. There is normal right ventricular global  systolic function.  Right Atrium: The right atrium was not assessed.  Aortic Valve: The aortic valve was not assessed. Aortic valve regurgitation was not assessed.  Mitral Valve: The mitral valve was not assessed. Mitral valve regurgitation was not assessed.  Tricuspid Valve: The tricuspid valve was not assessed. Tricuspid regurgitation was not assessed.  Pulmonic Valve: The pulmonic valve was not assessed. The pulmonic valve regurgitation was not assessed.  Pericardium: Pericardial effusion was not assessed.  Aorta: The aortic root was not assessed.       CONCLUSIONS:   1. Left ventricular systolic function is normal with a 60-65% estimated ejection fraction.    QUANTITATIVE DATA SUMMARY:  2D MEASUREMENTS:                           Normal Ranges:  IVSd:          1.05 cm   (0.6-1.1cm)  LVPWd:         1.00 cm   (0.6-1.1cm)  LVIDd:         4.35 cm   (3.9-5.9cm)  LVIDs:         2.60 cm  LV Mass Index: 82.5 g/m2  LV % FS        40.2 %    LA VOLUME:                                Normal Ranges:  LA Vol A4C:        66.8 ml    (22+/-6mL/m2)  LA Vol A2C:        52.6 ml  LA Vol BP:         62.4 ml  LA Vol Index A4C:  34.9ml/m2  LA Vol Index A2C:  27.5 ml/m2  LA Vol Index BP:   32.6 ml/m2  LA Area A4C:       24.2 cm2  LA Area A2C:       20.4 cm2  LA Major Axis A4C: 7.4 cm  LA Major Axis A2C: 6.7 cm  LA Volume Index:   31.4 ml/m2    LV SYSTOLIC FUNCTION BY 2D PLANIMETRY (MOD):                      Normal Ranges:  EF-A4C View: 61.4 % (>=55%)  EF-A2C View: 66.6 %  EF-Biplane:  64.0 %    LV DIASTOLIC FUNCTION:                         Normal Ranges:  MV Peak E:    1.24 m/s (0.7-1.2 m/s)  MV e'         0.10 m/s (>8.0)  MV lateral e' 0.10 m/s  MV medial e'  0.10 m/s  E/e' Ratio:   12.40    (<8.0)    MITRAL VALVE:                  Normal Ranges:  MV DT: 201 msec (150-240msec)    TRICUSPID VALVE/RVSP:                              Normal Ranges:  Peak TR Velocity: 2.70 m/s  RV Syst Pressure: 32.2 mmHg (< 30mmHg)       [id number] Freddie Weldon  DO  Electronically signed on 4/11/2024 at 12:20:44 PM       ** Final **      Physical Exam  CONSTITUTIONAL - alert, frail , comfortable  CHEST -  decreased air entry bilaterally, no obvious wheeze or crackles  CARDIAC - regular rate and regular rhythm, no murmur  ABDOMEN -slight abdominal tenderness over site of surgery  EXTREMITIES - no edema, no deformities  NEUROLOGICAL - alert, oriented x3 and no acute focal signs  PSYCHIATRIC - alert, pleasant and cordial, age-appropriate    Relevant Results               Assessment/Plan   This patient currently has cardiac telemetry ordered; if you would like to modify or discontinue the telemetry order, click here to go to the orders activity to modify/discontinue the order.    ASSESSMENT     male with hx of lung cancer s/p chemo/radiation/immunotherapy c/b pneumonitis on prednisone, afib on Eliquis, SMA occlusion s/p stenting, 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     PLAN     Incarcerated Hernia s/p Surgical Reduction w/ Bowel Resection  -Presented with abd pain, nausea, and vomiting and found to have incarcerated hernia s/p emergent surgical reduction with bowel resection  -Post-op with low-nl Bps but otherwise doing well  -Critical care and Surgery managing  4/11: Now on clear liquid diet.  Critical care recommendations appreciated.     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  -Currently on RA  -On pred taper, current dose 70mg  -Will transition to hydrocortisone IV 75mg q6 given patient NPO and for mineralocorticoid effect for stress dosing  -Should probably try to expedite steroid taper for wound healing purposes but will defer this to pulmonology     Chronic HFrEF EF 20% (EF now recovered) 2/2 NICM  -2/2 NICM (alcohol) last EF recovered at 55%  -No signs of  decompensated CHF  -Hold home GDMT (Entresto, metop, Aldactone) for tonight given low Bps      Other Issues  -Permanent Atrial Fibrillation: Rates controlled, continue dig but hold oral metop given low-nl BP, hold Eliquis for tonight  -Non-onst CAD: Home Statin. Hold Plavix for tonight  -Lung cancer s/p chemo/radiation/immunotherapy: Follow-up as an outpatient      DVT Prophy  -SCDs, hold home Eliquis for tonight                 Malnutrition Diagnosis Status: New  Malnutrition Diagnosis: Severe malnutrition related to chronic disease or condition  As Evidenced by: moderate to severe muscle/fat loss per NFPE, and PO intake <75% for >1 month  I agree with the dietitian's malnutrition diagnosis.         Sweta Jefferson MD

## 2024-04-11 NOTE — PROGRESS NOTES
GENERAL SURGERY PROGRESS NOTE    Kevin Rubi   1955   [id number]     Kevin Rubi is a 68 y.o. male  with history of stage IIIC LLL lung cancer s/p chemo/RT, recent pneumonitis on prednisone daily, HTN, A-fib on Eliquis, ascending aortic aneurysm, HFpEF with EF 60%, hx SMA occlusion s/p stenting, PUD, HLD on day 2 of admission presenting with Incarcerated right inguinal hernia.    Open repair of right inguinal hernia, Exploratory  laparotomy,small  bowel resection on 4/9/24, 2 Days Post-Op    Subjective  Was weaned off pressors yesterday. Had episode of hypotension requiring phenylephrine for 2 hours, currently off pressors now. Otherwise doing well with minimal complaints, having some abdominal incisional pain. Denies nausea. Has been passing flatus.     Review of Systems:  Review of Systems   Constitutional:  Negative for chills and fever.   Respiratory:  Negative for chest tightness and shortness of breath.    Cardiovascular:  Negative for chest pain.   Gastrointestinal:  Negative for abdominal pain and nausea.   Genitourinary:  Negative for decreased urine volume.   Musculoskeletal:  Negative for joint swelling.   Skin:  Negative for rash.   Neurological:  Negative for headaches.       Objective    Last Recorded Vitals  Blood pressure 101/69, pulse 89, temperature 36.4 °C (97.5 °F), resp. rate 10, height 1.829 m (6'), weight 74.5 kg (164 lb 3.9 oz), SpO2 100%.    Intake/Output last 3 Shifts:  I/O last 3 completed shifts:  In: 8834.7 (118.6 mL/kg) [I.V.:6104.7 (81.9 mL/kg); NG/GT:50; IV Piggyback:2680]  Out: 3050 (40.9 mL/kg) [Urine:1505 (0.6 mL/kg/hr); Emesis/NG output:1525; Blood:20]  Weight: 74.5 kg     Intake/Output Summary (Last 24 hours) at 4/11/2024 0812  Last data filed at 4/11/2024 0754  Gross per 24 hour   Intake 4241.5 ml   Output 1320 ml   Net 2921.5 ml       Physical Exam  Vitals reviewed.   Constitutional:       General: He is not in acute distress.  HENT:      Head: Normocephalic  and atraumatic.      Nose:      Comments: NG tube in place with 400cc of gastric non-bilious output in canister. NG tube functioning.   Eyes:      Conjunctiva/sclera: Conjunctivae normal.   Cardiovascular:      Rate and Rhythm: Normal rate and regular rhythm.      Pulses: Normal pulses.   Pulmonary:      Effort: Pulmonary effort is normal. No respiratory distress.   Abdominal:      General: There is no distension.      Palpations: Abdomen is soft.      Comments: Appropriately tender near incisions. Dressings removed, incisions well intact without erythema or drainage   Genitourinary:     Comments: Bower in place draining clear yellow urine  Musculoskeletal:         General: No swelling.      Cervical back: Neck supple.   Skin:     General: Skin is warm and dry.   Neurological:      Mental Status: He is alert and oriented to person, place, and time.         Relevant Results  Labs:  Results for orders placed or performed during the hospital encounter of 04/09/24 (from the past 24 hour(s))   CBC   Result Value Ref Range    WBC 21.8 (H) 4.4 - 11.3 x10*3/uL    nRBC 0.0 0.0 - 0.0 /100 WBCs    RBC 3.80 (L) 4.50 - 5.90 x10*6/uL    Hemoglobin 10.3 (L) 13.5 - 17.5 g/dL    Hematocrit 31.0 (L) 41.0 - 52.0 %    MCV 82 80 - 100 fL    MCH 27.1 26.0 - 34.0 pg    MCHC 33.2 32.0 - 36.0 g/dL    RDW 15.9 (H) 11.5 - 14.5 %    Platelets 129 (L) 150 - 450 x10*3/uL   Basic Metabolic Panel   Result Value Ref Range    Glucose 191 (H) 74 - 99 mg/dL    Sodium 133 (L) 136 - 145 mmol/L    Potassium 3.8 3.5 - 5.3 mmol/L    Chloride 100 98 - 107 mmol/L    Bicarbonate 24 21 - 32 mmol/L    Anion Gap 13 10 - 20 mmol/L    Urea Nitrogen 30 (H) 6 - 23 mg/dL    Creatinine 0.97 0.50 - 1.30 mg/dL    eGFR 86 >60 mL/min/1.73m*2    Calcium 7.8 (L) 8.6 - 10.3 mg/dL   Magnesium   Result Value Ref Range    Magnesium 1.88 1.60 - 2.40 mg/dL       Images:  CT abdomen pelvis w IV contrast   Final Result   1.  Multiple loops of dilated small bowel consistent with a  small   bowel obstruction.  There is a loop of small bowel located within a   right inguinal hernia likely incarcerated and acting as a transition   point for the obstruction.   2.  Ill-defined consolidative changes within the left lower lobe which   may represent a possible pneumonitis.   3.  Findings consistent with hepatic steatosis.   4.  No abdominal/pelvic fluid collections or pneumoperitoneum.   5.  Colonic diverticulosis without definite evidence of   diverticulitis.   6.  Stable mild prostatomegaly..   Result was given to Dr. Christiano Flanagan on 4/9/2024 at 1936.   Signed by Ward Tong MD          Assessment and Plan  Principal Problem:    Incarcerated right inguinal hernia    68 y.o. male with with history of stage IIIC LLL lung cancer s/p chemo/RT, recent pneumonitis on prednisone daily, HTN, A-fib on Eliquis, ascending aortic aneurysm, HFpEF with EF 60% who presented with incarcerated right inguinal hernia now s/p exploratory laparotomy, small bowel resection, and right inguinal hernia repair on 4/9/24. Intermittently requiring pressors, otherwise dong well.    Plan:  -Currently pain controlled: continue IV tylenol and IV morphine as needed  -Required some pressors overnight, currently off, hx HFpEF and a-fib. Cardiology consulted, follow up recommendations for restarting home medications  -Encourage IS use. Hx pneumonitis, on steroids at baseline, received stress dose in OR. Currently on Solu-cortef 100mg q6hr. Pulmonology consulted, planning on taper Hx lung cancer, oncology consulted, ok with quick steroid taper  -DC NG tube, ok for sips of clears, monitor bowel function  -Discontinue stewart, void trial. Replace electrolytes as needed. IVF  -Perioperative abx: IV zosyn off today. Will follow up on intra-operative cultures  -SCDs, start SQH today  -IMS and CCM consulted, appreciate recommendations  -Continue ICU cares until off pressors for 24 hours    Discussed with attending   Blaine Haskins, DO - PGY3  General Surgery

## 2024-04-11 NOTE — ANESTHESIA POSTPROCEDURE EVALUATION
Patient: Kevin Rubi    Procedure Summary       Date: 04/09/24 Room / Location: [facility] OR 01 / Virtual [facility]    Anesthesia Start: 1911 Anesthesia Stop: 2210    Procedure: Open repair of right inguinal hernia, Exploratory  laparotomy,small  bowel resection (Right) Diagnosis:       Incarcerated right inguinal hernia      (Incarcerated right inguinal hernia [K40.30])    Surgeons: Vale Valladares MD Responsible Provider: JONATHAN Wong    Anesthesia Type: general ASA Status: 3            Anesthesia Type: general    Vitals Value Taken Time   /58 04/09/24 2241   Temp 36.4 °C (97.5 °F) 04/09/24 2158   Pulse 117 04/09/24 2243   Resp 11 04/09/24 2243   SpO2 96 % 04/09/24 2243   Vitals shown include unfiled device data.    Anesthesia Post Evaluation    Patient location during evaluation: bedside  Patient participation: complete - patient participated  Level of consciousness: awake  Pain score: 2  Pain management: adequate  Airway patency: patent  Cardiovascular status: acceptable  Respiratory status: acceptable  Hydration status: acceptable  Postoperative Nausea and Vomiting: none    There were no known notable events for this encounter.

## 2024-04-11 NOTE — PROGRESS NOTES
Kevin Rubi is a 68 y.o. male on day 2 of admission presenting with Incarcerated right inguinal hernia.    Subjective   Patient is comfortably laying in bed. He is feeling better and pain is tolerable. His BP remained good overnight but early in AM his BP dropped again on sitting up needing Phenylephrine again. He apparently has a history of labile BP. He denies cough, SOB, chest pain, expectoration, headaches.     Later on having him sit up in the chair he went into a.fib rvr needing metoprolol to which he responded well.     Objective     Physical Exam  Vitals and nursing note reviewed.   Constitutional:       Appearance: Normal appearance.   HENT:      Head: Normocephalic.      Nose: Nose normal.      Mouth/Throat:      Pharynx: Oropharynx is clear.   Eyes:      Extraocular Movements: Extraocular movements intact.      Conjunctiva/sclera: Conjunctivae normal.      Pupils: Pupils are equal, round, and reactive to light.   Cardiovascular:      Rate and Rhythm: Normal rate and regular rhythm.      Pulses: Normal pulses.      Heart sounds: Normal heart sounds.   Pulmonary:      Effort: Pulmonary effort is normal.      Breath sounds: Normal breath sounds.   Musculoskeletal:         General: Normal range of motion.      Cervical back: Normal range of motion.   Skin:     General: Skin is warm.   Neurological:      General: No focal deficit present.      Mental Status: He is alert and oriented to person, place, and time. Mental status is at baseline.   Psychiatric:         Mood and Affect: Mood normal.         Behavior: Behavior normal.       Last Recorded Vitals  Blood pressure 89/55, pulse 108, temperature 36.7 °C (98.1 °F), temperature source Temporal, resp. rate 23, height 1.829 m (6'), weight 74.5 kg (164 lb 3.9 oz), SpO2 96%.  Intake/Output last 3 Shifts:  I/O last 3 completed shifts:  In: 8834.7 (118.6 mL/kg) [I.V.:6104.7 (81.9 mL/kg); NG/GT:50; IV Piggyback:2680]  Out: 3050 (40.9 mL/kg) [Urine:1505 (0.6  mL/kg/hr); Emesis/NG output:1525; Blood:20]  Weight: 74.5 kg     Relevant Results  Results for orders placed or performed during the hospital encounter of 04/09/24 (from the past 24 hour(s))   CBC   Result Value Ref Range    WBC 21.8 (H) 4.4 - 11.3 x10*3/uL    nRBC 0.0 0.0 - 0.0 /100 WBCs    RBC 3.80 (L) 4.50 - 5.90 x10*6/uL    Hemoglobin 10.3 (L) 13.5 - 17.5 g/dL    Hematocrit 31.0 (L) 41.0 - 52.0 %    MCV 82 80 - 100 fL    MCH 27.1 26.0 - 34.0 pg    MCHC 33.2 32.0 - 36.0 g/dL    RDW 15.9 (H) 11.5 - 14.5 %    Platelets 129 (L) 150 - 450 x10*3/uL   Basic Metabolic Panel   Result Value Ref Range    Glucose 191 (H) 74 - 99 mg/dL    Sodium 133 (L) 136 - 145 mmol/L    Potassium 3.8 3.5 - 5.3 mmol/L    Chloride 100 98 - 107 mmol/L    Bicarbonate 24 21 - 32 mmol/L    Anion Gap 13 10 - 20 mmol/L    Urea Nitrogen 30 (H) 6 - 23 mg/dL    Creatinine 0.97 0.50 - 1.30 mg/dL    eGFR 86 >60 mL/min/1.73m*2    Calcium 7.8 (L) 8.6 - 10.3 mg/dL   Magnesium   Result Value Ref Range    Magnesium 1.88 1.60 - 2.40 mg/dL         Assessment/Plan   Principal Problem:    Incarcerated right inguinal hernia    Hypotension  Incarcerated Hernia S/p Open hernia repair and small bowel segmental resection POD #2  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 BP labile. Does respond to IVF. Hypotension episode again due to ?a.fib RVR. BP better now but requiring low dose phenylephrine. Continue IVF and start Midodrine 5 mg PO TID. Wean off phenylephrine keeping MAP >60.   Discussed with bedside JENNIFER Singleton.  S/p hernia repair.  Patient is n.p.o. with NG tube to suction.  Surgery has allowed oral intake and NG tube is removed today.  Continue judicious pain management. Removed stewart. Advance activity as  tolerated.   Nonischemic cardiomyopathy with EF 20% initially and now improved to 60%.  Patient has been on Entresto, metoprolol and spironolactone.  Will consult cardiology to further evaluate if he can have modification of his cardiac regimen.  Recommend discontinue spironolactone.  Continue to hold cardiac medications until patient's blood pressure improves. Recommend restart digoxin and metoprolol prn.   History of atrial fibrillation.  Patient is on digoxin, metoprolol and Eliquis. Resume digoxin and keep prn metoprolol. Patient did receive Kcentra prior to surgery. Eliquis to remain withheld for a few more days per surgery. Continue DVT px in the meanwhile.  Continue home dose of pantoprazole.  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  Patient has been on prednisone 70 mg daily which has recently been cut down to 60 mg daily.  Patient is currently n.p.o. and will continue hydrocortisone at this time at the dose of 75 mg IV every 6 with gradual reduction and then transition to oral prednisone.  Continue current empiric antibiotic therapy with IV Zosyn.     IV fluid can be weaned off as oral intake improves.     I discussed case with patient's spouse at bedside as well.  Discussed case with Dr. Valladares and Dr. Rosario.  Case was discussed on multidisciplinary rounds.      I spent 35 minutes of critical care time in the professional and overall care of this patient including direct care & coordination of care.      Wisam Latham MD

## 2024-04-12 ENCOUNTER — TELEPHONE (OUTPATIENT)
Dept: ADMISSION | Facility: HOSPITAL | Age: 69
End: 2024-04-12
Payer: COMMERCIAL

## 2024-04-12 LAB
ANION GAP SERPL CALC-SCNC: 10 MMOL/L (ref 10–20)
ANION GAP SERPL CALC-SCNC: 9 MMOL/L (ref 10–20)
BACTERIA SPEC CULT: NORMAL
BUN SERPL-MCNC: 24 MG/DL (ref 6–23)
CALCIUM SERPL-MCNC: 6.8 MG/DL (ref 8.6–10.3)
CHLORIDE SERPL-SCNC: 105 MMOL/L (ref 98–107)
CHLORIDE SERPL-SCNC: 99 MMOL/L (ref 98–107)
CO2 SERPL-SCNC: 22 MMOL/L (ref 21–32)
CO2 SERPL-SCNC: 25 MMOL/L (ref 21–32)
CREAT SERPL-MCNC: 0.83 MG/DL (ref 0.5–1.3)
EGFRCR SERPLBLD CKD-EPI 2021: >90 ML/MIN/1.73M*2
ERYTHROCYTE [DISTWIDTH] IN BLOOD BY AUTOMATED COUNT: 15.9 % (ref 11.5–14.5)
GLUCOSE SERPL-MCNC: 153 MG/DL (ref 74–99)
GRAM STN SPEC: NORMAL
GRAM STN SPEC: NORMAL
HCT VFR BLD AUTO: 24.7 % (ref 41–52)
HGB BLD-MCNC: 8.2 G/DL (ref 13.5–17.5)
MAGNESIUM SERPL-MCNC: 1.68 MG/DL (ref 1.6–2.4)
MCH RBC QN AUTO: 27.2 PG (ref 26–34)
MCHC RBC AUTO-ENTMCNC: 33.2 G/DL (ref 32–36)
MCV RBC AUTO: 82 FL (ref 80–100)
NRBC BLD-RTO: 0 /100 WBCS (ref 0–0)
PLATELET # BLD AUTO: 107 X10*3/UL (ref 150–450)
POTASSIUM SERPL-SCNC: 3.3 MMOL/L (ref 3.5–5.3)
POTASSIUM SERPL-SCNC: 3.8 MMOL/L (ref 3.5–5.3)
RBC # BLD AUTO: 3.02 X10*6/UL (ref 4.5–5.9)
SODIUM SERPL-SCNC: 130 MMOL/L (ref 136–145)
SODIUM SERPL-SCNC: 133 MMOL/L (ref 136–145)
WBC # BLD AUTO: 9.8 X10*3/UL (ref 4.4–11.3)

## 2024-04-12 PROCEDURE — 2500000005 HC RX 250 GENERAL PHARMACY W/O HCPCS

## 2024-04-12 PROCEDURE — C9113 INJ PANTOPRAZOLE SODIUM, VIA: HCPCS

## 2024-04-12 PROCEDURE — 2500000001 HC RX 250 WO HCPCS SELF ADMINISTERED DRUGS (ALT 637 FOR MEDICARE OP): Performed by: SURGERY

## 2024-04-12 PROCEDURE — 97165 OT EVAL LOW COMPLEX 30 MIN: CPT | Mod: GO

## 2024-04-12 PROCEDURE — 99291 CRITICAL CARE FIRST HOUR: CPT | Performed by: INTERNAL MEDICINE

## 2024-04-12 PROCEDURE — 2500000004 HC RX 250 GENERAL PHARMACY W/ HCPCS (ALT 636 FOR OP/ED): Performed by: SURGERY

## 2024-04-12 PROCEDURE — 36415 COLL VENOUS BLD VENIPUNCTURE: CPT | Performed by: SURGERY

## 2024-04-12 PROCEDURE — 2500000001 HC RX 250 WO HCPCS SELF ADMINISTERED DRUGS (ALT 637 FOR MEDICARE OP): Performed by: INTERNAL MEDICINE

## 2024-04-12 PROCEDURE — 80048 BASIC METABOLIC PNL TOTAL CA: CPT | Performed by: SURGERY

## 2024-04-12 PROCEDURE — 36415 COLL VENOUS BLD VENIPUNCTURE: CPT | Performed by: INTERNAL MEDICINE

## 2024-04-12 PROCEDURE — 83735 ASSAY OF MAGNESIUM: CPT | Performed by: SURGERY

## 2024-04-12 PROCEDURE — 2500000004 HC RX 250 GENERAL PHARMACY W/ HCPCS (ALT 636 FOR OP/ED): Performed by: INTERNAL MEDICINE

## 2024-04-12 PROCEDURE — 2500000002 HC RX 250 W HCPCS SELF ADMINISTERED DRUGS (ALT 637 FOR MEDICARE OP, ALT 636 FOR OP/ED): Mod: MUE | Performed by: INTERNAL MEDICINE

## 2024-04-12 PROCEDURE — 2020000001 HC ICU ROOM DAILY

## 2024-04-12 PROCEDURE — 99232 SBSQ HOSP IP/OBS MODERATE 35: CPT | Performed by: INTERNAL MEDICINE

## 2024-04-12 PROCEDURE — 80051 ELECTROLYTE PANEL: CPT | Performed by: INTERNAL MEDICINE

## 2024-04-12 PROCEDURE — 2500000004 HC RX 250 GENERAL PHARMACY W/ HCPCS (ALT 636 FOR OP/ED)

## 2024-04-12 PROCEDURE — 99024 POSTOP FOLLOW-UP VISIT: CPT | Performed by: SURGERY

## 2024-04-12 PROCEDURE — 85027 COMPLETE CBC AUTOMATED: CPT | Performed by: SURGERY

## 2024-04-12 PROCEDURE — 97161 PT EVAL LOW COMPLEX 20 MIN: CPT | Mod: GP | Performed by: PHYSICAL THERAPIST

## 2024-04-12 RX ORDER — MORPHINE SULFATE 2 MG/ML
2 INJECTION, SOLUTION INTRAMUSCULAR; INTRAVENOUS EVERY 2 HOUR PRN
Status: DISCONTINUED | OUTPATIENT
Start: 2024-04-12 | End: 2024-04-13

## 2024-04-12 RX ORDER — PREDNISONE 20 MG/1
30 TABLET ORAL 2 TIMES DAILY
Status: CANCELLED | OUTPATIENT
Start: 2024-04-12

## 2024-04-12 RX ORDER — OXYCODONE HYDROCHLORIDE 5 MG/1
5 TABLET ORAL EVERY 4 HOURS PRN
Status: DISCONTINUED | OUTPATIENT
Start: 2024-04-12 | End: 2024-04-16 | Stop reason: HOSPADM

## 2024-04-12 RX ORDER — MAGNESIUM SULFATE HEPTAHYDRATE 40 MG/ML
2 INJECTION, SOLUTION INTRAVENOUS ONCE
Status: COMPLETED | OUTPATIENT
Start: 2024-04-12 | End: 2024-04-12

## 2024-04-12 RX ORDER — ACETAMINOPHEN 325 MG/1
650 TABLET ORAL EVERY 6 HOURS
Status: DISCONTINUED | OUTPATIENT
Start: 2024-04-12 | End: 2024-04-16 | Stop reason: HOSPADM

## 2024-04-12 RX ORDER — MIDODRINE HYDROCHLORIDE 10 MG/1
10 TABLET ORAL 3 TIMES DAILY
Status: DISCONTINUED | OUTPATIENT
Start: 2024-04-12 | End: 2024-04-13

## 2024-04-12 RX ORDER — MORPHINE SULFATE 4 MG/ML
4 INJECTION INTRAVENOUS EVERY 2 HOUR PRN
Status: DISCONTINUED | OUTPATIENT
Start: 2024-04-12 | End: 2024-04-13

## 2024-04-12 RX ORDER — OXYCODONE HYDROCHLORIDE 10 MG/1
10 TABLET ORAL EVERY 4 HOURS PRN
Status: DISCONTINUED | OUTPATIENT
Start: 2024-04-12 | End: 2024-04-16 | Stop reason: HOSPADM

## 2024-04-12 RX ORDER — POTASSIUM CHLORIDE 750 MG/1
10 TABLET, FILM COATED, EXTENDED RELEASE ORAL DAILY
Status: DISCONTINUED | OUTPATIENT
Start: 2024-04-12 | End: 2024-04-13

## 2024-04-12 RX ORDER — ESMOLOL HYDROCHLORIDE 10 MG/ML
INJECTION INTRAVENOUS AS NEEDED
Status: DISCONTINUED | OUTPATIENT
Start: 2024-04-09 | End: 2024-04-12

## 2024-04-12 RX ORDER — POTASSIUM CHLORIDE 14.9 MG/ML
20 INJECTION INTRAVENOUS
Status: COMPLETED | OUTPATIENT
Start: 2024-04-12 | End: 2024-04-12

## 2024-04-12 RX ADMIN — HEPARIN SODIUM 5000 UNITS: 5000 INJECTION INTRAVENOUS; SUBCUTANEOUS at 16:28

## 2024-04-12 RX ADMIN — METOPROLOL TARTRATE 5 MG: 5 INJECTION INTRAVENOUS at 03:41

## 2024-04-12 RX ADMIN — HEPARIN SODIUM 5000 UNITS: 5000 INJECTION INTRAVENOUS; SUBCUTANEOUS at 23:46

## 2024-04-12 RX ADMIN — SODIUM CHLORIDE, POTASSIUM CHLORIDE, SODIUM LACTATE AND CALCIUM CHLORIDE 50 ML/HR: 600; 310; 30; 20 INJECTION, SOLUTION INTRAVENOUS at 21:02

## 2024-04-12 RX ADMIN — ONDANSETRON 4 MG: 2 INJECTION INTRAMUSCULAR; INTRAVENOUS at 02:17

## 2024-04-12 RX ADMIN — OXYCODONE HYDROCHLORIDE 10 MG: 10 TABLET ORAL at 11:55

## 2024-04-12 RX ADMIN — PREDNISONE 30 MG: 20 TABLET ORAL at 20:04

## 2024-04-12 RX ADMIN — DIGOXIN 125 MCG: 125 TABLET ORAL at 08:07

## 2024-04-12 RX ADMIN — MORPHINE SULFATE 4 MG: 4 INJECTION, SOLUTION INTRAMUSCULAR; INTRAVENOUS at 02:17

## 2024-04-12 RX ADMIN — PANTOPRAZOLE SODIUM 40 MG: 40 INJECTION, POWDER, FOR SOLUTION INTRAVENOUS at 05:13

## 2024-04-12 RX ADMIN — MORPHINE SULFATE 4 MG: 4 INJECTION, SOLUTION INTRAMUSCULAR; INTRAVENOUS at 14:57

## 2024-04-12 RX ADMIN — ACETAMINOPHEN 650 MG: 325 TABLET ORAL at 16:28

## 2024-04-12 RX ADMIN — MIDODRINE HYDROCHLORIDE 10 MG: 10 TABLET ORAL at 14:36

## 2024-04-12 RX ADMIN — POTASSIUM CHLORIDE 20 MEQ: 14.9 INJECTION, SOLUTION INTRAVENOUS at 07:50

## 2024-04-12 RX ADMIN — MIDODRINE HYDROCHLORIDE 5 MG: 2.5 TABLET ORAL at 02:17

## 2024-04-12 RX ADMIN — MIDODRINE HYDROCHLORIDE 10 MG: 10 TABLET ORAL at 20:04

## 2024-04-12 RX ADMIN — MIDODRINE HYDROCHLORIDE 10 MG: 10 TABLET ORAL at 08:06

## 2024-04-12 RX ADMIN — PIPERACILLIN SODIUM AND TAZOBACTAM SODIUM 3.38 G: 3; .375 INJECTION, SOLUTION INTRAVENOUS at 05:13

## 2024-04-12 RX ADMIN — HYDROCORTISONE SODIUM SUCCINATE 50 MG: 100 INJECTION, POWDER, FOR SOLUTION INTRAMUSCULAR; INTRAVENOUS at 11:52

## 2024-04-12 RX ADMIN — SODIUM CHLORIDE, POTASSIUM CHLORIDE, SODIUM LACTATE AND CALCIUM CHLORIDE 150 ML/HR: 600; 310; 30; 20 INJECTION, SOLUTION INTRAVENOUS at 02:17

## 2024-04-12 RX ADMIN — ACETAMINOPHEN 650 MG: 325 TABLET ORAL at 11:52

## 2024-04-12 RX ADMIN — MAGNESIUM SULFATE HEPTAHYDRATE 2 G: 40 INJECTION, SOLUTION INTRAVENOUS at 07:50

## 2024-04-12 RX ADMIN — HEPARIN SODIUM 5000 UNITS: 5000 INJECTION INTRAVENOUS; SUBCUTANEOUS at 07:51

## 2024-04-12 RX ADMIN — MORPHINE SULFATE 4 MG: 4 INJECTION, SOLUTION INTRAMUSCULAR; INTRAVENOUS at 08:06

## 2024-04-12 RX ADMIN — OXYCODONE HYDROCHLORIDE 10 MG: 10 TABLET ORAL at 18:19

## 2024-04-12 RX ADMIN — LIDOCAINE 4% 2 PATCH: 40 PATCH TOPICAL at 08:07

## 2024-04-12 RX ADMIN — HYDROCORTISONE SODIUM SUCCINATE 75 MG: 100 INJECTION, POWDER, FOR SOLUTION INTRAMUSCULAR; INTRAVENOUS at 05:13

## 2024-04-12 RX ADMIN — POTASSIUM CHLORIDE 10 MEQ: 750 TABLET, FILM COATED, EXTENDED RELEASE ORAL at 14:58

## 2024-04-12 RX ADMIN — OXYCODONE HYDROCHLORIDE 10 MG: 10 TABLET ORAL at 22:00

## 2024-04-12 RX ADMIN — ACETAMINOPHEN 650 MG: 325 TABLET ORAL at 21:02

## 2024-04-12 RX ADMIN — POTASSIUM CHLORIDE 20 MEQ: 14.9 INJECTION, SOLUTION INTRAVENOUS at 09:43

## 2024-04-12 ASSESSMENT — COGNITIVE AND FUNCTIONAL STATUS - GENERAL
DAILY ACTIVITIY SCORE: 16
CLIMB 3 TO 5 STEPS WITH RAILING: A LITTLE
DRESSING REGULAR LOWER BODY CLOTHING: A LOT
HELP NEEDED FOR BATHING: A LOT
MOVING FROM LYING ON BACK TO SITTING ON SIDE OF FLAT BED WITH BEDRAILS: A LITTLE
MOVING TO AND FROM BED TO CHAIR: A LITTLE
PERSONAL GROOMING: A LITTLE
MOBILITY SCORE: 18
STANDING UP FROM CHAIR USING ARMS: A LITTLE
DRESSING REGULAR UPPER BODY CLOTHING: A LITTLE
WALKING IN HOSPITAL ROOM: A LITTLE
TURNING FROM BACK TO SIDE WHILE IN FLAT BAD: A LITTLE
EATING MEALS: A LITTLE
TOILETING: A LITTLE

## 2024-04-12 ASSESSMENT — PAIN - FUNCTIONAL ASSESSMENT
PAIN_FUNCTIONAL_ASSESSMENT: 0-10

## 2024-04-12 ASSESSMENT — PAIN DESCRIPTION - ORIENTATION
ORIENTATION: MID

## 2024-04-12 ASSESSMENT — PAIN SCALES - GENERAL
PAINLEVEL_OUTOF10: 0 - NO PAIN
PAINLEVEL_OUTOF10: 8
PAINLEVEL_OUTOF10: 8
PAINLEVEL_OUTOF10: 7
PAINLEVEL_OUTOF10: 5 - MODERATE PAIN
PAINLEVEL_OUTOF10: 8
PAINLEVEL_OUTOF10: 8
PAINLEVEL_OUTOF10: 5 - MODERATE PAIN
PAINLEVEL_OUTOF10: 8
PAINLEVEL_OUTOF10: 0 - NO PAIN
PAINLEVEL_OUTOF10: 5 - MODERATE PAIN
PAINLEVEL_OUTOF10: 8
PAINLEVEL_OUTOF10: 8

## 2024-04-12 ASSESSMENT — PAIN DESCRIPTION - LOCATION
LOCATION: ABDOMEN

## 2024-04-12 ASSESSMENT — ENCOUNTER SYMPTOMS
NAUSEA: 0
FEVER: 0
SHORTNESS OF BREATH: 0
HEADACHES: 0
CHILLS: 0
CHEST TIGHTNESS: 0
JOINT SWELLING: 0
ABDOMINAL PAIN: 0

## 2024-04-12 ASSESSMENT — ACTIVITIES OF DAILY LIVING (ADL)
ADL_ASSISTANCE: INDEPENDENT
BATHING_ASSISTANCE: MODERATE

## 2024-04-12 ASSESSMENT — PAIN DESCRIPTION - DESCRIPTORS: DESCRIPTORS: ACHING

## 2024-04-12 NOTE — PROGRESS NOTES
Physical Therapy    Physical Therapy Evaluation    Patient Name: Kvein Rubi  MRN: [id number]  Today's Date: 4/12/2024   Time Calculation  Start Time: 1450  Stop Time: 1501  Time Calculation (min): 11 min    Assessment/Plan   PT Assessment  PT Assessment Results: Decreased strength, Decreased endurance, Impaired balance, Decreased mobility, Pain  Rehab Prognosis: Good  Barriers to Discharge: none  Evaluation/Treatment Tolerance: Patient limited by fatigue, Patient limited by pain  Medical Staff Made Aware: Yes  End of Session Communication: Bedside nurse  Assessment Comment: Patient requires only SBA/CGA for mobility, anticipate good improvement over course of admit  End of Session Patient Position: Bed, 3 rail up, Alarm off, not on at start of session (sitting EOB)  IP OR SWING BED PT PLAN  Inpatient or Swing Bed: Inpatient  PT Plan  Treatment/Interventions: Bed mobility, Transfer training, Gait training, Stair training, Balance training, Endurance training, Therapeutic exercise, Therapeutic activity, Home exercise program  PT Plan: Skilled PT  PT Frequency: 2 times per week  PT Discharge Recommendations: Low intensity level of continued care (prn)  PT - OK to Discharge: Yes (when medically cleared)      General Visit Information:  General  Reason for Referral: Dx: Incarcerated hernia s/p repair, small bowel resection and ex lap  Referred By: Blaine  Past Medical History Relevant to Rehab: lung CA, HTN, A fib, AAA, HF  Prior to Session Communication: Bedside nurse  Patient Position Received: Bed, 3 rail up, Alarm off, not on at start of session (partially sitting EOB)  General Comment: patient seen in room 1014, tele; abd binder    Home Living:  Home Living  Type of Home:  (Lives with wife in 1 level home with 5 steps to enter. [place] amb without AD, independent with ADLs, normally active and wants to get back to being more active at home.)    Prior Level of Function:        Precautions:  Precautions  Precautions Comment: falls, abd precautions    Vital Signs:     Objective     Pain:  Pain Assessment  Pain Score: 8 (abd - RN aware)    Cognition:  Cognition  Overall Cognitive Status:  (Oriented toperson, place, time, situation; however occasionally veers off topic when answering questions)    General Assessments:  General Observation  General Observation: Patient with mild SOB/fatigue during amb, took brief standing rest half way before returning to bedside   Activity Tolerance  Endurance: Tolerates 10 - 20 min exercise with multiple rests     Strength  Strength Comments: KAREN LE quads grossly 4+/5        Postural Control  Postural Control:  (Sitting balance fair+; standing balance fair (mildly unsteady occasionally but no overt LOB))          Functional Assessments:     Bed Mobility  Bed Mobility:  (partially sidelying to sit with SBA x1)  Transfers  Transfer:  (sit to stand with SBA/CGA x1)  Ambulation/Gait Training  Ambulation/Gait Training Performed:  (Amb 5 feet x 2 with CGA x1; cues for safety, sequencing, balance, posture)          Extremity/Trunk Assessments:                Outcome Measures:  [facility] Basic Mobility  Turning from your back to your side while in a flat bed without using bedrails: A little  Moving from lying on your back to sitting on the side of a flat bed without using bedrails: A little  Moving to and from bed to chair (including a wheelchair): A little  Standing up from a chair using your arms (e.g. wheelchair or bedside chair): A little  To walk in hospital room: A little  Climbing 3-5 steps with railing: A little  Basic Mobility - Total Score: 18                            Goals:  Encounter Problems       Encounter Problems (Active)       PT Problem       transfers       Start:  04/12/24    Expected End:  04/26/24       Patient will perform all transfers with SBA x1          gait       Start:  04/12/24    Expected End:  04/26/24       Patient will amb 100+  feet with SBA x1               Education Documentation  Precautions, taught by Maria Victoria Boyd, PT at 4/12/2024  3:33 PM.  Learner: Patient  Readiness: Acceptance  Method: Explanation  Response: Verbalizes Understanding    Mobility Training, taught by Maria Victoria Boyd, PT at 4/12/2024  3:33 PM.  Learner: Patient  Readiness: Acceptance  Method: Explanation  Response: Verbalizes Understanding    Education Comments  No comments found.

## 2024-04-12 NOTE — TELEPHONE ENCOUNTER
Patients wife calls to let you know that he had surgery on April 9th and is currently in the ICU.      Has currently follow up 5/1.    She is wanting to update team.    Message sent to team    Team responded they are aware and monitoring patients status

## 2024-04-12 NOTE — PROGRESS NOTES
GENERAL SURGERY PROGRESS NOTE    Kevin Rubi   1955   [id number]     Kevin Rubi is a 68 y.o. male  with history of stage IIIC LLL lung cancer s/p chemo/RT, recent pneumonitis on prednisone daily, HTN, A-fib on Eliquis, ascending aortic aneurysm, HFpEF with EF 60%, hx SMA occlusion s/p stenting, PUD, HLD on day 3 of admission presenting with Incarcerated right inguinal hernia.    Open repair of right inguinal hernia, Exploratory  laparotomy,small  bowel resection on 4/9/24, 3 Days Post-Op    Subjective  No acute events overnight. Patient reports feeling improved. Bower came out yesterday and has been able to void. Tolerated sips of clears, no nausea. Is passing flatus. Reports feeling bored.     Review of Systems:  Review of Systems   Constitutional:  Negative for chills and fever.   Respiratory:  Negative for chest tightness and shortness of breath.    Cardiovascular:  Negative for chest pain.   Gastrointestinal:  Negative for abdominal pain and nausea.   Genitourinary:  Negative for decreased urine volume.   Musculoskeletal:  Negative for joint swelling.   Skin:  Negative for rash.   Neurological:  Negative for headaches.       Objective    Last Recorded Vitals  Blood pressure 100/58, pulse 55, temperature 36.8 °C (98.2 °F), temperature source Temporal, resp. rate 11, height 1.829 m (6'), weight 74.5 kg (164 lb 3.9 oz), SpO2 96%.    Intake/Output last 3 Shifts:  I/O last 3 completed shifts:  In: 6390.3 (85.8 mL/kg) [P.O.:1400; I.V.:4690.3 (63 mL/kg); IV Piggyback:300]  Out: 1255 (16.8 mL/kg) [Urine:1255 (0.5 mL/kg/hr)]  Weight: 74.5 kg     Intake/Output Summary (Last 24 hours) at 4/12/2024 0727  Last data filed at 4/12/2024 0400  Gross per 24 hour   Intake 5028.78 ml   Output 775 ml   Net 4253.78 ml       Physical Exam  Vitals reviewed.   Constitutional:       General: He is not in acute distress.  HENT:      Head: Normocephalic and atraumatic.   Eyes:      Conjunctiva/sclera: Conjunctivae  normal.   Cardiovascular:      Rate and Rhythm: Normal rate and regular rhythm.      Pulses: Normal pulses.   Pulmonary:      Effort: Pulmonary effort is normal. No respiratory distress.   Abdominal:      General: There is no distension.      Palpations: Abdomen is soft.      Comments: Appropriately tender near incisions. Dressings removed, incisions well intact, some ecchymosis of the midline incision.    Musculoskeletal:         General: No swelling.      Cervical back: Neck supple.   Skin:     General: Skin is warm and dry.   Neurological:      Mental Status: He is alert and oriented to person, place, and time.         Relevant Results  Labs:  Results for orders placed or performed during the hospital encounter of 04/09/24 (from the past 24 hour(s))   Transthoracic Echo (TTE) Limited   Result Value Ref Range    LV Biplane EF 64 %    MV avg E/e' ratio 12.40     LA vol index A/L 32.6 ml/m2    RVSP 32.2 mmHg    LVIDd 4.35 cm    LV A4C EF 61.4    CBC   Result Value Ref Range    WBC 9.8 4.4 - 11.3 x10*3/uL    nRBC 0.0 0.0 - 0.0 /100 WBCs    RBC 3.02 (L) 4.50 - 5.90 x10*6/uL    Hemoglobin 8.2 (L) 13.5 - 17.5 g/dL    Hematocrit 24.7 (L) 41.0 - 52.0 %    MCV 82 80 - 100 fL    MCH 27.2 26.0 - 34.0 pg    MCHC 33.2 32.0 - 36.0 g/dL    RDW 15.9 (H) 11.5 - 14.5 %    Platelets 107 (L) 150 - 450 x10*3/uL   Basic Metabolic Panel   Result Value Ref Range    Glucose 153 (H) 74 - 99 mg/dL    Sodium 133 (L) 136 - 145 mmol/L    Potassium 3.3 (L) 3.5 - 5.3 mmol/L    Chloride 105 98 - 107 mmol/L    Bicarbonate 22 21 - 32 mmol/L    Anion Gap 9 (L) 10 - 20 mmol/L    Urea Nitrogen 24 (H) 6 - 23 mg/dL    Creatinine 0.83 0.50 - 1.30 mg/dL    eGFR >90 >60 mL/min/1.73m*2    Calcium 6.8 (L) 8.6 - 10.3 mg/dL   Magnesium   Result Value Ref Range    Magnesium 1.68 1.60 - 2.40 mg/dL       Images:  Transthoracic Echo (TTE) Limited   Final Result      CT abdomen pelvis w IV contrast   Final Result   1.  Multiple loops of dilated small bowel  consistent with a small   bowel obstruction.  There is a loop of small bowel located within a   right inguinal hernia likely incarcerated and acting as a transition   point for the obstruction.   2.  Ill-defined consolidative changes within the left lower lobe which   may represent a possible pneumonitis.   3.  Findings consistent with hepatic steatosis.   4.  No abdominal/pelvic fluid collections or pneumoperitoneum.   5.  Colonic diverticulosis without definite evidence of   diverticulitis.   6.  Stable mild prostatomegaly..   Result was given to Dr. Christiano Flanagan on 4/9/2024 at 1936.   Signed by Ward Tong MD          Assessment and Plan  Principal Problem:    Incarcerated right inguinal hernia    68 y.o. male with with history of stage IIIC LLL lung cancer s/p chemo/RT, recent pneumonitis on prednisone daily, HTN, A-fib on Eliquis, ascending aortic aneurysm, HFpEF with EF 60% who presented with incarcerated right inguinal hernia now s/p exploratory laparotomy, small bowel resection, and right inguinal hernia repair on 4/9/24. Doing expectantly.    Plan:  -Currently pain controlled: continue IV tylenol and IV morphine as needed. Will transition to oral when tolerating a diet  -Currently off pressors, started on midodrine. hx HFpEF and a-fib. Cardiology consulted, follow up recommendations  -Encourage IS use. Hx pneumonitis, on steroids at baseline, received stress dose in OR. Currently on Solu-cortef 75mg q6hr. Pulmonology currently managing wean  -Advance to FLD, monitor bowel function  -Replace electrolytes as needed. IVF  -Will follow up on intra-operative cultures, NGTD  -SCDs, SQH. Possibly resume Eliquis tomorrow. Continue to hold Plavix  -IMS and CCM consulted, appreciate recommendations  -Continue ICU cares, possible TTF later today vs tomorrow    Discussed with attending Dr. Blaine Haskins, DO - PGY3  General Surgery

## 2024-04-12 NOTE — PROGRESS NOTES
Kevin Rubi is a 68 y.o. male on day 3 of admission presenting with Incarcerated right inguinal hernia.    Subjective   Patient is comfortably sitting up in bed. He is feeling much better and pain is tolerable. His BP is less labile and has not needed Phenylephrine still last night which was apparently restarted x 2 hrs. He denies cough, SOB, chest pain, expectoration, headaches.     Objective     Physical Exam  Vitals and nursing note reviewed.   Constitutional:       Appearance: Normal appearance.   HENT:      Head: Normocephalic.      Nose: Nose normal.      Mouth/Throat:      Pharynx: Oropharynx is clear.   Eyes:      Extraocular Movements: Extraocular movements intact.      Conjunctiva/sclera: Conjunctivae normal.      Pupils: Pupils are equal, round, and reactive to light.   Cardiovascular:      Rate and Rhythm: Normal rate and regular rhythm.      Pulses: Normal pulses.      Heart sounds: Normal heart sounds.   Pulmonary:      Effort: Pulmonary effort is normal.      Breath sounds: Normal breath sounds.   Musculoskeletal:         General: Normal range of motion.      Cervical back: Normal range of motion.   Skin:     General: Skin is warm.   Neurological:      General: No focal deficit present.      Mental Status: He is alert and oriented to person, place, and time. Mental status is at baseline.   Psychiatric:         Mood and Affect: Mood normal.         Behavior: Behavior normal.         Last Recorded Vitals  Blood pressure 114/58, pulse 73, temperature 36.5 °C (97.7 °F), resp. rate 12, height 1.829 m (6'), weight 74.5 kg (164 lb 3.9 oz), SpO2 (!) 82%.  Intake/Output last 3 Shifts:  I/O last 3 completed shifts:  In: 6390.3 (85.8 mL/kg) [P.O.:1400; I.V.:4690.3 (63 mL/kg); IV Piggyback:300]  Out: 1255 (16.8 mL/kg) [Urine:1255 (0.5 mL/kg/hr)]  Weight: 74.5 kg     Relevant Results  Results for orders placed or performed during the hospital encounter of 04/09/24 (from the past 24 hour(s))   CBC   Result  Value Ref Range    WBC 9.8 4.4 - 11.3 x10*3/uL    nRBC 0.0 0.0 - 0.0 /100 WBCs    RBC 3.02 (L) 4.50 - 5.90 x10*6/uL    Hemoglobin 8.2 (L) 13.5 - 17.5 g/dL    Hematocrit 24.7 (L) 41.0 - 52.0 %    MCV 82 80 - 100 fL    MCH 27.2 26.0 - 34.0 pg    MCHC 33.2 32.0 - 36.0 g/dL    RDW 15.9 (H) 11.5 - 14.5 %    Platelets 107 (L) 150 - 450 x10*3/uL   Basic Metabolic Panel   Result Value Ref Range    Glucose 153 (H) 74 - 99 mg/dL    Sodium 133 (L) 136 - 145 mmol/L    Potassium 3.3 (L) 3.5 - 5.3 mmol/L    Chloride 105 98 - 107 mmol/L    Bicarbonate 22 21 - 32 mmol/L    Anion Gap 9 (L) 10 - 20 mmol/L    Urea Nitrogen 24 (H) 6 - 23 mg/dL    Creatinine 0.83 0.50 - 1.30 mg/dL    eGFR >90 >60 mL/min/1.73m*2    Calcium 6.8 (L) 8.6 - 10.3 mg/dL   Magnesium   Result Value Ref Range    Magnesium 1.68 1.60 - 2.40 mg/dL         Assessment/Plan   Principal Problem:    Incarcerated right inguinal hernia    Hypotension  Incarcerated Hernia S/p Open hernia repair and small bowel segmental resection POD #3  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 Reduce IVF and increase Midodrine 10 mg PO TID. Weaned off phenylephrine. Monitor hemodynamics in the ICU.  S/p hernia repair.  Advance diet and activity as tolerated.   Nonischemic cardiomyopathy with EF 20% initially and now improved to 60%.  Patient has been on Entresto, metoprolol and spironolactone.  D/w Dr. Rosario, digoxin started. Consider starting short acting metoprolol. Plan to dc entresto and spironolactone on discharge altogether and follow up in cardiology office.   History of atrial fibrillation. Continue digoxin. Surgery to decide if eliquis can be resumed tomorrow AM.  Continue home dose of pantoprazole.  Patient has history of  developing interstitial pneumonitis secondary to immunotherapy for lung cancer Imfinzi.  Patient has been on prednisone 70 mg daily which has recently been cut down to 60 mg daily.  DC hydrocortisone and resume oral prednisone at 30 mg PO BID and at the time of discharge recommend discharging on Prednisone 60 mg daily again to follow up with Dr. Longoria with oncology.  Ok to dc empiric antibiotic therapy with IV Zosyn.     IV fluid can be stopped.     I discussed case with patient's spouse at bedside as well.  Discussed case with Dr. Valladares and Dr. Rosario.  Case was discussed on multidisciplinary rounds.    I spent 32 minutes of critical care time in the professional and overall care of this patient including direct care & coordination of care.    Anticipate transfer to SDU over the weekend. Consider starting Eliquis when cleared by surgery.     Wisam Latham MD

## 2024-04-12 NOTE — PROGRESS NOTES
Occupational Therapy    Evaluation    Patient Name: Kevin Rubi  MRN: [id number]  Today's Date: 4/12/2024  Time Calculation  Start Time: 1451  Stop Time: 1501  Time Calculation (min): 10 min    Assessment  IP OT Assessment  OT Assessment: OT eval completed. The patient is functioning below baseline for ADLs and mobility. can benefit from continued OT  End of Session Communication: Bedside nurse  End of Session Patient Position: Bed, 3 rail up, Alarm off, not on at start of session    Plan:  Treatment Interventions: ADL retraining, Functional transfer training, UE strengthening/ROM, Endurance training, Cognitive reorientation, Equipment evaluation/education, Neuromuscular reeducation  OT Frequency: 3 times per week  OT Discharge Recommendations: Low intensity level of continued care  OT - OK to Discharge: Yes To next level of care, with consideration of recommendations established on OT eval, and once cleared by medical team/physician for DC.     Subjective     Current Problem:  1. Incarcerated right inguinal hernia  Case Request Operating Room: Open repair of right inguinal hernia, possible laparotomy, possible bowel resection    Case Request Operating Room: Open repair of right inguinal hernia, possible laparotomy, possible bowel resection    Surgical Pathology Exam    Surgical Pathology Exam      2. Incarcerated inguinal hernia        3. Postoperative hypotension  Transthoracic Echo (TTE) Limited    Transthoracic Echo (TTE) Limited          General:  General  Reason for Referral: ADLs, safety assessment  Referred By: Fanny  Past Medical History Relevant to Rehab: lung CA, HTN, A fib, AAA, HF  Co-Treatment: PT  Co-Treatment Reason: to optimize safety and mobility, while focusing on discipline specific goals  Prior to Session Communication: Bedside nurse  Patient Position Received: Bed, 3 rail up, Alarm off, not on at start of session  General Comment: pt alert and agreeable to  therapy    Precautions:  Medical Precautions: Abdominal precautions  Post-Surgical Precautions: Abdominal surgery precautions        Pain:  Pain Assessment  Pain Assessment: 0-10  Pain Score: 8  Pain Type: Acute pain, Surgical pain  Pain Location: Abdomen  Pain Orientation: Mid  Pain Interventions: Repositioned, Compression, Distraction, Emotional support    Objective     Cognition:  Overall Cognitive Status: Within Functional Limits (except does not answer questions directly and responds off topic. required redirection to task)  Orientation Level: Oriented X4  Processing Speed: Delayed             Home Living:  Type of Home: House  Lives With: Spouse  Home Adaptive Equipment: None  Home Layout: One level  Home Access: Stairs to enter with rails  Entrance Stairs-Number of Steps: 5     Prior Function:  Receives Help From: Family  ADL Assistance: Independent  Homemaking Assistance: Independent  Ambulatory Assistance: Independent  Vocational: Retired (x1 year)  Prior Function Comments: +drives        ADL:  Eating Assistance: Stand by (anticipate)  Grooming Assistance: Minimal (anticipated)  Bathing Assistance: Moderate (anticipate)  UE Dressing Assistance: Minimal (anticipate)  LE Dressing Assistance: Moderate (anticipate)  Toileting Assistance with Device: Minimal (anticipate)    Activity Tolerance:  Endurance: Endurance does not limit participation in activity    Bed Mobility/Transfers:   Bed Mobility  Bed Mobility: Yes  Bed Mobility 1  Bed Mobility 1: Supine to sitting  Level of Assistance 1: Close supervision (pt partially sitting eob when therapy entered)  Transfers  Transfer:  (sit<>stand CGA)    Ambulation/Gait Training:  Functional Mobility  Functional Mobility Performed:  (pt performed functional mobility a few feet in room with CGAx1. reaching for furniture. appears stiff and unsteady)    Sitting Balance:  Static Sitting Balance  Static Sitting-Level of Assistance: Close supervision  Dynamic Sitting  Balance  Dynamic Sitting-Comments: supervision    Standing Balance:  Static Standing Balance  Static Standing-Comment/Number of Minutes: CGA  Dynamic Standing Balance  Dynamic Standing-Comments: CGA    Vision: Vision - Basic Assessment  Current Vision: No visual deficits   and      Sensation:  Light Touch: No apparent deficits    Strength:  Strength Comments: BUE grossly 4/5    Perception:  Inattention/Neglect: Appears intact    Coordination:  Movements are Fluid and Coordinated: Yes     Hand Function:  Hand Function  Gross Grasp: Functional    Extremities: RUE   RUE : Within Functional Limits and LUE   LUE: Within Functional Limits    Outcome Measures: [facility] Daily Activity  Putting on and taking off regular lower body clothing: A lot  Bathing (including washing, rinsing, drying): A lot  Putting on and taking off regular upper body clothing: A little  Toileting, which includes using toilet, bedpan or urinal: A little  Taking care of personal grooming such as brushing teeth: A little  Eating Meals: A little  Daily Activity - Total Score: 16                    EDUCATION:     Education Documentation  ADL Training, taught by Bekah Juarez OT at 4/12/2024  3:55 PM.  Learner: Patient  Readiness: Acceptance  Method: Explanation  Response: Needs Reinforcement    Education Comments  No comments found.        Goals:   Encounter Problems       Encounter Problems (Active)       ADLs       Patient with complete lower body dressing with modified independent level of assistance donning and doffing all LE clothes  with PRN adaptive equipment while supported sitting and standing (Progressing)       Start:  04/12/24    Expected End:  04/26/24               MOBILITY       Patient will perform Functional mobility max Household distances/Community Distances with modified independent level of assistance and least restrictive device in order to improve safety and functional mobility. (Progressing)       Start:  04/12/24    Expected End:   04/26/24

## 2024-04-12 NOTE — ADDENDUM NOTE
Addendum  created 04/12/24 1056 by JONATHAN Wong    Intraprocedure Meds edited, Orders acknowledged in Narrator

## 2024-04-12 NOTE — PROGRESS NOTES
Kevin Rubi is a 68 y.o. male on day 3 of admission presenting with Incarcerated right inguinal hernia.      Subjective   Kevin Rubi is a 68 y.o. 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 Patient was in his normal state of health until about two days ago when he started experiencing right groin and abdominal pain. Also with nausea and non-bloody emesis. Originally went to a cardiology appointment today but found to be hypotensive so sent to the ED. In the ED, found to have an incarcerated ventral hernia so taken to surgery for emergent surgical reduction with bowel resection. Hospitalist service consulted for medication management. Currently pain controlled post-op. NGT left in place. Denies SOB or LE edema. Wants numbing spray for throat. BP low-nl.     4/10- No acute events overnight. Pt doing well after surgery. Critical care also following.  4/11: No acute events overnight.  NG tube was removed.  Patient started clear liquids.  Other than abdominal pain at the site of the surgery patient is overall doing well.  He denies any nausea, vomiting.  He denies any fevers or chills.  We will continue to follow with you blood pressure remains low.  Patient denies any dizziness, palpitations or chest pain.  Patient will likely require at least another 24 to 48 hours of inpatient service.  4/12: No acute events overnight. Patient started clear liquids yesterday and tolerated them well. Will start a fill liquid diet today. Patient has been having hyptension over the pass few days, he is now off pressors and was started on midodrine. Cardio was consulted and is following. Possible TTF later today or tomorrow.     ROS  Gen: negative for chills and fever, alert, sitting  up  HEENT: negative for headache, rhinorrhea, congestion  Cardio: negative for chest pain or palpitations  Resp: negative for shortness of breath, chest tightness or cough  Abd/GI: negative for abdominal pain, nausea, vomiting  : negative for urinary changes  MSK: negative for joint pain or swelling  Skin: negative for rash  Neuro: negative for memory changes or concentration       Objective     Last Recorded Vitals  /58   Pulse 55   Temp 36.8 °C (98.2 °F) (Temporal)   Resp 11   Wt 74.5 kg (164 lb 3.9 oz)   SpO2 96%   Intake/Output last 3 Shifts:    Intake/Output Summary (Last 24 hours) at 4/12/2024 0716  Last data filed at 4/12/2024 0400  Gross per 24 hour   Intake 5028.78 ml   Output 775 ml   Net 4253.78 ml       Admission Weight  Weight: 69.9 kg (154 lb) (04/09/24 1419)    Daily Weight  04/11/24 : 74.5 kg (164 lb 3.9 oz)    Image Results  Transthoracic Echo (TTE) Limited                [place] [id number]       Phone [phone] Fax [phone]    TRANSTHORACIC ECHOCARDIOGRAM REPORT       Patient Name:     VON BRYANTJO-ANN Novak Physician:   [id number] Frdedie Weldon DO  Study Date:       4/11/2024            Ordering Provider:   [id number] PHUC HAHN  MRN/PID:          [id number]             Fellow:  Accession#:       [id number]         Nurse:  Date of           1955 / 68      Sonographer:         Kellee Marcial RDCS  Birth/Age:        years  Gender:           M                    Additional Staff:  Height:           182.88 cm            Admit Date:          4/9/2024  Weight:           70.76 kg             Admission Status:    Inpatient - Routine  BSA / BMI:        1.92 m2 / 21.16      Department Location: [facility]                    kg/m2  Blood Pressure: 95 /62 mmHg    Study Type:    TRANSTHORACIC ECHO (TTE) LIMITED  Diagnosis/ICD: Postprocedural hypotension-I95.81  Indication:     Hypotension  CPT Codes:     Echo Limited-24128    Patient History:  Pertinent History: Chest Pain, CAD and Hyperlipidemia.    Study Detail: The following Echo studies were performed: 2D, M-Mode, Doppler and                color flow.       PHYSICIAN INTERPRETATION:  Left Ventricle: Left ventricular systolic function is normal, with an estimated ejection fraction of 60-65%. There are no regional wall motion abnormalities. The left ventricular cavity size is normal. The left ventricular septal wall thickness is normal. There is normal left ventricular posterior wall thickness. Left ventricular diastolic filling was indeterminate.  Left Atrium: The left atrium was not assessed.  Right Ventricle: The right ventricle was not assessed. There is normal right ventricular global systolic function.  Right Atrium: The right atrium was not assessed.  Aortic Valve: The aortic valve was not assessed. Aortic valve regurgitation was not assessed.  Mitral Valve: The mitral valve was not assessed. Mitral valve regurgitation was not assessed.  Tricuspid Valve: The tricuspid valve was not assessed. Tricuspid regurgitation was not assessed.  Pulmonic Valve: The pulmonic valve was not assessed. The pulmonic valve regurgitation was not assessed.  Pericardium: Pericardial effusion was not assessed.  Aorta: The aortic root was not assessed.       CONCLUSIONS:   1. Left ventricular systolic function is normal with a 60-65% estimated ejection fraction.    QUANTITATIVE DATA SUMMARY:  2D MEASUREMENTS:                           Normal Ranges:  IVSd:          1.05 cm   (0.6-1.1cm)  LVPWd:         1.00 cm   (0.6-1.1cm)  LVIDd:         4.35 cm   (3.9-5.9cm)  LVIDs:         2.60 cm  LV Mass Index: 82.5 g/m2  LV % FS        40.2 %    LA VOLUME:                                Normal Ranges:  LA Vol A4C:        66.8 ml    (22+/-6mL/m2)  LA Vol A2C:        52.6 ml  LA Vol BP:         62.4 ml  LA Vol Index A4C:  34.9ml/m2  LA Vol Index A2C:  27.5  ml/m2  LA Vol Index BP:   32.6 ml/m2  LA Area A4C:       24.2 cm2  LA Area A2C:       20.4 cm2  LA Major Axis A4C: 7.4 cm  LA Major Axis A2C: 6.7 cm  LA Volume Index:   31.4 ml/m2    LV SYSTOLIC FUNCTION BY 2D PLANIMETRY (MOD):                      Normal Ranges:  EF-A4C View: 61.4 % (>=55%)  EF-A2C View: 66.6 %  EF-Biplane:  64.0 %    LV DIASTOLIC FUNCTION:                         Normal Ranges:  MV Peak E:    1.24 m/s (0.7-1.2 m/s)  MV e'         0.10 m/s (>8.0)  MV lateral e' 0.10 m/s  MV medial e'  0.10 m/s  E/e' Ratio:   12.40    (<8.0)    MITRAL VALVE:                  Normal Ranges:  MV DT: 201 msec (150-240msec)    TRICUSPID VALVE/RVSP:                              Normal Ranges:  Peak TR Velocity: 2.70 m/s  RV Syst Pressure: 32.2 mmHg (< 30mmHg)       [id number] Freddie Weldon   Electronically signed on 4/11/2024 at 12:20:44 PM       ** Final **      Physical Exam  Constitutional:       Appearance: Normal appearance.   HENT:      Head: Normocephalic and atraumatic.      Nose: Nose normal.      Mouth/Throat:      Mouth: Mucous membranes are moist.   Eyes:      Extraocular Movements: Extraocular movements intact.      Conjunctiva/sclera: Conjunctivae normal.   Cardiovascular:      Rate and Rhythm: Normal rate and regular rhythm.      Pulses: Normal pulses.   Pulmonary:      Effort: Pulmonary effort is normal.      Breath sounds: Normal breath sounds.   Abdominal:      General: Abdomen is flat. Bowel sounds are normal.      Comments: Some tenderness near the incision   Musculoskeletal:         General: Normal range of motion.      Cervical back: Normal range of motion.   Skin:     General: Skin is warm and dry.   Neurological:      General: No focal deficit present.      Mental Status: He is alert and oriented to person, place, and time.   Psychiatric:         Mood and Affect: Mood normal.         Behavior: Behavior normal.         Scheduled medications  acetaminophen, 650 mg, oral, q6h  digoxin, 125 mcg, oral,  Daily  pantoprazole, 40 mg, oral, Daily before breakfast   Or  esomeprazole, 40 mg, nasoduodenal tube, Daily before breakfast  heparin (porcine), 5,000 Units, subcutaneous, q8h  hydrocortisone sodium succinate, 50 mg, intravenous, q6h  lidocaine, 2 patch, transdermal, Daily  midodrine, 10 mg, oral, TID  potassium chloride, 20 mEq, intravenous, q2h      Continuous medications  lactated Ringer's, 150 mL/hr, Last Rate: 150 mL/hr (04/12/24 0217)      PRN medications  PRN medications: benzocaine, ipratropium-albuteroL, metoprolol, morphine, morphine, ondansetron ODT **OR** ondansetron, oxyCODONE, oxyCODONE, tiZANidine                Assessment/Plan   This patient currently has cardiac telemetry ordered; if you would like to modify or discontinue the telemetry order, click here to go to the orders activity to modify/discontinue the order.    ASSESSMENT     male with hx of lung cancer s/p chemo/radiation/immunotherapy c/b pneumonitis on prednisone, afib on Eliquis, SMA occlusion s/p stenting, 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     PLAN     Incarcerated Hernia s/p Surgical Reduction w/ Bowel Resection  -Presented with abd pain, nausea, and vomiting and found to have incarcerated hernia s/p emergent surgical reduction with bowel resection  -Post-op with low-nl Bps but otherwise doing well  -Critical care and Surgery managing  4/11: Now on clear liquid diet.  Critical care recommendations appreciated.  4/12: Now on full liquid diet. Will continue to follow recommendations of general surgeon and critical care.      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  -Currently on RA  -On pred taper, current dose 70mg  -Will transition to hydrocortisone IV 75mg q6 given patient NPO and for mineralocorticoid effect for stress  dosing  -Should probably try to expedite steroid taper for wound healing purposes but will defer this to pulmonology     Chronic HFrEF EF 20% (EF now recovered) 2/2 NICM  -2/2 NICM (alcohol) last EF recovered at 55%  -No signs of decompensated CHF  -Hold home GDMT (Entresto, metop, Aldactone) for tonight given low Bps     Other Issues  -Permanent Atrial Fibrillation: Rates controlled, continue dig but hold oral metop given low-nl BP, hold Eliquis for tonight  -Non-onst CAD: Home Statin. Hold Plavix for tonight  -Lung cancer s/p chemo/radiation/immunotherapy: Follow-up as an outpatient      DVT Prophy  -SCDs, hold home Eliquis for tonight     Malnutrition Diagnosis Status: New  Malnutrition Diagnosis: Severe malnutrition related to chronic disease or condition  As Evidenced by: moderate to severe muscle/fat loss per NFPE, and PO intake <75% for >1 month  I agree with the dietitian's malnutrition diagnosis.         WILLIS THAPAS

## 2024-04-12 NOTE — PROGRESS NOTES
Discussed discharge planning during interdisciplinary rounds with Dr. Jefferson. Patient is not medically ready for discharge.  Patient requires 24 -48 hours to advance to full liquids and toleration. Patient plans to return home with no needs upon discharge.

## 2024-04-13 LAB
ANION GAP SERPL CALC-SCNC: 9 MMOL/L (ref 10–20)
BUN SERPL-MCNC: 24 MG/DL (ref 6–23)
CALCIUM SERPL-MCNC: 7.6 MG/DL (ref 8.6–10.3)
CHLORIDE SERPL-SCNC: 102 MMOL/L (ref 98–107)
CO2 SERPL-SCNC: 24 MMOL/L (ref 21–32)
CREAT SERPL-MCNC: 0.93 MG/DL (ref 0.5–1.3)
EGFRCR SERPLBLD CKD-EPI 2021: >90 ML/MIN/1.73M*2
ERYTHROCYTE [DISTWIDTH] IN BLOOD BY AUTOMATED COUNT: 15.9 % (ref 11.5–14.5)
GLUCOSE SERPL-MCNC: 202 MG/DL (ref 74–99)
HCT VFR BLD AUTO: 28.1 % (ref 41–52)
HGB BLD-MCNC: 8.8 G/DL (ref 13.5–17.5)
MAGNESIUM SERPL-MCNC: 2.17 MG/DL (ref 1.6–2.4)
MCH RBC QN AUTO: 26.2 PG (ref 26–34)
MCHC RBC AUTO-ENTMCNC: 31.3 G/DL (ref 32–36)
MCV RBC AUTO: 84 FL (ref 80–100)
NRBC BLD-RTO: 0 /100 WBCS (ref 0–0)
PLATELET # BLD AUTO: 119 X10*3/UL (ref 150–450)
POTASSIUM SERPL-SCNC: 4.3 MMOL/L (ref 3.5–5.3)
RBC # BLD AUTO: 3.36 X10*6/UL (ref 4.5–5.9)
SODIUM SERPL-SCNC: 131 MMOL/L (ref 136–145)
WBC # BLD AUTO: 8 X10*3/UL (ref 4.4–11.3)

## 2024-04-13 PROCEDURE — 1200000002 HC GENERAL ROOM WITH TELEMETRY DAILY

## 2024-04-13 PROCEDURE — 2500000001 HC RX 250 WO HCPCS SELF ADMINISTERED DRUGS (ALT 637 FOR MEDICARE OP)

## 2024-04-13 PROCEDURE — 2500000004 HC RX 250 GENERAL PHARMACY W/ HCPCS (ALT 636 FOR OP/ED): Performed by: SURGERY

## 2024-04-13 PROCEDURE — 2500000005 HC RX 250 GENERAL PHARMACY W/O HCPCS: Performed by: SURGERY

## 2024-04-13 PROCEDURE — 83735 ASSAY OF MAGNESIUM: CPT | Performed by: SURGERY

## 2024-04-13 PROCEDURE — 2500000004 HC RX 250 GENERAL PHARMACY W/ HCPCS (ALT 636 FOR OP/ED): Performed by: INTERNAL MEDICINE

## 2024-04-13 PROCEDURE — 80048 BASIC METABOLIC PNL TOTAL CA: CPT | Performed by: SURGERY

## 2024-04-13 PROCEDURE — 2500000001 HC RX 250 WO HCPCS SELF ADMINISTERED DRUGS (ALT 637 FOR MEDICARE OP): Performed by: SURGERY

## 2024-04-13 PROCEDURE — 2500000002 HC RX 250 W HCPCS SELF ADMINISTERED DRUGS (ALT 637 FOR MEDICARE OP, ALT 636 FOR OP/ED): Mod: MUE | Performed by: SURGERY

## 2024-04-13 PROCEDURE — 99232 SBSQ HOSP IP/OBS MODERATE 35: CPT | Performed by: INTERNAL MEDICINE

## 2024-04-13 PROCEDURE — 2500000002 HC RX 250 W HCPCS SELF ADMINISTERED DRUGS (ALT 637 FOR MEDICARE OP, ALT 636 FOR OP/ED): Mod: MUE | Performed by: INTERNAL MEDICINE

## 2024-04-13 PROCEDURE — 99291 CRITICAL CARE FIRST HOUR: CPT | Performed by: INTERNAL MEDICINE

## 2024-04-13 PROCEDURE — 2500000005 HC RX 250 GENERAL PHARMACY W/O HCPCS

## 2024-04-13 PROCEDURE — 85027 COMPLETE CBC AUTOMATED: CPT | Performed by: SURGERY

## 2024-04-13 PROCEDURE — 99024 POSTOP FOLLOW-UP VISIT: CPT | Performed by: SURGERY

## 2024-04-13 PROCEDURE — 36415 COLL VENOUS BLD VENIPUNCTURE: CPT | Performed by: SURGERY

## 2024-04-13 PROCEDURE — 2500000001 HC RX 250 WO HCPCS SELF ADMINISTERED DRUGS (ALT 637 FOR MEDICARE OP): Performed by: INTERNAL MEDICINE

## 2024-04-13 RX ORDER — METOPROLOL TARTRATE 1 MG/ML
5 INJECTION, SOLUTION INTRAVENOUS EVERY 6 HOURS PRN
Status: DISCONTINUED | OUTPATIENT
Start: 2024-04-13 | End: 2024-04-13

## 2024-04-13 RX ORDER — METOPROLOL TARTRATE 1 MG/ML
5 INJECTION, SOLUTION INTRAVENOUS EVERY 6 HOURS PRN
Status: DISCONTINUED | OUTPATIENT
Start: 2024-04-13 | End: 2024-04-15

## 2024-04-13 RX ORDER — PREDNISONE 20 MG/1
20 TABLET ORAL DAILY
Status: DISCONTINUED | OUTPATIENT
Start: 2024-04-28 | End: 2024-04-16 | Stop reason: HOSPADM

## 2024-04-13 RX ORDER — METOPROLOL TARTRATE 25 MG/1
25 TABLET, FILM COATED ORAL 2 TIMES DAILY
Status: DISCONTINUED | OUTPATIENT
Start: 2024-04-13 | End: 2024-04-15

## 2024-04-13 RX ORDER — PREDNISONE 10 MG/1
10 TABLET ORAL DAILY
Status: DISCONTINUED | OUTPATIENT
Start: 2024-05-05 | End: 2024-04-16 | Stop reason: HOSPADM

## 2024-04-13 RX ORDER — PREDNISONE 20 MG/1
40 TABLET ORAL DAILY
Status: DISCONTINUED | OUTPATIENT
Start: 2024-04-14 | End: 2024-04-16 | Stop reason: HOSPADM

## 2024-04-13 RX ORDER — BISMUTH SUBSALICYLATE 262 MG
1 TABLET,CHEWABLE ORAL DAILY
Status: DISCONTINUED | OUTPATIENT
Start: 2024-04-13 | End: 2024-04-16 | Stop reason: HOSPADM

## 2024-04-13 RX ORDER — KETOROLAC TROMETHAMINE 30 MG/ML
15 INJECTION, SOLUTION INTRAMUSCULAR; INTRAVENOUS EVERY 6 HOURS
Status: DISCONTINUED | OUTPATIENT
Start: 2024-04-13 | End: 2024-04-14

## 2024-04-13 RX ORDER — ASCORBIC ACID 500 MG
500 TABLET ORAL DAILY
Status: DISCONTINUED | OUTPATIENT
Start: 2024-04-13 | End: 2024-04-16 | Stop reason: HOSPADM

## 2024-04-13 RX ORDER — ROSUVASTATIN CALCIUM 10 MG/1
5 TABLET, COATED ORAL DAILY
Status: DISCONTINUED | OUTPATIENT
Start: 2024-04-13 | End: 2024-04-16 | Stop reason: HOSPADM

## 2024-04-13 RX ORDER — ZINC SULFATE 50(220)MG
50 CAPSULE ORAL DAILY
Status: DISCONTINUED | OUTPATIENT
Start: 2024-04-13 | End: 2024-04-16 | Stop reason: HOSPADM

## 2024-04-13 RX ORDER — GABAPENTIN 300 MG/1
300 CAPSULE ORAL NIGHTLY
Status: DISCONTINUED | OUTPATIENT
Start: 2024-04-13 | End: 2024-04-16 | Stop reason: HOSPADM

## 2024-04-13 RX ORDER — MIDODRINE HYDROCHLORIDE 10 MG/1
10 TABLET ORAL EVERY 12 HOURS
Status: DISCONTINUED | OUTPATIENT
Start: 2024-04-13 | End: 2024-04-13

## 2024-04-13 RX ORDER — MIDODRINE HYDROCHLORIDE 10 MG/1
10 TABLET ORAL DAILY
Status: DISCONTINUED | OUTPATIENT
Start: 2024-04-15 | End: 2024-04-13

## 2024-04-13 RX ORDER — MORPHINE SULFATE 4 MG/ML
4 INJECTION INTRAVENOUS EVERY 6 HOURS PRN
Status: DISCONTINUED | OUTPATIENT
Start: 2024-04-13 | End: 2024-04-15

## 2024-04-13 RX ADMIN — KETOROLAC TROMETHAMINE 15 MG: 30 INJECTION, SOLUTION INTRAMUSCULAR at 22:45

## 2024-04-13 RX ADMIN — METOPROLOL TARTRATE 25 MG: 25 TABLET, FILM COATED ORAL at 08:42

## 2024-04-13 RX ADMIN — OXYCODONE HYDROCHLORIDE 10 MG: 10 TABLET ORAL at 14:09

## 2024-04-13 RX ADMIN — METOPROLOL TARTRATE 5 MG: 5 INJECTION INTRAVENOUS at 03:40

## 2024-04-13 RX ADMIN — OXYCODONE HYDROCHLORIDE 10 MG: 10 TABLET ORAL at 06:02

## 2024-04-13 RX ADMIN — ACETAMINOPHEN 650 MG: 325 TABLET ORAL at 10:50

## 2024-04-13 RX ADMIN — ACETAMINOPHEN 650 MG: 325 TABLET ORAL at 15:47

## 2024-04-13 RX ADMIN — LIDOCAINE 4% 2 PATCH: 40 PATCH TOPICAL at 08:42

## 2024-04-13 RX ADMIN — APIXABAN 5 MG: 5 TABLET, FILM COATED ORAL at 21:08

## 2024-04-13 RX ADMIN — KETOROLAC TROMETHAMINE 15 MG: 30 INJECTION, SOLUTION INTRAMUSCULAR at 15:47

## 2024-04-13 RX ADMIN — MIDODRINE HYDROCHLORIDE 10 MG: 10 TABLET ORAL at 08:42

## 2024-04-13 RX ADMIN — POTASSIUM CHLORIDE 10 MEQ: 750 TABLET, FILM COATED, EXTENDED RELEASE ORAL at 08:42

## 2024-04-13 RX ADMIN — MULTIVITAMIN TABLET 1 TABLET: TABLET at 10:50

## 2024-04-13 RX ADMIN — PREDNISONE 30 MG: 10 TABLET ORAL at 21:08

## 2024-04-13 RX ADMIN — OXYCODONE HYDROCHLORIDE AND ACETAMINOPHEN 500 MG: 500 TABLET ORAL at 10:50

## 2024-04-13 RX ADMIN — OXYCODONE HYDROCHLORIDE 10 MG: 10 TABLET ORAL at 02:11

## 2024-04-13 RX ADMIN — METOPROLOL TARTRATE 25 MG: 25 TABLET, FILM COATED ORAL at 21:10

## 2024-04-13 RX ADMIN — Medication 50 MG OF ELEMENTAL ZINC: at 10:50

## 2024-04-13 RX ADMIN — KETOROLAC TROMETHAMINE 15 MG: 30 INJECTION, SOLUTION INTRAMUSCULAR at 10:50

## 2024-04-13 RX ADMIN — ROSUVASTATIN 5 MG: 10 TABLET, FILM COATED ORAL at 12:19

## 2024-04-13 RX ADMIN — PREDNISONE 30 MG: 20 TABLET ORAL at 09:07

## 2024-04-13 RX ADMIN — PANTOPRAZOLE SODIUM 40 MG: 40 TABLET, DELAYED RELEASE ORAL at 06:03

## 2024-04-13 RX ADMIN — HEPARIN SODIUM 5000 UNITS: 5000 INJECTION INTRAVENOUS; SUBCUTANEOUS at 08:42

## 2024-04-13 RX ADMIN — OXYCODONE HYDROCHLORIDE 10 MG: 10 TABLET ORAL at 23:57

## 2024-04-13 RX ADMIN — ACETAMINOPHEN 650 MG: 325 TABLET ORAL at 22:30

## 2024-04-13 RX ADMIN — ACETAMINOPHEN 650 MG: 325 TABLET ORAL at 04:07

## 2024-04-13 RX ADMIN — DIGOXIN 125 MCG: 125 TABLET ORAL at 08:42

## 2024-04-13 RX ADMIN — CALCIUM CHLORIDE 1 G: 100 INJECTION INTRAVENOUS; INTRAVENTRICULAR at 12:19

## 2024-04-13 RX ADMIN — GABAPENTIN 300 MG: 300 CAPSULE ORAL at 21:08

## 2024-04-13 RX ADMIN — MORPHINE SULFATE 4 MG: 4 INJECTION, SOLUTION INTRAMUSCULAR; INTRAVENOUS at 08:42

## 2024-04-13 ASSESSMENT — PAIN SCALES - GENERAL
PAINLEVEL_OUTOF10: 0 - NO PAIN
PAINLEVEL_OUTOF10: 6
PAINLEVEL_OUTOF10: 8
PAINLEVEL_OUTOF10: 7
PAINLEVEL_OUTOF10: 2
PAINLEVEL_OUTOF10: 8
PAINLEVEL_OUTOF10: 6
PAINLEVEL_OUTOF10: 5 - MODERATE PAIN

## 2024-04-13 ASSESSMENT — PAIN - FUNCTIONAL ASSESSMENT
PAIN_FUNCTIONAL_ASSESSMENT: 0-10

## 2024-04-13 ASSESSMENT — ENCOUNTER SYMPTOMS
FEVER: 0
NAUSEA: 0
JOINT SWELLING: 0
HEADACHES: 0
SHORTNESS OF BREATH: 0
ABDOMINAL PAIN: 0
CHEST TIGHTNESS: 0
CHILLS: 0

## 2024-04-13 ASSESSMENT — COGNITIVE AND FUNCTIONAL STATUS - GENERAL
DAILY ACTIVITIY SCORE: 24
DAILY ACTIVITIY SCORE: 24
MOBILITY SCORE: 24
DAILY ACTIVITIY SCORE: 24

## 2024-04-13 ASSESSMENT — PAIN DESCRIPTION - ORIENTATION
ORIENTATION: INNER
ORIENTATION: INNER

## 2024-04-13 ASSESSMENT — PAIN SCALES - WONG BAKER: WONGBAKER_NUMERICALRESPONSE: NO HURT

## 2024-04-13 ASSESSMENT — PAIN DESCRIPTION - LOCATION
LOCATION: ABDOMEN
LOCATION: ABDOMEN

## 2024-04-13 NOTE — PROGRESS NOTES
Subjective Data:    Patient able to be weaned off pressors evening of 4/11/24, now BP normalized, HR Afib 80's.  No chest pain/pressure, dyspnea, LE edema, orthopnea.    ROS:  The remainder of the review of systems was obtained, as was negative as pertains to the chief complaint.    Overnight Events:    Limited TTE on 4/11/24 demonstrated normal LVEF 60-65%, no rWMA.    4/13/2024 : Feeling well. Hrs controlled     Objective Data:  Last Recorded Vitals:  Vitals:    04/13/24 0341 04/13/24 0400 04/13/24 0700 04/13/24 0800   BP: 112/59 142/81 106/57 121/58   BP Location:    Right arm   Pulse:   60 87   Resp:  10 (!) 7 10   Temp:       TempSrc:       SpO2: 100%  97% 93%   Weight:       Height:           Last Labs:  CBC - 4/13/2024:  4:03 AM  8.0 8.8 119    28.1      CMP - 4/13/2024:  4:03 AM  7.6 6.9 11 --- 0.6   CANCELED 3.7 13 46      PTT - 9/12/2023:  7:46 AM  1.8   20.7 32     TROPHS   Date/Time Value Ref Range Status   04/09/2024 04:51 PM 22 0 - 20 ng/L Final   12/18/2023 12:31 PM 34 0 - 53 ng/L Final   12/18/2023 11:12 AM 30 0 - 53 ng/L Final     HGBA1C   Date/Time Value Ref Range Status   02/27/2024 12:21 PM 6.1 see below % Final   08/08/2023 04:31 AM 7.3 % Final     Comment:          Diagnosis of Diabetes-Adults   Non-Diabetic: < or = 5.6%   Increased risk for developing diabetes: 5.7-6.4%   Diagnostic of diabetes: > or = 6.5%  .       Monitoring of Diabetes                Age (y)     Therapeutic Goal (%)   Adults:          >18           <7.0   Pediatrics:    13-18           <7.5                   7-12           <8.0                   0- 6            7.5-8.5   American Diabetes Association. Diabetes Care 33(S1), Jan 2010.       LDLCALC   Date/Time Value Ref Range Status   10/23/2023 09:08 AM 74 <=99 mg/dL Final     Comment:                                 Near   Borderline      AGE      Desirable  Optimal    High     High     Very High     0-19 Y     0 - 109     ---    110-129   >/= 130     ----    20-24 Y     0  - 119     ---    120-159   >/= 160     ----      >24 Y     0 -  99   100-129  130-159   160-189     >/=190       VLDL   Date/Time Value Ref Range Status   10/23/2023 09:08 AM 24 0 - 40 mg/dL Final   04/01/2023 08:05 AM 46 0 - 40 mg/dL Final   07/28/2022 08:24 AM 41 0 - 40 mg/dL Final   08/14/2021 08:44 AM 31 0 - 40 mg/dL Final      Last I/O:  I/O last 3 completed shifts:  In: 3322.3 (44.6 mL/kg) [P.O.:700; I.V.:2572.3 (34.5 mL/kg); IV Piggyback:50]  Out: 1325 (17.8 mL/kg) [Urine:1325 (0.5 mL/kg/hr)]  Weight: 74.5 kg     Past Cardiology Tests (Last 3 Years):  EKG:  ECG 12 lead 04/09/2024 (Preliminary)      ECG 12 Lead 04/09/2024      ECG 12 lead 12/18/2023    Echo:  Transthoracic Echo (TTE) Limited 04/11/2024      Transthoracic echo (TTE) complete 03/26/2024    Ejection Fractions:  EF   Date/Time Value Ref Range Status   03/26/2024 01:26 PM 58 %      Cath:  No results found for this or any previous visit from the past 1095 days.    Stress Test:  No results found for this or any previous visit from the past 1095 days.    Cardiac Imaging:  No results found for this or any previous visit from the past 1095 days.      Inpatient Medications:  Scheduled medications   Medication Dose Route Frequency    acetaminophen  650 mg oral q6h    digoxin  125 mcg oral Daily    heparin (porcine)  5,000 Units subcutaneous q8h    lidocaine  2 patch transdermal Daily    metoprolol tartrate  25 mg oral BID    midodrine  10 mg oral TID    pantoprazole  40 mg oral Daily before breakfast    potassium chloride CR  10 mEq oral Daily    predniSONE  30 mg oral BID     PRN medications   Medication    benzocaine    ipratropium-albuteroL    metoprolol    morphine    morphine    ondansetron ODT    Or    ondansetron    oxyCODONE    oxyCODONE    tiZANidine     Continuous Medications   Medication Dose Last Rate    lactated Ringer's  50 mL/hr 50 mL/hr (04/13/24 6223)       Physical Exam:  HENT:      Head: Normocephalic.      Mouth/Throat:      Mouth:  Mucous membranes are moist.   Eyes:      Extraocular Movements: Extraocular movements intact.   Cardiovascular:      Rate and Rhythm: Normal rate. Rhythm irregular.   Pulmonary:      Effort: Pulmonary effort is normal.   Abdominal:      Palpations: Abdomen is soft.   Genitourinary:     Comments: S/p R inguinal hernia open repair  Musculoskeletal:      Cervical back: Neck supple.   Skin:     General: Skin is warm.   Neurological:      General: No focal deficit present.      Mental Status: He is alert.   Psychiatric:      Comments: appropriate affect     Assessment/Plan   Incarcerated R inguinal hernia s/p open repair of hernia / exlap / small bowel resection on 4/9/24  NICM with recovery of EF from 20% > 55-60%  paroxysmal Afib on apixaban  Lung CA s/p CTX/XRT in 12/23  mild nonobst CAD  ascending aortic aneurysm (measured 4.4 cm on CT 3/2021)  former tobacco use     4/13/24:  Postop hypotension has improved, likely due to post op / post anesthesia vasoplegia, blood loss, etc.  He is off phenylephrine.  It appears he was initiated on midodrine 10mg TID as well for hypotension, and also prednisone 30mg bid.    4/14/2024 : BP stable    Still in Afib (history of pAfib, however has been persistent during this perioperative period), with HR's in the 80-90's on tele, rising to > 100's with activity.  Limited TTE (postop) yesterday demonstrated normal LV fcn EF 60-65%, no rWMA.    Recs:  -Continue Dig, Metoprolol  Anticoagulation as per primary team  Peripheral IV 04/09/24 20 G Right;Posterior Wrist (Active)   Site Assessment Clean;Dry;Intact 04/13/24 0400   Dressing Type Transparent 04/13/24 0400   Line Status Infusing 04/13/24 0400   Dressing Status Clean;Dry 04/13/24 0400   Number of days: 4       Peripheral IV 04/11/24 20 G Left Forearm (Active)   Site Assessment Clean;Dry;Intact 04/13/24 0400   Dressing Type Transparent 04/13/24 0400   Line Status Flushed 04/13/24 0400   Dressing Status Clean;Dry 04/13/24 0400   Number  of days: 2       Code Status:  Full Code    I spent 30 minutes of critical care time in the professional and overall care of this patient on 4/12/24.        Martín Walls MD

## 2024-04-13 NOTE — CARE PLAN
Problem: Pain  Goal: My pain/discomfort is manageable  Outcome: Progressing     Problem: Safety  Goal: Patient will be injury free during hospitalization  Outcome: Progressing  Goal: I will remain free of falls  Outcome: Progressing     Problem: Daily Care  Goal: Daily care needs are met  Outcome: Progressing     Problem: Psychosocial Needs  Goal: Demonstrates ability to cope with hospitalization/illness  Outcome: Progressing  Goal: Collaborate with me, my family, and caregiver to identify my specific goals  Outcome: Progressing     Problem: Discharge Barriers  Goal: My discharge needs are met  Outcome: Progressing     Problem: Nutrition  Goal: Less than 5 days NPO/clear liquids  Outcome: Progressing  Goal: Lab values WNL  Outcome: Progressing  Goal: Promote healing  Outcome: Progressing  Goal: Maintain stable weight  Outcome: Progressing   The patient's goals for the shift include      The clinical goals for the shift include maintain vital signs WNL  throughout shift

## 2024-04-13 NOTE — PROGRESS NOTES
Kevin Rubi is a 68 y.o. male on day 4 of admission presenting with Incarcerated right inguinal hernia.      Subjective   Kevin Rubi is a 68 y.o. 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 Patient was in his normal state of health until about two days ago when he started experiencing right groin and abdominal pain. Also with nausea and non-bloody emesis. Originally went to a cardiology appointment today but found to be hypotensive so sent to the ED. In the ED, found to have an incarcerated ventral hernia so taken to surgery for emergent surgical reduction with bowel resection. Hospitalist service consulted for medication management. Currently pain controlled post-op. NGT left in place. Denies SOB or LE edema. Wants numbing spray for throat. BP low-nl.     4/10- No acute events overnight. Pt doing well after surgery. Critical care also following.  4/11: No acute events overnight.  NG tube was removed.  Patient started clear liquids.  Other than abdominal pain at the site of the surgery patient is overall doing well.  He denies any nausea, vomiting.  He denies any fevers or chills.  We will continue to follow with you blood pressure remains low.  Patient denies any dizziness, palpitations or chest pain.  Patient will likely require at least another 24 to 48 hours of inpatient service.  4/12: No acute events overnight. Patient started clear liquids yesterday and tolerated them well. Will start a fill liquid diet today. Patient has been having hyptension over the pass few days, he is now off pressors and was started on midodrine. Cardio was consulted and is following. Possible TTF later today or tomorrow.   4/13: No acute events overnight. Pt clinically much improved . He  denies any abdominal pain, nausea, vomiting diarrhea.  Denies any fevers or chills.  He will likely be transferred to the medical floor today.  Anticipate discharge on Monday.       Objective     Last Recorded Vitals  /68   Pulse 58   Temp 36.3 °C (97.3 °F) (Temporal)   Resp 10   Wt 74.5 kg (164 lb 3.9 oz)   SpO2 100%   Intake/Output last 3 Shifts:    Intake/Output Summary (Last 24 hours) at 4/13/2024 1237  Last data filed at 4/13/2024 0417  Gross per 24 hour   Intake 900 ml   Output 300 ml   Net 600 ml       Admission Weight  Weight: 69.9 kg (154 lb) (04/09/24 1419)    Daily Weight  04/12/24 : 74.5 kg (164 lb 3.9 oz)    Image Results  Transthoracic Echo (TTE) [place] [address]       Phone [phone] Fax [phone]    TRANSTHORACIC ECHOCARDIOGRAM REPORT       Patient Name:     VON Novak Physician:   [id number] Freddie Weldon DO  Study Date:       4/11/2024            Ordering Provider:   [id number] PHUC HAHN  MRN/PID:          [id number]             Fellow:  Accession#:       [id number]         Nurse:  Date of           1955 / 68      Sonographer:         Kellee Marcial OSWALD  Birth/Age:        years  Gender:           M                    Additional Staff:  Height:           182.88 cm            Admit Date:          4/9/2024  Weight:           70.76 kg             Admission Status:    Inpatient - Routine  BSA / BMI:        1.92 m2 / 21.16      Department Location: [facility]                    kg/m2  Blood Pressure: 95 /62 mmHg    Study Type:    TRANSTHORACIC ECHO (TTE) LIMITED  Diagnosis/ICD: Postprocedural hypotension-I95.81  Indication:    Hypotension  CPT Codes:     Echo Limited-44119    Patient History:  Pertinent History: Chest Pain, CAD and Hyperlipidemia.    Study Detail: The following Echo studies were performed: 2D, M-Mode, Doppler and                 color flow.       PHYSICIAN INTERPRETATION:  Left Ventricle: Left ventricular systolic function is normal, with an estimated ejection fraction of 60-65%. There are no regional wall motion abnormalities. The left ventricular cavity size is normal. The left ventricular septal wall thickness is normal. There is normal left ventricular posterior wall thickness. Left ventricular diastolic filling was indeterminate.  Left Atrium: The left atrium was not assessed.  Right Ventricle: The right ventricle was not assessed. There is normal right ventricular global systolic function.  Right Atrium: The right atrium was not assessed.  Aortic Valve: The aortic valve was not assessed. Aortic valve regurgitation was not assessed.  Mitral Valve: The mitral valve was not assessed. Mitral valve regurgitation was not assessed.  Tricuspid Valve: The tricuspid valve was not assessed. Tricuspid regurgitation was not assessed.  Pulmonic Valve: The pulmonic valve was not assessed. The pulmonic valve regurgitation was not assessed.  Pericardium: Pericardial effusion was not assessed.  Aorta: The aortic root was not assessed.       CONCLUSIONS:   1. Left ventricular systolic function is normal with a 60-65% estimated ejection fraction.    QUANTITATIVE DATA SUMMARY:  2D MEASUREMENTS:                           Normal Ranges:  IVSd:          1.05 cm   (0.6-1.1cm)  LVPWd:         1.00 cm   (0.6-1.1cm)  LVIDd:         4.35 cm   (3.9-5.9cm)  LVIDs:         2.60 cm  LV Mass Index: 82.5 g/m2  LV % FS        40.2 %    LA VOLUME:                                Normal Ranges:  LA Vol A4C:        66.8 ml    (22+/-6mL/m2)  LA Vol A2C:        52.6 ml  LA Vol BP:         62.4 ml  LA Vol Index A4C:  34.9ml/m2  LA Vol Index A2C:  27.5 ml/m2  LA Vol Index BP:   32.6 ml/m2  LA Area A4C:       24.2 cm2  LA Area A2C:       20.4 cm2  LA Major Axis A4C: 7.4 cm  LA Major Axis A2C: 6.7 cm  LA Volume Index:   31.4 ml/m2    LV SYSTOLIC FUNCTION BY 2D PLANIMETRY  (MOD):                      Normal Ranges:  EF-A4C View: 61.4 % (>=55%)  EF-A2C View: 66.6 %  EF-Biplane:  64.0 %    LV DIASTOLIC FUNCTION:                         Normal Ranges:  MV Peak E:    1.24 m/s (0.7-1.2 m/s)  MV e'         0.10 m/s (>8.0)  MV lateral e' 0.10 m/s  MV medial e'  0.10 m/s  E/e' Ratio:   12.40    (<8.0)    MITRAL VALVE:                  Normal Ranges:  MV DT: 201 msec (150-240msec)    TRICUSPID VALVE/RVSP:                              Normal Ranges:  Peak TR Velocity: 2.70 m/s  RV Syst Pressure: 32.2 mmHg (< 30mmHg)       [id number] Freddie Weldon   Electronically signed on 4/11/2024 at 12:20:44 PM       ** Final **      Physical Exam  Constitutional:       Appearance: Normal appearance.   Cardiovascular:      Rate and Rhythm: Normal rate and regular rhythm.      Pulses: Normal pulses.   Pulmonary:      Effort: Pulmonary effort is normal.      Breath sounds: Normal breath sounds.   Abdominal:      General: Abdomen is flat. Bowel sounds are normal.      Comments: Some tenderness near the incision   Musculoskeletal:         General: Normal range of motion.      Cervical back: Normal range of motion.   Neurological:      General: No focal deficit present.      Mental Status: He is alert and oriented to person, place, and time.   Psychiatric:         Mood and Affect: Mood normal.         Behavior: Behavior normal.         Scheduled medications  acetaminophen, 650 mg, oral, q6h  apixaban, 5 mg, oral, q12h  ascorbic acid, 500 mg, oral, Daily  calcium chloride, 1 g, intravenous, Once  digoxin, 125 mcg, oral, Daily  gabapentin, 300 mg, oral, Nightly  ketorolac, 15 mg, intravenous, q6h  lidocaine, 2 patch, transdermal, Daily  metoprolol tartrate, 25 mg, oral, BID  midodrine, 10 mg, oral, q12h  [START ON 4/15/2024] midodrine, 10 mg, oral, Daily  multivitamin, 1 tablet, oral, Daily  pantoprazole, 40 mg, oral, Daily before breakfast  predniSONE, 30 mg, oral, BID  rosuvastatin, 5 mg, oral, Daily  zinc sulfate, 50  mg of elemental zinc, oral, Daily      Continuous medications       PRN medications  PRN medications: ipratropium-albuteroL, metoprolol, morphine, [DISCONTINUED] ondansetron ODT **OR** ondansetron, oxyCODONE, oxyCODONE, tiZANidine                Assessment/Plan   This patient currently has cardiac telemetry ordered; if you would like to modify or discontinue the telemetry order, click here to go to the orders activity to modify/discontinue the order.    ASSESSMENT     male with hx of lung cancer s/p chemo/radiation/immunotherapy c/b pneumonitis on prednisone, afib on Eliquis, SMA occlusion s/p stenting, 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     PLAN     Incarcerated Hernia s/p Surgical Reduction w/ Bowel Resection  -Presented with abd pain, nausea, and vomiting and found to have incarcerated hernia s/p emergent surgical reduction with bowel resection  -Post-op with low-nl Bps but otherwise doing well  -Critical care and Surgery managing  4/11: Now on clear liquid diet.  Critical care recommendations appreciated.  4/12: Now on full liquid diet. Will continue to follow recommendations of general surgeon and critical care.  4/13: Clinically improving.  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  -Currently on RA  -On pred taper, current dose 70mg  -Will transition to hydrocortisone IV 75mg q6 given patient NPO and for mineralocorticoid effect for stress dosing  -Should probably try to expedite steroid taper for wound healing purposes but will defer this to pulmonology     Chronic HFrEF EF 20% (EF now recovered) 2/2 NICM  -2/2 NICM (alcohol) last EF recovered at 55%  -No signs of decompensated CHF  -Hold home GDMT (Entresto, metop, Aldactone) for tonight given low Bps     Other  Issues  -Permanent Atrial Fibrillation: Rates controlled, continue dig but hold oral metop given low-nl BP, hold Eliquis for tonight  -Non-onst CAD: Home Statin. Hold Plavix for tonight  -Lung cancer s/p chemo/radiation/immunotherapy: Follow-up as an outpatient      DVT Prophy  -SCDs, hold home Eliquis for tonight     Malnutrition Diagnosis Status: New  Malnutrition Diagnosis: Severe malnutrition related to chronic disease or condition  As Evidenced by: moderate to severe muscle/fat loss per NFPE, and PO intake <75% for >1 month  I agree with the dietitian's malnutrition diagnosis.         Sweta Jefferson MD

## 2024-04-13 NOTE — PROGRESS NOTES
GENERAL SURGERY PROGRESS NOTE    Kevin Rubi   1955   [id number]     Kevin Rubi is a 68 y.o. male  with history of stage IIIC LLL lung cancer s/p chemo/RT, recent pneumonitis on prednisone daily, HTN, A-fib on Eliquis, ascending aortic aneurysm, HFpEF with EF 60%, hx SMA occlusion s/p stenting, PUD, HLD on day 4 of admission presenting with Incarcerated right inguinal hernia.    Open repair of right inguinal hernia, Exploratory  laparotomy,small  bowel resection on 4/9/24, 4 Days Post-Op    Subjective  No acute events overnight. Patient reports feeling improved. Voiding well and tolerated full liquid diet. Patient is passing flatus but no BM yet. He is non distended and no bloating sensation. Patient getting transferred to [facility]    Review of Systems:  Review of Systems   Constitutional:  Negative for chills and fever.   Respiratory:  Negative for chest tightness and shortness of breath.    Cardiovascular:  Negative for chest pain.   Gastrointestinal:  Negative for abdominal pain and nausea.   Genitourinary:  Negative for decreased urine volume.   Musculoskeletal:  Negative for joint swelling.   Skin:  Negative for rash.   Neurological:  Negative for headaches.       Objective    Last Recorded Vitals  Blood pressure 98/68, pulse 82, temperature 36.3 °C (97.3 °F), temperature source Temporal, resp. rate 13, height 1.829 m (6'), weight 74.5 kg (164 lb 3.9 oz), SpO2 100%.    Intake/Output last 3 Shifts:  I/O last 3 completed shifts:  In: 3322.3 (44.6 mL/kg) [P.O.:700; I.V.:2572.3 (34.5 mL/kg); IV Piggyback:50]  Out: 1325 (17.8 mL/kg) [Urine:1325 (0.5 mL/kg/hr)]  Weight: 74.5 kg     Intake/Output Summary (Last 24 hours) at 4/13/2024 1008  Last data filed at 4/13/2024 0417  Gross per 24 hour   Intake 1025.5 ml   Output 650 ml   Net 375.5 ml       Physical Exam  Vitals reviewed.   Constitutional:       General: He is not in acute distress.  HENT:      Head: Normocephalic and atraumatic.   Eyes:       Conjunctiva/sclera: Conjunctivae normal.   Cardiovascular:      Rate and Rhythm: Normal rate and regular rhythm.      Pulses: Normal pulses.   Pulmonary:      Effort: Pulmonary effort is normal. No respiratory distress.   Abdominal:      General: There is no distension.      Palpations: Abdomen is soft.      Comments: Appropriately tender near incisions. Dressings removed, incisions well intact, some ecchymosis of the midline incision. No ecchymosis along groin incision.   Musculoskeletal:         General: No swelling.      Cervical back: Neck supple.   Skin:     General: Skin is warm and dry.   Neurological:      Mental Status: He is alert and oriented to person, place, and time.         Relevant Results  Labs:  Results for orders placed or performed during the hospital encounter of 04/09/24 (from the past 24 hour(s))   Electrolyte panel   Result Value Ref Range    Sodium 130 (L) 136 - 145 mmol/L    Potassium 3.8 3.5 - 5.3 mmol/L    Chloride 99 98 - 107 mmol/L    Bicarbonate 25 21 - 32 mmol/L    Anion Gap 10 10 - 20 mmol/L   CBC   Result Value Ref Range    WBC 8.0 4.4 - 11.3 x10*3/uL    nRBC 0.0 0.0 - 0.0 /100 WBCs    RBC 3.36 (L) 4.50 - 5.90 x10*6/uL    Hemoglobin 8.8 (L) 13.5 - 17.5 g/dL    Hematocrit 28.1 (L) 41.0 - 52.0 %    MCV 84 80 - 100 fL    MCH 26.2 26.0 - 34.0 pg    MCHC 31.3 (L) 32.0 - 36.0 g/dL    RDW 15.9 (H) 11.5 - 14.5 %    Platelets 119 (L) 150 - 450 x10*3/uL   Basic Metabolic Panel   Result Value Ref Range    Glucose 202 (H) 74 - 99 mg/dL    Sodium 131 (L) 136 - 145 mmol/L    Potassium 4.3 3.5 - 5.3 mmol/L    Chloride 102 98 - 107 mmol/L    Bicarbonate 24 21 - 32 mmol/L    Anion Gap 9 (L) 10 - 20 mmol/L    Urea Nitrogen 24 (H) 6 - 23 mg/dL    Creatinine 0.93 0.50 - 1.30 mg/dL    eGFR >90 >60 mL/min/1.73m*2    Calcium 7.6 (L) 8.6 - 10.3 mg/dL   Magnesium   Result Value Ref Range    Magnesium 2.17 1.60 - 2.40 mg/dL       Images:  Transthoracic Echo (TTE) Limited   Final Result      CT abdomen pelvis  w IV contrast   Final Result   1.  Multiple loops of dilated small bowel consistent with a small   bowel obstruction.  There is a loop of small bowel located within a   right inguinal hernia likely incarcerated and acting as a transition   point for the obstruction.   2.  Ill-defined consolidative changes within the left lower lobe which   may represent a possible pneumonitis.   3.  Findings consistent with hepatic steatosis.   4.  No abdominal/pelvic fluid collections or pneumoperitoneum.   5.  Colonic diverticulosis without definite evidence of   diverticulitis.   6.  Stable mild prostatomegaly..   Result was given to Dr. Christiano Flanagan on 4/9/2024 at 1936.   Signed by Ward Tong MD          Assessment and Plan  Principal Problem:    Incarcerated right inguinal hernia    68 y.o. male with with history of stage IIIC LLL lung cancer s/p chemo/RT, recent pneumonitis on prednisone daily, HTN, A-fib on Eliquis, ascending aortic aneurysm, HFpEF with EF 60% who presented with incarcerated right inguinal hernia now s/p exploratory laparotomy, small bowel resection, and right inguinal hernia repair on 4/9/24. Doing expectantly.    Plan:  -Currently pain controlled: wean off IV pain medications and transition to PO Oxycodone -Cardiology following, on home PO meds and IV metoprolol, will get final recs closer to DC date  -Encourage IS use. Hx pneumonitis, on steroids at baseline, received stress dose in OR. Currently on prednisone 30 mg BID, per oncology recs will start tomorrow 4/14 on 40 mg/day and taper 10 mg per week until reaches 10 mg/day - and stay on it until he sees Oncology outpatient in office  -Advance to regular, monitor bowel function  -Replace electrolytes as needed. DC IV fluids  -Will follow up on intra-operative cultures, NGTD  -SCDs, SQH. Possibly resume Eliquis tomorrow. Continue to hold Plavix, will resume Pavix on DC  -IMS and CCM consulted, appreciate recommendations  -OK for transfer to  RNF    Discussed with attending Dr. Rubens Mora, DO - PGY2  General Surgery

## 2024-04-13 NOTE — PROGRESS NOTES
Kevin Rubi is a 68 y.o. male on day 4 of admission presenting with Incarcerated right inguinal hernia.    Subjective   4/13/2024: Patient is normotensive, has been off of phenylephrine for 48 hours.  Surgery started on Eliquis and advancing the diet     Objective     Physical Exam  Vitals and nursing note reviewed.   Constitutional:       Appearance: Normal appearance.   HENT:      Head: Normocephalic.      Nose: Nose normal.      Mouth/Throat:      Pharynx: Oropharynx is clear.   Eyes:      Extraocular Movements: Extraocular movements intact.      Conjunctiva/sclera: Conjunctivae normal.      Pupils: Pupils are equal, round, and reactive to light.   Cardiovascular:      Rate and Rhythm: Normal rate and regular rhythm.      Pulses: Normal pulses.      Heart sounds: Normal heart sounds.   Pulmonary:      Effort: Pulmonary effort is normal.      Breath sounds: Normal breath sounds.   Musculoskeletal:         General: Normal range of motion.      Cervical back: Normal range of motion.   Skin:     General: Skin is warm.   Neurological:      General: No focal deficit present.      Mental Status: He is alert and oriented to person, place, and time. Mental status is at baseline.   Psychiatric:         Mood and Affect: Mood normal.         Behavior: Behavior normal.         Last Recorded Vitals  Blood pressure 121/68, pulse 58, temperature 36.3 °C (97.3 °F), temperature source Temporal, resp. rate 10, height 1.829 m (6'), weight 74.5 kg (164 lb 3.9 oz), SpO2 100%.  Intake/Output last 3 Shifts:  I/O last 3 completed shifts:  In: 3322.3 (44.6 mL/kg) [P.O.:700; I.V.:2572.3 (34.5 mL/kg); IV Piggyback:50]  Out: 1325 (17.8 mL/kg) [Urine:1325 (0.5 mL/kg/hr)]  Weight: 74.5 kg     Relevant Results  Results for orders placed or performed during the hospital encounter of 04/09/24 (from the past 24 hour(s))   Electrolyte panel   Result Value Ref Range    Sodium 130 (L) 136 - 145 mmol/L    Potassium 3.8 3.5 - 5.3 mmol/L     Chloride 99 98 - 107 mmol/L    Bicarbonate 25 21 - 32 mmol/L    Anion Gap 10 10 - 20 mmol/L   CBC   Result Value Ref Range    WBC 8.0 4.4 - 11.3 x10*3/uL    nRBC 0.0 0.0 - 0.0 /100 WBCs    RBC 3.36 (L) 4.50 - 5.90 x10*6/uL    Hemoglobin 8.8 (L) 13.5 - 17.5 g/dL    Hematocrit 28.1 (L) 41.0 - 52.0 %    MCV 84 80 - 100 fL    MCH 26.2 26.0 - 34.0 pg    MCHC 31.3 (L) 32.0 - 36.0 g/dL    RDW 15.9 (H) 11.5 - 14.5 %    Platelets 119 (L) 150 - 450 x10*3/uL   Basic Metabolic Panel   Result Value Ref Range    Glucose 202 (H) 74 - 99 mg/dL    Sodium 131 (L) 136 - 145 mmol/L    Potassium 4.3 3.5 - 5.3 mmol/L    Chloride 102 98 - 107 mmol/L    Bicarbonate 24 21 - 32 mmol/L    Anion Gap 9 (L) 10 - 20 mmol/L    Urea Nitrogen 24 (H) 6 - 23 mg/dL    Creatinine 0.93 0.50 - 1.30 mg/dL    eGFR >90 >60 mL/min/1.73m*2    Calcium 7.6 (L) 8.6 - 10.3 mg/dL   Magnesium   Result Value Ref Range    Magnesium 2.17 1.60 - 2.40 mg/dL         Assessment/Plan   Principal Problem:    Incarcerated right inguinal hernia    Hypotension  Incarcerated Hernia S/p Open hernia repair and small bowel segmental resection POD #4  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 Reduce IVF and increase Midodrine 10 mg PO TID. Weaned off phenylephrine. Monitor hemodynamics in the ICU.  S/p hernia repair.  Advance diet and activity as tolerated.   Nonischemic cardiomyopathy with EF 20% initially and now improved to 60%.  Patient has been on Entresto, metoprolol and spironolactone.  D/w Dr. Rosario, digoxin started. Consider starting short acting metoprolol. Plan to dc entresto and spironolactone on discharge altogether and follow up in cardiology office.   History of atrial fibrillation. Continue digoxin. Surgery to decide if  eliquis can be resumed tomorrow AM.  Continue home dose of pantoprazole.  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  Patient has been on prednisone 70 mg daily which has recently been cut down to 60 mg daily.  DC hydrocortisone and resume oral prednisone at 30 mg PO BID. follow up with Dr. Longoria with oncology.  Ok to dc empiric antibiotic therapy with IV Zosyn.     IV fluid can be stopped.     4/13/2024: Patient is normotensive, has been off of phenylephrine for 48 hours.  Surgery started on Eliquis and advancing the diet. Patient is on prednisone 30 mg twice daily started by oncologist for chemotherapy induced pneumonitis.  Management of steroids as per oncology. I discussed case with patient and patient's spouse at bedside as well.  Discussed case with Dr. Art. Will sign off, please call us back if you have any questions    I spent 30 minutes of critical care time in the professional and overall care of this patient including direct care & coordination of care.      Thanh Man MD

## 2024-04-13 NOTE — PROGRESS NOTES
Subjective Data:  Late entry for care given on 4/12/24    Patient able to be weaned off pressors evening of 4/11/24, now BP normalized, HR Afib 80's.  No chest pain/pressure, dyspnea, LE edema, orthopnea.    ROS:  The remainder of the review of systems was obtained, as was negative as pertains to the chief complaint.    Overnight Events:    Limited TTE on 4/11/24 demonstrated normal LVEF 60-65%, no rWMA.     Objective Data:  Last Recorded Vitals:  Vitals:    04/12/24 2352 04/13/24 0200 04/13/24 0341 04/13/24 0400   BP: 122/70  112/59 142/81   BP Location:       Patient Position:       Pulse:       Resp: 12 12  10   Temp: 35.9 °C (96.6 °F)      TempSrc:       SpO2:   100%    Weight:       Height:           Last Labs:  CBC - 4/13/2024:  4:03 AM  8.0 8.8 119    28.1      CMP - 4/13/2024:  4:03 AM  7.6 6.9 11 --- 0.6   CANCELED 3.7 13 46      PTT - 9/12/2023:  7:46 AM  1.8   20.7 32     TROPHS   Date/Time Value Ref Range Status   04/09/2024 04:51 PM 22 0 - 20 ng/L Final   12/18/2023 12:31 PM 34 0 - 53 ng/L Final   12/18/2023 11:12 AM 30 0 - 53 ng/L Final     HGBA1C   Date/Time Value Ref Range Status   02/27/2024 12:21 PM 6.1 see below % Final   08/08/2023 04:31 AM 7.3 % Final     Comment:          Diagnosis of Diabetes-Adults   Non-Diabetic: < or = 5.6%   Increased risk for developing diabetes: 5.7-6.4%   Diagnostic of diabetes: > or = 6.5%  .       Monitoring of Diabetes                Age (y)     Therapeutic Goal (%)   Adults:          >18           <7.0   Pediatrics:    13-18           <7.5                   7-12           <8.0                   0- 6            7.5-8.5   American Diabetes Association. Diabetes Care 33(S1), Jan 2010.       LDLCALC   Date/Time Value Ref Range Status   10/23/2023 09:08 AM 74 <=99 mg/dL Final     Comment:                                 Near   Borderline      AGE      Desirable  Optimal    High     High     Very High     0-19 Y     0 - 109     ---    110-129   >/= 130     ----    20-24 Y      0 - 119     ---    120-159   >/= 160     ----      >24 Y     0 -  99   100-129  130-159   160-189     >/=190       VLDL   Date/Time Value Ref Range Status   10/23/2023 09:08 AM 24 0 - 40 mg/dL Final   04/01/2023 08:05 AM 46 0 - 40 mg/dL Final   07/28/2022 08:24 AM 41 0 - 40 mg/dL Final   08/14/2021 08:44 AM 31 0 - 40 mg/dL Final      Last I/O:  I/O last 3 completed shifts:  In: 5654.3 (75.9 mL/kg) [P.O.:1900; I.V.:3654.3 (49.1 mL/kg); IV Piggyback:100]  Out: 1425 (19.1 mL/kg) [Urine:1425 (0.5 mL/kg/hr)]  Weight: 74.5 kg     Past Cardiology Tests (Last 3 Years):  EKG:  ECG 12 lead 04/09/2024 (Preliminary)      ECG 12 Lead 04/09/2024      ECG 12 lead 12/18/2023    Echo:  Transthoracic Echo (TTE) Limited 04/11/2024      Transthoracic echo (TTE) complete 03/26/2024    Ejection Fractions:  EF   Date/Time Value Ref Range Status   03/26/2024 01:26 PM 58 %      Cath:  No results found for this or any previous visit from the past 1095 days.    Stress Test:  No results found for this or any previous visit from the past 1095 days.    Cardiac Imaging:  No results found for this or any previous visit from the past 1095 days.      Inpatient Medications:  Scheduled medications   Medication Dose Route Frequency    acetaminophen  650 mg oral q6h    digoxin  125 mcg oral Daily    heparin (porcine)  5,000 Units subcutaneous q8h    lidocaine  2 patch transdermal Daily    midodrine  10 mg oral TID    pantoprazole  40 mg oral Daily before breakfast    potassium chloride CR  10 mEq oral Daily    predniSONE  30 mg oral BID     PRN medications   Medication    benzocaine    ipratropium-albuteroL    metoprolol    morphine    morphine    ondansetron ODT    Or    ondansetron    oxyCODONE    oxyCODONE    tiZANidine     Continuous Medications   Medication Dose Last Rate    lactated Ringer's  50 mL/hr 50 mL/hr (04/13/24 6476)       Physical Exam:  HENT:      Head: Normocephalic.      Mouth/Throat:      Mouth: Mucous membranes are moist.    Eyes:      Extraocular Movements: Extraocular movements intact.   Cardiovascular:      Rate and Rhythm: Normal rate. Rhythm irregular.   Pulmonary:      Effort: Pulmonary effort is normal.   Abdominal:      Palpations: Abdomen is soft.   Genitourinary:     Comments: S/p R inguinal hernia open repair  Musculoskeletal:      Cervical back: Neck supple.   Skin:     General: Skin is warm.   Neurological:      General: No focal deficit present.      Mental Status: He is alert.   Psychiatric:      Comments: appropriate affect     Assessment/Plan   Incarcerated R inguinal hernia s/p open repair of hernia / exlap / small bowel resection on 4/9/24  NICM with recovery of EF from 20% > 55-60%  paroxysmal Afib on apixaban  Lung CA s/p CTX/XRT in 12/23  mild nonobst CAD  ascending aortic aneurysm (measured 4.4 cm on CT 3/2021)  former tobacco use     4/13/24:  Postop hypotension has improved, likely due to post op / post anesthesia vasoplegia, blood loss, etc.  He is off phenylephrine.  It appears he was initiated on midodrine 10mg TID as well for hypotension, and also prednisone 30mg bid.    Still in Afib (history of pAfib, however has been persistent during this perioperative period), with HR's in the 80-90's on tele, rising to > 100's with activity.  Limited TTE (postop) yesterday demonstrated normal LV fcn EF 60-65%, no rWMA.    Recs:  -reinitiate metoprolol tartrate 25mg bid for now (home dose is metop succinate) and montior BP's  -digoxin 0.125mg daily  -it appears he was initiated on midodrine 10mg TID as well for hypotension, and also prednisone 30mg bid  -slowly reinitiate outpatient HFpEF regimen as BP tolerates, and depending on volume status  -discussed with Dr. Agarwal - plan is to reinitiate apixaban and clopidogrel when cleared to do so by surgical team  -ongoing postop care    Peripheral IV 04/09/24 20 G Right;Posterior Wrist (Active)   Site Assessment Clean;Dry;Intact 04/13/24 0400   Dressing Type Transparent  04/13/24 0400   Line Status Infusing 04/13/24 0400   Dressing Status Clean;Dry 04/13/24 0400   Number of days: 4       Peripheral IV 04/11/24 20 G Left Forearm (Active)   Site Assessment Clean;Dry;Intact 04/13/24 0400   Dressing Type Transparent 04/13/24 0400   Line Status Flushed 04/13/24 0400   Dressing Status Clean;Dry 04/13/24 0400   Number of days: 2       Code Status:  Full Code    I spent 30 minutes of critical care time in the professional and overall care of this patient on 4/12/24.        Leann Rosario MD

## 2024-04-14 LAB
ANION GAP SERPL CALC-SCNC: 10 MMOL/L (ref 10–20)
ANION GAP SERPL CALC-SCNC: 11 MMOL/L (ref 10–20)
ATRIAL RATE: 93 BPM
BASOPHILS # BLD AUTO: 0.01 X10*3/UL (ref 0–0.1)
BASOPHILS NFR BLD AUTO: 0.2 %
BUN SERPL-MCNC: 33 MG/DL (ref 6–23)
BUN SERPL-MCNC: 34 MG/DL (ref 6–23)
CALCIUM SERPL-MCNC: 7.6 MG/DL (ref 8.6–10.3)
CALCIUM SERPL-MCNC: 7.9 MG/DL (ref 8.6–10.3)
CHLORIDE SERPL-SCNC: 101 MMOL/L (ref 98–107)
CHLORIDE SERPL-SCNC: 101 MMOL/L (ref 98–107)
CO2 SERPL-SCNC: 23 MMOL/L (ref 21–32)
CO2 SERPL-SCNC: 25 MMOL/L (ref 21–32)
CREAT SERPL-MCNC: 1.29 MG/DL (ref 0.5–1.3)
CREAT SERPL-MCNC: 1.35 MG/DL (ref 0.5–1.3)
DIGOXIN SERPL-MCNC: 1.07 NG/ML (ref 0.8–?)
EGFRCR SERPLBLD CKD-EPI 2021: 58 ML/MIN/1.73M*2
EGFRCR SERPLBLD CKD-EPI 2021: 61 ML/MIN/1.73M*2
EOSINOPHIL # BLD AUTO: 0 X10*3/UL (ref 0–0.7)
EOSINOPHIL NFR BLD AUTO: 0 %
ERYTHROCYTE [DISTWIDTH] IN BLOOD BY AUTOMATED COUNT: 15.8 % (ref 11.5–14.5)
GLUCOSE SERPL-MCNC: 193 MG/DL (ref 74–99)
GLUCOSE SERPL-MCNC: 205 MG/DL (ref 74–99)
HCT VFR BLD AUTO: 29.1 % (ref 41–52)
HGB BLD-MCNC: 9.1 G/DL (ref 13.5–17.5)
IMM GRANULOCYTES # BLD AUTO: 0.05 X10*3/UL (ref 0–0.7)
IMM GRANULOCYTES NFR BLD AUTO: 0.8 % (ref 0–0.9)
LYMPHOCYTES # BLD AUTO: 0.7 X10*3/UL (ref 1.2–4.8)
LYMPHOCYTES NFR BLD AUTO: 10.6 %
MAGNESIUM SERPL-MCNC: 2.17 MG/DL (ref 1.6–2.4)
MCH RBC QN AUTO: 26.5 PG (ref 26–34)
MCHC RBC AUTO-ENTMCNC: 31.3 G/DL (ref 32–36)
MCV RBC AUTO: 85 FL (ref 80–100)
MONOCYTES # BLD AUTO: 0.12 X10*3/UL (ref 0.1–1)
MONOCYTES NFR BLD AUTO: 1.8 %
NEUTROPHILS # BLD AUTO: 5.74 X10*3/UL (ref 1.2–7.7)
NEUTROPHILS NFR BLD AUTO: 86.6 %
NRBC BLD-RTO: 0 /100 WBCS (ref 0–0)
PHOSPHATE SERPL-MCNC: 2.1 MG/DL (ref 2.5–4.9)
PLATELET # BLD AUTO: 123 X10*3/UL (ref 150–450)
POTASSIUM SERPL-SCNC: 4.9 MMOL/L (ref 3.5–5.3)
POTASSIUM SERPL-SCNC: 4.9 MMOL/L (ref 3.5–5.3)
PR INTERVAL: 137 MS
Q ONSET: 251 MS
QRS COUNT: 14 BEATS
QRS DURATION: 100 MS
QT INTERVAL: 352 MS
QTC CALCULATION(BAZETT): 429 MS
QTC FREDERICIA: 401 MS
R AXIS: -29 DEGREES
RBC # BLD AUTO: 3.44 X10*6/UL (ref 4.5–5.9)
SODIUM SERPL-SCNC: 130 MMOL/L (ref 136–145)
SODIUM SERPL-SCNC: 131 MMOL/L (ref 136–145)
T AXIS: 243 DEGREES
T OFFSET: 427 MS
VENTRICULAR RATE: 89 BPM
WBC # BLD AUTO: 6.6 X10*3/UL (ref 4.4–11.3)

## 2024-04-14 PROCEDURE — 2500000001 HC RX 250 WO HCPCS SELF ADMINISTERED DRUGS (ALT 637 FOR MEDICARE OP)

## 2024-04-14 PROCEDURE — 99232 SBSQ HOSP IP/OBS MODERATE 35: CPT | Performed by: INTERNAL MEDICINE

## 2024-04-14 PROCEDURE — 2500000005 HC RX 250 GENERAL PHARMACY W/O HCPCS

## 2024-04-14 PROCEDURE — 2500000005 HC RX 250 GENERAL PHARMACY W/O HCPCS: Performed by: SURGERY

## 2024-04-14 PROCEDURE — 83735 ASSAY OF MAGNESIUM: CPT | Performed by: SURGERY

## 2024-04-14 PROCEDURE — 2500000004 HC RX 250 GENERAL PHARMACY W/ HCPCS (ALT 636 FOR OP/ED): Performed by: SURGERY

## 2024-04-14 PROCEDURE — 36415 COLL VENOUS BLD VENIPUNCTURE: CPT | Performed by: SURGERY

## 2024-04-14 PROCEDURE — 2500000001 HC RX 250 WO HCPCS SELF ADMINISTERED DRUGS (ALT 637 FOR MEDICARE OP): Performed by: SURGERY

## 2024-04-14 PROCEDURE — 80048 BASIC METABOLIC PNL TOTAL CA: CPT | Performed by: SURGERY

## 2024-04-14 PROCEDURE — 85025 COMPLETE CBC W/AUTO DIFF WBC: CPT | Performed by: SURGERY

## 2024-04-14 PROCEDURE — 2500000002 HC RX 250 W HCPCS SELF ADMINISTERED DRUGS (ALT 637 FOR MEDICARE OP, ALT 636 FOR OP/ED): Mod: MUE | Performed by: SURGERY

## 2024-04-14 PROCEDURE — 80162 ASSAY OF DIGOXIN TOTAL: CPT

## 2024-04-14 PROCEDURE — 1200000002 HC GENERAL ROOM WITH TELEMETRY DAILY

## 2024-04-14 PROCEDURE — 84100 ASSAY OF PHOSPHORUS: CPT | Performed by: SURGERY

## 2024-04-14 PROCEDURE — 99024 POSTOP FOLLOW-UP VISIT: CPT | Performed by: SURGERY

## 2024-04-14 PROCEDURE — 2500000004 HC RX 250 GENERAL PHARMACY W/ HCPCS (ALT 636 FOR OP/ED)

## 2024-04-14 PROCEDURE — 99231 SBSQ HOSP IP/OBS SF/LOW 25: CPT | Performed by: NURSE PRACTITIONER

## 2024-04-14 RX ORDER — SODIUM CHLORIDE 9 MG/ML
100 INJECTION, SOLUTION INTRAVENOUS CONTINUOUS
Status: DISCONTINUED | OUTPATIENT
Start: 2024-04-14 | End: 2024-04-15

## 2024-04-14 RX ORDER — SODIUM CHLORIDE, SODIUM LACTATE, POTASSIUM CHLORIDE, CALCIUM CHLORIDE 600; 310; 30; 20 MG/100ML; MG/100ML; MG/100ML; MG/100ML
75 INJECTION, SOLUTION INTRAVENOUS CONTINUOUS
Status: DISCONTINUED | OUTPATIENT
Start: 2024-04-14 | End: 2024-04-14

## 2024-04-14 RX ADMIN — Medication 50 MG OF ELEMENTAL ZINC: at 09:17

## 2024-04-14 RX ADMIN — METOPROLOL TARTRATE 25 MG: 25 TABLET, FILM COATED ORAL at 20:21

## 2024-04-14 RX ADMIN — ACETAMINOPHEN 650 MG: 325 TABLET ORAL at 15:37

## 2024-04-14 RX ADMIN — CALCIUM CHLORIDE 1 G: 100 INJECTION INTRAVENOUS; INTRAVENTRICULAR at 20:20

## 2024-04-14 RX ADMIN — ACETAMINOPHEN 650 MG: 325 TABLET ORAL at 22:14

## 2024-04-14 RX ADMIN — SODIUM CHLORIDE, POTASSIUM CHLORIDE, SODIUM LACTATE AND CALCIUM CHLORIDE 1000 ML: 600; 310; 30; 20 INJECTION, SOLUTION INTRAVENOUS at 14:05

## 2024-04-14 RX ADMIN — MULTIVITAMIN TABLET 1 TABLET: TABLET at 09:17

## 2024-04-14 RX ADMIN — CALCIUM CHLORIDE 1 G: 100 INJECTION INTRAVENOUS; INTRAVENTRICULAR at 15:39

## 2024-04-14 RX ADMIN — OXYCODONE HYDROCHLORIDE 10 MG: 10 TABLET ORAL at 11:57

## 2024-04-14 RX ADMIN — APIXABAN 5 MG: 5 TABLET, FILM COATED ORAL at 20:21

## 2024-04-14 RX ADMIN — PREDNISONE 40 MG: 20 TABLET ORAL at 09:17

## 2024-04-14 RX ADMIN — DIGOXIN 125 MCG: 125 TABLET ORAL at 09:17

## 2024-04-14 RX ADMIN — METOPROLOL TARTRATE 25 MG: 25 TABLET, FILM COATED ORAL at 09:17

## 2024-04-14 RX ADMIN — ACETAMINOPHEN 650 MG: 325 TABLET ORAL at 10:07

## 2024-04-14 RX ADMIN — GABAPENTIN 300 MG: 300 CAPSULE ORAL at 20:21

## 2024-04-14 RX ADMIN — OXYCODONE HYDROCHLORIDE 10 MG: 10 TABLET ORAL at 21:03

## 2024-04-14 RX ADMIN — APIXABAN 5 MG: 5 TABLET, FILM COATED ORAL at 09:17

## 2024-04-14 RX ADMIN — LIDOCAINE 4% 2 PATCH: 40 PATCH TOPICAL at 09:17

## 2024-04-14 RX ADMIN — ACETAMINOPHEN 650 MG: 325 TABLET ORAL at 04:17

## 2024-04-14 RX ADMIN — SODIUM CHLORIDE 100 ML/HR: 9 INJECTION, SOLUTION INTRAVENOUS at 21:18

## 2024-04-14 RX ADMIN — SODIUM CHLORIDE, POTASSIUM CHLORIDE, SODIUM LACTATE AND CALCIUM CHLORIDE 75 ML/HR: 600; 310; 30; 20 INJECTION, SOLUTION INTRAVENOUS at 06:16

## 2024-04-14 RX ADMIN — ROSUVASTATIN 5 MG: 10 TABLET, FILM COATED ORAL at 09:17

## 2024-04-14 RX ADMIN — POTASSIUM PHOSPHATE, MONOBASIC AND POTASSIUM PHOSPHATE, DIBASIC 15 MMOL: 224; 236 INJECTION, SOLUTION, CONCENTRATE INTRAVENOUS at 09:16

## 2024-04-14 RX ADMIN — PANTOPRAZOLE SODIUM 40 MG: 40 TABLET, DELAYED RELEASE ORAL at 05:56

## 2024-04-14 RX ADMIN — KETOROLAC TROMETHAMINE 15 MG: 30 INJECTION, SOLUTION INTRAMUSCULAR at 04:16

## 2024-04-14 RX ADMIN — OXYCODONE HYDROCHLORIDE AND ACETAMINOPHEN 500 MG: 500 TABLET ORAL at 09:17

## 2024-04-14 RX ADMIN — SODIUM CHLORIDE, POTASSIUM CHLORIDE, SODIUM LACTATE AND CALCIUM CHLORIDE 1000 ML: 600; 310; 30; 20 INJECTION, SOLUTION INTRAVENOUS at 17:01

## 2024-04-14 ASSESSMENT — COGNITIVE AND FUNCTIONAL STATUS - GENERAL
MOBILITY SCORE: 24
MOBILITY SCORE: 24
DAILY ACTIVITIY SCORE: 24
MOBILITY SCORE: 24

## 2024-04-14 ASSESSMENT — ENCOUNTER SYMPTOMS
ABDOMINAL PAIN: 0
JOINT SWELLING: 0
FEVER: 0
CHEST TIGHTNESS: 0
SHORTNESS OF BREATH: 0
CHILLS: 0
NAUSEA: 0
HEADACHES: 0

## 2024-04-14 ASSESSMENT — PAIN SCALES - GENERAL
PAINLEVEL_OUTOF10: 3
PAINLEVEL_OUTOF10: 7
PAINLEVEL_OUTOF10: 4
PAINLEVEL_OUTOF10: 0 - NO PAIN
PAINLEVEL_OUTOF10: 8
PAINLEVEL_OUTOF10: 2
PAINLEVEL_OUTOF10: 5 - MODERATE PAIN
PAINLEVEL_OUTOF10: 3
PAINLEVEL_OUTOF10: 8
PAINLEVEL_OUTOF10: 2
PAINLEVEL_OUTOF10: 2
PAINLEVEL_OUTOF10: 4

## 2024-04-14 ASSESSMENT — PAIN - FUNCTIONAL ASSESSMENT
PAIN_FUNCTIONAL_ASSESSMENT: 0-10

## 2024-04-14 NOTE — PROGRESS NOTES
Kevin Rubi is a 68 y.o. male on day 5 of admission presenting with Incarcerated right inguinal hernia.      Subjective   Kevin Rubi is a 68 y.o. 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 Patient was in his normal state of health until about two days ago when he started experiencing right groin and abdominal pain. Also with nausea and non-bloody emesis. Originally went to a cardiology appointment today but found to be hypotensive so sent to the ED. In the ED, found to have an incarcerated ventral hernia so taken to surgery for emergent surgical reduction with bowel resection. Hospitalist service consulted for medication management. Currently pain controlled post-op. NGT left in place. Denies SOB or LE edema. Wants numbing spray for throat. BP low-nl.     4/10- No acute events overnight. Pt doing well after surgery. Critical care also following.  4/11: No acute events overnight.  NG tube was removed.  Patient started clear liquids.  Other than abdominal pain at the site of the surgery patient is overall doing well.  He denies any nausea, vomiting.  He denies any fevers or chills.  We will continue to follow with you blood pressure remains low.  Patient denies any dizziness, palpitations or chest pain.  Patient will likely require at least another 24 to 48 hours of inpatient service.  4/12: No acute events overnight. Patient started clear liquids yesterday and tolerated them well. Will start a fill liquid diet today. Patient has been having hyptension over the pass few days, he is now off pressors and was started on midodrine. Cardio was consulted and is following. Possible TTF later today or tomorrow.   4/13: No acute events overnight. Pt clinically much improved . He  denies any abdominal pain, nausea, vomiting diarrhea.  Denies any fevers or chills.  He will likely be transferred to the medical floor today.  Anticipate discharge on Monday.  4/14: No acute events overnight.  Patient denies any new symptoms.  Will continue to follow with you.       Objective     Last Recorded Vitals  /80 (BP Location: Left arm, Patient Position: Lying)   Pulse 83   Temp 36.7 °C (98.1 °F) (Temporal)   Resp 18   Wt 81.6 kg (179 lb 14.3 oz)   SpO2 95%   Intake/Output last 3 Shifts:    Intake/Output Summary (Last 24 hours) at 4/14/2024 1643  Last data filed at 4/14/2024 0945  Gross per 24 hour   Intake 883.75 ml   Output --   Net 883.75 ml       Admission Weight  Weight: 69.9 kg (154 lb) (04/09/24 1419)    Daily Weight  04/14/24 : 81.6 kg (179 lb 14.3 oz)    Image Results  ECG 12 lead  Atrial fibrillation  Ventricular premature complex  Low voltage, extremity leads  Repol abnrm suggests ischemia, diffuse leads    See ED provider note for full interpretation and clinical correlation  Confirmed by Kalyani Perdomo ([id number]) on 4/14/2024 11:43:27 AM      Physical Exam  Constitutional:       Appearance: Normal appearance.   Cardiovascular:      Rate and Rhythm: Normal rate and regular rhythm.      Pulses: Normal pulses.   Pulmonary:      Effort: Pulmonary effort is normal.      Breath sounds: Normal breath sounds.   Abdominal:      General: Bowel sounds are normal.      Palpations: Abdomen is soft.   Musculoskeletal:         General: Normal range of motion.      Cervical back: Normal range of motion.   Neurological:      General: No focal deficit present.      Mental Status: He is alert and oriented to person, place, and time.   Psychiatric:         Mood and Affect: Mood normal.         Behavior: Behavior normal.         Scheduled medications  acetaminophen, 650 mg, oral, q6h  apixaban, 5 mg, oral, q12h  ascorbic acid, 500 mg, oral, Daily  calcium chloride, 1 g, intravenous, Once  digoxin, 125  mcg, oral, Daily  gabapentin, 300 mg, oral, Nightly  lactated Ringer's, 1,000 mL, intravenous, Once  lidocaine, 2 patch, transdermal, Daily  metoprolol tartrate, 25 mg, oral, BID  multivitamin, 1 tablet, oral, Daily  pantoprazole, 40 mg, oral, Daily before breakfast  [START ON 5/5/2024] predniSONE, 10 mg, oral, Daily  [START ON 4/28/2024] predniSONE, 20 mg, oral, Daily  [START ON 4/21/2024] predniSONE, 30 mg, oral, Daily  predniSONE, 40 mg, oral, Daily  rosuvastatin, 5 mg, oral, Daily  zinc sulfate, 50 mg of elemental zinc, oral, Daily      Continuous medications  sodium chloride 0.9%, 100 mL/hr        PRN medications  PRN medications: ipratropium-albuteroL, metoprolol, morphine, [DISCONTINUED] ondansetron ODT **OR** ondansetron, oxyCODONE, oxyCODONE, tiZANidine                Assessment/Plan        ASSESSMENT     male with hx of lung cancer s/p chemo/radiation/immunotherapy c/b pneumonitis on prednisone, afib on Eliquis, SMA occlusion s/p stenting, 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     PLAN     Incarcerated Hernia s/p Surgical Reduction w/ Bowel Resection  -Presented with abd pain, nausea, and vomiting and found to have incarcerated hernia s/p emergent surgical reduction with bowel resection  -Post-op with low-nl Bps but otherwise doing well  -Critical care and Surgery managing  4/11: Now on clear liquid diet.  Critical care recommendations appreciated.  4/12: Now on full liquid diet. Will continue to follow recommendations of general surgeon and critical care.  4/13: Clinically improving.  Will continue to follow with you.  4/14: Doing well.     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  -Currently on RA  -On pred taper, current dose 70mg  -Will transition to hydrocortisone IV 75mg q6 given patient NPO and  for mineralocorticoid effect for stress dosing  -Should probably try to expedite steroid taper for wound healing purposes but will defer this to pulmonology     Chronic HFrEF EF 20% (EF now recovered) 2/2 NICM  -2/2 NICM (alcohol) last EF recovered at 55%  -No signs of decompensated CHF  -Hold home GDMT (Entresto, metop, Aldactone) for tonight given low Bps     Other Issues  -Permanent Atrial Fibrillation: Rates controlled, continue dig but hold oral metop given low-nl BP, hold Eliquis for tonight  -Non-onst CAD: Home Statin. Hold Plavix for tonight  -Lung cancer s/p chemo/radiation/immunotherapy: Follow-up as an outpatient      DVT Prophy  -SCDs, hold home Eliquis for tonight     Malnutrition Diagnosis Status: New  Malnutrition Diagnosis: Severe malnutrition related to chronic disease or condition  As Evidenced by: moderate to severe muscle/fat loss per NFPE, and PO intake <75% for >1 month  I agree with the dietitian's malnutrition diagnosis.         Sweta Jefferson MD

## 2024-04-14 NOTE — PROGRESS NOTES
Subjective Data:  Up at bedside.  No chest discomfort.  Trace to +1 edema pos tibial and pedal.   Not sob.      Overnight Events:    No significant events.      Objective Data:  Last Recorded Vitals:  Vitals:    04/13/24 2036 04/14/24 0108 04/14/24 0451 04/14/24 0850   BP: 126/62 149/79 144/72 126/66   BP Location: Left arm Left arm Right arm Left arm   Patient Position: Lying Lying Lying Lying   Pulse: 84 96 77 98   Resp: 18 20 18 18   Temp: 36 °C (96.8 °F) 36.2 °C (97.1 °F) 36.3 °C (97.3 °F) 36.4 °C (97.6 °F)   TempSrc: Temporal Temporal Temporal Temporal   SpO2: 94% 93% 100% 100%   Weight:   81.6 kg (179 lb 14.3 oz)    Height:           Last Labs:  Results from last 7 days   Lab Units 04/14/24 0425 04/13/24 0403 04/12/24  1431 04/12/24  0421   SODIUM mmol/L 131* 131* 130* 133*   POTASSIUM mmol/L 4.9 4.3 3.8 3.3*   CHLORIDE mmol/L 101 102 99 105   CO2 mmol/L 25 24 25 22   BUN mg/dL 33* 24*  --  24*   CREATININE mg/dL 1.35* 0.93  --  0.83   GLUCOSE mg/dL 193* 202*  --  153*   CALCIUM mg/dL 7.9* 7.6*  --  6.8*     Results from last 7 days   Lab Units 04/14/24 0425 04/13/24 0403 04/12/24  0421   WBC AUTO x10*3/uL 6.6 8.0 9.8   HEMOGLOBIN g/dL 9.1* 8.8* 8.2*   HEMATOCRIT % 29.1* 28.1* 24.7*   PLATELETS AUTO x10*3/uL 123* 119* 107*      Results from last 7 days   Lab Units 04/14/24 0425   MAGNESIUM mg/dL 2.17      CBC - 4/14/2024:  4:25 AM  6.6 9.1 123    29.1      CMP - 4/14/2024:  4:25 AM  7.9 6.9 11 --- 0.6   2.1 3.7 13 46      PTT - 9/12/2023:  7:46 AM  1.8   20.7 32     TROPHS   Date/Time Value Ref Range Status   04/09/2024 04:51 PM 22 0 - 20 ng/L Final   12/18/2023 12:31 PM 34 0 - 53 ng/L Final   12/18/2023 11:12 AM 30 0 - 53 ng/L Final     HGBA1C   Date/Time Value Ref Range Status   02/27/2024 12:21 PM 6.1 see below % Final   08/08/2023 04:31 AM 7.3 % Final     Comment:          Diagnosis of Diabetes-Adults   Non-Diabetic: < or = 5.6%   Increased risk for developing diabetes: 5.7-6.4%   Diagnostic of  diabetes: > or = 6.5%  .       Monitoring of Diabetes                Age (y)     Therapeutic Goal (%)   Adults:          >18           <7.0   Pediatrics:    13-18           <7.5                   7-12           <8.0                   0- 6            7.5-8.5   American Diabetes Association. Diabetes Care 33(S1), Jan 2010.       LDLCALC   Date/Time Value Ref Range Status   10/23/2023 09:08 AM 74 <=99 mg/dL Final     Comment:                                 Near   Borderline      AGE      Desirable  Optimal    High     High     Very High     0-19 Y     0 - 109     ---    110-129   >/= 130     ----    20-24 Y     0 - 119     ---    120-159   >/= 160     ----      >24 Y     0 -  99   100-129  130-159   160-189     >/=190       VLDL   Date/Time Value Ref Range Status   10/23/2023 09:08 AM 24 0 - 40 mg/dL Final   04/01/2023 08:05 AM 46 0 - 40 mg/dL Final   07/28/2022 08:24 AM 41 0 - 40 mg/dL Final   08/14/2021 08:44 AM 31 0 - 40 mg/dL Final      Last I/O:  I/O last 3 completed shifts:  In: 1906.7 (23.4 mL/kg) [P.O.:670; I.V.:1186.7 (14.5 mL/kg); IV Piggyback:50]  Out: 450 (5.5 mL/kg) [Urine:450 (0.2 mL/kg/hr)]  Weight: 81.6 kg     Past Cardiology Tests (Last 3 Years):  EKG:  ECG 12 lead 04/09/2024      ECG 12 Lead 04/09/2024      ECG 12 lead 12/18/2023    Echo:  Transthoracic Echo (TTE) Limited 04/11/2024      Transthoracic echo (TTE) complete 03/26/2024    Ejection Fractions:  EF   Date/Time Value Ref Range Status   03/26/2024 01:26 PM 58 %      Cath:  No results found for this or any previous visit from the past 1095 days.    Stress Test:  No results found for this or any previous visit from the past 1095 days.    Cardiac Imaging:  No results found for this or any previous visit from the past 1095 days.      Inpatient Medications:  Scheduled medications   Medication Dose Route Frequency    acetaminophen  650 mg oral q6h    apixaban  5 mg oral q12h    ascorbic acid  500 mg oral Daily    calcium chloride  1 g intravenous  Once    digoxin  125 mcg oral Daily    gabapentin  300 mg oral Nightly    lactated Ringer's  1,000 mL intravenous Once    lidocaine  2 patch transdermal Daily    metoprolol tartrate  25 mg oral BID    multivitamin  1 tablet oral Daily    pantoprazole  40 mg oral Daily before breakfast    potassium phosphate  15 mmol intravenous Once    [START ON 5/5/2024] predniSONE  10 mg oral Daily    [START ON 4/28/2024] predniSONE  20 mg oral Daily    [START ON 4/21/2024] predniSONE  30 mg oral Daily    predniSONE  40 mg oral Daily    rosuvastatin  5 mg oral Daily    zinc sulfate  50 mg of elemental zinc oral Daily     PRN medications   Medication    ipratropium-albuteroL    metoprolol    morphine    ondansetron    oxyCODONE    oxyCODONE    tiZANidine     Continuous Medications   Medication Dose Last Rate    sodium chloride 0.9%  75 mL/hr         Physical Exam:  Constitutional:       General: He is not in acute distress.  HENT:      Head: Normocephalic and atraumatic.      Mouth: Mucous membranes are moist.      Neck:  No JVD or HJR   Eyes:      Extraocular Movements: .      Conjunctiva/sclera: Conjunctivae normal.    Cardiovascular:      Rate and Rhythm: irregularly irregular HR controlled.  Telemetry shows atrial fibrillation.      Heart sounds:  S1 S2 normal, no murmur, no S3 or S4   Pulmonary:      Effort: Pulmonary effort is normal. No respiratory distress.      Breath sounds: Normal breath sounds. No stridor. No wheezing or rales.   Abdominal:      General: Bowel sounds are normal. There is no distension.      Tenderness: There is no abdominal tenderness. There is no guarding or rebound.   Musculoskeletal:         General: +1 posterior tibial and pedal edema, tenderness or deformity. Normal range of motion.      Comments:   Skin:     General: Skin is warm and dry.   Neurological:      General: No focal deficit present.      Mental Status: alert and oriented to person, place, and time. Mental status is at baseline.      Psychiatric:         Mood and Affect: Mood normal.       Assessment/Plan   1.Incarcerated R inguinal hernia s/p open repair of hernia / exlap / small bowel resection on 4/9/24  NICM with recovery of EF from 20% > 55-60%  paroxysmal Afib on apixaban  Lung CA s/p CTX/XRT in 12/23  mild nonobst CAD  ascending aortic aneurysm (measured 4.4 cm on CT 3/2021)  former tobacco use     4/13/24:  Postop hypotension has improved, likely due to post op / post anesthesia vasoplegia, blood loss, etc.  He is off phenylephrine.  It appears he was initiated on midodrine 10mg TID as well for hypotension, and also prednisone 30mg bid.     4/14/2024 : BP stable       2. Still in Afib (history of pAfib, however has been persistent during this perioperative period), with HR's in the 80-90's on tele, rising to > 100's with activity.  Limited TTE (postop) yesterday demonstrated normal LV fcn EF 60-65%, no rWMA.  4/14/24:  HR is controlled.   OAC with Eliquis .  Blood count is improving.       Code Status:  Full Code    I spent 15 minutes in the professional and overall care of this patient.        Aidee Muhammad, APRN-CNP

## 2024-04-14 NOTE — PROGRESS NOTES
GENERAL SURGERY PROGRESS NOTE    Kevin Rubi   1955   [id number]     Kevin Rubi is a 68 y.o. male  with history of stage IIIC LLL lung cancer s/p chemo/RT, recent pneumonitis on prednisone daily, HTN, A-fib on Eliquis, ascending aortic aneurysm, HFpEF with EF 60%, hx SMA occlusion s/p stenting, PUD, HLD on day 5 of admission presenting with Incarcerated right inguinal hernia.    Open repair of right inguinal hernia, Exploratory  laparotomy,small  bowel resection on 4/9/24, 5 Days Post-Op    Subjective  Patient reports multiple BM overnight. Denies fevers/chills/nausea. Tolerating current diet. Had a bump in Cre to 1.35 this AM    Review of Systems:  Review of Systems   Constitutional:  Negative for chills and fever.   Respiratory:  Negative for chest tightness and shortness of breath.    Cardiovascular:  Negative for chest pain.   Gastrointestinal:  Negative for abdominal pain and nausea.   Genitourinary:  Negative for decreased urine volume.   Musculoskeletal:  Negative for joint swelling.   Skin:  Negative for rash.   Neurological:  Negative for headaches.       Objective    Last Recorded Vitals  Blood pressure 144/72, pulse 77, temperature 36.3 °C (97.3 °F), temperature source Temporal, resp. rate 18, height 1.829 m (6'), weight 81.6 kg (179 lb 14.3 oz), SpO2 100%.    Intake/Output last 3 Shifts:  I/O last 3 completed shifts:  In: 1862.2 (25 mL/kg) [P.O.:500; I.V.:1312.2 (17.6 mL/kg); IV Piggyback:50]  Out: 1100 (14.8 mL/kg) [Urine:1100 (0.4 mL/kg/hr)]  Weight: 74.5 kg     Intake/Output Summary (Last 24 hours) at 4/14/2024 0656  Last data filed at 4/14/2024 0108  Gross per 24 hour   Intake 1006.67 ml   Output 450 ml   Net 556.67 ml       Physical Exam  Vitals reviewed.   Constitutional:       General: He is not in acute distress.  HENT:      Head: Normocephalic and atraumatic.   Eyes:      Conjunctiva/sclera: Conjunctivae normal.   Cardiovascular:      Rate and Rhythm: Normal rate and regular  rhythm.      Pulses: Normal pulses.   Pulmonary:      Effort: Pulmonary effort is normal. No respiratory distress.   Abdominal:      General: There is no distension.      Palpations: Abdomen is soft.      Comments: Appropriately tender near incisions. Goodspring present, incisions well intact, some ecchymosis along both incisions, no palpable masses or hematoma   Musculoskeletal:         General: No swelling.      Cervical back: Neck supple.   Skin:     General: Skin is warm and dry.   Neurological:      Mental Status: He is alert and oriented to person, place, and time.         Relevant Results  Labs:  Results for orders placed or performed during the hospital encounter of 04/09/24 (from the past 24 hour(s))   Digoxin   Result Value Ref Range    Digoxin  1.07 0.80 - <2.00 ng/mL   CBC and Auto Differential   Result Value Ref Range    WBC 6.6 4.4 - 11.3 x10*3/uL    nRBC 0.0 0.0 - 0.0 /100 WBCs    RBC 3.44 (L) 4.50 - 5.90 x10*6/uL    Hemoglobin 9.1 (L) 13.5 - 17.5 g/dL    Hematocrit 29.1 (L) 41.0 - 52.0 %    MCV 85 80 - 100 fL    MCH 26.5 26.0 - 34.0 pg    MCHC 31.3 (L) 32.0 - 36.0 g/dL    RDW 15.8 (H) 11.5 - 14.5 %    Platelets 123 (L) 150 - 450 x10*3/uL    Neutrophils % 86.6 40.0 - 80.0 %    Immature Granulocytes %, Automated 0.8 0.0 - 0.9 %    Lymphocytes % 10.6 13.0 - 44.0 %    Monocytes % 1.8 2.0 - 10.0 %    Eosinophils % 0.0 0.0 - 6.0 %    Basophils % 0.2 0.0 - 2.0 %    Neutrophils Absolute 5.74 1.20 - 7.70 x10*3/uL    Immature Granulocytes Absolute, Automated 0.05 0.00 - 0.70 x10*3/uL    Lymphocytes Absolute 0.70 (L) 1.20 - 4.80 x10*3/uL    Monocytes Absolute 0.12 0.10 - 1.00 x10*3/uL    Eosinophils Absolute 0.00 0.00 - 0.70 x10*3/uL    Basophils Absolute 0.01 0.00 - 0.10 x10*3/uL   Basic Metabolic Panel   Result Value Ref Range    Glucose 193 (H) 74 - 99 mg/dL    Sodium 131 (L) 136 - 145 mmol/L    Potassium 4.9 3.5 - 5.3 mmol/L    Chloride 101 98 - 107 mmol/L    Bicarbonate 25 21 - 32 mmol/L    Anion Gap 10 10 -  20 mmol/L    Urea Nitrogen 33 (H) 6 - 23 mg/dL    Creatinine 1.35 (H) 0.50 - 1.30 mg/dL    eGFR 58 (L) >60 mL/min/1.73m*2    Calcium 7.9 (L) 8.6 - 10.3 mg/dL   Magnesium   Result Value Ref Range    Magnesium 2.17 1.60 - 2.40 mg/dL   Phosphorus   Result Value Ref Range    Phosphorus 2.1 (L) 2.5 - 4.9 mg/dL       Images:  Transthoracic Echo (TTE) Limited   Final Result      CT abdomen pelvis w IV contrast   Final Result   1.  Multiple loops of dilated small bowel consistent with a small   bowel obstruction.  There is a loop of small bowel located within a   right inguinal hernia likely incarcerated and acting as a transition   point for the obstruction.   2.  Ill-defined consolidative changes within the left lower lobe which   may represent a possible pneumonitis.   3.  Findings consistent with hepatic steatosis.   4.  No abdominal/pelvic fluid collections or pneumoperitoneum.   5.  Colonic diverticulosis without definite evidence of   diverticulitis.   6.  Stable mild prostatomegaly..   Result was given to Dr. Christiano Flanagan on 4/9/2024 at 1936.   Signed by Ward Tong MD          Assessment and Plan  Principal Problem:    Incarcerated right inguinal hernia    68 y.o. male with with history of stage IIIC LLL lung cancer s/p chemo/RT, recent pneumonitis on prednisone daily, HTN, A-fib on Eliquis, ascending aortic aneurysm, HFpEF with EF 60% who presented with incarcerated right inguinal hernia now s/p exploratory laparotomy, small bowel resection, and right inguinal hernia repair on 4/9/24. Doing expectantly.    Plan:  -Currently pain controlled: wean off IV pain medications and transition to PO Oxycodone, and scheduled toradol, toradol on hold for elevated Cre  -Cardiology following, on home PO meds and IV metoprolol, will get final recs closer to DC date, anticipate DC Monday  -Encourage IS use. Hx pneumonitis, on steroids at baseline, received stress dose in OR. Currently on prednisone 30 mg BID, per  oncology recs will start tomorrow 4/14 on 40 mg/day and taper 10 mg per week until reaches 10 mg/day - and stay on it until he sees Oncology outpatient in office  -Advance to regular, monitor bowel function  -Replace electrolytes as needed. Replacement Phos ordered this AM as well as gentle IV fluids given Cre increase  -Will follow up on intra-operative cultures, NGTD  -SCDs, Eliquis. Continue to hold Plavix, will resume Pavix on DC  -IMS and CCM consulted, appreciate recommendations    Discussed with attending Dr. Rubens Mora, DO - PGY2  General Surgery

## 2024-04-14 NOTE — CARE PLAN
Problem: Pain  Goal: My pain/discomfort is manageable  Outcome: Progressing     Problem: Safety  Goal: Patient will be injury free during hospitalization  Outcome: Progressing  Goal: I will remain free of falls  Outcome: Progressing     Problem: Daily Care  Goal: Daily care needs are met  Outcome: Progressing     Problem: Psychosocial Needs  Goal: Demonstrates ability to cope with hospitalization/illness  Outcome: Progressing  Goal: Collaborate with me, my family, and caregiver to identify my specific goals  Outcome: Progressing     Problem: Discharge Barriers  Goal: My discharge needs are met  Outcome: Progressing     Problem: Nutrition  Goal: Less than 5 days NPO/clear liquids  Outcome: Progressing  Goal: Lab values WNL  Outcome: Progressing  Goal: Promote healing  Outcome: Progressing  Goal: Maintain stable weight  Outcome: Progressing     Problem: Pain  Goal: Takes deep breaths with improved pain control throughout the shift  Outcome: Progressing  Goal: Turns in bed with improved pain control throughout the shift  Outcome: Progressing  Goal: Walks with improved pain control throughout the shift  Outcome: Progressing  Goal: Performs ADL's with improved pain control throughout shift  Outcome: Progressing  Goal: Participates in PT with improved pain control throughout the shift  Outcome: Progressing  Goal: Free from opioid side effects throughout the shift  Outcome: Progressing  Goal: Free from acute confusion related to pain meds throughout the shift  Outcome: Progressing     Problem: Pain - Adult  Goal: Verbalizes/displays adequate comfort level or baseline comfort level  Outcome: Progressing     Problem: Safety - Adult  Goal: Free from fall injury  Outcome: Progressing     Problem: Discharge Planning  Goal: Discharge to home or other facility with appropriate resources  Outcome: Progressing     Problem: Chronic Conditions and Co-morbidities  Goal: Patient's chronic conditions and co-morbidity symptoms are  monitored and maintained or improved  Outcome: Progressing     The patient's goals for the shift include      The clinical goals for the shift include Have adequate pain management during shift    Over the shift, the patient did not make progress toward the following goals.

## 2024-04-15 LAB
ANION GAP SERPL CALC-SCNC: 10 MMOL/L (ref 10–20)
BASOPHILS # BLD AUTO: 0 X10*3/UL (ref 0–0.1)
BASOPHILS NFR BLD AUTO: 0 %
BUN SERPL-MCNC: 29 MG/DL (ref 6–23)
CALCIUM SERPL-MCNC: 7.7 MG/DL (ref 8.6–10.3)
CHLORIDE SERPL-SCNC: 104 MMOL/L (ref 98–107)
CO2 SERPL-SCNC: 23 MMOL/L (ref 21–32)
CREAT SERPL-MCNC: 1.06 MG/DL (ref 0.5–1.3)
EGFRCR SERPLBLD CKD-EPI 2021: 77 ML/MIN/1.73M*2
EOSINOPHIL # BLD AUTO: 0.05 X10*3/UL (ref 0–0.7)
EOSINOPHIL NFR BLD AUTO: 0.6 %
ERYTHROCYTE [DISTWIDTH] IN BLOOD BY AUTOMATED COUNT: 16.1 % (ref 11.5–14.5)
GLUCOSE SERPL-MCNC: 182 MG/DL (ref 74–99)
HCT VFR BLD AUTO: 25.4 % (ref 41–52)
HGB BLD-MCNC: 8.3 G/DL (ref 13.5–17.5)
IMM GRANULOCYTES # BLD AUTO: 0.08 X10*3/UL (ref 0–0.7)
IMM GRANULOCYTES NFR BLD AUTO: 1 % (ref 0–0.9)
LYMPHOCYTES # BLD AUTO: 1.1 X10*3/UL (ref 1.2–4.8)
LYMPHOCYTES NFR BLD AUTO: 13.7 %
MAGNESIUM SERPL-MCNC: 1.67 MG/DL (ref 1.6–2.4)
MCH RBC QN AUTO: 27.3 PG (ref 26–34)
MCHC RBC AUTO-ENTMCNC: 32.7 G/DL (ref 32–36)
MCV RBC AUTO: 84 FL (ref 80–100)
MONOCYTES # BLD AUTO: 0.26 X10*3/UL (ref 0.1–1)
MONOCYTES NFR BLD AUTO: 3.2 %
NEUTROPHILS # BLD AUTO: 6.55 X10*3/UL (ref 1.2–7.7)
NEUTROPHILS NFR BLD AUTO: 81.5 %
NRBC BLD-RTO: 0.2 /100 WBCS (ref 0–0)
PHOSPHATE SERPL-MCNC: 1.8 MG/DL (ref 2.5–4.9)
PLATELET # BLD AUTO: 134 X10*3/UL (ref 150–450)
POTASSIUM SERPL-SCNC: 4.3 MMOL/L (ref 3.5–5.3)
RBC # BLD AUTO: 3.04 X10*6/UL (ref 4.5–5.9)
SODIUM SERPL-SCNC: 133 MMOL/L (ref 136–145)
WBC # BLD AUTO: 8 X10*3/UL (ref 4.4–11.3)

## 2024-04-15 PROCEDURE — 80048 BASIC METABOLIC PNL TOTAL CA: CPT | Performed by: SURGERY

## 2024-04-15 PROCEDURE — 84100 ASSAY OF PHOSPHORUS: CPT | Performed by: SURGERY

## 2024-04-15 PROCEDURE — 2500000001 HC RX 250 WO HCPCS SELF ADMINISTERED DRUGS (ALT 637 FOR MEDICARE OP)

## 2024-04-15 PROCEDURE — 99221 1ST HOSP IP/OBS SF/LOW 40: CPT | Performed by: INTERNAL MEDICINE

## 2024-04-15 PROCEDURE — 2500000001 HC RX 250 WO HCPCS SELF ADMINISTERED DRUGS (ALT 637 FOR MEDICARE OP): Performed by: NURSE PRACTITIONER

## 2024-04-15 PROCEDURE — 2500000002 HC RX 250 W HCPCS SELF ADMINISTERED DRUGS (ALT 637 FOR MEDICARE OP, ALT 636 FOR OP/ED): Performed by: SURGERY

## 2024-04-15 PROCEDURE — 97530 THERAPEUTIC ACTIVITIES: CPT | Mod: GO,CO

## 2024-04-15 PROCEDURE — 2500000004 HC RX 250 GENERAL PHARMACY W/ HCPCS (ALT 636 FOR OP/ED): Performed by: SURGERY

## 2024-04-15 PROCEDURE — 2500000001 HC RX 250 WO HCPCS SELF ADMINISTERED DRUGS (ALT 637 FOR MEDICARE OP): Performed by: SURGERY

## 2024-04-15 PROCEDURE — 99024 POSTOP FOLLOW-UP VISIT: CPT | Performed by: SURGERY

## 2024-04-15 PROCEDURE — 85025 COMPLETE CBC W/AUTO DIFF WBC: CPT | Performed by: SURGERY

## 2024-04-15 PROCEDURE — 83735 ASSAY OF MAGNESIUM: CPT | Performed by: SURGERY

## 2024-04-15 PROCEDURE — 99233 SBSQ HOSP IP/OBS HIGH 50: CPT | Performed by: INTERNAL MEDICINE

## 2024-04-15 PROCEDURE — 1200000002 HC GENERAL ROOM WITH TELEMETRY DAILY

## 2024-04-15 PROCEDURE — 36415 COLL VENOUS BLD VENIPUNCTURE: CPT | Performed by: SURGERY

## 2024-04-15 PROCEDURE — 99232 SBSQ HOSP IP/OBS MODERATE 35: CPT | Performed by: NURSE PRACTITIONER

## 2024-04-15 PROCEDURE — 2500000005 HC RX 250 GENERAL PHARMACY W/O HCPCS

## 2024-04-15 PROCEDURE — 97116 GAIT TRAINING THERAPY: CPT | Mod: CQ,GP

## 2024-04-15 RX ORDER — METOPROLOL SUCCINATE 50 MG/1
50 TABLET, EXTENDED RELEASE ORAL DAILY
Status: DISCONTINUED | OUTPATIENT
Start: 2024-04-15 | End: 2024-04-16 | Stop reason: HOSPADM

## 2024-04-15 RX ADMIN — APIXABAN 5 MG: 5 TABLET, FILM COATED ORAL at 21:27

## 2024-04-15 RX ADMIN — PREDNISONE 40 MG: 20 TABLET ORAL at 09:17

## 2024-04-15 RX ADMIN — ACETAMINOPHEN 650 MG: 325 TABLET ORAL at 04:06

## 2024-04-15 RX ADMIN — SACUBITRIL AND VALSARTAN 1 TABLET: 24; 26 TABLET, FILM COATED ORAL at 21:27

## 2024-04-15 RX ADMIN — METOPROLOL SUCCINATE 50 MG: 50 TABLET, FILM COATED, EXTENDED RELEASE ORAL at 09:58

## 2024-04-15 RX ADMIN — OXYCODONE HYDROCHLORIDE AND ACETAMINOPHEN 500 MG: 500 TABLET ORAL at 09:16

## 2024-04-15 RX ADMIN — ACETAMINOPHEN 650 MG: 325 TABLET ORAL at 17:09

## 2024-04-15 RX ADMIN — METOPROLOL TARTRATE 25 MG: 25 TABLET, FILM COATED ORAL at 09:17

## 2024-04-15 RX ADMIN — DIGOXIN 125 MCG: 125 TABLET ORAL at 09:17

## 2024-04-15 RX ADMIN — PANTOPRAZOLE SODIUM 40 MG: 40 TABLET, DELAYED RELEASE ORAL at 06:09

## 2024-04-15 RX ADMIN — GABAPENTIN 300 MG: 300 CAPSULE ORAL at 21:27

## 2024-04-15 RX ADMIN — OXYCODONE HYDROCHLORIDE 10 MG: 10 TABLET ORAL at 03:06

## 2024-04-15 RX ADMIN — Medication 50 MG OF ELEMENTAL ZINC: at 09:16

## 2024-04-15 RX ADMIN — OXYCODONE HYDROCHLORIDE 10 MG: 10 TABLET ORAL at 13:40

## 2024-04-15 RX ADMIN — OXYCODONE HYDROCHLORIDE 10 MG: 10 TABLET ORAL at 21:29

## 2024-04-15 RX ADMIN — SODIUM CHLORIDE 100 ML/HR: 9 INJECTION, SOLUTION INTRAVENOUS at 04:06

## 2024-04-15 RX ADMIN — LIDOCAINE 4% 2 PATCH: 40 PATCH TOPICAL at 09:16

## 2024-04-15 RX ADMIN — ROSUVASTATIN 5 MG: 10 TABLET, FILM COATED ORAL at 09:17

## 2024-04-15 RX ADMIN — MULTIVITAMIN TABLET 1 TABLET: TABLET at 09:17

## 2024-04-15 RX ADMIN — APIXABAN 5 MG: 5 TABLET, FILM COATED ORAL at 09:16

## 2024-04-15 RX ADMIN — ACETAMINOPHEN 650 MG: 325 TABLET ORAL at 22:21

## 2024-04-15 RX ADMIN — ACETAMINOPHEN 650 MG: 325 TABLET ORAL at 09:18

## 2024-04-15 ASSESSMENT — COGNITIVE AND FUNCTIONAL STATUS - GENERAL
DRESSING REGULAR UPPER BODY CLOTHING: A LITTLE
DAILY ACTIVITIY SCORE: 17
DAILY ACTIVITIY SCORE: 24
DAILY ACTIVITIY SCORE: 24
HELP NEEDED FOR BATHING: A LOT
CLIMB 3 TO 5 STEPS WITH RAILING: A LITTLE
MOBILITY SCORE: 24
PERSONAL GROOMING: A LITTLE
MOBILITY SCORE: 24
STANDING UP FROM CHAIR USING ARMS: A LITTLE
TOILETING: A LITTLE
MOBILITY SCORE: 20
DAILY ACTIVITIY SCORE: 24
DAILY ACTIVITIY SCORE: 24
MOBILITY SCORE: 24
DRESSING REGULAR LOWER BODY CLOTHING: A LOT
MOBILITY SCORE: 24
WALKING IN HOSPITAL ROOM: A LITTLE
MOVING TO AND FROM BED TO CHAIR: A LITTLE

## 2024-04-15 ASSESSMENT — PAIN SCALES - GENERAL
PAINLEVEL_OUTOF10: 2
PAINLEVEL_OUTOF10: 4
PAINLEVEL_OUTOF10: 8
PAINLEVEL_OUTOF10: 7
PAINLEVEL_OUTOF10: 8
PAINLEVEL_OUTOF10: 4
PAINLEVEL_OUTOF10: 2
PAINLEVEL_OUTOF10: 8
PAINLEVEL_OUTOF10: 5 - MODERATE PAIN

## 2024-04-15 ASSESSMENT — ENCOUNTER SYMPTOMS
NAUSEA: 0
SHORTNESS OF BREATH: 0
ABDOMINAL PAIN: 0
FEVER: 0
JOINT SWELLING: 0
HEADACHES: 0
CHEST TIGHTNESS: 0
CHILLS: 0

## 2024-04-15 ASSESSMENT — PAIN - FUNCTIONAL ASSESSMENT
PAIN_FUNCTIONAL_ASSESSMENT: 0-10

## 2024-04-15 ASSESSMENT — PAIN DESCRIPTION - DESCRIPTORS: DESCRIPTORS: ACHING;THROBBING;STABBING

## 2024-04-15 ASSESSMENT — PAIN DESCRIPTION - LOCATION
LOCATION: ABDOMEN
LOCATION: ABDOMEN

## 2024-04-15 NOTE — CARE PLAN
Problem: Pain  Goal: My pain/discomfort is manageable  Outcome: Progressing     Problem: Safety  Goal: Patient will be injury free during hospitalization  Outcome: Progressing  Goal: I will remain free of falls  Outcome: Progressing     Problem: Daily Care  Goal: Daily care needs are met  Outcome: Progressing     Problem: Psychosocial Needs  Goal: Demonstrates ability to cope with hospitalization/illness  Outcome: Progressing  Goal: Collaborate with me, my family, and caregiver to identify my specific goals  Outcome: Progressing     Problem: Discharge Barriers  Goal: My discharge needs are met  Outcome: Progressing     Problem: Nutrition  Goal: Less than 5 days NPO/clear liquids  Outcome: Progressing  Goal: Lab values WNL  Outcome: Progressing  Goal: Promote healing  Outcome: Progressing  Goal: Maintain stable weight  Outcome: Progressing     Problem: Pain  Goal: Takes deep breaths with improved pain control throughout the shift  Outcome: Progressing  Goal: Turns in bed with improved pain control throughout the shift  Outcome: Progressing  Goal: Walks with improved pain control throughout the shift  Outcome: Progressing  Goal: Performs ADL's with improved pain control throughout shift  Outcome: Progressing  Goal: Participates in PT with improved pain control throughout the shift  Outcome: Progressing  Goal: Free from opioid side effects throughout the shift  Outcome: Progressing  Goal: Free from acute confusion related to pain meds throughout the shift  Outcome: Progressing     Problem: Pain - Adult  Goal: Verbalizes/displays adequate comfort level or baseline comfort level  Outcome: Progressing     Problem: Safety - Adult  Goal: Free from fall injury  Outcome: Progressing     Problem: Discharge Planning  Goal: Discharge to home or other facility with appropriate resources  Outcome: Progressing     Problem: Chronic Conditions and Co-morbidities  Goal: Patient's chronic conditions and co-morbidity symptoms are  monitored and maintained or improved  Outcome: Progressing   The patient's goals for the shift include      The clinical goals for the shift include Pt will remain free from falls or injury this shift.

## 2024-04-15 NOTE — CONSULTS
Reason For Consult  Non-small cell lung cancer    History Of Present Illness  Kevin Rubi is a 68 y.o. 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 Patient was in his normal state of health until about two days ago when he started experiencing right groin and abdominal pain. Also with nausea and non-bloody emesis. Originally went to a cardiology appointment today but found to be hypotensive so sent to the ED. In the ED, found to have an incarcerated ventral hernia so taken to surgery for emergent surgical reduction with bowel resection.    Postoperatively patient doing very well.    History of non-small cell lung cancer stage IIIc, status post concurrent chemotherapy radiotherapy including carboplatin appointment to extricate and then was sick taking durvalumab which is in hold due to pneumonitis.    Postoperatively doing okay, no nausea vomiting, patient has some abdominal pain, patient has bowel movement watery today.  No fever abdominal pain after operation, no nausea vomiting, no fever patient have diarrhea, no chest pain no shortness of breath     Past Medical History  He has a past medical history of Aortic aneurysm (CMS-HCC), Atrial fibrillation (Multi), Coronary artery disease, Hyperlipidemia, Hypertension, Lung cancer (Multi), Personal history of peptic ulcer disease, Superior mesenteric artery stenosis (Multi), and Tinnitus, bilateral (08/14/2016).    Surgical History  He has a past surgical history that includes Other surgical history (02/13/2019); Other surgical history (02/13/2019); CT angio abdomen pelvis w and or wo IV IV contrast (08/08/2023); and Superior mestenteric artery stent.     Social History  He reports that he quit smoking about 11 years ago. His smoking  use included cigarettes. He started smoking about 41 years ago. He has a 15 pack-year smoking history. He has been exposed to tobacco smoke. He has never used smokeless tobacco. He reports that he does not currently use alcohol. He reports that he does not use drugs.    Family History  Family History   Problem Relation Name Age of Onset    Cancer Mother          ?larynx or lung        Allergies  Patient has no known allergies.    Review of Systems  No nausea vomiting, some abdominal pain patient has diarrhea, no fever, no chest pain, no     Physical Exam  Vitals are stable patient doing very well    Neck supple    HEENT examination normal limits    Lungs clear to auscultation, good air movement    Heart with normal regular rhythm    Abdomen is soft, bowel sound present, surgical incision healing well    Extremity no edema cyanosis    No peripheral lymphadenopathy     Last Recorded Vitals  Blood pressure (!) 152/94, pulse (!) 137, temperature 36.2 °C (97.1 °F), temperature source Temporal, resp. rate 18, height 1.829 m (6'), weight 83.4 kg (183 lb 13.8 oz), SpO2 96%.    Relevant Results   Latest Reference Range & Units 04/15/24 04:13   GLUCOSE 74 - 99 mg/dL 182 (H)   SODIUM 136 - 145 mmol/L 133 (L)   POTASSIUM 3.5 - 5.3 mmol/L 4.3   CHLORIDE 98 - 107 mmol/L 104   Bicarbonate 21 - 32 mmol/L 23   Anion Gap 10 - 20 mmol/L 10   Blood Urea Nitrogen 6 - 23 mg/dL 29 (H)   Creatinine 0.50 - 1.30 mg/dL 1.06   EGFR >60 mL/min/1.73m*2 77   Calcium 8.6 - 10.3 mg/dL 7.7 (L)   PHOSPHORUS 2.5 - 4.9 mg/dL 1.8 (L)   MAGNESIUM 1.60 - 2.40 mg/dL 1.67   LEUKOCYTES (10*3/UL) IN BLOOD BY AUTOMATED COUNT, [place] 4.4 - 11.3 x10*3/uL 8.0   nRBC 0.0 - 0.0 /100 WBCs 0.2 (H)   ERYTHROCYTES (10*6/UL) IN BLOOD BY AUTOMATED COUNT, [place] 4.50 - 5.90 x10*6/uL 3.04 (L)   HEMOGLOBIN 13.5 - 17.5 g/dL 8.3 (L)   HEMATOCRIT 41.0 - 52.0 % 25.4 (L)   MCV 80 - 100 fL 84   MCH 26.0 - 34.0 pg 27.3   MCHC 32.0 - 36.0 g/dL 32.7   RED CELL DISTRIBUTION WIDTH  11.5 - 14.5 % 16.1 (H)   PLATELETS (10*3/UL) IN BLOOD AUTOMATED COUNT, [place] 150 - 450 x10*3/uL 134 (L)   (H): Data is abnormally high  (L): Data is abnormally low     Assessment/Plan     #1 incarcerated hernia status post repair and bowel resection postoperatively doing very well    2.  Non-small cell lung cancer stage III status post concurrent chemoradiotherapy, chemotherapy regimen including carboplatin pemetrexed, was receiving   durvalumab.  No further intervention during hospitalization we will see as outpatient and finalize treatment plan.    Discussed with patient    I spent 40 minutes in the professional and overall care of this patient.      Michael Fonseca MD

## 2024-04-15 NOTE — PROGRESS NOTES
Kevin Rubi is a 68 y.o. male on day 6 of admission presenting with Incarcerated right inguinal hernia.      Subjective   Kevin Rubi is a 68 y.o. 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 Patient was in his normal state of health until about two days ago when he started experiencing right groin and abdominal pain. Also with nausea and non-bloody emesis. Originally went to a cardiology appointment today but found to be hypotensive so sent to the ED. In the ED, found to have an incarcerated ventral hernia so taken to surgery for emergent surgical reduction with bowel resection. Hospitalist service consulted for medication management. Currently pain controlled post-op. NGT left in place. Denies SOB or LE edema. Wants numbing spray for throat. BP low-nl.     4/10- No acute events overnight. Pt doing well after surgery. Critical care also following.  4/11: No acute events overnight.  NG tube was removed.  Patient started clear liquids.  Other than abdominal pain at the site of the surgery patient is overall doing well.  He denies any nausea, vomiting.  He denies any fevers or chills.  We will continue to follow with you blood pressure remains low.  Patient denies any dizziness, palpitations or chest pain.  Patient will likely require at least another 24 to 48 hours of inpatient service.  4/12: No acute events overnight. Patient started clear liquids yesterday and tolerated them well. Will start a fill liquid diet today. Patient has been having hyptension over the pass few days, he is now off pressors and was started on midodrine. Cardio was consulted and is following. Possible TTF later today or tomorrow.   4/13: No acute events overnight. Pt clinically much improved . He  denies any abdominal pain, nausea, vomiting diarrhea.  Denies any fevers or chills.  He will likely be transferred to the medical floor today.  Anticipate discharge on Monday.  4/14: No acute events overnight.  Patient denies any new symptoms.  Will continue to follow with you.  4/15: Patient seen.  Developed atrial fibrillation this a.m. with RVR.  Metoprolol has been on hold due to hypotension.  Seen by cardiology, metoprolol increased.  Will continue to monitor.  Patient is back on a normal consistency diet.       Objective     Last Recorded Vitals  BP (!) 152/94 (BP Location: Left arm, Patient Position: Lying) Comment: JENNIFER QUIROGA NOTIFIED  Pulse (!) 137 Comment: JENNIFER QUIROGA NOTIFIED  Temp 36.2 °C (97.1 °F) (Temporal)   Resp 18   Wt 83.4 kg (183 lb 13.8 oz)   SpO2 96%   Intake/Output last 3 Shifts:    Intake/Output Summary (Last 24 hours) at 4/15/2024 1112  Last data filed at 4/15/2024 0929  Gross per 24 hour   Intake 2740 ml   Output 0 ml   Net 2740 ml       Admission Weight  Weight: 69.9 kg (154 lb) (04/09/24 1419)    Daily Weight  04/15/24 : 83.4 kg (183 lb 13.8 oz)    Image Results  ECG 12 lead  Atrial fibrillation  Ventricular premature complex  Low voltage, extremity leads  Repol abnrm suggests ischemia, diffuse leads    See ED provider note for full interpretation and clinical correlation  Confirmed by Kalyani Perdomo ([id number]) on 4/14/2024 11:43:27 AM      Physical Exam  Constitutional:       Appearance: Normal appearance.   Cardiovascular:      Rate and Rhythm: Normal rate and regular rhythm.      Pulses: Normal pulses.   Pulmonary:      Effort: Pulmonary effort is normal.      Breath sounds: Normal breath sounds.   Abdominal:      General: Bowel sounds are normal.      Palpations: Abdomen is soft.   Musculoskeletal:         General: Normal range of motion.      Cervical back: Normal range of motion.   Neurological:      General: No focal deficit present.      Mental Status: He is alert and oriented to  person, place, and time.   Psychiatric:         Mood and Affect: Mood normal.         Behavior: Behavior normal.         Scheduled medications  acetaminophen, 650 mg, oral, q6h  apixaban, 5 mg, oral, q12h  ascorbic acid, 500 mg, oral, Daily  digoxin, 125 mcg, oral, Daily  gabapentin, 300 mg, oral, Nightly  lidocaine, 2 patch, transdermal, Daily  metoprolol succinate XL, 50 mg, oral, Daily  multivitamin, 1 tablet, oral, Daily  pantoprazole, 40 mg, oral, Daily before breakfast  [START ON 5/5/2024] predniSONE, 10 mg, oral, Daily  [START ON 4/28/2024] predniSONE, 20 mg, oral, Daily  [START ON 4/21/2024] predniSONE, 30 mg, oral, Daily  predniSONE, 40 mg, oral, Daily  rosuvastatin, 5 mg, oral, Daily  [START ON 4/16/2024] sacubitriL-valsartan, 1 tablet, oral, BID  zinc sulfate, 50 mg of elemental zinc, oral, Daily      Continuous medications         PRN medications  PRN medications: ipratropium-albuteroL, oxyCODONE, oxyCODONE, tiZANidine                Assessment/Plan        ASSESSMENT     male with hx of lung cancer s/p chemo/radiation/immunotherapy c/b pneumonitis on prednisone, afib on Eliquis, SMA occlusion s/p stenting, 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     PLAN     Incarcerated Hernia s/p Surgical Reduction w/ Bowel Resection  -Presented with abd pain, nausea, and vomiting and found to have incarcerated hernia s/p emergent surgical reduction with bowel resection  -Post-op with low-nl Bps but otherwise doing well  -Critical care and Surgery managing  4/11: Now on clear liquid diet.  Critical care recommendations appreciated.  4/12: Now on full liquid diet. Will continue to follow recommendations of general surgeon and critical care.  4/13: Clinically improving.  Will continue to follow with you.  4/14: Doing well.  4/15:  Currently on regular diet consistency.     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  Heart rate noted elevated 4/15.  Metoprolol had been on hold due to hypotension.  Restarting metoprolol, watch blood pressures.    Other Issues  -Non-onst CAD: Home Statin. Hold Plavix for tonight  -Lung cancer s/p chemo/radiation/immunotherapy: Follow-up as an outpatient      DVT Prophy  -SCDs, hold home Eliquis for tonight     Malnutrition Diagnosis Status: New  Malnutrition Diagnosis: Severe malnutrition related to chronic disease or condition  As Evidenced by: moderate to severe muscle/fat loss per NFPE, and PO intake <75% for >1 month  I agree with the dietitian's malnutrition diagnosis.         Mani East MD

## 2024-04-15 NOTE — PROGRESS NOTES
Nutrition Follow Up Assessment:   Nutrition Assessment         Medical history per chart:   Kevin Rubi is a 68 y.o. male  with history of stage IIIC LLL lung cancer s/p chemo/RT, recent pneumonitis on prednisone daily, HTN, A-fib on Eliquis, ascending aortic aneurysm, HFpEF with EF 60%, hx SMA occlusion s/p stenting, PUD, HLD on day 5 of admission presenting with Incarcerated right inguinal hernia.  Open repair of right inguinal hernia, Exploratory  laparotomy,small  bowel resection on 4/9/24,Developed atrial fibrillation this a.m. with RVR -cardio on consult    4/15:  Patient seen in AM. Tolerating regular diet and reports he ate a good breakfast today.  He likes the ensure but prefers strawberry. Declines trial of ensure clear and magic cup at this time. Would like ensure plus once a day. Reports +BM overnight yesterday. Discussed ways to increase calories and protein in his diet, declined written information.      4/10:  Pt reviewed in IDT rounds.  Pt NPO with NG tube to suction.  Pt is s/p hernia repair, and small bowel resection yesterday 4/9.  Pt reports decreased intake at home of meats since last Chemo in Nov 2023.  Pt was taking Ensure in the past, but dislikes them.  Pt agreeable to trial of Ensure Clear, and Magic cups once diet advances.    Nutrition History:  Food and Nutrient History: Pt reports decreased PO intake especially meats.  Pt dislikes Ensure supplements as he had them in the past while he was on chemo.  Energy Intake: Fair 50-75 %, Poor < 50 %    Current Diet: Adult diet Regular    Nutrition Related Findings:   Oral Symptoms: none Teeth: Missing teeth   GI symptoms: no GI issues at this time.   BM:  Reports +bm overnight yesterday  Wound Type: surgical (nursing/wound notes provide further details)  Edema: No  Food allergies: NKFA.   Meds/Labs reviewed.  acetaminophen, 650 mg, oral, q6h  apixaban, 5 mg, oral, q12h  ascorbic acid, 500 mg, oral, Daily  digoxin, 125 mcg, oral,  Daily  gabapentin, 300 mg, oral, Nightly  lidocaine, 2 patch, transdermal, Daily  metoprolol succinate XL, 50 mg, oral, Daily  multivitamin, 1 tablet, oral, Daily  pantoprazole, 40 mg, oral, Daily before breakfast  [START ON 5/5/2024] predniSONE, 10 mg, oral, Daily  [START ON 4/28/2024] predniSONE, 20 mg, oral, Daily  [START ON 4/21/2024] predniSONE, 30 mg, oral, Daily  predniSONE, 40 mg, oral, Daily  rosuvastatin, 5 mg, oral, Daily  [START ON 4/16/2024] sacubitriL-valsartan, 1 tablet, oral, BID  zinc sulfate, 50 mg of elemental zinc, oral, Daily             Nutrition Significant Labs:  Results from last 7 days   Lab Units 04/15/24  0413 04/14/24  1435 04/14/24  0425 04/13/24  0403   GLUCOSE mg/dL 182* 205* 193* 202*   SODIUM mmol/L 133* 130* 131* 131*   POTASSIUM mmol/L 4.3 4.9 4.9 4.3   CHLORIDE mmol/L 104 101 101 102   CO2 mmol/L 23 23 25 24   BUN mg/dL 29* 34* 33* 24*   CREATININE mg/dL 1.06 1.29 1.35* 0.93   EGFR mL/min/1.73m*2 77 61 58* >90   CALCIUM mg/dL 7.7* 7.6* 7.9* 7.6*   PHOSPHORUS mg/dL 1.8*  --  2.1*  --    MAGNESIUM mg/dL 1.67  --  2.17 2.17     Lab Results   Component Value Date    HGBA1C 6.1 (H) 02/27/2024    HGBA1C 7.3 (A) 08/08/2023         Anthropometrics:  Height: 182.9 cm (6')   Weight: 83.4 kg (183 lb 13.8 oz)   BMI (Calculated): 24.93  IBW/kg (Dietitian Calculated): 80.9 kg        Weight History:   Wt Readings from Last 10 Encounters:   04/15/24 83.4 kg (183 lb 13.8 oz)   04/09/24 69.9 kg (154 lb)   04/03/24 70.4 kg (155 lb 3.3 oz)   04/02/24 69.4 kg (153 lb)   03/27/24 69.3 kg (152 lb 12.5 oz)   03/11/24 70.8 kg (156 lb)   02/28/24 73.7 kg (162 lb 7.7 oz)   02/22/24 74.8 kg (165 lb)   01/31/24 76.8 kg (169 lb 4.8 oz)   01/24/24 75.9 kg (167 lb 5.3 oz)      Weight Change %:  Weight History / % Weight Change: Pt reports UBW of 185# last year indicating a 14.6% loss x 1 year.  Per records noted a 5.3% loss x 3 months.  Significant Weight Loss: No        Nutrition Focused Physical Exam  Findings:  Subcutaneous Fat Loss:   Orbital Fat Pads: Mild-Moderate (slight dark circles and slight hollowing)  Buccal Fat Pads: Mild-Moderate (flat cheeks, minimal bounce)  Triceps: Mild-Moderate (less than ample fat tissue)  Muscle Wasting:  Temporalis: Severe (hollowed scooping depression)  Pectoralis (Clavicular Region): Severe (protruding prominent clavicle)  Deltoid/Trapezius: Mild-Moderate (slight protrusion of acromion process)  Edema:     Physical Findings:  Skin: Positive (Surgical incision)    Estimated Needs:   Total Energy Estimated Needs (kCal): 2160 kCal  Method for Estimating Needs: 30 kcal/kg     Method for Estimating Needs:  gm (1.2-1.5 gm/kg)     Method for Estimating Needs: 1ml/kcal        Nutrition Diagnosis   Nutrition Diagnosis:  Malnutrition Diagnosis  Patient has Malnutrition Diagnosis: Yes  Diagnosis Status: Ongoing  Malnutrition Diagnosis: Severe malnutrition related to chronic disease or condition  As Evidenced by: moderate to severe muscle/fat loss per NFPE, and PO intake <75% for >1 month  Additional Assessment Information: Lung CA s/p chemo/radiation    Nutrition Diagnosis  Patient has Nutrition Diagnosis: Yes  Diagnosis Status (1):  (improving)  Nutrition Diagnosis 1: Inadequate oral intake  Related to (1): s/p hernia repair, and ex lap/Small bowel resection  As Evidenced by (1): NPO status       Nutrition Interventions/Recommendations   Nutrition Interventions and Recommendations:        Nutrition Prescription:  Individualized Nutrition Prescription Provided for : Continue Regular diet. Modified supplements to ensure plus HP strawberry daily. Obtain standing weight        Nutrition Interventions:   Food and/or Nutrient Delivery Interventions  Interventions: Meals and snacks  Meals and Snacks: General healthful diet  Goal: consume >50%  Medical Food Supplement: Commercial beverage  Goal: consume ensure plus daily         Nutrition Education:   Education Documentation  No  documentation found.      Nutrition Counseling  Counseling Theoretical Approach: Other (Comment)  Goal: reviewed diet, supplements, high calorie/protein diet       Nutrition Monitoring and Evaluation   Monitoring/Evaluation:   Food/Nutrient Related History Monitoring  Monitoring and Evaluation Plan: Energy intake  Energy Intake: Estimated energy intake  Criteria: meet >75%    Body Composition/Growth/Weight History  Monitoring and Evaluation Plan: Weight  Weight: Measured weight  Criteria: stable, wt gain of 1 lbs per week    Biochemical Data, Medical Tests and Procedures  Monitoring and Evaluation Plan: Electrolyte/renal panel  Electrolyte and Renal Panel: Phosphorus, Potassium, Sodium, Magnesium  Criteria: wnl    Nutrition Focused Physical Findings  Monitoring and Evaluation Plan: Skin  Skin: Impaired wound healing  Criteria: promote healing            Time Spent/Follow-up Reminder:   Follow Up  Time Spent (min): 45 minutes  Last Date of Nutrition Visit: 04/15/24  Nutrition Follow-Up Needed?: Dietitian to reassess per policy  Follow up Comment: 4/18-20, sev mal

## 2024-04-15 NOTE — PROGRESS NOTES
Patient declining HHC and denies any other home going needs. [facility] will continue to follow for needs if they arise.     1422 Discussed discharge planning during interdisciplinary rounds with Dr. East. Patient is not medically ready for discharge.  Patient went into Afib with RVR. Patient planning to return home when medically ready.

## 2024-04-15 NOTE — PROGRESS NOTES
"Physical Therapy  Physical Therapy Treatment    Patient Name: Kevin Rubi  MRN: [id number]  Today's Date: 4/15/2024  Time Calculation  Start Time: 1407  Stop Time: 1430  Time Calculation (min): 23 min     Assessment/Plan   PT Plan  Treatment/Interventions: Bed mobility, Transfer training, Gait training, Stair training, Balance training, Endurance training, Therapeutic exercise, Therapeutic activity, Home exercise program  PT Plan: Skilled PT  PT Frequency: 2 times per week  PT Discharge Recommendations: Low intensity level of continued care (prn)  PT - OK to Discharge: Yes (when medically cleared)    General Visit Information:   PT  Visit  PT Received On: 04/15/24  Response to Previous Treatment: Patient with no complaints from previous session.  Reason for Referral: hernia repair  Room\" 6451    Subjective   Precautions:  Abdominal binder     Objective   Pain:  4/10 surgical pain    Cognition:  Oriented X4    Activity Tolerance:  Activity Tolerance  Endurance: Tolerates 30 min exercise with multiple rests    Treatments:  Bed Mobility  Supine to sitting: Modified independent  Sitting to supine: Modified independent  Scooting: Modified independent    Ambulation/Gait Training  20 feet and 15 feet with no device, Supervision    Transfers  Sit to stand: Supervision  Stand to sit: Supervision  Commode transfer: Supervision    Pt remained sitting EOB following tx. All needs in reach.     Outcome Measures:  [facility] Basic Mobility  Turning from your back to your side while in a flat bed without using bedrails: None  Moving from lying on your back to sitting on the side of a flat bed without using bedrails: None  Moving to and from bed to chair (including a wheelchair): A little  Standing up from a chair using your arms (e.g. wheelchair or bedside chair): A little  To walk in hospital room: A little  Climbing 3-5 steps with railing: A little  Basic Mobility - Total Score: 20    Education Documentation  Mobility Training, " taught by Juan Harris PTA at 4/15/2024  2:41 PM.  Learner: Patient  Readiness: Acceptance  Method: Demonstration, Explanation  Response: Verbalizes Understanding, Demonstrated Understanding    Education Comments  No comments found.        OP EDUCATION:       Encounter Problems       Encounter Problems (Active)       PT Problem       transfers (Progressing)       Start:  04/12/24    Expected End:  04/18/24       Patient will perform all transfers with SBA x1          gait (Progressing)       Start:  04/12/24    Expected End:  04/18/24       Patient will amb 100+ feet with SBA x1             Pain - Adult

## 2024-04-15 NOTE — PROGRESS NOTES
GENERAL SURGERY PROGRESS NOTE    Kevin Rubi   1955   [id number]     Kevin Rubi is a 68 y.o. male  with history of stage IIIC LLL lung cancer s/p chemo/RT, recent pneumonitis on prednisone daily, HTN, A-fib on Eliquis, ascending aortic aneurysm, HFpEF with EF 60%, hx SMA occlusion s/p stenting, PUD, HLD on day 6 of admission presenting with Incarcerated right inguinal hernia.    Open repair of right inguinal hernia, Exploratory  laparotomy,small  bowel resection on 4/9/24, 6 Days Post-Op    Subjective  PT GRIS, NAEON. Cre improved on AM labs. Still tolerating diet with consistent bowel function. Had one episode this AM of afib rate greater than 130. Denies fevers/chills/nausea. Tolerating current diet.    Review of Systems:  Review of Systems   Constitutional:  Negative for chills and fever.   Respiratory:  Negative for chest tightness and shortness of breath.    Cardiovascular:  Negative for chest pain.   Gastrointestinal:  Negative for abdominal pain and nausea.   Genitourinary:  Negative for decreased urine volume.   Musculoskeletal:  Negative for joint swelling.   Skin:  Negative for rash.   Neurological:  Negative for headaches.       Objective    Last Recorded Vitals  Blood pressure (!) 152/94, pulse (!) 137, temperature 36.2 °C (97.1 °F), temperature source Temporal, resp. rate 18, height 1.829 m (6'), weight 83.4 kg (183 lb 13.8 oz), SpO2 96%.    Intake/Output last 3 Shifts:  I/O last 3 completed shifts:  In: 3763.8 (45.1 mL/kg) [P.O.:2350; I.V.:213.8 (2.6 mL/kg); IV Piggyback:1200]  Out: - (0 mL/kg)   Weight: 83.4 kg     Intake/Output Summary (Last 24 hours) at 4/15/2024 1211  Last data filed at 4/15/2024 1156  Gross per 24 hour   Intake 3871.67 ml   Output 350 ml   Net 3521.67 ml       Physical Exam  Vitals reviewed.   Constitutional:       General: He is not in acute distress.  HENT:      Head: Normocephalic and atraumatic.   Eyes:      Conjunctiva/sclera: Conjunctivae normal.    Cardiovascular:      Rate and Rhythm: Normal rate and regular rhythm.      Pulses: Normal pulses.   Pulmonary:      Effort: Pulmonary effort is normal. No respiratory distress.   Abdominal:      General: There is no distension.      Palpations: Abdomen is soft.      Comments: Appropriately tender near incisions. [place] present, incisions well intact, some ecchymosis along both incisions, no palpable masses or hematoma   Musculoskeletal:         General: No swelling.      Cervical back: Neck supple.   Skin:     General: Skin is warm and dry.   Neurological:      Mental Status: He is alert and oriented to person, place, and time.       Relevant Results  Labs:  Results for orders placed or performed during the hospital encounter of 04/09/24 (from the past 24 hour(s))   Basic Metabolic Panel   Result Value Ref Range    Glucose 205 (H) 74 - 99 mg/dL    Sodium 130 (L) 136 - 145 mmol/L    Potassium 4.9 3.5 - 5.3 mmol/L    Chloride 101 98 - 107 mmol/L    Bicarbonate 23 21 - 32 mmol/L    Anion Gap 11 10 - 20 mmol/L    Urea Nitrogen 34 (H) 6 - 23 mg/dL    Creatinine 1.29 0.50 - 1.30 mg/dL    eGFR 61 >60 mL/min/1.73m*2    Calcium 7.6 (L) 8.6 - 10.3 mg/dL   CBC and Auto Differential   Result Value Ref Range    WBC 8.0 4.4 - 11.3 x10*3/uL    nRBC 0.2 (H) 0.0 - 0.0 /100 WBCs    RBC 3.04 (L) 4.50 - 5.90 x10*6/uL    Hemoglobin 8.3 (L) 13.5 - 17.5 g/dL    Hematocrit 25.4 (L) 41.0 - 52.0 %    MCV 84 80 - 100 fL    MCH 27.3 26.0 - 34.0 pg    MCHC 32.7 32.0 - 36.0 g/dL    RDW 16.1 (H) 11.5 - 14.5 %    Platelets 134 (L) 150 - 450 x10*3/uL    Neutrophils % 81.5 40.0 - 80.0 %    Immature Granulocytes %, Automated 1.0 (H) 0.0 - 0.9 %    Lymphocytes % 13.7 13.0 - 44.0 %    Monocytes % 3.2 2.0 - 10.0 %    Eosinophils % 0.6 0.0 - 6.0 %    Basophils % 0.0 0.0 - 2.0 %    Neutrophils Absolute 6.55 1.20 - 7.70 x10*3/uL    Immature Granulocytes Absolute, Automated 0.08 0.00 - 0.70 x10*3/uL    Lymphocytes Absolute 1.10 (L) 1.20 - 4.80 x10*3/uL     Monocytes Absolute 0.26 0.10 - 1.00 x10*3/uL    Eosinophils Absolute 0.05 0.00 - 0.70 x10*3/uL    Basophils Absolute 0.00 0.00 - 0.10 x10*3/uL   Basic Metabolic Panel   Result Value Ref Range    Glucose 182 (H) 74 - 99 mg/dL    Sodium 133 (L) 136 - 145 mmol/L    Potassium 4.3 3.5 - 5.3 mmol/L    Chloride 104 98 - 107 mmol/L    Bicarbonate 23 21 - 32 mmol/L    Anion Gap 10 10 - 20 mmol/L    Urea Nitrogen 29 (H) 6 - 23 mg/dL    Creatinine 1.06 0.50 - 1.30 mg/dL    eGFR 77 >60 mL/min/1.73m*2    Calcium 7.7 (L) 8.6 - 10.3 mg/dL   Magnesium   Result Value Ref Range    Magnesium 1.67 1.60 - 2.40 mg/dL   Phosphorus   Result Value Ref Range    Phosphorus 1.8 (L) 2.5 - 4.9 mg/dL       Images:  Transthoracic Echo (TTE) Limited   Final Result      CT abdomen pelvis w IV contrast   Final Result   1.  Multiple loops of dilated small bowel consistent with a small   bowel obstruction.  There is a loop of small bowel located within a   right inguinal hernia likely incarcerated and acting as a transition   point for the obstruction.   2.  Ill-defined consolidative changes within the left lower lobe which   may represent a possible pneumonitis.   3.  Findings consistent with hepatic steatosis.   4.  No abdominal/pelvic fluid collections or pneumoperitoneum.   5.  Colonic diverticulosis without definite evidence of   diverticulitis.   6.  Stable mild prostatomegaly..   Result was given to Dr. Christiano Flanagan on 4/9/2024 at 1936.   Signed by Ward Tong MD          Assessment and Plan  Principal Problem:    Incarcerated right inguinal hernia    68 y.o. male with with history of stage IIIC LLL lung cancer s/p chemo/RT, recent pneumonitis on prednisone daily, HTN, A-fib on Eliquis, ascending aortic aneurysm, HFpEF with EF 60% who presented with incarcerated right inguinal hernia now s/p exploratory laparotomy, small bowel resection, and right inguinal hernia repair on 4/9/24. Doing expectantly.    Plan:  -Currently pain  controlled: wean off IV pain medications and transition to PO Oxycodone  - SUDHIR resolved  -Cardiology following, on home PO meds and IV metoprolol, will get final recs closer to DC date.  - Increased metoprolol to 75 mg, and will add on entresto. Monitor BP, possible DC 4/16  -Encourage IS use. Hx pneumonitis, on steroids at baseline, received stress dose in OR. Currently on prednisone 30 mg BID, per oncology recs will start tomorrow 4/14 on 40 mg/day and taper 10 mg per week until reaches 10 mg/day - and stay on it until he sees Oncology outpatient in office  -Advance to regular, monitor bowel function  -Replace electrolytes as needed. Replacement Phos ordered this AM as well as gentle IV fluids given Cre increase  -Will follow up on intra-operative cultures, NGTD  -SCDs, Eliquis. Continue to hold Plavix, will resume Pavix on DC  -IMS and CCM consulted, appreciate recommendations    Discussed with attending Dr. Rubens Mora, DO - PGY2  General Surgery

## 2024-04-15 NOTE — PROGRESS NOTES
Occupational Therapy    OT Treatment    Patient Name: Kevin Rubi  MRN: [id number]  Today's Date: 4/15/2024  Time Calculation  Start Time: 1435  Stop Time: 1450  Time Calculation (min): 15 min         Assessment:  OT Assessment: Pt requires CGA/min A to safely complete functional   mobility d/t decreased balance and x1 overt LOB.     Plan:  Treatment Interventions: ADL retraining, Functional transfer training, UE strengthening/ROM, Endurance training, Cognitive reorientation, Equipment evaluation/education, Neuromuscular reeducation  OT Frequency: 3 times per week  OT Discharge Recommendations: Low intensity level of continued care  OT - OK to Discharge: Yes  Treatment Interventions: ADL retraining, Functional transfer training, UE strengthening/ROM, Endurance training, Cognitive reorientation, Equipment evaluation/education, Neuromuscular reeducation    Subjective   Previous Visit Info:  OT Last Visit  OT Received On: 04/15/24  General:  General  Prior to Session Communication: Bedside nurse  Patient Position Received: Bed, 3 rail up, Alarm off, not on at start of session  General Comment: Pt agreeable and cooperative to therapy.       Pain:  Pain Assessment  Pain Assessment: 0-10  Pain Score: 4  Pain Type: Acute pain, Surgical pain  Pain Location: Abdomen    Objective    Cognition:  Cognition  Orientation Level: Oriented X4       Activities of Daily Living:              LE Dressing  LE Dressing:  (Pt declined trial LB dressing. Per pt his wife assists with LB dressing at home.)       Functional Standing Tolerance:  Activity: Pt tolerated standing with intervals of dyn activity for 1 minute why CGA.  Bed Mobility/Transfers: Bed Mobility  Bed Mobility:  (sitting EOB upon entering room)    Transfer 1  Technique 1: Sit to stand, Stand to sit  Transfer Level of Assistance 1: Close supervision         Functional Mobility:  Functional Mobility  Functional Mobility Performed: Yes  Functional Mobility 1  Comments 1:  Pt completed functional mobility max household distance with CGA/min A and min verbal cues for safety, sequencing and technique.      Outcome Measures:[facility] Daily Activity  Putting on and taking off regular lower body clothing: A lot  Bathing (including washing, rinsing, drying): A lot  Putting on and taking off regular upper body clothing: A little  Toileting, which includes using toilet, bedpan or urinal: A little  Taking care of personal grooming such as brushing teeth: A little  Eating Meals: None  Daily Activity - Total Score: 17        Education Documentation  Body Mechanics, taught by ADOLFO Downey at 4/15/2024  3:18 PM.  Learner: Patient  Readiness: Acceptance  Method: Explanation  Response: Needs Reinforcement    Education Comments  No comments found.           Goals:  Encounter Problems       Encounter Problems (Active)       ADLs       Patient with complete lower body dressing with modified independent level of assistance donning and doffing all LE clothes  with PRN adaptive equipment while supported sitting and standing (Progressing)       Start:  04/12/24    Expected End:  04/18/24               MOBILITY       Patient will perform Functional mobility max Household distances/Community Distances with modified independent level of assistance and least restrictive device in order to improve safety and functional mobility. (Progressing)       Start:  04/12/24    Expected End:  04/18/24

## 2024-04-15 NOTE — DISCHARGE INSTRUCTIONS
Discharge Instructions    Incisions  Your incisions are closed with staples and need to be removed ~3 weeks after surgery in the office.   You may take a shower or bath.   Keep the incisions clean and dry.   You may (but do not have to) cover the incisions with gauze and tape.   Wear an abdominal binder as needed for comfort.    Pain  Do not drive while taking stronger pain medications (Tramadol, Percocet, Norco, Oxycodone, etc).   You may drive once you feel comfortable without stronger pain medications and can drive without any significant distractions related to your pain or surgical sites.  You may alternate acetaminophen and ibuprofen for pain control, as long as you are able to take either medication.     Diet  Resume your normal diet. Your appetite may not fully return immediately.   Please drink plenty of fluids to maintain hydration.    Physical Activity  Limit physical activity that would involve stretching the incision(s) for 2 weeks.  No lifting over 10-15 lb or strenuous physical activity for at least 4 weeks after surgery. If there is pain with increased physical effort, please resume light duty restrictions.   If you need FMLA, short-term disability, or other paperwork completed, please provide the paperwork or instructions to the office in person or fax to [phone].    Follow Up  Follow up with Dr. Vale Valladares in the office per the instructions above. Please call the office to follow up sooner if there are concerns you would like to discuss.    Call Provider  If you are experiencing fever (100.4F or higher).  If you are experiencing significantly worsening pain, nausea or vomiting.  If the incision(s) appear reddened, swollen or are draining.  If you have very loose or watery bowel movements.  If you have any new concerning symptoms.

## 2024-04-15 NOTE — PROGRESS NOTES
Subjective Data:  Up at bedside.  No chest discomfort.  Trace to +1 edema pos tibial and pedal.   Not sob.      4-15-24:  Sitting up at side of bed, no distress.  No CP/pressure, mild COLLIER.  Patient wants to go home but RN reports increased HR when up in room, rates into 160-170s at times, remains Afib (chronic).  BP has improved with med holds and IV fluids.  Will need to gradually readd rate and heart failure meds.       Overnight Events:    No significant events.      Objective Data:  Last Recorded Vitals:  Vitals:    04/14/24 2142 04/15/24 0057 04/15/24 0455 04/15/24 0500   BP: 159/73 119/69 122/62    BP Location: Left arm      Patient Position: Lying      Pulse: 98 77 86    Resp: 18 16 18    Temp: 36.8 °C (98.2 °F) 36.2 °C (97.2 °F) 37.4 °C (99.4 °F)    TempSrc: Temporal Temporal Temporal    SpO2: 96% 95% 95%    Weight:    83.4 kg (183 lb 13.8 oz)   Height:           Last Labs:  Results from last 7 days   Lab Units 04/15/24  0413 04/14/24  1435 04/14/24  0425   SODIUM mmol/L 133* 130* 131*   POTASSIUM mmol/L 4.3 4.9 4.9   CHLORIDE mmol/L 104 101 101   CO2 mmol/L 23 23 25   BUN mg/dL 29* 34* 33*   CREATININE mg/dL 1.06 1.29 1.35*   GLUCOSE mg/dL 182* 205* 193*   CALCIUM mg/dL 7.7* 7.6* 7.9*     Results from last 7 days   Lab Units 04/15/24  0413 04/14/24  0425 04/13/24  0403   WBC AUTO x10*3/uL 8.0 6.6 8.0   HEMOGLOBIN g/dL 8.3* 9.1* 8.8*   HEMATOCRIT % 25.4* 29.1* 28.1*   PLATELETS AUTO x10*3/uL 134* 123* 119*      Results from last 7 days   Lab Units 04/15/24  0413   MAGNESIUM mg/dL 1.67          TROPHS   Date/Time Value Ref Range Status   04/09/2024 04:51 PM 22 0 - 20 ng/L Final   12/18/2023 12:31 PM 34 0 - 53 ng/L Final   12/18/2023 11:12 AM 30 0 - 53 ng/L Final     HGBA1C   Date/Time Value Ref Range Status   02/27/2024 12:21 PM 6.1 see below % Final   08/08/2023 04:31 AM 7.3 % Final     Comment:          Diagnosis of Diabetes-Adults   Non-Diabetic: < or = 5.6%   Increased risk for developing diabetes:  5.7-6.4%   Diagnostic of diabetes: > or = 6.5%  .       Monitoring of Diabetes                Age (y)     Therapeutic Goal (%)   Adults:          >18           <7.0   Pediatrics:    13-18           <7.5                   7-12           <8.0                   0- 6            7.5-8.5   American Diabetes Association. Diabetes Care 33(S1), Jan 2010.       LDLCALC   Date/Time Value Ref Range Status   10/23/2023 09:08 AM 74 <=99 mg/dL Final     Comment:                                 Near   Borderline      AGE      Desirable  Optimal    High     High     Very High     0-19 Y     0 - 109     ---    110-129   >/= 130     ----    20-24 Y     0 - 119     ---    120-159   >/= 160     ----      >24 Y     0 -  99   100-129  130-159   160-189     >/=190       VLDL   Date/Time Value Ref Range Status   10/23/2023 09:08 AM 24 0 - 40 mg/dL Final   04/01/2023 08:05 AM 46 0 - 40 mg/dL Final   07/28/2022 08:24 AM 41 0 - 40 mg/dL Final   08/14/2021 08:44 AM 31 0 - 40 mg/dL Final      Last I/O:  I/O last 3 completed shifts:  In: 3763.8 (45.1 mL/kg) [P.O.:2350; I.V.:213.8 (2.6 mL/kg); IV Piggyback:1200]  Out: - (0 mL/kg)   Weight: 83.4 kg     Past Cardiology Tests (Last 3 Years):    Echo:  CONCLUSIONS:   1. Left ventricular systolic function is normal with a 60-65% estimated ejection fraction.      Inpatient Medications:  Scheduled medications   Medication Dose Route Frequency    acetaminophen  650 mg oral q6h    apixaban  5 mg oral q12h    ascorbic acid  500 mg oral Daily    digoxin  125 mcg oral Daily    gabapentin  300 mg oral Nightly    lidocaine  2 patch transdermal Daily    metoprolol tartrate  25 mg oral BID    multivitamin  1 tablet oral Daily    pantoprazole  40 mg oral Daily before breakfast    [START ON 5/5/2024] predniSONE  10 mg oral Daily    [START ON 4/28/2024] predniSONE  20 mg oral Daily    [START ON 4/21/2024] predniSONE  30 mg oral Daily    predniSONE  40 mg oral Daily    rosuvastatin  5 mg oral Daily    zinc sulfate   50 mg of elemental zinc oral Daily     PRN medications   Medication    ipratropium-albuteroL    oxyCODONE    oxyCODONE    tiZANidine     Continuous Medications   Medication Dose Last Rate       Physical Exam:  Constitutional:       General: He is not in acute distress.  HENT:      Head: Normocephalic and atraumatic.      Mouth: Mucous membranes are moist.      Neck:  + JVD  Eyes:      Extraocular Movements: .      Conjunctiva/sclera: Conjunctivae normal.    Cardiovascular:      Rate and Rhythm: irregularly irregular HR controlled.  Telemetry shows atrial fibrillation.      Heart sounds:  S1 S2 normal, no murmur, no S3 or S4   Pulmonary:      Effort: Pulmonary effort is normal. No respiratory distress.      Breath sounds: Lungs with diminished bases and faint wheeze, no crackles  Abdominal:      General: Bowel sounds are normal. There is no distension.      Tenderness: Mild.   Musculoskeletal:         General: Ankles/feet with 2+ edema, tenderness or deformity. Normal range of motion.      Comments:   Skin:     General: Skin is warm and dry.   Neurological:      General: No focal deficit present.      Mental Status: alert and oriented to person, place, and time. Mental status is at baseline.     Psychiatric:         Mood and Affect: Mood normal.       Assessment/Plan   .Incarcerated R inguinal hernia s/p open repair of hernia / exlap / small bowel resection on 4/9/24  NICM with recovery of EF from 20% > 55-60%  paroxysmal Afib on apixaban  Lung CA s/p CTX/XRT in 12/23  mild nonobst CAD  ascending aortic aneurysm (measured 4.4 cm on CT 3/2021)  former tobacco use     4/13/24:  Postop hypotension has improved, likely due to post op / post anesthesia vasoplegia, blood loss, etc.  He is off phenylephrine.  It appears he was initiated on midodrine 10mg TID as well for hypotension, and also prednisone 30mg bid.     4/14/2024 : BP stable    Acute on chronic diastolic heart failure (recovered EF)  4-15-24: Patient's BP has  improved. Midodrine and IV fluids have been stopped.  Looks mildly volume overloaded.  Will work on HR control and try to readd heart failure meds as able. Does not appear ready for d/c yet, will reassess tomorrow. Restart entresto tomorrow. +/- spironolactone.     2. Atrial fibrillation  (history of pAfib, however has been persistent during this perioperative period), with HR's in the 80-90's on tele, rising to > 100's with activity.  Limited TTE (postop) yesterday demonstrated normal LV fcn EF 60-65%, no rWMA.  4/14/24:  HR is controlled.   OAC with Eliquis .  Blood count is improving.      4-15-24: Patient is chronic Afib.  He is normally on Metoprolol succinate 75 mg daily and digoxin.  Will change Metoprolol dose today and monitor response.  HR is elevated with activity and can get up into the 170s.  Back on Eliquis.     3. Post op repair of incarcerated R inguinal hernia.  Surgery following, patient doing well         Code Status:  Full Code          Kelsie Carlson, APRN-CNP

## 2024-04-16 ENCOUNTER — PHARMACY VISIT (OUTPATIENT)
Dept: PHARMACY | Facility: CLINIC | Age: 69
End: 2024-04-16
Payer: COMMERCIAL

## 2024-04-16 VITALS
BODY MASS INDEX: 24.49 KG/M2 | DIASTOLIC BLOOD PRESSURE: 61 MMHG | HEART RATE: 80 BPM | RESPIRATION RATE: 18 BRPM | WEIGHT: 180.78 LBS | OXYGEN SATURATION: 99 % | SYSTOLIC BLOOD PRESSURE: 104 MMHG | HEIGHT: 72 IN | TEMPERATURE: 97.2 F

## 2024-04-16 LAB
ANION GAP SERPL CALC-SCNC: 8 MMOL/L (ref 10–20)
BNP SERPL-MCNC: 899 PG/ML (ref 0–99)
BUN SERPL-MCNC: 24 MG/DL (ref 6–23)
CALCIUM SERPL-MCNC: 7.4 MG/DL (ref 8.6–10.3)
CHLORIDE SERPL-SCNC: 104 MMOL/L (ref 98–107)
CO2 SERPL-SCNC: 27 MMOL/L (ref 21–32)
CREAT SERPL-MCNC: 0.84 MG/DL (ref 0.5–1.3)
EGFRCR SERPLBLD CKD-EPI 2021: >90 ML/MIN/1.73M*2
GLUCOSE SERPL-MCNC: 144 MG/DL (ref 74–99)
POTASSIUM SERPL-SCNC: 4.1 MMOL/L (ref 3.5–5.3)
SODIUM SERPL-SCNC: 135 MMOL/L (ref 136–145)

## 2024-04-16 PROCEDURE — 2500000002 HC RX 250 W HCPCS SELF ADMINISTERED DRUGS (ALT 637 FOR MEDICARE OP, ALT 636 FOR OP/ED): Performed by: NURSE PRACTITIONER

## 2024-04-16 PROCEDURE — 2500000001 HC RX 250 WO HCPCS SELF ADMINISTERED DRUGS (ALT 637 FOR MEDICARE OP): Performed by: SURGERY

## 2024-04-16 PROCEDURE — 2500000004 HC RX 250 GENERAL PHARMACY W/ HCPCS (ALT 636 FOR OP/ED): Performed by: SURGERY

## 2024-04-16 PROCEDURE — 80048 BASIC METABOLIC PNL TOTAL CA: CPT | Performed by: NURSE PRACTITIONER

## 2024-04-16 PROCEDURE — 2500000004 HC RX 250 GENERAL PHARMACY W/ HCPCS (ALT 636 FOR OP/ED): Performed by: NURSE PRACTITIONER

## 2024-04-16 PROCEDURE — 99231 SBSQ HOSP IP/OBS SF/LOW 25: CPT | Performed by: INTERNAL MEDICINE

## 2024-04-16 PROCEDURE — RXMED WILLOW AMBULATORY MEDICATION CHARGE

## 2024-04-16 PROCEDURE — 2500000005 HC RX 250 GENERAL PHARMACY W/O HCPCS

## 2024-04-16 PROCEDURE — 83880 ASSAY OF NATRIURETIC PEPTIDE: CPT | Performed by: NURSE PRACTITIONER

## 2024-04-16 PROCEDURE — 99233 SBSQ HOSP IP/OBS HIGH 50: CPT | Performed by: INTERNAL MEDICINE

## 2024-04-16 PROCEDURE — 99232 SBSQ HOSP IP/OBS MODERATE 35: CPT | Performed by: NURSE PRACTITIONER

## 2024-04-16 PROCEDURE — 2500000001 HC RX 250 WO HCPCS SELF ADMINISTERED DRUGS (ALT 637 FOR MEDICARE OP)

## 2024-04-16 PROCEDURE — 36415 COLL VENOUS BLD VENIPUNCTURE: CPT | Performed by: NURSE PRACTITIONER

## 2024-04-16 PROCEDURE — 99024 POSTOP FOLLOW-UP VISIT: CPT | Performed by: SURGERY

## 2024-04-16 PROCEDURE — 2500000001 HC RX 250 WO HCPCS SELF ADMINISTERED DRUGS (ALT 637 FOR MEDICARE OP): Performed by: NURSE PRACTITIONER

## 2024-04-16 RX ORDER — PREDNISONE 10 MG/1
20 TABLET ORAL DAILY
Qty: 51 TABLET | Refills: 0 | Status: SHIPPED | OUTPATIENT
Start: 2024-04-28 | End: 2024-04-29 | Stop reason: WASHOUT

## 2024-04-16 RX ORDER — OXYCODONE HYDROCHLORIDE 5 MG/1
5 TABLET ORAL EVERY 6 HOURS PRN
Qty: 15 TABLET | Refills: 0 | OUTPATIENT
Start: 2024-04-16

## 2024-04-16 RX ORDER — PREDNISONE 10 MG/1
30 TABLET ORAL DAILY
Qty: 21 TABLET | Refills: 0 | Status: SHIPPED | OUTPATIENT
Start: 2024-04-21 | End: 2024-04-30 | Stop reason: ALTCHOICE

## 2024-04-16 RX ORDER — PREDNISONE 10 MG/1
TABLET ORAL DAILY
Qty: 69 TABLET | Refills: 0 | OUTPATIENT
Start: 2024-04-16 | End: 2024-05-19

## 2024-04-16 RX ORDER — METOPROLOL SUCCINATE 25 MG/1
25 TABLET, EXTENDED RELEASE ORAL DAILY
Status: DISCONTINUED | OUTPATIENT
Start: 2024-04-16 | End: 2024-04-16 | Stop reason: HOSPADM

## 2024-04-16 RX ORDER — CLOPIDOGREL BISULFATE 75 MG/1
75 TABLET ORAL DAILY
Qty: 30 TABLET | Refills: 2 | Status: SHIPPED | OUTPATIENT
Start: 2024-04-16

## 2024-04-16 RX ORDER — SPIRONOLACTONE 25 MG/1
12.5 TABLET ORAL DAILY
Status: DISCONTINUED | OUTPATIENT
Start: 2024-04-16 | End: 2024-04-16 | Stop reason: HOSPADM

## 2024-04-16 RX ORDER — PREDNISONE 20 MG/1
40 TABLET ORAL DAILY
Qty: 8 TABLET | Refills: 0 | Status: SHIPPED | OUTPATIENT
Start: 2024-04-17 | End: 2024-04-21

## 2024-04-16 RX ORDER — PREDNISONE 10 MG/1
10 TABLET ORAL DAILY
Qty: 30 TABLET | Refills: 0 | Status: SHIPPED | OUTPATIENT
Start: 2024-05-05 | End: 2024-06-06 | Stop reason: HOSPADM

## 2024-04-16 RX ORDER — FUROSEMIDE 10 MG/ML
20 INJECTION INTRAMUSCULAR; INTRAVENOUS ONCE
Status: COMPLETED | OUTPATIENT
Start: 2024-04-16 | End: 2024-04-16

## 2024-04-16 RX ORDER — OXYCODONE HYDROCHLORIDE 5 MG/1
5 TABLET ORAL EVERY 4 HOURS PRN
Qty: 15 TABLET | Refills: 0 | Status: SHIPPED | OUTPATIENT
Start: 2024-04-16 | End: 2024-05-13 | Stop reason: ALTCHOICE

## 2024-04-16 RX ADMIN — METOPROLOL SUCCINATE 50 MG: 50 TABLET, FILM COATED, EXTENDED RELEASE ORAL at 09:51

## 2024-04-16 RX ADMIN — ACETAMINOPHEN 650 MG: 325 TABLET ORAL at 09:51

## 2024-04-16 RX ADMIN — DIGOXIN 125 MCG: 125 TABLET ORAL at 09:52

## 2024-04-16 RX ADMIN — FUROSEMIDE 20 MG: 10 INJECTION, SOLUTION INTRAMUSCULAR; INTRAVENOUS at 09:50

## 2024-04-16 RX ADMIN — MULTIVITAMIN TABLET 1 TABLET: TABLET at 09:51

## 2024-04-16 RX ADMIN — METOPROLOL SUCCINATE 25 MG: 25 TABLET, EXTENDED RELEASE ORAL at 09:54

## 2024-04-16 RX ADMIN — PREDNISONE 40 MG: 20 TABLET ORAL at 09:51

## 2024-04-16 RX ADMIN — Medication 50 MG OF ELEMENTAL ZINC: at 09:51

## 2024-04-16 RX ADMIN — APIXABAN 5 MG: 5 TABLET, FILM COATED ORAL at 09:52

## 2024-04-16 RX ADMIN — ACETAMINOPHEN 650 MG: 325 TABLET ORAL at 04:30

## 2024-04-16 RX ADMIN — SACUBITRIL AND VALSARTAN 1 TABLET: 24; 26 TABLET, FILM COATED ORAL at 09:51

## 2024-04-16 RX ADMIN — SPIRONOLACTONE 12.5 MG: 25 TABLET ORAL at 09:52

## 2024-04-16 RX ADMIN — LIDOCAINE 4% 2 PATCH: 40 PATCH TOPICAL at 09:54

## 2024-04-16 RX ADMIN — OXYCODONE HYDROCHLORIDE 10 MG: 10 TABLET ORAL at 09:51

## 2024-04-16 RX ADMIN — OXYCODONE HYDROCHLORIDE 10 MG: 10 TABLET ORAL at 16:14

## 2024-04-16 RX ADMIN — PANTOPRAZOLE SODIUM 40 MG: 40 TABLET, DELAYED RELEASE ORAL at 06:05

## 2024-04-16 RX ADMIN — OXYCODONE HYDROCHLORIDE AND ACETAMINOPHEN 500 MG: 500 TABLET ORAL at 09:51

## 2024-04-16 RX ADMIN — OXYCODONE HYDROCHLORIDE 10 MG: 10 TABLET ORAL at 01:36

## 2024-04-16 ASSESSMENT — ENCOUNTER SYMPTOMS
WOUND: 0
VOMITING: 0
DIZZINESS: 0
COUGH: 0
FATIGUE: 0
APPETITE CHANGE: 0
HEADACHES: 0
CONSTIPATION: 0
FLANK PAIN: 0
ABDOMINAL DISTENTION: 0
FEVER: 0
PALPITATIONS: 0
FREQUENCY: 0
NAUSEA: 0
LIGHT-HEADEDNESS: 0
DIFFICULTY URINATING: 0
SHORTNESS OF BREATH: 0
ABDOMINAL PAIN: 0
COLOR CHANGE: 0
CHEST TIGHTNESS: 0

## 2024-04-16 ASSESSMENT — PAIN - FUNCTIONAL ASSESSMENT
PAIN_FUNCTIONAL_ASSESSMENT: 0-10

## 2024-04-16 ASSESSMENT — COGNITIVE AND FUNCTIONAL STATUS - GENERAL
DAILY ACTIVITIY SCORE: 24
MOBILITY SCORE: 24

## 2024-04-16 ASSESSMENT — PAIN SCALES - GENERAL
PAINLEVEL_OUTOF10: 7
PAINLEVEL_OUTOF10: 9
PAINLEVEL_OUTOF10: 4
PAINLEVEL_OUTOF10: 7

## 2024-04-16 ASSESSMENT — PAIN DESCRIPTION - ORIENTATION: ORIENTATION: RIGHT

## 2024-04-16 ASSESSMENT — PAIN DESCRIPTION - LOCATION: LOCATION: GROIN

## 2024-04-16 NOTE — PROGRESS NOTES
Subjective Data:  Up at bedside.  No chest discomfort.  Trace to +1 edema pos tibial and pedal.   Not sob.      4-15-24:  Sitting up at side of bed, no distress.  No CP/pressure, mild COLLIER.  Patient wants to go home but RN reports increased HR when up in room, rates into 160-170s at times, remains Afib (chronic).  BP has improved with med holds and IV fluids.  Will need to gradually readd rate and heart failure meds.     4-16-24: No issues overnight.  HR in the 90s, /61.  Will increase BB dose to 75mg daily (home dose) and will resume spironolactone.  Started on Entresto last evening.  Patient denies CP/dyspnea/lightheadedness. Wants to go home.       Overnight Events:    No significant events.      Objective Data:  Last Recorded Vitals:  Vitals:    04/15/24 2053 04/16/24 0100 04/16/24 0500 04/16/24 0600   BP: 132/76 120/58 121/61    BP Location: Left arm Left arm Left arm    Patient Position: Lying Sitting Lying    Pulse: 88 (!) 116 92    Resp: 16 20 20    Temp: 36.9 °C (98.4 °F) 36.4 °C (97.6 °F) 36.3 °C (97.4 °F)    TempSrc: Temporal Temporal Temporal    SpO2: 98% 98% 100%    Weight:   82 kg (180 lb 12.4 oz) 82 kg (180 lb 12.4 oz)   Height:           Last Labs:  Results from last 7 days   Lab Units 04/16/24  0555 04/15/24  0413 04/14/24  1435   SODIUM mmol/L 135* 133* 130*   POTASSIUM mmol/L 4.1 4.3 4.9   CHLORIDE mmol/L 104 104 101   CO2 mmol/L 27 23 23   BUN mg/dL 24* 29* 34*   CREATININE mg/dL 0.84 1.06 1.29   GLUCOSE mg/dL 144* 182* 205*   CALCIUM mg/dL 7.4* 7.7* 7.6*     Results from last 7 days   Lab Units 04/15/24  0413 04/14/24  0425 04/13/24  0403   WBC AUTO x10*3/uL 8.0 6.6 8.0   HEMOGLOBIN g/dL 8.3* 9.1* 8.8*   HEMATOCRIT % 25.4* 29.1* 28.1*   PLATELETS AUTO x10*3/uL 134* 123* 119*      Results from last 7 days   Lab Units 04/15/24  0413   MAGNESIUM mg/dL 1.67          TROPHS   Date/Time Value Ref Range Status   04/09/2024 04:51 PM 22 0 - 20 ng/L Final   12/18/2023 12:31 PM 34 0 - 53 ng/L Final    12/18/2023 11:12 AM 30 0 - 53 ng/L Final     BNP   Date/Time Value Ref Range Status   04/16/2024 05:55  0 - 99 pg/mL Final     HGBA1C   Date/Time Value Ref Range Status   02/27/2024 12:21 PM 6.1 see below % Final   08/08/2023 04:31 AM 7.3 % Final     Comment:          Diagnosis of Diabetes-Adults   Non-Diabetic: < or = 5.6%   Increased risk for developing diabetes: 5.7-6.4%   Diagnostic of diabetes: > or = 6.5%  .       Monitoring of Diabetes                Age (y)     Therapeutic Goal (%)   Adults:          >18           <7.0   Pediatrics:    13-18           <7.5                   7-12           <8.0                   0- 6            7.5-8.5   American Diabetes Association. Diabetes Care 33(S1), Jan 2010.       LDLCALC   Date/Time Value Ref Range Status   10/23/2023 09:08 AM 74 <=99 mg/dL Final     Comment:                                 Near   Borderline      AGE      Desirable  Optimal    High     High     Very High     0-19 Y     0 - 109     ---    110-129   >/= 130     ----    20-24 Y     0 - 119     ---    120-159   >/= 160     ----      >24 Y     0 -  99   100-129  130-159   160-189     >/=190       VLDL   Date/Time Value Ref Range Status   10/23/2023 09:08 AM 24 0 - 40 mg/dL Final   04/01/2023 08:05 AM 46 0 - 40 mg/dL Final   07/28/2022 08:24 AM 41 0 - 40 mg/dL Final   08/14/2021 08:44 AM 31 0 - 40 mg/dL Final      Last I/O:  I/O last 3 completed shifts:  In: 2081.7 (25.4 mL/kg) [P.O.:1100; I.V.:981.7 (12 mL/kg)]  Out: 1250 (15.2 mL/kg) [Urine:1250 (0.4 mL/kg/hr)]  Weight: 82 kg     Past Cardiology Tests (Last 3 Years):    Echo:  CONCLUSIONS:   1. Left ventricular systolic function is normal with a 60-65% estimated ejection fraction.      Inpatient Medications:  Scheduled medications   Medication Dose Route Frequency    acetaminophen  650 mg oral q6h    apixaban  5 mg oral q12h    ascorbic acid  500 mg oral Daily    digoxin  125 mcg oral Daily    gabapentin  300 mg oral Nightly    lidocaine  2 patch  transdermal Daily    metoprolol succinate XL  25 mg oral Daily    metoprolol succinate XL  50 mg oral Daily    multivitamin  1 tablet oral Daily    pantoprazole  40 mg oral Daily before breakfast    [START ON 5/5/2024] predniSONE  10 mg oral Daily    [START ON 4/28/2024] predniSONE  20 mg oral Daily    [START ON 4/21/2024] predniSONE  30 mg oral Daily    predniSONE  40 mg oral Daily    rosuvastatin  5 mg oral Daily    sacubitriL-valsartan  1 tablet oral BID    zinc sulfate  50 mg of elemental zinc oral Daily     PRN medications   Medication    ipratropium-albuteroL    oxyCODONE    oxyCODONE    tiZANidine     Continuous Medications   Medication Dose Last Rate       Physical Exam:  Constitutional:       General: He is not in acute distress.  HENT:      Head: Normocephalic and atraumatic.      Mouth: Mucous membranes are moist.      Eyes:      Extraocular Movements: .      Conjunctiva/sclera: Conjunctivae normal.    Cardiovascular:      Rate and Rhythm: irregularly irregular HR controlled.  Telemetry shows atrial fibrillation.      Heart sounds:  S1 S2 normal, no murmur, no S3 or S4   Pulmonary:      Effort: Pulmonary effort is normal. No respiratory distress.      Breath sounds: Lungs with diminished bases, no crackles or wheezing  Abdominal:      General: Bowel sounds are normal. There is no distension.      Tenderness: Mild.   Musculoskeletal:         General: Ankles/feet with 2+ edema, tenderness or deformity. Normal range of motion.      Comments:   Skin:     General: Skin is warm and dry.   Neurological:      General: No focal deficit present.      Mental Status: alert and oriented to person, place, and time. Mental status is at baseline.     Psychiatric:         Mood and Affect: Mood normal.       Assessment/Plan   .Incarcerated R inguinal hernia s/p open repair of hernia / exlap / small bowel resection on 4/9/24  NICM with recovery of EF from 20% > 55-60%  paroxysmal Afib on apixaban  Lung CA s/p CTX/XRT in  12/23  mild nonobst CAD  ascending aortic aneurysm (measured 4.4 cm on CT 3/2021)  former tobacco use     4/13/24:  Postop hypotension has improved, likely due to post op / post anesthesia vasoplegia, blood loss, etc.  He is off phenylephrine.  It appears he was initiated on midodrine 10mg TID as well for hypotension, and also prednisone 30mg bid.     4/14/2024 : BP stable    Acute on chronic diastolic heart failure (recovered EF)  4-15-24: Patient's BP has improved. Midodrine and IV fluids have been stopped.  Looks mildly volume overloaded.  Will work on HR control and try to readd heart failure meds as able. Does not appear ready for d/c yet, will reassess tomorrow. Restart entresto tomorrow. +/- spironolactone.  4-16-24: BNP elevated (had IV fluids and med holds).  Will give one dose of IV Lasix and plan for d/c later today.  Restarting spironolactone today as well.      2. Atrial fibrillation  (history of pAfib, however has been persistent during this perioperative period), with HR's in the 80-90's on tele, rising to > 100's with activity.  Limited TTE (postop) yesterday demonstrated normal LV fcn EF 60-65%, no rWMA.  4/14/24:  HR is controlled.   OAC with Eliquis .  Blood count is improving.      4-15-24: Patient is chronic Afib.  He is normally on Metoprolol succinate 75 mg daily and digoxin.  Will change Metoprolol dose today and monitor response.  HR is elevated with activity and can get up into the 170s.  Back on Eliquis.   4-16-24: Remains Afib but HR has improved some.  Will plan for d/c on home dose of BB and digoxin.  Continue on anticoagulation.    3. Post op repair of incarcerated R inguinal hernia.  Surgery following, patient doing well         Code Status:  Full Code          Kelsie Carlson, APRN-CNP

## 2024-04-16 NOTE — PROGRESS NOTES
Kevin Rubi is a 68 y.o. male on day 7 of admission presenting with Incarcerated right inguinal hernia.      Subjective   Kevin Rubi is a 68 y.o. 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 Patient was in his normal state of health until about two days ago when he started experiencing right groin and abdominal pain. Also with nausea and non-bloody emesis. Originally went to a cardiology appointment today but found to be hypotensive so sent to the ED. In the ED, found to have an incarcerated ventral hernia so taken to surgery for emergent surgical reduction with bowel resection. Hospitalist service consulted for medication management. Currently pain controlled post-op. NGT left in place. Denies SOB or LE edema. Wants numbing spray for throat. BP low-nl.     4/10- No acute events overnight. Pt doing well after surgery. Critical care also following.  4/11: No acute events overnight.  NG tube was removed.  Patient started clear liquids.  Other than abdominal pain at the site of the surgery patient is overall doing well.  He denies any nausea, vomiting.  He denies any fevers or chills.  We will continue to follow with you blood pressure remains low.  Patient denies any dizziness, palpitations or chest pain.  Patient will likely require at least another 24 to 48 hours of inpatient service.  4/12: No acute events overnight. Patient started clear liquids yesterday and tolerated them well. Will start a fill liquid diet today. Patient has been having hyptension over the pass few days, he is now off pressors and was started on midodrine. Cardio was consulted and is following. Possible TTF later today or tomorrow.   4/13: No acute events overnight. Pt clinically much improved . He  denies any abdominal pain, nausea, vomiting diarrhea.  Denies any fevers or chills.  He will likely be transferred to the medical floor today.  Anticipate discharge on Monday.  4/14: No acute events overnight.  Patient denies any new symptoms.  Will continue to follow with you.  4/15: Patient seen.  Developed atrial fibrillation this a.m. with RVR.  Metoprolol has been on hold due to hypotension.  Seen by cardiology, metoprolol increased.  Will continue to monitor.  Patient is back on a normal consistency diet.  4/16: Patient seen.  No complaints present.  Cardiology is further adjust medications.  Gave an additional dose of Lasix.  Restarted Aldactone.  Could be discharged on beta-blocker and digoxin.  Follow-up with cardiology as outpatient.  Otherwise doing well.  Per cardiology okay to discharge today.  Will await input from general surgery.         Objective     Last Recorded Vitals  /64   Pulse 89   Temp 36 °C (96.8 °F)   Resp 18   Wt 82 kg (180 lb 12.4 oz)   SpO2 100%   Intake/Output last 3 Shifts:    Intake/Output Summary (Last 24 hours) at 4/16/2024 1433  Last data filed at 4/16/2024 0839  Gross per 24 hour   Intake 470 ml   Output 500 ml   Net -30 ml       Admission Weight  Weight: 69.9 kg (154 lb) (04/09/24 1419)    Daily Weight  04/16/24 : 82 kg (180 lb 12.4 oz)    Image Results  ECG 12 lead  Atrial fibrillation  Ventricular premature complex  Low voltage, extremity leads  Repol abnrm suggests ischemia, diffuse leads    See ED provider note for full interpretation and clinical correlation  Confirmed by Kalyani Perdomo ([id number]) on 4/14/2024 11:43:27 AM      Physical Exam  Constitutional:       Appearance: Normal appearance.   Cardiovascular:      Rate and Rhythm: Normal rate and regular rhythm.      Pulses: Normal pulses.   Pulmonary:      Effort: Pulmonary effort is normal.      Breath sounds: Normal breath sounds.   Abdominal:      General: Bowel sounds are normal.      Palpations: Abdomen  is soft.   Musculoskeletal:         General: Normal range of motion.      Cervical back: Normal range of motion.   Neurological:      General: No focal deficit present.      Mental Status: He is alert and oriented to person, place, and time.   Psychiatric:         Mood and Affect: Mood normal.         Behavior: Behavior normal.         Scheduled medications  acetaminophen, 650 mg, oral, q6h  apixaban, 5 mg, oral, q12h  ascorbic acid, 500 mg, oral, Daily  digoxin, 125 mcg, oral, Daily  gabapentin, 300 mg, oral, Nightly  lidocaine, 2 patch, transdermal, Daily  metoprolol succinate XL, 25 mg, oral, Daily  metoprolol succinate XL, 50 mg, oral, Daily  multivitamin, 1 tablet, oral, Daily  pantoprazole, 40 mg, oral, Daily before breakfast  [START ON 5/5/2024] predniSONE, 10 mg, oral, Daily  [START ON 4/28/2024] predniSONE, 20 mg, oral, Daily  [START ON 4/21/2024] predniSONE, 30 mg, oral, Daily  predniSONE, 40 mg, oral, Daily  rosuvastatin, 5 mg, oral, Daily  sacubitriL-valsartan, 1 tablet, oral, BID  spironolactone, 12.5 mg, oral, Daily  zinc sulfate, 50 mg of elemental zinc, oral, Daily      Continuous medications         PRN medications  PRN medications: ipratropium-albuteroL, oxyCODONE, oxyCODONE, tiZANidine                Assessment/Plan        ASSESSMENT     male with hx of lung cancer s/p chemo/radiation/immunotherapy c/b pneumonitis on prednisone, afib on Eliquis, SMA occlusion s/p stenting, 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     PLAN     Incarcerated Hernia s/p Surgical Reduction w/ Bowel Resection  -Presented with abd pain, nausea, and vomiting and found to have incarcerated hernia s/p emergent surgical reduction with bowel resection  -Post-op with low-nl Bps but otherwise doing well  -Critical care and Surgery  managing  4/11: Now on clear liquid diet.  Critical care recommendations appreciated.  4/12: Now on full liquid diet. Will continue to follow recommendations of general surgeon and critical care.  4/13: Clinically improving.  Will continue to follow with you.  4/14: Doing well.  4/15: Currently on regular diet consistency.  4/16: Discharge per general surgery.     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  Heart rate noted elevated 4/15.  Metoprolol had been on hold due to hypotension.  Restarting metoprolol, watch blood pressures.  4/16: 1 additional dose of Lasix today.  Okay to discharge with beta-blocker and digoxin.  Resume anticoagulation.  Follow-up cardiology as outpatient.    Other Issues  -Non-onst CAD: Home Statin. Hold Plavix for tonight  -Lung cancer s/p chemo/radiation/immunotherapy: Follow-up as an outpatient      DVT Prophy  -SCDs, hold home Eliquis for tonight      Malnutrition Diagnosis Status: Ongoing  Malnutrition Diagnosis: Severe malnutrition related to chronic disease or condition  As Evidenced by: moderate to severe muscle/fat loss per NFPE, and PO intake <75% for >1 month  I agree with the dietitian's malnutrition diagnosis.         Mani East MD

## 2024-04-16 NOTE — DISCHARGE SUMMARY
Discharge Diagnosis  Incarcerated right inguinal hernia    Issues Requiring Follow-Up  Post Op inguinal hernia repair with small bowel resection and anastamosis    Test Results Pending At Discharge  Pending Labs       Order Current Status    Surgical Pathology Exam In process            Hospital Course   Patient initially presented on 4/9 to the ED. On admission he had CT and exam findings consistent with an incarcerated right inguinal hernia. He was taken to the operating room, and intra op he was found to have a compromised segment of small bowel. He subsequently underwent an exploratory laparotomy with small bowel resection as well as an open primary repair of a right inguinal hernia. For full details see the operative report. Post operatively he was admitted to the ICU as he had multiple chronic conditions including stage IIIc lung cancer, pneumonitis, and CHF. He was seen by pulmonology, oncology, and cardiology while in the hospital. On POD#3 he was transitioned to floor status. At the time of discharge his pain was well controlled, his cardiac medications resumed including plavix and eliquis, and he was initiated on a steroid taper. He is having consistent bowel function as well as tolerating diet, deemed in good condition to DC home.    Pertinent Physical Exam At Time of Discharge  Review of Systems   Constitutional:  Negative for appetite change, fatigue and fever.   Respiratory:  Negative for cough, chest tightness and shortness of breath.    Cardiovascular:  Negative for chest pain and palpitations.   Gastrointestinal:  Negative for abdominal distention, abdominal pain, constipation, nausea and vomiting.   Genitourinary:  Negative for difficulty urinating, flank pain, frequency, penile pain, penile swelling, scrotal swelling and testicular pain.   Skin:  Negative for color change and wound.   Neurological:  Negative for dizziness, light-headedness and headaches.      Physical Exam  Vitals reviewed.    Constitutional:       General: He is not in acute distress.     Appearance: He is normal weight.   HENT:      Head: Normocephalic and atraumatic.      Mouth/Throat:      Mouth: Mucous membranes are moist.      Pharynx: Oropharynx is clear.   Eyes:      General: No scleral icterus.     Conjunctiva/sclera: Conjunctivae normal.      Pupils: Pupils are equal, round, and reactive to light.   Cardiovascular:      Rate and Rhythm: Normal rate.      Pulses: Normal pulses.   Pulmonary:      Effort: Pulmonary effort is normal. No respiratory distress.   Chest:      Chest wall: No tenderness.   Abdominal:      Comments: Appropriately tender near incisions. Violeta present, incisions well intact, some ecchymosis along both incisions, no palpable masses or   Musculoskeletal:         General: Normal range of motion.   Skin:     General: Skin is warm and dry.      Capillary Refill: Capillary refill takes less than 2 seconds.   Neurological:      General: No focal deficit present.      Mental Status: He is alert and oriented to person, place, and time.         Home Medications     Medication List      ASK your doctor about these medications     apixaban 5 mg tablet; Commonly known as: Eliquis; Take 1 tablet (5 mg)   by mouth 2 times a day.   clopidogrel 75 mg tablet; Commonly known as: Plavix; Take 1 tablet (75   mg) by mouth once daily.; Ask about: Should I take this medication?   digoxin 125 MCG tablet; Commonly known as: Lanoxin; TAKE 1 TABLET DAILY   Entresto 24-26 mg tablet; Generic drug: sacubitriL-valsartan; Take 1   tablet by mouth 2 times a day.   fenofibrate 145 mg tablet; Commonly known as: Tricor   gabapentin 300 mg capsule; Commonly known as: Neurontin; Take 1 capsule   (300 mg) by mouth once daily at bedtime.   * metoprolol succinate XL 50 mg 24 hr tablet; Commonly known as:   Toprol-XL; Take 1 tablet (50 mg) by mouth once daily. Do not crush or   chew.   * metoprolol succinate XL 25 mg 24 hr tablet; Commonly known  as:   Toprol-XL; Take 1 tablet (25 mg) by mouth once daily. Do not crush or   chew.   pantoprazole 20 mg EC tablet; Commonly known as: ProtoNix; Take 1 tablet   (20 mg) by mouth once daily in the morning. Take before meals. Do not   crush, chew, or split.   predniSONE 10 mg tablet; Commonly known as: Deltasone; Take 7 tablets   (70 mg) by mouth once daily. Take in the morning with food. Taper as   instructed by provider.   rosuvastatin 5 mg tablet; Commonly known as: Crestor; Take 1 tablet (5   mg) by mouth once daily.   spironolactone 25 mg tablet; Commonly known as: Aldactone; Take 0.5   tablets (12.5 mg) by mouth once daily.   tiZANidine 2 mg tablet; Commonly known as: Zanaflex; Take 1 tablet (2   mg) by mouth every 8 hours if needed for muscle spasms for up to 10 days.  * This list has 2 medication(s) that are the same as other medications   prescribed for you. Read the directions carefully, and ask your doctor or   other care provider to review them with you.       Outpatient Follow-Up  Future Appointments   Date Time Provider Department Center   5/1/2024 10:00 AM Janell Longoria MD [id number] [facility]   10/2/2024  1:40 PM RUKHSANA Fried-CNS [id number] [facility]     Follow up with Cardiology in 1 week, PCP in 1 week, Oncology in 3-4 weeks.  Call to schedule appointment with Dr. Valladares in 2 weeks  Valentin Mora DO PGY-2  General Surgery [id number]

## 2024-04-16 NOTE — CARE PLAN
The patient's goals for the shift include      Problem: Pain  Goal: My pain/discomfort is manageable  4/16/2024 1837 by Rufina Carlos RN  Outcome: Adequate for Discharge  4/16/2024 1431 by Rufina Carlos RN  Outcome: Progressing     Problem: Safety  Goal: Patient will be injury free during hospitalization  4/16/2024 1837 by Rufina Carlos RN  Outcome: Adequate for Discharge  4/16/2024 1431 by Rufina Carlos RN  Outcome: Progressing  Goal: I will remain free of falls  4/16/2024 1837 by Rufina Carlos RN  Outcome: Adequate for Discharge  4/16/2024 1431 by Rufina Carlos RN  Outcome: Progressing     Problem: Daily Care  Goal: Daily care needs are met  4/16/2024 1837 by Rufina Carlos RN  Outcome: Adequate for Discharge  4/16/2024 1431 by Rufina Carlos RN  Outcome: Progressing     Problem: Psychosocial Needs  Goal: Demonstrates ability to cope with hospitalization/illness  4/16/2024 1837 by Rufina Carlos RN  Outcome: Adequate for Discharge  4/16/2024 1431 by Rufina Carlos RN  Outcome: Progressing  Goal: Collaborate with me, my family, and caregiver to identify my specific goals  4/16/2024 1837 by Rufina Carlos RN  Outcome: Adequate for Discharge  4/16/2024 1431 by Rufina Carlos RN  Outcome: Progressing     Problem: Discharge Barriers  Goal: My discharge needs are met  4/16/2024 1837 by Rufina Carlos RN  Outcome: Adequate for Discharge  4/16/2024 1431 by Rufina Carlos RN  Outcome: Progressing     Problem: Nutrition  Goal: Lab values WNL  4/16/2024 1837 by Rufina Carlos RN  Outcome: Adequate for Discharge  4/16/2024 1431 by Rufina Carlos RN  Outcome: Progressing  Goal: Promote healing  4/16/2024 1837 by Rufina Carlos RN  Outcome: Adequate for Discharge  4/16/2024 1431 by Rufina Carlos RN  Outcome: Progressing  Goal: Maintain stable weight  4/16/2024 1837 by Rufina Carlos RN  Outcome: Adequate for Discharge  4/16/2024 1431 by Rufina Carlos RN  Outcome: Progressing     Problem: PT  Problem  Goal: transfers  Outcome: Adequate for Discharge  Goal: gait  Outcome: Adequate for Discharge     Problem: ADLs  Goal: Patient with complete lower body dressing with modified independent level of assistance donning and doffing all LE clothes  with PRN adaptive equipment while supported sitting and standing  Outcome: Adequate for Discharge     Problem: MOBILITY  Goal: Patient will perform Functional mobility max Household distances/Community Distances with modified independent level of assistance and least restrictive device in order to improve safety and functional mobility.  Outcome: Adequate for Discharge     Problem: Pain  Goal: Takes deep breaths with improved pain control throughout the shift  4/16/2024 1837 by Rufina Carlos RN  Outcome: Adequate for Discharge  4/16/2024 1431 by Rufina Carlos RN  Outcome: Progressing  Goal: Turns in bed with improved pain control throughout the shift  4/16/2024 1837 by Rufina Carlos RN  Outcome: Adequate for Discharge  4/16/2024 1431 by Rufina Carlos RN  Outcome: Progressing  Goal: Walks with improved pain control throughout the shift  4/16/2024 1837 by Rufina Carlos RN  Outcome: Adequate for Discharge  4/16/2024 1431 by Rufina Carlos RN  Outcome: Progressing  Goal: Performs ADL's with improved pain control throughout shift  4/16/2024 1837 by Rufina Carlos RN  Outcome: Adequate for Discharge  4/16/2024 1431 by Rufina Carlos RN  Outcome: Progressing  Goal: Participates in PT with improved pain control throughout the shift  4/16/2024 1837 by Rufina Carlos RN  Outcome: Adequate for Discharge  4/16/2024 1431 by Rufina Carlos RN  Outcome: Progressing  Goal: Free from opioid side effects throughout the shift  4/16/2024 1837 by Rufina Carlos RN  Outcome: Adequate for Discharge  4/16/2024 1431 by Rufina Carlos RN  Outcome: Progressing  Goal: Free from acute confusion related to pain meds throughout the shift  4/16/2024 1837 by Rufina Carlos  RN  Outcome: Adequate for Discharge  4/16/2024 1431 by Rufina Carlos RN  Outcome: Progressing     Problem: Pain - Adult  Goal: Verbalizes/displays adequate comfort level or baseline comfort level  4/16/2024 1837 by Rufina Carlos RN  Outcome: Adequate for Discharge  4/16/2024 1431 by Rufina Carlos RN  Outcome: Progressing     Problem: Safety - Adult  Goal: Free from fall injury  4/16/2024 1837 by Rufina Carlos RN  Outcome: Adequate for Discharge  4/16/2024 1431 by Rufina Carlos RN  Outcome: Progressing     Problem: Discharge Planning  Goal: Discharge to home or other facility with appropriate resources  4/16/2024 1837 by Rufina Carlos RN  Outcome: Adequate for Discharge  4/16/2024 1431 by Rufina Carlos RN  Outcome: Progressing     Problem: Chronic Conditions and Co-morbidities  Goal: Patient's chronic conditions and co-morbidity symptoms are monitored and maintained or improved  4/16/2024 1837 by Rufina Carlos RN  Outcome: Adequate for Discharge  4/16/2024 1431 by Rufina Carlos RN  Outcome: Progressing       The clinical goals for the shift include progressing    Discharged home this evening. Voiced understanding of discharge instructions, home meds, scripts, and follow up appointments. No questions or concerns noted. Scripts dropped off to pt from pharmacy.

## 2024-04-16 NOTE — PROGRESS NOTES
Kevin Rubi is a 68 y.o. male on day 7 of admission presenting with Incarcerated right inguinal hernia.  History of non-small cell lung cancer, postoperatively doing okay  Subjective   No new complaint, no nausea vomiting, patient has bowel movement without any problem pain is under control       Objective     Physical Exam  Vitals are stable patient doing very well     Neck supple     HEENT examination normal limits     Lungs clear to auscultation, good air movement     Heart with normal regular rhythm     Abdomen is soft, bowel sound present, surgical incision healing well     Extremity no edema cyanosis     No peripheral lymphadenopathy  Last Recorded Vitals  Blood pressure 107/67, pulse 72, temperature 35.8 °C (96.4 °F), resp. rate 18, height 1.829 m (6'), weight 82 kg (180 lb 12.4 oz), SpO2 96%.  Intake/Output last 3 Shifts:  I/O last 3 completed shifts:  In: 2081.7 (25.4 mL/kg) [P.O.:1100; I.V.:981.7 (12 mL/kg)]  Out: 1250 (15.2 mL/kg) [Urine:1250 (0.4 mL/kg/hr)]  Weight: 82 kg     Relevant Results      Latest Reference Range & Units 04/15/24 04:13   LEUKOCYTES (10*3/UL) IN BLOOD BY AUTOMATED COUNT, [place] 4.4 - 11.3 x10*3/uL 8.0   nRBC 0.0 - 0.0 /100 WBCs 0.2 (H)   ERYTHROCYTES (10*6/UL) IN BLOOD BY AUTOMATED COUNT, [place] 4.50 - 5.90 x10*6/uL 3.04 (L)   HEMOGLOBIN 13.5 - 17.5 g/dL 8.3 (L)   HEMATOCRIT 41.0 - 52.0 % 25.4 (L)   MCV 80 - 100 fL 84   MCH 26.0 - 34.0 pg 27.3   MCHC 32.0 - 36.0 g/dL 32.7   RED CELL DISTRIBUTION WIDTH 11.5 - 14.5 % 16.1 (H)   PLATELETS (10*3/UL) IN BLOOD AUTOMATED COUNT, [place] 150 - 450 x10*3/uL 134 (L)   (H): Data is abnormally high  (L): Data is abnormally low               Assessment/Plan   #1 incarcerated hernia status post repair and bowel resection postoperatively doing very well     2.  Non-small cell lung cancer stage III status post concurrent chemoradiotherapy, chemotherapy regimen including carboplatin pemetrexed, was receiving   durvalumab.  No further  intervention during hospitalization, patient will be followed as outpatient   I spent 20 minutes in the professional and overall care of this patient.      Michael Fonseca MD

## 2024-04-16 NOTE — CARE PLAN
The patient's goals for the shift include      Problem: Pain  Goal: My pain/discomfort is manageable  Outcome: Progressing     Problem: Safety  Goal: Patient will be injury free during hospitalization  Outcome: Progressing  Goal: I will remain free of falls  Outcome: Progressing     Problem: Daily Care  Goal: Daily care needs are met  Outcome: Progressing     Problem: Psychosocial Needs  Goal: Demonstrates ability to cope with hospitalization/illness  Outcome: Progressing  Goal: Collaborate with me, my family, and caregiver to identify my specific goals  Outcome: Progressing     Problem: Discharge Barriers  Goal: My discharge needs are met  Outcome: Progressing     Problem: Nutrition  Goal: Lab values WNL  Outcome: Progressing  Goal: Promote healing  Outcome: Progressing  Goal: Maintain stable weight  Outcome: Progressing     Problem: Pain  Goal: Takes deep breaths with improved pain control throughout the shift  Outcome: Progressing  Goal: Turns in bed with improved pain control throughout the shift  Outcome: Progressing  Goal: Walks with improved pain control throughout the shift  Outcome: Progressing  Goal: Performs ADL's with improved pain control throughout shift  Outcome: Progressing  Goal: Participates in PT with improved pain control throughout the shift  Outcome: Progressing  Goal: Free from opioid side effects throughout the shift  Outcome: Progressing  Goal: Free from acute confusion related to pain meds throughout the shift  Outcome: Progressing     Problem: Pain - Adult  Goal: Verbalizes/displays adequate comfort level or baseline comfort level  Outcome: Progressing     Problem: Safety - Adult  Goal: Free from fall injury  Outcome: Progressing     Problem: Discharge Planning  Goal: Discharge to home or other facility with appropriate resources  Outcome: Progressing     Problem: Chronic Conditions and Co-morbidities  Goal: Patient's chronic conditions and co-morbidity symptoms are monitored and  maintained or improved  Outcome: Progressing       The clinical goals for the shift include progressing    See assessment and  mar. Remains  on room air. Tele as ordered. Incision care as ordered.  Lasix given this shift.

## 2024-04-16 NOTE — CARE PLAN
The patient's goals for the shift include      The clinical goals for the shift include Antonino will have decrease BLE swelling this shift.

## 2024-04-17 ENCOUNTER — PATIENT OUTREACH (OUTPATIENT)
Dept: CARE COORDINATION | Facility: CLINIC | Age: 69
End: 2024-04-17
Payer: COMMERCIAL

## 2024-04-17 ENCOUNTER — TELEPHONE (OUTPATIENT)
Dept: PRIMARY CARE | Facility: CLINIC | Age: 69
End: 2024-04-17
Payer: COMMERCIAL

## 2024-04-17 NOTE — TELEPHONE ENCOUNTER
Patient's wife called patient just had surgery requesting a follow up appointment did not know if they need to come in office or if they just needed a phone conversation please advise

## 2024-04-17 NOTE — PROGRESS NOTES
Discharge Facility: [facility]   Discharge Diagnosis: Incarcerated right inguinal hernia   Admission Date: 4-9-24   Discharge Date: 4-16-24     PCP Appointment Date: 4-30-24   Specialist Appointment Date:  4-29-24 Cardiology   Hospital Encounter and Summary: Linked   Engagement  Call Start Time: 1026 (4/18/2024 10:28 AM)    Medications  Medications reviewed with patient/caregiver?: Yes (4/18/2024 10:28 AM)  Is the patient having any side effects they believe may be caused by any medication additions or changes?: No (4/18/2024 10:28 AM)  Does the patient have all medications ordered at discharge?: Yes (4/18/2024 10:28 AM)  Care Management Interventions: No intervention needed (4/18/2024 10:28 AM)  Prescription Comments: START taking:  oxyCODONE (Roxicodone)    CHANGE how you take:  predniSONE (Deltasone) (4/18/2024 10:28 AM)  Is the patient taking all medications as directed (includes completed medication regime)?: Yes (4/18/2024 10:28 AM)  Care Management Interventions: Provided patient education (4/18/2024 10:28 AM)    Appointments  Does the patient have a primary care provider?: Yes (4/18/2024 10:28 AM)  Care Management Interventions: Verified appointment date/time/provider (4/18/2024 10:28 AM)  Has the patient kept scheduled appointments due by today?: Yes (4/18/2024 10:28 AM)  Care Management Interventions: Advised patient to keep appointment (4/18/2024 10:28 AM)    Self Management  Has home health visited the patient within 72 hours of discharge?: Not applicable (4/18/2024 10:28 AM)    Patient Teaching  Does the patient have access to their discharge instructions?: Yes (4/18/2024 10:28 AM)  Care Management Interventions: Reviewed instructions with patient (4/18/2024 10:28 AM)  What is the patient's perception of their health status since discharge?: Improving (4/18/2024 10:28 AM)  Is the patient/caregiver able to teach back the hierarchy of who to call/visit for symptoms/problems? PCP, Specialist, Home Health  nurse, Urgent Care, ED, 911: Yes (4/18/2024 10:28 AM)

## 2024-04-19 LAB
LABORATORY COMMENT REPORT: NORMAL
PATH REPORT.FINAL DX SPEC: NORMAL
PATH REPORT.GROSS SPEC: NORMAL
PATH REPORT.RELEVANT HX SPEC: NORMAL
PATH REPORT.TOTAL CANCER: NORMAL

## 2024-04-23 PROBLEM — F43.23 ADJUSTMENT DISORDER WITH MIXED ANXIETY AND DEPRESSED MOOD: Status: ACTIVE | Noted: 2024-04-23
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
## 2024-04-23 PROBLEM — H52.4 PRESBYOPIA: Status: ACTIVE | Noted: 2024-04-23

## 2024-04-23 PROBLEM — F32.A DEPRESSION: Status: ACTIVE | Noted: 2024-04-23

## 2024-04-23 PROBLEM — J98.4 PNEUMONITIS: Status: ACTIVE | Noted: 2024-04-23

## 2024-04-23 PROBLEM — I73.9 PERIPHERAL VASCULAR DISEASE (CMS-HCC): Status: ACTIVE | Noted: 2024-04-23

## 2024-04-23 PROBLEM — I50.9 CONGESTIVE HEART FAILURE (MULTI): Status: ACTIVE | Noted: 2023-09-22

## 2024-04-23 PROBLEM — R42 DIZZINESS: Status: ACTIVE | Noted: 2024-04-23

## 2024-04-23 RX ORDER — DICYCLOMINE HYDROCHLORIDE 10 MG/1
CAPSULE ORAL 4 TIMES DAILY PRN
COMMUNITY
Start: 2023-07-31 | End: 2024-05-13 | Stop reason: ALTCHOICE

## 2024-04-25 ENCOUNTER — OFFICE VISIT (OUTPATIENT)
Dept: SURGERY | Facility: CLINIC | Age: 69
End: 2024-04-25
Payer: MEDICARE

## 2024-04-25 VITALS
BODY MASS INDEX: 22 KG/M2 | HEIGHT: 72 IN | WEIGHT: 162.4 LBS | SYSTOLIC BLOOD PRESSURE: 102 MMHG | HEART RATE: 102 BPM | OXYGEN SATURATION: 97 % | DIASTOLIC BLOOD PRESSURE: 64 MMHG

## 2024-04-25 DIAGNOSIS — K40.30 INCARCERATED RIGHT INGUINAL HERNIA: Primary | ICD-10-CM

## 2024-04-25 PROBLEM — I50.32 HEART FAILURE WITH IMPROVED EJECTION FRACTION (HFIMPEF) (MULTI): Status: ACTIVE | Noted: 2024-04-25

## 2024-04-25 PROCEDURE — 1157F ADVNC CARE PLAN IN RCRD: CPT | Performed by: SURGERY

## 2024-04-25 PROCEDURE — 1160F RVW MEDS BY RX/DR IN RCRD: CPT | Performed by: SURGERY

## 2024-04-25 PROCEDURE — 3078F DIAST BP <80 MM HG: CPT | Performed by: SURGERY

## 2024-04-25 PROCEDURE — 1159F MED LIST DOCD IN RCRD: CPT | Performed by: SURGERY

## 2024-04-25 PROCEDURE — 1036F TOBACCO NON-USER: CPT | Performed by: SURGERY

## 2024-04-25 PROCEDURE — 1111F DSCHRG MED/CURRENT MED MERGE: CPT | Performed by: SURGERY

## 2024-04-25 PROCEDURE — 15854 REMOVAL SUTR&STAPL XREQ ANES: CPT | Performed by: SURGERY

## 2024-04-25 PROCEDURE — 99024 POSTOP FOLLOW-UP VISIT: CPT | Performed by: SURGERY

## 2024-04-25 PROCEDURE — 3074F SYST BP LT 130 MM HG: CPT | Performed by: SURGERY

## 2024-04-25 ASSESSMENT — ENCOUNTER SYMPTOMS
HEADACHES: 0
BLOOD IN STOOL: 0
ABDOMINAL PAIN: 0
HEMATURIA: 0
FEVER: 0
CHILLS: 0
IRREGULAR HEARTBEAT: 0
DYSPNEA ON EXERTION: 0
PALPITATIONS: 0
VOMITING: 0
COUGH: 0
DIARRHEA: 0
ALTERED MENTAL STATUS: 0
NAUSEA: 0
CONSTIPATION: 0
PALPITATIONS: 0
WHEEZING: 0
FEVER: 0
COUGH: 0
HEMATOCHEZIA: 0
ORTHOPNEA: 0
SHORTNESS OF BREATH: 0
SHORTNESS OF BREATH: 0
DIZZINESS: 0
NAUSEA: 0
VOMITING: 0
CHILLS: 0
NEAR-SYNCOPE: 0
SYNCOPE: 0

## 2024-04-25 NOTE — PROGRESS NOTES
Chief Complaint/Reason for Visit:  No chief complaint on file. S/p hospitalization follow up    History Of Present Illness:    Kevin Rubi is a 68 y.o. male that presents to the office with his wife for S/p hospitalization follow up.  Taking medications as prescribed.      He was admitted to [facility] 4/9/24-4/16/24 for incarcerated right inguinal hernia s/p surgical repair.  Initially had issues with postop hypotension that was thought to be due to post anesthesia vasoplegia, blood loss.  He did require phenylephrine infusion and midodrine which was discontinued eventually.  Cardiology was following for acute on chronic HFpEF and chronic atrial fibrillation.  Beta blocker dose was increased.  He was discharged on apixaban, clopidogrel, digoxin, Entresto, metoprolol succinate and spironolactone.  He was given a one time dose of IV furosemide prior to d/c.     EKG personally reviewed today showed AF with HR 93 bpm.  This will go to the cardiologist for final review.     Past Medical History:  He has a past medical history of Aortic aneurysm (CMS-[facility]), Atrial fibrillation (Multi), Coronary artery disease, Hyperlipidemia, Hypertension, Lung cancer (Multi), Personal history of peptic ulcer disease, Superior mesenteric artery stenosis (Multi), and Tinnitus, bilateral (08/14/2016).    Past Surgical History:  He has a past surgical history that includes Other surgical history (02/13/2019); Other surgical history (02/13/2019); CT angio abdomen pelvis w and or wo IV IV contrast (08/08/2023); Superior mestenteric artery stent; Exploratory laparotomy w/ bowel resection (04/09/2024); and Hernia repair (Right, 04/09/2024).      Social History:  He reports that he quit smoking about 11 years ago. His smoking use included cigarettes. He started smoking about 41 years ago. He has a 15 pack-year smoking history. He has been exposed to tobacco smoke. He has never used smokeless tobacco. He reports that he does  not currently use alcohol. He reports that he does not use drugs.    Family History:  Family History   Problem Relation Name Age of Onset    Cancer Mother          ?larynx or lung        Allergies:  Patient has no known allergies.    Review of Systems   Constitutional: Negative for chills and fever.   Cardiovascular:  Positive for leg swelling. Negative for chest pain, dyspnea on exertion, irregular heartbeat, near-syncope, orthopnea, palpitations and syncope.   Respiratory:  Negative for cough, shortness of breath and wheezing.    Gastrointestinal:  Negative for hematochezia, melena, nausea and vomiting.   Genitourinary:  Negative for hematuria.   Psychiatric/Behavioral:  Negative for altered mental status.        Objective      Vitals reviewed.   Constitutional:       Appearance: Healthy appearance.   Pulmonary:      Effort: Pulmonary effort is normal.      Breath sounds: Examination of the right-lower field reveals rhonchi. Rhonchi present.   Cardiovascular:      PMI at left midclavicular line. Normal rate. Irregularly irregular rhythm. S1 with normal intensity. S2 with normal intensity.       Murmurs: There is no murmur.   Edema:     Peripheral edema present.     Pretibial: bilateral trace pitting edema of the pretibial area.     Ankle: bilateral 2+ pitting edema of the ankle.     Feet: bilateral 2+ pitting edema of the feet.  Abdominal:      General: Bowel sounds are normal.   Skin:     General: Skin is warm and dry.   Psychiatric:         Attention and Perception: Attention normal.         Mood and Affect: Mood normal.         Behavior: Behavior is cooperative.         Current Outpatient Medications   Medication Instructions    apixaban (ELIQUIS) 5 mg, oral, 2 times daily    clopidogrel (PLAVIX) 75 mg, oral, Daily    dicyclomine (Bentyl) 10 mg capsule oral, 4 times daily PRN    digoxin (LANOXIN) 125 mcg, oral, Daily    Entresto 24-26 mg tablet 1 tablet, oral, 2 times daily    fenofibrate (Tricor) 145 mg tablet  Take 1 tablet (145 mg) by mouth once daily.    gabapentin (NEURONTIN) 300 mg, oral, Nightly    metoprolol succinate XL (TOPROL-XL) 50 mg, oral, Daily, Do not crush or chew.    metoprolol succinate XL (TOPROL-XL) 25 mg, oral, Daily, Do not crush or chew.    oxyCODONE (ROXICODONE) 5 mg, oral, Every 4 hours PRN    pantoprazole (PROTONIX) 20 mg, oral, Daily before breakfast, Do not crush, chew, or split.    predniSONE (Deltasone) 10 mg tablet Take 4 tablets (40 mg) by mouth once daily for 4 doses.  Then on April 21, 2024 Take 3 tablets (30 mg) by mouth once daily for 7 doses. Then on April 28, 2024 Take 2 tablets (20 mg) by mouth once daily for 7 doses.  After finishing this taper, use next rx bottle to start on May 5, 2024 Take 1 tablet (10mg) by mouth once daily for 30 doses.    [START ON 5/5/2024] predniSONE (Deltasone) 10 mg tablet After finishing taper.  Take 1 tablet (10 mg) by mouth once daily. Do not start before May 5, 2024.    rosuvastatin (CRESTOR) 5 mg, oral, Daily    spironolactone (ALDACTONE) 12.5 mg, oral, Daily    tiZANidine (ZANAFLEX) 2 mg, oral, Every 8 hours PRN        Last Labs:  CBC -  Lab Results   Component Value Date    WBC 8.0 04/15/2024    HGB 8.3 (L) 04/15/2024    HCT 25.4 (L) 04/15/2024    MCV 84 04/15/2024     (L) 04/15/2024       CMP -  Lab Results   Component Value Date    CALCIUM 7.4 (L) 04/16/2024    PHOS 1.8 (L) 04/15/2024    PROT 6.9 04/09/2024    ALBUMIN 3.7 04/09/2024    AST 11 04/09/2024    ALT 13 04/09/2024    ALKPHOS 46 04/09/2024    BILITOT 0.6 04/09/2024       LIPID PANEL -   Lab Results   Component Value Date    CHOL 140 10/23/2023    TRIG 122 10/23/2023    HDL 41.3 10/23/2023    CHHDL 3.4 10/23/2023    LDLF 39 04/01/2023    VLDL 24 10/23/2023    NHDL 99 10/23/2023       RENAL FUNCTION PANEL -   Lab Results   Component Value Date    GLUCOSE 144 (H) 04/16/2024     (L) 04/16/2024    K 4.1 04/16/2024     04/16/2024    CO2 27 04/16/2024    ANIONGAP 8 (L)  04/16/2024    BUN 24 (H) 04/16/2024    CREATININE 0.84 04/16/2024    GFRMALE CANCELED 09/16/2023    CALCIUM 7.4 (L) 04/16/2024    PHOS 1.8 (L) 04/15/2024    ALBUMIN 3.7 04/09/2024        Lab Results   Component Value Date     (H) 04/16/2024    HGBA1C 6.1 (H) 02/27/2024     Lab Results   Component Value Date    DIGOXIN 1.07 04/14/2024       Last Cardiology Tests:    TTE 4/11/24  1. Left ventricular systolic function is normal with a 60-65% estimated ejection fraction.     TTE 3/26/24  1. Left ventricular systolic function is normal with a 55-60% estimated ejection fraction.   2. Spectral Doppler shows a pseudonormal pattern of left ventricular diastolic filling.   3. The left atrium is mild to moderately dilated.   4. RVSP within normal limits.   5. Aortic valve appears abnormal.   6. The aortic valve appears bicuspid.   7. Mild aortic valve regurgitation.    Echo 6/14/19:   LVEF 60%. Probable bicuspid aortic valve with mild AS    Visit Vitals  /70   Pulse 74   Ht 1.829 m (6')   Wt 73.5 kg (162 lb)   BMI 21.97 kg/m²   Smoking Status Former   BSA 1.93 m²       Assessment/Plan   The primary encounter diagnosis was Heart failure with improved ejection fraction (HFimpEF) (Multi). Diagnoses of Mild CAD, Atrial fibrillation, unspecified type (Multi), Bicuspid aortic valve ([facility]), and Hyperlipidemia, unspecified hyperlipidemia type were also pertinent to this visit.    1. HFimpEF  Volume overloaded on exam, +BLE pitting edema  Continue current GDMT which includes:  Beta blocker:  metoprolol succinate 75 mg daily  ACE inhibitor/ARB/ARNI: Sacubitril-valsartan 24-26 mg BID  MRA: spironolactone 12.5 mg daily  SGLT2i:  None  Diuretic: None start furosemide 20 mg daily.  Initially was going to use 40 mg daily, but he has chronic issues with hypotension.    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   TTE April 2024 with LVEF 60-65%   Check BMP in 1 week  Educated regarding 2 gm sodium and 2L fluid restriction. His wife does the cook and does not use salt.  He thinks he is probably drinking more than 2L/day.  Handout given on heart failure.    2. Mild nonobstructive CAD  [facility] in 2019 with mild CAD  not on ASA d/t taking Eliquis  Continue rosuvastatin 5 mg daily     3. Afib, paroxysmal  Continue BB/digoxin for rate control, and apixaban OAC  Plan is to rate control.  Patient has declined rhythm control strategy in the past.  [id number] score is 3. (Hypertension - Yes, 1 point, Congestive Heart Failure  or Left ventricular Dysfunction - Yes, 1 point, and Age 65-74 years - 1 point)  Continue apixaban 5 mg BID    EKG today AF with HR 93 bpm     4. Ascending aortic aneurysm (also has bicuspid aortic valve)  CTA chest in 2021 with ascending aorta 4.4 x 4.3 cm  Check repeat TTE for surveillance     5. Bicuspid aortic valve  Noted to have bicuspid aortic valve in 2019 on TTE with mild AS  TTE March 2024 with bicuspid aortic valve, mild aortic valve regurgitation. The peak instantaneous gradient of the aortic valve is 19.3 mmHg. The mean gradient of the aortic valve is 9.9 mmHg.      6. Newly diagnosed lung cancer  Management per oncology - completed chemo and radiation and is now undergoing immunotherapy.    7. Dyslipidemia  Goal LDL <70.  Currently at goal.  Continue  rosuvastatin 5 mg daily .     Tressa Khalil, APRN-CNP

## 2024-04-25 NOTE — PROGRESS NOTES
GENERAL SURGERY CLINIC NOTE    Kevin Rubi   1955   [id number]     History Of Present Illness  Kevin Rubi is a 68 y.o. male who presents to the office for following up after being admitted 4/9/24-4/16/24 with an incarcerated right inguinal hernia. He underwent emergent open primary repair of the right inguinal hernia, exploratory laparotomy and small bowel resection on 4/9/24. He recovered well postoperatively and was discharged on postoperative day 7. He is tolerating a diet and having normal bowel function.    He has a history of stage IIIC lung cancer status post chemo/RT, pneumonitis on prednisone (started on taper prior to admission, remains on it), HTN, AF on Eliquis, asc aortic aneurysm, HFpEF with EF 60%, hx SMA occlusion s/p stent placement on Plavix, PUD, HLD. He had a negative Cologuard <1 year ago.    His wife was present in the office with him today.     Past Medical History  He has a past medical history of Aortic aneurysm (CMS-[facility]), Atrial fibrillation (Multi), Coronary artery disease, Hyperlipidemia, Hypertension, Lung cancer (Multi), Personal history of peptic ulcer disease, Superior mesenteric artery stenosis (Multi), and Tinnitus, bilateral (08/14/2016).    Surgical History  He has a past surgical history that includes Other surgical history (02/13/2019); Other surgical history (02/13/2019); CT angio abdomen pelvis w and or wo IV IV contrast (08/08/2023); and Superior mestenteric artery stent.    Medications  Current Outpatient Medications on File Prior to Visit   Medication Sig Dispense Refill    apixaban (Eliquis) 5 mg tablet Take 1 tablet (5 mg) by mouth 2 times a day. 180 tablet 1    clopidogrel (Plavix) 75 mg tablet Take 1 tablet (75 mg) by mouth once daily. 30 tablet 2    digoxin (Lanoxin) 125 MCG tablet TAKE 1 TABLET DAILY 90 tablet 0    Entresto 24-26 mg tablet Take 1 tablet by mouth 2 times a day. 180 tablet 1    fenofibrate (Tricor) 145 mg tablet Take 1 tablet (145 mg)  by mouth once daily.      gabapentin (Neurontin) 300 mg capsule Take 1 capsule (300 mg) by mouth once daily at bedtime. 60 capsule 2    metoprolol succinate XL (Toprol-XL) 25 mg 24 hr tablet Take 1 tablet (25 mg) by mouth once daily. Do not crush or chew. 90 tablet 3    metoprolol succinate XL (Toprol-XL) 50 mg 24 hr tablet Take 1 tablet (50 mg) by mouth once daily. Do not crush or chew. 90 tablet 3    oxyCODONE (Roxicodone) 5 mg immediate release tablet Take 1 tablet (5 mg) by mouth every 4 hours if needed for moderate pain (4 - 6). 15 tablet 0    pantoprazole (ProtoNix) 20 mg EC tablet Take 1 tablet (20 mg) by mouth once daily in the morning. Take before meals. Do not crush, chew, or split. 30 tablet 11    predniSONE (Deltasone) 10 mg tablet Take 3 tablets (30 mg) by mouth once daily for 7 doses. Do not start before 2024. 21 tablet 0    [START ON 2024] predniSONE (Deltasone) 10 mg tablet After finishing taper.  Take 1 tablet (10 mg) by mouth once daily. Do not start before May 5, 2024. 30 tablet 0    [] predniSONE (Deltasone) 20 mg tablet Take 2 tablets (40 mg) by mouth once daily for 4 doses. 8 tablet 0    [START ON 2024] predniSONE (Deltasone) 10 mg tablet Take 4 tablets (40 mg) by mouth once daily for 4 doses.  Then on 2024 Take 3 tablets (30 mg) by mouth once daily for 7 doses. Then on 2024 Take 2 tablets (20 mg) by mouth once daily for 7 doses.  After finishing this taper, use next rx bottle to start on May 5, 2024 Take 1 tablet (10mg) by mouth once daily for 30 doses. 51 tablet 0    rosuvastatin (Crestor) 5 mg tablet Take 1 tablet (5 mg) by mouth once daily. 90 tablet 1    spironolactone (Aldactone) 25 mg tablet Take 0.5 tablets (12.5 mg) by mouth once daily. 45 tablet 1    tiZANidine (Zanaflex) 2 mg tablet Take 1 tablet (2 mg) by mouth every 8 hours if needed for muscle spasms for up to 10 days. 30 tablet 0     No current facility-administered medications on  file prior to visit.       Allergies  Patient has no known allergies.     Social History  He reports that he quit smoking about 11 years ago. His smoking use included cigarettes. He started smoking about 41 years ago. He has a 15 pack-year smoking history. He has been exposed to tobacco smoke. He has never used smokeless tobacco. He reports that he does not currently use alcohol. He reports that he does not use drugs.    Family History  Family History   Problem Relation Name Age of Onset    Cancer Mother          ?larynx or lung        Review of Systems   Constitutional:  Negative for chills and fever.   Respiratory:  Negative for cough and shortness of breath.    Cardiovascular:  Negative for chest pain and palpitations.   Gastrointestinal:  Negative for abdominal pain, blood in stool, constipation, diarrhea, nausea and vomiting.   Musculoskeletal:         Leg swelling   Neurological:  Negative for dizziness and headaches.   All other systems reviewed and are negative.      Last Recorded Vitals  There were no vitals taken for this visit.     Physical Exam  Constitutional:       General: He is not in acute distress.     Appearance: Normal appearance. He is not ill-appearing.   HENT:      Head: Normocephalic and atraumatic.   Cardiovascular:      Rate and Rhythm: Normal rate and regular rhythm.   Pulmonary:      Effort: Pulmonary effort is normal. No respiratory distress.      Breath sounds: Normal breath sounds.   Abdominal:      General: There is no distension.      Palpations: Abdomen is soft.      Tenderness: There is no abdominal tenderness. There is no guarding.      Comments: Midline and right inguinal incisions healing well. The patient previously had significant lower abdominal ecchymosis from midline incision - this has improved significantly. Mild right inguinal swelling - expected postop   Musculoskeletal:         General: Swelling present.   Skin:     General: Skin is warm and dry.   Neurological:       Mental Status: He is alert and oriented to person, place, and time. Mental status is at baseline.   Psychiatric:         Mood and Affect: Mood normal.         Behavior: Behavior normal.          Relevant Results    CT abdomen pelvis w IV contrast    1.  Multiple loops of dilated small bowel consistent with a small bowel obstruction.  There is a loop of small bowel located within a right inguinal hernia likely incarcerated and acting as a transition point for the obstruction. 2.  Ill-defined consolidative changes within the left lower lobe which may represent a possible pneumonitis. 3.  Findings consistent with hepatic steatosis. 4.  No abdominal/pelvic fluid collections or pneumoperitoneum. 5.  Colonic diverticulosis without definite evidence of diverticulitis. 6.  Stable mild prostatomegaly.. Result was given to Dr. Christiano Flanagan on 4/9/2024 at 1936. Signed by Ward Tong MD    Assessment and Plan  68 y.o. male with a recent history of an incarcerated right inguinal hernia status post emergent open primary repair of the right inguinal hernia, exploratory laparotomy and small bowel resection on 4/9/24. He was discharged in stable condition on postoperative day 7. He is recovering well and the staples were removed in the office today without incident. He is following up with Cardiology and his PCP next week - he will likely need mild diuresis given his lower extremity swelling. Maintain lifting restrictions for a few more weeks. Follow up with me as needed. He and his wife expressed their understanding and all questions were answered.    Vale Valladares MD, [facility]  General Surgery

## 2024-04-25 NOTE — PATIENT INSTRUCTIONS
Recommend Mediterranean style of eating  Start furosemide 20 mg daily  Check lab work in 1 week  Establish with heart failure clinic  Follow-up with Dr. Leann Rosario in 3 months  If you have any questions or cardiac concerns, please call our office at [phone].

## 2024-04-29 ENCOUNTER — OFFICE VISIT (OUTPATIENT)
Dept: CARDIOLOGY | Facility: CLINIC | Age: 69
End: 2024-04-29
Payer: MEDICARE

## 2024-04-29 VITALS
HEART RATE: 74 BPM | SYSTOLIC BLOOD PRESSURE: 118 MMHG | BODY MASS INDEX: 21.94 KG/M2 | DIASTOLIC BLOOD PRESSURE: 70 MMHG | HEIGHT: 72 IN | WEIGHT: 162 LBS

## 2024-04-29 DIAGNOSIS — I48.91 ATRIAL FIBRILLATION, UNSPECIFIED TYPE (MULTI): ICD-10-CM

## 2024-04-29 DIAGNOSIS — E78.5 HYPERLIPIDEMIA, UNSPECIFIED HYPERLIPIDEMIA TYPE: ICD-10-CM

## 2024-04-29 DIAGNOSIS — I50.32 HEART FAILURE WITH IMPROVED EJECTION FRACTION (HFIMPEF) (MULTI): Primary | ICD-10-CM

## 2024-04-29 DIAGNOSIS — I25.10 MILD CAD: ICD-10-CM

## 2024-04-29 DIAGNOSIS — Q23.1 BICUSPID AORTIC VALVE (HHS-HCC): ICD-10-CM

## 2024-04-29 PROCEDURE — 3074F SYST BP LT 130 MM HG: CPT | Performed by: NURSE PRACTITIONER

## 2024-04-29 PROCEDURE — 3078F DIAST BP <80 MM HG: CPT | Performed by: NURSE PRACTITIONER

## 2024-04-29 PROCEDURE — 1036F TOBACCO NON-USER: CPT | Performed by: NURSE PRACTITIONER

## 2024-04-29 PROCEDURE — 1157F ADVNC CARE PLAN IN RCRD: CPT | Performed by: NURSE PRACTITIONER

## 2024-04-29 PROCEDURE — 1159F MED LIST DOCD IN RCRD: CPT | Performed by: NURSE PRACTITIONER

## 2024-04-29 PROCEDURE — 1160F RVW MEDS BY RX/DR IN RCRD: CPT | Performed by: NURSE PRACTITIONER

## 2024-04-29 PROCEDURE — 99214 OFFICE O/P EST MOD 30 MIN: CPT | Performed by: NURSE PRACTITIONER

## 2024-04-29 PROCEDURE — 1111F DSCHRG MED/CURRENT MED MERGE: CPT | Performed by: NURSE PRACTITIONER

## 2024-04-29 RX ORDER — FUROSEMIDE 40 MG/1
40 TABLET ORAL DAILY
Qty: 30 TABLET | Refills: 0 | Status: SHIPPED | OUTPATIENT
Start: 2024-04-29 | End: 2024-04-29 | Stop reason: DRUGHIGH

## 2024-04-29 RX ORDER — FUROSEMIDE 20 MG/1
20 TABLET ORAL DAILY
Qty: 30 TABLET | Refills: 0 | Status: SHIPPED | OUTPATIENT
Start: 2024-04-29 | End: 2024-05-30

## 2024-04-30 ENCOUNTER — OFFICE VISIT (OUTPATIENT)
Dept: PRIMARY CARE | Facility: CLINIC | Age: 69
End: 2024-04-30
Payer: MEDICARE

## 2024-04-30 VITALS
WEIGHT: 155 LBS | DIASTOLIC BLOOD PRESSURE: 50 MMHG | BODY MASS INDEX: 20.99 KG/M2 | SYSTOLIC BLOOD PRESSURE: 96 MMHG | HEIGHT: 72 IN | HEART RATE: 56 BPM

## 2024-04-30 DIAGNOSIS — E78.2 MIXED HYPERLIPIDEMIA: ICD-10-CM

## 2024-04-30 DIAGNOSIS — K40.30 INCARCERATED RIGHT INGUINAL HERNIA: Primary | ICD-10-CM

## 2024-04-30 DIAGNOSIS — Z09 HOSPITAL DISCHARGE FOLLOW-UP: ICD-10-CM

## 2024-04-30 DIAGNOSIS — I77.1 CELIAC ARTERY STENOSIS (CMS-HCC): ICD-10-CM

## 2024-04-30 DIAGNOSIS — C34.90 MALIGNANT NEOPLASM OF LUNG, UNSPECIFIED LATERALITY, UNSPECIFIED PART OF LUNG (MULTI): ICD-10-CM

## 2024-04-30 DIAGNOSIS — I50.22 CHRONIC SYSTOLIC CONGESTIVE HEART FAILURE (MULTI): ICD-10-CM

## 2024-04-30 PROCEDURE — 3074F SYST BP LT 130 MM HG: CPT | Performed by: CLINICAL NURSE SPECIALIST

## 2024-04-30 PROCEDURE — 1157F ADVNC CARE PLAN IN RCRD: CPT | Performed by: CLINICAL NURSE SPECIALIST

## 2024-04-30 PROCEDURE — 3078F DIAST BP <80 MM HG: CPT | Performed by: CLINICAL NURSE SPECIALIST

## 2024-04-30 PROCEDURE — 1160F RVW MEDS BY RX/DR IN RCRD: CPT | Performed by: CLINICAL NURSE SPECIALIST

## 2024-04-30 PROCEDURE — 1111F DSCHRG MED/CURRENT MED MERGE: CPT | Performed by: CLINICAL NURSE SPECIALIST

## 2024-04-30 PROCEDURE — 1159F MED LIST DOCD IN RCRD: CPT | Performed by: CLINICAL NURSE SPECIALIST

## 2024-04-30 PROCEDURE — 1036F TOBACCO NON-USER: CPT | Performed by: CLINICAL NURSE SPECIALIST

## 2024-04-30 PROCEDURE — 99495 TRANSJ CARE MGMT MOD F2F 14D: CPT | Performed by: CLINICAL NURSE SPECIALIST

## 2024-04-30 ASSESSMENT — ENCOUNTER SYMPTOMS
DYSURIA: 0
FLANK PAIN: 0
BLOOD IN STOOL: 0
DEPRESSION: 0
EYE PAIN: 0
SEIZURES: 0
UNEXPECTED WEIGHT CHANGE: 0
PHOTOPHOBIA: 0
PALPITATIONS: 0
CHILLS: 0
BRUISES/BLEEDS EASILY: 0
NAUSEA: 0
CONSTIPATION: 0
LOSS OF SENSATION IN FEET: 0
APPETITE CHANGE: 0
OCCASIONAL FEELINGS OF UNSTEADINESS: 0
SHORTNESS OF BREATH: 0
COUGH: 0
ACTIVITY CHANGE: 0
ABDOMINAL PAIN: 0
CHEST TIGHTNESS: 0
DIZZINESS: 0
FEVER: 0
HEMATURIA: 0
CONFUSION: 0
WOUND: 0
HEADACHES: 0
NECK PAIN: 0
WHEEZING: 0
SLEEP DISTURBANCE: 0
FATIGUE: 0
ARTHRALGIAS: 0
BACK PAIN: 0
MYALGIAS: 0
VOMITING: 0
TROUBLE SWALLOWING: 0
POLYDIPSIA: 0
SORE THROAT: 0
DIARRHEA: 0
JOINT SWELLING: 0

## 2024-04-30 NOTE — PROGRESS NOTES
Patient ID: Kevin Rubi is a 68 y.o. male    Primary Care Provider: Chen Olea, APRN-CNS    DIAGNOSIS AND STAGING   Stage IIIC ([id number]) NSCLC (adenocarcinoma, TTF1+) of the LLL - dx on 09/15/23   Primary tumor measures 3.5 cm (+satellite nodule)   2R+ for adenocarcinoma cells     SITES OF DISEASE   LLL   Mediastinal nodes (including contralateral)    Has a couple of other spiculated nodules (0.8 cm WILDA and 0.7 cm RUL) that are potential  synchronous primaries and will be addressed when needed      MOLECULAR GENOMICS   KRAS G12D mutation    PD-L1 TPS 95%     PRIOR THERAPIES  Concurrent chemo RT (60 Beyer in 30 fractions, completed on 12/26/2023 using carboplatin/pemetrexed      CURRENT THERAPY  Begins consolidative durvalumab on 01/31/2024     CURRENT ONCOLOGICAL PROBLEMS  None     HISTORY OF PRESENT ILLNESS   Former smoker, (quit in 2000), who while undergoing w/u for abdominal pain related to SMA occlusion, was found to have a LLL nodule -   Based on his records he was noted to have abnormal imaging back in 2021, was seen by pulmonary and was told to complete w/u, including PET scan and potential biopsy but did not follow recommendations.    A CT chest on 09/12/23 showed a 3.5 cm spiculated LLL nodule abutting the pericardium. There was also a satellite nodule suspicious for disease and a couple of other spiculated nodules in the WILDA and contralateral right lung.4R node measured 1.4 cm and level 7 2.4 cm.   PET scan on 08/04/23 showed no findings concerning for extra-thoracic disease. LLL nodule was FDG avid as well as multiple mediastinal nodes (including contralateral nodes). The WILDA nodule has mild hypermetabolic activity.  An EBUS procedure on 09/15/23 demonstrated 2R to be involved with adenocarcinoma cells, TTF1+. KRAS G12D +, TPS 95%.  The pt sees medical oncology on 09/27/23 -   States he is feeling well and denies any systemic sx related to this malignancy - including fatigue and weight loss.  The abdominal pain resolved after vascular procedure/stenting performed and he is now on Xarelto and Plavix. Denies any h/a or cough. Denies dyspnea.   10/23/23: C1  Carboplatin/pemetrexed  23: C2 carboplatin/pemetrexed with concurrent RT    23: C3 carboplatin/pemetrexed with concurrent RT   2023: Hypotension due to dehydration, requiring ED visit.  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  Status post exploratory laparotomy for incarcerated right inguinal hernia repair with small bowel resection on 2024  HFpEF - EF 60%   Atrial fibrillation - on Eliquis  Ascending aortic aneurysm   PUD (gastric)  HLD        SOCIAL HISTORY  + Tobacco - quit in  - mostly 1/2 ppd starting at age 22  Occasional ETOH intake  Worked as a  for 35 years    for 43 years and has 2 children - son who is 39 yo and daughter who is 35  He has just retired      FAMILY HISTORY  Mother seems to have had H/N or lung cancer - unclear   Father may also have had cancer   1 sister  from complications of GSW  1 brother  of CA (?liver CA)     CURRENT MEDS REVIEWED        ALLERGIES REVIEWED   NKDA     SUBJECTIVE:  Here with his wife Priya   He is status post exploratory laparotomy for incarcerated right inguinal hernia repair and resection of small bowel on 24  Recovering  The last dose of consolidative durvalumab: 2024  Comes in today with bilateral LE edema, however the right foot is extremely red and  painful.  This has worsened over the last 24h   He is also orthostatic in the office: 101/64 mmHg and HR 65 when sitting and 79/50 mmHg and  bpm when standing   His wife states he has been eating and drinking so this is unlikely dehydration   He is tearful and discouraged by going to the ED, back in the hospital       A 13 point review of systems was performed, with significant findings documented above in subjective history.    OBJECTIVE:  Vitals:    05/01/24 1025   BP: 89/58   Pulse: 92   Resp: 18   Temp: 37.3 °C (99.1 °F)   SpO2: 97%        Body surface area is 1.89 meters squared.     Wt Readings from Last 5 Encounters:   05/01/24 70.2 kg (154 lb 12.2 oz)   04/30/24 70.3 kg (155 lb)   04/29/24 73.5 kg (162 lb)   04/25/24 73.7 kg (162 lb 6.4 oz)   04/16/24 82 kg (180 lb 12.4 oz)       ECOGSCORE: 3    Physical Exam  Constitutional:       Appearance: He is ill-appearing.   HENT:      Head: Normocephalic and atraumatic.   Eyes:      General: No scleral icterus.     Extraocular Movements: Extraocular movements intact.      Conjunctiva/sclera: Conjunctivae normal.   Cardiovascular:      Rate and Rhythm: Normal rate and regular rhythm.   Pulmonary:      Effort: Pulmonary effort is normal.      Breath sounds: Normal breath sounds.   Abdominal:      General: Abdomen is flat.      Palpations: Abdomen is soft.      Tenderness: There is no abdominal tenderness.   Musculoskeletal:      Right lower leg: Edema present.      Left lower leg: Edema present.   Skin:     Findings: Erythema and rash present.      Comments: Erythema or R foot with increased warmth, suggestive of cellulitis    Neurological:      General: No focal deficit present.      Mental Status: He is alert and oriented to person, place, and time. Mental status is at baseline.      Motor: No weakness.   Psychiatric:         Behavior: Behavior normal.         Thought Content: Thought content normal.         Judgment: Judgment normal.      Comments: Sad,  tearful           Diagnostic Results   Results:  Labs:  Lab Results   Component Value Date    WBC 8.0 04/15/2024    HGB 8.3 (L) 04/15/2024    HCT 25.4 (L) 04/15/2024    MCV 84 04/15/2024     (L) 04/15/2024      Lab Results   Component Value Date    NEUTROABS 6.55 04/15/2024        Lab Results   Component Value Date    GLUCOSE 144 (H) 04/16/2024    CALCIUM 7.4 (L) 04/16/2024     (L) 04/16/2024    K 4.1 04/16/2024    CO2 27 04/16/2024     04/16/2024    BUN 24 (H) 04/16/2024    CREATININE 0.84 04/16/2024    MG 1.67 04/15/2024     Lab Results   Component Value Date    ALT 13 04/09/2024    AST 11 04/09/2024    ALKPHOS 46 04/09/2024    BILITOT 0.6 04/09/2024    BILIDIR 0.1 04/09/2024      Lab Results   Component Value Date    ACTH 11.8 01/23/2024    CORTISOL 16.6 03/26/2024    TSH 0.01 (L) 03/26/2024    FREET4 1.48 (H) 03/26/2024       STUDY:  CT CHEST W IV CONTRAST;  1/8/2024 2:19 pm      INDICATION:  Signs/Symptoms:Assessment treatment response stage III NSCLC after  chemo-RT.      COMPARISON:  09/12/2023      ACCESSION NUMBER(S):  [id number]      ORDERING CLINICIAN:  ADELIA CRUZ      TECHNIQUE:  Helical data acquisition of the chest was obtained with  50 mL  Omnipaque 350. Images were reformatted in axial, coronal, and  sagittal planes.MIP reformatted images were also generated.      FINDINGS:  LUNGS and AIRWAYS:  Left lower lobe nodule has decreased in size from 35 x 24 x 3.5 mm to  29 x 18 x 2.7 mm. Scattered additional solid and sub solid nodules  are otherwise not significantly changed, for example measuring 10 mm  in left upper lobe posteriorly. No new nodules or areas of  consolidation. Mild emphysematous changes. Central airways are  patent. No bronchiectasis.      No pleural effusion or pneumothorax.      MEDIASTINUM and GET, LOWER NECK AND AXILLA:  The visualized thyroid gland is grossly unremarkable.      Regression in mediastinal lymphadenopathy, for example:      Subcarinal: Decreased  from 24 mm to 11 mm short axis  Upper right paratracheal: From 14 mm 6 mm  Lower right paratracheal: From 13 mm to 14 mm      Esophagus is not dilated.      HEART and VESSELS:  Mild cardiomegaly.      No significant pericardial effusion.      No central pulmonary embolism identified on nondedicated study.      Severe coronary atherosclerosis.      Aortic valve leaflet calcifications. Severe thoracic aortic  atherosclerosis. Unchanged dilatation ascending aorta up to 4.6 cm.  Otherwise the thoracic aorta and great vessels are patent.      UPPER ABDOMEN:  The visualized subdiaphragmatic structures demonstrate no acute  findings on limited images.      CHEST WALL and OSSEOUS STRUCTURES:  No change in cyst and lipomas of the anterior chest wall.  There is increased sclerosis involving a the T6 vertebral body. There  is an increasing sclerotic margin involving lytic lesion of the left  5th rib laterally. Thoracic degenerative changes.      IMPRESSION:  1.  Favorable treatment response compared to 4 months ago.  2. Left lower lobe mass is slightly regressed in size.  3. Mediastinal lymphadenopathy has significantly regressed.  4. Increased sclerosis at T6 vertebral body and increased sclerotic  margin of lucent lesion of left 5th rib which could be healing  metastases.    Assessment/Plan     Primary cancer of left lower lobe of lung (Multi), Clinical: Stage IIIC (cT3, cN3, cM0)  Status post completion of concurrent chemo RT on 12/26/2023 (60 Beyer in 30 fractions) using carboplatin/pemetrexed (s/p 3 cycles). His tumor harbors a KRAS G12 D mutation in the PD-L1 expression TPS 95%.  His scans from 01/08/2024 demonstrating a favorable response, with a decrease in size of the dominant left lower lobe primary tumor by approximately 20%, compatible with stable disease.   The left upper lobe nodule remains a stable.  He was a started on consolidative durvalumab on 01/31/2024  Treatment course was interrupted due to an  incarcerated right inguinal hernia leading to an exploratory laparotomy and small bowel resection on 04/09/2024, for which she is recovering.  Now has new onset cellulitis of the R foot with orthostatic hypotension seen today in clinic.  He was sent to the ED and I signed out to [facility] prior to his arrival.   I will see him after his discharge, 2 weeks from now, to re-evaluate his course and recovery.       This note was created with the assistance of a speech recognition program.  Although the intention is to generate a document that actually reflects the content of the visit, it is possible that some mistakes occur and may not be corrected by the time of completion of this note.        Janell Longoria MD, MS  Thoracic Medical Oncology   [address] [place] [address]  Phone: [phone]

## 2024-04-30 NOTE — PROGRESS NOTES
"Patient: Kevin Rubi  : 1955  PCP: RUKHSANA Fried-CNS  MRN: [id number]  Program: Transitional Care Management  Status: Enrolled  Effective Dates: 2024 - present  Responsible Staff: Rhiannon Timmons LPN  Social Determinants to be Addressed: Physical Activity, Social Connections, Stress, Tobacco Use         Kevin Rubi is a 68 y.o. male presenting today for follow-up after being discharged from the hospital 2024.  The main problem requiring admission was Incarcerate right inguinal hernia with small bowel resection and anastamosis. The discharge summary and/or Transitional Care Management documentation was reviewed. Medication reconciliation was performed as indicated via the \"Roly as Reviewed\" timestamp.     Kevin Rubi was contacted by Transitional Care Management services two days after his discharge. This encounter and supporting documentation was reviewed.    Here today as a follow up appointment, TCM.      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     Followed with Cardiology yesterday. Diuretics adjusted, monitoring blood pressure due to issues with Hypotension.      Has continued on medications as prescribed.      Primary Cancer of LLL. Completed Chemo and RT on 2023. Stable on most recent imaging. Immunotherapy.     Currently being treated for inflammation with Prednisone. Follow up CT completed.     Patient was admitted  with incarcerated right inguinal hernia. Emergent open primary repair of the right inguinal hernia, exploratory lap and small bowel resection. Tolerating diet and bowels have returned to normal. Pain controlled. Ambulating independently.     Review of Systems   Constitutional:  Negative for activity change, appetite change, chills, fatigue, fever and unexpected weight change.   HENT:  Negative for ear pain, hearing loss, nosebleeds, sore throat, " tinnitus and trouble swallowing.    Eyes:  Negative for photophobia, pain and visual disturbance.   Respiratory:  Negative for cough, chest tightness, shortness of breath and wheezing.    Cardiovascular:  Positive for leg swelling. Negative for chest pain and palpitations.   Gastrointestinal:  Negative for abdominal pain, blood in stool, constipation, diarrhea, nausea and vomiting.   Endocrine: Negative for cold intolerance, heat intolerance, polydipsia and polyuria.   Genitourinary:  Negative for dysuria, flank pain and hematuria.   Musculoskeletal:  Negative for arthralgias, back pain, joint swelling, myalgias and neck pain.   Skin:  Negative for pallor, rash and wound.   Allergic/Immunologic: Negative for immunocompromised state.   Neurological:  Negative for dizziness, seizures and headaches.   Hematological:  Does not bruise/bleed easily.   Psychiatric/Behavioral:  Negative for confusion and sleep disturbance.        BP 96/50   Pulse 56   Ht 1.829 m (6')   Wt 70.3 kg (155 lb)   BMI 21.02 kg/m²     Physical Exam  Vitals and nursing note reviewed.   Constitutional:       General: He is not in acute distress.     Appearance: Normal appearance.   HENT:      Head: Normocephalic.      Nose: Nose normal.   Eyes:      Conjunctiva/sclera: Conjunctivae normal.   Neck:      Vascular: No carotid bruit.   Cardiovascular:      Rate and Rhythm: Normal rate and regular rhythm.      Pulses: Normal pulses.      Heart sounds: Normal heart sounds.   Pulmonary:      Effort: Pulmonary effort is normal.      Breath sounds: Normal breath sounds.   Abdominal:      General: Bowel sounds are normal.      Palpations: Abdomen is soft.   Musculoskeletal:         General: Normal range of motion.      Cervical back: Normal range of motion.   Skin:     General: Skin is warm and dry.   Neurological:      Mental Status: He is alert and oriented to person, place, and time. Mental status is at baseline.   Psychiatric:         Mood and Affect:  Mood normal.         Behavior: Behavior normal.       The complexity of medical decision making for this patient's transitional care is moderate.    Assessment/Plan       Reviewed recent records/hospitalization with patient.      HFpEF, pAfib, Hypertension: Following with Cardiology. Blood pressure controlled at OV today. Diuretics adjusted. Follow up scheduled this week with HF clinic. Continue to monitor. Continue medications as prescribed. Follow up with Cardiology clinic.   CAD, Celiac Artery Stenosis, Abdominal Pain, Dysuria: Recent CT completed. Continue to follow with Specialists as previously determined.    AAA: Monitoring with Cardiology.   Malignant Neoplasm of Lung: Continue to follow with Specialists as previously determined. Stable per patient on most recent imaging.   Hyperlipidemia: Continue Rosuvastatin.   Insomnia: Discussed Melatonin at last OV.   TCM, Incarcerated Hernia: Reviewed hospital information. Tolerating oral intake. Pain controlled. Bowels have been regular. Followed with Dr. Valladares following hospitalization.       Prostate Cancer Screening: October 2023, normal.    Flu Vaccine: September 2023.   Prevnar 20: September 2023.   COVID Vaccine: January 2022.   RSV Vaccine: October 2023.   Medicare Wellness: April 2024.   Cologuard: October 2023, positive.   Discussed Tdap, Shingrix, COVID.

## 2024-05-01 ENCOUNTER — APPOINTMENT (OUTPATIENT)
Dept: RADIOLOGY | Facility: HOSPITAL | Age: 69
DRG: 177 | End: 2024-05-01
Payer: COMMERCIAL

## 2024-05-01 ENCOUNTER — PATIENT OUTREACH (OUTPATIENT)
Dept: CARE COORDINATION | Facility: CLINIC | Age: 69
End: 2024-05-01

## 2024-05-01 ENCOUNTER — APPOINTMENT (OUTPATIENT)
Dept: CARDIOLOGY | Facility: HOSPITAL | Age: 69
DRG: 177 | End: 2024-05-01
Payer: COMMERCIAL

## 2024-05-01 ENCOUNTER — HOSPITAL ENCOUNTER (INPATIENT)
Facility: HOSPITAL | Age: 69
LOS: 2 days | Discharge: HOME | DRG: 177 | End: 2024-05-03
Attending: STUDENT IN AN ORGANIZED HEALTH CARE EDUCATION/TRAINING PROGRAM | Admitting: INTERNAL MEDICINE
Payer: COMMERCIAL

## 2024-05-01 ENCOUNTER — OFFICE VISIT (OUTPATIENT)
Dept: HEMATOLOGY/ONCOLOGY | Facility: CLINIC | Age: 69
End: 2024-05-01
Payer: COMMERCIAL

## 2024-05-01 VITALS
SYSTOLIC BLOOD PRESSURE: 79 MMHG | HEART RATE: 122 BPM | BODY MASS INDEX: 20.99 KG/M2 | RESPIRATION RATE: 18 BRPM | WEIGHT: 154.76 LBS | TEMPERATURE: 99.1 F | OXYGEN SATURATION: 97 % | DIASTOLIC BLOOD PRESSURE: 50 MMHG

## 2024-05-01 DIAGNOSIS — J18.9 PNEUMONIA OF BOTH LOWER LOBES DUE TO INFECTIOUS ORGANISM: Primary | ICD-10-CM

## 2024-05-01 DIAGNOSIS — C34.32 PRIMARY CANCER OF LEFT LOWER LOBE OF LUNG (MULTI): Primary | ICD-10-CM

## 2024-05-01 DIAGNOSIS — L03.115 CELLULITIS OF FOOT, RIGHT: ICD-10-CM

## 2024-05-01 DIAGNOSIS — I95.89 OTHER SPECIFIED HYPOTENSION: ICD-10-CM

## 2024-05-01 PROBLEM — D63.8 ANEMIA IN OTHER CHRONIC DISEASES CLASSIFIED ELSEWHERE: Status: ACTIVE | Noted: 2024-05-01

## 2024-05-01 PROBLEM — R21 RASH AND NONSPECIFIC SKIN ERUPTION: Status: ACTIVE | Noted: 2024-05-01

## 2024-05-01 LAB
ALBUMIN SERPL BCP-MCNC: 3.4 G/DL (ref 3.4–5)
ALP SERPL-CCNC: 51 U/L (ref 33–136)
ALT SERPL W P-5'-P-CCNC: 36 U/L (ref 10–52)
ANION GAP SERPL CALC-SCNC: 14 MMOL/L (ref 10–20)
APPEARANCE UR: CLEAR
AST SERPL W P-5'-P-CCNC: 24 U/L (ref 9–39)
BASOPHILS # BLD AUTO: 0.01 X10*3/UL (ref 0–0.1)
BASOPHILS NFR BLD AUTO: 0.1 %
BILIRUB SERPL-MCNC: 0.5 MG/DL (ref 0–1.2)
BILIRUB UR STRIP.AUTO-MCNC: NEGATIVE MG/DL
BNP SERPL-MCNC: 181 PG/ML (ref 0–99)
BUN SERPL-MCNC: 26 MG/DL (ref 6–23)
CALCIUM SERPL-MCNC: 8.7 MG/DL (ref 8.6–10.3)
CARDIAC TROPONIN I PNL SERPL HS: 13 NG/L (ref 0–20)
CHLORIDE SERPL-SCNC: 98 MMOL/L (ref 98–107)
CO2 SERPL-SCNC: 25 MMOL/L (ref 21–32)
COLOR UR: YELLOW
CREAT SERPL-MCNC: 1.08 MG/DL (ref 0.5–1.3)
EGFRCR SERPLBLD CKD-EPI 2021: 76 ML/MIN/1.73M*2
EOSINOPHIL # BLD AUTO: 0 X10*3/UL (ref 0–0.7)
EOSINOPHIL NFR BLD AUTO: 0 %
ERYTHROCYTE [DISTWIDTH] IN BLOOD BY AUTOMATED COUNT: 18.7 % (ref 11.5–14.5)
GLUCOSE BLD MANUAL STRIP-MCNC: 182 MG/DL (ref 74–99)
GLUCOSE SERPL-MCNC: 286 MG/DL (ref 74–99)
GLUCOSE UR STRIP.AUTO-MCNC: ABNORMAL MG/DL
HCT VFR BLD AUTO: 25 % (ref 41–52)
HGB BLD-MCNC: 8 G/DL (ref 13.5–17.5)
IMM GRANULOCYTES # BLD AUTO: 0.11 X10*3/UL (ref 0–0.7)
IMM GRANULOCYTES NFR BLD AUTO: 0.9 % (ref 0–0.9)
KETONES UR STRIP.AUTO-MCNC: NEGATIVE MG/DL
LACTATE SERPL-SCNC: 1.9 MMOL/L (ref 0.4–2)
LEUKOCYTE ESTERASE UR QL STRIP.AUTO: NEGATIVE
LIPASE SERPL-CCNC: 9 U/L (ref 9–82)
LYMPHOCYTES # BLD AUTO: 0.34 X10*3/UL (ref 1.2–4.8)
LYMPHOCYTES NFR BLD AUTO: 2.7 %
MCH RBC QN AUTO: 26.6 PG (ref 26–34)
MCHC RBC AUTO-ENTMCNC: 32 G/DL (ref 32–36)
MCV RBC AUTO: 83 FL (ref 80–100)
MONOCYTES # BLD AUTO: 0.14 X10*3/UL (ref 0.1–1)
MONOCYTES NFR BLD AUTO: 1.1 %
MRSA DNA SPEC QL NAA+PROBE: NOT DETECTED
NEUTROPHILS # BLD AUTO: 12.05 X10*3/UL (ref 1.2–7.7)
NEUTROPHILS NFR BLD AUTO: 95.2 %
NITRITE UR QL STRIP.AUTO: NEGATIVE
NRBC BLD-RTO: 0 /100 WBCS (ref 0–0)
PH UR STRIP.AUTO: 6 [PH]
PLATELET # BLD AUTO: 245 X10*3/UL (ref 150–450)
POTASSIUM SERPL-SCNC: 3.7 MMOL/L (ref 3.5–5.3)
PROT SERPL-MCNC: 7 G/DL (ref 6.4–8.2)
PROT UR STRIP.AUTO-MCNC: NEGATIVE MG/DL
RBC # BLD AUTO: 3.01 X10*6/UL (ref 4.5–5.9)
RBC # UR STRIP.AUTO: NEGATIVE /UL
SODIUM SERPL-SCNC: 133 MMOL/L (ref 136–145)
SP GR UR STRIP.AUTO: 1.01
UROBILINOGEN UR STRIP.AUTO-MCNC: <2 MG/DL
WBC # BLD AUTO: 12.7 X10*3/UL (ref 4.4–11.3)

## 2024-05-01 PROCEDURE — 84484 ASSAY OF TROPONIN QUANT: CPT | Performed by: STUDENT IN AN ORGANIZED HEALTH CARE EDUCATION/TRAINING PROGRAM

## 2024-05-01 PROCEDURE — 99222 1ST HOSP IP/OBS MODERATE 55: CPT | Performed by: NURSE PRACTITIONER

## 2024-05-01 PROCEDURE — 1160F RVW MEDS BY RX/DR IN RCRD: CPT | Performed by: INTERNAL MEDICINE

## 2024-05-01 PROCEDURE — 81003 URINALYSIS AUTO W/O SCOPE: CPT | Performed by: STUDENT IN AN ORGANIZED HEALTH CARE EDUCATION/TRAINING PROGRAM

## 2024-05-01 PROCEDURE — 83605 ASSAY OF LACTIC ACID: CPT | Performed by: STUDENT IN AN ORGANIZED HEALTH CARE EDUCATION/TRAINING PROGRAM

## 2024-05-01 PROCEDURE — 2500000004 HC RX 250 GENERAL PHARMACY W/ HCPCS (ALT 636 FOR OP/ED): Performed by: NURSE PRACTITIONER

## 2024-05-01 PROCEDURE — 1159F MED LIST DOCD IN RCRD: CPT | Performed by: INTERNAL MEDICINE

## 2024-05-01 PROCEDURE — 83880 ASSAY OF NATRIURETIC PEPTIDE: CPT | Performed by: STUDENT IN AN ORGANIZED HEALTH CARE EDUCATION/TRAINING PROGRAM

## 2024-05-01 PROCEDURE — 2500000001 HC RX 250 WO HCPCS SELF ADMINISTERED DRUGS (ALT 637 FOR MEDICARE OP): Performed by: NURSE PRACTITIONER

## 2024-05-01 PROCEDURE — 96374 THER/PROPH/DIAG INJ IV PUSH: CPT | Mod: 59

## 2024-05-01 PROCEDURE — 36415 COLL VENOUS BLD VENIPUNCTURE: CPT | Performed by: STUDENT IN AN ORGANIZED HEALTH CARE EDUCATION/TRAINING PROGRAM

## 2024-05-01 PROCEDURE — A4217 STERILE WATER/SALINE, 500 ML: HCPCS | Performed by: STUDENT IN AN ORGANIZED HEALTH CARE EDUCATION/TRAINING PROGRAM

## 2024-05-01 PROCEDURE — 82947 ASSAY GLUCOSE BLOOD QUANT: CPT

## 2024-05-01 PROCEDURE — 1157F ADVNC CARE PLAN IN RCRD: CPT | Performed by: INTERNAL MEDICINE

## 2024-05-01 PROCEDURE — 87641 MR-STAPH DNA AMP PROBE: CPT | Performed by: NURSE PRACTITIONER

## 2024-05-01 PROCEDURE — 2550000001 HC RX 255 CONTRASTS: Performed by: STUDENT IN AN ORGANIZED HEALTH CARE EDUCATION/TRAINING PROGRAM

## 2024-05-01 PROCEDURE — 36415 COLL VENOUS BLD VENIPUNCTURE: CPT | Performed by: NURSE PRACTITIONER

## 2024-05-01 PROCEDURE — 74177 CT ABD & PELVIS W/CONTRAST: CPT

## 2024-05-01 PROCEDURE — 2500000004 HC RX 250 GENERAL PHARMACY W/ HCPCS (ALT 636 FOR OP/ED): Performed by: STUDENT IN AN ORGANIZED HEALTH CARE EDUCATION/TRAINING PROGRAM

## 2024-05-01 PROCEDURE — 3078F DIAST BP <80 MM HG: CPT | Performed by: INTERNAL MEDICINE

## 2024-05-01 PROCEDURE — 71045 X-RAY EXAM CHEST 1 VIEW: CPT

## 2024-05-01 PROCEDURE — 99214 OFFICE O/P EST MOD 30 MIN: CPT | Performed by: INTERNAL MEDICINE

## 2024-05-01 PROCEDURE — 2500000006 HC RX 250 W HCPCS SELF ADMINISTERED DRUGS (ALT 637 FOR ALL PAYERS): Mod: MUE | Performed by: NURSE PRACTITIONER

## 2024-05-01 PROCEDURE — 93005 ELECTROCARDIOGRAM TRACING: CPT

## 2024-05-01 PROCEDURE — 1125F AMNT PAIN NOTED PAIN PRSNT: CPT | Performed by: INTERNAL MEDICINE

## 2024-05-01 PROCEDURE — 71275 CT ANGIOGRAPHY CHEST: CPT | Performed by: RADIOLOGY

## 2024-05-01 PROCEDURE — 87449 NOS EACH ORGANISM AG IA: CPT | Mod: PORLAB | Performed by: NURSE PRACTITIONER

## 2024-05-01 PROCEDURE — 93971 EXTREMITY STUDY: CPT | Performed by: RADIOLOGY

## 2024-05-01 PROCEDURE — 85025 COMPLETE CBC W/AUTO DIFF WBC: CPT | Performed by: STUDENT IN AN ORGANIZED HEALTH CARE EDUCATION/TRAINING PROGRAM

## 2024-05-01 PROCEDURE — 96375 TX/PRO/DX INJ NEW DRUG ADDON: CPT

## 2024-05-01 PROCEDURE — 93971 EXTREMITY STUDY: CPT

## 2024-05-01 PROCEDURE — 74177 CT ABD & PELVIS W/CONTRAST: CPT | Performed by: RADIOLOGY

## 2024-05-01 PROCEDURE — 84075 ASSAY ALKALINE PHOSPHATASE: CPT | Performed by: STUDENT IN AN ORGANIZED HEALTH CARE EDUCATION/TRAINING PROGRAM

## 2024-05-01 PROCEDURE — 87899 AGENT NOS ASSAY W/OPTIC: CPT | Mod: PORLAB | Performed by: NURSE PRACTITIONER

## 2024-05-01 PROCEDURE — 71045 X-RAY EXAM CHEST 1 VIEW: CPT | Performed by: RADIOLOGY

## 2024-05-01 PROCEDURE — 1200000002 HC GENERAL ROOM WITH TELEMETRY DAILY

## 2024-05-01 PROCEDURE — 83036 HEMOGLOBIN GLYCOSYLATED A1C: CPT | Performed by: NURSE PRACTITIONER

## 2024-05-01 PROCEDURE — 83690 ASSAY OF LIPASE: CPT | Performed by: STUDENT IN AN ORGANIZED HEALTH CARE EDUCATION/TRAINING PROGRAM

## 2024-05-01 PROCEDURE — 1111F DSCHRG MED/CURRENT MED MERGE: CPT | Performed by: INTERNAL MEDICINE

## 2024-05-01 PROCEDURE — 87040 BLOOD CULTURE FOR BACTERIA: CPT | Mod: PORLAB | Performed by: STUDENT IN AN ORGANIZED HEALTH CARE EDUCATION/TRAINING PROGRAM

## 2024-05-01 PROCEDURE — 3074F SYST BP LT 130 MM HG: CPT | Performed by: INTERNAL MEDICINE

## 2024-05-01 PROCEDURE — 71275 CT ANGIOGRAPHY CHEST: CPT

## 2024-05-01 PROCEDURE — 96365 THER/PROPH/DIAG IV INF INIT: CPT

## 2024-05-01 RX ORDER — FUROSEMIDE 20 MG/1
20 TABLET ORAL DAILY
Status: DISCONTINUED | OUTPATIENT
Start: 2024-05-01 | End: 2024-05-03 | Stop reason: HOSPADM

## 2024-05-01 RX ORDER — INSULIN LISPRO 100 [IU]/ML
0-5 INJECTION, SOLUTION INTRAVENOUS; SUBCUTANEOUS
Status: DISCONTINUED | OUTPATIENT
Start: 2024-05-01 | End: 2024-05-03 | Stop reason: HOSPADM

## 2024-05-01 RX ORDER — ROSUVASTATIN CALCIUM 10 MG/1
5 TABLET, COATED ORAL NIGHTLY
Status: DISCONTINUED | OUTPATIENT
Start: 2024-05-01 | End: 2024-05-03 | Stop reason: HOSPADM

## 2024-05-01 RX ORDER — METOPROLOL SUCCINATE 25 MG/1
25 TABLET, EXTENDED RELEASE ORAL DAILY
Status: DISCONTINUED | OUTPATIENT
Start: 2024-05-01 | End: 2024-05-03 | Stop reason: HOSPADM

## 2024-05-01 RX ORDER — GABAPENTIN 300 MG/1
300 CAPSULE ORAL NIGHTLY
Status: DISCONTINUED | OUTPATIENT
Start: 2024-05-01 | End: 2024-05-03 | Stop reason: HOSPADM

## 2024-05-01 RX ORDER — VANCOMYCIN HYDROCHLORIDE 1 G/20ML
INJECTION, POWDER, LYOPHILIZED, FOR SOLUTION INTRAVENOUS DAILY PRN
Status: DISCONTINUED | OUTPATIENT
Start: 2024-05-01 | End: 2024-05-03

## 2024-05-01 RX ORDER — POTASSIUM CHLORIDE 1.5 G/1.58G
20 POWDER, FOR SOLUTION ORAL ONCE
Status: COMPLETED | OUTPATIENT
Start: 2024-05-01 | End: 2024-05-01

## 2024-05-01 RX ORDER — MORPHINE SULFATE 4 MG/ML
4 INJECTION INTRAVENOUS ONCE
Status: DISCONTINUED | OUTPATIENT
Start: 2024-05-01 | End: 2024-05-01

## 2024-05-01 RX ORDER — DEXTROSE 50 % IN WATER (D50W) INTRAVENOUS SYRINGE
12.5
Status: DISCONTINUED | OUTPATIENT
Start: 2024-05-01 | End: 2024-05-03 | Stop reason: HOSPADM

## 2024-05-01 RX ORDER — FENTANYL CITRATE 50 UG/ML
25 INJECTION, SOLUTION INTRAMUSCULAR; INTRAVENOUS ONCE
Status: COMPLETED | OUTPATIENT
Start: 2024-05-01 | End: 2024-05-01

## 2024-05-01 RX ORDER — ALBUTEROL SULFATE 0.83 MG/ML
3 SOLUTION RESPIRATORY (INHALATION) EVERY 6 HOURS PRN
Status: DISCONTINUED | OUTPATIENT
Start: 2024-05-01 | End: 2024-05-03 | Stop reason: HOSPADM

## 2024-05-01 RX ORDER — CLOPIDOGREL BISULFATE 75 MG/1
75 TABLET ORAL DAILY
Status: DISCONTINUED | OUTPATIENT
Start: 2024-05-01 | End: 2024-05-03 | Stop reason: HOSPADM

## 2024-05-01 RX ORDER — DIGOXIN 125 MCG
125 TABLET ORAL DAILY
Status: DISCONTINUED | OUTPATIENT
Start: 2024-05-01 | End: 2024-05-03 | Stop reason: HOSPADM

## 2024-05-01 RX ORDER — PANTOPRAZOLE SODIUM 20 MG/1
20 TABLET, DELAYED RELEASE ORAL
Status: DISCONTINUED | OUTPATIENT
Start: 2024-05-02 | End: 2024-05-03 | Stop reason: HOSPADM

## 2024-05-01 RX ADMIN — PIPERACILLIN SODIUM AND TAZOBACTAM SODIUM 4.5 G: 4; .5 INJECTION, SOLUTION INTRAVENOUS at 13:32

## 2024-05-01 RX ADMIN — SODIUM CHLORIDE 2000 ML: 9 INJECTION, SOLUTION INTRAVENOUS at 13:25

## 2024-05-01 RX ADMIN — VANCOMYCIN HYDROCHLORIDE 2 G: 10 INJECTION, POWDER, LYOPHILIZED, FOR SOLUTION INTRAVENOUS at 14:45

## 2024-05-01 RX ADMIN — ROSUVASTATIN 5 MG: 10 TABLET, FILM COATED ORAL at 21:36

## 2024-05-01 RX ADMIN — GABAPENTIN 300 MG: 300 CAPSULE ORAL at 21:36

## 2024-05-01 RX ADMIN — IOHEXOL 75 ML: 350 INJECTION, SOLUTION INTRAVENOUS at 14:45

## 2024-05-01 RX ADMIN — APIXABAN 5 MG: 5 TABLET, FILM COATED ORAL at 21:36

## 2024-05-01 RX ADMIN — POTASSIUM CHLORIDE 20 MEQ: 1.5 POWDER, FOR SOLUTION ORAL at 21:36

## 2024-05-01 RX ADMIN — PIPERACILLIN SODIUM AND TAZOBACTAM SODIUM 4.5 G: 4; .5 INJECTION, SOLUTION INTRAVENOUS at 21:36

## 2024-05-01 RX ADMIN — FENTANYL CITRATE 25 MCG: 50 INJECTION INTRAMUSCULAR; INTRAVENOUS at 14:21

## 2024-05-01 SDOH — SOCIAL STABILITY: SOCIAL INSECURITY: WERE YOU ABLE TO COMPLETE ALL THE BEHAVIORAL HEALTH SCREENINGS?: YES

## 2024-05-01 SDOH — SOCIAL STABILITY: SOCIAL INSECURITY: HAVE YOU HAD THOUGHTS OF HARMING ANYONE ELSE?: NO

## 2024-05-01 ASSESSMENT — COGNITIVE AND FUNCTIONAL STATUS - GENERAL
MOBILITY SCORE: 24
DAILY ACTIVITIY SCORE: 24
MOBILITY SCORE: 24
PATIENT BASELINE BEDBOUND: NO
DAILY ACTIVITIY SCORE: 24

## 2024-05-01 ASSESSMENT — ACTIVITIES OF DAILY LIVING (ADL)
GROOMING: INDEPENDENT
HEARING - LEFT EAR: FUNCTIONAL
FEEDING YOURSELF: INDEPENDENT
LACK_OF_TRANSPORTATION: NO
PATIENT'S MEMORY ADEQUATE TO SAFELY COMPLETE DAILY ACTIVITIES?: YES
WALKS IN HOME: INDEPENDENT
TOILETING: INDEPENDENT
DRESSING YOURSELF: INDEPENDENT
BATHING: INDEPENDENT
HEARING - RIGHT EAR: FUNCTIONAL
ADEQUATE_TO_COMPLETE_ADL: YES
JUDGMENT_ADEQUATE_SAFELY_COMPLETE_DAILY_ACTIVITIES: YES

## 2024-05-01 ASSESSMENT — LIFESTYLE VARIABLES
AUDIT-C TOTAL SCORE: 0
SKIP TO QUESTIONS 9-10: 1
HOW OFTEN DO YOU HAVE 6 OR MORE DRINKS ON ONE OCCASION: NEVER
PRESCIPTION_ABUSE_PAST_12_MONTHS: NO
HAVE YOU EVER FELT YOU SHOULD CUT DOWN ON YOUR DRINKING: NO
HOW OFTEN DO YOU HAVE A DRINK CONTAINING ALCOHOL: NEVER
AUDIT-C TOTAL SCORE: 0
EVER FELT BAD OR GUILTY ABOUT YOUR DRINKING: NO
EVER HAD A DRINK FIRST THING IN THE MORNING TO STEADY YOUR NERVES TO GET RID OF A HANGOVER: NO
HAVE PEOPLE ANNOYED YOU BY CRITICIZING YOUR DRINKING: NO
HOW MANY STANDARD DRINKS CONTAINING ALCOHOL DO YOU HAVE ON A TYPICAL DAY: PATIENT DOES NOT DRINK
TOTAL SCORE: 0
SUBSTANCE_ABUSE_PAST_12_MONTHS: NO

## 2024-05-01 ASSESSMENT — ENCOUNTER SYMPTOMS
CHILLS: 1
CONFUSION: 0
TROUBLE SWALLOWING: 0
DIARRHEA: 0
SINUS PAIN: 0
NAUSEA: 0
BRUISES/BLEEDS EASILY: 0
HEMATURIA: 0
ABDOMINAL PAIN: 0
TREMORS: 0
LIGHT-HEADEDNESS: 0
POLYDIPSIA: 0
DYSURIA: 0
COLOR CHANGE: 0
PHOTOPHOBIA: 0
ARTHRALGIAS: 0
NUMBNESS: 0
SHORTNESS OF BREATH: 1
FLANK PAIN: 0
EYE DISCHARGE: 0
COUGH: 1
EYE PAIN: 0
DYSPHORIC MOOD: 0
CHEST TIGHTNESS: 0
SPEECH DIFFICULTY: 0
BACK PAIN: 0
MYALGIAS: 0
DIFFICULTY URINATING: 0
POLYPHAGIA: 0
VOICE CHANGE: 0
SORE THROAT: 0
ABDOMINAL DISTENTION: 0
DIZZINESS: 0
FEVER: 0
BLOOD IN STOOL: 0
ADENOPATHY: 0
HEADACHES: 0
HALLUCINATIONS: 0
NECK STIFFNESS: 0
NECK PAIN: 0
UNEXPECTED WEIGHT CHANGE: 0
APPETITE CHANGE: 0
FREQUENCY: 0
CHOKING: 0
WHEEZING: 1
SLEEP DISTURBANCE: 0
SEIZURES: 0
CONSTIPATION: 0
FATIGUE: 0
NERVOUS/ANXIOUS: 0
APNEA: 0
PALPITATIONS: 0
VOMITING: 0
WEAKNESS: 0
WOUND: 0
EYE ITCHING: 0

## 2024-05-01 ASSESSMENT — PAIN SCALES - GENERAL
PAINLEVEL_OUTOF10: 10 - WORST POSSIBLE PAIN
PAINLEVEL_OUTOF10: 10 - WORST POSSIBLE PAIN
PAINLEVEL_OUTOF10: 0 - NO PAIN
PAINLEVEL: 10-WORST PAIN EVER
PAINLEVEL_OUTOF10: 0 - NO PAIN

## 2024-05-01 ASSESSMENT — PAIN - FUNCTIONAL ASSESSMENT: PAIN_FUNCTIONAL_ASSESSMENT: 0-10

## 2024-05-01 ASSESSMENT — COLUMBIA-SUICIDE SEVERITY RATING SCALE - C-SSRS
2. HAVE YOU ACTUALLY HAD ANY THOUGHTS OF KILLING YOURSELF?: NO
6. HAVE YOU EVER DONE ANYTHING, STARTED TO DO ANYTHING, OR PREPARED TO DO ANYTHING TO END YOUR LIFE?: NO
1. IN THE PAST MONTH, HAVE YOU WISHED YOU WERE DEAD OR WISHED YOU COULD GO TO SLEEP AND NOT WAKE UP?: NO

## 2024-05-01 ASSESSMENT — PAIN DESCRIPTION - ORIENTATION: ORIENTATION: RIGHT

## 2024-05-01 ASSESSMENT — PAIN DESCRIPTION - PAIN TYPE: TYPE: ACUTE PAIN

## 2024-05-01 NOTE — H&P
History Obtained From: Patient    History Of Present Illness:  Kevin Rubi is a 68 y.o. male with PMHx s/f lung cancer, atrial fibrillation, recent right inguinal hernia repair and small bowel resection presenting with swelling and hypotension.  The patient had recently undergone inguinal hernia repair on the right side with small bowel resection and anastomosis on April 9, 2024 he was sent to the hospital from oncologist office due to complaint of leg swelling and low blood pressure.  The patient was significantly hypotensive when standing.  The patient had noted both his leg swelling but recently had a rash to the right foot.  The patient denies any abdominal pain he does have some cough and some slight shortness of breath.  Has not had any documented fevers. The patient presented with heart rate of 110 blood pressure 83/50 systolic was given fluid bolus with some improvement.  Subsequently the patient did have CT a of the chest this was negative for pulmonary embolism but there were significant findings of bilateral pneumonia CT scan of the abdomen pelvis showed no evidence of hematoma vessels were without any aneurysmal dilation, patient was started on IV vancomycin and Zosyn his blood pressure did improve and is referred for admission to continue treatment of the underlying pneumonia.          ED Course:  Diagnoses as of 05/01/24 1817   Pneumonia of both lower lobes due to infectious organism   Other specified hypotension     Relevant Results  Results for orders placed or performed during the hospital encounter of 05/01/24 (from the past 24 hour(s))   CBC and Auto Differential   Result Value Ref Range    WBC 12.7 (H) 4.4 - 11.3 x10*3/uL    nRBC 0.0 0.0 - 0.0 /100 WBCs    RBC 3.01 (L) 4.50 - 5.90 x10*6/uL    Hemoglobin 8.0 (L) 13.5 - 17.5 g/dL    Hematocrit 25.0 (L) 41.0 - 52.0 %    MCV 83 80 - 100 fL    MCH 26.6 26.0 - 34.0 pg    MCHC 32.0 32.0 - 36.0 g/dL    RDW 18.7 (H) 11.5 - 14.5 %    Platelets 245  150 - 450 x10*3/uL    Neutrophils % 95.2 40.0 - 80.0 %    Immature Granulocytes %, Automated 0.9 0.0 - 0.9 %    Lymphocytes % 2.7 13.0 - 44.0 %    Monocytes % 1.1 2.0 - 10.0 %    Eosinophils % 0.0 0.0 - 6.0 %    Basophils % 0.1 0.0 - 2.0 %    Neutrophils Absolute 12.05 (H) 1.20 - 7.70 x10*3/uL    Immature Granulocytes Absolute, Automated 0.11 0.00 - 0.70 x10*3/uL    Lymphocytes Absolute 0.34 (L) 1.20 - 4.80 x10*3/uL    Monocytes Absolute 0.14 0.10 - 1.00 x10*3/uL    Eosinophils Absolute 0.00 0.00 - 0.70 x10*3/uL    Basophils Absolute 0.01 0.00 - 0.10 x10*3/uL   Comprehensive Metabolic Panel   Result Value Ref Range    Glucose 286 (H) 74 - 99 mg/dL    Sodium 133 (L) 136 - 145 mmol/L    Potassium 3.7 3.5 - 5.3 mmol/L    Chloride 98 98 - 107 mmol/L    Bicarbonate 25 21 - 32 mmol/L    Anion Gap 14 10 - 20 mmol/L    Urea Nitrogen 26 (H) 6 - 23 mg/dL    Creatinine 1.08 0.50 - 1.30 mg/dL    eGFR 76 >60 mL/min/1.73m*2    Calcium 8.7 8.6 - 10.3 mg/dL    Albumin 3.4 3.4 - 5.0 g/dL    Alkaline Phosphatase 51 33 - 136 U/L    Total Protein 7.0 6.4 - 8.2 g/dL    AST 24 9 - 39 U/L    Bilirubin, Total 0.5 0.0 - 1.2 mg/dL    ALT 36 10 - 52 U/L   Lactate   Result Value Ref Range    Lactate 1.9 0.4 - 2.0 mmol/L   Troponin I, High Sensitivity   Result Value Ref Range    Troponin I, High Sensitivity 13 0 - 20 ng/L   B-type natriuretic peptide   Result Value Ref Range     (H) 0 - 99 pg/mL   Lipase   Result Value Ref Range    Lipase 9 9 - 82 U/L   ECG 12 lead   Result Value Ref Range    Ventricular Rate 83 BPM    Atrial Rate 405 BPM    GA Interval 57 ms    QRS Duration 113 ms    QT Interval 372 ms    QTC Calculation(Bazett) 437 ms    R Axis -19 degrees    T Axis 43 degrees    QRS Count 12 beats    Q Onset 251 ms    T Offset 437 ms    QTC Fredericia 414 ms   Urinalysis with Reflex Culture and Microscopic   Result Value Ref Range    Color, Urine Yellow Straw, Yellow    Appearance, Urine Clear Clear    Specific Gravity, Urine 1.013  1.005 - 1.035    pH, Urine 6.0 5.0, 5.5, 6.0, 6.5, 7.0, 7.5, 8.0    Protein, Urine NEGATIVE NEGATIVE mg/dL    Glucose, Urine 150 (2+) (A) NEGATIVE mg/dL    Blood, Urine NEGATIVE NEGATIVE    Ketones, Urine NEGATIVE NEGATIVE mg/dL    Bilirubin, Urine NEGATIVE NEGATIVE    Urobilinogen, Urine <2.0 <2.0 mg/dL    Nitrite, Urine NEGATIVE NEGATIVE    Leukocyte Esterase, Urine NEGATIVE NEGATIVE      Lower extremity venous duplex right    Result Date: 5/1/2024  Interpreted By:  Dereje Manjarrez, STUDY: [facility] US LOWER EXTREMITY VENOUS DUPLEX RIGHT;  5/1/2024 4:16 pm   INDICATION: Signs/Symptoms:edema.   COMPARISON: None.   ACCESSION NUMBER(S): [id number]   ORDERING CLINICIAN: CURTIS WONG   TECHNIQUE: Grayscale, color and spectral Doppler sonographic images of the right lower extremity deep venous system. The left common femoral vein was imaged for comparison.   FINDINGS: THIGH VEINS: There is normal compressibility of the right common femoral vein, saphenous-femoral junction, femoral vein and popliteal vein. There is normal spontaneous and phasic variation by spectral doppler.   CALF VEINS: Visualized calf veins demonstrate normal color flow and compressibility.   CONTRALATERAL COMPARISON: The left common femoral vein is patent.   OTHER FINDINGS: None.       No evidence of acute DVT in the right lower extremity.   Signed by: Dereje Manjarrez 5/1/2024 4:37 PM Dictation workstation:   [id number]    CT abdomen pelvis w IV contrast    Result Date: 5/1/2024  Interpreted By:  Sunshine Medrano, STUDY: CT ABDOMEN PELVIS W IV CONTRAST;  5/1/2024 2:43 pm   INDICATION: Signs/Symptoms:recent hernia repair with hypotension. Repair of incarcerated right inguinal hernia and small-bowel resection performed on 04/09/2024   COMPARISON: 04/09/2024   ACCESSION NUMBER(S): [id number]   ORDERING CLINICIAN: CURTIS WONG   TECHNIQUE: CT of the abdomen and pelvis was performed in the axial plane following intravenous administration of 75 mL of Omnipaque  350. Sagittal and coronal reformations were performed by the technologist at the acquisition scanner.   All CT examinations are performed with 1 or more of the following dose reduction techniques: Automated exposure control, adjustment of mA and/or kv according to patient's size, or use of iterative reconstruction techniques.   FINDINGS: ABDOMEN:   LIVER: The liver is of normal size. However, there is now periportal hypodensity consistent with periportal edema. No intrahepatic biliary ductal dilatation is seen. There is no space-occupying hepatic lesion identified.   BILE DUCTS: No extrahepatic biliary ductal dilatation is evident.   GALLBLADDER: The gallbladder is contracted.   PANCREAS: Normal in appearance.   SPLEEN: Unremarkable   ADRENAL GLANDS: No adrenal abnormality.   KIDNEYS AND URETERS: No hydronephrosis of either kidney is seen. There is vascular calcification present bilaterally with right renal cortical scarring noted. Renal cortical thinning on the right is seen.   PELVIS:   BLADDER: No abnormality of the urinary bladder is observed.   REPRODUCTIVE ORGANS: The prostate gland is normal in size. No abnormality of either seminal vesicle is seen.   BOWEL: There has been resection of small bowel in right lower quadrant with small bowel reanastomosis. There is no evidence of bowel obstruction. There are diverticula of the colon with no evidence for diverticulitis.   VESSELS: Atherosclerosis of the abdominal aorta and iliac arteries is seen without aneurysmal dilatation. No retroperitoneal hemorrhage is present.   PERITONEUM/RETROPERITONEUM/LYMPH NODES: There is a roughly 2 cm area of infiltrated fat seen adjacent to the cecal tip most likely related to postoperative change. No lymphadenopathy within the abdomen or pelvis is seen. There is a small amount of fluid within gallbladder fossa. No free intraperitoneal air is observed.   BONES AND ABDOMINAL WALL: There is postoperative change from exploratory  laparotomy without abnormal fluid collection along the anterior abdominal wall to indicate postoperative seroma or hematoma formation. A tiny fat containing umbilical hernia is present. The right inguinal hernia has been repaired. There is no recurrent right inguinal hernia.       1.  Postoperative change from exploratory laparotomy, right inguinal herniorrhaphy, and small-bowel resection with small bowel reanastomosis. There is a small amount of fluid within gallbladder fossa with a roughly 2 cm fatty lesion with induration adjacent to the cecal tip most likely postoperative in etiology. 2. No hematoma within abdomen or pelvis. 3. Mild periportal edema is now seen. 4. Contracted gallbladder. 5. Right renal cortical scarring.     MACRO: None   Signed by: Sunshine Medrano 5/1/2024 3:32 PM Dictation workstation:   [id number]    CT angio chest for pulmonary embolism    Result Date: 5/1/2024  Interpreted By:  Ward Rice, STUDY: CT ANGIO CHEST FOR PULMONARY EMBOLISM; 5/1/2024 2:43 pm   INDICATION: Signs/Symptoms:recent surgery, hypotension, tachycardia. Status post recent inguinal hernia repair and small-bowel resection and anastomosis right leg swelling with hypotension. Left leg swelling is also present.   COMPARISON: 04/02/2024   ACCESSION NUMBER(S): [id number]   ORDERING CLINICIAN: CURTIS WONG   TECHNIQUE: 75 ml of non-ionic iodinated contrast was injected intravenously.   CT angiography (non-coronary) of the chest was performed with Intravenous contrast material along with 3D (maximum intensity projection - MIP) image post processing.   One or more of the following dose reduction techniques were used: Automated exposure control Adjustment of the mA and/or kV according to patient size, and/or use of iterative reconstruction technique.   FINDINGS: There is no evidence for aortic dissection or pericardial effusion. There is aneurysmal dilatation of the ascending thoracic aorta measuring 4.4 cm in maximal diameter,  similar compared to the prior study from 1 month earlier. There is no evidence for pulmonary embolism.   There is worsening alveolar infiltrate within the left lower lobe extending to the left hilum, within the right middle lobe (new) and within the right lower lobe (new).   There is stable patch of ground-glass infiltrate left apex posteriorly measuring approximately 1.6 cm. There is a new patch of ground-glass infiltrate anterior segment left upper lobe measuring 4 cm. There is a small left pleural effusion.   Chest Wall: There are several small lipomas identified within the anterior chest wall bilaterally, larger on the left with 6 identified on the right and 2 on the left with the largest on the left measuring 2 cm in diameter.   Upper Abdominal Findings: No upper intra-abdominal pathology is identified. There is a 1.6 cm midline lipoma involving the anterior subcutaneous fat at the level of the epigastrium.   Skeletal System: No acute fractures or destructive lesions are identified. There is a stable sclerotic lesion in the anterolateral aspect of the left 5th rib measuring 11 mm.       1. There are multiple upper abdominal and chest wall lipomas. The largest measures approximately 2 cm in diameter. 2. Worsening bilateral infiltrates most marked within the left lower lobe, right middle lobe along with patchy infiltrates within the right lower lobe. The radiographic pattern as well as the rapid interval change is more suggestive of an infectious etiology, pneumonia. Follow-up to assure complete clearing is suggested. 3. New ground-glass infiltrate anterior segment left upper lobe may be infectious as well due to the rapid interval change. 4. Stable ground-glass infiltrate left apex. 5. No evidence for pulmonary embolism. 6. Aneurysmal dilatation of the ascending thoracic aorta measuring up to 4.4 cm in diameter.   MACRO: none   Signed by: Ward Rice 5/1/2024 3:13 PM Dictation workstation:    [id number]    ECG 12 lead    Result Date: 5/1/2024  See ED provider note for full interpretation and clinical correlation    XR chest 1 view    Result Date: 5/1/2024  Interpreted By:  Pilo Schmitz, STUDY: XR CHEST 1 VIEW; 5/1/2024 1:54 pm   INDICATION: Signs/Symptoms:sepsis   COMPARISON: December 2023 and 04/09/2024   ACCESSION NUMBER(S): [id number]   ORDERING CLINICIAN: CURTIS WONG   FINDINGS: The cardiomediastinal silhouette is within normal limits for the technique. Patchy infiltrates are noted in the left lung base, silhouetting the lateral cardiac border and left hemidiaphragm. Minimal infiltrates also seen in the right lower lung. There is no pneumothorax. The osseous structures are unremarkable.       Bibasilar, left more than right infiltrates, suspicious for pneumonia. Correlate clinically and follow-up to document resolution.   Signed by: Pilo Schmitz 5/1/2024 2:27 PM Dictation workstation:   [id number]    ECG 12 lead    Result Date: 4/14/2024  Atrial fibrillation Ventricular premature complex Low voltage, extremity leads Repol abnrm suggests ischemia, diffuse leads See ED provider note for full interpretation and clinical correlation Confirmed by Kalyani Perdomo ([id number]) on 4/14/2024 11:43:27 AM    Transthoracic Echo (TTE) Limited    Result Date: 4/11/2024              [place] [address]      Phone [phone] Fax [phone] TRANSTHORACIC ECHOCARDIOGRAM REPORT  Patient Name:     VON Novak Physician:   [id number] Freddie Weldon DO Study Date:       4/11/2024            Ordering Provider:   [id number] PHUC HAHN MRN/PID:          [id number]             Fellow: Accession#:       [id number]         Nurse: Date of           1955 / 68      Sonographer:         Kellee Marcial RDCS Birth/Age:        years Gender:           M                    Additional Staff: Height:            182.88 cm            Admit Date:          4/9/2024 Weight:           70.76 kg             Admission Status:    Inpatient - Routine BSA / BMI:        1.92 m2 / 21.16      Department Location: [facility]                   kg/m2 Blood Pressure: 95 /62 mmHg Study Type:    TRANSTHORACIC ECHO (TTE) LIMITED Diagnosis/ICD: Postprocedural hypotension-I95.81 Indication:    Hypotension CPT Codes:     Echo Limited-[id number] Patient History: Pertinent History: Chest Pain, CAD and Hyperlipidemia. Study Detail: The following Echo studies were performed: 2D, M-Mode, Doppler and               color flow.  PHYSICIAN INTERPRETATION: Left Ventricle: Left ventricular systolic function is normal, with an estimated ejection fraction of 60-65%. There are no regional wall motion abnormalities. The left ventricular cavity size is normal. The left ventricular septal wall thickness is normal. There is normal left ventricular posterior wall thickness. Left ventricular diastolic filling was indeterminate. Left Atrium: The left atrium was not assessed. Right Ventricle: The right ventricle was not assessed. There is normal right ventricular global systolic function. Right Atrium: The right atrium was not assessed. Aortic Valve: The aortic valve was not assessed. Aortic valve regurgitation was not assessed. Mitral Valve: The mitral valve was not assessed. Mitral valve regurgitation was not assessed. Tricuspid Valve: The tricuspid valve was not assessed. Tricuspid regurgitation was not assessed. Pulmonic Valve: The pulmonic valve was not assessed. The pulmonic valve regurgitation was not assessed. Pericardium: Pericardial effusion was not assessed. Aorta: The aortic root was not assessed.  CONCLUSIONS:  1. Left ventricular systolic function is normal with a 60-65% estimated ejection fraction. QUANTITATIVE DATA SUMMARY: 2D MEASUREMENTS:                          Normal Ranges: IVSd:          1.05 cm   (0.6-1.1cm) LVPWd:         1.00 cm   (0.6-1.1cm)  LVIDd:         4.35 cm   (3.9-5.9cm) LVIDs:         2.60 cm LV Mass Index: 82.5 g/m2 LV % FS        40.2 % LA VOLUME:                               Normal Ranges: LA Vol A4C:        66.8 ml    (22+/-6mL/m2) LA Vol A2C:        52.6 ml LA Vol BP:         62.4 ml LA Vol Index A4C:  34.9ml/m2 LA Vol Index A2C:  27.5 ml/m2 LA Vol Index BP:   32.6 ml/m2 LA Area A4C:       24.2 cm2 LA Area A2C:       20.4 cm2 LA Major Axis A4C: 7.4 cm LA Major Axis A2C: 6.7 cm LA Volume Index:   31.4 ml/m2 LV SYSTOLIC FUNCTION BY 2D PLANIMETRY (MOD):                     Normal Ranges: EF-A4C View: 61.4 % (>=55%) EF-A2C View: 66.6 % EF-Biplane:  64.0 % LV DIASTOLIC FUNCTION:                        Normal Ranges: MV Peak E:    1.24 m/s (0.7-1.2 m/s) MV e'         0.10 m/s (>8.0) MV lateral e' 0.10 m/s MV medial e'  0.10 m/s E/e' Ratio:   12.40    (<8.0) MITRAL VALVE:                 Normal Ranges: MV DT: 201 msec (150-240msec) TRICUSPID VALVE/RVSP:                             Normal Ranges: Peak TR Velocity: 2.70 m/s RV Syst Pressure: 32.2 mmHg (< 30mmHg)  [id number] Freddie Weldon DO Electronically signed on 4/11/2024 at 12:20:44 PM  ** Final **     CT abdomen pelvis w IV contrast    Result Date: 4/9/2024  STUDY: CT Abdomen and Pelvis with IV Contrast; 04/09/2024 at 6:22 PM INDICATION: Unspecified abdominal pain. History of lung cancer. COMPARISON: CT abdomen and pelvis 03/12/24. Correlation with CT chest 04/02/24, 01/08/24. PET-CT 01/26/24. ACCESSION NUMBER(S): [id number] ORDERING CLINICIAN: KERRI BAPTISTE TECHNIQUE: CT of the abdomen and pelvis was performed.  Contiguous axial images were obtained at 3 mm slice thickness through the abdomen and pelvis. Coronal and sagittal reconstructions at 3 mm slice thickness were performed.  Omnipaque-350 75 mL was administered intravenously.  FINDINGS: LOWER CHEST: No cardiomegaly.  No pericardial effusion.  There is an increased ill-defined consolidation within the left lung base (2-12) .   ABDOMEN:  LIVER: No hepatomegaly.  Smooth surface contour.  There is decreased attenuation consistent with hepatic steatosis.  BILE DUCTS: No intrahepatic or extrahepatic biliary ductal dilatation.  GALLBLADDER: The gallbladder is contracted.  No evidence of radiopaque calculi.. STOMACH: No abnormalities identified.  PANCREAS: No masses or ductal dilatation.  SPLEEN: No splenomegaly or focal splenic lesion.  ADRENAL GLANDS: No thickening or nodules.  KIDNEYS AND URETERS: Kidneys are normal in size and location.  No renal or ureteral calculi.  Vascular calcifications are noted.  No evidence of hydronephrosis.  There is a right renal cortical scarring stable compared to the previous CT.  PELVIS:  BLADDER: No abnormalities identified.  REPRODUCTIVE ORGANS: Mild prostatomegaly stable.  BOWEL: There are multiple loops of dilated small bowel noted within the abdomen containing fluid and gas.  There is a loop of small bowel which is located within a right inguinal hernia which likely axes the transition point for this small bowel obstruction (2-108).  There is colonic diverticulosis without definite CT evidence of diverticulitis.  VESSELS: No abnormalities identified.  Abdominal aorta is normal in caliber. There are stable atherosclerotic changes of the abdominal aorta and iliac vessels.  PERITONEUM/RETROPERITONEUM/LYMPH NODES: No free fluid.  No pneumoperitoneum. There is no abdominal or pelvic lymphadenopathy by CT size criteria.  ABDOMINAL WALL: There is a right internal hernia noted containing loop of small bowel.  There is also probable fluid within the inguinal canal extending into the scrotum. SOFT TISSUES: No abnormalities identified.  BONES: No acute fracture or aggressive osseous lesion.    1.  Multiple loops of dilated small bowel consistent with a small bowel obstruction.  There is a loop of small bowel located within a right inguinal hernia likely incarcerated and acting as a transition point for the  obstruction. 2.  Ill-defined consolidative changes within the left lower lobe which may represent a possible pneumonitis. 3.  Findings consistent with hepatic steatosis. 4.  No abdominal/pelvic fluid collections or pneumoperitoneum. 5.  Colonic diverticulosis without definite evidence of diverticulitis. 6.  Stable mild prostatomegaly.. Result was given to Dr. Christiano Flanagan on 4/9/2024 at 1936. Signed by Ward Tong MD    ECG 12 Lead    Result Date: 4/9/2024  Afib , RVR, marked ST abnormality in the anterior leads and leads II and aVF - possible subendocardial injury    CT chest wo IV contrast    Result Date: 4/2/2024  Interpreted By:  Rafa Mitchell, STUDY: CT CHEST WO IV CONTRAST;  4/2/2024 2:19 pm   INDICATION: Signs/Symptoms:pneumonitis in the context of checkpoint inhibitor therapy.   COMPARISON: CT chest 01/08/2024, 01/24/2019   ACCESSION NUMBER(S): [id number]   ORDERING CLINICIAN: ADELIA CRUZ   TECHNIQUE: Helical data acquisition of the chest was obtained without IV contrast material.  Images were reformatted in axial, coronal, and sagittal planes.   FINDINGS: LINE AND DEVICES: None.   LUNGS AND AIRWAYS: No endobronchial lesions in the trachea and central airways. There is interval development of bronchial wall thickening and bronchiolectasis with new consolidative and ground-glass opacities in the periphery of the anterolateral left lower lobe. There is decreased size of the left lower lobe dominant nodule now measuring 1.6 x 1.2 cm (series 3, image 248), previously 2.9 x 1.8 cm. Additional nodular opacities are grossly stable in size, for example: 1.8 cm nodule in the left lung apex (series 3, image 74); 1 cm nodule in the lateral left upper lobe (series 3, image 141); 0.7 cm nodule in the lateral right upper lobe (series 3, image 157). No new suspicious lung nodules. No pleural effusion or pneumothorax.   MEDIASTINUM AND GET, LOWER NECK AND AXILLA: The visualized thyroid gland is within  normal limits.   Grossly stable prominent mediastinal lymph nodes, for example 8 mm subcarinal node (series 4, image 161). No progressive lymphadenopathy.   Esophagus appears within normal limits.   HEART AND VESSELS: Mild atherosclerotic calcifications of the thoracic aorta with stable aneurysmal dilation of the ascending segment measuring 4.5 cm.   Main pulmonary artery is normal in caliber.   Moderate to severe coronary artery calcifications in the LAD and circumflex arteries. The study is not optimized for evaluation of coronary arteries.   Stable biatrial enlargement.   Stable trace pericardial effusion.   UPPER ABDOMEN: Celiac artery origin vascular stent.   CHEST WALL AND OSSEOUS STRUCTURES: Redemonstration of a 1.2 cm sclerotic lesion in the left anterolateral 5th rib (series 4, image 150), stable compared to 01/08/2024, although new when compared to more remote prior dated 01/24/2019. No new suspicious sclerotic or lytic lesions. No acute fracture.       Compared to 01/08/2024: 1. Decreased size of left lower lobe dominant nodule now 1.6 x 1.2 cm, previously 2.9 x 1.8 cm. 2. New ground-glass and consolidative opacities, bronchial wall thickening and bronchiolectasis in the left lower lobe peripheral to the above dominant nodule, suspicious for infection/inflammation, possibly treatment-related. 3. Additional bilateral small nodules are stable in size; no progressive lymphadenopathy. 4. Stable left 5th rib sclerotic lesion, new when compared to more remote prior CT dated 01/24/2019.   MACRO: None   Signed by: Rafa Mitchell 4/2/2024 4:00 PM Dictation workstation:   [id number]     Scheduled medications:  apixaban, 5 mg, oral, BID  clopidogrel, 75 mg, oral, Daily  digoxin, 125 mcg, oral, Daily  furosemide, 20 mg, oral, Daily  gabapentin, 300 mg, oral, Nightly  insulin lispro, 0-5 Units, subcutaneous, TID with meals  metoprolol succinate XL, 25 mg, oral, Daily  [START ON 5/2/2024] pantoprazole, 20 mg, oral,  Daily before breakfast  piperacillin-tazobactam, 4.5 g, intravenous, q6h  rosuvastatin, 5 mg, oral, Nightly      Continuous medications:     PRN medications:  PRN medications: albuterol, dextrose, glucagon, oxygen      Past Medical History  He has a past medical history of Aortic aneurysm (CMS-HCC), Atrial fibrillation (Multi), Coronary artery disease, Hyperlipidemia, Hypertension, Lung cancer (Multi), Personal history of peptic ulcer disease, Superior mesenteric artery stenosis (Multi), and Tinnitus, bilateral (08/14/2016).    Surgical History  He has a past surgical history that includes Other surgical history (02/13/2019); Other surgical history (02/13/2019); CT angio abdomen pelvis w and or wo IV IV contrast (08/08/2023); Superior mestenteric artery stent; Exploratory laparotomy w/ bowel resection (04/09/2024); and Hernia repair (Right, 04/09/2024).     Social History  He reports that he quit smoking about 11 years ago. His smoking use included cigarettes. He started smoking about 41 years ago. He has a 15 pack-year smoking history. He has been exposed to tobacco smoke. He has never used smokeless tobacco. He reports that he does not currently use alcohol. He reports that he does not use drugs.    Family History  Family History   Problem Relation Name Age of Onset    Cancer Mother          ?larynx or lung        Allergies  Patient has no known allergies.    Code Status  Full Code     Review of Systems   Constitutional:  Positive for chills. Negative for appetite change, fatigue, fever and unexpected weight change.   HENT:  Negative for congestion, ear discharge, ear pain, mouth sores, nosebleeds, sinus pain, sore throat, trouble swallowing and voice change.    Eyes:  Negative for photophobia, pain, discharge, itching and visual disturbance.   Respiratory:  Positive for cough, shortness of breath and wheezing. Negative for apnea, choking and chest tightness.    Cardiovascular:  Positive for leg swelling. Negative  for chest pain and palpitations.   Gastrointestinal:  Negative for abdominal distention, abdominal pain, blood in stool, constipation, diarrhea, nausea and vomiting.   Endocrine: Negative for cold intolerance, heat intolerance, polydipsia, polyphagia and polyuria.   Genitourinary:  Negative for decreased urine volume, difficulty urinating, dysuria, flank pain, frequency, hematuria and urgency.   Musculoskeletal:  Negative for arthralgias, back pain, gait problem, myalgias, neck pain and neck stiffness.   Skin:  Positive for rash. Negative for color change, pallor and wound.   Allergic/Immunologic: Negative for food allergies and immunocompromised state.   Neurological:  Negative for dizziness, tremors, seizures, syncope, speech difficulty, weakness, light-headedness, numbness and headaches.   Hematological:  Negative for adenopathy. Does not bruise/bleed easily.   Psychiatric/Behavioral:  Negative for confusion, dysphoric mood, hallucinations, sleep disturbance and suicidal ideas. The patient is not nervous/anxious.        Last Recorded Vitals  /67 (BP Location: Left arm, Patient Position: Lying)   Pulse 94   Temp 36.5 °C (97.7 °F) (Temporal)   Resp 20   Wt 69.9 kg (154 lb)   SpO2 100%      Physical Exam    Assessment/Plan   Principal Problem:    Pneumonia of both lower lobes due to infectious organism    Bilateral pneumonia with history of recent hospitalization and lung cancer of left lower lobe  The patient is placed on IV vancomycin and Zosyn  Supplemental oxygen if needed  As needed nebulizer treatments    Chronic diastolic congestive heart failure  Patient does have increased edema lately however BN peptide is not significantly elevated  Patient was given 2 L bolus in the emergency department  Continue patient on furosemide as tolerated  Patient was hypotensive in the emergency department Entresto was on hold    Atrial fibrillation  Continue patient on rate control medication digoxin  Continue  patient on anticoagulation therapy with Eliquis    Peripheral arterial disease  Continue patient on Eliquis and Plavix  Patient has history of SMA occlusion    Petechial rash right foot  There was some associated swelling of bilateral lower extremities  Continue to monitor    Abnormal blood glucose  Cover with sliding scale check hemoglobin A1c    Anemia hemoglobin of 8   Likely related to prior lung cancer treatments seems to have dropped since surgery last hemoglobin 8.3 on April 15, 2024  CT scan abdomen and pelvis was performed without any significant finding for hematoma  Follow-up iron studies reticulocyte count  Will continue to monitor    No new Assessment & Plan notes have been filed under this hospital service since the last note was generated.  Service: Internal Medicine          Brayden Taylor, APRN-CNP    Dragon dictation software was used to dictate this note and thus there may be minor errors in translation/transcription including garbled speech or misspellings. Please contact for clarification if needed.

## 2024-05-01 NOTE — ED PROVIDER NOTES
External Records Reviewed: Discharge summary from 4/16/2024  Independent Historians: Dr. Longoria, patient's oncologist.    HPI  Kevin Rubi is a 68 y.o. male with a history of Inguinal hernia repair and small bowel resection and anastomosis on 4/9/2024 who is presenting with leg swelling and hypotension.  Patient states he noticed some swelling of his right leg when he left the hospital a few weeks ago.  Patient states since then the swelling has gotten worse and he also is having swelling on the left leg.  Patient states his right leg started becoming red and weeping because of the swelling.  He notes significant pain in the right foot.  Patient notes his abdomen seems to be healing well.  He does endorse some pain but feels like it is improving.  He denies any vomiting and notes he had a bowel movement earlier today.  Patient denies any fevers but does note that his blood pressure has been low for the past 2 days.  Patient was seen by his doctor today who recommended he come into the emergency department for further evaluation.    [facility]  Past Medical History:   Diagnosis Date    Aortic aneurysm (CMS-HCC)     Atrial fibrillation (Multi)     Coronary artery disease     Hyperlipidemia     Hypertension     Lung cancer (Multi)     Personal history of peptic ulcer disease     History of gastric ulcer    Superior mesenteric artery stenosis (Multi)     Tinnitus, bilateral 08/14/2016    Tinnitus of both ears       Meds  Current Outpatient Medications   Medication Instructions    apixaban (ELIQUIS) 5 mg, oral, 2 times daily    clopidogrel (PLAVIX) 75 mg, oral, Daily    dicyclomine (Bentyl) 10 mg capsule oral, 4 times daily PRN    digoxin (LANOXIN) 125 mcg, oral, Daily    Entresto 24-26 mg tablet 1 tablet, oral, 2 times daily    fenofibrate (Tricor) 145 mg tablet Take 1 tablet (145 mg) by mouth once daily.    furosemide (LASIX) 20 mg, oral, Daily    gabapentin (NEURONTIN) 300 mg, oral, Nightly    metoprolol succinate XL  (TOPROL-XL) 50 mg, oral, Daily, Do not crush or chew.    metoprolol succinate XL (TOPROL-XL) 25 mg, oral, Daily, Do not crush or chew.    oxyCODONE (ROXICODONE) 5 mg, oral, Every 4 hours PRN    pantoprazole (PROTONIX) 20 mg, oral, Daily before breakfast, Do not crush, chew, or split.    [START ON 2024] predniSONE (Deltasone) 10 mg tablet After finishing taper.  Take 1 tablet (10 mg) by mouth once daily. Do not start before May 5, 2024.    rosuvastatin (CRESTOR) 5 mg, oral, Daily    spironolactone (ALDACTONE) 12.5 mg, oral, Daily    tiZANidine (ZANAFLEX) 2 mg, oral, Every 8 hours PRN       Allergies  No Known Allergies     SHx  Social History     Tobacco Use    Smoking status: Former     Current packs/day: 0.00     Average packs/day: 0.5 packs/day for 30.0 years (15.0 ttl pk-yrs)     Types: Cigarettes     Start date:      Quit date:      Years since quittin.3     Passive exposure: Past    Smokeless tobacco: Never   Vaping Use    Vaping status: Never Used   Substance Use Topics    Alcohol use: Not Currently    Drug use: Never       ------------------------------------------------------------------------------------------------------------------------------------------    /69   Pulse 87   Temp 36.8 °C (98.2 °F)   Resp 18   Ht 1.829 m (6')   Wt 69.9 kg (154 lb)   SpO2 98%   BMI 20.89 kg/m²     Physical Exam  Constitutional:       General: He is not in acute distress.  HENT:      Head: Normocephalic and atraumatic.      Mouth/Throat:      Mouth: Mucous membranes are moist.   Eyes:      Extraocular Movements: Extraocular movements intact.      Conjunctiva/sclera: Conjunctivae normal.      Pupils: Pupils are equal, round, and reactive to light.   Cardiovascular:      Rate and Rhythm: Regular rhythm. Tachycardia present.      Heart sounds: No murmur heard.     No gallop.   Pulmonary:      Effort: Pulmonary effort is normal. No respiratory distress.      Breath sounds: Normal breath sounds. No  wheezing.   Abdominal:      General: There is no distension.      Palpations: Abdomen is soft.      Tenderness: There is no abdominal tenderness. There is no guarding.   Musculoskeletal:         General: Swelling (Swelling of the bilateral feet however worse in the right foot.) present. No signs of injury. Normal range of motion.      Comments: Right foot is tender to palpation.  It is not warm and he has normal range of motion.   Skin:     General: Skin is warm and dry.      Findings: Lesion (Scattered red appearing vessels on the dorsal aspect of the right foot.) present. No rash.   Neurological:      General: No focal deficit present.      Mental Status: He is alert and oriented to person, place, and time. Mental status is at baseline.   Psychiatric:         Mood and Affect: Mood normal.         Judgment: Judgment normal.          ------------------------------------------------------------------------------------------------------------------------------------------    Medical Decision Making: Patient is a 68-year-old male presenting with leg swelling and hypotension.  Patient is hypotensive with a blood pressure of 83/50.  He is also tachycardic to 110.  Given these vital signs and the pain in his right foot with swelling there is concern for possible sepsis.  Unclear if infection is from the right foot or if this is more dependent edema versus possible DVT.  Patient is postop a couple weeks from major surgery and this could also be a source of infection.  Patient's tachycardia and hypotension may be due to PE as well.  Will obtain lab work including blood cultures as well as cover with broad-spectrum antibiotics.  Will obtain CT PE as well as CT of the abdomen and pelvis. Patient will be given fluids as well as fentanyl for pain.  Lab work was notable for mildly elevated BNP.  Blood sugar is also elevated.  He does have a leukocytosis with a white blood cell count of 12.7.  He has anemia of 8 however this is  consistent with prior.  Troponin is negative and lactate is 1.9 which is reassuring.  UA showed no signs of infection. CT PE showed worsening bilateral infiltrates within the left lower lobe right middle lobe and patchy infiltrates within the right lower lobe concerning for pneumonia.  It showed no evidence of PE.  CT of the abdomen pelvis showed postoperative changes from exploratory laparotomy and resection.  It showed a small amount of fluid within the gallbladder fossa likely postoperative in nature.  Also showed some periportal edema.  Duplex of the left right lower extremity showed no evidence of DVT.  Patient's blood pressure improved to 107/69 and his tachycardia improved to 87.  He otherwise remained hemodynamically stable.  Patient presentation most likely secondary to his pneumonia.  His edema is most likely due to dependent edema versus some CHF.  Patient will require admission for further IV antibiotic therapy.  He was agreeable with this plan.  Patient was admitted to [facility] for further treatment and management.    Independent EKG interpretation:  Irregular rhythm, rate 83 bpm, left axis deviation, QTc 437 ms, no ST segment elevations or depressions.  Not concerning for STEMI.  Diagnoses as of 05/01/24 1702   Pneumonia of both lower lobes due to infectious organism   Other specified hypotension       Discussed with: Admitting provider  Natalie Mills MD  Emergency Medicine       Natalie Mills MD  05/01/24 1702

## 2024-05-01 NOTE — PROGRESS NOTES
Unable to reach patient for call back after patient's follow up appointment with PCP.   LVM with call back number for patient to call if needed   If no voicemail available call attempts x 2 were made to contact the patient to assist with any questions or concerns patient may have.    Rhiannon Timmons LPN

## 2024-05-01 NOTE — PROGRESS NOTES
Pt stataes bill foot swelling and extremer pain in rt foot for 2 weeks and a cough for two nweeks nthe ncough for 2 days./

## 2024-05-01 NOTE — PROGRESS NOTES
"Vancomycin Dosing by Pharmacy- INITIAL    eKvin Rubi is a 68 y.o. year old male who Pharmacy has been consulted for vancomycin dosing for pneumonia. Based on the patient's indication and renal status this patient will be dosed based on a goal AUC of 400-600.     Renal function is currently stable.    Visit Vitals  /67 (BP Location: Left arm, Patient Position: Lying)   Pulse 94   Temp 36.5 °C (97.7 °F) (Temporal)   Resp 20        Lab Results   Component Value Date    CREATININE 1.08 05/01/2024    CREATININE 0.84 04/16/2024    CREATININE 1.06 04/15/2024    CREATININE 1.29 04/14/2024        Patient weight is No results found for: \"PTWEIGHT\"    No results found for: \"CULTURE\"     I/O last 3 completed shifts:  In: 2600 (37.2 mL/kg) [IV Piggyback:2600]  Out: - (0 mL/kg)   Weight: 69.9 kg   [unfilled]    Lab Results   Component Value Date    PATIENTTEMP 37.0 12/18/2023          Assessment/Plan     Patient has already been given a loading dose of 2000 mg.  Will initiate vancomycin maintenance,  1500 mg every 24 hours.    This dosing regimen is predicted by [employer] to result in the following pharmacokinetic parameters:  Regimen: 1500 mg IV every 24 hours.  Start time: 02:45 on 05/02/2024  Exposure target: AUC24 (range)400-600 mg/L.hr   AUC24,ss: 533 mg/L.hr  Probability of AUC24 > 400: 80 %  Ctrough,ss: 14.9 mg/L  Probability of Ctrough,ss > 20: 27 %  Probability of nephrotoxicity (Lodise JEFFREY 2009): 10 %      Follow-up level will be ordered on 5/2 at 0500 unless clinically indicated sooner.  Will continue to monitor renal function daily while on vancomycin and order serum creatinine at least every 48 hours if not already ordered.  Follow for continued vancomycin needs, clinical response, and signs/symptoms of toxicity.       Benny Carlos [facility]       "

## 2024-05-01 NOTE — CARE PLAN
Problem: Pain  Goal: Takes deep breaths with improved pain control throughout the shift  Outcome: Progressing  Goal: Turns in bed with improved pain control throughout the shift  Outcome: Progressing  Goal: Walks with improved pain control throughout the shift  Outcome: Progressing  Goal: Performs ADL's with improved pain control throughout shift  Outcome: Progressing  Goal: Participates in PT with improved pain control throughout the shift  Outcome: Progressing  Goal: Free from opioid side effects throughout the shift  Outcome: Progressing  Goal: Free from acute confusion related to pain meds throughout the shift  Outcome: Progressing     Problem: Fall/Injury  Goal: Not fall by end of shift  Outcome: Progressing  Goal: Be free from injury by end of the shift  Outcome: Progressing  Goal: Verbalize understanding of personal risk factors for fall in the hospital  Outcome: Progressing  Goal: Verbalize understanding of risk factor reduction measures to prevent injury from fall in the home  Outcome: Progressing  Goal: Use assistive devices by end of the shift  Outcome: Progressing  Goal: Pace activities to prevent fatigue by end of the shift  Outcome: Progressing   The patient's goals for the shift include      The clinical goals for the shift include

## 2024-05-02 ENCOUNTER — APPOINTMENT (OUTPATIENT)
Dept: CARDIOLOGY | Facility: HOSPITAL | Age: 69
End: 2024-05-02
Payer: COMMERCIAL

## 2024-05-02 LAB
DIGOXIN SERPL-MCNC: 0.64 NG/ML (ref 0.8–?)
EST. AVERAGE GLUCOSE BLD GHB EST-MCNC: 151 MG/DL
FERRITIN SERPL-MCNC: 183 NG/ML (ref 20–300)
GLUCOSE BLD MANUAL STRIP-MCNC: 123 MG/DL (ref 74–99)
GLUCOSE BLD MANUAL STRIP-MCNC: 151 MG/DL (ref 74–99)
GLUCOSE BLD MANUAL STRIP-MCNC: 198 MG/DL (ref 74–99)
GLUCOSE BLD MANUAL STRIP-MCNC: 250 MG/DL (ref 74–99)
HBA1C MFR BLD: 6.9 %
HGB RETIC QN: 22 PG (ref 28–38)
HOLD SPECIMEN: NORMAL
IMMATURE RETIC FRACTION: 25.8 %
IRON SATN MFR SERPL: 13 % (ref 25–45)
IRON SERPL-MCNC: 29 UG/DL (ref 35–150)
LEGIONELLA AG UR QL: NEGATIVE
RETICS #: 0.08 X10*6/UL (ref 0.02–0.11)
RETICS/RBC NFR AUTO: 3.3 % (ref 0.5–2)
S PNEUM AG UR QL: NEGATIVE
TIBC SERPL-MCNC: 231 UG/DL (ref 240–445)
TRANSFERRIN SERPL-MCNC: 176 MG/DL (ref 200–360)
UIBC SERPL-MCNC: 202 UG/DL (ref 110–370)
VANCOMYCIN SERPL-MCNC: 9.4 UG/ML (ref 5–20)

## 2024-05-02 PROCEDURE — 1200000002 HC GENERAL ROOM WITH TELEMETRY DAILY

## 2024-05-02 PROCEDURE — 2500000001 HC RX 250 WO HCPCS SELF ADMINISTERED DRUGS (ALT 637 FOR MEDICARE OP): Performed by: INTERNAL MEDICINE

## 2024-05-02 PROCEDURE — 82728 ASSAY OF FERRITIN: CPT | Performed by: NURSE PRACTITIONER

## 2024-05-02 PROCEDURE — 36415 COLL VENOUS BLD VENIPUNCTURE: CPT | Performed by: NURSE PRACTITIONER

## 2024-05-02 PROCEDURE — 85045 AUTOMATED RETICULOCYTE COUNT: CPT | Performed by: NURSE PRACTITIONER

## 2024-05-02 PROCEDURE — 99222 1ST HOSP IP/OBS MODERATE 55: CPT | Performed by: INTERNAL MEDICINE

## 2024-05-02 PROCEDURE — 2500000001 HC RX 250 WO HCPCS SELF ADMINISTERED DRUGS (ALT 637 FOR MEDICARE OP): Performed by: NURSE PRACTITIONER

## 2024-05-02 PROCEDURE — 82947 ASSAY GLUCOSE BLOOD QUANT: CPT

## 2024-05-02 PROCEDURE — 2500000004 HC RX 250 GENERAL PHARMACY W/ HCPCS (ALT 636 FOR OP/ED): Performed by: NURSE PRACTITIONER

## 2024-05-02 PROCEDURE — 82947 ASSAY GLUCOSE BLOOD QUANT: CPT | Mod: 91

## 2024-05-02 PROCEDURE — 2500000006 HC RX 250 W HCPCS SELF ADMINISTERED DRUGS (ALT 637 FOR ALL PAYERS): Mod: MUE | Performed by: NURSE PRACTITIONER

## 2024-05-02 PROCEDURE — 80202 ASSAY OF VANCOMYCIN: CPT | Performed by: PHARMACIST

## 2024-05-02 PROCEDURE — 99233 SBSQ HOSP IP/OBS HIGH 50: CPT | Performed by: INTERNAL MEDICINE

## 2024-05-02 PROCEDURE — 2500000002 HC RX 250 W HCPCS SELF ADMINISTERED DRUGS (ALT 637 FOR MEDICARE OP, ALT 636 FOR OP/ED): Performed by: NURSE PRACTITIONER

## 2024-05-02 PROCEDURE — 2500000004 HC RX 250 GENERAL PHARMACY W/ HCPCS (ALT 636 FOR OP/ED): Performed by: INTERNAL MEDICINE

## 2024-05-02 PROCEDURE — 80162 ASSAY OF DIGOXIN TOTAL: CPT | Performed by: NURSE PRACTITIONER

## 2024-05-02 PROCEDURE — 2500000005 HC RX 250 GENERAL PHARMACY W/O HCPCS: Performed by: NURSE PRACTITIONER

## 2024-05-02 PROCEDURE — 83540 ASSAY OF IRON: CPT | Performed by: NURSE PRACTITIONER

## 2024-05-02 PROCEDURE — 2500000004 HC RX 250 GENERAL PHARMACY W/ HCPCS (ALT 636 FOR OP/ED): Performed by: PHARMACIST

## 2024-05-02 PROCEDURE — 84466 ASSAY OF TRANSFERRIN: CPT | Mod: PORLAB | Performed by: NURSE PRACTITIONER

## 2024-05-02 RX ORDER — OXYCODONE HYDROCHLORIDE 5 MG/1
5 TABLET ORAL EVERY 6 HOURS PRN
Status: DISCONTINUED | OUTPATIENT
Start: 2024-05-02 | End: 2024-05-03 | Stop reason: HOSPADM

## 2024-05-02 RX ORDER — PREDNISONE 20 MG/1
20 TABLET ORAL DAILY
Status: DISCONTINUED | OUTPATIENT
Start: 2024-05-02 | End: 2024-05-03 | Stop reason: HOSPADM

## 2024-05-02 RX ORDER — PREDNISONE 10 MG/1
10 TABLET ORAL DAILY
Status: DISCONTINUED | OUTPATIENT
Start: 2024-05-06 | End: 2024-05-03 | Stop reason: HOSPADM

## 2024-05-02 RX ADMIN — INSULIN LISPRO 2 UNITS: 100 INJECTION, SOLUTION INTRAVENOUS; SUBCUTANEOUS at 16:48

## 2024-05-02 RX ADMIN — PIPERACILLIN SODIUM AND TAZOBACTAM SODIUM 4.5 G: 4; .5 INJECTION, SOLUTION INTRAVENOUS at 10:19

## 2024-05-02 RX ADMIN — GABAPENTIN 300 MG: 300 CAPSULE ORAL at 20:51

## 2024-05-02 RX ADMIN — APIXABAN 5 MG: 5 TABLET, FILM COATED ORAL at 10:19

## 2024-05-02 RX ADMIN — VANCOMYCIN HYDROCHLORIDE 1500 MG: 1.5 INJECTION, POWDER, LYOPHILIZED, FOR SOLUTION INTRAVENOUS at 16:34

## 2024-05-02 RX ADMIN — PIPERACILLIN SODIUM AND TAZOBACTAM SODIUM 4.5 G: 4; .5 INJECTION, SOLUTION INTRAVENOUS at 16:34

## 2024-05-02 RX ADMIN — CLOPIDOGREL 75 MG: 75 TABLET ORAL at 10:19

## 2024-05-02 RX ADMIN — DIGOXIN 125 MCG: 125 TABLET ORAL at 10:19

## 2024-05-02 RX ADMIN — PIPERACILLIN SODIUM AND TAZOBACTAM SODIUM 4.5 G: 4; .5 INJECTION, SOLUTION INTRAVENOUS at 20:51

## 2024-05-02 RX ADMIN — ROSUVASTATIN 5 MG: 10 TABLET, FILM COATED ORAL at 20:51

## 2024-05-02 RX ADMIN — APIXABAN 5 MG: 5 TABLET, FILM COATED ORAL at 20:51

## 2024-05-02 RX ADMIN — Medication 21 PERCENT: at 15:15

## 2024-05-02 RX ADMIN — IRON SUCROSE 200 MG: 20 INJECTION, SOLUTION INTRAVENOUS at 18:15

## 2024-05-02 RX ADMIN — OXYCODONE 5 MG: 5 TABLET ORAL at 21:38

## 2024-05-02 RX ADMIN — PIPERACILLIN SODIUM AND TAZOBACTAM SODIUM 4.5 G: 4; .5 INJECTION, SOLUTION INTRAVENOUS at 03:04

## 2024-05-02 RX ADMIN — OXYCODONE 5 MG: 5 TABLET ORAL at 05:41

## 2024-05-02 RX ADMIN — FUROSEMIDE 20 MG: 20 TABLET ORAL at 10:19

## 2024-05-02 RX ADMIN — PANTOPRAZOLE SODIUM 20 MG: 20 TABLET, DELAYED RELEASE ORAL at 10:19

## 2024-05-02 RX ADMIN — PREDNISONE 20 MG: 20 TABLET ORAL at 10:43

## 2024-05-02 RX ADMIN — METOPROLOL SUCCINATE 25 MG: 25 TABLET, EXTENDED RELEASE ORAL at 10:19

## 2024-05-02 ASSESSMENT — COGNITIVE AND FUNCTIONAL STATUS - GENERAL
MOBILITY SCORE: 24
MOBILITY SCORE: 24
DAILY ACTIVITIY SCORE: 24
DAILY ACTIVITIY SCORE: 24

## 2024-05-02 ASSESSMENT — PAIN - FUNCTIONAL ASSESSMENT
PAIN_FUNCTIONAL_ASSESSMENT: 0-10

## 2024-05-02 ASSESSMENT — PAIN SCALES - GENERAL
PAINLEVEL_OUTOF10: 2
PAINLEVEL_OUTOF10: 6
PAINLEVEL_OUTOF10: 0 - NO PAIN
PAINLEVEL_OUTOF10: 6
PAINLEVEL_OUTOF10: 6

## 2024-05-02 ASSESSMENT — ACTIVITIES OF DAILY LIVING (ADL): LACK_OF_TRANSPORTATION: NO

## 2024-05-02 ASSESSMENT — PAIN DESCRIPTION - ORIENTATION: ORIENTATION: RIGHT

## 2024-05-02 ASSESSMENT — PAIN DESCRIPTION - DESCRIPTORS
DESCRIPTORS: ACHING;DULL

## 2024-05-02 NOTE — PROGRESS NOTES
Occupational Therapy                 Therapy Communication Note    Patient Name: Kevin Rubi  MRN: [id number]  Today's Date: 5/2/2024     Discipline: Occupational Therapy     Missed Visit Reason: Other (Comment) Patient observed up amb. In room and to/from bathroom with IV. No O.T. needs.  Discharge O.T.

## 2024-05-02 NOTE — PROGRESS NOTES
"Vancomycin Dosing by Pharmacy- FOLLOW UP    MRSA PCR NEGATIVE    Kevin Rubi is a 68 y.o. year old male who Pharmacy has been consulted for vancomycin dosing for Vancomycin Indications: Pneumonia. Based on the patient's indication and renal status this patient will be dosed based on a goal AUC of 400-600.     Renal function is currently stable.    Current vancomycin dose:  1500 mg every 24 hours    Estimated vancomycin AUC on current dose: 467 mg/L.hr     Visit Vitals  /65 (BP Location: Left arm, Patient Position: Lying)   Pulse 95   Temp 37.1 °C (98.7 °F)   Resp 16           Lab Results   Component Value Date    CREATININE 1.08 05/01/2024    CREATININE 0.84 04/16/2024    CREATININE 1.06 04/15/2024    CREATININE 1.29 04/14/2024       Patient weight is No results found for: \"PTWEIGHT\"    No results found for: \"CULTURE\"    I/O last 3 completed shifts:  In: 2870 (41.1 mL/kg) [P.O.:120; IV Piggyback:2750]  Out: - (0 mL/kg)   Weight: 69.9 kg     Lab Results   Component Value Date    PATIENTTEMP 37.0 12/18/2023          Assessment/Plan     Within goal AUC range. Continue current vancomycin regimen.    This dosing regimen is predicted by [employer] to result in the following pharmacokinetic parameters:  Loading dose: N/A  Regimen: 1500 mg IV every 24 hours.  Start time: 02:45 on 05/02/2024  Exposure target: AUC24 (range)400-600 mg/L.hr   AUC24,ss: 467 mg/L.hr  Probability of AUC24 > 400: 75 %  Ctrough,ss: 14.4 mg/L  Probability of Ctrough,ss > 20: 18 %  Probability of nephrotoxicity (Lodise JEFFREY 2009): 10 %    The next level will be obtained on  5/7 at 0500. May be obtained sooner if clinically indicated.   Will continue to monitor renal function daily while on vancomycin and order serum creatinine at least every 48 hours if not already ordered.  Follow for continued vancomycin needs, clinical response, and signs/symptoms of toxicity.     Alexis ToledoD, BCPS      "

## 2024-05-02 NOTE — CARE PLAN
The patient's goals for the shift include      The clinical goals for the shift include patient will remain safe this shift      Problem: Pain  Goal: Takes deep breaths with improved pain control throughout the shift  Outcome: Progressing  Goal: Turns in bed with improved pain control throughout the shift  Outcome: Progressing  Goal: Walks with improved pain control throughout the shift  Outcome: Progressing  Goal: Performs ADL's with improved pain control throughout shift  Outcome: Progressing  Goal: Participates in PT with improved pain control throughout the shift  Outcome: Progressing  Goal: Free from opioid side effects throughout the shift  Outcome: Progressing  Goal: Free from acute confusion related to pain meds throughout the shift  Outcome: Progressing     Problem: Fall/Injury  Goal: Not fall by end of shift  Outcome: Progressing  Goal: Be free from injury by end of the shift  Outcome: Progressing  Goal: Verbalize understanding of personal risk factors for fall in the hospital  Outcome: Progressing  Goal: Verbalize understanding of risk factor reduction measures to prevent injury from fall in the home  Outcome: Progressing  Goal: Use assistive devices by end of the shift  Outcome: Progressing  Goal: Pace activities to prevent fatigue by end of the shift  Outcome: Progressing

## 2024-05-02 NOTE — PROGRESS NOTES
05/02/24 1208   Discharge Planning   Living Arrangements Spouse/significant other   Support Systems Spouse/significant other   Assistance Needed none   Type of Residence Private residence   Number of Stairs to Enter Residence 4   Number of Stairs Within Residence 0   Do you have animals or pets at home? No   Who is requesting discharge planning? Provider   Home or Post Acute Services None   Patient expects to be discharged to: Home   Does the patient need discharge transport arranged? No   Financial Resource Strain   How hard is it for you to pay for the very basics like food, housing, medical care, and heating? Not hard   Housing Stability   In the last 12 months, was there a time when you were not able to pay the mortgage or rent on time? N   In the last 12 months, how many places have you lived? 1   In the last 12 months, was there a time when you did not have a steady place to sleep or slept in a shelter (including now)? N   Transportation Needs   In the past 12 months, has lack of transportation kept you from medical appointments or from getting medications? no   In the past 12 months, has lack of transportation kept you from meetings, work, or from getting things needed for daily living? No     Spoke with pt via phone due to working remote and verified address, phone number and emergency contact information. PCP is  Dr Olea last seen 1 week ago. Preferred pharmacy [facility] denies issues obtaining or affording his medications. Pt is independent, lives at home with his wife and feels safe. Plans to return home no needs. [facility] 24/24. ADOD 5/4. CT to follow. Florecita CALLEJASN/RN-TCC

## 2024-05-02 NOTE — CONSULTS
Inpatient consult to Cardiology  Consult performed by: Leann Rosario MD  Consult ordered by: RUKHSANA Cabrales-CNP  Reason for consult: Afib        History Of Present Illness:    Kevin Rubi is a 68 y.o. male presenting with LE edema.  History of HFrEF > HFpEF (LVEF 20% > 60-65%), mild CAD, pAfib, ascending aortic aneurysm, bicuspid AV, DL, newly diagnosed lung CA s/p CTX/XRT now on immunotherapy who presented with LE edema and cough.  Currently being treated for PNA.  No complaint of chest pain/pressure, significant palpitations/LH/dizziness.  LE edema/redness has improved.  Otherwise denies any active CV symptoms.    ROS:  The remainder of the review of systems was obtained, as was negative as pertains to the chief complaint.       Last Recorded Vitals:  Vitals:    05/01/24 2102 05/02/24 0148 05/02/24 0500 05/02/24 0904   BP: 116/58 109/62 101/65 106/62   BP Location: Left arm Left arm Left arm Left arm   Patient Position: Lying Lying Lying Lying   Pulse: 90 89 95 95   Resp: 18 16 16 16   Temp: 36.9 °C (98.4 °F) 37.4 °C (99.4 °F) 37.1 °C (98.7 °F) 37.4 °C (99.3 °F)   TempSrc: Temporal Temporal  Temporal   SpO2: 97% 97% 98% 99%   Weight:       Height:           Last Labs:  CBC - 5/1/2024:  1:24 PM  12.7 8.0 245    25.0      CMP - 5/1/2024:  1:24 PM  8.7 7.0 24 --- 0.5   1.8 3.4 36 51      PTT - 9/12/2023:  7:46 AM  1.8   20.7 32     Troponin I, High Sensitivity   Date/Time Value Ref Range Status   05/01/2024 01:24 PM 13 0 - 20 ng/L Final   04/09/2024 04:51 PM 22 (H) 0 - 20 ng/L Final   12/18/2023 12:31 PM 34 0 - 53 ng/L Final     BNP   Date/Time Value Ref Range Status   05/01/2024 01:24  (H) 0 - 99 pg/mL Final   04/16/2024 05:55  (H) 0 - 99 pg/mL Final     Hemoglobin A1C   Date/Time Value Ref Range Status   02/27/2024 12:21 PM 6.1 (H) see below % Final   08/08/2023 04:31 AM 7.3 (A) % Final     Comment:          Diagnosis of Diabetes-Adults   Non-Diabetic: < or = 5.6%   Increased risk for  developing diabetes: 5.7-6.4%   Diagnostic of diabetes: > or = 6.5%  .       Monitoring of Diabetes                Age (y)     Therapeutic Goal (%)   Adults:          >18           <7.0   Pediatrics:    13-18           <7.5                   7-12           <8.0                   0- 6            7.5-8.5   American Diabetes Association. Diabetes Care 33(S1), Jan 2010.       LDL Calculated   Date/Time Value Ref Range Status   10/23/2023 09:08 AM 74 <=99 mg/dL Final     Comment:                                 Near   Borderline      AGE      Desirable  Optimal    High     High     Very High     0-19 Y     0 - 109     ---    110-129   >/= 130     ----    20-24 Y     0 - 119     ---    120-159   >/= 160     ----      >24 Y     0 -  99   100-129  130-159   160-189     >/=190       VLDL   Date/Time Value Ref Range Status   10/23/2023 09:08 AM 24 0 - 40 mg/dL Final   04/01/2023 08:05 AM 46 (H) 0 - 40 mg/dL Final   07/28/2022 08:24 AM 41 (H) 0 - 40 mg/dL Final   08/14/2021 08:44 AM 31 0 - 40 mg/dL Final      Last I/O:  I/O last 3 completed shifts:  In: 2870 (41.1 mL/kg) [P.O.:120; IV Piggyback:2750]  Out: - (0 mL/kg)   Weight: 69.9 kg     Past Cardiology Tests (Last 3 Years):  EKG:  ECG 12 lead 05/01/2024 (Preliminary)      ECG 12 lead 04/09/2024      ECG 12 Lead 04/09/2024      ECG 12 lead 12/18/2023    Echo:  Transthoracic Echo (TTE) Limited 04/11/2024      Transthoracic echo (TTE) complete 03/26/2024    Ejection Fractions:  EF   Date/Time Value Ref Range Status   03/26/2024 01:26 PM 58 %      Cath:  No results found for this or any previous visit from the past 1095 days.    Stress Test:  No results found for this or any previous visit from the past 1095 days.    Cardiac Imaging:  No results found for this or any previous visit from the past 1095 days.      Past Medical History:  He has a past medical history of Aortic aneurysm (CMS-HCC), Atrial fibrillation (Multi), Coronary artery disease, Hyperlipidemia, Hypertension,  Lung cancer (Multi), Personal history of peptic ulcer disease, Superior mesenteric artery stenosis (Multi), and Tinnitus, bilateral (08/14/2016).    Past Surgical History:  He has a past surgical history that includes Other surgical history (02/13/2019); Other surgical history (02/13/2019); CT angio abdomen pelvis w and or wo IV IV contrast (08/08/2023); Superior mestenteric artery stent; Exploratory laparotomy w/ bowel resection (04/09/2024); and Hernia repair (Right, 04/09/2024).      Social History:  He reports that he quit smoking about 11 years ago. His smoking use included cigarettes. He started smoking about 41 years ago. He has a 15 pack-year smoking history. He has been exposed to tobacco smoke. He has never used smokeless tobacco. He reports that he does not currently use alcohol. He reports that he does not use drugs.    Family History:  Family History   Problem Relation Name Age of Onset    Cancer Mother          ?larynx or lung        Allergies:  Patient has no known allergies.    Inpatient Medications:  Scheduled medications   Medication Dose Route Frequency    apixaban  5 mg oral BID    clopidogrel  75 mg oral Daily    digoxin  125 mcg oral Daily    furosemide  20 mg oral Daily    gabapentin  300 mg oral Nightly    insulin lispro  0-5 Units subcutaneous TID with meals    metoprolol succinate XL  25 mg oral Daily    pantoprazole  20 mg oral Daily before breakfast    piperacillin-tazobactam  4.5 g intravenous q6h    [START ON 5/6/2024] predniSONE  10 mg oral Daily    predniSONE  20 mg oral Daily    rosuvastatin  5 mg oral Nightly    vancomycin  1,500 mg intravenous q24h     PRN medications   Medication    albuterol    dextrose    glucagon    oxyCODONE    oxygen    vancomycin     Continuous Medications   Medication Dose Last Rate     Outpatient Medications:  Current Outpatient Medications   Medication Instructions    apixaban (ELIQUIS) 5 mg, oral, 2 times daily    clopidogrel (PLAVIX) 75 mg, oral, Daily     dicyclomine (Bentyl) 10 mg capsule oral, 4 times daily PRN    digoxin (LANOXIN) 125 mcg, oral, Daily    Entresto 24-26 mg tablet 1 tablet, oral, 2 times daily    fenofibrate (Tricor) 145 mg tablet Take 1 tablet (145 mg) by mouth once daily.    furosemide (LASIX) 20 mg, oral, Daily    gabapentin (NEURONTIN) 300 mg, oral, Nightly    metoprolol succinate XL (TOPROL-XL) 50 mg, oral, Daily, Do not crush or chew.    metoprolol succinate XL (TOPROL-XL) 25 mg, oral, Daily, Do not crush or chew.    oxyCODONE (ROXICODONE) 5 mg, oral, Every 4 hours PRN    pantoprazole (PROTONIX) 20 mg, oral, Daily before breakfast, Do not crush, chew, or split.    [START ON 5/5/2024] predniSONE (Deltasone) 10 mg tablet After finishing taper.  Take 1 tablet (10 mg) by mouth once daily. Do not start before May 5, 2024.    rosuvastatin (CRESTOR) 5 mg, oral, Daily    spironolactone (ALDACTONE) 12.5 mg, oral, Daily    tiZANidine (ZANAFLEX) 2 mg, oral, Every 8 hours PRN       Physical Exam:  Physical Exam  HENT:      Head: Normocephalic.      Mouth/Throat:      Mouth: Mucous membranes are moist.   Eyes:      Extraocular Movements: Extraocular movements intact.   Cardiovascular:      Rate and Rhythm: Normal rate. Rhythm irregular.   Pulmonary:      Comments: Crackles b/l bases L > R, no wheezes  Abdominal:      Palpations: Abdomen is soft.   Musculoskeletal:      Cervical back: Neck supple.   Skin:     General: Skin is warm.   Neurological:      General: No focal deficit present.      Mental Status: He is alert.   Psychiatric:         Mood and Affect: Mood normal.            Assessment/Plan   Acute on chronic diastolic HF:  h/o HFrEF with recovery of EF.  pAfib on apixaban  Mild nonbstructive CAD  PNA  Ascending aortic aneurysm  Recent incarcerated R inguinal hernia s/p open repair and SB resection  Lung CA s/p CTX/XRT    Currently being treated for PNA.  Had LE edema which has improved.    Plan:  -continue oral lasix 20mg PO daily  -serial volume  assessment - can add IV lasix if worsening LE edema  -Abx for PNA per primary team  -continue rate control Afib with toprol xL 25mg daily, digoxin 125 mcg daily  -apixaban for CVA prophylaxis  -also on clopidogrel - continue  -rosuvastatin 5mg daily    Peripheral IV 05/01/24 18 G Right Antecubital (Active)   Site Assessment Clean;Dry;Intact 05/02/24 0900   Dressing Status Clean;Dry;Occlusive 05/02/24 0900   Number of days: 1       Peripheral IV 05/01/24 18 G Left Forearm (Active)   Site Assessment Clean;Dry;Intact 05/02/24 0900   Dressing Status Clean;Dry;Occlusive 05/02/24 0900   Number of days: 1       Code Status:  Full Code    Leann Rosario MD

## 2024-05-02 NOTE — DOCUMENTATION CLARIFICATION NOTE
"    PATIENT:               VON KELLY  ACCT #:                  [id number]  MRN:                       [id number]  :                       1955  ADMIT DATE:       2024 1:00 PM  DISCH DATE:  RESPONDING PROVIDER #:        [id number]          PROVIDER RESPONSE TEXT:    Gram Negative PNA    CDI QUERY TEXT:    Clarification        Instruction:    Based on your assessment of the patient and the clinical information, please provide the requested documentation by clicking on the appropriate radio button and enter any additional information if prompted.    Question: Please further specify the type of pneumonia being treated    When answering this query, please exercise your independent professional judgment. The fact that a question is being asked, does not imply that any particular answer is desired or expected.    The patient's clinical indicators include:  Clinical Information: 69 yo male admitted with pneumonia, hypotension, leg swelling. PMH includes prior SMA occlusion, AAA, Afib, CAD, HTN, HLD, Lung ca, gastric ulcer, recent R inguinal hernia/SBO resection and anastamosis on 24.    Clinical Indicators:  - H&P NP Matchett : \"Bilateral pneumonia with history of recent hospitalization and lung cancer of left lower lobe\"    Treatment:  Vancomycin, Zosyn, prn O2, PRN nebs.    Risk Factors: Recent surgery, recent hospitalization.  Options provided:  -- Aspiration PNA  -- Gram Negative PNA  -- Pseudomonas PNA  -- Viral PNA  -- MRSA PNA  -- Other - I will add my own diagnosis  -- Refer to Clinical Documentation Reviewer    Query created by: Jessica Harkins on 2024 5:54 PM      Electronically signed by:  ARIANA FINK MD 2024 6:10 PM          "

## 2024-05-02 NOTE — PROGRESS NOTES
Kevin Rubi is a 68 y.o. male on day 1 of admission presenting with Pneumonia of both lower lobes due to infectious organism.      Subjective   Kevin Rubi is a 68 y.o. male with PMHx s/f lung cancer, atrial fibrillation, recent right inguinal hernia repair and small bowel resection presenting with swelling and hypotension.  The patient had recently undergone inguinal hernia repair on the right side with small bowel resection and anastomosis on April 9, 2024 he was sent to the hospital from oncologist office due to complaint of leg swelling and low blood pressure.  The patient was significantly hypotensive when standing.  The patient had noted both his leg swelling but recently had a rash to the right foot.  The patient denies any abdominal pain he does have some cough and some slight shortness of breath.  Has not had any documented fevers. The patient presented with heart rate of 110 blood pressure 83/50 systolic was given fluid bolus with some improvement.  Subsequently the patient did have CT a of the chest this was negative for pulmonary embolism but there were significant findings of bilateral pneumonia CT scan of the abdomen pelvis showed no evidence of hematoma vessels were without any aneurysmal dilation, patient was started on IV vancomycin and Zosyn his blood pressure did improve and is referred for admission to continue treatment of the underlying pneumonia.     05/02: Patient was evaluated this morning, feeling better, shortness of breath is improving, leg swelling getting better.  No nausea vomiting, no fever or chills.       Objective     Last Recorded Vitals  /65 (BP Location: Left arm, Patient Position: Lying)   Pulse (!) 114   Temp 37.6 °C (99.6 °F) (Temporal)   Resp 16   Wt 69.9 kg (154 lb)   SpO2 97%   Intake/Output last 3 Shifts:    Intake/Output Summary (Last 24 hours) at 5/2/2024 1616  Last data filed at 5/2/2024 1356  Gross per 24 hour   Intake 1030 ml   Output --    Net 1030 ml       Admission Weight  Weight: 69.9 kg (154 lb) (05/01/24 1257)    Daily Weight  05/01/24 : 69.9 kg (154 lb)    Image Results  Lower extremity venous duplex right  Narrative: Interpreted By:  Dereje Manjarrez,   STUDY:  [facility] US LOWER EXTREMITY VENOUS DUPLEX RIGHT;  5/1/2024 4:16 pm      INDICATION:  Signs/Symptoms:edema.      COMPARISON:  None.      ACCESSION NUMBER(S):  [id number]      ORDERING CLINICIAN:  CURTIS WONG      TECHNIQUE:  Grayscale, color and spectral Doppler sonographic images of the right  lower extremity deep venous system. The left common femoral vein was  imaged for comparison.      FINDINGS:  THIGH VEINS: There is normal compressibility of the right common  femoral vein, saphenous-femoral junction, femoral vein and popliteal  vein. There is normal spontaneous and phasic variation by spectral  doppler.      CALF VEINS: Visualized calf veins demonstrate normal color flow and  compressibility.      CONTRALATERAL COMPARISON: The left common femoral vein is patent.      OTHER FINDINGS: None.      Impression: No evidence of acute DVT in the right lower extremity.      Signed by: Dereje Manjarrez 5/1/2024 4:37 PM  Dictation workstation:   [id number]  CT abdomen pelvis w IV contrast  Narrative: Interpreted By:  Sunshine Medrano,   STUDY:  CT ABDOMEN PELVIS W IV CONTRAST;  5/1/2024 2:43 pm      INDICATION:  Signs/Symptoms:recent hernia repair with hypotension. Repair of  incarcerated right inguinal hernia and small-bowel resection  performed on 04/09/2024      COMPARISON:  04/09/2024      ACCESSION NUMBER(S):  [id number]      ORDERING CLINICIAN:  CURTIS WONG      TECHNIQUE:  CT of the abdomen and pelvis was performed in the axial plane  following intravenous administration of 75 mL of Omnipaque 350.  Sagittal and coronal reformations were performed by the technologist  at the acquisition scanner.      All CT examinations are performed with 1 or more of the following  dose reduction  techniques: Automated exposure control, adjustment of  mA and/or kv according to patient's size, or use of iterative  reconstruction techniques.      FINDINGS:  ABDOMEN:      LIVER:  The liver is of normal size. However, there is now periportal  hypodensity consistent with periportal edema. No intrahepatic biliary  ductal dilatation is seen. There is no space-occupying hepatic lesion  identified.      BILE DUCTS:  No extrahepatic biliary ductal dilatation is evident.      GALLBLADDER:  The gallbladder is contracted.      PANCREAS:  Normal in appearance.      SPLEEN:  Unremarkable      ADRENAL GLANDS:  No adrenal abnormality.      KIDNEYS AND URETERS:  No hydronephrosis of either kidney is seen. There is vascular  calcification present bilaterally with right renal cortical scarring  noted. Renal cortical thinning on the right is seen.      PELVIS:      BLADDER:  No abnormality of the urinary bladder is observed.      REPRODUCTIVE ORGANS:  The prostate gland is normal in size. No abnormality of either  seminal vesicle is seen.      BOWEL:  There has been resection of small bowel in right lower quadrant with  small bowel reanastomosis. There is no evidence of bowel obstruction.  There are diverticula of the colon with no evidence for  diverticulitis.      VESSELS:  Atherosclerosis of the abdominal aorta and iliac arteries is seen  without aneurysmal dilatation. No retroperitoneal hemorrhage is  present.      PERITONEUM/RETROPERITONEUM/LYMPH NODES:  There is a roughly 2 cm area of infiltrated fat seen adjacent to the  cecal tip most likely related to postoperative change. No  lymphadenopathy within the abdomen or pelvis is seen. There is a  small amount of fluid within gallbladder fossa. No free  intraperitoneal air is observed.      BONES AND ABDOMINAL WALL:  There is postoperative change from exploratory laparotomy without  abnormal fluid collection along the anterior abdominal wall to  indicate postoperative seroma  or hematoma formation. A tiny fat  containing umbilical hernia is present. The right inguinal hernia has  been repaired. There is no recurrent right inguinal hernia.      Impression: 1.  Postoperative change from exploratory laparotomy, right inguinal  herniorrhaphy, and small-bowel resection with small bowel  reanastomosis. There is a small amount of fluid within gallbladder  fossa with a roughly 2 cm fatty lesion with induration adjacent to  the cecal tip most likely postoperative in etiology.  2. No hematoma within abdomen or pelvis.  3. Mild periportal edema is now seen.  4. Contracted gallbladder.  5. Right renal cortical scarring.          MACRO:  None      Signed by: Sunshnie Medrano 5/1/2024 3:32 PM  Dictation workstation:   [id number]  CT angio chest for pulmonary embolism  Narrative: Interpreted By:  Ward Rice,   STUDY:  CT ANGIO CHEST FOR PULMONARY EMBOLISM; 5/1/2024 2:43 pm      INDICATION:  Signs/Symptoms:recent surgery, hypotension, tachycardia. Status post  recent inguinal hernia repair and small-bowel resection and  anastomosis right leg swelling with hypotension. Left leg swelling is  also present.      COMPARISON:  04/02/2024      ACCESSION NUMBER(S):  [id number]      ORDERING CLINICIAN:  CURTIS WONG      TECHNIQUE:  75 ml of non-ionic iodinated contrast was injected intravenously.      CT angiography (non-coronary) of the chest was performed with  Intravenous contrast material along with 3D (maximum intensity  projection - MIP) image post processing.      One or more of the following dose reduction techniques were used:  Automated exposure control Adjustment of the mA and/or kV according  to patient size, and/or use of iterative reconstruction technique.      FINDINGS:  There is no evidence for aortic dissection or pericardial effusion.  There is aneurysmal dilatation of the ascending thoracic aorta  measuring 4.4 cm in maximal diameter, similar compared to the prior  study from 1 month  earlier. There is no evidence for pulmonary  embolism.      There is worsening alveolar infiltrate within the left lower lobe  extending to the left hilum, within the right middle lobe (new) and  within the right lower lobe (new).      There is stable patch of ground-glass infiltrate left apex  posteriorly measuring approximately 1.6 cm. There is a new patch of  ground-glass infiltrate anterior segment left upper lobe measuring 4  cm. There is a small left pleural effusion.      Chest Wall: There are several small lipomas identified within the  anterior chest wall bilaterally, larger on the left with 6 identified  on the right and 2 on the left with the largest on the left measuring  2 cm in diameter.      Upper Abdominal Findings: No upper intra-abdominal pathology is  identified. There is a 1.6 cm midline lipoma involving the anterior  subcutaneous fat at the level of the epigastrium.      Skeletal System: No acute fractures or destructive lesions are  identified. There is a stable sclerotic lesion in the anterolateral  aspect of the left 5th rib measuring 11 mm.      Impression: 1. There are multiple upper abdominal and chest wall lipomas. The  largest measures approximately 2 cm in diameter.  2. Worsening bilateral infiltrates most marked within the left lower  lobe, right middle lobe along with patchy infiltrates within the  right lower lobe. The radiographic pattern as well as the rapid  interval change is more suggestive of an infectious etiology,  pneumonia. Follow-up to assure complete clearing is suggested.  3. New ground-glass infiltrate anterior segment left upper lobe may  be infectious as well due to the rapid interval change.  4. Stable ground-glass infiltrate left apex.  5. No evidence for pulmonary embolism.  6. Aneurysmal dilatation of the ascending thoracic aorta measuring up  to 4.4 cm in diameter.      MACRO:  none      Signed by: Ward Rice 5/1/2024 3:13 PM  Dictation workstation:    [id number]  ECG 12 lead  See ED provider note for full interpretation and clinical correlation  XR chest 1 view  Narrative: Interpreted By:  Pilo Schmitz,   STUDY:  XR CHEST 1 VIEW; 5/1/2024 1:54 pm      INDICATION:  Signs/Symptoms:sepsis      COMPARISON:  December 2023 and 04/09/2024      ACCESSION NUMBER(S):  [id number]      ORDERING CLINICIAN:  CURTIS WONG      FINDINGS:  The cardiomediastinal silhouette is within normal limits for the  technique. Patchy infiltrates are noted in the left lung base,  silhouetting the lateral cardiac border and left hemidiaphragm.  Minimal infiltrates also seen in the right lower lung. There is no  pneumothorax. The osseous structures are unremarkable.      Impression: Bibasilar, left more than right infiltrates, suspicious for  pneumonia. Correlate clinically and follow-up to document resolution.      Signed by: Pilo Schmitz 5/1/2024 2:27 PM  Dictation workstation:   [id number]      Physical Exam  Patient is awake and orient, not in apparent distress  Eyes: PERRLA, no conjunctival congestion  Chest: Bilateral Air entry, no crackles or wheezing  Heart: s1S2 regular, no murmur  Abdomen: Soft, non tender, BS present  Ext:    Relevant Results               Assessment/Plan                  Bilateral pneumonia with history of recent hospitalization and lung cancer of left lower lobe  The patient is placed on IV vancomycin and Zosyn  Supplemental oxygen if needed  As needed nebulizer treatments  Follow cultures     Chronic diastolic congestive heart failure  Patient does have increased edema lately however BN peptide is not significantly elevated  Patient was given 2 L bolus in the emergency department  Continue patient on furosemide as tolerated  Patient was hypotensive in the emergency department Entresto was on hold-restart when appropriate       Atrial fibrillation  Continue patient on rate control medication digoxin  Continue patient on anticoagulation therapy with  Eliquis     Peripheral arterial disease  Continue patient on Eliquis and Plavix  Patient has history of SMA occlusion     Petechial rash right foot  There was some associated swelling of bilateral lower extremities  Continue to monitor-improving     Abnormal blood glucose  Cover with sliding scale  A1c of 6.9 suggestive prediabetes state      Anemia-combination of chronic disease as well as iron deficiency  Likely related to prior lung cancer treatments seems to have dropped since surgery last hemoglobin 8.3 on April 15, 2024  CT scan abdomen and pelvis was performed without any significant finding for hematoma  Iron supplementation  Will continue to monitor                 Zeina Julian MD

## 2024-05-02 NOTE — PROGRESS NOTES
Physical Therapy SCREEN                 Therapy Communication Note    Patient Name: Kevin Rubi  MRN: [id number]  Today's Date: 5/2/2024     Discipline: Physical Therapy SCREEN/DISCH     Comment: pt ambulatory independently in room, pushing own IV pole.    No need for Skilled PT at this time.

## 2024-05-03 ENCOUNTER — PHARMACY VISIT (OUTPATIENT)
Dept: PHARMACY | Facility: CLINIC | Age: 69
End: 2024-05-03
Payer: COMMERCIAL

## 2024-05-03 VITALS
HEIGHT: 72 IN | RESPIRATION RATE: 16 BRPM | HEART RATE: 91 BPM | TEMPERATURE: 97.4 F | DIASTOLIC BLOOD PRESSURE: 59 MMHG | BODY MASS INDEX: 20.86 KG/M2 | WEIGHT: 154 LBS | SYSTOLIC BLOOD PRESSURE: 96 MMHG | OXYGEN SATURATION: 94 %

## 2024-05-03 PROBLEM — J18.9 PNEUMONIA OF BOTH LOWER LOBES DUE TO INFECTIOUS ORGANISM: Status: RESOLVED | Noted: 2024-05-01 | Resolved: 2024-05-03

## 2024-05-03 PROBLEM — R21 RASH AND NONSPECIFIC SKIN ERUPTION: Status: RESOLVED | Noted: 2024-05-01 | Resolved: 2024-05-03

## 2024-05-03 LAB
ANION GAP SERPL CALC-SCNC: 12 MMOL/L (ref 10–20)
BUN SERPL-MCNC: 14 MG/DL (ref 6–23)
CALCIUM SERPL-MCNC: 8.4 MG/DL (ref 8.6–10.3)
CHLORIDE SERPL-SCNC: 101 MMOL/L (ref 98–107)
CO2 SERPL-SCNC: 26 MMOL/L (ref 21–32)
CREAT SERPL-MCNC: 0.95 MG/DL (ref 0.5–1.3)
EGFRCR SERPLBLD CKD-EPI 2021: 88 ML/MIN/1.73M*2
ERYTHROCYTE [DISTWIDTH] IN BLOOD BY AUTOMATED COUNT: 18.4 % (ref 11.5–14.5)
GLUCOSE BLD MANUAL STRIP-MCNC: 171 MG/DL (ref 74–99)
GLUCOSE BLD MANUAL STRIP-MCNC: 92 MG/DL (ref 74–99)
GLUCOSE SERPL-MCNC: 201 MG/DL (ref 74–99)
HCT VFR BLD AUTO: 24.1 % (ref 41–52)
HGB BLD-MCNC: 7.6 G/DL (ref 13.5–17.5)
MAGNESIUM SERPL-MCNC: 1.47 MG/DL (ref 1.6–2.4)
MCH RBC QN AUTO: 25.9 PG (ref 26–34)
MCHC RBC AUTO-ENTMCNC: 31.5 G/DL (ref 32–36)
MCV RBC AUTO: 82 FL (ref 80–100)
NRBC BLD-RTO: 0.4 /100 WBCS (ref 0–0)
PLATELET # BLD AUTO: 227 X10*3/UL (ref 150–450)
POTASSIUM SERPL-SCNC: 3.6 MMOL/L (ref 3.5–5.3)
RBC # BLD AUTO: 2.94 X10*6/UL (ref 4.5–5.9)
SODIUM SERPL-SCNC: 135 MMOL/L (ref 136–145)
WBC # BLD AUTO: 7.6 X10*3/UL (ref 4.4–11.3)

## 2024-05-03 PROCEDURE — 2500000001 HC RX 250 WO HCPCS SELF ADMINISTERED DRUGS (ALT 637 FOR MEDICARE OP): Performed by: NURSE PRACTITIONER

## 2024-05-03 PROCEDURE — 2500000001 HC RX 250 WO HCPCS SELF ADMINISTERED DRUGS (ALT 637 FOR MEDICARE OP): Performed by: INTERNAL MEDICINE

## 2024-05-03 PROCEDURE — 99232 SBSQ HOSP IP/OBS MODERATE 35: CPT | Performed by: NURSE PRACTITIONER

## 2024-05-03 PROCEDURE — 99239 HOSP IP/OBS DSCHRG MGMT >30: CPT | Performed by: INTERNAL MEDICINE

## 2024-05-03 PROCEDURE — 2500000006 HC RX 250 W HCPCS SELF ADMINISTERED DRUGS (ALT 637 FOR ALL PAYERS): Mod: MUE | Performed by: NURSE PRACTITIONER

## 2024-05-03 PROCEDURE — 36415 COLL VENOUS BLD VENIPUNCTURE: CPT | Performed by: INTERNAL MEDICINE

## 2024-05-03 PROCEDURE — 2500000004 HC RX 250 GENERAL PHARMACY W/ HCPCS (ALT 636 FOR OP/ED): Performed by: NURSE PRACTITIONER

## 2024-05-03 PROCEDURE — 82374 ASSAY BLOOD CARBON DIOXIDE: CPT | Performed by: INTERNAL MEDICINE

## 2024-05-03 PROCEDURE — 85027 COMPLETE CBC AUTOMATED: CPT | Performed by: INTERNAL MEDICINE

## 2024-05-03 PROCEDURE — RXMED WILLOW AMBULATORY MEDICATION CHARGE

## 2024-05-03 PROCEDURE — 2500000004 HC RX 250 GENERAL PHARMACY W/ HCPCS (ALT 636 FOR OP/ED): Performed by: INTERNAL MEDICINE

## 2024-05-03 PROCEDURE — 82947 ASSAY GLUCOSE BLOOD QUANT: CPT

## 2024-05-03 PROCEDURE — 83735 ASSAY OF MAGNESIUM: CPT | Performed by: INTERNAL MEDICINE

## 2024-05-03 RX ORDER — MAGNESIUM SULFATE HEPTAHYDRATE 40 MG/ML
2 INJECTION, SOLUTION INTRAVENOUS ONCE
Status: COMPLETED | OUTPATIENT
Start: 2024-05-03 | End: 2024-05-03

## 2024-05-03 RX ORDER — AMOXICILLIN AND CLAVULANATE POTASSIUM 875; 125 MG/1; MG/1
1 TABLET, FILM COATED ORAL 2 TIMES DAILY
Qty: 10 TABLET | Refills: 0 | Status: SHIPPED | OUTPATIENT
Start: 2024-05-03 | End: 2024-05-13 | Stop reason: ALTCHOICE

## 2024-05-03 RX ORDER — FERROUS SULFATE 325(65) MG
325 TABLET ORAL
Qty: 30 TABLET | Refills: 1 | Status: SHIPPED | OUTPATIENT
Start: 2024-05-03 | End: 2024-05-22 | Stop reason: WASHOUT

## 2024-05-03 RX ORDER — DOXYCYCLINE 100 MG/1
100 CAPSULE ORAL 2 TIMES DAILY
Qty: 10 CAPSULE | Refills: 0 | Status: SHIPPED | OUTPATIENT
Start: 2024-05-03 | End: 2024-05-13 | Stop reason: ALTCHOICE

## 2024-05-03 RX ADMIN — PREDNISONE 20 MG: 20 TABLET ORAL at 09:32

## 2024-05-03 RX ADMIN — PIPERACILLIN SODIUM AND TAZOBACTAM SODIUM 4.5 G: 4; .5 INJECTION, SOLUTION INTRAVENOUS at 09:33

## 2024-05-03 RX ADMIN — OXYCODONE 5 MG: 5 TABLET ORAL at 04:04

## 2024-05-03 RX ADMIN — OXYCODONE 5 MG: 5 TABLET ORAL at 10:57

## 2024-05-03 RX ADMIN — METOPROLOL SUCCINATE 25 MG: 25 TABLET, EXTENDED RELEASE ORAL at 09:32

## 2024-05-03 RX ADMIN — DOXYCYCLINE 100 MG: 100 INJECTION, POWDER, LYOPHILIZED, FOR SOLUTION INTRAVENOUS at 10:34

## 2024-05-03 RX ADMIN — MAGNESIUM SULFATE HEPTAHYDRATE 2 G: 40 INJECTION, SOLUTION INTRAVENOUS at 10:34

## 2024-05-03 RX ADMIN — PANTOPRAZOLE SODIUM 20 MG: 20 TABLET, DELAYED RELEASE ORAL at 08:00

## 2024-05-03 RX ADMIN — PIPERACILLIN SODIUM AND TAZOBACTAM SODIUM 4.5 G: 4; .5 INJECTION, SOLUTION INTRAVENOUS at 04:03

## 2024-05-03 RX ADMIN — CLOPIDOGREL 75 MG: 75 TABLET ORAL at 09:33

## 2024-05-03 RX ADMIN — DIGOXIN 125 MCG: 125 TABLET ORAL at 09:40

## 2024-05-03 RX ADMIN — APIXABAN 5 MG: 5 TABLET, FILM COATED ORAL at 09:32

## 2024-05-03 RX ADMIN — FUROSEMIDE 20 MG: 20 TABLET ORAL at 09:32

## 2024-05-03 RX ADMIN — INSULIN LISPRO 1 UNITS: 100 INJECTION, SOLUTION INTRAVENOUS; SUBCUTANEOUS at 09:41

## 2024-05-03 ASSESSMENT — PAIN SCALES - GENERAL
PAINLEVEL_OUTOF10: 6
PAINLEVEL_OUTOF10: 4
PAINLEVEL_OUTOF10: 2

## 2024-05-03 ASSESSMENT — ENCOUNTER SYMPTOMS
LIGHT-HEADEDNESS: 0
DEPRESSION: 0
MEMORY LOSS: 0
BLURRED VISION: 0
CONSTITUTIONAL NEGATIVE: 1
SYNCOPE: 0
GASTROINTESTINAL NEGATIVE: 1
SHORTNESS OF BREATH: 0
IRREGULAR HEARTBEAT: 0
FOCAL WEAKNESS: 0
PALPITATIONS: 0
DYSPNEA ON EXERTION: 1
ORTHOPNEA: 0
DECREASED APPETITE: 0
COUGH: 1
FALLS: 0

## 2024-05-03 ASSESSMENT — PAIN - FUNCTIONAL ASSESSMENT
PAIN_FUNCTIONAL_ASSESSMENT: 0-10

## 2024-05-03 ASSESSMENT — PAIN DESCRIPTION - DESCRIPTORS: DESCRIPTORS: ACHING;DULL

## 2024-05-03 ASSESSMENT — PAIN DESCRIPTION - ORIENTATION: ORIENTATION: OTHER (COMMENT)

## 2024-05-03 ASSESSMENT — PAIN DESCRIPTION - LOCATION: LOCATION: LEG

## 2024-05-03 NOTE — PROGRESS NOTES
HPI:  Kevin Rubi is a 68 y.o. male presenting with LE edema.  History of HFrEF > HFpEF (LVEF 20% > 60-65%), mild CAD, pAfib, ascending aortic aneurysm, bicuspid AV, DL, newly diagnosed lung CA s/p CTX/XRT now on immunotherapy who presented with LE edema and cough.  Currently being treated for PNA.  No complaint of chest pain/pressure, significant palpitations/LH/dizziness.  LE edema/redness has improved.  Otherwise denies any active CV symptoms.     Subjective Data:      Overnight Events:         Objective Data:  Last Recorded Vitals:  Vitals:    05/02/24 2120 05/03/24 0145 05/03/24 0532 05/03/24 0900   BP: 94/59 115/62 150/83 96/59   BP Location: Left arm Left arm Left arm Right arm   Patient Position: Lying Lying Lying Lying   Pulse: 95 102 76 91   Resp: 16 17 16 16   Temp: 36.4 °C (97.5 °F) 36.1 °C (96.9 °F) 36.3 °C (97.3 °F) 36.3 °C (97.4 °F)   TempSrc: Temporal Temporal Temporal Temporal   SpO2: 95% 94% 93% 94%   Weight:       Height:           Last Labs:    Results from last 7 days   Lab Units 05/03/24  0409 05/01/24  1324   SODIUM mmol/L 135* 133*   POTASSIUM mmol/L 3.6 3.7   CHLORIDE mmol/L 101 98   CO2 mmol/L 26 25   BUN mg/dL 14 26*   CREATININE mg/dL 0.95 1.08   GLUCOSE mg/dL 201* 286*   CALCIUM mg/dL 8.4* 8.7        Results from last 7 days   Lab Units 05/03/24  0409 05/01/24  1324   WBC AUTO x10*3/uL 7.6 12.7*   HEMOGLOBIN g/dL 7.6* 8.0*   HEMATOCRIT % 24.1* 25.0*   PLATELETS AUTO x10*3/uL 227 245               Last I/O:  I/O last 3 completed shifts:  In: 2080 (29.8 mL/kg) [P.O.:940; I.V.:90 (1.3 mL/kg); IV Piggyback:1050]  Out: - (0 mL/kg)   Weight: 69.9 kg     Past Cardiology Tests (Last 3 Years):    Echo:  Transthoracic Echo (TTE) Complete 10/11/2023    Cath:  No results found for this or any previous visit from the past 1095 days.    Stress Test:  Nuclear Stress Test 10/13/2023    Inpatient Medications:  Scheduled medications   Medication Dose Route Frequency    apixaban  5 mg oral BID     clopidogrel  75 mg oral Daily    digoxin  125 mcg oral Daily    doxycycline  100 mg intravenous q12h    furosemide  20 mg oral Daily    gabapentin  300 mg oral Nightly    insulin lispro  0-5 Units subcutaneous TID with meals    iron sucrose  200 mg intravenous Daily    magnesium sulfate  2 g intravenous Once    metoprolol succinate XL  25 mg oral Daily    pantoprazole  20 mg oral Daily before breakfast    piperacillin-tazobactam  4.5 g intravenous q6h    [START ON 5/6/2024] predniSONE  10 mg oral Daily    predniSONE  20 mg oral Daily    rosuvastatin  5 mg oral Nightly     PRN medications   Medication    albuterol    dextrose    glucagon    oxyCODONE    oxygen    vancomycin     Continuous Medications   Medication Dose Last Rate       ROS:  Review of Systems   Constitutional: Negative. Negative for decreased appetite and malaise/fatigue.   HENT: Negative.     Eyes:  Negative for blurred vision and visual disturbance.   Cardiovascular:  Positive for dyspnea on exertion (mild) and leg swelling (mild and improving). Negative for chest pain, irregular heartbeat, orthopnea, palpitations and syncope.   Respiratory:  Positive for cough. Negative for shortness of breath.    Musculoskeletal:  Negative for arthritis and falls.   Gastrointestinal: Negative.    Neurological:  Negative for focal weakness and light-headedness.   Psychiatric/Behavioral:  Negative for depression and memory loss.         Physical Exam:  Physical Exam  Constitutional:       Appearance: Normal appearance.   HENT:      Head: Normocephalic.   Eyes:      Conjunctiva/sclera: Conjunctivae normal.   Cardiovascular:      Rate and Rhythm: Normal rate. Rhythm irregular.      Pulses: Normal pulses.      Heart sounds: S1 normal and S2 normal. No murmur heard.     No friction rub. No gallop.   Pulmonary:      Effort: Pulmonary effort is normal.      Comments: Lungs clear with diminished L  base. No crackles noted.  Abdominal:      General: Bowel sounds are normal.       Palpations: Abdomen is soft.      Tenderness: There is no abdominal tenderness.   Musculoskeletal:      Cervical back: Neck supple.      Right lower leg: No edema.      Left lower leg: No edema.      Comments: Top of R foot with some petechiae and excoriation. No lesions noted on toes or soles of feet   Skin:     General: Skin is warm and dry.   Neurological:      General: No focal deficit present.      Mental Status: He is alert and oriented to person, place, and time.   Psychiatric:         Attention and Perception: Attention normal.         Mood and Affect: Mood normal.          Assessment/Plan   Acute on chronic diastolic HF:  h/o HFrEF with recovery of EF.  pAfib on apixaban  Mild nonbstructive CAD  PNA  Ascending aortic aneurysm  Recent incarcerated R inguinal hernia s/p open repair and SB resection  Lung CA s/p CTX/XRT     Currently being treated for PNA.  Had LE edema which has improved.     Plan:  -continue oral lasix 20mg PO daily  -serial volume assessment - can add IV lasix if worsening LE edema  -Abx for PNA per primary team  -continue rate control Afib with toprol xL 25mg daily, digoxin 125 mcg daily  -apixaban for CVA prophylaxis  -also on clopidogrel - continue  -rosuvastatin 5mg daily    5-3-24:  Chronic Afib:  Has fair rate control. Continue on Dig and BB. On Eliquis for anticoagulation which should continue. No obvious bleeding/ecchymosis noted.     Acute on chronic DHF:  EF 60-65%.  Continue on Dig, Lasix, BB.  Had some LE edema on admission which has resolved.  Looks compensated today on exam    PNA:  IMS following. Patient reports his breathing is improve, rare cough.    DISPOSITION:  Continue on current cardiac meds  Looks reasonably compensated today  Will sign off             Code Status:  Full Code          Kelsie Carlson, APRN-CNP

## 2024-05-03 NOTE — ED PROCEDURE NOTE
Procedure  Critical Care    Performed by: Natalie Mills MD  Authorized by: Natalie Mills MD    Critical care provider statement:     Critical care time (minutes):  20    Critical care time was exclusive of:  Separately billable procedures and treating other patients and teaching time    Critical care was necessary to treat or prevent imminent or life-threatening deterioration of the following conditions:  Sepsis    Critical care was time spent personally by me on the following activities:  Blood draw for specimens, development of treatment plan with patient or surrogate, examination of patient, evaluation of patient's response to treatment, obtaining history from patient or surrogate, ordering and performing treatments and interventions, ordering and review of laboratory studies, ordering and review of radiographic studies, pulse oximetry, re-evaluation of patient's condition and review of old charts               Natalie Mills MD  05/03/24 0929

## 2024-05-03 NOTE — DISCHARGE SUMMARY
Discharge Diagnosis  Bilateral pneumonia with history of recent hospitalization and lung cancer of left lower lobe  The patient is placed on IV vancomycin and Zosyn from nasal swab for MRSA negative, vancomycin was discontinued and started on doxycycline.  Patient condition gradually improved.  Patient will be discharged home on oral Augmentin/doxycycline for 5 more days.  Blood cultures negative so far.     Acute on chronic diastolic congestive heart failure  Continue on Lasix, beta-blocker,  Cardiology consulted-recommendation appreciated  Cardiology follow-up as an outpatient.        Atrial fibrillation  Continue patient on rate control medication digoxin  Continue patient on anticoagulation therapy with Eliquis     Peripheral arterial disease  Continue patient on Eliquis and Plavix  Patient has history of SMA occlusion     Petechial rash right foot  There was some associated swelling of bilateral lower extremities  Continue to monitor-improving     Abnormal blood glucose  Cover with sliding scale  A1c of 6.9 suggestive prediabetes state        Anemia-combination of chronic disease as well as iron deficiency  Likely related to prior lung cancer treatments seems to have dropped since surgery last hemoglobin 8.3 on April 15, 2024  CT scan abdomen and pelvis was performed without any significant finding for hematoma  Iron supplementation  Will continue to monitor         This discharge took greater than 35 minutes.    Test Results Pending At Discharge  Pending Labs       Order Current Status    Blood Culture Preliminary result    Blood Culture Preliminary result            Hospital Course   Kevin Rubi is a 68 y.o. male with PMHx s/f lung cancer, atrial fibrillation, recent right inguinal hernia repair and small bowel resection presenting with swelling and hypotension.  The patient had recently undergone inguinal hernia repair on the right side with small bowel resection and anastomosis on April 9, 2024 he  was sent to the hospital from oncologist office due to complaint of leg swelling and low blood pressure.  The patient was significantly hypotensive when standing.  The patient had noted both his leg swelling but recently had a rash to the right foot.  The patient denies any abdominal pain he does have some cough and some slight shortness of breath.  Has not had any documented fevers. The patient presented with heart rate of 110 blood pressure 83/50 systolic was given fluid bolus with some improvement.  Subsequently the patient did have CT a of the chest this was negative for pulmonary embolism but there were significant findings of bilateral pneumonia CT scan of the abdomen pelvis showed no evidence of hematoma vessels were without any aneurysmal dilation, patient was started on IV vancomycin and Zosyn his blood pressure did improve and is referred for admission to continue treatment of the underlying pneumonia.      05/02: Patient was evaluated this morning, feeling better, shortness of breath is improving, leg swelling getting better.  No nausea vomiting, no fever or chills.  05/03: Patient feeling a lot better.  Shortness of breath is improving, leg swelling improving.  Cleared by cardiology for discharge.  Patient will be discharged home on oral Augmentin/Doxy for 5 more days.  He will also be discharged on iron supplementation.  He will follow-up with PCP as well as oncology as an outpatient.    Pertinent Physical Exam At Time of Discharge  Physical Exam  Patient is awake and orient, not in apparent distress  Eyes: PERRLA, no conjunctival congestion  Chest: Bilateral Air entry, no crackles or wheezing  Heart: s1S2 regular, no murmur  Abdomen: Soft, non tender, BS present  Ext:    Home Medications     Medication List      START taking these medications     amoxicillin-pot clavulanate 875-125 mg tablet; Commonly known as:   Augmentin; Take 1 tablet by mouth 2 times a day for 5 days.   doxycycline 100 mg capsule;  Commonly known as: Vibramycin; Take 1   capsule (100 mg) by mouth 2 times a day for 5 days. Take with at least 8   ounces (large glass) of water, do not lie down for 30 minutes after   ferrous sulfate 325 (65 Fe) MG EC tablet; Take 1 tablet by mouth once   daily with breakfast. Do not crush, chew, or split.     CONTINUE taking these medications     apixaban 5 mg tablet; Commonly known as: Eliquis; Take 1 tablet (5 mg)   by mouth 2 times a day.   clopidogrel 75 mg tablet; Commonly known as: Plavix; Take 1 tablet (75   mg) by mouth once daily.   dicyclomine 10 mg capsule; Commonly known as: Bentyl   digoxin 125 MCG tablet; Commonly known as: Lanoxin; TAKE 1 TABLET DAILY   Entresto 24-26 mg tablet; Generic drug: sacubitriL-valsartan; Take 1   tablet by mouth 2 times a day.   fenofibrate 145 mg tablet; Commonly known as: Tricor   furosemide 20 mg tablet; Commonly known as: Lasix; Take 1 tablet (20 mg)   by mouth once daily.   gabapentin 300 mg capsule; Commonly known as: Neurontin; Take 1 capsule   (300 mg) by mouth once daily at bedtime.   * metoprolol succinate XL 50 mg 24 hr tablet; Commonly known as:   Toprol-XL; Take 1 tablet (50 mg) by mouth once daily. Do not crush or   chew.   * metoprolol succinate XL 25 mg 24 hr tablet; Commonly known as:   Toprol-XL; Take 1 tablet (25 mg) by mouth once daily. Do not crush or   chew.   oxyCODONE 5 mg immediate release tablet; Commonly known as: Roxicodone;   Take 1 tablet (5 mg) by mouth every 4 hours if needed for moderate pain (4   - 6).   pantoprazole 20 mg EC tablet; Commonly known as: ProtoNix; Take 1 tablet   (20 mg) by mouth once daily in the morning. Take before meals. Do not   crush, chew, or split.   predniSONE 10 mg tablet; Commonly known as: Deltasone; After finishing   taper.  Take 1 tablet (10 mg) by mouth once daily. Do not start before May   5, 2024.; Start taking on: May 5, 2024   rosuvastatin 5 mg tablet; Commonly known as: Crestor; Take 1 tablet (5   mg) by  mouth once daily.   spironolactone 25 mg tablet; Commonly known as: Aldactone; Take 0.5   tablets (12.5 mg) by mouth once daily.  * This list has 2 medication(s) that are the same as other medications   prescribed for you. Read the directions carefully, and ask your doctor or   other care provider to review them with you.     STOP taking these medications     tiZANidine 2 mg tablet; Commonly known as: Zanaflex       Outpatient Follow-Up  No follow-ups on file.     Zeina Julian MD  5/3/2024  11:01 AM

## 2024-05-03 NOTE — NURSING NOTE
Patient and wife educated on and given discharge instructions; verbalized understanding. Medications delivered to bedside, education given. Wife will transport patient home.

## 2024-05-03 NOTE — CARE PLAN
The clinical goals for the shift include Patient will remain free from acute cardiorespiratory distress for the length of my shift.    Over the shift, the patient did make progress toward the following goals.     Problem: Pain  Goal: Takes deep breaths with improved pain control throughout the shift  Outcome: Progressing  Goal: Turns in bed with improved pain control throughout the shift  Outcome: Progressing  Goal: Walks with improved pain control throughout the shift  Outcome: Progressing  Goal: Performs ADL's with improved pain control throughout shift  Outcome: Progressing  Goal: Participates in PT with improved pain control throughout the shift  Outcome: Progressing  Goal: Free from opioid side effects throughout the shift  Outcome: Progressing  Goal: Free from acute confusion related to pain meds throughout the shift  Outcome: Progressing     Problem: Fall/Injury  Goal: Not fall by end of shift  Outcome: Progressing  Goal: Be free from injury by end of the shift  Outcome: Progressing  Goal: Verbalize understanding of personal risk factors for fall in the hospital  Outcome: Progressing  Goal: Verbalize understanding of risk factor reduction measures to prevent injury from fall in the home  Outcome: Progressing  Goal: Pace activities to prevent fatigue by end of the shift  Outcome: Progressing     Problem: Pain - Adult  Goal: Verbalizes/displays adequate comfort level or baseline comfort level  Outcome: Progressing     Problem: Safety - Adult  Goal: Free from fall injury  Outcome: Progressing     Problem: Discharge Planning  Goal: Discharge to home or other facility with appropriate resources  Outcome: Progressing     Problem: Chronic Conditions and Co-morbidities  Goal: Patient's chronic conditions and co-morbidity symptoms are monitored and maintained or improved  Outcome: Progressing     Problem: Heart Failure  Goal: Improved gas exchange this shift  Outcome: Progressing  Goal: Improved urinary output this  shift  Outcome: Progressing  Goal: Reduction in peripheral edema within 24 hours  Outcome: Progressing  Goal: Report improvement of dyspnea/breathlessness this shift  Outcome: Progressing  Goal: Weight from fluid excess reduced over 2-3 days, then stabilize  Outcome: Progressing  Goal: Increase self care and/or family involvement in 24 hours  Outcome: Progressing

## 2024-05-03 NOTE — PROGRESS NOTES
05/03/24 1112   Discharge Planning   Patient expects to be discharged to: Home on oral antibiotics.     No home going needs

## 2024-05-04 LAB
ATRIAL RATE: 405 BPM
PR INTERVAL: 57 MS
Q ONSET: 251 MS
QRS COUNT: 12 BEATS
QRS DURATION: 113 MS
QT INTERVAL: 372 MS
QTC CALCULATION(BAZETT): 437 MS
QTC FREDERICIA: 414 MS
R AXIS: -19 DEGREES
T AXIS: 43 DEGREES
T OFFSET: 437 MS
VENTRICULAR RATE: 83 BPM

## 2024-05-05 LAB
BACTERIA BLD CULT: NORMAL
BACTERIA BLD CULT: NORMAL

## 2024-05-06 ENCOUNTER — PATIENT OUTREACH (OUTPATIENT)
Dept: CARE COORDINATION | Facility: CLINIC | Age: 69
End: 2024-05-06
Payer: COMMERCIAL

## 2024-05-06 NOTE — PROGRESS NOTES
Discharge Facility: [facility]   Discharge Diagnosis: Bilateral pneumonia with history of recent hospitalization and lung cancer of left lower lobe   Admission Date:  5-1-24  Discharge Date: 5-3-24     PCP Appointment Date:  Message routed to office for scheduling     Specialist Appointment Date: 5-13-24 Cardiology  Hospital Encounter and Summary: Linked  Rhiannon Timmons LPN

## 2024-05-08 ENCOUNTER — TELEPHONE (OUTPATIENT)
Dept: PRIMARY CARE | Facility: CLINIC | Age: 69
End: 2024-05-08
Payer: COMMERCIAL

## 2024-05-08 NOTE — TELEPHONE ENCOUNTER
----- Message from DANIEL Fried sent at 5/7/2024  6:21 PM EDT -----  Regarding: FW: TCM  OK for TCM.     ----- Message -----  From: Yamel Molina MA  Sent: 5/7/2024   4:25 PM EDT  To: DANIEL Fried  Subject: FW: TCM                                          Okay to work in?  ----- Message -----  From: Rhiannon Timmons LPN  Sent: 5/7/2024   2:11 PM EDT  [facility]  Subject: TCM                                              Discharge Facility: [facility]   Discharge Diagnosis: Bilateral pneumonia with history of recent hospitalization and lung cancer of left lower lobe   Admission Date:  5-1-24  Discharge Date: 5-3-24   Unsuccessful attempts x2 to reach patient for PCP Follow-up  Please have office staff reach out to patient and schedule an appointment within 14 days from discharge date.    Rhiannon Timmons LPN

## 2024-05-09 ENCOUNTER — TELEPHONE (OUTPATIENT)
Dept: CARDIOLOGY | Facility: HOSPITAL | Age: 69
End: 2024-05-09
Payer: COMMERCIAL

## 2024-05-09 ENCOUNTER — NURSE TRIAGE (OUTPATIENT)
Dept: HEMATOLOGY/ONCOLOGY | Facility: HOSPITAL | Age: 69
End: 2024-05-09
Payer: COMMERCIAL

## 2024-05-09 NOTE — TELEPHONE ENCOUNTER
Discussed with team and spouse advised to contact PCP to see if he can be evaluated there.  She is in agreement.  Aware that follow up with Dr. Longoria has been scheduled for 5/22 at 4:20pm

## 2024-05-09 NOTE — TELEPHONE ENCOUNTER
Pt wife Priya called wanting to speak to [facility] regarding husbands condition. She said he is fevered and swelling. Please call [phone]. New pt first appt is Monday

## 2024-05-09 NOTE — TELEPHONE ENCOUNTER
"Spoke with patient's wife, states pt having fevers and \"body cramping\" pt is not an established patient with [facility] yet, not seen in clinic.. Wife states she did speak with oncology office and they advised her to go to Emergency if feer does not come down with Tyleonol or pt's symptoms get worse. Wife verbalizes understanding of instructions.   "

## 2024-05-09 NOTE — TELEPHONE ENCOUNTER
Spouse called reporting patient has a fever of 102 this morning.  He completed antibiotics for pneumonia yesterday.  He only coughed a few times overnight which is improvement from the previous few nights.  His only  new complain it bilateral arm pain, otherwise denies any localizing symptoms of infection. Discussed recommendation that pt needs to be evaluated in the ED and spouse agrees.  The patient is refusing and has already told her he's not going anywhere and not to call paramedics.    Additional Information   Commented on: What was the highest temperature you've had in the past 24 hours?     102   Commented on: New or worsening pain - on a scale of 0 - 10 (0 being no pain and 10 being the worst possible) how would you rate your pain?     Pt complained of bilateral arm pain today   Commented on: What else do you want to tell me about this problem?     Completed antibiotics for pneumonia yesterday  Per spouse, pt only coughed a few times overnight which is improvement over the previous few nights  SOB with minimal exertion  /85,   Refusing ED or paramedics    Protocols used: Fever/Chills/Infection

## 2024-05-10 ENCOUNTER — TELEPHONE (OUTPATIENT)
Dept: PRIMARY CARE | Facility: CLINIC | Age: 69
End: 2024-05-10
Payer: COMMERCIAL

## 2024-05-10 DIAGNOSIS — R10.84 GENERALIZED ABDOMINAL PAIN: Primary | ICD-10-CM

## 2024-05-10 RX ORDER — TIZANIDINE 2 MG/1
2 TABLET ORAL 2 TIMES DAILY PRN
Qty: 30 TABLET | Refills: 1 | Status: SHIPPED | OUTPATIENT
Start: 2024-05-10 | End: 2024-05-20

## 2024-05-10 NOTE — TELEPHONE ENCOUNTER
Needs a refill on his Tizanidine 2 mg takes it 1 time a day last apt 5/2 next apt is in Oct please send to [facility] [place]    Problem: Diabetes Self-Management  Goal: *Disease process and treatment process  Description: Define diabetes and identify own type of diabetes; list 3 options for treating diabetes. Outcome: Progressing Towards Goal  Goal: *Incorporating nutritional management into lifestyle  Description: Describe effect of type, amount and timing of food on blood glucose; list 3 methods for planning meals. Outcome: Progressing Towards Goal  Goal: *Incorporating physical activity into lifestyle  Description: State effect of exercise on blood glucose levels. Outcome: Progressing Towards Goal  Goal: *Developing strategies to promote health/change behavior  Description: Define the ABC's of diabetes; identify appropriate screenings, schedule and personal plan for screenings. Outcome: Progressing Towards Goal  Goal: *Using medications safely  Description: State effect of diabetes medications on diabetes; name diabetes medication taking, action and side effects. Outcome: Progressing Towards Goal  Goal: *Monitoring blood glucose, interpreting and using results  Description: Identify recommended blood glucose targets  and personal targets. Outcome: Progressing Towards Goal  Goal: *Prevention, detection, treatment of acute complications  Description: List symptoms of hyper- and hypoglycemia; describe how to treat low blood sugar and actions for lowering  high blood glucose level. Outcome: Progressing Towards Goal  Goal: *Prevention, detection and treatment of chronic complications  Description: Define the natural course of diabetes and describe the relationship of blood glucose levels to long term complications of diabetes.   Outcome: Progressing Towards Goal  Goal: *Developing strategies to address psychosocial issues  Description: Describe feelings about living with diabetes; identify support needed and support network  Outcome: Progressing Towards Goal  Goal: *Insulin pump training  Outcome: Progressing Towards Goal  Goal: *Sick day guidelines  Outcome: Progressing Towards Goal  Goal: *Patient Specific Goal (EDIT GOAL, INSERT TEXT)  Outcome: Progressing Towards Goal     Problem: Patient Education: Go to Patient Education Activity  Goal: Patient/Family Education  Outcome: Progressing Towards Goal     Problem: Risk for Spread of Infection  Goal: Prevent transmission of infectious organism to others  Description: Prevent the transmission of infectious organisms to other patients, staff members, and visitors. Outcome: Progressing Towards Goal     Problem: Patient Education:  Go to Education Activity  Goal: Patient/Family Education  Outcome: Progressing Towards Goal     Problem: Injury - Risk of, Adverse Drug Event  Goal: *Absence of adverse drug events  Outcome: Progressing Towards Goal  Goal: *Absence of medication errors  Outcome: Progressing Towards Goal  Goal: *Knowledge of prescribed medications  Outcome: Progressing Towards Goal     Problem: Patient Education: Go to Patient Education Activity  Goal: Patient/Family Education  Outcome: Progressing Towards Goal     Problem: Impaired Skin Integrity/Pressure Injury Treatment  Goal: *Improvement of Existing Pressure Injury  Outcome: Progressing Towards Goal  Goal: *Prevention of pressure injury  Description: Document Chacho Scale and appropriate interventions in the flowsheet.   Outcome: Progressing Towards Goal  Note: Pressure Injury Interventions:  Sensory Interventions: Assess changes in LOC, Chair cushion    Moisture Interventions: Absorbent underpads    Activity Interventions: Increase time out of bed, Chair cushion    Mobility Interventions: Pressure redistribution bed/mattress (bed type)    Nutrition Interventions: Document food/fluid/supplement intake    Friction and Shear Interventions: Foam dressings/transparent film/skin sealants                Problem: Patient Education: Go to Patient Education Activity  Goal: Patient/Family Education  Outcome: Progressing Towards Goal     Problem: Pain  Goal: *Control of Pain  Outcome: Progressing Towards Goal  Goal: *PALLIATIVE CARE:  Alleviation of Pain  Outcome: Progressing Towards Goal     Problem: Patient Education: Go to Patient Education Activity  Goal: Patient/Family Education  Outcome: Progressing Towards Goal     Problem: Pressure Injury - Risk of  Goal: *Prevention of pressure injury  Description: Document Chacho Scale and appropriate interventions in the flowsheet. Outcome: Progressing Towards Goal  Note: Pressure Injury Interventions:  Sensory Interventions: Assess changes in LOC, Chair cushion    Moisture Interventions: Absorbent underpads    Activity Interventions: Increase time out of bed, Chair cushion    Mobility Interventions: Pressure redistribution bed/mattress (bed type)    Nutrition Interventions: Document food/fluid/supplement intake    Friction and Shear Interventions: Foam dressings/transparent film/skin sealants                Problem: Patient Education: Go to Patient Education Activity  Goal: Patient/Family Education  Outcome: Progressing Towards Goal     Problem: Falls - Risk of  Goal: *Absence of Falls  Description: Document Pepe Fall Risk and appropriate interventions in the flowsheet.   Outcome: Progressing Towards Goal  Note: Fall Risk Interventions:  Mobility Interventions: Bed/chair exit alarm, Patient to call before getting OOB         Medication Interventions: Bed/chair exit alarm, Evaluate medications/consider consulting pharmacy, Patient to call before getting OOB    Elimination Interventions: Call light in reach, Patient to call for help with toileting needs              Problem: Patient Education: Go to Patient Education Activity  Goal: Patient/Family Education  Outcome: Progressing Towards Goal     Problem: Patient Education: Go to Patient Education Activity  Goal: Patient/Family Education  Outcome: Progressing Towards Goal     Problem: Patient Education: Go to Patient Education Activity  Goal: Patient/Family Education  Outcome: Progressing Towards Goal

## 2024-05-13 ENCOUNTER — OFFICE VISIT (OUTPATIENT)
Dept: CARDIOLOGY | Facility: HOSPITAL | Age: 69
End: 2024-05-13
Payer: COMMERCIAL

## 2024-05-13 VITALS
HEART RATE: 94 BPM | BODY MASS INDEX: 20.57 KG/M2 | RESPIRATION RATE: 20 BRPM | OXYGEN SATURATION: 98 % | SYSTOLIC BLOOD PRESSURE: 96 MMHG | WEIGHT: 151.7 LBS | DIASTOLIC BLOOD PRESSURE: 60 MMHG

## 2024-05-13 DIAGNOSIS — I50.32 HEART FAILURE WITH IMPROVED EJECTION FRACTION (HFIMPEF) (MULTI): Primary | ICD-10-CM

## 2024-05-13 DIAGNOSIS — I25.10 MILD CAD: ICD-10-CM

## 2024-05-13 DIAGNOSIS — I25.10 ARTERIOSCLEROSIS OF CORONARY ARTERY: ICD-10-CM

## 2024-05-13 DIAGNOSIS — I50.30 DIASTOLIC CONGESTIVE HEART FAILURE, UNSPECIFIED HF CHRONICITY (MULTI): Primary | ICD-10-CM

## 2024-05-13 PROCEDURE — 1036F TOBACCO NON-USER: CPT | Performed by: NURSE PRACTITIONER

## 2024-05-13 PROCEDURE — 99213 OFFICE O/P EST LOW 20 MIN: CPT | Performed by: NURSE PRACTITIONER

## 2024-05-13 PROCEDURE — 1126F AMNT PAIN NOTED NONE PRSNT: CPT | Performed by: NURSE PRACTITIONER

## 2024-05-13 PROCEDURE — 3078F DIAST BP <80 MM HG: CPT | Performed by: NURSE PRACTITIONER

## 2024-05-13 PROCEDURE — 1160F RVW MEDS BY RX/DR IN RCRD: CPT | Performed by: NURSE PRACTITIONER

## 2024-05-13 PROCEDURE — 1157F ADVNC CARE PLAN IN RCRD: CPT | Performed by: NURSE PRACTITIONER

## 2024-05-13 PROCEDURE — 3074F SYST BP LT 130 MM HG: CPT | Performed by: NURSE PRACTITIONER

## 2024-05-13 PROCEDURE — 1111F DSCHRG MED/CURRENT MED MERGE: CPT | Performed by: NURSE PRACTITIONER

## 2024-05-13 PROCEDURE — 1159F MED LIST DOCD IN RCRD: CPT | Performed by: NURSE PRACTITIONER

## 2024-05-13 ASSESSMENT — ENCOUNTER SYMPTOMS
HEMATURIA: 0
FEVER: 0
WHEEZING: 0
PALPITATIONS: 0
EYES NEGATIVE: 1
DEPRESSION: 0
SHORTNESS OF BREATH: 0
CHILLS: 0
CONFUSION: 0
ACTIVITY CHANGE: 0
CHEST TIGHTNESS: 0
LOSS OF SENSATION IN FEET: 0
WEAKNESS: 0
LIGHT-HEADEDNESS: 0
ABDOMINAL DISTENTION: 0
COUGH: 0
FATIGUE: 1
OCCASIONAL FEELINGS OF UNSTEADINESS: 0
BLOOD IN STOOL: 0

## 2024-05-13 ASSESSMENT — PAIN SCALES - GENERAL: PAINLEVEL: 0-NO PAIN

## 2024-05-13 NOTE — PATIENT INSTRUCTIONS
Thank you for coming in today.  If you have any questions you may contact the office Monday through Friday at [phone] or on week ends at [phone].    Please decrease the Entresto to 24/26 mg 1/2 tablet twice a day to allow the BP to improve.   Continue all other medications.    Please have lab work completed.     Please follow  a 2 GM sodium diet and limit fluid intake to 2 liters per day or 8 servings ( serving size = 8 oz. = 1 cup = 240 ml) per day.   Please avoid processed meat products (luncheon meats, sausages, tyler, hot dogs for example) eat 4 servings of vegetables and 1-2 whole servings of whole fruits per day.   Please weigh daily and call [phone] for weight gain of 3 pounds in 24 hours or 5 pounds or if you experience increased swelling or shortness of breath.         Follow up:   2-3 weeks.     Please be sure to follow up with your cardiologist at [facility] once every year. Call [phone] to schedule appointment if you do not have a follow up appointment scheduled already.

## 2024-05-13 NOTE — PROGRESS NOTES
Subjective   Patient ID: Kevin Rubi is a 68 y.o. male who presents for follow-up of congestive heart failure.     Current Outpatient Medications:     clopidogrel (Plavix) 75 mg tablet, Take 1 tablet (75 mg) by mouth once daily., Disp: 30 tablet, Rfl: 2    digoxin (Lanoxin) 125 MCG tablet, TAKE 1 TABLET DAILY, Disp: 90 tablet, Rfl: 0    fenofibrate (Tricor) 145 mg tablet, Take 1 tablet (145 mg) by mouth once daily., Disp: , Rfl:     ferrous sulfate, 325 mg ferrous sulfate, (FeroSuL) tablet, Take 1 tablet by mouth once daily with breakfast., Disp: 30 tablet, Rfl: 1    furosemide (Lasix) 20 mg tablet, Take 1 tablet (20 mg) by mouth once daily., Disp: 30 tablet, Rfl: 0    gabapentin (Neurontin) 300 mg capsule, Take 1 capsule (300 mg) by mouth once daily at bedtime., Disp: 60 capsule, Rfl: 2    metoprolol succinate XL (Toprol-XL) 25 mg 24 hr tablet, Take 1 tablet (25 mg) by mouth once daily. Do not crush or chew., Disp: 90 tablet, Rfl: 3    metoprolol succinate XL (Toprol-XL) 50 mg 24 hr tablet, Take 1 tablet (50 mg) by mouth once daily. Do not crush or chew., Disp: 90 tablet, Rfl: 3    pantoprazole (ProtoNix) 20 mg EC tablet, Take 1 tablet (20 mg) by mouth once daily in the morning. Take before meals. Do not crush, chew, or split., Disp: 30 tablet, Rfl: 11    predniSONE (Deltasone) 10 mg tablet, After finishing taper.  Take 1 tablet (10 mg) by mouth once daily. Do not start before May 5, 2024., Disp: 30 tablet, Rfl: 0    rosuvastatin (Crestor) 5 mg tablet, Take 1 tablet (5 mg) by mouth once daily., Disp: 90 tablet, Rfl: 1    tiZANidine (Zanaflex) 2 mg tablet, Take 1 tablet (2 mg) by mouth 2 times a day as needed for muscle spasms., Disp: 30 tablet, Rfl: 1    apixaban (Eliquis) 5 mg tablet, Take 1 tablet (5 mg) by mouth 2 times a day., Disp: 180 tablet, Rfl: 1    Entresto 24-26 mg tablet, Take 1 tablet by mouth 2 times a day., Disp: 180 tablet, Rfl: 1    spironolactone (Aldactone) 25 mg tablet, Take 0.5 tablets  (12.5 mg) by mouth once daily., Disp: 45 tablet, Rfl: 1     HPI   Patient has a history of mild CAD per left heart cath in 2019.  He also has a history of paroxysmal/persistent atrial fibrillation.  He has a history of bicuspid aortic valve, hyperlipidemia, recently diagnosed lung cancer status postchemotherapy and radiation treatment.  He has a history of ascending aortic aneurysm.  Recent echocardiogram showed preserved LV function.  He does have atrial fibrillation heart rate is about 100 bpm.  He tells me he has been in A-fib for a couple of years.  He also has a history of PAD with peripheral intervention.  Recent hospitalization for PNA and acute heart failure Patient discharged 5/3/2024.     Review of Systems   Constitutional:  Positive for fatigue. Negative for activity change, chills and fever.   HENT:  Negative for hearing loss.    Eyes: Negative.    Respiratory:  Negative for cough, chest tightness, shortness of breath and wheezing.    Cardiovascular:  Negative for chest pain, palpitations and leg swelling.   Gastrointestinal:  Negative for abdominal distention and blood in stool.   Genitourinary:  Negative for hematuria.   Neurological:  Negative for syncope, weakness and light-headedness.   Psychiatric/Behavioral:  Negative for confusion.        Objective     BP 96/60 (BP Location: Left arm, Patient Position: Sitting)   Pulse 94   Resp 20   Wt 68.8 kg (151 lb 11.2 oz)   SpO2 98%   BMI 20.57 kg/m²         Transthoracic Echo (TTE) Limited    Result Date: 4/11/2024              [place] [id number]      Phone [phone] Fax [phone] TRANSTHORACIC ECHOCARDIOGRAM REPORT  Patient Name:     VON Novak Physician:   [id number] Freddie Weldon DO Study Date:       4/11/2024            Ordering Provider:   [id number] PHUC HAHN MRN/PID:          [id number]             Fellow: Accession#:        [id number]         Nurse: Date of           1955 / 68      Sonographer:         Kellee Marcial [facility] Birth/Age:        years Gender:           M                    Additional Staff: Height:           182.88 cm            Admit Date:          4/9/2024 Weight:           70.76 kg             Admission Status:    Inpatient - Routine BSA / BMI:        1.92 m2 / 21.16      Department Location: [place] [facility]                   kg/m2 Blood Pressure: 95 /62 mmHg Study Type:    TRANSTHORACIC ECHO (TTE) LIMITED Diagnosis/ICD: Postprocedural hypotension-I95.81 Indication:    Hypotension CPT Codes:     Echo Limited-86188 Patient History: Pertinent History: Chest Pain, CAD and Hyperlipidemia. Study Detail: The following Echo studies were performed: 2D, M-Mode, Doppler and               color flow.  PHYSICIAN INTERPRETATION: Left Ventricle: Left ventricular systolic function is normal, with an estimated ejection fraction of 60-65%. There are no regional wall motion abnormalities. The left ventricular cavity size is normal. The left ventricular septal wall thickness is normal. There is normal left ventricular posterior wall thickness. Left ventricular diastolic filling was indeterminate. Left Atrium: The left atrium was not assessed. Right Ventricle: The right ventricle was not assessed. There is normal right ventricular global systolic function. Right Atrium: The right atrium was not assessed. Aortic Valve: The aortic valve was not assessed. Aortic valve regurgitation was not assessed. Mitral Valve: The mitral valve was not assessed. Mitral valve regurgitation was not assessed. Tricuspid Valve: The tricuspid valve was not assessed. Tricuspid regurgitation was not assessed. Pulmonic Valve: The pulmonic valve was not assessed. The pulmonic valve regurgitation was not assessed. Pericardium: Pericardial effusion was not assessed. Aorta: The aortic root was not assessed.  CONCLUSIONS:  1. Left ventricular systolic function is normal  with a 60-65% estimated ejection fraction. QUANTITATIVE DATA SUMMARY: 2D MEASUREMENTS:                          Normal Ranges: IVSd:          1.05 cm   (0.6-1.1cm) LVPWd:         1.00 cm   (0.6-1.1cm) LVIDd:         4.35 cm   (3.9-5.9cm) LVIDs:         2.60 cm LV Mass Index: 82.5 g/m2 LV % FS        40.2 % LA VOLUME:                               Normal Ranges: LA Vol A4C:        66.8 ml    (22+/-6mL/m2) LA Vol A2C:        52.6 ml LA Vol BP:         62.4 ml LA Vol Index A4C:  34.9ml/m2 LA Vol Index A2C:  27.5 ml/m2 LA Vol Index BP:   32.6 ml/m2 LA Area A4C:       24.2 cm2 LA Area A2C:       20.4 cm2 LA Major Axis A4C: 7.4 cm LA Major Axis A2C: 6.7 cm LA Volume Index:   31.4 ml/m2 LV SYSTOLIC FUNCTION BY 2D PLANIMETRY (MOD):                     Normal Ranges: EF-A4C View: 61.4 % (>=55%) EF-A2C View: 66.6 % EF-Biplane:  64.0 % LV DIASTOLIC FUNCTION:                        Normal Ranges: MV Peak E:    1.24 m/s (0.7-1.2 m/s) MV e'         0.10 m/s (>8.0) MV lateral e' 0.10 m/s MV medial e'  0.10 m/s E/e' Ratio:   12.40    (<8.0) MITRAL VALVE:                 Normal Ranges: MV DT: 201 msec (150-240msec) TRICUSPID VALVE/RVSP:                             Normal Ranges: Peak TR Velocity: 2.70 m/s RV Syst Pressure: 32.2 mmHg (< 30mmHg)  [id number] Freddie Weldon DO Electronically signed on 4/11/2024 at 12:20:44 PM  ** Final **     Transthoracic echo (TTE) complete    Result Date: 3/27/2024              [address] [address]      Phone [phone] Fax [phone] TRANSTHORACIC ECHOCARDIOGRAM REPORT  Patient Name:      VON JOSE LETICIA   Reading Physician:    [id number] Martín Walsl MD Study Date:        3/26/2024            Ordering Provider:    [id number] BRYAN FINLEY MRN/PID:           [id number]             Fellow: Accession#:        [id number]         Nurse: Date of  Birth/Age: 1955 / 68      Sonographer:          Tiffanie Siddiqui RDCS                    years Gender:            M                    Additional Staff: Height:            182.88 cm            Admit Date:           3/26/2024 Weight:            70.76 kg             Admission Status:     Outpatient BSA / BMI:         1.92 m2 / 21.16      Department Location:  [facility] Echo                    kg/m2                                      Lab Blood Pressure: 100 /60 mmHg Study Type:    TRANSTHORACIC ECHO (TTE) COMPLETE Diagnosis/ICD: 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  PHYSICIAN INTERPRETATION: Left Ventricle: Left ventricular systolic function is normal, with an estimated ejection fraction of 55-60%. There are no regional wall motion abnormalities. The left ventricular cavity size is normal. The left ventricular septal wall thickness is normal. Left Ventricular Global Longitudinal Strain - 15.2 %. Spectral Doppler shows a pseudonormal pattern of left ventricular diastolic filling. Left Atrium: The left atrium is mild to moderately dilated. Right Ventricle: The right ventricle is normal in size. There is normal right ventricular global systolic function. Right Atrium: The right atrium is mildly dilated. Aortic Valve: The aortic valve appears abnormal. The aortic valve appears bicuspid. Fusion between the right and non coronary cusps. There is mild aortic valve regurgitation. The peak instantaneous gradient of the aortic valve is 19.3 mmHg. The mean gradient of the aortic valve is 9.9 mmHg. Mitral Valve: The mitral valve is normal in structure. There is trace to mild mitral valve regurgitation. Tricuspid Valve: The tricuspid  valve is structurally normal. There is mild tricuspid regurgitation. The Doppler estimated RVSP is within normal limits at 32.9 mmHg. Pulmonic Valve: The pulmonic valve is structurally normal. There is physiologic pulmonic valve regurgitation. Pericardium: There is no pericardial effusion noted. Aorta: The aortic root is normal. Systemic Veins: The inferior vena cava appears to be of normal size. There is IVC inspiratory collapse greater than 50%.  CONCLUSIONS:  1. Left ventricular systolic function is normal with a 55-60% estimated ejection fraction.  2. Spectral Doppler shows a pseudonormal pattern of left ventricular diastolic filling.  3. The left atrium is mild to moderately dilated.  4. RVSP within normal limits.  5. Aortic valve appears abnormal.  6. The aortic valve appears bicuspid.  7. Mild aortic valve regurgitation. QUANTITATIVE DATA SUMMARY: 2D MEASUREMENTS:                          Normal Ranges: IVSd:          0.94 cm   (0.6-1.1cm) LVPWd:         1.03 cm   (0.6-1.1cm) LVIDd:         4.28 cm   (3.9-5.9cm) LVIDs:         3.08 cm LV Mass Index: 72.3 g/m2 LV % FS        28.0 % LA VOLUME:                               Normal Ranges: LA Vol A4C:        100.8 ml   (22+/-6mL/m2) LA Vol A2C:        79.3 ml LA Vol BP:         92.7 ml LA Vol Index A4C:  52.6ml/m2 LA Vol Index A2C:  41.4 ml/m2 LA Vol Index BP:   48.3 ml/m2 LA Area A4C:       28.4 cm2 LA Area A2C:       26.1 cm2 LA Major Axis A4C: 6.8 cm LA Major Axis A2C: 7.3 cm LA Volume Index:   45.6 ml/m2 LA Vol A4C:        95.4 ml LA Vol A2C:        74.7 ml RA VOLUME BY A/L METHOD:                       Normal Ranges: RA Area A4C: 23.0 cm2 M-MODE MEASUREMENTS:                  Normal Ranges: Ao Root: 3.60 cm (2.0-3.7cm) AoV Exc: 2.60 cm (1.5-2.5cm) LAs:     4.33 cm (2.7-4.0cm) AORTA MEASUREMENTS:                      Normal Ranges: AoV Exc:     2.60 cm (1.5-2.5cm) Ao Sinus, d: 3.50 cm (2.1-3.5cm) Ao STJ, d:   2.70 cm (1.7-3.4cm) Asc Ao, d:   3.80 cm  (2.1-3.4cm) Ao Arch:     2.50 cm (2.0-3.6cm) LV SYSTOLIC FUNCTION BY 2D PLANIMETRY (MOD):                                          Normal Ranges: EF-A4C View:                      59.2 % (>=55%) EF-A2C View:                      53.6 % EF-Biplane:                       58.3 % Global Longitudinal Strain (GLS): 15.2 % LV DIASTOLIC FUNCTION:                        Normal Ranges: MV Peak E:    0.91 m/s (0.7-1.2 m/s) MV Peak A:    0.23 m/s (0.42-0.7 m/s) E/A Ratio:    3.98     (1.0-2.2) MV e'         0.13 m/s (>8.0) MV lateral e' 0.13 m/s MV medial e'  0.13 m/s E/e' Ratio:   6.96     (<8.0) MITRAL VALVE:                 Normal Ranges: MV DT: 186 msec (150-240msec) AORTIC VALVE:                          Normal Ranges: AoV Vmax:      2.19 m/s  (<=1.7m/s) AoV Peak P.3 mmHg (<20mmHg) AoV Mean P.9 mmHg  (1.7-11.5mmHg) AoV VTI:       35.43 cm  (18-25cm) LVOT Diameter: 2.20 cm   (1.8-2.4cm)  RIGHT VENTRICLE: RV Basal 3.72 cm RV Mid   2.50 cm RV Major 7.3 cm TAPSE:   21.1 mm RV s'    0.14 m/s TRICUSPID VALVE/RVSP:                             Normal Ranges: Peak TR Velocity: 2.73 m/s RV Syst Pressure: 32.9 mmHg (< 30mmHg) IVC Diam:         2.00 cm TV e'             0.2 m/s AORTA: Asc Ao Diam 3.78 cm  [id number] Martín Walls MD Electronically signed on 3/27/2024 at 7:52:44 AM  ** Final **       [facility] 2019 showed mild non obstructive disease at that time.     2023 JUSTIN  CONCLUSIONS:  Right Lower PVR: Evidence of moderate arterial occlusive disease in the right lower extremity at rest. Monophasic flow is noted in the right posterior tibial artery and right dorsalis pedis artery. Triphasic flow is noted in the right common femoral artery. May suspect severe disease based on waveforms. Unable to obtain digit pressures due to flatlined waveforms.  Left Lower PVR: Evidence of mild arterial occlusive disease in the left lower extremity at rest. Biphasic flow is noted in the left posterior tibial artery and left dorsalis pedis  artery. Triphasic flow is noted in the left common femoral artery. Unable to obtain left brachial pressure due to IV placement.  Additional Findings:  May wish further means of evaluation.          Lab Results   Component Value Date    BUN 14 05/03/2024    CREATININE 0.95 05/03/2024     (H) 05/01/2024    MG 1.47 (L) 05/03/2024    K 3.6 05/03/2024     (L) 05/03/2024         Constitutional:       General: He is not in acute distress.  HENT:      Head: Normocephalic and atraumatic.      Mouth: Mucous membranes are moist.      Neck:  No JVD or HJR   Eyes:      Extraocular Movements: .      Conjunctiva/sclera: Conjunctivae normal.    Cardiovascular:      Rate and Rhythm: Normal rate and regular rhythm.      Heart sounds:  S1 S2 normal, no murmur, no S3 or S4   Pulmonary:      Effort: Pulmonary effort is normal. No respiratory distress.      Breath sounds: Normal breath sounds. No stridor. No wheezing or rales.   Abdominal:      General: Bowel sounds are normal. There is no distension.      Tenderness: There is no abdominal tenderness. There is no guarding or rebound.   Musculoskeletal:         General: No swelling, tenderness or deformity. Normal range of motion.      Comments:   Skin:     General: Skin is warm and dry.   Neurological:      General: No focal deficit present.      Mental Status: alert and oriented to person, place, and time. Mental status is at baseline.     Psychiatric:         Mood and Affect: Mood normal.      Assessment/Plan     Problem List Items Addressed This Visit       Mild CAD    Heart failure with improved ejection fraction (HFimpEF) (Multi) - Primary        Chronic systolic/diastolic  heart failure with improved EF 60-65%   Etiology  non ischemic cardiomyopathy   AHA Stage: C   NYHA class: 2-3   Volume Status:  Appears to be reasonably compensated.   GFR: 88    GDMT:  BB- metoprolol XL 75 mg daily.   ARB/ACEI/ARNI - Entresto 24/26 mg  1/2  tablet twice a day   MRA - spironolactone  "12.5 mg daily.   SGLT2i -   He does not want another \"pill\"   Diuretic - furosemide 20 mg 1 tablet daily   Digoxin 0.125 mg once a day.   Device Therapy:     CHF:   He is taking medications as directed.  He appears reasonably compensated on exam.   Will check BNP/BMP/ digoxin levels.    Consider SGLT2i  once he is feeling improved.   EF improved as noted above.     Will continue current therapy.   Follow up in 2 weeks.     2. ASHD: mild non obstructive CAD.  Denies chest discomfort.    BB, statin,     3. Chronic atrial fibrillation :  HR is 103 today in clinic.  On metoprolol and digoxin currently.     Will check digoxin level.  BP is marginal.  He has had cardioversion in the past but did not maintain NSR.  Discussed evaluation with EP but he is not sure he wants to do that at this time.     4. Lung Ca:  following with oncology:   He has had radiation and chemotherapy with improvement but not complete resolution of the Ca.  But is currently off chemotherapy.            Aidee Muhammad, APRN-CNP                 " [phone]

## 2024-05-13 NOTE — PROGRESS NOTES
Subjective   Patient ID: Kevin Rubi is a 68 y.o. male who presents for follow-up of congestive heart failure.     Current Outpatient Medications:     clopidogrel (Plavix) 75 mg tablet, Take 1 tablet (75 mg) by mouth once daily., Disp: 30 tablet, Rfl: 2    digoxin (Lanoxin) 125 MCG tablet, TAKE 1 TABLET DAILY, Disp: 90 tablet, Rfl: 0    fenofibrate (Tricor) 145 mg tablet, Take 1 tablet (145 mg) by mouth once daily., Disp: , Rfl:     ferrous sulfate, 325 mg ferrous sulfate, (FeroSuL) tablet, Take 1 tablet by mouth once daily with breakfast., Disp: 30 tablet, Rfl: 1    furosemide (Lasix) 20 mg tablet, Take 1 tablet (20 mg) by mouth once daily., Disp: 30 tablet, Rfl: 0    gabapentin (Neurontin) 300 mg capsule, Take 1 capsule (300 mg) by mouth once daily at bedtime., Disp: 60 capsule, Rfl: 2    metoprolol succinate XL (Toprol-XL) 25 mg 24 hr tablet, Take 1 tablet (25 mg) by mouth once daily. Do not crush or chew., Disp: 90 tablet, Rfl: 3    metoprolol succinate XL (Toprol-XL) 50 mg 24 hr tablet, Take 1 tablet (50 mg) by mouth once daily. Do not crush or chew., Disp: 90 tablet, Rfl: 3    pantoprazole (ProtoNix) 20 mg EC tablet, Take 1 tablet (20 mg) by mouth once daily in the morning. Take before meals. Do not crush, chew, or split., Disp: 30 tablet, Rfl: 11    predniSONE (Deltasone) 10 mg tablet, After finishing taper.  Take 1 tablet (10 mg) by mouth once daily. Do not start before May 5, 2024., Disp: 30 tablet, Rfl: 0    rosuvastatin (Crestor) 5 mg tablet, Take 1 tablet (5 mg) by mouth once daily., Disp: 90 tablet, Rfl: 1    tiZANidine (Zanaflex) 2 mg tablet, Take 1 tablet (2 mg) by mouth 2 times a day as needed for muscle spasms., Disp: 30 tablet, Rfl: 1    apixaban (Eliquis) 5 mg tablet, Take 1 tablet (5 mg) by mouth 2 times a day., Disp: 180 tablet, Rfl: 1    Entresto 24-26 mg tablet, Take 1 tablet by mouth 2 times a day., Disp: 180 tablet, Rfl: 1    spironolactone (Aldactone) 25 mg tablet, Take 0.5 tablets  "(12.5 mg) by mouth once daily., Disp: 45 tablet, Rfl: 1     HPI   Patient presents for follow up of chronic heart failure. Current symptoms include: {symptoms; cardiac:98148}. He denies {symptoms; cardiac:40140}. He states he is {compliance:5303::\"compliant most of the time\"} with his medications. He states he is {compliance:5303::\"compliant most of the time\"} with his diet.    Review of Systems    Objective     BP 96/60 (BP Location: Left arm, Patient Position: Sitting)   Pulse 94   Resp 20   Wt 68.8 kg (151 lb 11.2 oz)   SpO2 98%   BMI 20.57 kg/m²         Transthoracic Echo (TTE) Limited    Result Date: 4/11/2024              [place] [address]      Phone [phone] Fax [phone] TRANSTHORACIC ECHOCARDIOGRAM REPORT  Patient Name:     VON GARCIA LETICIA Novak Physician:   [id number] Freddie Weldon DO Study Date:       4/11/2024            Ordering Provider:   [id number] PHUC HAHN MRN/PID:          [id number]             Fellow: Accession#:       [id number]         Nurse: Date of           1955 / 68      Sonographer:         Kellee Marcial RDCS Birth/Age:        years Gender:           M                    Additional Staff: Height:           182.88 cm            Admit Date:          4/9/2024 Weight:           70.76 kg             Admission Status:    Inpatient - Routine BSA / BMI:        1.92 m2 / 21.16      Department Location: [facility]                   kg/m2 Blood Pressure: 95 /62 mmHg Study Type:    TRANSTHORACIC ECHO (TTE) LIMITED Diagnosis/ICD: Postprocedural hypotension-I95.81 Indication:    Hypotension CPT Codes:     Echo Limited-04686 Patient History: Pertinent History: Chest Pain, CAD and Hyperlipidemia. Study Detail: The following Echo studies were performed: 2D, M-Mode, Doppler and               color flow.  PHYSICIAN INTERPRETATION: Left Ventricle: Left ventricular systolic function " is normal, with an estimated ejection fraction of 60-65%. There are no regional wall motion abnormalities. The left ventricular cavity size is normal. The left ventricular septal wall thickness is normal. There is normal left ventricular posterior wall thickness. Left ventricular diastolic filling was indeterminate. Left Atrium: The left atrium was not assessed. Right Ventricle: The right ventricle was not assessed. There is normal right ventricular global systolic function. Right Atrium: The right atrium was not assessed. Aortic Valve: The aortic valve was not assessed. Aortic valve regurgitation was not assessed. Mitral Valve: The mitral valve was not assessed. Mitral valve regurgitation was not assessed. Tricuspid Valve: The tricuspid valve was not assessed. Tricuspid regurgitation was not assessed. Pulmonic Valve: The pulmonic valve was not assessed. The pulmonic valve regurgitation was not assessed. Pericardium: Pericardial effusion was not assessed. Aorta: The aortic root was not assessed.  CONCLUSIONS:  1. Left ventricular systolic function is normal with a 60-65% estimated ejection fraction. QUANTITATIVE DATA SUMMARY: 2D MEASUREMENTS:                          Normal Ranges: IVSd:          1.05 cm   (0.6-1.1cm) LVPWd:         1.00 cm   (0.6-1.1cm) LVIDd:         4.35 cm   (3.9-5.9cm) LVIDs:         2.60 cm LV Mass Index: 82.5 g/m2 LV % FS        40.2 % LA VOLUME:                               Normal Ranges: LA Vol A4C:        66.8 ml    (22+/-6mL/m2) LA Vol A2C:        52.6 ml LA Vol BP:         62.4 ml LA Vol Index A4C:  34.9ml/m2 LA Vol Index A2C:  27.5 ml/m2 LA Vol Index BP:   32.6 ml/m2 LA Area A4C:       24.2 cm2 LA Area A2C:       20.4 cm2 LA Major Axis A4C: 7.4 cm LA Major Axis A2C: 6.7 cm LA Volume Index:   31.4 ml/m2 LV SYSTOLIC FUNCTION BY 2D PLANIMETRY (MOD):                     Normal Ranges: EF-A4C View: 61.4 % (>=55%) EF-A2C View: 66.6 % EF-Biplane:  64.0 % LV DIASTOLIC FUNCTION:                         Normal Ranges: MV Peak E:    1.24 m/s (0.7-1.2 m/s) MV e'         0.10 m/s (>8.0) MV lateral e' 0.10 m/s MV medial e'  0.10 m/s E/e' Ratio:   12.40    (<8.0) MITRAL VALVE:                 Normal Ranges: MV DT: 201 msec (150-240msec) TRICUSPID VALVE/RVSP:                             Normal Ranges: Peak TR Velocity: 2.70 m/s RV Syst Pressure: 32.2 mmHg (< 30mmHg)  [id number] Freddie Weldon  Electronically signed on 4/11/2024 at 12:20:44 PM  ** Final **     Transthoracic echo (TTE) complete    Result Date: 3/27/2024              [place] [address]      Phone [phone] Fax [phone] TRANSTHORACIC ECHOCARDIOGRAM REPORT  Patient Name:      VON GARCIA LETICIA   Reading Physician:    [id number] Martín Walls MD Study Date:        3/26/2024            Ordering Provider:    [id number] BRYAN FINLEY MRN/PID:           [id number]             Fellow: Accession#:        [id number]         Nurse: Date of Birth/Age: 1955 / 68      Sonographer:          Tiffanie Siddiqui RDCS                    years Gender:            M                    Additional Staff: Height:            182.88 cm            Admit Date:           3/26/2024 Weight:            70.76 kg             Admission Status:     Outpatient BSA / BMI:         1.92 m2 / 21.16      Department Location:  [facility] Echo                    kg/m2                                      Lab Blood Pressure: 100 /60 mmHg Study Type:    TRANSTHORACIC ECHO (TTE) COMPLETE Diagnosis/ICD: 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   PHYSICIAN INTERPRETATION: Left Ventricle: Left ventricular systolic function is normal, with an estimated ejection fraction of 55-60%. There are no regional wall motion abnormalities. The left ventricular cavity size is normal. The left ventricular septal wall thickness is normal. Left Ventricular Global Longitudinal Strain - 15.2 %. Spectral Doppler shows a pseudonormal pattern of left ventricular diastolic filling. Left Atrium: The left atrium is mild to moderately dilated. Right Ventricle: The right ventricle is normal in size. There is normal right ventricular global systolic function. Right Atrium: The right atrium is mildly dilated. Aortic Valve: The aortic valve appears abnormal. The aortic valve appears bicuspid. Fusion between the right and non coronary cusps. There is mild aortic valve regurgitation. The peak instantaneous gradient of the aortic valve is 19.3 mmHg. The mean gradient of the aortic valve is 9.9 mmHg. Mitral Valve: The mitral valve is normal in structure. There is trace to mild mitral valve regurgitation. Tricuspid Valve: The tricuspid valve is structurally normal. There is mild tricuspid regurgitation. The Doppler estimated RVSP is within normal limits at 32.9 mmHg. Pulmonic Valve: The pulmonic valve is structurally normal. There is physiologic pulmonic valve regurgitation. Pericardium: There is no pericardial effusion noted. Aorta: The aortic root is normal. Systemic Veins: The inferior vena cava appears to be of normal size. There is IVC inspiratory collapse greater than 50%.  CONCLUSIONS:  1. Left ventricular systolic function is normal with a 55-60% estimated ejection fraction.  2. Spectral Doppler shows a pseudonormal pattern of left ventricular diastolic filling.  3. The left atrium is mild to moderately dilated.  4. RVSP within normal limits.  5. Aortic valve appears abnormal.  6. The aortic valve appears bicuspid.  7. Mild aortic valve regurgitation. QUANTITATIVE DATA SUMMARY: 2D  MEASUREMENTS:                          Normal Ranges: IVSd:          0.94 cm   (0.6-1.1cm) LVPWd:         1.03 cm   (0.6-1.1cm) LVIDd:         4.28 cm   (3.9-5.9cm) LVIDs:         3.08 cm LV Mass Index: 72.3 g/m2 LV % FS        28.0 % LA VOLUME:                               Normal Ranges: LA Vol A4C:        100.8 ml   (22+/-6mL/m2) LA Vol A2C:        79.3 ml LA Vol BP:         92.7 ml LA Vol Index A4C:  52.6ml/m2 LA Vol Index A2C:  41.4 ml/m2 LA Vol Index BP:   48.3 ml/m2 LA Area A4C:       28.4 cm2 LA Area A2C:       26.1 cm2 LA Major Axis A4C: 6.8 cm LA Major Axis A2C: 7.3 cm LA Volume Index:   45.6 ml/m2 LA Vol A4C:        95.4 ml LA Vol A2C:        74.7 ml RA VOLUME BY A/L METHOD:                       Normal Ranges: RA Area A4C: 23.0 cm2 M-MODE MEASUREMENTS:                  Normal Ranges: Ao Root: 3.60 cm (2.0-3.7cm) AoV Exc: 2.60 cm (1.5-2.5cm) LAs:     4.33 cm (2.7-4.0cm) AORTA MEASUREMENTS:                      Normal Ranges: AoV Exc:     2.60 cm (1.5-2.5cm) Ao Sinus, d: 3.50 cm (2.1-3.5cm) Ao STJ, d:   2.70 cm (1.7-3.4cm) Asc Ao, d:   3.80 cm (2.1-3.4cm) Ao Arch:     2.50 cm (2.0-3.6cm) LV SYSTOLIC FUNCTION BY 2D PLANIMETRY (MOD):                                          Normal Ranges: EF-A4C View:                      59.2 % (>=55%) EF-A2C View:                      53.6 % EF-Biplane:                       58.3 % Global Longitudinal Strain (GLS): 15.2 % LV DIASTOLIC FUNCTION:                        Normal Ranges: MV Peak E:    0.91 m/s (0.7-1.2 m/s) MV Peak A:    0.23 m/s (0.42-0.7 m/s) E/A Ratio:    3.98     (1.0-2.2) MV e'         0.13 m/s (>8.0) MV lateral e' 0.13 m/s MV medial e'  0.13 m/s E/e' Ratio:   6.96     (<8.0) MITRAL VALVE:                 Normal Ranges: MV DT: 186 msec (150-240msec) AORTIC VALVE:                          Normal Ranges: AoV Vmax:      2.19 m/s  (<=1.7m/s) AoV Peak P.3 mmHg (<20mmHg) AoV Mean P.9 mmHg  (1.7-11.5mmHg) AoV VTI:       35.43 cm  (18-25cm) LVOT  Diameter: 2.20 cm   (1.8-2.4cm)  RIGHT VENTRICLE: RV Basal 3.72 cm RV Mid   2.50 cm RV Major 7.3 cm TAPSE:   21.1 mm RV s'    0.14 m/s TRICUSPID VALVE/RVSP:                             Normal Ranges: Peak TR Velocity: 2.73 m/s RV Syst Pressure: 32.9 mmHg (< 30mmHg) IVC Diam:         2.00 cm TV e'             0.2 m/s AORTA: Asc Ao Diam 3.78 cm  [id number] Martín Walls MD Electronically signed on 3/27/2024 at 7:52:44 AM  ** Final **       Lab Results   Component Value Date    BUN 14 05/03/2024    CREATININE 0.95 05/03/2024     (H) 05/01/2024    MG 1.47 (L) 05/03/2024    K 3.6 05/03/2024     (L) 05/03/2024         Constitutional:       General: He is not in acute distress.  HENT:      Head: Normocephalic and atraumatic.      Mouth: Mucous membranes are moist.      Neck:  No JVD or HJR   Eyes:      Extraocular Movements: .      Conjunctiva/sclera: Conjunctivae normal.    Cardiovascular:      Rate and Rhythm: Normal rate and regular rhythm.      Heart sounds:  S1 S2 normal, no murmur, no S3 or S4   Pulmonary:      Effort: Pulmonary effort is normal. No respiratory distress.      Breath sounds: Normal breath sounds. No stridor. No wheezing or rales.   Abdominal:      General: Bowel sounds are normal. There is no distension.      Tenderness: There is no abdominal tenderness. There is no guarding or rebound.   Musculoskeletal:         General: No swelling, tenderness or deformity. Normal range of motion.      Comments:   Skin:     General: Skin is warm and dry.   Neurological:      General: No focal deficit present.      Mental Status: alert and oriented to person, place, and time. Mental status is at baseline.     Psychiatric:         Mood and Affect: Mood normal.     Assessment/Plan     Problem List Items Addressed This Visit       Mild CAD    Heart failure with improved ejection fraction (HFimpEF) (Multi) - Primary                  Aidee Muhammad, APRN-CNP

## 2024-05-17 DIAGNOSIS — R10.84 GENERALIZED ABDOMINAL PAIN: ICD-10-CM

## 2024-05-20 ENCOUNTER — PATIENT OUTREACH (OUTPATIENT)
Dept: CARE COORDINATION | Facility: CLINIC | Age: 69
End: 2024-05-20
Payer: COMMERCIAL

## 2024-05-20 RX ORDER — TIZANIDINE 2 MG/1
2 TABLET ORAL 2 TIMES DAILY PRN
Qty: 180 TABLET | Refills: 1 | Status: SHIPPED | OUTPATIENT
Start: 2024-05-20

## 2024-05-21 DIAGNOSIS — I50.32 HEART FAILURE WITH IMPROVED EJECTION FRACTION (HFIMPEF) (MULTI): ICD-10-CM

## 2024-05-22 ENCOUNTER — OFFICE VISIT (OUTPATIENT)
Dept: HEMATOLOGY/ONCOLOGY | Facility: CLINIC | Age: 69
End: 2024-05-22
Payer: COMMERCIAL

## 2024-05-22 VITALS
TEMPERATURE: 98.6 F | HEART RATE: 73 BPM | BODY MASS INDEX: 19.35 KG/M2 | RESPIRATION RATE: 18 BRPM | OXYGEN SATURATION: 94 % | DIASTOLIC BLOOD PRESSURE: 66 MMHG | WEIGHT: 142.64 LBS | SYSTOLIC BLOOD PRESSURE: 100 MMHG

## 2024-05-22 DIAGNOSIS — R53.83 FATIGUE, UNSPECIFIED TYPE: ICD-10-CM

## 2024-05-22 DIAGNOSIS — C34.92 MALIGNANT NEOPLASM OF LEFT LUNG, UNSPECIFIED PART OF LUNG (MULTI): Primary | ICD-10-CM

## 2024-05-22 PROCEDURE — 1111F DSCHRG MED/CURRENT MED MERGE: CPT | Performed by: INTERNAL MEDICINE

## 2024-05-22 PROCEDURE — 1126F AMNT PAIN NOTED NONE PRSNT: CPT | Performed by: INTERNAL MEDICINE

## 2024-05-22 PROCEDURE — 3078F DIAST BP <80 MM HG: CPT | Performed by: INTERNAL MEDICINE

## 2024-05-22 PROCEDURE — 1160F RVW MEDS BY RX/DR IN RCRD: CPT | Performed by: INTERNAL MEDICINE

## 2024-05-22 PROCEDURE — 1157F ADVNC CARE PLAN IN RCRD: CPT | Performed by: INTERNAL MEDICINE

## 2024-05-22 PROCEDURE — 3074F SYST BP LT 130 MM HG: CPT | Performed by: INTERNAL MEDICINE

## 2024-05-22 PROCEDURE — 99215 OFFICE O/P EST HI 40 MIN: CPT | Performed by: INTERNAL MEDICINE

## 2024-05-22 PROCEDURE — 1159F MED LIST DOCD IN RCRD: CPT | Performed by: INTERNAL MEDICINE

## 2024-05-22 RX ORDER — MIRTAZAPINE 15 MG/1
15 TABLET, FILM COATED ORAL NIGHTLY
Qty: 30 TABLET | Refills: 0 | Status: SHIPPED | OUTPATIENT
Start: 2024-05-22 | End: 2024-06-06 | Stop reason: HOSPADM

## 2024-05-22 ASSESSMENT — PAIN SCALES - GENERAL: PAINLEVEL: 0-NO PAIN

## 2024-05-22 NOTE — PATIENT INSTRUCTIONS
You can start taking prednisone 10 mg every other day for 2 weeks and then you can stop the medication.   You should start taking mirtazapine 15 mg at night, 8:00 pm, to help with appetite and sleep. Ok to hold gabapentin 300 mg that you take at night for restless legs now while you start this new medication.

## 2024-05-22 NOTE — PROGRESS NOTES
Patient ID: Kevin Rubi is a 68 y.o. male    Primary Care Provider: Chen Olea, APRN-CNS    DIAGNOSIS AND STAGING   Stage IIIC ([id number]) NSCLC (adenocarcinoma, TTF1+) of the LLL - dx on 09/15/23   Primary tumor measures 3.5 cm (+satellite nodule)   2R+ for adenocarcinoma cells     SITES OF DISEASE   LLL   Mediastinal nodes (including contralateral)    Has a couple of other spiculated nodules (0.8 cm WILDA and 0.7 cm RUL) that are potential  synchronous primaries and will be addressed when needed      MOLECULAR GENOMICS   KRAS G12D mutation    PD-L1 TPS 95%     PRIOR THERAPIES  Concurrent chemo RT (60 Beyer in 30 fractions, completed on 12/26/2023 using carboplatin/pemetrexed      CURRENT THERAPY  Begins consolidative durvalumab on 01/31/2024     CURRENT ONCOLOGICAL PROBLEMS  None     HISTORY OF PRESENT ILLNESS  Former smoker, (quit in 2000), who while undergoing w/u for abdominal pain related to SMA occlusion, was found to have a LLL nodule   Based on his records he was noted to have abnormal imaging back in 2021, was seen by pulmonary and was told to complete w/u, including PET scan and potential biopsy but did not follow recommendations.    A CT chest on 09/12/23 showed a 3.5 cm spiculated LLL nodule abutting the pericardium. There was also a satellite nodule suspicious for disease and a couple of other spiculated nodules in the WILDA and contralateral right lung.4R node measured 1.4 cm and level 7 2.4 cm.   PET scan on 08/04/23 showed no findings concerning for extra-thoracic disease. LLL nodule was FDG avid as well as multiple mediastinal nodes (including contralateral nodes). The WILDA nodule has mild hypermetabolic activity.  An EBUS procedure on 09/15/23 demonstrated 2R to be involved with adenocarcinoma cells, TTF1+. KRAS G12D +, TPS 95%.  The pt sees medical oncology on 09/27/23 -   States he is feeling well and denies any systemic sx related to this malignancy - including fatigue and weight loss. The  abdominal pain resolved after vascular procedure/stenting performed and he is now on Xarelto and Plavix. Denies any h/a or cough. Denies dyspnea.   10/23/23: C1  Carboplatin/pemetrexed  23: C2 carboplatin/pemetrexed with concurrent RT    23: C3 carboplatin/pemetrexed with concurrent RT   2023: Hypotension due to dehydration, requiring ED visit.  2023: Completes concurrent chemo RT (60 Gray in 30 fractions)   2024: CT chest with IV contrast demonstrates response to concurrent chemo RT with no signs of disease progression  01/10/2024: Consents for consolidative durvalumab  2024: C1 D1 (every 28-day cycles) of durvalumab  2024: started on prednisone for concerns regarding pneumonitis (70 mg/day)   2024: Discharged from the hospital after being operated for incarcerated right inguinal hernia repair with small bowel resection and anastomosis on 2024 - 24: hospitalized with cellulitis and pneumonia - discharged home on doxycycline and Augmentin      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  Status post exploratory laparotomy for incarcerated right inguinal hernia repair with small bowel resection on 2024  HFpEF - EF 60%   Atrial fibrillation - on Eliquis  Ascending aortic aneurysm   PUD (gastric)  HLD        SOCIAL HISTORY  + Tobacco - quit in  - mostly 1/2 ppd starting at age 22  Occasional ETOH intake  Worked as a  for 35 years    for 43 years and has 2 children - son who is 39 yo and daughter who is 35  He has just retired      FAMILY HISTORY  Mother seems to have had H/N or lung cancer - unclear   Father may also have had cancer   1 sister  from complications of GSW  1 brother  of CA (?liver CA)     CURRENT MEDS REVIEWED        ALLERGIES REVIEWED   NKDA     SUBJECTIVE:  Here with his wife Priya   Not doing well -   Not eating much   Sad and cried after talking about his parents who have  passed during this visit   Sister has cancer (he learned about it recently) but they don't get along   Denies diarrhea   No rashes   Edema of lower extremities has improved       A 13 point review of systems was performed, with significant findings documented above in subjective history.    OBJECTIVE:  Vitals:    05/22/24 1438   BP: 100/66   Pulse: 73   Resp: 18   Temp: 37 °C (98.6 °F)   SpO2: 94%        Body surface area is 1.81 meters squared.     Wt Readings from Last 5 Encounters:   05/22/24 64.7 kg (142 lb 10.2 oz)   05/13/24 68.8 kg (151 lb 11.2 oz)   05/01/24 69.9 kg (154 lb)   05/01/24 70.2 kg (154 lb 12.2 oz)   04/30/24 70.3 kg (155 lb)       ECOGSCORE: 3    Physical Exam  HENT:      Head: Normocephalic and atraumatic.   Eyes:      General: No scleral icterus.     Extraocular Movements: Extraocular movements intact.      Conjunctiva/sclera: Conjunctivae normal.   Cardiovascular:      Rate and Rhythm: Normal rate and regular rhythm.   Pulmonary:      Effort: Pulmonary effort is normal.      Breath sounds: Normal breath sounds.   Abdominal:      General: Abdomen is flat.      Palpations: Abdomen is soft.      Tenderness: There is no abdominal tenderness.   Musculoskeletal:      Right lower leg: Edema present.      Left lower leg: Edema present.   Neurological:      General: No focal deficit present.      Mental Status: He is alert and oriented to person, place, and time. Mental status is at baseline.      Motor: No weakness.   Psychiatric:         Behavior: Behavior normal.         Thought Content: Thought content normal.         Judgment: Judgment normal.          Diagnostic Results   Results:  Labs:  Lab Results   Component Value Date    WBC 7.6 05/03/2024    HGB 7.6 (L) 05/03/2024    HCT 24.1 (L) 05/03/2024    MCV 82 05/03/2024     05/03/2024      Lab Results   Component Value Date    NEUTROABS 12.05 (H) 05/01/2024        Lab Results   Component Value Date    GLUCOSE 201 (H) 05/03/2024    CALCIUM  8.4 (L) 05/03/2024     (L) 05/03/2024    K 3.6 05/03/2024    CO2 26 05/03/2024     05/03/2024    BUN 14 05/03/2024    CREATININE 0.95 05/03/2024    MG 1.47 (L) 05/03/2024     Lab Results   Component Value Date    ALT 36 05/01/2024    AST 24 05/01/2024    ALKPHOS 51 05/01/2024    BILITOT 0.5 05/01/2024    BILIDIR 0.1 04/09/2024      Lab Results   Component Value Date    ACTH 11.8 01/23/2024    CORTISOL 16.6 03/26/2024    TSH 0.01 (L) 03/26/2024    FREET4 1.48 (H) 03/26/2024       STUDY:  CT CHEST W IV CONTRAST;  1/8/2024 2:19 pm      INDICATION:  Signs/Symptoms:Assessment treatment response stage III NSCLC after  chemo-RT.      COMPARISON:  09/12/2023      ACCESSION NUMBER(S):  [id number]      ORDERING CLINICIAN:  ADELIA CRUZ      TECHNIQUE:  Helical data acquisition of the chest was obtained with  50 mL  Omnipaque 350. Images were reformatted in axial, coronal, and  sagittal planes.MIP reformatted images were also generated.      FINDINGS:  LUNGS and AIRWAYS:  Left lower lobe nodule has decreased in size from 35 x 24 x 3.5 mm to  29 x 18 x 2.7 mm. Scattered additional solid and sub solid nodules  are otherwise not significantly changed, for example measuring 10 mm  in left upper lobe posteriorly. No new nodules or areas of  consolidation. Mild emphysematous changes. Central airways are  patent. No bronchiectasis.      No pleural effusion or pneumothorax.      MEDIASTINUM and GET, LOWER NECK AND AXILLA:  The visualized thyroid gland is grossly unremarkable.      Regression in mediastinal lymphadenopathy, for example:      Subcarinal: Decreased from 24 mm to 11 mm short axis  Upper right paratracheal: From 14 mm 6 mm  Lower right paratracheal: From 13 mm to 14 mm      Esophagus is not dilated.      HEART and VESSELS:  Mild cardiomegaly.      No significant pericardial effusion.      No central pulmonary embolism identified on nondedicated study.      Severe coronary atherosclerosis.      Aortic valve  leaflet calcifications. Severe thoracic aortic  atherosclerosis. Unchanged dilatation ascending aorta up to 4.6 cm.  Otherwise the thoracic aorta and great vessels are patent.      UPPER ABDOMEN:  The visualized subdiaphragmatic structures demonstrate no acute  findings on limited images.      CHEST WALL and OSSEOUS STRUCTURES:  No change in cyst and lipomas of the anterior chest wall.  There is increased sclerosis involving a the T6 vertebral body. There  is an increasing sclerotic margin involving lytic lesion of the left  5th rib laterally. Thoracic degenerative changes.      IMPRESSION:  1.  Favorable treatment response compared to 4 months ago.  2. Left lower lobe mass is slightly regressed in size.  3. Mediastinal lymphadenopathy has significantly regressed.  4. Increased sclerosis at T6 vertebral body and increased sclerotic  margin of lucent lesion of left 5th rib which could be healing  metastases.    Assessment/Plan     Primary cancer of left lower lobe of lung (Multi), Clinical: Stage IIIC (cT3, cN3, cM0)  Status post completion of concurrent chemo RT on 12/26/2023 (60 Beyer in 30 fractions) using carboplatin/pemetrexed (s/p 3 cycles). His tumor harbors a KRAS G12 D mutation in the PD-L1 expression TPS 95%.  His scans from 01/08/2024 demonstrating a favorable response, with a decrease in size of the dominant left lower lobe primary tumor by approximately 20%, compatible with stable disease.   The left upper lobe nodule remains a stable.  He was a started on consolidative durvalumab on 01/31/2024  Treatment course was interrupted due to an incarcerated right inguinal hernia leading to an exploratory laparotomy and small bowel resection on 04/09/2024 -  This was followed by another admission related to cellulitis and PNA   Now feeling better but still depressed, unable to gain his weight back   Continue to hold durvalumab until recovers   Scans from May 2024 showed no signs of PD - repeat in August  2024    Depression/unintentional weight loss  Start mirtazapine 15 mg HS   RTC in 3 weeks for reassessment    Has been on steroids since 3/27/24 due to concerns regarding pneumonitis   Now taking 10 mg/day   Ok to stop after going 10 mg every other day for 2 weeks       This note was created with the assistance of a speech recognition program.  Although the intention is to generate a document that actually reflects the content of the visit, it is possible that some mistakes occur and may not be corrected by the time of completion of this note.        Janell Longoria MD, MS  Thoracic Medical Oncology   [address] [place], [place] [address]  Phone: [phone]

## 2024-05-30 RX ORDER — FUROSEMIDE 20 MG/1
20 TABLET ORAL DAILY
Qty: 90 TABLET | Refills: 0 | Status: SHIPPED | OUTPATIENT
Start: 2024-05-30 | End: 2024-06-03 | Stop reason: SDUPTHER

## 2024-06-03 ENCOUNTER — APPOINTMENT (OUTPATIENT)
Dept: RADIOLOGY | Facility: HOSPITAL | Age: 69
DRG: 394 | End: 2024-06-03
Payer: COMMERCIAL

## 2024-06-03 ENCOUNTER — HOSPITAL ENCOUNTER (INPATIENT)
Facility: HOSPITAL | Age: 69
LOS: 3 days | Discharge: HOME | DRG: 394 | End: 2024-06-06
Attending: EMERGENCY MEDICINE | Admitting: STUDENT IN AN ORGANIZED HEALTH CARE EDUCATION/TRAINING PROGRAM
Payer: COMMERCIAL

## 2024-06-03 ENCOUNTER — OFFICE VISIT (OUTPATIENT)
Dept: CARDIOLOGY | Facility: HOSPITAL | Age: 69
DRG: 394 | End: 2024-06-03
Payer: COMMERCIAL

## 2024-06-03 ENCOUNTER — APPOINTMENT (OUTPATIENT)
Dept: CARDIOLOGY | Facility: HOSPITAL | Age: 69
DRG: 394 | End: 2024-06-03
Payer: COMMERCIAL

## 2024-06-03 VITALS
SYSTOLIC BLOOD PRESSURE: 87 MMHG | DIASTOLIC BLOOD PRESSURE: 59 MMHG | RESPIRATION RATE: 18 BRPM | WEIGHT: 144.7 LBS | HEART RATE: 86 BPM | OXYGEN SATURATION: 95 % | BODY MASS INDEX: 19.62 KG/M2

## 2024-06-03 DIAGNOSIS — R10.84 GENERALIZED ABDOMINAL PAIN: ICD-10-CM

## 2024-06-03 DIAGNOSIS — I50.32 HEART FAILURE WITH IMPROVED EJECTION FRACTION (HFIMPEF) (MULTI): ICD-10-CM

## 2024-06-03 DIAGNOSIS — I25.10 MILD CAD: ICD-10-CM

## 2024-06-03 DIAGNOSIS — I48.91 ATRIAL FIBRILLATION, UNSPECIFIED TYPE (MULTI): ICD-10-CM

## 2024-06-03 DIAGNOSIS — I25.10 ARTERIOSCLEROSIS OF CORONARY ARTERY: ICD-10-CM

## 2024-06-03 DIAGNOSIS — I50.30 DIASTOLIC CONGESTIVE HEART FAILURE, UNSPECIFIED HF CHRONICITY (MULTI): ICD-10-CM

## 2024-06-03 DIAGNOSIS — K55.1 SUPERIOR MESENTERIC ARTERY STENOSIS (MULTI): Primary | ICD-10-CM

## 2024-06-03 DIAGNOSIS — I50.22 CHRONIC SYSTOLIC CONGESTIVE HEART FAILURE (MULTI): Primary | ICD-10-CM

## 2024-06-03 PROBLEM — D64.9 NORMOCYTIC ANEMIA: Status: ACTIVE | Noted: 2024-06-03

## 2024-06-03 PROBLEM — E87.1 HYPONATREMIA: Status: ACTIVE | Noted: 2024-06-03

## 2024-06-03 PROBLEM — R73.9 HYPERGLYCEMIA: Status: ACTIVE | Noted: 2024-06-03

## 2024-06-03 LAB
ALBUMIN SERPL BCP-MCNC: 3.5 G/DL (ref 3.4–5)
ALP SERPL-CCNC: 40 U/L (ref 33–136)
ALT SERPL W P-5'-P-CCNC: 11 U/L (ref 10–52)
ANION GAP SERPL CALC-SCNC: 13 MMOL/L (ref 10–20)
APPEARANCE UR: CLEAR
AST SERPL W P-5'-P-CCNC: 12 U/L (ref 9–39)
BASOPHILS # BLD AUTO: 0.01 X10*3/UL (ref 0–0.1)
BASOPHILS NFR BLD AUTO: 0.1 %
BILIRUB DIRECT SERPL-MCNC: 0.1 MG/DL (ref 0–0.3)
BILIRUB SERPL-MCNC: 0.3 MG/DL (ref 0–1.2)
BILIRUB UR STRIP.AUTO-MCNC: NEGATIVE MG/DL
BUN SERPL-MCNC: 25 MG/DL (ref 6–23)
CALCIUM SERPL-MCNC: 8.8 MG/DL (ref 8.6–10.3)
CHLORIDE SERPL-SCNC: 99 MMOL/L (ref 98–107)
CO2 SERPL-SCNC: 24 MMOL/L (ref 21–32)
COLOR UR: YELLOW
CREAT SERPL-MCNC: 0.87 MG/DL (ref 0.5–1.3)
EGFRCR SERPLBLD CKD-EPI 2021: >90 ML/MIN/1.73M*2
EOSINOPHIL # BLD AUTO: 0.03 X10*3/UL (ref 0–0.7)
EOSINOPHIL NFR BLD AUTO: 0.3 %
ERYTHROCYTE [DISTWIDTH] IN BLOOD BY AUTOMATED COUNT: 18.5 % (ref 11.5–14.5)
GLUCOSE SERPL-MCNC: 201 MG/DL (ref 74–99)
GLUCOSE UR STRIP.AUTO-MCNC: ABNORMAL MG/DL
HCT VFR BLD AUTO: 29.5 % (ref 41–52)
HGB BLD-MCNC: 9.2 G/DL (ref 13.5–17.5)
IMM GRANULOCYTES # BLD AUTO: 0.07 X10*3/UL (ref 0–0.7)
IMM GRANULOCYTES NFR BLD AUTO: 0.7 % (ref 0–0.9)
KETONES UR STRIP.AUTO-MCNC: NEGATIVE MG/DL
LACTATE SERPL-SCNC: 1.4 MMOL/L (ref 0.4–2)
LEUKOCYTE ESTERASE UR QL STRIP.AUTO: NEGATIVE
LIPASE SERPL-CCNC: 8 U/L (ref 9–82)
LYMPHOCYTES # BLD AUTO: 0.59 X10*3/UL (ref 1.2–4.8)
LYMPHOCYTES NFR BLD AUTO: 5.9 %
MCH RBC QN AUTO: 25.6 PG (ref 26–34)
MCHC RBC AUTO-ENTMCNC: 31.2 G/DL (ref 32–36)
MCV RBC AUTO: 82 FL (ref 80–100)
MONOCYTES # BLD AUTO: 0.28 X10*3/UL (ref 0.1–1)
MONOCYTES NFR BLD AUTO: 2.8 %
NEUTROPHILS # BLD AUTO: 9.07 X10*3/UL (ref 1.2–7.7)
NEUTROPHILS NFR BLD AUTO: 90.2 %
NITRITE UR QL STRIP.AUTO: NEGATIVE
NRBC BLD-RTO: 0 /100 WBCS (ref 0–0)
PH UR STRIP.AUTO: 6.5 [PH]
PLATELET # BLD AUTO: 172 X10*3/UL (ref 150–450)
POTASSIUM SERPL-SCNC: 3.6 MMOL/L (ref 3.5–5.3)
PROT SERPL-MCNC: 6.8 G/DL (ref 6.4–8.2)
PROT UR STRIP.AUTO-MCNC: NEGATIVE MG/DL
RBC # BLD AUTO: 3.6 X10*6/UL (ref 4.5–5.9)
RBC # UR STRIP.AUTO: NEGATIVE /UL
SODIUM SERPL-SCNC: 132 MMOL/L (ref 136–145)
SP GR UR STRIP.AUTO: 1.02
UROBILINOGEN UR STRIP.AUTO-MCNC: NORMAL MG/DL
WBC # BLD AUTO: 10.1 X10*3/UL (ref 4.4–11.3)

## 2024-06-03 PROCEDURE — 93005 ELECTROCARDIOGRAM TRACING: CPT

## 2024-06-03 PROCEDURE — 83690 ASSAY OF LIPASE: CPT | Performed by: EMERGENCY MEDICINE

## 2024-06-03 PROCEDURE — 96374 THER/PROPH/DIAG INJ IV PUSH: CPT

## 2024-06-03 PROCEDURE — 3078F DIAST BP <80 MM HG: CPT | Performed by: NURSE PRACTITIONER

## 2024-06-03 PROCEDURE — 1200000002 HC GENERAL ROOM WITH TELEMETRY DAILY

## 2024-06-03 PROCEDURE — 80053 COMPREHEN METABOLIC PANEL: CPT | Performed by: EMERGENCY MEDICINE

## 2024-06-03 PROCEDURE — 82248 BILIRUBIN DIRECT: CPT | Performed by: EMERGENCY MEDICINE

## 2024-06-03 PROCEDURE — 85025 COMPLETE CBC W/AUTO DIFF WBC: CPT | Performed by: EMERGENCY MEDICINE

## 2024-06-03 PROCEDURE — 99213 OFFICE O/P EST LOW 20 MIN: CPT | Performed by: NURSE PRACTITIONER

## 2024-06-03 PROCEDURE — 96375 TX/PRO/DX INJ NEW DRUG ADDON: CPT

## 2024-06-03 PROCEDURE — 74177 CT ABD & PELVIS W/CONTRAST: CPT

## 2024-06-03 PROCEDURE — 1157F ADVNC CARE PLAN IN RCRD: CPT | Performed by: NURSE PRACTITIONER

## 2024-06-03 PROCEDURE — 80048 BASIC METABOLIC PNL TOTAL CA: CPT | Performed by: EMERGENCY MEDICINE

## 2024-06-03 PROCEDURE — 2550000001 HC RX 255 CONTRASTS: Performed by: EMERGENCY MEDICINE

## 2024-06-03 PROCEDURE — 2500000001 HC RX 250 WO HCPCS SELF ADMINISTERED DRUGS (ALT 637 FOR MEDICARE OP): Performed by: STUDENT IN AN ORGANIZED HEALTH CARE EDUCATION/TRAINING PROGRAM

## 2024-06-03 PROCEDURE — 99223 1ST HOSP IP/OBS HIGH 75: CPT | Performed by: STUDENT IN AN ORGANIZED HEALTH CARE EDUCATION/TRAINING PROGRAM

## 2024-06-03 PROCEDURE — 96361 HYDRATE IV INFUSION ADD-ON: CPT

## 2024-06-03 PROCEDURE — 1159F MED LIST DOCD IN RCRD: CPT | Performed by: NURSE PRACTITIONER

## 2024-06-03 PROCEDURE — 83605 ASSAY OF LACTIC ACID: CPT | Performed by: EMERGENCY MEDICINE

## 2024-06-03 PROCEDURE — 2500000004 HC RX 250 GENERAL PHARMACY W/ HCPCS (ALT 636 FOR OP/ED): Performed by: EMERGENCY MEDICINE

## 2024-06-03 PROCEDURE — G0378 HOSPITAL OBSERVATION PER HR: HCPCS

## 2024-06-03 PROCEDURE — 3074F SYST BP LT 130 MM HG: CPT | Performed by: NURSE PRACTITIONER

## 2024-06-03 PROCEDURE — 36415 COLL VENOUS BLD VENIPUNCTURE: CPT | Performed by: EMERGENCY MEDICINE

## 2024-06-03 PROCEDURE — 99285 EMERGENCY DEPT VISIT HI MDM: CPT | Mod: 25

## 2024-06-03 PROCEDURE — 74177 CT ABD & PELVIS W/CONTRAST: CPT | Mod: FOREIGN READ | Performed by: RADIOLOGY

## 2024-06-03 PROCEDURE — 1036F TOBACCO NON-USER: CPT | Performed by: NURSE PRACTITIONER

## 2024-06-03 PROCEDURE — 2500000002 HC RX 250 W HCPCS SELF ADMINISTERED DRUGS (ALT 637 FOR MEDICARE OP, ALT 636 FOR OP/ED): Performed by: STUDENT IN AN ORGANIZED HEALTH CARE EDUCATION/TRAINING PROGRAM

## 2024-06-03 PROCEDURE — 81003 URINALYSIS AUTO W/O SCOPE: CPT | Performed by: EMERGENCY MEDICINE

## 2024-06-03 RX ORDER — FUROSEMIDE 20 MG/1
20 TABLET ORAL SEE ADMIN INSTRUCTIONS
Qty: 90 TABLET | Refills: 0 | Status: SHIPPED | OUTPATIENT
Start: 2024-06-03

## 2024-06-03 RX ORDER — PANTOPRAZOLE SODIUM 40 MG/1
40 TABLET, DELAYED RELEASE ORAL
Status: DISCONTINUED | OUTPATIENT
Start: 2024-06-04 | End: 2024-06-06 | Stop reason: HOSPADM

## 2024-06-03 RX ORDER — METOPROLOL SUCCINATE 25 MG/1
25 TABLET, EXTENDED RELEASE ORAL DAILY
Status: DISCONTINUED | OUTPATIENT
Start: 2024-06-04 | End: 2024-06-06 | Stop reason: HOSPADM

## 2024-06-03 RX ORDER — SPIRONOLACTONE 25 MG/1
12.5 TABLET ORAL DAILY
Status: DISCONTINUED | OUTPATIENT
Start: 2024-06-04 | End: 2024-06-06 | Stop reason: HOSPADM

## 2024-06-03 RX ORDER — ONDANSETRON HYDROCHLORIDE 2 MG/ML
4 INJECTION, SOLUTION INTRAVENOUS EVERY 8 HOURS PRN
Status: DISCONTINUED | OUTPATIENT
Start: 2024-06-03 | End: 2024-06-06 | Stop reason: HOSPADM

## 2024-06-03 RX ORDER — BISACODYL 10 MG/1
10 SUPPOSITORY RECTAL DAILY PRN
Status: DISCONTINUED | OUTPATIENT
Start: 2024-06-03 | End: 2024-06-06 | Stop reason: HOSPADM

## 2024-06-03 RX ORDER — SACUBITRIL AND VALSARTAN 24; 26 MG/1; MG/1
1 TABLET, FILM COATED ORAL 2 TIMES DAILY
Qty: 180 TABLET | Refills: 1 | COMMUNITY
Start: 2024-06-03 | End: 2024-06-06 | Stop reason: HOSPADM

## 2024-06-03 RX ORDER — GABAPENTIN 300 MG/1
300 CAPSULE ORAL NIGHTLY
Status: DISCONTINUED | OUTPATIENT
Start: 2024-06-03 | End: 2024-06-06 | Stop reason: HOSPADM

## 2024-06-03 RX ORDER — TIZANIDINE 2 MG/1
2 TABLET ORAL 2 TIMES DAILY PRN
Status: DISCONTINUED | OUTPATIENT
Start: 2024-06-03 | End: 2024-06-06 | Stop reason: HOSPADM

## 2024-06-03 RX ORDER — DIGOXIN 125 MCG
125 TABLET ORAL DAILY
Status: DISCONTINUED | OUTPATIENT
Start: 2024-06-04 | End: 2024-06-06 | Stop reason: HOSPADM

## 2024-06-03 RX ORDER — ONDANSETRON HYDROCHLORIDE 2 MG/ML
4 INJECTION, SOLUTION INTRAVENOUS ONCE
Status: COMPLETED | OUTPATIENT
Start: 2024-06-03 | End: 2024-06-03

## 2024-06-03 RX ORDER — ROSUVASTATIN CALCIUM 10 MG/1
5 TABLET, COATED ORAL NIGHTLY
Status: DISCONTINUED | OUTPATIENT
Start: 2024-06-03 | End: 2024-06-06 | Stop reason: HOSPADM

## 2024-06-03 RX ORDER — TALC
3 POWDER (GRAM) TOPICAL NIGHTLY PRN
Status: DISCONTINUED | OUTPATIENT
Start: 2024-06-03 | End: 2024-06-06 | Stop reason: HOSPADM

## 2024-06-03 RX ORDER — GUAIFENESIN 600 MG/1
600 TABLET, EXTENDED RELEASE ORAL EVERY 12 HOURS PRN
Status: DISCONTINUED | OUTPATIENT
Start: 2024-06-03 | End: 2024-06-06 | Stop reason: HOSPADM

## 2024-06-03 RX ORDER — BISACODYL 5 MG
10 TABLET, DELAYED RELEASE (ENTERIC COATED) ORAL DAILY PRN
Status: DISCONTINUED | OUTPATIENT
Start: 2024-06-03 | End: 2024-06-06 | Stop reason: HOSPADM

## 2024-06-03 RX ORDER — CLOPIDOGREL BISULFATE 75 MG/1
75 TABLET ORAL DAILY
Status: DISCONTINUED | OUTPATIENT
Start: 2024-06-04 | End: 2024-06-06 | Stop reason: HOSPADM

## 2024-06-03 RX ORDER — PANTOPRAZOLE SODIUM 40 MG/10ML
40 INJECTION, POWDER, LYOPHILIZED, FOR SOLUTION INTRAVENOUS
Status: DISCONTINUED | OUTPATIENT
Start: 2024-06-04 | End: 2024-06-06 | Stop reason: HOSPADM

## 2024-06-03 RX ORDER — ONDANSETRON 4 MG/1
4 TABLET, FILM COATED ORAL EVERY 8 HOURS PRN
Status: DISCONTINUED | OUTPATIENT
Start: 2024-06-03 | End: 2024-06-06 | Stop reason: HOSPADM

## 2024-06-03 RX ORDER — MORPHINE SULFATE 4 MG/ML
4 INJECTION INTRAVENOUS ONCE
Status: COMPLETED | OUTPATIENT
Start: 2024-06-03 | End: 2024-06-03

## 2024-06-03 RX ADMIN — IOHEXOL 75 ML: 350 INJECTION, SOLUTION INTRAVENOUS at 18:50

## 2024-06-03 RX ADMIN — ONDANSETRON 4 MG: 2 INJECTION INTRAMUSCULAR; INTRAVENOUS at 17:39

## 2024-06-03 RX ADMIN — ROSUVASTATIN 5 MG: 10 TABLET, FILM COATED ORAL at 23:37

## 2024-06-03 RX ADMIN — APIXABAN 5 MG: 5 TABLET, FILM COATED ORAL at 23:37

## 2024-06-03 RX ADMIN — Medication 3 MG: at 23:37

## 2024-06-03 RX ADMIN — MORPHINE SULFATE 4 MG: 4 INJECTION INTRAVENOUS at 17:39

## 2024-06-03 RX ADMIN — GABAPENTIN 300 MG: 300 CAPSULE ORAL at 23:38

## 2024-06-03 RX ADMIN — SODIUM CHLORIDE, POTASSIUM CHLORIDE, SODIUM LACTATE AND CALCIUM CHLORIDE 1000 ML: 600; 310; 30; 20 INJECTION, SOLUTION INTRAVENOUS at 17:39

## 2024-06-03 RX ADMIN — SACUBITRIL AND VALSARTAN 1 TABLET: 24; 26 TABLET, FILM COATED ORAL at 23:37

## 2024-06-03 SDOH — ECONOMIC STABILITY: FOOD INSECURITY: WITHIN THE PAST 12 MONTHS, YOU WORRIED THAT YOUR FOOD WOULD RUN OUT BEFORE YOU GOT MONEY TO BUY MORE.: NEVER TRUE

## 2024-06-03 SDOH — ECONOMIC STABILITY: FOOD INSECURITY: WITHIN THE PAST 12 MONTHS, THE FOOD YOU BOUGHT JUST DIDN'T LAST AND YOU DIDN'T HAVE MONEY TO GET MORE.: NEVER TRUE

## 2024-06-03 SDOH — SOCIAL STABILITY: SOCIAL INSECURITY: HAVE YOU HAD THOUGHTS OF HARMING ANYONE ELSE?: NO

## 2024-06-03 SDOH — SOCIAL STABILITY: SOCIAL INSECURITY: WERE YOU ABLE TO COMPLETE ALL THE BEHAVIORAL HEALTH SCREENINGS?: YES

## 2024-06-03 ASSESSMENT — ENCOUNTER SYMPTOMS
SHORTNESS OF BREATH: 0
ABDOMINAL DISTENTION: 0
DIAPHORESIS: 0
COUGH: 0
STRIDOR: 0
WHEEZING: 0
RHINORRHEA: 0
SINUS PAIN: 0
VOMITING: 0
FATIGUE: 0
CONFUSION: 0
OCCASIONAL FEELINGS OF UNSTEADINESS: 0
ARTHRALGIAS: 0
DEPRESSION: 0
BLOOD IN STOOL: 0
HEMATURIA: 0
HEMATURIA: 0
DIFFICULTY URINATING: 0
CHILLS: 0
WEAKNESS: 0
COUGH: 0
AGITATION: 0
DECREASED CONCENTRATION: 0
WEAKNESS: 0
HEADACHES: 0
ABDOMINAL PAIN: 1
WHEEZING: 0
FLANK PAIN: 0
ACTIVITY CHANGE: 0
CHEST TIGHTNESS: 0
APPETITE CHANGE: 0
NERVOUS/ANXIOUS: 0
FEVER: 0
EYES NEGATIVE: 1
SORE THROAT: 0
COLOR CHANGE: 0
DYSURIA: 0
CONSTIPATION: 0
ABDOMINAL DISTENTION: 0
CONFUSION: 0
DIARRHEA: 0
FREQUENCY: 0
CHILLS: 0
NUMBNESS: 0
MYALGIAS: 0
LIGHT-HEADEDNESS: 0
PALPITATIONS: 0
JOINT SWELLING: 0
WOUND: 0
LOSS OF SENSATION IN FEET: 0
LIGHT-HEADEDNESS: 0
PALPITATIONS: 0
NAUSEA: 0
ACTIVITY CHANGE: 0
BACK PAIN: 0
SHORTNESS OF BREATH: 0
APNEA: 0
DIZZINESS: 0
FEVER: 0
CHEST TIGHTNESS: 0

## 2024-06-03 ASSESSMENT — ACTIVITIES OF DAILY LIVING (ADL)
GROOMING: INDEPENDENT
WALKS IN HOME: INDEPENDENT
DRESSING YOURSELF: INDEPENDENT
JUDGMENT_ADEQUATE_SAFELY_COMPLETE_DAILY_ACTIVITIES: YES
ADEQUATE_TO_COMPLETE_ADL: YES
LACK_OF_TRANSPORTATION: NO
ADEQUATE_TO_COMPLETE_ADL: YES
HEARING - RIGHT EAR: FUNCTIONAL
JUDGMENT_ADEQUATE_SAFELY_COMPLETE_DAILY_ACTIVITIES: YES
PATIENT'S MEMORY ADEQUATE TO SAFELY COMPLETE DAILY ACTIVITIES?: YES
PATIENT'S MEMORY ADEQUATE TO SAFELY COMPLETE DAILY ACTIVITIES?: YES
FEEDING YOURSELF: INDEPENDENT
HEARING - LEFT EAR: DIFFICULTY WITH NOISE
WALKS IN HOME: INDEPENDENT
BATHING: INDEPENDENT
GROOMING: INDEPENDENT
FEEDING YOURSELF: INDEPENDENT
BATHING: INDEPENDENT
HEARING - RIGHT EAR: FUNCTIONAL
HEARING - LEFT EAR: FUNCTIONAL
TOILETING: INDEPENDENT
DRESSING YOURSELF: INDEPENDENT
TOILETING: INDEPENDENT

## 2024-06-03 ASSESSMENT — LIFESTYLE VARIABLES
SUBSTANCE_ABUSE_PAST_12_MONTHS: NO
AUDIT-C TOTAL SCORE: 0
AUDIT-C TOTAL SCORE: 0
HOW MANY STANDARD DRINKS CONTAINING ALCOHOL DO YOU HAVE ON A TYPICAL DAY: PATIENT DOES NOT DRINK
HOW OFTEN DO YOU HAVE A DRINK CONTAINING ALCOHOL: NEVER
SKIP TO QUESTIONS 9-10: 1
PRESCIPTION_ABUSE_PAST_12_MONTHS: NO
HOW OFTEN DO YOU HAVE 6 OR MORE DRINKS ON ONE OCCASION: NEVER

## 2024-06-03 ASSESSMENT — PATIENT HEALTH QUESTIONNAIRE - PHQ9
2. FEELING DOWN, DEPRESSED OR HOPELESS: NOT AT ALL
2. FEELING DOWN, DEPRESSED OR HOPELESS: NOT AT ALL
1. LITTLE INTEREST OR PLEASURE IN DOING THINGS: NOT AT ALL
SUM OF ALL RESPONSES TO PHQ9 QUESTIONS 1 & 2: 0
SUM OF ALL RESPONSES TO PHQ9 QUESTIONS 1 AND 2: 0
1. LITTLE INTEREST OR PLEASURE IN DOING THINGS: NOT AT ALL

## 2024-06-03 ASSESSMENT — COGNITIVE AND FUNCTIONAL STATUS - GENERAL
PATIENT BASELINE BEDBOUND: NO
MOBILITY SCORE: 24
DAILY ACTIVITIY SCORE: 24

## 2024-06-03 ASSESSMENT — PAIN SCALES - GENERAL
PAINLEVEL_OUTOF10: 9
PAINLEVEL_OUTOF10: 0 - NO PAIN
PAINLEVEL_OUTOF10: 0 - NO PAIN

## 2024-06-03 ASSESSMENT — PAIN - FUNCTIONAL ASSESSMENT
PAIN_FUNCTIONAL_ASSESSMENT: 0-10
PAIN_FUNCTIONAL_ASSESSMENT: 0-10

## 2024-06-03 ASSESSMENT — PAIN DESCRIPTION - LOCATION: LOCATION: ABDOMEN

## 2024-06-03 NOTE — PATIENT INSTRUCTIONS
Thank you for coming in today.  If you have any questions you may contact the office Monday through Friday at [phone] or on week ends at [phone].    Please stop the furosemide for now.       Please follow  a 2 GM sodium diet and limit fluid intake to 2 liters per day or 8 servings ( serving size = 8 oz. = 1 cup = 240 ml) per day.   Please avoid processed meat products (luncheon meats, sausages, tyler, hot dogs for example) eat 4 servings of vegetables and 1-2 whole servings of whole fruits per day.   Please weigh daily and call [phone] for weight gain of 3 pounds in 24 hours or 5 pounds or if you experience increased swelling or shortness of breath.     Please go to the ER for evaluation of the abdominal pain.      Follow up: 1 month.      Please be sure to follow up with your cardiologist at [facility] once every year. Call [phone] to schedule appointment if you do not have a follow up appointment scheduled already.

## 2024-06-03 NOTE — PROGRESS NOTES
Subjective   Patient ID: Kevin Rubi is a 68 y.o. male who presents for follow-up of congestive heart failure.     Current Outpatient Medications:     apixaban (Eliquis) 5 mg tablet, Take 1 tablet (5 mg) by mouth 2 times a day., Disp: 180 tablet, Rfl: 1    clopidogrel (Plavix) 75 mg tablet, Take 1 tablet (75 mg) by mouth once daily., Disp: 30 tablet, Rfl: 2    digoxin (Lanoxin) 125 MCG tablet, TAKE 1 TABLET DAILY, Disp: 90 tablet, Rfl: 0    Entresto 24-26 mg tablet, Take 1 tablet by mouth 2 times a day. (Patient taking differently: Take 1 tablet by mouth 2 times a day. Taking 1/2 tablet twice a day.), Disp: 180 tablet, Rfl: 1    fenofibrate (Tricor) 145 mg tablet, Take 1 tablet (145 mg) by mouth once daily., Disp: , Rfl:     furosemide (Lasix) 20 mg tablet, TAKE 1 TABLET BY MOUTH EVERY DAY, Disp: 90 tablet, Rfl: 0    gabapentin (Neurontin) 300 mg capsule, Take 1 capsule (300 mg) by mouth once daily at bedtime., Disp: 60 capsule, Rfl: 2    metoprolol succinate XL (Toprol-XL) 25 mg 24 hr tablet, Take 1 tablet (25 mg) by mouth once daily. Do not crush or chew., Disp: 90 tablet, Rfl: 3    metoprolol succinate XL (Toprol-XL) 50 mg 24 hr tablet, Take 1 tablet (50 mg) by mouth once daily. Do not crush or chew., Disp: 90 tablet, Rfl: 3    rosuvastatin (Crestor) 5 mg tablet, Take 1 tablet (5 mg) by mouth once daily., Disp: 90 tablet, Rfl: 1    spironolactone (Aldactone) 25 mg tablet, Take 0.5 tablets (12.5 mg) by mouth once daily., Disp: 45 tablet, Rfl: 1    tiZANidine (Zanaflex) 2 mg tablet, TAKE 1 TABLET BY MOUTH 2 TIMES A DAY AS NEEDED FOR MUSCLE SPASMS., Disp: 180 tablet, Rfl: 1    mirtazapine (Remeron) 15 mg tablet, Take 1 tablet (15 mg) by mouth once daily at bedtime. (Patient not taking: Reported on 6/3/2024), Disp: 30 tablet, Rfl: 0    predniSONE (Deltasone) 10 mg tablet, After finishing taper.  Take 1 tablet (10 mg) by mouth once daily. Do not start before May 5, 2024. (Patient not taking: Reported on  6/3/2024), Disp: 30 tablet, Rfl: 0     HPI   Patient has a history of mild CAD per left heart cath in 2019.  He also has a history of paroxysmal/persistent atrial fibrillation.  He has a history of bicuspid aortic valve, hyperlipidemia, recently diagnosed lung cancer status postchemotherapy and radiation treatment.  He has a history of ascending aortic aneurysm.  Recent echocardiogram showed preserved LV function.  He does have atrial fibrillation heart rate is about 100 bpm.  He tells me he has been in A-fib for a couple of years.  He also has a history of PAD with peripheral intervention.  Recent hospitalization for PNA and acute heart failure Patient discharged 5/3/2024.       Review of Systems   Constitutional:  Negative for activity change, chills and fever.   HENT:  Negative for hearing loss.    Eyes: Negative.    Respiratory:  Negative for cough, chest tightness, shortness of breath and wheezing.    Cardiovascular:  Negative for chest pain, palpitations and leg swelling.   Gastrointestinal:  Negative for abdominal distention and blood in stool.        RUQ abd pain and guarding on palpation. Hypoactive bowel sounds.   Rates pain of about 7-8 on 1-10 scale    Genitourinary:  Negative for hematuria.   Neurological:  Negative for syncope, weakness and light-headedness.   Psychiatric/Behavioral:  Negative for confusion.        Objective     BP 87/59 (BP Location: Right arm, Patient Position: Sitting, BP Cuff Size: Adult)   Pulse 86   Resp 18   Wt 65.6 kg (144 lb 11.2 oz)   SpO2 95%   BMI 19.62 kg/m²         Transthoracic Echo (TTE) Limited    Result Date: 4/11/2024              [place] [id number]      Phone [phone] Fax [phone] TRANSTHORACIC ECHOCARDIOGRAM REPORT  Patient Name:     VON KELLY   [place] Physician:   [id number] Freddie Weldon DO Study Date:       4/11/2024            Ordering Provider:   Jo HANSON                                                              SELMA MRN/PID:          [id number]             Fellow: Accession#:       [id number]         Nurse: Date of           1955 / 68      Sonographer:         Kellee Marcial RDCS Birth/Age:        years Gender:           M                    Additional Staff: Height:           182.88 cm            Admit Date:          4/9/2024 Weight:           70.76 kg             Admission Status:    Inpatient - Routine BSA / BMI:        1.92 m2 / 21.16      Department Location: [facility]                   kg/m2 Blood Pressure: 95 /62 mmHg Study Type:    TRANSTHORACIC ECHO (TTE) LIMITED Diagnosis/ICD: Postprocedural hypotension-I95.81 Indication:    Hypotension CPT Codes:     Echo Limited-96816 Patient History: Pertinent History: Chest Pain, CAD and Hyperlipidemia. Study Detail: The following Echo studies were performed: 2D, M-Mode, Doppler and               color flow.  PHYSICIAN INTERPRETATION: Left Ventricle: Left ventricular systolic function is normal, with an estimated ejection fraction of 60-65%. There are no regional wall motion abnormalities. The left ventricular cavity size is normal. The left ventricular septal wall thickness is normal. There is normal left ventricular posterior wall thickness. Left ventricular diastolic filling was indeterminate. Left Atrium: The left atrium was not assessed. Right Ventricle: The right ventricle was not assessed. There is normal right ventricular global systolic function. Right Atrium: The right atrium was not assessed. Aortic Valve: The aortic valve was not assessed. Aortic valve regurgitation was not assessed. Mitral Valve: The mitral valve was not assessed. Mitral valve regurgitation was not assessed. Tricuspid Valve: The tricuspid valve was not assessed. Tricuspid regurgitation was not assessed. Pulmonic Valve: The pulmonic valve was not assessed. The pulmonic valve regurgitation was not assessed. Pericardium: Pericardial effusion was not assessed. Aorta:  The aortic root was not assessed.  CONCLUSIONS:  1. Left ventricular systolic function is normal with a 60-65% estimated ejection fraction. QUANTITATIVE DATA SUMMARY: 2D MEASUREMENTS:                          Normal Ranges: IVSd:          1.05 cm   (0.6-1.1cm) LVPWd:         1.00 cm   (0.6-1.1cm) LVIDd:         4.35 cm   (3.9-5.9cm) LVIDs:         2.60 cm LV Mass Index: 82.5 g/m2 LV % FS        40.2 % LA VOLUME:                               Normal Ranges: LA Vol A4C:        66.8 ml    (22+/-6mL/m2) LA Vol A2C:        52.6 ml LA Vol BP:         62.4 ml LA Vol Index A4C:  34.9ml/m2 LA Vol Index A2C:  27.5 ml/m2 LA Vol Index BP:   32.6 ml/m2 LA Area A4C:       24.2 cm2 LA Area A2C:       20.4 cm2 LA Major Axis A4C: 7.4 cm LA Major Axis A2C: 6.7 cm LA Volume Index:   31.4 ml/m2 LV SYSTOLIC FUNCTION BY 2D PLANIMETRY (MOD):                     Normal Ranges: EF-A4C View: 61.4 % (>=55%) EF-A2C View: 66.6 % EF-Biplane:  64.0 % LV DIASTOLIC FUNCTION:                        Normal Ranges: MV Peak E:    1.24 m/s (0.7-1.2 m/s) MV e'         0.10 m/s (>8.0) MV lateral e' 0.10 m/s MV medial e'  0.10 m/s E/e' Ratio:   12.40    (<8.0) MITRAL VALVE:                 Normal Ranges: MV DT: 201 msec (150-240msec) TRICUSPID VALVE/RVSP:                             Normal Ranges: Peak TR Velocity: 2.70 m/s RV Syst Pressure: 32.2 mmHg (< 30mmHg)  [id number] Freddie Weldon DO Electronically signed on 4/11/2024 at 12:20:44 PM  ** Final **     Transthoracic echo (TTE) complete    Result Date: 3/27/2024              [address] [address]      Phone [phone] Fax [phone] TRANSTHORACIC ECHOCARDIOGRAM REPORT  Patient Name:      VON KELLY   Reading Physician:    [id number] Martín Walls MD Study Date:        3/26/2024            Ordering Provider:    [id number] BRYAN FINLEY  MRN/PID:           [id number]             Fellow: Accession#:        [id number]         Nurse: Date of Birth/Age: 1955 / 68      Sonographer:          Tiffanie Siddiqui RDCS                    years Gender:            M                    Additional Staff: Height:            182.88 cm            Admit Date:           3/26/2024 Weight:            70.76 kg             Admission Status:     Outpatient BSA / BMI:         1.92 m2 / 21.16      Department Location:  [facility] Echo                    kg/m2                                      Lab Blood Pressure: 100 /60 mmHg Study Type:    TRANSTHORACIC ECHO (TTE) COMPLETE Diagnosis/ICD: 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  PHYSICIAN INTERPRETATION: Left Ventricle: Left ventricular systolic function is normal, with an estimated ejection fraction of 55-60%. There are no regional wall motion abnormalities. The left ventricular cavity size is normal. The left ventricular septal wall thickness is normal. Left Ventricular Global Longitudinal Strain - 15.2 %. Spectral Doppler shows a pseudonormal pattern of left ventricular diastolic filling. Left Atrium: The left atrium is mild to moderately dilated. Right Ventricle: The right ventricle is normal in size. There is normal right ventricular global systolic function. Right Atrium: The right atrium is mildly dilated. Aortic Valve: The aortic valve appears abnormal. The aortic valve appears bicuspid. Fusion between the right and non coronary cusps. There is mild aortic valve regurgitation. The peak instantaneous gradient of the aortic valve is 19.3 mmHg. The mean gradient of the aortic valve is 9.9 mmHg. Mitral Valve: The mitral valve is  normal in structure. There is trace to mild mitral valve regurgitation. Tricuspid Valve: The tricuspid valve is structurally normal. There is mild tricuspid regurgitation. The Doppler estimated RVSP is within normal limits at 32.9 mmHg. Pulmonic Valve: The pulmonic valve is structurally normal. There is physiologic pulmonic valve regurgitation. Pericardium: There is no pericardial effusion noted. Aorta: The aortic root is normal. Systemic Veins: The inferior vena cava appears to be of normal size. There is IVC inspiratory collapse greater than 50%.  CONCLUSIONS:  1. Left ventricular systolic function is normal with a 55-60% estimated ejection fraction.  2. Spectral Doppler shows a pseudonormal pattern of left ventricular diastolic filling.  3. The left atrium is mild to moderately dilated.  4. RVSP within normal limits.  5. Aortic valve appears abnormal.  6. The aortic valve appears bicuspid.  7. Mild aortic valve regurgitation. QUANTITATIVE DATA SUMMARY: 2D MEASUREMENTS:                          Normal Ranges: IVSd:          0.94 cm   (0.6-1.1cm) LVPWd:         1.03 cm   (0.6-1.1cm) LVIDd:         4.28 cm   (3.9-5.9cm) LVIDs:         3.08 cm LV Mass Index: 72.3 g/m2 LV % FS        28.0 % LA VOLUME:                               Normal Ranges: LA Vol A4C:        100.8 ml   (22+/-6mL/m2) LA Vol A2C:        79.3 ml LA Vol BP:         92.7 ml LA Vol Index A4C:  52.6ml/m2 LA Vol Index A2C:  41.4 ml/m2 LA Vol Index BP:   48.3 ml/m2 LA Area A4C:       28.4 cm2 LA Area A2C:       26.1 cm2 LA Major Axis A4C: 6.8 cm LA Major Axis A2C: 7.3 cm LA Volume Index:   45.6 ml/m2 LA Vol A4C:        95.4 ml LA Vol A2C:        74.7 ml RA VOLUME BY A/L METHOD:                       Normal Ranges: RA Area A4C: 23.0 cm2 M-MODE MEASUREMENTS:                  Normal Ranges: Ao Root: 3.60 cm (2.0-3.7cm) AoV Exc: 2.60 cm (1.5-2.5cm) LAs:     4.33 cm (2.7-4.0cm) AORTA MEASUREMENTS:                      Normal Ranges: AoV Exc:     2.60 cm  (1.5-2.5cm) Ao Sinus, d: 3.50 cm (2.1-3.5cm) Ao STJ, d:   2.70 cm (1.7-3.4cm) Asc Ao, d:   3.80 cm (2.1-3.4cm) Ao Arch:     2.50 cm (2.0-3.6cm) LV SYSTOLIC FUNCTION BY 2D PLANIMETRY (MOD):                                          Normal Ranges: EF-A4C View:                      59.2 % (>=55%) EF-A2C View:                      53.6 % EF-Biplane:                       58.3 % Global Longitudinal Strain (GLS): 15.2 % LV DIASTOLIC FUNCTION:                        Normal Ranges: MV Peak E:    0.91 m/s (0.7-1.2 m/s) MV Peak A:    0.23 m/s (0.42-0.7 m/s) E/A Ratio:    3.98     (1.0-2.2) MV e'         0.13 m/s (>8.0) MV lateral e' 0.13 m/s MV medial e'  0.13 m/s E/e' Ratio:   6.96     (<8.0) MITRAL VALVE:                 Normal Ranges: MV DT: 186 msec (150-240msec) AORTIC VALVE:                          Normal Ranges: AoV Vmax:      2.19 m/s  (<=1.7m/s) AoV Peak P.3 mmHg (<20mmHg) AoV Mean P.9 mmHg  (1.7-11.5mmHg) AoV VTI:       35.43 cm  (18-25cm) LVOT Diameter: 2.20 cm   (1.8-2.4cm)  RIGHT VENTRICLE: RV Basal 3.72 cm RV Mid   2.50 cm RV Major 7.3 cm TAPSE:   21.1 mm RV s'    0.14 m/s TRICUSPID VALVE/RVSP:                             Normal Ranges: Peak TR Velocity: 2.73 m/s RV Syst Pressure: 32.9 mmHg (< 30mmHg) IVC Diam:         2.00 cm TV e'             0.2 m/s AORTA: Asc Ao Diam 3.78 cm  [id number] Martín Walls MD Electronically signed on 3/27/2024 at 7:52:44 AM  ** Final **       [facility] 2019 showed mild non obstructive disease at that time.      2023 JUSTIN  CONCLUSIONS:  Right Lower PVR: Evidence of moderate arterial occlusive disease in the right lower extremity at rest. Monophasic flow is noted in the right posterior tibial artery and right dorsalis pedis artery. Triphasic flow is noted in the right common femoral artery. May suspect severe disease based on waveforms. Unable to obtain digit pressures due to flatlined waveforms.  Left Lower PVR: Evidence of mild arterial occlusive disease in the left lower  extremity at rest. Biphasic flow is noted in the left posterior tibial artery and left dorsalis pedis artery. Triphasic flow is noted in the left common femoral artery. Unable to obtain left brachial pressure due to IV placement.  Additional Findings:  May wish further means of evaluation.       Lab Results   Component Value Date    BUN 14 05/03/2024    CREATININE 0.95 05/03/2024     (H) 05/01/2024    MG 1.47 (L) 05/03/2024    K 3.6 05/03/2024     (L) 05/03/2024         Constitutional:       General: He is not in acute distress.  HENT:      Head: Normocephalic and atraumatic.      Mouth: Mucous membranes are moist.      Neck:  No JVD or HJR   Eyes:      Extraocular Movements: .      Conjunctiva/sclera: Conjunctivae normal.    Cardiovascular:      Rate and Rhythm: Normal rate and regular rhythm.      Heart sounds:  S1 S2 normal, no murmur, no S3 or S4   Pulmonary:      Effort: Pulmonary effort is normal. No respiratory distress.      Breath sounds: Normal breath sounds. No stridor. No wheezing or rales.   Abdominal:      General: There is no distension.      Tenderness: tenderness and guarding right upper quadrant.  Hypoactive bowel sounds.   Musculoskeletal:         General: No swelling, tenderness or deformity. Normal range of motion.      Comments:   Skin:     General: Skin is warm and dry.   Neurological:      General: No focal deficit present.      Mental Status: alert and oriented to person, place, and time. Mental status is at baseline.     Psychiatric:         Mood and Affect: Mood normal.     Assessment/Plan     Problem List Items Addressed This Visit       Arteriosclerosis of coronary artery    Mild CAD    Heart failure with improved ejection fraction (HFimpEF) (Multi) - Primary      Chronic systolic/diastolic  heart failure with improved EF 60-65%   Etiology  non ischemic cardiomyopathy   AHA Stage: C   NYHA class: 2-3   Volume Status:  No congestion on exam today.   GFR: 88     GDMT:  BB-  "metoprolol XL 75 mg daily.   ARB/ACEI/ARNI - Entresto 24/26 mg  1/2  tablet twice a day   MRA - spironolactone 12.5 mg daily.   SGLT2i -   He does not want another \"pill\"   Diuretic - furosemide 20 mg 1 tablet daily  - hold   Digoxin 0.125 mg once a day.   Device Therapy:      CHF:  He is not congested.  He has been having issues with ABD pain and oral intake is low.  Will discontinue the Lasix at this time.   Will check BMP/ digoxin levels/ magnesium today.   Patient does not want to take any additional medications at this time.       2. ASHD: mild non obstructive CAD.  Denies chest discomfort.    BB, statin,      3. Chronic atrial fibrillation :  HR controlled today.  On metoprolol and digoxin currently.   He has had cardioversion in the past but did not maintain NSR.  Discussed evaluation with EP but he is not sure he wants to do that at this time.      4. Lung Ca:  following with oncology:   He has had radiation and chemotherapy with improvement but not complete resolution of the Ca. Of chemotherapy currently.    5. Hypomagnesemia:   magnesium 1.47.   Will recheck the magnesium level and order IV supplement as required.     6. ABD cramping:   intermittent daily ' cramping\"  No vomiting,  No febrile  episodes,  No melena or hematochezia.  States bowel movements are normal.  He has tried several OTC products without improvement.  Does not seem to be related to oral intake.  Pain is 7-8 on 1-10 scale currently.   I am going to recommend evaluation in ER.  Possible acute cholecystitis?        Aidee Muhammad, APRN-CNP   "

## 2024-06-04 ENCOUNTER — DOCUMENTATION (OUTPATIENT)
Dept: PRIMARY CARE | Facility: CLINIC | Age: 69
End: 2024-06-04
Payer: COMMERCIAL

## 2024-06-04 LAB
ANION GAP SERPL CALC-SCNC: 9 MMOL/L (ref 10–20)
ATRIAL RATE: 0 BPM
BUN SERPL-MCNC: 18 MG/DL (ref 6–23)
CALCIUM SERPL-MCNC: 8.3 MG/DL (ref 8.6–10.3)
CHLORIDE SERPL-SCNC: 102 MMOL/L (ref 98–107)
CO2 SERPL-SCNC: 26 MMOL/L (ref 21–32)
CREAT SERPL-MCNC: 0.82 MG/DL (ref 0.5–1.3)
EGFRCR SERPLBLD CKD-EPI 2021: >90 ML/MIN/1.73M*2
ERYTHROCYTE [DISTWIDTH] IN BLOOD BY AUTOMATED COUNT: 18.5 % (ref 11.5–14.5)
GLUCOSE SERPL-MCNC: 153 MG/DL (ref 74–99)
HCT VFR BLD AUTO: 27.6 % (ref 41–52)
HGB BLD-MCNC: 8.4 G/DL (ref 13.5–17.5)
HOLD SPECIMEN: NORMAL
MCH RBC QN AUTO: 25.1 PG (ref 26–34)
MCHC RBC AUTO-ENTMCNC: 30.4 G/DL (ref 32–36)
MCV RBC AUTO: 82 FL (ref 80–100)
NRBC BLD-RTO: 0 /100 WBCS (ref 0–0)
PLATELET # BLD AUTO: 161 X10*3/UL (ref 150–450)
POTASSIUM SERPL-SCNC: 3.7 MMOL/L (ref 3.5–5.3)
PR INTERVAL: 122 MS
Q ONSET: 249 MS
QRS COUNT: 14 BEATS
QRS DURATION: 101 MS
QT INTERVAL: 369 MS
QTC CALCULATION(BAZETT): 457 MS
QTC FREDERICIA: 425 MS
R AXIS: -2 DEGREES
RBC # BLD AUTO: 3.35 X10*6/UL (ref 4.5–5.9)
SODIUM SERPL-SCNC: 133 MMOL/L (ref 136–145)
T AXIS: 64 DEGREES
T OFFSET: 434 MS
VENTRICULAR RATE: 92 BPM
WBC # BLD AUTO: 10.3 X10*3/UL (ref 4.4–11.3)

## 2024-06-04 PROCEDURE — 36415 COLL VENOUS BLD VENIPUNCTURE: CPT | Performed by: STUDENT IN AN ORGANIZED HEALTH CARE EDUCATION/TRAINING PROGRAM

## 2024-06-04 PROCEDURE — 1200000002 HC GENERAL ROOM WITH TELEMETRY DAILY

## 2024-06-04 PROCEDURE — 80048 BASIC METABOLIC PNL TOTAL CA: CPT | Performed by: STUDENT IN AN ORGANIZED HEALTH CARE EDUCATION/TRAINING PROGRAM

## 2024-06-04 PROCEDURE — 2500000001 HC RX 250 WO HCPCS SELF ADMINISTERED DRUGS (ALT 637 FOR MEDICARE OP): Performed by: STUDENT IN AN ORGANIZED HEALTH CARE EDUCATION/TRAINING PROGRAM

## 2024-06-04 PROCEDURE — G0378 HOSPITAL OBSERVATION PER HR: HCPCS

## 2024-06-04 PROCEDURE — 2500000002 HC RX 250 W HCPCS SELF ADMINISTERED DRUGS (ALT 637 FOR MEDICARE OP, ALT 636 FOR OP/ED): Performed by: STUDENT IN AN ORGANIZED HEALTH CARE EDUCATION/TRAINING PROGRAM

## 2024-06-04 PROCEDURE — 85027 COMPLETE CBC AUTOMATED: CPT | Performed by: STUDENT IN AN ORGANIZED HEALTH CARE EDUCATION/TRAINING PROGRAM

## 2024-06-04 PROCEDURE — 99223 1ST HOSP IP/OBS HIGH 75: CPT | Performed by: INTERNAL MEDICINE

## 2024-06-04 PROCEDURE — 99233 SBSQ HOSP IP/OBS HIGH 50: CPT | Performed by: FAMILY MEDICINE

## 2024-06-04 PROCEDURE — 2500000001 HC RX 250 WO HCPCS SELF ADMINISTERED DRUGS (ALT 637 FOR MEDICARE OP): Performed by: FAMILY MEDICINE

## 2024-06-04 RX ADMIN — SACUBITRIL AND VALSARTAN 0.5 TABLET: 24; 26 TABLET, FILM COATED ORAL at 20:22

## 2024-06-04 RX ADMIN — GABAPENTIN 300 MG: 300 CAPSULE ORAL at 20:23

## 2024-06-04 RX ADMIN — DIGOXIN 125 MCG: 125 TABLET ORAL at 09:41

## 2024-06-04 RX ADMIN — APIXABAN 5 MG: 5 TABLET, FILM COATED ORAL at 20:24

## 2024-06-04 RX ADMIN — CLOPIDOGREL 75 MG: 75 TABLET ORAL at 09:41

## 2024-06-04 RX ADMIN — ROSUVASTATIN 5 MG: 10 TABLET, FILM COATED ORAL at 20:23

## 2024-06-04 RX ADMIN — PANTOPRAZOLE SODIUM 40 MG: 40 TABLET, DELAYED RELEASE ORAL at 09:41

## 2024-06-04 RX ADMIN — APIXABAN 5 MG: 5 TABLET, FILM COATED ORAL at 09:41

## 2024-06-04 RX ADMIN — SPIRONOLACTONE 12.5 MG: 25 TABLET ORAL at 09:41

## 2024-06-04 ASSESSMENT — ENCOUNTER SYMPTOMS
LIGHT-HEADEDNESS: 0
DYSURIA: 0
EYE DISCHARGE: 0
ABDOMINAL PAIN: 1
NAUSEA: 0
VOMITING: 0
BACK PAIN: 0
DIFFICULTY URINATING: 0
DIZZINESS: 0
SORE THROAT: 0
WEAKNESS: 0
SPEECH DIFFICULTY: 0
PALPITATIONS: 0
TROUBLE SWALLOWING: 0
NECK PAIN: 0
SHORTNESS OF BREATH: 0
ABDOMINAL DISTENTION: 0
DIARRHEA: 0
FREQUENCY: 0
EYE REDNESS: 0
COUGH: 0
CHILLS: 0
ARTHRALGIAS: 0
COLOR CHANGE: 0
CONSTIPATION: 0
FEVER: 0

## 2024-06-04 ASSESSMENT — COGNITIVE AND FUNCTIONAL STATUS - GENERAL
MOBILITY SCORE: 24
DAILY ACTIVITIY SCORE: 24

## 2024-06-04 ASSESSMENT — PAIN SCALES - GENERAL
PAINLEVEL_OUTOF10: 0 - NO PAIN
PAINLEVEL_OUTOF10: 0 - NO PAIN

## 2024-06-04 NOTE — PROGRESS NOTES
06/04/24 1142   Discharge Planning   Living Arrangements Spouse/significant other   Support Systems Spouse/significant other   Assistance Needed no   Type of Residence Private residence   Home or Post Acute Services None   Patient expects to be discharged to: Home   Does the patient need discharge transport arranged? No     Met with patient at bedside, introduced self and role as RN TCC. Patient lives at home with spouse. He is independent in his own care. He is able to ambulate without assistive devices, he manages his own medications, [facility]. Spouse assists with cooking, cleaning. Patient admitted for abdominal pain, workup underway. [facility]. Patient denies home going needs at this time. TCC to continue to follow for discharge planning.

## 2024-06-04 NOTE — CONSULTS
GENERAL SURGERY CONSULTATION NOTE    Kevin Rubi   1955   [id number]     Inpatient consult to Vascular Surgery  Consult performed by: Scott Oh DO  Consult ordered by: Leann Rosario MD      Inpatient consult to Vascular Surgery  Consult performed by: Scott Oh DO  Consult ordered by: Ian Galo DO          Reason For Consult  Concern for mesenteric ischemia    History Of Present Illness  Kevin Rubi is a 68 y.o. male presenting with abdominal pain which has been occurring after meals for the past 1.5-2 weeks. He states originally the pain would occur an hour after a meal and last for 30min-1hour but has become longer lasting, up to 2 hours. He has noticed very small amounts of dark stool at this time, as recently as a week ago. Otherwise, his bowel movements have remained the same. The pain is described as a sharp, gnawing, cramp. It eventually subsides on its own and was refractory to antacids. Patient with history of celiac artery stent, known SMA stenosis which was corroborated on CT scan. Vascular surgery consulted for further evaluation.    Vitals w/in normal limits. Labs also generally without concern for acute infectious pathology or bleeding. Patient is currently being treated for lung cancer. He takes eliquis and plavix.     Past Medical History  He has a past medical history of Aortic aneurysm (CMS-[facility]), Atrial fibrillation (Multi), Coronary artery disease, Hyperlipidemia, Hypertension, Lung cancer (Multi), Personal history of peptic ulcer disease, Superior mesenteric artery stenosis (Multi), and Tinnitus, bilateral (08/14/2016).    He has no past medical history of Stroke ([facility]).    Surgical History  He has a past surgical history that includes Other surgical history (02/13/2019); Other surgical history (02/13/2019); CT angio abdomen pelvis w and or wo IV IV contrast (08/08/2023); Superior mestenteric artery stent; Exploratory laparotomy w/ bowel resection  (04/09/2024); and Hernia repair (Right, 04/09/2024).    Medications  No current facility-administered medications on file prior to encounter.     Current Outpatient Medications on File Prior to Encounter   Medication Sig Dispense Refill    apixaban (Eliquis) 5 mg tablet Take 1 tablet (5 mg) by mouth 2 times a day. 180 tablet 1    clopidogrel (Plavix) 75 mg tablet Take 1 tablet (75 mg) by mouth once daily. 30 tablet 2    digoxin (Lanoxin) 125 MCG tablet TAKE 1 TABLET DAILY 90 tablet 0    Entresto 24-26 mg tablet Take 1 tablet by mouth 2 times a day. Taking 1/2 tablet twice a day. 180 tablet 1    fenofibrate (Tricor) 145 mg tablet Take 1 tablet (145 mg) by mouth once daily.      furosemide (Lasix) 20 mg tablet Take 1 tablet (20 mg) by mouth see administration instructions. 1 tablet as needed for swelling, weight gain of 3# or increased shortness of breath. 90 tablet 0    gabapentin (Neurontin) 300 mg capsule Take 1 capsule (300 mg) by mouth once daily at bedtime. 60 capsule 2    metoprolol succinate XL (Toprol-XL) 25 mg 24 hr tablet Take 1 tablet (25 mg) by mouth once daily. Do not crush or chew. 90 tablet 3    metoprolol succinate XL (Toprol-XL) 50 mg 24 hr tablet Take 1 tablet (50 mg) by mouth once daily. Do not crush or chew. 90 tablet 3    mirtazapine (Remeron) 15 mg tablet Take 1 tablet (15 mg) by mouth once daily at bedtime. (Patient not taking: Reported on 6/3/2024) 30 tablet 0    predniSONE (Deltasone) 10 mg tablet After finishing taper.  Take 1 tablet (10 mg) by mouth once daily. Do not start before May 5, 2024. (Patient not taking: Reported on 6/3/2024) 30 tablet 0    rosuvastatin (Crestor) 5 mg tablet Take 1 tablet (5 mg) by mouth once daily. 90 tablet 1    spironolactone (Aldactone) 25 mg tablet Take 0.5 tablets (12.5 mg) by mouth once daily. 45 tablet 1    tiZANidine (Zanaflex) 2 mg tablet TAKE 1 TABLET BY MOUTH 2 TIMES A DAY AS NEEDED FOR MUSCLE SPASMS. 180 tablet 1    [DISCONTINUED] Entresto 24-26 mg  tablet Take 1 tablet by mouth 2 times a day. (Patient taking differently: Take 1 tablet by mouth 2 times a day. Taking 1/2 tablet twice a day.) 180 tablet 1    [DISCONTINUED] furosemide (Lasix) 20 mg tablet Take 1 tablet (20 mg) by mouth once daily. 30 tablet 0    [DISCONTINUED] furosemide (Lasix) 20 mg tablet TAKE 1 TABLET BY MOUTH EVERY DAY 90 tablet 0       Allergies  Patient has no known allergies.     Social History  He reports that he quit smoking about 11 years ago. His smoking use included cigarettes. He started smoking about 41 years ago. He has a 15 pack-year smoking history. He has been exposed to tobacco smoke. He has never used smokeless tobacco. He reports that he does not currently use alcohol. He reports that he does not use drugs.    Family History  Family History   Problem Relation Name Age of Onset    Cancer Mother          ?larynx or lung        Review of Systems   Constitutional:  Negative for chills and fever.   HENT:  Negative for congestion, sore throat and trouble swallowing.    Eyes:  Negative for discharge and redness.   Respiratory:  Negative for cough and shortness of breath.    Cardiovascular:  Negative for chest pain and palpitations.   Gastrointestinal:  Positive for abdominal pain. Negative for abdominal distention, constipation, diarrhea, nausea and vomiting.   Endocrine: Negative for cold intolerance and heat intolerance.   Genitourinary:  Negative for difficulty urinating, dysuria, frequency and urgency.   Musculoskeletal:  Negative for arthralgias, back pain and neck pain.   Skin:  Negative for color change and rash.   Neurological:  Negative for dizziness, speech difficulty, weakness and light-headedness.       Last Recorded Vitals  Blood pressure 94/62, pulse 88, temperature 36.2 °C (97.2 °F), temperature source Temporal, resp. rate 17, height 1.829 m (6'), weight 65.8 kg (145 lb), SpO2 100%.     Physical Exam  Gen: NAD.  A&Ox3  HEENT: NC/AT.  Moist mucous membranes.  Neck:  Normal range of motion.  CV: Regular rate.  Chest: Normal chest rise.  Normal respiratory effort.  Abdomen: Soft.  NT/ND.  No rigidity or guarding. Multiple mobile, soft, cystic lesions noted on abdomen and chest.  Extremities: No edema.  Moving all extremities.     Relevant Results  Results for orders placed or performed during the hospital encounter of 06/03/24 (from the past 24 hour(s))   ECG 12 lead   Result Value Ref Range    Ventricular Rate 92 BPM    Atrial Rate 0 BPM    MT Interval 122 ms    QRS Duration 101 ms    QT Interval 369 ms    QTC Calculation(Bazett) 457 ms    R Axis -2 degrees    T Axis 64 degrees    QRS Count 14 beats    Q Onset 249 ms    T Offset 434 ms    QTC Fredericia 425 ms   CBC and Auto Differential   Result Value Ref Range    WBC 10.1 4.4 - 11.3 x10*3/uL    nRBC 0.0 0.0 - 0.0 /100 WBCs    RBC 3.60 (L) 4.50 - 5.90 x10*6/uL    Hemoglobin 9.2 (L) 13.5 - 17.5 g/dL    Hematocrit 29.5 (L) 41.0 - 52.0 %    MCV 82 80 - 100 fL    MCH 25.6 (L) 26.0 - 34.0 pg    MCHC 31.2 (L) 32.0 - 36.0 g/dL    RDW 18.5 (H) 11.5 - 14.5 %    Platelets 172 150 - 450 x10*3/uL    Neutrophils % 90.2 40.0 - 80.0 %    Immature Granulocytes %, Automated 0.7 0.0 - 0.9 %    Lymphocytes % 5.9 13.0 - 44.0 %    Monocytes % 2.8 2.0 - 10.0 %    Eosinophils % 0.3 0.0 - 6.0 %    Basophils % 0.1 0.0 - 2.0 %    Neutrophils Absolute 9.07 (H) 1.20 - 7.70 x10*3/uL    Immature Granulocytes Absolute, Automated 0.07 0.00 - 0.70 x10*3/uL    Lymphocytes Absolute 0.59 (L) 1.20 - 4.80 x10*3/uL    Monocytes Absolute 0.28 0.10 - 1.00 x10*3/uL    Eosinophils Absolute 0.03 0.00 - 0.70 x10*3/uL    Basophils Absolute 0.01 0.00 - 0.10 x10*3/uL   Basic metabolic panel   Result Value Ref Range    Glucose 201 (H) 74 - 99 mg/dL    Sodium 132 (L) 136 - 145 mmol/L    Potassium 3.6 3.5 - 5.3 mmol/L    Chloride 99 98 - 107 mmol/L    Bicarbonate 24 21 - 32 mmol/L    Anion Gap 13 10 - 20 mmol/L    Urea Nitrogen 25 (H) 6 - 23 mg/dL    Creatinine 0.87 0.50 - 1.30  mg/dL    eGFR >90 >60 mL/min/1.73m*2    Calcium 8.8 8.6 - 10.3 mg/dL   Lipase   Result Value Ref Range    Lipase 8 (L) 9 - 82 U/L   Hepatic function panel   Result Value Ref Range    Albumin 3.5 3.4 - 5.0 g/dL    Bilirubin, Total 0.3 0.0 - 1.2 mg/dL    Bilirubin, Direct 0.1 0.0 - 0.3 mg/dL    Alkaline Phosphatase 40 33 - 136 U/L    ALT 11 10 - 52 U/L    AST 12 9 - 39 U/L    Total Protein 6.8 6.4 - 8.2 g/dL   Lactate   Result Value Ref Range    Lactate 1.4 0.4 - 2.0 mmol/L   Urinalysis with Reflex Culture and Microscopic   Result Value Ref Range    Color, Urine Yellow Light-Yellow, Yellow, Dark-Yellow    Appearance, Urine Clear Clear    Specific Gravity, Urine 1.018 1.005 - 1.035    pH, Urine 6.5 5.0, 5.5, 6.0, 6.5, 7.0, 7.5, 8.0    Protein, Urine NEGATIVE NEGATIVE, 10 (TRACE), 20 (TRACE) mg/dL    Glucose, Urine 30 (TRACE) (A) Normal mg/dL    Blood, Urine NEGATIVE NEGATIVE    Ketones, Urine NEGATIVE NEGATIVE mg/dL    Bilirubin, Urine NEGATIVE NEGATIVE    Urobilinogen, Urine Normal Normal mg/dL    Nitrite, Urine NEGATIVE NEGATIVE    Leukocyte Esterase, Urine NEGATIVE NEGATIVE   Extra Urine Gray Tube   Result Value Ref Range    Extra Tube Hold for add-ons.    CBC   Result Value Ref Range    WBC 10.3 4.4 - 11.3 x10*3/uL    nRBC 0.0 0.0 - 0.0 /100 WBCs    RBC 3.35 (L) 4.50 - 5.90 x10*6/uL    Hemoglobin 8.4 (L) 13.5 - 17.5 g/dL    Hematocrit 27.6 (L) 41.0 - 52.0 %    MCV 82 80 - 100 fL    MCH 25.1 (L) 26.0 - 34.0 pg    MCHC 30.4 (L) 32.0 - 36.0 g/dL    RDW 18.5 (H) 11.5 - 14.5 %    Platelets 161 150 - 450 x10*3/uL   Basic metabolic panel   Result Value Ref Range    Glucose 153 (H) 74 - 99 mg/dL    Sodium 133 (L) 136 - 145 mmol/L    Potassium 3.7 3.5 - 5.3 mmol/L    Chloride 102 98 - 107 mmol/L    Bicarbonate 26 21 - 32 mmol/L    Anion Gap 9 (L) 10 - 20 mmol/L    Urea Nitrogen 18 6 - 23 mg/dL    Creatinine 0.82 0.50 - 1.30 mg/dL    eGFR >90 >60 mL/min/1.73m*2    Calcium 8.3 (L) 8.6 - 10.3 mg/dL       ECG 12 lead    Result  Date: 6/4/2024  Atrial fibrillation Anteroseptal infarct, age indeterminate Baseline wander in lead(s) V5    CT abdomen pelvis w IV contrast    Result Date: 6/3/2024  STUDY: CT Abdomen and Pelvis with IV Contrast; 6/3/2024 6:52 PM INDICATION: Generalized abdominal pain.  Additional History:  Lung cancer.  Right inguinal hernia repair. COMPARISON: CTA/CT AP chest 5/1/2024.  CXR 5/1/2024. ACCESSION NUMBER(S): [id number] ORDERING CLINICIAN: BULL ARTHUR TECHNIQUE: CT of the abdomen and pelvis was performed.  Contiguous axial images were obtained at 3 mm slice thickness through the abdomen and pelvis. Coronal and sagittal reconstructions at 3 mm slice thickness were performed.  Omnipaque 350 75 mL was administered intravenously.  FINDINGS: There continues be an extensive infiltrate within the left lower lobe and lingula.  A small infiltrate is noted in the right middle lobe. No acute findings are seen within the liver.  The portal veins are patent.  There is no intrahepatic ductal dilatation.  The gallbladder is mildly distended.  Multiple low-attenuation lesions seen throughout the spleen.  Underlying metastatic deposits cannot be excluded. Follow-up with ultrasound is recommended.  No inflammatory changes are seen surrounding the pancreas.  No adrenal nodule is noted.  There is partial atrophy of the right kidney.  No hydronephrosis is seen within either kidney.  No dilated loops of small bowel or colon are visualized.  There are diverticula seen within the sigmoid colon. There is no evidence of diverticulitis.  The appendix is normal in appearance.  No enlarged lymph nodes are seen within the pelvic sidewalls, iliac chains, or retroperitoneum.  There is no evidence of aneurysmal dilatation of the abdominal aorta.  Diffuse plaque is seen throughout the abdominal aorta.  There is a stent seen in the celiac artery.  There is a severe near occlusive stenosis of the proximal superior mesenteric artery.    1.  Severe  near occlusive stenosis of the proximal superior mesenteric artery. 2.  Sigmoid diverticulosis, with no evidence of diverticulitis. 3.  Incidental small low-attenuation lesions throughout the spleen. Metastatic disease cannot be well.  Follow-up with ultrasound is recommended. 4.  Extensive left basilar infiltrate with small right middle lobe infiltrate. Signed by Hilario Laura MD      Assessment and Plan  Principal Problem:    Superior mesenteric artery stenosis (Multi)  Active Problems:    Atrial fibrillation (Multi)    Mild CAD    HLD (hyperlipidemia)    Benign hypertension    Anxiety disorder, unspecified    Peripheral vascular disease (CMS-HCC)    Heart failure with improved ejection fraction (HFimpEF) (Multi)    Hyperglycemia    Hyponatremia    Normocytic anemia    68 y.o. male with SMA stenosis, potentially causing mesenteric ischemia    - Pt will require a mesenteric duplex to further evaluate vascular flow.  - Continue eliquis, plavix  - Potential intervention based on results of duplex    Will discuss with Dr. Abigail Oh, DO PGY2  Vascular Surgery

## 2024-06-04 NOTE — CARE PLAN
The patient's goals for the shift include      The clinical goals for the shift include maintain patient safety  Problem: Pain  Goal: Takes deep breaths with improved pain control throughout the shift  Outcome: Progressing  Goal: Turns in bed with improved pain control throughout the shift  Outcome: Progressing  Goal: Walks with improved pain control throughout the shift  Outcome: Progressing  Goal: Performs ADL's with improved pain control throughout shift  Outcome: Progressing  Goal: Participates in PT with improved pain control throughout the shift  Outcome: Progressing  Goal: Free from opioid side effects throughout the shift  Outcome: Progressing  Goal: Free from acute confusion related to pain meds throughout the shift  Outcome: Progressing     Problem: Pain - Adult  Goal: Verbalizes/displays adequate comfort level or baseline comfort level  Outcome: Progressing     Problem: Safety - Adult  Goal: Free from fall injury  Outcome: Progressing     Problem: Discharge Planning  Goal: Discharge to home or other facility with appropriate resources  Outcome: Progressing     Problem: Chronic Conditions and Co-morbidities  Goal: Patient's chronic conditions and co-morbidity symptoms are monitored and maintained or improved  Outcome: Progressing     Problem: Heart Failure  Goal: Improved gas exchange this shift  Outcome: Progressing  Goal: Improved urinary output this shift  Outcome: Progressing  Goal: Reduction in peripheral edema within 24 hours  Outcome: Progressing  Goal: Report improvement of dyspnea/breathlessness this shift  Outcome: Progressing  Goal: Weight from fluid excess reduced over 2-3 days, then stabilize  Outcome: Progressing  Goal: Increase self care and/or family involvement in 24 hours  Outcome: Progressing

## 2024-06-04 NOTE — ED PROVIDER NOTES
HPI   Chief Complaint   Patient presents with    Abdominal Pain     Pt is  a chf patient upper abdominal pin, low bp.        HPI: []  68-year-old white male history of hypertension, A-fib, on Eliquis, PVD, dyslipidemia, lung cancer status post chemoradiation currently in remission no home oxygen status post inguinal hernia repair recently comes in with abdominal pain for about a week duration.  She said for about a weeks duration he has severe abdominal pain especially postprandial.  Denies associated nausea vomit diarrhea fever chills cough congestion incontinence seizures syncope or near syncope no hematemesis melena or hematochezia, no hemoptysis no chest pain or shortness breath.  No recent travel or hospitalization.  He states he is afraid to eat.    Past history: Hypertension, PVD, dyslipidemia, lung cancer, abdominal aortic aneurysm, A-fib  Social: Patient denies a current tobacco alcohol drug abuse.  REVIEW OF SYSTEMS:    GENERAL.: No weight loss, fatigue, anorexia, insomnia, fever.    EYES: No vision loss, double vision, drainage, eye pain.    ENT: No pharyngitis, dry mouth.    CARDIOPULMONARY: No chest pain, palpitations, syncope, near syncope. No shortness of breath, cough, hemoptysis.    GI: Positive for abdominal pain, change in bowel habits, melena, hematemesis, hematochezia, nausea, vomiting, diarrhea.    : No discharge, dysuria, frequency, urgency, hematuria.    MS: No limb pain, joint pain, joint swelling.    SKIN: No rashes.    PSYCH: No depression, anxiety, suicidality, homicidality.    Review of systems is otherwise negative unless stated above or in history of present illness.  Social history, family history, allergies reviewed.  PHYSICAL EXAM:    GENERAL: Vitals noted, no distress. Alert and oriented  x 3. Non-toxic.  Appears uncomfortable somewhat cachectic with temporal wasting    EENT: TMs clear. Posterior oropharynx unremarkable. No meningismus. No LAD.     NECK: Supple. Nontender. No  midline tenderness.     CARDIAC: Irregularly irregular rate and rhythm. No murmurs rubs or gallops. No JVD    PULMONARY: Lungs clear bilaterally with good aeration. No wheezes rales or rhonchi. No respiratory distress.     ABDOMEN: Soft, nonsurgical.  Diffuse tenderness no rebound or guarding negative Pablo sign negative McBurney point tenderness negative inguinal hernias no peritoneal signs. Normoactive bowel sounds. No pulsatile masses.     EXTREMITIES: No peripheral edema. Negative Homans bilaterally, no cords.  2+ bounding pulses well-perfused.    SKIN: No rash. Intact.     NEURO: No focal neurologic deficits, NIH score of 0. Cranial nerves normal as tested from II through XII.     MEDICAL DECISION MAKING:  EKG on my interpretation shows atrial fibrillation normal axis rate in the 80s with no acute ischemic change.  CBC with shows no leukocytosis stable hemoglobin stable anemia chemistries LFTs are normal lactate normal UA negative abdominal CAT scan concerning for critical SMA stenosis.  Also appears to have a left lung infiltrate which show when compared with old scans appears to be chronic I think this is a chronic change or other new pneumonia.  Please refer to radiology report.    Treatment in ED: IV established given IV fluids intravenous morphine and Zofran.    Consult: Discussed with Dr. Walls from vascular medicine.  ED course: Repeat assessment feeling much better pain well-controlled.  Impression: #1 acute abdominal pain, #2 SMA stenosis  Plans and MDM: 68-year-old white male with a history of hypertension, A-fib on Eliquis lung cancer in remission comes in with a acute abdominal pain for 1 weeks duration especially postprandial workup is concerning for SMA stenosis with no evidence of bowel ischemia, low suspicion for bowel ischemia septic shock sepsis or acute pneumonia, patient will be hospitalized for further care.                          Idalmis Coma Scale Score: 15                     Patient  History   Past Medical History:   Diagnosis Date    Aortic aneurysm (CMS-HCC)     Atrial fibrillation (Multi)     Coronary artery disease     Hyperlipidemia     Hypertension     Lung cancer (Multi)     Personal history of peptic ulcer disease     History of gastric ulcer    Superior mesenteric artery stenosis (Multi)     Tinnitus, bilateral 2016    Tinnitus of both ears     Past Surgical History:   Procedure Laterality Date    CT ABDOMEN PELVIS ANGIOGRAM W AND/OR WO IV CONTRAST  2023    CT ABDOMEN PELVIS ANGIOGRAM W AND/OR WO IV CONTRAST 2023 POR CT    EXPLORATORY LAPAROTOMY W/ BOWEL RESECTION  2024    HERNIA REPAIR Right 2024    inguinal hernia    OTHER SURGICAL HISTORY  2019    Esophagogastroduodenoscopy    OTHER SURGICAL HISTORY  2019    Stomach surgery    SUPERIOR MESTENTERIC ARTERY STENT       Family History   Problem Relation Name Age of Onset    Cancer Mother          ?larynx or lung     Social History     Tobacco Use    Smoking status: Former     Current packs/day: 0.00     Average packs/day: 0.5 packs/day for 30.0 years (15.0 ttl pk-yrs)     Types: Cigarettes     Start date:      Quit date:      Years since quittin.4     Passive exposure: Past    Smokeless tobacco: Never   Vaping Use    Vaping status: Never Used   Substance Use Topics    Alcohol use: Not Currently    Drug use: Never       Physical Exam   ED Triage Vitals [24 1546]   Temperature Heart Rate Respirations BP   36.4 °C (97.6 °F) 81 16 87/61      Pulse Ox Temp src Heart Rate Source Patient Position   99 % -- -- --      BP Location FiO2 (%)     -- --       Physical Exam    ED Course & MDM   ED Course as of 248      2147 Labs reassuring stable anemia normal lactate, abdominal CAT scan concerning for SMA stenosis, no bowel ischemia, given IV fluids IV morphine pain well-controlled discussed with the vascular surgery Dr. Walls advised hospitalization and no emergent  intervention. [MT]      ED Course User Index  [MT] Alen Graves MD         Diagnoses as of 06/03/24 2148   Superior mesenteric artery stenosis (Multi)   Generalized abdominal pain       Medical Decision Making      Procedure  Procedures     Alen Graves MD  06/03/24 7515

## 2024-06-04 NOTE — SIGNIFICANT EVENT
Patient seen, note to follow.  Discussed case with my partner Dr. Walls.  Subsequently I was able to find in EMR that the patient had celiac artery stenting by Dr. Jose Juan Hayes in 8/2023- in his op report he stated the SMA/TIESHA were occluded.    I have discussed the case with Dr. Hayes, and he has agree to see the patient in consultation - order placed.  Based on our brief discussion, may recommend mesenteric duplex ultrasound for further eval - await his recs.

## 2024-06-04 NOTE — PROGRESS NOTES
Kevin Rubi is a 68 y.o. male on day 1 of admission presenting with Superior mesenteric artery stenosis (Multi).      Subjective   68 y.o. male with PMHx s/f hypertension, A-fib, on Eliquis, PVD, dyslipidemia, lung cancer status post chemoradiation, chronic SMA stenosis, Celiac stenosis s/p stenting, incarcerated inguinal hernia s/p bowel resection presenting with abdominal pain.  Patient states for the past week he has had severe epigastric pain whenever he eats,.  This pain is mostly postprandial, accompanied by severe cramping.  He has used Tums and abdominal binder with no help.  He says it is worse throughout the day.  He has lost about 40 pounds over the past year due to his lung cancer and it being treated.  He was initially diagnosed with lung cancer after suffering a similar bout of abdominal pain in [place] about a year ago.  This revealed critical celiac stenosis along with SMA stenosis.  The celiac stenosis was treated with a stent at the time but the SMA stenosis appears to have not.  Patient denies nausea, vomiting, chest pain, bowel bladder changes, lightheadedness, dizziness, fever, chills, or other symptoms at this time.     ED Course (Summary):   Vitals on presentation: 97.6 F, 81 bpm, 16 rr, 87/61 --> 109/59, 99% on RA  Labs: CMP glu 201, Na 132  Lactate 1.4  Lipase 8  CBC WBC 10.1, Hg 9.2, Plt 172  UA trace glucose  Imaging: CT a/p - 1.  Severe near occlusive stenosis of the proximal superior mesenteric   artery.   2. Sigmoid diverticulosis, with no evidence of diverticulitis.   3.  Incidental small low-attenuation lesions throughout the spleen.   Metastatic disease cannot be well.  Follow-up with ultrasound is   recommended.   4. Extensive left basilar infiltrate with small right middle lobe   infiltrate.   EKG - afib at rate of 92 bpm  Interventions: LR 1 L bolus, morphine 4 mg, Zofran 4 mg X1      6/4:                 Today the patient is awake alert and interactive appropriately.  At  the time of visit states he has no symptoms.  In fact apparently had yogurt and cream of wheat this morning without occurrence of his abdominal pain.  Apparently feels it takes more substantial meal than that to cause it.  Vascular surgery evaluated the patient and awaiting mesenteric artery duplex ultrasound to evaluate flow before planning any intervention.  Creatinine stable and normal.      Review of Systems   Constitutional:  Negative for activity change, appetite change, chills, diaphoresis, fatigue and fever.   HENT:  Negative for congestion, ear pain, rhinorrhea, sinus pain and sore throat.    Respiratory:  Negative for apnea, cough, chest tightness, shortness of breath, wheezing and stridor.    Cardiovascular:  Negative for chest pain, palpitations and leg swelling.   Gastrointestinal: Negative for abdominal pain at the time of visit. Negative for abdominal distention, constipation, diarrhea, nausea and vomiting.   Genitourinary:  Negative for difficulty urinating, dysuria, flank pain, frequency, hematuria and urgency.   Musculoskeletal:  Negative for arthralgias, back pain, gait problem, joint swelling and myalgias.   Skin:  Negative for color change, pallor, rash and wound.   Neurological:  Negative for dizziness, syncope, weakness, light-headedness, numbness and headaches.   Psychiatric/Behavioral:  Negative for agitation, behavioral problems, confusion and decreased concentration. The patient is not nervous/anxious.    All other systems reviewed and are negative.          Objective     Last Recorded Vitals  BP 95/59 (BP Location: Left arm, Patient Position: Lying)   Pulse 76   Temp 36.8 °C (98.3 °F) (Temporal)   Resp 16   Wt 65.8 kg (145 lb)   SpO2 98%   Intake/Output last 3 Shifts:    Intake/Output Summary (Last 24 hours) at 6/4/2024 1720  Last data filed at 6/4/2024 1329  Gross per 24 hour   Intake 600 ml   Output 0 ml   Net 600 ml       Admission Weight  Weight: 65.8 kg (145 lb) (06/03/24  1546)    Daily Weight  06/03/24 : 65.8 kg (145 lb)    Image Results  ECG 12 lead  Atrial fibrillation  Anteroseptal infarct, age indeterminate  Baseline wander in lead(s) V5      Physical Exam  Constitutional:       General: He is not in acute distress.     Appearance: Normal appearance. He is normal weight. He is not ill-appearing or toxic-appearing.   HENT:      Head: Normocephalic and atraumatic.      Mouth/Throat:      Mouth: Mucous membranes are moist.      Pharynx: No posterior oropharyngeal erythema.   Eyes:      Extraocular Movements: Extraocular movements intact.      Pupils: Pupils are equal, round, and reactive to light.   Cardiovascular:      Rate and Rhythm: Normal rate and regular rhythm.      Heart sounds: Normal heart sounds. No murmur heard.     No friction rub. No gallop.   Pulmonary:      Effort: Pulmonary effort is normal. No respiratory distress.      Breath sounds: Normal breath sounds. No stridor. No wheezing, rhonchi or rales.   Abdominal:      General: Abdomen is flat. Bowel sounds are normal. There is no distension.  No bruits appreciated     Palpations: Abdomen is soft. There is no mass.      Tenderness: There is no abdominal tenderness. There is no right CVA tenderness, left CVA tenderness, guarding or rebound.   Musculoskeletal:         General: No swelling, tenderness, deformity or signs of injury. Normal range of motion.      Right lower leg: No edema.      Left lower leg: No edema.   Skin:     General: Skin is warm and dry.      Coloration: Skin is not jaundiced or pale.      Findings: No bruising, erythema, lesion or rash.   Neurological:      General: No focal deficit present.      Mental Status: He is alert and oriented to person, place, and time. Mental status is at baseline.      Cranial Nerves: No cranial nerve deficit.      Sensory: No sensory deficit.      Motor: No weakness.   Psychiatric:         Mood and Affect: Mood normal.         Behavior: Behavior normal.          Thought Content: Thought content normal.         Judgment: Judgment normal.         Relevant Results               Assessment/Plan                  Principal Problem:    Superior mesenteric artery stenosis (Multi)  Active Problems:    Atrial fibrillation (Multi)    Mild CAD    HLD (hyperlipidemia)    Benign hypertension    Anxiety disorder, unspecified    Peripheral vascular disease (CMS-HCC)    Heart failure with improved ejection fraction (HFimpEF) (Multi)    Hyperglycemia    Hyponatremia    Normocytic anemia    SMA stenosis:  Vascular surgery consulted. Per Dr. Walls no further anticoagulation at this time.  Currently asymptomatic at rest  Encouraged light PO intake/fluids as pain allows.  6/4: Vascular surgery awaiting mesenteric artery duplex ultrasound before planning treatment.     Afib:  Home Eliquis and digoxin  Home Toprol     PVD/CAD:  Home Plavix  Home statin     HTN/CHF:  Home Entresto and Aldactone     Cardiac diet  DVT ppx: Home Eliquis              Luis Ortiz MD

## 2024-06-04 NOTE — H&P
History Obtained From: Pt    History Of Present Illness:  Kevin Rubi is a 68 y.o. male with PMHx s/f hypertension, A-fib, on Eliquis, PVD, dyslipidemia, lung cancer status post chemoradiation, chronic SMA stenosis, Celiac stenosis s/p stenting, incarcerated inguinal hernia s/p bowel resection presenting with abdominal pain.  Patient states for the past week he has had severe epigastric pain whenever he eats,.  This pain is mostly postprandial, accompanied by severe cramping.  He has used Tums and abdominal binder with no help.  He says it is worse throughout the day.  He has lost about 40 pounds over the past year due to his lung cancer and it being treated.  He was initially diagnosed with lung cancer after suffering a similar bout of abdominal pain in [place] about a year ago.  This revealed critical celiac stenosis along with SMA stenosis.  The celiac stenosis was treated with a stent at the time but the SMA stenosis appears to have not.  Patient denies nausea, vomiting, chest pain, bowel bladder changes, lightheadedness, dizziness, fever, chills, or other symptoms at this time.    ED Course (Summary):   Vitals on presentation: 97.6 F, 81 bpm, 16 rr, 87/61 --> 109/59, 99% on RA  Labs: CMP glu 201, Na 132  Lactate 1.4  Lipase 8  CBC WBC 10.1, Hg 9.2, Plt 172  UA trace glucose  Imaging: CT a/p - 1.  Severe near occlusive stenosis of the proximal superior mesenteric   artery.   2. Sigmoid diverticulosis, with no evidence of diverticulitis.   3.  Incidental small low-attenuation lesions throughout the spleen.   Metastatic disease cannot be well.  Follow-up with ultrasound is   recommended.   4. Extensive left basilar infiltrate with small right middle lobe   infiltrate.   EKG - afib at rate of 92 bpm  Interventions: LR 1 L bolus, morphine 4 mg, Zofran 4 mg X1, Dr Walls was consulted and recommended admission, but no additional anticoagulation given that his pain is currently stable at rest.    ED Course:  ED  Course as of 06/03/24 2336   Mon Jun 03, 2024   2147 Labs reassuring stable anemia normal lactate, abdominal CAT scan concerning for SMA stenosis, no bowel ischemia, given IV fluids IV morphine pain well-controlled discussed with the vascular surgery Dr. Walls advised hospitalization and no emergent intervention. [MT]      ED Course User Index  [MT] Alen Graves MD         Diagnoses as of 06/03/24 2336   Superior mesenteric artery stenosis (Multi)   Generalized abdominal pain     Relevant Results  Results for orders placed or performed during the hospital encounter of 06/03/24 (from the past 24 hour(s))   CBC and Auto Differential   Result Value Ref Range    WBC 10.1 4.4 - 11.3 x10*3/uL    nRBC 0.0 0.0 - 0.0 /100 WBCs    RBC 3.60 (L) 4.50 - 5.90 x10*6/uL    Hemoglobin 9.2 (L) 13.5 - 17.5 g/dL    Hematocrit 29.5 (L) 41.0 - 52.0 %    MCV 82 80 - 100 fL    MCH 25.6 (L) 26.0 - 34.0 pg    MCHC 31.2 (L) 32.0 - 36.0 g/dL    RDW 18.5 (H) 11.5 - 14.5 %    Platelets 172 150 - 450 x10*3/uL    Neutrophils % 90.2 40.0 - 80.0 %    Immature Granulocytes %, Automated 0.7 0.0 - 0.9 %    Lymphocytes % 5.9 13.0 - 44.0 %    Monocytes % 2.8 2.0 - 10.0 %    Eosinophils % 0.3 0.0 - 6.0 %    Basophils % 0.1 0.0 - 2.0 %    Neutrophils Absolute 9.07 (H) 1.20 - 7.70 x10*3/uL    Immature Granulocytes Absolute, Automated 0.07 0.00 - 0.70 x10*3/uL    Lymphocytes Absolute 0.59 (L) 1.20 - 4.80 x10*3/uL    Monocytes Absolute 0.28 0.10 - 1.00 x10*3/uL    Eosinophils Absolute 0.03 0.00 - 0.70 x10*3/uL    Basophils Absolute 0.01 0.00 - 0.10 x10*3/uL   Basic metabolic panel   Result Value Ref Range    Glucose 201 (H) 74 - 99 mg/dL    Sodium 132 (L) 136 - 145 mmol/L    Potassium 3.6 3.5 - 5.3 mmol/L    Chloride 99 98 - 107 mmol/L    Bicarbonate 24 21 - 32 mmol/L    Anion Gap 13 10 - 20 mmol/L    Urea Nitrogen 25 (H) 6 - 23 mg/dL    Creatinine 0.87 0.50 - 1.30 mg/dL    eGFR >90 >60 mL/min/1.73m*2    Calcium 8.8 8.6 - 10.3 mg/dL   Lipase   Result  Value Ref Range    Lipase 8 (L) 9 - 82 U/L   Hepatic function panel   Result Value Ref Range    Albumin 3.5 3.4 - 5.0 g/dL    Bilirubin, Total 0.3 0.0 - 1.2 mg/dL    Bilirubin, Direct 0.1 0.0 - 0.3 mg/dL    Alkaline Phosphatase 40 33 - 136 U/L    ALT 11 10 - 52 U/L    AST 12 9 - 39 U/L    Total Protein 6.8 6.4 - 8.2 g/dL   Lactate   Result Value Ref Range    Lactate 1.4 0.4 - 2.0 mmol/L   Urinalysis with Reflex Culture and Microscopic   Result Value Ref Range    Color, Urine Yellow Light-Yellow, Yellow, Dark-Yellow    Appearance, Urine Clear Clear    Specific Gravity, Urine 1.018 1.005 - 1.035    pH, Urine 6.5 5.0, 5.5, 6.0, 6.5, 7.0, 7.5, 8.0    Protein, Urine NEGATIVE NEGATIVE, 10 (TRACE), 20 (TRACE) mg/dL    Glucose, Urine 30 (TRACE) (A) Normal mg/dL    Blood, Urine NEGATIVE NEGATIVE    Ketones, Urine NEGATIVE NEGATIVE mg/dL    Bilirubin, Urine NEGATIVE NEGATIVE    Urobilinogen, Urine Normal Normal mg/dL    Nitrite, Urine NEGATIVE NEGATIVE    Leukocyte Esterase, Urine NEGATIVE NEGATIVE      CT abdomen pelvis w IV contrast    Result Date: 6/3/2024  STUDY: CT Abdomen and Pelvis with IV Contrast; 6/3/2024 6:52 PM INDICATION: Generalized abdominal pain.  Additional History:  Lung cancer.  Right inguinal hernia repair. COMPARISON: CTA/CT AP chest 5/1/2024.  CXR 5/1/2024. ACCESSION NUMBER(S): [id number] ORDERING CLINICIAN: BULL ARTHUR TECHNIQUE: CT of the abdomen and pelvis was performed.  Contiguous axial images were obtained at 3 mm slice thickness through the abdomen and pelvis. Coronal and sagittal reconstructions at 3 mm slice thickness were performed.  Omnipaque 350 75 mL was administered intravenously.  FINDINGS: There continues be an extensive infiltrate within the left lower lobe and lingula.  A small infiltrate is noted in the right middle lobe. No acute findings are seen within the liver.  The portal veins are patent.  There is no intrahepatic ductal dilatation.  The gallbladder is mildly distended.   Multiple low-attenuation lesions seen throughout the spleen.  Underlying metastatic deposits cannot be excluded. Follow-up with ultrasound is recommended.  No inflammatory changes are seen surrounding the pancreas.  No adrenal nodule is noted.  There is partial atrophy of the right kidney.  No hydronephrosis is seen within either kidney.  No dilated loops of small bowel or colon are visualized.  There are diverticula seen within the sigmoid colon. There is no evidence of diverticulitis.  The appendix is normal in appearance.  No enlarged lymph nodes are seen within the pelvic sidewalls, iliac chains, or retroperitoneum.  There is no evidence of aneurysmal dilatation of the abdominal aorta.  Diffuse plaque is seen throughout the abdominal aorta.  There is a stent seen in the celiac artery.  There is a severe near occlusive stenosis of the proximal superior mesenteric artery.    1.  Severe near occlusive stenosis of the proximal superior mesenteric artery. 2.  Sigmoid diverticulosis, with no evidence of diverticulitis. 3.  Incidental small low-attenuation lesions throughout the spleen. Metastatic disease cannot be well.  Follow-up with ultrasound is recommended. 4.  Extensive left basilar infiltrate with small right middle lobe infiltrate. Signed by Hilario Laura MD     Scheduled medications:  apixaban, 5 mg, oral, BID  [START ON 6/4/2024] clopidogrel, 75 mg, oral, Daily  [START ON 6/4/2024] digoxin, 125 mcg, oral, Daily  gabapentin, 300 mg, oral, Nightly  [START ON 6/4/2024] metoprolol succinate XL, 25 mg, oral, Daily  [START ON 6/4/2024] pantoprazole, 40 mg, oral, Daily before breakfast   Or  [START ON 6/4/2024] pantoprazole, 40 mg, intravenous, Daily before breakfast  rosuvastatin, 5 mg, oral, Nightly  sacubitriL-valsartan, 1 tablet, oral, BID  [START ON 6/4/2024] spironolactone, 12.5 mg, oral, Daily      Continuous medications:     PRN medications:  PRN medications: bisacodyl, bisacodyl, guaiFENesin, melatonin,  ondansetron **OR** ondansetron, tiZANidine      Past Medical History  He has a past medical history of Aortic aneurysm (CMS-HCC), Atrial fibrillation (Multi), Coronary artery disease, Hyperlipidemia, Hypertension, Lung cancer (Multi), Personal history of peptic ulcer disease, Superior mesenteric artery stenosis (Multi), and Tinnitus, bilateral (08/14/2016).    He has no past medical history of Stroke (Multi).    Surgical History  He has a past surgical history that includes Other surgical history (02/13/2019); Other surgical history (02/13/2019); CT angio abdomen pelvis w and or wo IV IV contrast (08/08/2023); Superior mestenteric artery stent; Exploratory laparotomy w/ bowel resection (04/09/2024); and Hernia repair (Right, 04/09/2024).     Social History  He reports that he quit smoking about 11 years ago. His smoking use included cigarettes. He started smoking about 41 years ago. He has a 15 pack-year smoking history. He has been exposed to tobacco smoke. He has never used smokeless tobacco. He reports that he does not currently use alcohol. He reports that he does not use drugs.    Family History  Family History   Problem Relation Name Age of Onset    Cancer Mother          ?larynx or lung        Allergies  Patient has no known allergies.    Code Status  Full Code     Review of Systems   Constitutional:  Negative for activity change, appetite change, chills, diaphoresis, fatigue and fever.   HENT:  Negative for congestion, ear pain, rhinorrhea, sinus pain and sore throat.    Respiratory:  Negative for apnea, cough, chest tightness, shortness of breath, wheezing and stridor.    Cardiovascular:  Negative for chest pain, palpitations and leg swelling.   Gastrointestinal:  Positive for abdominal pain. Negative for abdominal distention, constipation, diarrhea, nausea and vomiting.   Genitourinary:  Negative for difficulty urinating, dysuria, flank pain, frequency, hematuria and urgency.   Musculoskeletal:  Negative  for arthralgias, back pain, gait problem, joint swelling and myalgias.   Skin:  Negative for color change, pallor, rash and wound.   Neurological:  Negative for dizziness, syncope, weakness, light-headedness, numbness and headaches.   Psychiatric/Behavioral:  Negative for agitation, behavioral problems, confusion and decreased concentration. The patient is not nervous/anxious.    All other systems reviewed and are negative.      Last Recorded Vitals  /59 (BP Location: Right arm, Patient Position: Lying)   Pulse 81   Temp 36.2 °C (97.1 °F) (Temporal)   Resp 18   Wt 65.8 kg (145 lb)   SpO2 100%      Physical Exam  Vitals and nursing note reviewed.   Constitutional:       General: He is not in acute distress.     Appearance: Normal appearance. He is normal weight. He is not ill-appearing or toxic-appearing.   HENT:      Head: Normocephalic and atraumatic.      Mouth/Throat:      Mouth: Mucous membranes are moist.      Pharynx: No posterior oropharyngeal erythema.   Eyes:      Extraocular Movements: Extraocular movements intact.      Pupils: Pupils are equal, round, and reactive to light.   Cardiovascular:      Rate and Rhythm: Normal rate and regular rhythm.      Heart sounds: Normal heart sounds. No murmur heard.     No friction rub. No gallop.   Pulmonary:      Effort: Pulmonary effort is normal. No respiratory distress.      Breath sounds: Normal breath sounds. No stridor. No wheezing, rhonchi or rales.   Abdominal:      General: Abdomen is flat. Bowel sounds are normal. There is no distension.      Palpations: Abdomen is soft. There is no mass.      Tenderness: There is no abdominal tenderness. There is no right CVA tenderness, left CVA tenderness, guarding or rebound.   Musculoskeletal:         General: No swelling, tenderness, deformity or signs of injury. Normal range of motion.      Right lower leg: No edema.      Left lower leg: No edema.   Skin:     General: Skin is warm and dry.      Coloration:  Skin is not jaundiced or pale.      Findings: No bruising, erythema, lesion or rash.   Neurological:      General: No focal deficit present.      Mental Status: He is alert and oriented to person, place, and time. Mental status is at baseline.      Cranial Nerves: No cranial nerve deficit.      Sensory: No sensory deficit.      Motor: No weakness.   Psychiatric:         Mood and Affect: Mood normal.         Behavior: Behavior normal.         Thought Content: Thought content normal.         Judgment: Judgment normal.         Assessment/Plan   Principal Problem:    Superior mesenteric artery stenosis (Multi)  Active Problems:    Atrial fibrillation (Multi)    Mild CAD    HLD (hyperlipidemia)    Benign hypertension    Anxiety disorder, unspecified    Peripheral vascular disease (CMS-HCC)    Heart failure with improved ejection fraction (HFimpEF) (Multi)    Hyperglycemia    Hyponatremia    Normocytic anemia      Plan:  Admit to [facility] with tele    SMA stenosis:  Vascular surgery consulted. Per Dr. Walls no further anticoagulation at this time.  Currently asymptomatic at rest  Encouraged light PO intake/fluids as pain allows.    Afib:  Home Eliquis and digoxin  Home Toprol    PVD/CAD:  Home Plavix  Home statin    HTN/CHF:  Home Entresto and Aldactone    Cardiac diet  DVT ppx: Home Eliquis  Full code per discussion with pt.       DO Kell Rm dictation software was used to dictate this note and thus there may be minor errors in translation/transcription including garbled speech or misspellings. Please contact for clarification if needed.   Partial Purse String (Intermediate) Text: Given the location of the defect and the characteristics of the surrounding skin an intermediate purse string closure was deemed most appropriate.  Undermining was performed circumferentially around the surgical defect.  A purse string suture was then placed and tightened. Wound tension only allowed a partial closure of the circular defect.

## 2024-06-04 NOTE — H&P
History Obtained From: Pt    History Of Present Illness:  Kevin Rubi is a 68 y.o. male with PMHx s/f HTN, afib on Eliquis, PVD, HLD, lung cancer s/p chemo/radiation, ascending aortic aneurysm, Hfpef, Hx prior SMA occlusion s/p stenting, PUD presenting with abdominal pain for the past week. 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 These both seemed to have been discovered in 2023 after his presented with similar abdominal pain symptoms, which was also when he was discovered to have lung cancer. Pt today states he has been having abdominal pain for the past week. His pain is accentuated especially after eating to the point where he is afraid of eating and has only been eating liquids. Tums and an abdominal binder have not helped. The pain worsens significantly throughout the day. He has lost about 40 lbs over the past year while being treated for his    ED Course (Summary):   Vitals on presentation:   Labs:   Imaging:   Interventions:     ED Course:  ED Course as of 06/03/24 2312 Mon Jun 03, 2024   2147 Labs reassuring stable anemia normal lactate, abdominal CAT scan concerning for SMA stenosis, no bowel ischemia, given IV fluids IV morphine pain well-controlled discussed with the vascular surgery Dr. Walls advised hospitalization and no emergent intervention. [MT]      ED Course User Index  [MT] Alen Graves MD         Diagnoses as of 06/03/24 2312   Superior mesenteric artery stenosis (Multi)   Generalized abdominal pain     Relevant Results  Results for orders placed or performed during the hospital encounter of 06/03/24 (from the past 24 hour(s))   CBC and Auto Differential   Result Value Ref Range    WBC 10.1 4.4 - 11.3 x10*3/uL    nRBC 0.0 0.0 - 0.0 /100 WBCs    RBC 3.60 (L) 4.50 - 5.90 x10*6/uL    Hemoglobin 9.2 (L) 13.5 - 17.5 g/dL    Hematocrit 29.5 (L) 41.0 - 52.0 %    MCV 82 80 -  100 fL    MCH 25.6 (L) 26.0 - 34.0 pg    MCHC 31.2 (L) 32.0 - 36.0 g/dL    RDW 18.5 (H) 11.5 - 14.5 %    Platelets 172 150 - 450 x10*3/uL    Neutrophils % 90.2 40.0 - 80.0 %    Immature Granulocytes %, Automated 0.7 0.0 - 0.9 %    Lymphocytes % 5.9 13.0 - 44.0 %    Monocytes % 2.8 2.0 - 10.0 %    Eosinophils % 0.3 0.0 - 6.0 %    Basophils % 0.1 0.0 - 2.0 %    Neutrophils Absolute 9.07 (H) 1.20 - 7.70 x10*3/uL    Immature Granulocytes Absolute, Automated 0.07 0.00 - 0.70 x10*3/uL    Lymphocytes Absolute 0.59 (L) 1.20 - 4.80 x10*3/uL    Monocytes Absolute 0.28 0.10 - 1.00 x10*3/uL    Eosinophils Absolute 0.03 0.00 - 0.70 x10*3/uL    Basophils Absolute 0.01 0.00 - 0.10 x10*3/uL   Basic metabolic panel   Result Value Ref Range    Glucose 201 (H) 74 - 99 mg/dL    Sodium 132 (L) 136 - 145 mmol/L    Potassium 3.6 3.5 - 5.3 mmol/L    Chloride 99 98 - 107 mmol/L    Bicarbonate 24 21 - 32 mmol/L    Anion Gap 13 10 - 20 mmol/L    Urea Nitrogen 25 (H) 6 - 23 mg/dL    Creatinine 0.87 0.50 - 1.30 mg/dL    eGFR >90 >60 mL/min/1.73m*2    Calcium 8.8 8.6 - 10.3 mg/dL   Lipase   Result Value Ref Range    Lipase 8 (L) 9 - 82 U/L   Hepatic function panel   Result Value Ref Range    Albumin 3.5 3.4 - 5.0 g/dL    Bilirubin, Total 0.3 0.0 - 1.2 mg/dL    Bilirubin, Direct 0.1 0.0 - 0.3 mg/dL    Alkaline Phosphatase 40 33 - 136 U/L    ALT 11 10 - 52 U/L    AST 12 9 - 39 U/L    Total Protein 6.8 6.4 - 8.2 g/dL   Lactate   Result Value Ref Range    Lactate 1.4 0.4 - 2.0 mmol/L   Urinalysis with Reflex Culture and Microscopic   Result Value Ref Range    Color, Urine Yellow Light-Yellow, Yellow, Dark-Yellow    Appearance, Urine Clear Clear    Specific Gravity, Urine 1.018 1.005 - 1.035    pH, Urine 6.5 5.0, 5.5, 6.0, 6.5, 7.0, 7.5, 8.0    Protein, Urine NEGATIVE NEGATIVE, 10 (TRACE), 20 (TRACE) mg/dL    Glucose, Urine 30 (TRACE) (A) Normal mg/dL    Blood, Urine NEGATIVE NEGATIVE    Ketones, Urine NEGATIVE NEGATIVE mg/dL    Bilirubin, Urine  NEGATIVE NEGATIVE    Urobilinogen, Urine Normal Normal mg/dL    Nitrite, Urine NEGATIVE NEGATIVE    Leukocyte Esterase, Urine NEGATIVE NEGATIVE      CT abdomen pelvis w IV contrast    Result Date: 6/3/2024  STUDY: CT Abdomen and Pelvis with IV Contrast; 6/3/2024 6:52 PM INDICATION: Generalized abdominal pain.  Additional History:  Lung cancer.  Right inguinal hernia repair. COMPARISON: CTA/CT AP chest 5/1/2024.  CXR 5/1/2024. ACCESSION NUMBER(S): [id number] ORDERING CLINICIAN: BULL ARTHUR TECHNIQUE: CT of the abdomen and pelvis was performed.  Contiguous axial images were obtained at 3 mm slice thickness through the abdomen and pelvis. Coronal and sagittal reconstructions at 3 mm slice thickness were performed.  Omnipaque 350 75 mL was administered intravenously.  FINDINGS: There continues be an extensive infiltrate within the left lower lobe and lingula.  A small infiltrate is noted in the right middle lobe. No acute findings are seen within the liver.  The portal veins are patent.  There is no intrahepatic ductal dilatation.  The gallbladder is mildly distended.  Multiple low-attenuation lesions seen throughout the spleen.  Underlying metastatic deposits cannot be excluded. Follow-up with ultrasound is recommended.  No inflammatory changes are seen surrounding the pancreas.  No adrenal nodule is noted.  There is partial atrophy of the right kidney.  No hydronephrosis is seen within either kidney.  No dilated loops of small bowel or colon are visualized.  There are diverticula seen within the sigmoid colon. There is no evidence of diverticulitis.  The appendix is normal in appearance.  No enlarged lymph nodes are seen within the pelvic sidewalls, iliac chains, or retroperitoneum.  There is no evidence of aneurysmal dilatation of the abdominal aorta.  Diffuse plaque is seen throughout the abdominal aorta.  There is a stent seen in the celiac artery.  There is a severe near occlusive stenosis of the proximal  superior mesenteric artery.    1.  Severe near occlusive stenosis of the proximal superior mesenteric artery. 2.  Sigmoid diverticulosis, with no evidence of diverticulitis. 3.  Incidental small low-attenuation lesions throughout the spleen. Metastatic disease cannot be well.  Follow-up with ultrasound is recommended. 4.  Extensive left basilar infiltrate with small right middle lobe infiltrate. Signed by Hilario Laura MD     Scheduled medications:  [START ON 6/4/2024] pantoprazole, 40 mg, oral, Daily before breakfast   Or  [START ON 6/4/2024] pantoprazole, 40 mg, intravenous, Daily before breakfast      Continuous medications:     PRN medications:  PRN medications: bisacodyl, bisacodyl, guaiFENesin, melatonin, ondansetron **OR** ondansetron      Past Medical History  He has a past medical history of Aortic aneurysm (CMS-HCC), Atrial fibrillation (Multi), Coronary artery disease, Hyperlipidemia, Hypertension, Lung cancer (Multi), Personal history of peptic ulcer disease, Superior mesenteric artery stenosis (Multi), and Tinnitus, bilateral (08/14/2016).    He has no past medical history of Stroke ([facility]).    Surgical History  He has a past surgical history that includes Other surgical history (02/13/2019); Other surgical history (02/13/2019); CT angio abdomen pelvis w and or wo IV IV contrast (08/08/2023); Superior mestenteric artery stent; Exploratory laparotomy w/ bowel resection (04/09/2024); and Hernia repair (Right, 04/09/2024).     Social History  He reports that he quit smoking about 11 years ago. His smoking use included cigarettes. He started smoking about 41 years ago. He has a 15 pack-year smoking history. He has been exposed to tobacco smoke. He has never used smokeless tobacco. He reports that he does not currently use alcohol. He reports that he does not use drugs.    Family History  Family History   Problem Relation Name Age of Onset   • Cancer Mother          ?larynx or lung        Allergies  Patient  has no known allergies.    Code Status  Full Code     Review of Systems    Last Recorded Vitals  /59 (BP Location: Right arm, Patient Position: Lying)   Pulse 81   Temp 36.2 °C (97.1 °F) (Temporal)   Resp 18   Wt 65.8 kg (145 lb)   SpO2 100%      Physical Exam    Assessment/Plan   Principal Problem:    Superior mesenteric artery stenosis (Multi)  Active Problems:    Peripheral vascular disease (CMS-HCC)        68 y.o. male with PMHx s/f ***         DO Kell Rm dictation software was used to dictate this note and thus there may be minor errors in translation/transcription including garbled speech or misspellings. Please contact for clarification if needed.

## 2024-06-04 NOTE — PROGRESS NOTES
Kevin Rubi is a 68 y.o. male admitted for Superior mesenteric artery stenosis (Multi). Pharmacy reviewed the patient's [id number]-[id number] medications and allergies for accuracy.    The list below reflects the PTA list prior to pharmacy medication history. A summary a changes to the PTA medication list has been listed below. Please review each medication in order reconciliation for additional clarification and justification.    Source of information:  Pharmacy and dr visits  No Changes!  Medications added:    Medications modified:    Medications to be removed:    Medications of concern:      Prior to Admission Medications   Prescriptions Last Dose Informant Patient Reported? Taking?   Entresto 24-26 mg tablet   No No   Sig: Take 1 tablet by mouth 2 times a day. Taking 1/2 tablet twice a day.   apixaban (Eliquis) 5 mg tablet   No No   Sig: Take 1 tablet (5 mg) by mouth 2 times a day.   clopidogrel (Plavix) 75 mg tablet   No No   Sig: Take 1 tablet (75 mg) by mouth once daily.   digoxin (Lanoxin) 125 MCG tablet   No No   Sig: TAKE 1 TABLET DAILY   fenofibrate (Tricor) 145 mg tablet   Yes No   Sig: Take 1 tablet (145 mg) by mouth once daily.   furosemide (Lasix) 20 mg tablet   No No   Sig: Take 1 tablet (20 mg) by mouth see administration instructions. 1 tablet as needed for swelling, weight gain of 3# or increased shortness of breath.   gabapentin (Neurontin) 300 mg capsule   No No   Sig: Take 1 capsule (300 mg) by mouth once daily at bedtime.   metoprolol succinate XL (Toprol-XL) 25 mg 24 hr tablet   No No   Sig: Take 1 tablet (25 mg) by mouth once daily. Do not crush or chew.   metoprolol succinate XL (Toprol-XL) 50 mg 24 hr tablet   No No   Sig: Take 1 tablet (50 mg) by mouth once daily. Do not crush or chew.   mirtazapine (Remeron) 15 mg tablet   No No   Sig: Take 1 tablet (15 mg) by mouth once daily at bedtime.   Patient not taking: Reported on 6/3/2024   predniSONE (Deltasone) 10 mg tablet   No No   Sig:  After finishing taper.  Take 1 tablet (10 mg) by mouth once daily. Do not start before May 5, 2024.   Patient not taking: Reported on 6/3/2024   rosuvastatin (Crestor) 5 mg tablet   No No   Sig: Take 1 tablet (5 mg) by mouth once daily.   spironolactone (Aldactone) 25 mg tablet   No No   Sig: Take 0.5 tablets (12.5 mg) by mouth once daily.   tiZANidine (Zanaflex) 2 mg tablet   No No   Sig: TAKE 1 TABLET BY MOUTH 2 TIMES A DAY AS NEEDED FOR MUSCLE SPASMS.      Facility-Administered Medications: None       Viktoriya Lopez

## 2024-06-04 NOTE — CARE PLAN
The patient's goals for the shift include  remain free from pain    The clinical goals for the shift include  remain free from falls

## 2024-06-04 NOTE — CONSULTS
"Consults  History Of Present Illness:    Kevin Rubi is a 68 y.o. male presenting with abdominal cramping.  HFrEF > HFpEF (LVEF 20% > 60-65%), mild CAD, pAfib on apixaban, ascending aortic aneurysm (measured 4.4 cm on CT 3/2021), bicuspid AV, DL, lung CA s/p CTX/XRT on immunotherapy, PAD with chronic SMA stenosis and prior celiac artery stenting, and incarcerated inguinal hernia s/p bowel resection 4/9/24.    Kevin presents with severe epigastric pain with eating every meal - pain is postprandial and described as \"cramping.  He has used Tums and abdominal binder without relief.  Of note he had recent inguinal hernia repair with small bowel resection and anastomosis in 4/16/24.  He was subsequently admitted to [facility] with LE edema and treated for HFpEF as well as PNA.      In ED HR 81, /59.  Labs significant for:  Na 132, lactate 1.4, WBC 10.1, Hb 9.2.  CT a/p:  severe near occlusive stenosis of the proximal SMA, sigmoid diverticulosis without evidence of diverticulitis, small low-attenuation lesions in the spleen - follow up ultrasound recommended, and extensive L basilar infiltrate and small RML infiltrate.  Hew as given morphine, zofran, and IVF in the ED.    Fhx:  no premature CAD  SocHx:  former tobacco use    ROS:  The remainder of the review of systems was obtained, as was negative as pertains to the chief complaint.      Last Recorded Vitals:  Vitals:    06/03/24 2100 06/03/24 2232 06/04/24 0151 06/04/24 0559   BP: 113/75 109/59 101/63 91/63   BP Location:  Right arm Left arm Left arm   Patient Position:  Lying Lying Lying   Pulse: 81 81 86 68   Resp: 14 18 16 16   Temp:  36.2 °C (97.1 °F) 36.4 °C (97.6 °F) 36 °C (96.8 °F)   TempSrc:  Temporal Temporal Temporal   SpO2: 97% 100% 99% 100%   Weight:       Height:           Last Labs:  CBC - 6/4/2024:  5:02 AM  10.3 8.4 161    27.6      CMP - 6/4/2024:  5:02 AM  8.3 6.8 12 --- 0.3   1.8 3.5 11 40      PTT - 9/12/2023:  7:46 AM  1.8   20.7 32 "     Troponin I, High Sensitivity   Date/Time Value Ref Range Status   05/01/2024 01:24 PM 13 0 - 20 ng/L Final   04/09/2024 04:51 PM 22 (H) 0 - 20 ng/L Final   12/18/2023 12:31 PM 34 0 - 53 ng/L Final     BNP   Date/Time Value Ref Range Status   05/01/2024 01:24  (H) 0 - 99 pg/mL Final   04/16/2024 05:55  (H) 0 - 99 pg/mL Final     Hemoglobin A1C   Date/Time Value Ref Range Status   05/01/2024 06:28 PM 6.9 (H) see below % Final   02/27/2024 12:21 PM 6.1 (H) see below % Final     LDL Calculated   Date/Time Value Ref Range Status   10/23/2023 09:08 AM 74 <=99 mg/dL Final     Comment:                                 Near   Borderline      AGE      Desirable  Optimal    High     High     Very High     0-19 Y     0 - 109     ---    110-129   >/= 130     ----    20-24 Y     0 - 119     ---    120-159   >/= 160     ----      >24 Y     0 -  99   100-129  130-159   160-189     >/=190       VLDL   Date/Time Value Ref Range Status   10/23/2023 09:08 AM 24 0 - 40 mg/dL Final   04/01/2023 08:05 AM 46 (H) 0 - 40 mg/dL Final   07/28/2022 08:24 AM 41 (H) 0 - 40 mg/dL Final   08/14/2021 08:44 AM 31 0 - 40 mg/dL Final      Last I/O:  No intake/output data recorded.    Past Cardiology Tests (Last 3 Years):  EKG:  ECG 12 lead 06/03/2024 (Preliminary)      ECG 12 lead 05/01/2024      ECG 12 lead 04/09/2024      ECG 12 Lead 04/09/2024      ECG 12 lead 12/18/2023      ECG 12 Lead     Echo:  Transthoracic Echo (TTE) Limited 04/11/2024      Transthoracic echo (TTE) complete 03/26/2024    Ejection Fractions:  EF   Date/Time Value Ref Range Status   03/26/2024 01:26 PM 58 %      Cath:  No results found for this or any previous visit from the past 1095 days.    Stress Test:  No results found for this or any previous visit from the past 1095 days.    Cardiac Imaging:  No results found for this or any previous visit from the past 1095 days.      Past Medical History:  He has a past medical history of Aortic aneurysm (CMS-[facility]), Atrial  fibrillation (Multi), Coronary artery disease, Hyperlipidemia, Hypertension, Lung cancer (Multi), Personal history of peptic ulcer disease, Superior mesenteric artery stenosis (Multi), and Tinnitus, bilateral (08/14/2016).    He has no past medical history of Stroke (Multi).    Past Surgical History:  He has a past surgical history that includes Other surgical history (02/13/2019); Other surgical history (02/13/2019); CT angio abdomen pelvis w and or wo IV IV contrast (08/08/2023); Superior mestenteric artery stent; Exploratory laparotomy w/ bowel resection (04/09/2024); and Hernia repair (Right, 04/09/2024).      Social History:  He reports that he quit smoking about 11 years ago. His smoking use included cigarettes. He started smoking about 41 years ago. He has a 15 pack-year smoking history. He has been exposed to tobacco smoke. He has never used smokeless tobacco. He reports that he does not currently use alcohol. He reports that he does not use drugs.    Family History:  Family History   Problem Relation Name Age of Onset    Cancer Mother          ?larynx or lung        Allergies:  Patient has no known allergies.    Inpatient Medications:  Scheduled medications   Medication Dose Route Frequency    apixaban  5 mg oral BID    clopidogrel  75 mg oral Daily    digoxin  125 mcg oral Daily    gabapentin  300 mg oral Nightly    metoprolol succinate XL  25 mg oral Daily    pantoprazole  40 mg oral Daily before breakfast    Or    pantoprazole  40 mg intravenous Daily before breakfast    rosuvastatin  5 mg oral Nightly    sacubitriL-valsartan  1 tablet oral BID    spironolactone  12.5 mg oral Daily     PRN medications   Medication    bisacodyl    bisacodyl    guaiFENesin    melatonin    ondansetron    Or    ondansetron    tiZANidine     Continuous Medications   Medication Dose Last Rate     Outpatient Medications:  Current Outpatient Medications   Medication Instructions    apixaban (ELIQUIS) 5 mg, oral, 2 times daily     "clopidogrel (PLAVIX) 75 mg, oral, Daily    digoxin (LANOXIN) 125 mcg, oral, Daily    Entresto 24-26 mg tablet 1 tablet, oral, 2 times daily, Taking 1/2 tablet twice a day.    fenofibrate (Tricor) 145 mg tablet Take 1 tablet (145 mg) by mouth once daily.    furosemide (LASIX) 20 mg, oral, See admin instructions, 1 tablet as needed for swelling, weight gain of 3# or increased shortness of breath.    gabapentin (NEURONTIN) 300 mg, oral, Nightly    metoprolol succinate XL (TOPROL-XL) 50 mg, oral, Daily, Do not crush or chew.    metoprolol succinate XL (TOPROL-XL) 25 mg, oral, Daily, Do not crush or chew.    mirtazapine (REMERON) 15 mg, oral, Nightly    predniSONE (Deltasone) 10 mg tablet After finishing taper.  Take 1 tablet (10 mg) by mouth once daily. Do not start before May 5, 2024.    rosuvastatin (CRESTOR) 5 mg, oral, Daily    spironolactone (ALDACTONE) 12.5 mg, oral, Daily    tiZANidine (ZANAFLEX) 2 mg, oral, 2 times daily PRN       Physical Exam:  Physical Exam  HENT:      Head: Normocephalic.      Mouth/Throat:      Mouth: Mucous membranes are moist.   Eyes:      Extraocular Movements: Extraocular movements intact.   Cardiovascular:      Rate and Rhythm: Normal rate.   Pulmonary:      Effort: Pulmonary effort is normal.   Abdominal:      Palpations: Abdomen is soft.      Comments: + tenderness to palpation in the mid epigastric and RUQ   Musculoskeletal:         General: Normal range of motion.   Skin:     General: Skin is warm.   Neurological:      General: No focal deficit present.      Mental Status: He is alert.   Psychiatric:         Mood and Affect: Mood normal.            Assessment/Plan   Abdominal pain:  symptoms suspicious for intestinal angina, however importantly lactate was WNL on presentation and no mention of ischemic bowel on CT a/p.  Daisha has history of celiac artery stent in 8/23 by Dr. Hayes.  CT scan this admission suggestive of \"severe near occlusive stenosis of the proximal SMA artery,\" " however in op report by Dr. Hayes, SMA/TIESHA were reported to be occluded.  -Discussed case with my partner Dr. Walls, as well as Dr. Hayes.  Given that he performed celiac artery stenting last year, Dr. Hayes has agreed to see the patient in consultation - order placed.  Based on our brief discussion, may recommend mesenteric duplex ultrasound for further eval - await his recs.  -continue antiplatelet therapy   -await recs from vascular surgery    HFrEF > HFpEF (LVEF 20% > 60-65%)  -entresto 24-26mg half tab bid  -meto XL 25mg daily  -digoxin 125 mcg daily  -spironolactone 12.5mg daily  -no current indication for additional diuresis    Mild CAD:  no angina currently.  -not on ASA due to clopidogrel/apixaban  -rosuvastatin 5mg daily  -metop XL 25mg daily    pAfib on apixaban:  -c/w apixaban  -BB for rate control  -tele monitoring    Ascending aortic aneurysm (measured 4.4 cm on CT 3/2021)  -serial monitoring and BP control    Bicuspid AV    DL:    -Statin    Recent PNA:  infiltrate on CXR , patient denies cough/fever  -mgt per primary service     lung CA s/p CTX/XRT on immunotherapy    Recent incarcerated inguinal hernia s/p bowel resection 4/9/24.  Pt states the current abdominal pain is completely different form his inguinal hernia pain.      Code Status:  Full Code    Leann Rosario MD

## 2024-06-05 ENCOUNTER — APPOINTMENT (OUTPATIENT)
Dept: VASCULAR MEDICINE | Facility: HOSPITAL | Age: 69
DRG: 394 | End: 2024-06-05
Payer: COMMERCIAL

## 2024-06-05 ENCOUNTER — APPOINTMENT (OUTPATIENT)
Dept: RADIOLOGY | Facility: HOSPITAL | Age: 69
DRG: 394 | End: 2024-06-05
Payer: COMMERCIAL

## 2024-06-05 LAB
ANION GAP SERPL CALC-SCNC: 12 MMOL/L (ref 10–20)
BUN SERPL-MCNC: 15 MG/DL (ref 6–23)
CALCIUM SERPL-MCNC: 8.3 MG/DL (ref 8.6–10.3)
CHLORIDE SERPL-SCNC: 104 MMOL/L (ref 98–107)
CO2 SERPL-SCNC: 23 MMOL/L (ref 21–32)
CREAT SERPL-MCNC: 0.82 MG/DL (ref 0.5–1.3)
EGFRCR SERPLBLD CKD-EPI 2021: >90 ML/MIN/1.73M*2
ERYTHROCYTE [DISTWIDTH] IN BLOOD BY AUTOMATED COUNT: 18.4 % (ref 11.5–14.5)
GLUCOSE SERPL-MCNC: 72 MG/DL (ref 74–99)
HCT VFR BLD AUTO: 29.6 % (ref 41–52)
HGB BLD-MCNC: 9.1 G/DL (ref 13.5–17.5)
MCH RBC QN AUTO: 25.2 PG (ref 26–34)
MCHC RBC AUTO-ENTMCNC: 30.7 G/DL (ref 32–36)
MCV RBC AUTO: 82 FL (ref 80–100)
NRBC BLD-RTO: 0 /100 WBCS (ref 0–0)
PLATELET # BLD AUTO: 159 X10*3/UL (ref 150–450)
POTASSIUM SERPL-SCNC: 3.6 MMOL/L (ref 3.5–5.3)
RBC # BLD AUTO: 3.61 X10*6/UL (ref 4.5–5.9)
SODIUM SERPL-SCNC: 135 MMOL/L (ref 136–145)
WBC # BLD AUTO: 6.8 X10*3/UL (ref 4.4–11.3)

## 2024-06-05 PROCEDURE — 2500000001 HC RX 250 WO HCPCS SELF ADMINISTERED DRUGS (ALT 637 FOR MEDICARE OP): Performed by: STUDENT IN AN ORGANIZED HEALTH CARE EDUCATION/TRAINING PROGRAM

## 2024-06-05 PROCEDURE — 2500000001 HC RX 250 WO HCPCS SELF ADMINISTERED DRUGS (ALT 637 FOR MEDICARE OP): Performed by: FAMILY MEDICINE

## 2024-06-05 PROCEDURE — 1200000002 HC GENERAL ROOM WITH TELEMETRY DAILY

## 2024-06-05 PROCEDURE — 2550000001 HC RX 255 CONTRASTS: Performed by: FAMILY MEDICINE

## 2024-06-05 PROCEDURE — G0378 HOSPITAL OBSERVATION PER HR: HCPCS

## 2024-06-05 PROCEDURE — 99234 HOSP IP/OBS SM DT SF/LOW 45: CPT | Performed by: SURGERY

## 2024-06-05 PROCEDURE — 93975 VASCULAR STUDY: CPT

## 2024-06-05 PROCEDURE — 85027 COMPLETE CBC AUTOMATED: CPT | Performed by: FAMILY MEDICINE

## 2024-06-05 PROCEDURE — 93975 VASCULAR STUDY: CPT | Performed by: SURGERY

## 2024-06-05 PROCEDURE — 2500000002 HC RX 250 W HCPCS SELF ADMINISTERED DRUGS (ALT 637 FOR MEDICARE OP, ALT 636 FOR OP/ED): Performed by: STUDENT IN AN ORGANIZED HEALTH CARE EDUCATION/TRAINING PROGRAM

## 2024-06-05 PROCEDURE — 36415 COLL VENOUS BLD VENIPUNCTURE: CPT | Performed by: FAMILY MEDICINE

## 2024-06-05 PROCEDURE — 74174 CTA ABD&PLVS W/CONTRAST: CPT

## 2024-06-05 PROCEDURE — 80048 BASIC METABOLIC PNL TOTAL CA: CPT | Performed by: FAMILY MEDICINE

## 2024-06-05 PROCEDURE — 74174 CTA ABD&PLVS W/CONTRAST: CPT | Performed by: STUDENT IN AN ORGANIZED HEALTH CARE EDUCATION/TRAINING PROGRAM

## 2024-06-05 PROCEDURE — 99233 SBSQ HOSP IP/OBS HIGH 50: CPT | Performed by: FAMILY MEDICINE

## 2024-06-05 RX ADMIN — APIXABAN 5 MG: 5 TABLET, FILM COATED ORAL at 21:06

## 2024-06-05 RX ADMIN — IOHEXOL 100 ML: 350 INJECTION, SOLUTION INTRAVENOUS at 21:27

## 2024-06-05 RX ADMIN — DIGOXIN 125 MCG: 125 TABLET ORAL at 16:06

## 2024-06-05 RX ADMIN — SPIRONOLACTONE 12.5 MG: 25 TABLET ORAL at 16:07

## 2024-06-05 RX ADMIN — ROSUVASTATIN 5 MG: 10 TABLET, FILM COATED ORAL at 21:06

## 2024-06-05 RX ADMIN — CLOPIDOGREL 75 MG: 75 TABLET ORAL at 16:07

## 2024-06-05 RX ADMIN — GABAPENTIN 300 MG: 300 CAPSULE ORAL at 21:06

## 2024-06-05 RX ADMIN — SACUBITRIL AND VALSARTAN 0.5 TABLET: 24; 26 TABLET, FILM COATED ORAL at 21:06

## 2024-06-05 RX ADMIN — METOPROLOL SUCCINATE 25 MG: 25 TABLET, EXTENDED RELEASE ORAL at 16:07

## 2024-06-05 ASSESSMENT — ENCOUNTER SYMPTOMS
EYE REDNESS: 0
DIFFICULTY URINATING: 0
WEAKNESS: 0
DYSURIA: 0
DIZZINESS: 0
COUGH: 0
ABDOMINAL PAIN: 0
CONSTIPATION: 0
SORE THROAT: 0
CHILLS: 0
SHORTNESS OF BREATH: 0
VOMITING: 0
ARTHRALGIAS: 0
BACK PAIN: 0
FREQUENCY: 0
NAUSEA: 0
TROUBLE SWALLOWING: 0
EYE DISCHARGE: 0
PALPITATIONS: 0
ABDOMINAL DISTENTION: 0
NECK PAIN: 0
LIGHT-HEADEDNESS: 0
FEVER: 0
COLOR CHANGE: 0
DIARRHEA: 0
SPEECH DIFFICULTY: 0

## 2024-06-05 ASSESSMENT — COGNITIVE AND FUNCTIONAL STATUS - GENERAL
MOBILITY SCORE: 24
DAILY ACTIVITIY SCORE: 24
DAILY ACTIVITIY SCORE: 24
MOBILITY SCORE: 24

## 2024-06-05 ASSESSMENT — PAIN SCALES - GENERAL
PAINLEVEL_OUTOF10: 0 - NO PAIN
PAINLEVEL_OUTOF10: 0 - NO PAIN

## 2024-06-05 ASSESSMENT — PAIN - FUNCTIONAL ASSESSMENT: PAIN_FUNCTIONAL_ASSESSMENT: 0-10

## 2024-06-05 NOTE — PROGRESS NOTES
GENERAL SURGERY PROGRESS NOTE    Kevin Rubi   1955   [id number]     Kevin Rubi is a 68 y.o. male on day 2 of admission presenting with Superior mesenteric artery stenosis (Multi).      Subjective  Pt GRIS. NAEO. Patient without pain while he has been here. NPO at midnight for duplex.    Review of Systems   Constitutional:  Negative for chills and fever.   HENT:  Negative for congestion, sore throat and trouble swallowing.    Eyes:  Negative for discharge and redness.   Respiratory:  Negative for cough and shortness of breath.    Cardiovascular:  Negative for chest pain and palpitations.   Gastrointestinal:  Negative for abdominal distention, abdominal pain, constipation, diarrhea, nausea and vomiting.   Endocrine: Negative for cold intolerance and heat intolerance.   Genitourinary:  Negative for difficulty urinating, dysuria, frequency and urgency.   Musculoskeletal:  Negative for arthralgias, back pain and neck pain.   Skin:  Negative for color change and rash.   Neurological:  Negative for dizziness, speech difficulty, weakness and light-headedness.       Objective    Last Recorded Vitals  Blood pressure 91/51, pulse 88, temperature 36.6 °C (97.8 °F), temperature source Temporal, resp. rate 16, height 1.829 m (6'), weight 65.8 kg (145 lb), SpO2 93%.    Intake/Output last 3 Shifts:  I/O last 3 completed shifts:  In: 600 (9.1 mL/kg) [P.O.:600]  Out: 0 (0 mL/kg)   Weight: 65.8 kg     Gen: NAD.  A&Ox3  HEENT: NC/AT.  Moist mucous membranes.  Neck: Normal range of motion.  CV: Regular rate.  Chest: Normal chest rise.  Normal respiratory effort.  Abdomen: Soft.  NT/ND.  No rigidity or guarding. Multiple mobile, soft, cystic lesions noted on abdomen and chest.  Extremities: No edema.  Moving all extremities.    Relevant Results  Results for orders placed or performed during the hospital encounter of 06/03/24 (from the past 24 hour(s))   CBC   Result Value Ref Range    WBC 6.8 4.4 - 11.3 x10*3/uL     nRBC 0.0 0.0 - 0.0 /100 WBCs    RBC 3.61 (L) 4.50 - 5.90 x10*6/uL    Hemoglobin 9.1 (L) 13.5 - 17.5 g/dL    Hematocrit 29.6 (L) 41.0 - 52.0 %    MCV 82 80 - 100 fL    MCH 25.2 (L) 26.0 - 34.0 pg    MCHC 30.7 (L) 32.0 - 36.0 g/dL    RDW 18.4 (H) 11.5 - 14.5 %    Platelets 159 150 - 450 x10*3/uL   Basic Metabolic Panel   Result Value Ref Range    Glucose 72 (L) 74 - 99 mg/dL    Sodium 135 (L) 136 - 145 mmol/L    Potassium 3.6 3.5 - 5.3 mmol/L    Chloride 104 98 - 107 mmol/L    Bicarbonate 23 21 - 32 mmol/L    Anion Gap 12 10 - 20 mmol/L    Urea Nitrogen 15 6 - 23 mg/dL    Creatinine 0.82 0.50 - 1.30 mg/dL    eGFR >90 >60 mL/min/1.73m*2    Calcium 8.3 (L) 8.6 - 10.3 mg/dL       ECG 12 lead    Result Date: 6/4/2024  Atrial fibrillation Anteroseptal infarct, age indeterminate Baseline wander in lead(s) V5    CT abdomen pelvis w IV contrast    Result Date: 6/3/2024  STUDY: CT Abdomen and Pelvis with IV Contrast; 6/3/2024 6:52 PM INDICATION: Generalized abdominal pain.  Additional History:  Lung cancer.  Right inguinal hernia repair. COMPARISON: CTA/CT AP chest 5/1/2024.  CXR 5/1/2024. ACCESSION NUMBER(S): [id number] ORDERING CLINICIAN: BULL ARTHUR TECHNIQUE: CT of the abdomen and pelvis was performed.  Contiguous axial images were obtained at 3 mm slice thickness through the abdomen and pelvis. Coronal and sagittal reconstructions at 3 mm slice thickness were performed.  Omnipaque 350 75 mL was administered intravenously.  FINDINGS: There continues be an extensive infiltrate within the left lower lobe and lingula.  A small infiltrate is noted in the right middle lobe. No acute findings are seen within the liver.  The portal veins are patent.  There is no intrahepatic ductal dilatation.  The gallbladder is mildly distended.  Multiple low-attenuation lesions seen throughout the spleen.  Underlying metastatic deposits cannot be excluded. Follow-up with ultrasound is recommended.  No inflammatory changes are seen  surrounding the pancreas.  No adrenal nodule is noted.  There is partial atrophy of the right kidney.  No hydronephrosis is seen within either kidney.  No dilated loops of small bowel or colon are visualized.  There are diverticula seen within the sigmoid colon. There is no evidence of diverticulitis.  The appendix is normal in appearance.  No enlarged lymph nodes are seen within the pelvic sidewalls, iliac chains, or retroperitoneum.  There is no evidence of aneurysmal dilatation of the abdominal aorta.  Diffuse plaque is seen throughout the abdominal aorta.  There is a stent seen in the celiac artery.  There is a severe near occlusive stenosis of the proximal superior mesenteric artery.    1.  Severe near occlusive stenosis of the proximal superior mesenteric artery. 2.  Sigmoid diverticulosis, with no evidence of diverticulitis. 3.  Incidental small low-attenuation lesions throughout the spleen. Metastatic disease cannot be well.  Follow-up with ultrasound is recommended. 4.  Extensive left basilar infiltrate with small right middle lobe infiltrate. Signed by Hilario Laura MD      Assessment and Plan  Principal Problem:    Superior mesenteric artery stenosis (Multi)  Active Problems:    Atrial fibrillation (Multi)    Mild CAD    HLD (hyperlipidemia)    Benign hypertension    Anxiety disorder, unspecified    Peripheral vascular disease (CMS-HCC)    Heart failure with improved ejection fraction (HFimpEF) (Multi)    Hyperglycemia    Hyponatremia    Normocytic anemia    68 y.o. male with celieac, SMA stenosis, potentially causing mesenteric ischemia     - NPO for duplex today  - will await results of duplex before discussing possible intervention  - continue eliquis, plavix  - rest of care per primary    Will discuss with Dr. Abigail Oh, DO  PGY2  Vascular Surgery

## 2024-06-05 NOTE — PROGRESS NOTES
Kevin Rubi is a 68 y.o. male on day 2 of admission presenting with Superior mesenteric artery stenosis (Multi).      Subjective   68 y.o. male with PMHx s/f hypertension, A-fib, on Eliquis, PVD, dyslipidemia, lung cancer status post chemoradiation, chronic SMA stenosis, Celiac stenosis s/p stenting, incarcerated inguinal hernia s/p bowel resection presenting with abdominal pain.  Patient states for the past week he has had severe epigastric pain whenever he eats,.  This pain is mostly postprandial, accompanied by severe cramping.  He has used Tums and abdominal binder with no help.  He says it is worse throughout the day.  He has lost about 40 pounds over the past year due to his lung cancer and it being treated.  He was initially diagnosed with lung cancer after suffering a similar bout of abdominal pain in [place] about a year ago.  This revealed critical celiac stenosis along with SMA stenosis.  The celiac stenosis was treated with a stent at the time but the SMA stenosis appears to have not.  Patient denies nausea, vomiting, chest pain, bowel bladder changes, lightheadedness, dizziness, fever, chills, or other symptoms at this time.     ED Course (Summary):   Vitals on presentation: 97.6 F, 81 bpm, 16 rr, 87/61 --> 109/59, 99% on RA  Labs: CMP glu 201, Na 132  Lactate 1.4  Lipase 8  CBC WBC 10.1, Hg 9.2, Plt 172  UA trace glucose  Imaging: CT a/p - 1.  Severe near occlusive stenosis of the proximal superior mesenteric   artery.   2. Sigmoid diverticulosis, with no evidence of diverticulitis.   3.  Incidental small low-attenuation lesions throughout the spleen.   Metastatic disease cannot be well.  Follow-up with ultrasound is   recommended.   4. Extensive left basilar infiltrate with small right middle lobe   infiltrate.   EKG - afib at rate of 92 bpm  Interventions: LR 1 L bolus, morphine 4 mg, Zofran 4 mg X1      6/4:                 Today the patient is awake alert and interactive appropriately.  At  the time of visit states he has no symptoms.  In fact apparently had yogurt and cream of wheat this morning without occurrence of his abdominal pain.  Apparently feels it takes more substantial meal than that to cause it.  Vascular surgery evaluated the patient and awaiting mesenteric artery duplex ultrasound to evaluate flow before planning any intervention.  Creatinine stable and normal.    6/5:                Today patient remains awake alert and interactive appropriately.  Has not really had any of his abdominal pain since admission.  Mesenteric artery duplex ultrasound describes no flow seen at the SMA and 70% stenosis celiac artery among other findings of turbulent flow.  Awaiting further vascular surgery recommendations.      Review of Systems   Constitutional:  Negative for activity change, appetite change, chills, diaphoresis, fatigue and fever.   HENT:  Negative for congestion, ear pain, rhinorrhea, sinus pain and sore throat.    Respiratory:  Negative for apnea, cough, chest tightness, shortness of breath, wheezing and stridor.    Cardiovascular:  Negative for chest pain, palpitations and leg swelling.   Gastrointestinal: Negative for abdominal pain at the time of visit. Negative for abdominal distention, constipation, diarrhea, nausea and vomiting.   Genitourinary:  Negative for difficulty urinating, dysuria, flank pain, frequency, hematuria and urgency.   Musculoskeletal:  Negative for arthralgias, back pain, gait problem, joint swelling and myalgias.   Skin:  Negative for color change, pallor, rash and wound.   Neurological:  Negative for dizziness, syncope, weakness, light-headedness, numbness and headaches.   Psychiatric/Behavioral:  Negative for agitation, behavioral problems, confusion and decreased concentration. The patient is not nervous/anxious.    All other systems reviewed and are negative.          Objective     Last Recorded Vitals  BP 92/60 (BP Location: Left arm, Patient Position: Lying)    Pulse 90   Temp 37 °C (98.6 °F) (Temporal)   Resp 17   Wt 65.8 kg (145 lb)   SpO2 93%   Intake/Output last 3 Shifts:    Intake/Output Summary (Last 24 hours) at 6/5/2024 1912  Last data filed at 6/5/2024 1700  Gross per 24 hour   Intake 440 ml   Output 0 ml   Net 440 ml       Admission Weight  Weight: 65.8 kg (145 lb) (06/03/24 1546)    Daily Weight  06/03/24 : 65.8 kg (145 lb)    Image Results  Mesenteric artery duplex complete  Preliminary Cardiology Report                   [place] [id number]       Phone [phone] Fax [phone]            Preliminary Vascular Lab Report     [facility] MESENTERIC ARTERY DUPLEX COMPLETE       Patient Name:     VON GARCIA LETICIA Reading Physician:  [id number] Marco Antonio Moran MD  Study Date:       6/5/2024           Ordering Provider:  [id number] ANIL POPE  MRN/PID:          [id number]           Fellow:  Accession#:       [id number]       Technologist:       Ashlyn Amaro RVSONIA  YOB: 1955         Technologist 2:  Gender:           M                  Encounter#:         [id number]  Admission Status: Inpatient          Location Performed: [facility]       Diagnosis/ICD: Chronic mesenteric ischemia-K55.1  CPT Codes:     92878 Mesenteric Duplex scan       PRELIMINARY CONCLUSIONS:     Mesenteric: Celiac artery demonstrates a hemodynamically significant stenosis of greater than 70%. Velocities did not change with inspiration and/or change to sitting position which may be suggestive of intrinsic stenosis. The patient was NPO for this study. There are elevated velocities noted in the splenic artery. Turbulent flow is noted in the splenic, hepatic and throughout the entire celiac artery.  The stent walls of the celiac are not seen.  The SMA is not visualized(possibly chronically occluded).  The TIESHA is patent with turbulent flow.     Imaging & Doppler Findings:     AORTA    PSV   Mid  55.9 cm/s       Aorta PSV         56  cm/s  Celiac Origin  cm/s  Celiac Prox PSV   384 cm/s  Celiac Mid PSV    94 cm/s  Celiac Dist PSV   107 cm/s  SMA Prox PSV      82 cm/s  TIESHA Prox PSV      140 cm/s  Hepatic PSV       85 cm/s  Splenic PSV       257 cm/s          VASCULAR PRELIMINARY REPORT  completed by Ashlyn Amaro RVT on 6/5/2024 at 8:46:51 AM       ** Final **      Physical Exam  Constitutional:       General: He is not in acute distress.     Appearance: Normal appearance. He is normal weight. He is not ill-appearing or toxic-appearing.   HENT:      Head: Normocephalic and atraumatic.      Mouth/Throat:      Mouth: Mucous membranes are moist.      Pharynx: No posterior oropharyngeal erythema.   Eyes:      Extraocular Movements: Extraocular movements intact.      Pupils: Pupils are equal, round, and reactive to light.   Cardiovascular:      Rate and Rhythm: Normal rate and regular rhythm.      Heart sounds: Normal heart sounds. No murmur heard.     No friction rub. No gallop.   Pulmonary:      Effort: Pulmonary effort is normal. No respiratory distress.      Breath sounds: Normal breath sounds. No stridor. No wheezing, rhonchi or rales.   Abdominal:      General: Abdomen is flat. Bowel sounds are normal. There is no distension.  No bruits appreciated     Palpations: Abdomen is soft. There is no mass.      Tenderness: There is no abdominal tenderness. There is no right CVA tenderness, left CVA tenderness, guarding or rebound.   Musculoskeletal:         General: No swelling, tenderness, deformity or signs of injury. Normal range of motion.      Right lower leg: No edema.      Left lower leg: No edema.   Skin:     General: Skin is warm and dry.      Coloration: Skin is not jaundiced or pale.      Findings: No bruising, erythema, lesion or rash.   Neurological:      General: No focal deficit present.      Mental Status: He is alert and oriented to person, place, and time. Mental status is at baseline.      Cranial Nerves: No cranial nerve deficit.       Sensory: No sensory deficit.      Motor: No weakness.   Psychiatric:         Mood and Affect: Mood normal.         Behavior: Behavior normal.         Thought Content: Thought content normal.         Judgment: Judgment normal.         Relevant Results               Assessment/Plan                  Principal Problem:    Superior mesenteric artery stenosis (Multi)  Active Problems:    Atrial fibrillation (Multi)    Mild CAD    HLD (hyperlipidemia)    Benign hypertension    Anxiety disorder, unspecified    Peripheral vascular disease (CMS-HCC)    Heart failure with improved ejection fraction (HFimpEF) (Multi)    Hyperglycemia    Hyponatremia    Normocytic anemia    SMA stenosis:  Vascular surgery consulted. Per Dr. Walls no further anticoagulation at this time.  Currently asymptomatic at rest  Encouraged light PO intake/fluids as pain allows.  6/4: Vascular surgery awaiting mesenteric artery duplex ultrasound before planning treatment.  6/5: Mesenteric artery ultrasound describes no flow at the SMA with 70% stenosis celiac and other multiple turbulent flow findings.  Awaiting further vascular surgery plans.     Afib:  Home Eliquis and digoxin  Home Toprol     PVD/CAD:  Home Plavix  Home statin     HTN/CHF:  Home Entresto and Aldactone     Cardiac diet  DVT ppx: Home Eliquis              Luis Ortiz MD

## 2024-06-05 NOTE — CARE PLAN
Problem: Pain  Goal: Takes deep breaths with improved pain control throughout the shift  Outcome: Progressing  Goal: Turns in bed with improved pain control throughout the shift  Outcome: Progressing  Goal: Walks with improved pain control throughout the shift  Outcome: Progressing  Goal: Performs ADL's with improved pain control throughout shift  Outcome: Progressing  Goal: Participates in PT with improved pain control throughout the shift  Outcome: Progressing  Goal: Free from opioid side effects throughout the shift  Outcome: Progressing  Goal: Free from acute confusion related to pain meds throughout the shift  Outcome: Progressing     Problem: Pain - Adult  Goal: Verbalizes/displays adequate comfort level or baseline comfort level  Outcome: Progressing     Problem: Safety - Adult  Goal: Free from fall injury  Outcome: Progressing     Problem: Discharge Planning  Goal: Discharge to home or other facility with appropriate resources  Outcome: Progressing     Problem: Chronic Conditions and Co-morbidities  Goal: Patient's chronic conditions and co-morbidity symptoms are monitored and maintained or improved  Outcome: Progressing     Problem: Heart Failure  Goal: Improved gas exchange this shift  Outcome: Progressing  Goal: Improved urinary output this shift  Outcome: Progressing  Goal: Reduction in peripheral edema within 24 hours  Outcome: Progressing  Goal: Report improvement of dyspnea/breathlessness this shift  Outcome: Progressing  Goal: Weight from fluid excess reduced over 2-3 days, then stabilize  Outcome: Progressing  Goal: Increase self care and/or family involvement in 24 hours  Outcome: Progressing

## 2024-06-06 VITALS
BODY MASS INDEX: 19.64 KG/M2 | RESPIRATION RATE: 17 BRPM | HEART RATE: 108 BPM | TEMPERATURE: 98.4 F | WEIGHT: 145 LBS | HEIGHT: 72 IN | SYSTOLIC BLOOD PRESSURE: 100 MMHG | OXYGEN SATURATION: 97 % | DIASTOLIC BLOOD PRESSURE: 55 MMHG

## 2024-06-06 PROBLEM — R73.9 HYPERGLYCEMIA: Status: RESOLVED | Noted: 2024-06-03 | Resolved: 2024-06-06

## 2024-06-06 PROBLEM — E87.1 HYPONATREMIA: Status: RESOLVED | Noted: 2024-06-03 | Resolved: 2024-06-06

## 2024-06-06 PROCEDURE — 99232 SBSQ HOSP IP/OBS MODERATE 35: CPT | Performed by: SURGERY

## 2024-06-06 PROCEDURE — 99239 HOSP IP/OBS DSCHRG MGMT >30: CPT | Performed by: FAMILY MEDICINE

## 2024-06-06 PROCEDURE — 2500000001 HC RX 250 WO HCPCS SELF ADMINISTERED DRUGS (ALT 637 FOR MEDICARE OP): Performed by: STUDENT IN AN ORGANIZED HEALTH CARE EDUCATION/TRAINING PROGRAM

## 2024-06-06 PROCEDURE — G0378 HOSPITAL OBSERVATION PER HR: HCPCS

## 2024-06-06 PROCEDURE — 2500000004 HC RX 250 GENERAL PHARMACY W/ HCPCS (ALT 636 FOR OP/ED): Performed by: FAMILY MEDICINE

## 2024-06-06 RX ADMIN — APIXABAN 5 MG: 5 TABLET, FILM COATED ORAL at 10:28

## 2024-06-06 RX ADMIN — CLOPIDOGREL 75 MG: 75 TABLET ORAL at 10:27

## 2024-06-06 RX ADMIN — PANTOPRAZOLE SODIUM 40 MG: 40 TABLET, DELAYED RELEASE ORAL at 06:25

## 2024-06-06 RX ADMIN — DIGOXIN 125 MCG: 125 TABLET ORAL at 10:28

## 2024-06-06 RX ADMIN — SODIUM CHLORIDE 500 ML: 9 INJECTION, SOLUTION INTRAVENOUS at 10:28

## 2024-06-06 ASSESSMENT — COGNITIVE AND FUNCTIONAL STATUS - GENERAL
DAILY ACTIVITIY SCORE: 24
MOBILITY SCORE: 24

## 2024-06-06 ASSESSMENT — PAIN SCALES - GENERAL: PAINLEVEL_OUTOF10: 0 - NO PAIN

## 2024-06-06 ASSESSMENT — PAIN - FUNCTIONAL ASSESSMENT: PAIN_FUNCTIONAL_ASSESSMENT: 0-10

## 2024-06-06 NOTE — PROGRESS NOTES
Kevin Rubi is a 68 y.o. male on day 3 of admission presenting with Superior mesenteric artery stenosis (Multi).    Subjective   Patient is doing well  Tolerated breakfast without event  Ordered lunch today.  No sign of pain or discomfort since admission.       Objective     Physical Exam  Physical exam    Constitutional: alert and in no acute distress verbal  Eyes: No erythema swelling or discharge noted  Neck: supple, symmetric, trachea midline, no masses noted  Cardiovascular: Carotid pulses 2+, no obvious bruit, no Jugular distension noted, no thrill, heart regular rate, lower extremity vascular exam intact, cap refill <2 sec  Pulmonary:  Bilateral breath sounds intact, clear with rales rhonchi or wheeze  Abdomen: soft non tender, no pulsatile masses noted, no rebound rigidity or guarding noted  Skin: intact warm no abnormal turgor  Psychiatric: alert without any obvious cognitive issues, oriented to person, place, and time    Last Recorded Vitals  Blood pressure 100/55, pulse 108, temperature 36.9 °C (98.4 °F), temperature source Temporal, resp. rate 17, height 1.829 m (6'), weight 65.8 kg (145 lb), SpO2 97%.  Intake/Output last 3 Shifts:  I/O last 3 completed shifts:  In: 440 (6.7 mL/kg) [P.O.:440]  Out: 0 (0 mL/kg)   Weight: 65.8 kg     Relevant Results                             Assessment/Plan   SMA occlusion  CT reviewed demonstrating celiac axis stent widely patent  TIESHA is also patent  At this time continue activity as well as diet as tolerated  I recommend smaller sized meals and to keep track of what specific foods bother him and to avoid them.  No acute vascular invention from my standpoint  Okay to follow-up as outpatient and discharge from my standpoint.             Jose Juan Hayes, DO

## 2024-06-06 NOTE — DISCHARGE SUMMARY
Discharge Diagnosis  Superior mesenteric artery stenosis (Multi)    Issues Requiring Follow-Up  SMA occlusion, postprandial pain, adenocarcinoma lung    This discharge took greater than 35 minutes.    Test Results Pending At Discharge  Pending Labs       No current pending labs.            Hospital Course   68 y.o. male with PMHx s/f hypertension, A-fib, on Eliquis, PVD, dyslipidemia, lung cancer status post chemoradiation, chronic SMA stenosis, Celiac stenosis s/p stenting, incarcerated inguinal hernia s/p bowel resection presenting with abdominal pain.  Patient states for the past week he has had severe epigastric pain whenever he eats,.  This pain is mostly postprandial, accompanied by severe cramping.  He has used Tums and abdominal binder with no help.  He says it is worse throughout the day.  He has lost about 40 pounds over the past year due to his lung cancer and it being treated.  He was initially diagnosed with lung cancer after suffering a similar bout of abdominal pain in [place] about a year ago.  This revealed critical celiac stenosis along with SMA stenosis.  The celiac stenosis was treated with a stent at the time but the SMA stenosis appears to have not.  Patient denies nausea, vomiting, chest pain, bowel bladder changes, lightheadedness, dizziness, fever, chills, or other symptoms at this time.     ED Course (Summary):   Vitals on presentation: 97.6 F, 81 bpm, 16 rr, 87/61 --> 109/59, 99% on RA  Labs: CMP glu 201, Na 132  Lactate 1.4  Lipase 8  CBC WBC 10.1, Hg 9.2, Plt 172  UA trace glucose  Imaging: CT a/p - 1.  Severe near occlusive stenosis of the proximal superior mesenteric   artery.   2. Sigmoid diverticulosis, with no evidence of diverticulitis.   3.  Incidental small low-attenuation lesions throughout the spleen.   Metastatic disease cannot be well.  Follow-up with ultrasound is   recommended.   4. Extensive left basilar infiltrate with small right middle lobe   infiltrate.   EKG - afib  at rate of 92 bpm  Interventions: LR 1 L bolus, morphine 4 mg, Zofran 4 mg X1        6/4:                 Today the patient is awake alert and interactive appropriately.  At the time of visit states he has no symptoms.  In fact apparently had yogurt and cream of wheat this morning without occurrence of his abdominal pain.  Apparently feels it takes more substantial meal than that to cause it.  Vascular surgery evaluated the patient and awaiting mesenteric artery duplex ultrasound to evaluate flow before planning any intervention.  Creatinine stable and normal.     6/5:                Today patient remains awake alert and interactive appropriately.  Has not really had any of his abdominal pain since admission.  Mesenteric artery duplex ultrasound describes no flow seen at the SMA and 70% stenosis celiac artery among other findings of turbulent flow.  Awaiting further vascular surgery recommendations.    6/6:                 Today patient seen in the presence of his wife.  Remains awake alert and interactive appropriately.  Has continued to eat his meals here with no recurrence of his presenting postprandial pain.  Vascular surgery in view of CTA of the abdomen and pelvis feels no intervention indicated at this time and can follow-up as outpatient.  Further recommend he follow small meals.  Discussed with patient need to follow-up with his oncologist regarding CT results here regarding some appearance described of worsening RML in addition to more stable findings.  Patient does desire discharge home.        Review of Systems   Constitutional:  Negative for activity change, appetite change, chills, diaphoresis, fatigue and fever.   HENT:  Negative for congestion, ear pain, rhinorrhea, sinus pain and sore throat.    Respiratory:  Negative for apnea, cough, chest tightness, shortness of breath, wheezing and stridor.    Cardiovascular:  Negative for chest pain, palpitations and leg swelling.   Gastrointestinal: Negative  for abdominal pain at the time of visit. Negative for abdominal distention, constipation, diarrhea, nausea and vomiting.   Genitourinary:  Negative for difficulty urinating, dysuria, flank pain, frequency, hematuria and urgency.   Musculoskeletal:  Negative for arthralgias, back pain, gait problem, joint swelling and myalgias.   Skin:  Negative for color change, pallor, rash and wound.   Neurological:  Negative for dizziness, syncope, weakness, light-headedness, numbness and headaches.   Psychiatric/Behavioral:  Negative for agitation, behavioral problems, confusion and decreased concentration. The patient is not nervous/anxious.    All other systems reviewed and are negative.      SMA stenosis:  Vascular surgery consulted. Per Dr. Walls no further anticoagulation at this time.  Currently asymptomatic at rest  Encouraged light PO intake/fluids as pain allows.  6/4: Vascular surgery awaiting mesenteric artery duplex ultrasound before planning treatment.  6/5: Mesenteric artery ultrasound describes no flow at the SMA with 70% stenosis celiac and other multiple turbulent flow findings.  Awaiting further vascular surgery plans.  6/6: Vascular surgery feels no indication for surgical intervention or procedure at this time and okay for discharge recommending follow small meals only.  Follow-up as outpatient.     Afib:  Home Eliquis and digoxin  Home Toprol     PVD/CAD:  Home Plavix  Home statin     HTN/CHF:  Home Entresto and Aldactone    Adenocarcinoma of the lung  -CT studies here suggest worsening RML findings and recommend patient follow-up as soon as possible with his oncologist.          Pertinent Physical Exam At Time of Discharge  Physical Exam  Constitutional:       General: He is not in acute distress.     Appearance: Normal appearance. He is normal weight. He is not ill-appearing or toxic-appearing.   HENT:      Head: Normocephalic and atraumatic.      Mouth/Throat:      Mouth: Mucous membranes are moist.       Pharynx: No posterior oropharyngeal erythema.   Eyes:      Extraocular Movements: Extraocular movements intact.      Pupils: Pupils are equal, round, and reactive to light.   Cardiovascular:      Rate and Rhythm: Normal rate and regular rhythm.      Heart sounds: Normal heart sounds. No murmur heard.     No friction rub. No gallop.   Pulmonary:      Effort: Pulmonary effort is normal. No respiratory distress.      Breath sounds: Normal breath sounds. No stridor. No wheezing, rhonchi or rales.   Abdominal:      General: Abdomen is flat. Bowel sounds are normal. There is no distension.  No bruits appreciated     Palpations: Abdomen is soft. There is no mass.      Tenderness: There is no abdominal tenderness. There is no right CVA tenderness, left CVA tenderness, guarding or rebound.   Musculoskeletal:         General: No swelling, tenderness, deformity or signs of injury. Normal range of motion.      Right lower leg: No edema.      Left lower leg: No edema.   Skin:     General: Skin is warm and dry.      Coloration: Skin is not jaundiced or pale.      Findings: No bruising, erythema, lesion or rash.   Neurological:      General: No focal deficit present.      Mental Status: He is alert and oriented to person, place, and time. Mental status is at baseline.      Cranial Nerves: No cranial nerve deficit.      Sensory: No sensory deficit.      Motor: No weakness.   Psychiatric:         Mood and Affect: Mood normal.         Behavior: Behavior normal.         Thought Content: Thought content normal.         Judgment: Judgment normal.         Home Medications     Medication List      CHANGE how you take these medications     metoprolol succinate XL 25 mg 24 hr tablet; Commonly known as:   Toprol-XL; Take 1 tablet (25 mg) by mouth once daily. Do not crush or   chew.; What changed: Another medication with the same name was removed.   Continue taking this medication, and follow the directions you see here.    sacubitriL-valsartan 24-26 mg tablet; Commonly known as: Entresto; Take   0.5 tablets by mouth 2 times a day.; What changed: how much to take,   additional instructions     CONTINUE taking these medications     apixaban 5 mg tablet; Commonly known as: Eliquis; Take 1 tablet (5 mg)   by mouth 2 times a day.   clopidogrel 75 mg tablet; Commonly known as: Plavix; Take 1 tablet (75   mg) by mouth once daily.   digoxin 125 MCG tablet; Commonly known as: Lanoxin; TAKE 1 TABLET DAILY   fenofibrate 145 mg tablet; Commonly known as: Tricor   furosemide 20 mg tablet; Commonly known as: Lasix; Take 1 tablet (20 mg)   by mouth see administration instructions. 1 tablet as needed for swelling,   weight gain of 3# or increased shortness of breath.   gabapentin 300 mg capsule; Commonly known as: Neurontin; Take 1 capsule   (300 mg) by mouth once daily at bedtime.   rosuvastatin 5 mg tablet; Commonly known as: Crestor; Take 1 tablet (5   mg) by mouth once daily.   spironolactone 25 mg tablet; Commonly known as: Aldactone; Take 0.5   tablets (12.5 mg) by mouth once daily.   tiZANidine 2 mg tablet; Commonly known as: Zanaflex; TAKE 1 TABLET BY   MOUTH 2 TIMES A DAY AS NEEDED FOR MUSCLE SPASMS.     STOP taking these medications     mirtazapine 15 mg tablet; Commonly known as: Remeron   predniSONE 10 mg tablet; Commonly known as: Deltasone       Outpatient Follow-Up  PCP, vascular surgery, oncologist    Luis Ortiz MD

## 2024-06-06 NOTE — CARE PLAN
Problem: Pain  Goal: Takes deep breaths with improved pain control throughout the shift  Outcome: Progressing  Goal: Turns in bed with improved pain control throughout the shift  Outcome: Progressing  Goal: Walks with improved pain control throughout the shift  Outcome: Progressing  Goal: Performs ADL's with improved pain control throughout shift  Outcome: Progressing  Goal: Participates in PT with improved pain control throughout the shift  Outcome: Progressing  Goal: Free from opioid side effects throughout the shift  Outcome: Progressing  Goal: Free from acute confusion related to pain meds throughout the shift  Outcome: Progressing     Problem: Pain - Adult  Goal: Verbalizes/displays adequate comfort level or baseline comfort level  Outcome: Progressing     Problem: Safety - Adult  Goal: Free from fall injury  Outcome: Progressing     Problem: Discharge Planning  Goal: Discharge to home or other facility with appropriate resources  Outcome: Progressing     Problem: Chronic Conditions and Co-morbidities  Goal: Patient's chronic conditions and co-morbidity symptoms are monitored and maintained or improved  Outcome: Progressing     Problem: Heart Failure  Goal: Improved gas exchange this shift  Outcome: Progressing  Goal: Improved urinary output this shift  Outcome: Progressing  Goal: Reduction in peripheral edema within 24 hours  Outcome: Progressing  Goal: Report improvement of dyspnea/breathlessness this shift  Outcome: Progressing  Goal: Weight from fluid excess reduced over 2-3 days, then stabilize  Outcome: Progressing  Goal: Increase self care and/or family involvement in 24 hours  Outcome: Progressing       The patient's goals for the shift include      The clinical goals for the shift include Patient will remain hemodynamically stable throughout the shift.      No falls or injury this shift. Patient remained hemodynamically stable. Patient being discharged to home.

## 2024-06-06 NOTE — CARE PLAN
The patient's goals for the shift include      The clinical goals for the shift include Patient will remain safe throughout the shift.    Problem: Pain  Goal: Takes deep breaths with improved pain control throughout the shift  Outcome: Progressing  Goal: Turns in bed with improved pain control throughout the shift  Outcome: Progressing  Goal: Walks with improved pain control throughout the shift  Outcome: Progressing  Goal: Performs ADL's with improved pain control throughout shift  Outcome: Progressing  Goal: Participates in PT with improved pain control throughout the shift  Outcome: Progressing  Goal: Free from opioid side effects throughout the shift  Outcome: Progressing  Goal: Free from acute confusion related to pain meds throughout the shift  Outcome: Progressing     Problem: Pain - Adult  Goal: Verbalizes/displays adequate comfort level or baseline comfort level  Outcome: Progressing     Problem: Safety - Adult  Goal: Free from fall injury  Outcome: Progressing     Problem: Discharge Planning  Goal: Discharge to home or other facility with appropriate resources  Outcome: Progressing     Problem: Chronic Conditions and Co-morbidities  Goal: Patient's chronic conditions and co-morbidity symptoms are monitored and maintained or improved  Outcome: Progressing

## 2024-06-06 NOTE — NURSING NOTE
Patient was discharged to home. Discharge instructions were reviewed with the patient and the patient's spouse. Patient's IV access was removed prior to the patient leaving the unit. Patient left with all of his belongings.

## 2024-06-07 ENCOUNTER — PATIENT OUTREACH (OUTPATIENT)
Dept: PRIMARY CARE | Facility: CLINIC | Age: 69
End: 2024-06-07
Payer: COMMERCIAL

## 2024-06-07 ENCOUNTER — TELEPHONE (OUTPATIENT)
Dept: HEMATOLOGY/ONCOLOGY | Facility: HOSPITAL | Age: 69
End: 2024-06-07

## 2024-06-07 NOTE — PROGRESS NOTES
Pt declining TCM services at this time. Pt stating he will make his own follow up appt at his convenience.

## 2024-06-12 ENCOUNTER — APPOINTMENT (OUTPATIENT)
Dept: HEMATOLOGY/ONCOLOGY | Facility: CLINIC | Age: 69
End: 2024-06-12
Payer: COMMERCIAL

## 2024-06-13 LAB
ATRIAL RATE: 0 BPM
PR INTERVAL: 122 MS
Q ONSET: 249 MS
QRS COUNT: 14 BEATS
QRS DURATION: 101 MS
QT INTERVAL: 369 MS
QTC CALCULATION(BAZETT): 457 MS
QTC FREDERICIA: 425 MS
R AXIS: -2 DEGREES
T AXIS: 64 DEGREES
T OFFSET: 434 MS
VENTRICULAR RATE: 92 BPM

## 2024-06-19 ENCOUNTER — APPOINTMENT (OUTPATIENT)
Dept: HEMATOLOGY/ONCOLOGY | Facility: CLINIC | Age: 69
End: 2024-06-19
Payer: COMMERCIAL

## 2024-06-19 DIAGNOSIS — C34.32 PRIMARY CANCER OF LEFT LOWER LOBE OF LUNG (MULTI): Primary | Chronic | ICD-10-CM

## 2024-06-20 DIAGNOSIS — I48.91 ATRIAL FIBRILLATION, UNSPECIFIED TYPE (MULTI): ICD-10-CM

## 2024-06-20 DIAGNOSIS — I50.22 CHRONIC SYSTOLIC CONGESTIVE HEART FAILURE (MULTI): ICD-10-CM

## 2024-06-24 RX ORDER — DIGOXIN 125 MCG
125 TABLET ORAL DAILY
Qty: 90 TABLET | Refills: 0 | Status: SHIPPED | OUTPATIENT
Start: 2024-06-24

## 2024-06-26 ENCOUNTER — TELEPHONE (OUTPATIENT)
Dept: PRIMARY CARE | Facility: CLINIC | Age: 69
End: 2024-06-26
Payer: COMMERCIAL

## 2024-06-26 DIAGNOSIS — F41.9 ANXIETY: ICD-10-CM

## 2024-06-26 RX ORDER — GABAPENTIN 300 MG/1
300 CAPSULE ORAL NIGHTLY
Qty: 60 CAPSULE | Refills: 2 | Status: SHIPPED | OUTPATIENT
Start: 2024-06-26 | End: 2025-06-26

## 2024-06-26 NOTE — TELEPHONE ENCOUNTER
Needs a refill on his Gabapentin 300 mg takes it 1 time a day it was given to him by Dr. Roly Brown with [facility]. Mr. Rubi would like to know if you would refill this med. I let know know that he needed to call Dr. Brown office for a refill. Please advise and call him at [phone] his cell number is [phone]

## 2024-06-27 ENCOUNTER — APPOINTMENT (OUTPATIENT)
Dept: VASCULAR SURGERY | Facility: CLINIC | Age: 69
End: 2024-06-27
Payer: COMMERCIAL

## 2024-06-27 VITALS
SYSTOLIC BLOOD PRESSURE: 91 MMHG | HEART RATE: 101 BPM | BODY MASS INDEX: 20.21 KG/M2 | DIASTOLIC BLOOD PRESSURE: 56 MMHG | WEIGHT: 149 LBS

## 2024-06-27 DIAGNOSIS — K55.1 CHRONIC MESENTERIC ISCHEMIA (MULTI): ICD-10-CM

## 2024-06-27 PROCEDURE — 1157F ADVNC CARE PLAN IN RCRD: CPT | Performed by: SURGERY

## 2024-06-27 PROCEDURE — 1036F TOBACCO NON-USER: CPT | Performed by: SURGERY

## 2024-06-27 PROCEDURE — 1159F MED LIST DOCD IN RCRD: CPT | Performed by: SURGERY

## 2024-06-27 PROCEDURE — 1111F DSCHRG MED/CURRENT MED MERGE: CPT | Performed by: SURGERY

## 2024-06-27 PROCEDURE — 3074F SYST BP LT 130 MM HG: CPT | Performed by: SURGERY

## 2024-06-27 PROCEDURE — 3078F DIAST BP <80 MM HG: CPT | Performed by: SURGERY

## 2024-06-27 PROCEDURE — 99212 OFFICE O/P EST SF 10 MIN: CPT | Performed by: SURGERY

## 2024-06-27 NOTE — PROGRESS NOTES
Subjective   Patient ID: Kevin Rubi is a 68 y.o. male who presents for New Patient Visit (Hosp f/up SMA occlusion ).  HPI  Patient appears to be pain-free at this time.  He is eating multiple small meals and tolerating.  His weight is stable at 146 and no significant weight loss noted in the past 3 months.    Review of Systems    Objective   Physical Exam  Physical exam    Constitutional: alert and in no acute distress verbal  Eyes: No erythema swelling or discharge noted  Neck: supple, symmetric, trachea midline, no masses noted  Cardiovascular: Carotid pulses 2+, no obvious bruit, no Jugular distension noted, no thrill, heart regular rate, lower extremity vascular exam intact, cap refill <2 sec  Pulmonary:  Bilateral breath sounds intact, clear with rales rhonchi or wheeze  Abdomen: soft non tender, no pulsatile masses noted, no rebound rigidity or guarding noted  Skin: intact warm no abnormal turgor  Psychiatric: alert without any obvious cognitive issues, oriented to person, place, and time    Assessment/Plan   Chronic mesenteric ischemia  Patient's SMA is occluded based on CTA  Celiac axis stent is patent  Will refer him to aortic center for possible aortomesenteric bypass and options  He does understand this I will see him back in 3 months.         Jose Juan Hayes DO 06/27/24 9:45 AM   
Patient

## 2024-07-01 ENCOUNTER — APPOINTMENT (OUTPATIENT)
Dept: CARDIOLOGY | Facility: HOSPITAL | Age: 69
End: 2024-07-01
Payer: COMMERCIAL

## 2024-07-26 ENCOUNTER — APPOINTMENT (OUTPATIENT)
Dept: CARDIOLOGY | Facility: CLINIC | Age: 69
End: 2024-07-26
Payer: COMMERCIAL

## 2024-07-26 VITALS
HEIGHT: 72 IN | DIASTOLIC BLOOD PRESSURE: 54 MMHG | BODY MASS INDEX: 19.83 KG/M2 | HEART RATE: 86 BPM | SYSTOLIC BLOOD PRESSURE: 106 MMHG | WEIGHT: 146.4 LBS

## 2024-07-26 DIAGNOSIS — I48.91 ATRIAL FIBRILLATION, UNSPECIFIED TYPE (MULTI): ICD-10-CM

## 2024-07-26 DIAGNOSIS — I77.1 CELIAC ARTERY STENOSIS (CMS-HCC): ICD-10-CM

## 2024-07-26 DIAGNOSIS — I50.32 HEART FAILURE WITH IMPROVED EJECTION FRACTION (HFIMPEF) (MULTI): ICD-10-CM

## 2024-07-26 DIAGNOSIS — I10 BENIGN HYPERTENSION: ICD-10-CM

## 2024-07-26 DIAGNOSIS — E78.2 MIXED HYPERLIPIDEMIA: Primary | ICD-10-CM

## 2024-07-26 PROCEDURE — 93000 ELECTROCARDIOGRAM COMPLETE: CPT | Performed by: INTERNAL MEDICINE

## 2024-07-26 PROCEDURE — 1157F ADVNC CARE PLAN IN RCRD: CPT | Performed by: INTERNAL MEDICINE

## 2024-07-26 PROCEDURE — 3074F SYST BP LT 130 MM HG: CPT | Performed by: INTERNAL MEDICINE

## 2024-07-26 PROCEDURE — 3008F BODY MASS INDEX DOCD: CPT | Performed by: INTERNAL MEDICINE

## 2024-07-26 PROCEDURE — 99214 OFFICE O/P EST MOD 30 MIN: CPT | Performed by: INTERNAL MEDICINE

## 2024-07-26 PROCEDURE — 3078F DIAST BP <80 MM HG: CPT | Performed by: INTERNAL MEDICINE

## 2024-07-26 PROCEDURE — 1159F MED LIST DOCD IN RCRD: CPT | Performed by: INTERNAL MEDICINE

## 2024-07-26 PROCEDURE — 1036F TOBACCO NON-USER: CPT | Performed by: INTERNAL MEDICINE

## 2024-07-26 NOTE — PROGRESS NOTES
Chief Complaint:   No chief complaint on file.     History Of Present Illness:    Kevin Rubi is a 68 y.o. male presenting for three month follow up .   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  Imaging demonstrated occlusion of SMA and celiac stent with 70% stenosis - no inpatient surgical intervention recommended at that time.  Since then Kevin has been eating small meals and avoiding foods such as late night chocolate, and with this he only has mild abd pain.  He denies any chest pain/pressure, no palps, LH/dizziness or sensation of irreg HR.  No other active CV symptoms reported.    ROS:  The remainder of the review of systems was obtained, as was negative as pertains to the chief complaint.    Prior history:  I first met Kevin when he was admitted to [facility] in 1/19 with progressive SOB. He was found to have new-onset Afib that we treated with rate control and anticoagulation. TTE 1/2019 demonstrated increased LV cavity size, moderate cLVH, severe generalized LV dysfunction EF 20%, stage II-IV diastolic dysfcn, dilated RV with moderate dysfcn, mod MR, ? bicuspid aortic valve with fused R and noncoronary cusps (peak grad 17/mean 11), trace AR, mild PHTN. I performed a coronary angiogram which demonstrated mild nonobstructive CAD. Plan was made for FARZANA-DCCV which was performed by Dr. Soto, however patient unable to be cardioverted successfully. He was started on amiodarone PO with plan for 6 weeks of loading. He is now off of amiodarone           CV testing and labs :  BMP6/2024  K 3.6  BUN 15  crea 0.82  H&H 901  29.6   5/2024  dig level 0.64  6/2024 mesenteric artery duplex  CONCLUSIONS:  Mesenteric: Celiac artery  demonstrates a hemodynamically significant stenosis of greater than 70%. Velocities did not change with inspiration and/or change to sitting position which may be suggestive of intrinsic stenosis. The patient was NPO for this study. There are elevated velocities noted in the splenic artery. Turbulent flow is noted in the splenic, hepatic and throughout the entire celiac artery.  The stent walls of the celiac are not seen.  The SMA is not visualized(possibly chronically occluded).  The TIESHA is patent with turbulent flow.  TTE limited 4/2024  EF 60-65%   CT chest  4/2024    HEART AND VESSELS:  Mild atherosclerotic calcifications of the thoracic aorta with stable  aneurysmal dilation of the ascending segment measuring 4.5 cm. Main pulmonary artery is normal in caliber.  Moderate to severe coronary artery calcifications in the LAD and  circumflex arteries. The study is not optimized for evaluation of  coronary arteries.  Stable biatrial enlargement.  Stable trace pericardial effusion.     H&H 3/2024   10.9  33.6  CMP  K 3.6  BUN 39  crea 1.02  ast 24  alt24     TTE 3/2024  EF 55-60% LV global longitudinal strain -15.2% , stage II DD , left atrium is mild to moderately dilated, right atrium mildly dilated, aortic valve appears bicuspid, mils aortic valve regurg, trace to mild mitral valve regurg, mild tricuspid regurg,      Lipid labs 10/2023  chol 140  HDL 41.3  LDL 74 trig 122  Dig level 0.58     8/2023  Peripheral artery JSUTIN moderate disease in the RLE, mild disease in LLE.       [facility] in 2019 - mild CAD     Stress test 2/2019  nondiagnostic d/t inadequate heart rate     Last Recorded Vitals:  There were no vitals filed for this visit.    Past Medical History:  He has a past medical history of Aortic aneurysm (CMS-formerly [facility]), Atrial fibrillation (Multi), Coronary artery disease, Hyperlipidemia, Hypertension, Lung cancer (Multi), Personal history of peptic ulcer disease, Superior mesenteric artery stenosis (Multi), and  Tinnitus, bilateral (08/14/2016).    He has no past medical history of Stroke (Multi).    Past Surgical History:  He has a past surgical history that includes Other surgical history (02/13/2019); Other surgical history (02/13/2019); CT angio abdomen pelvis w and or wo IV IV contrast (08/08/2023); Superior mestenteric artery stent; Exploratory laparotomy w/ bowel resection (04/09/2024); and Hernia repair (Right, 04/09/2024).      Social History:  He reports that he quit smoking about 11 years ago. His smoking use included cigarettes. He started smoking about 41 years ago. He has a 15 pack-year smoking history. He has been exposed to tobacco smoke. He has never used smokeless tobacco. He reports that he does not currently use alcohol. He reports that he does not use drugs.    Family History:  Family History   Problem Relation Name Age of Onset    Cancer Mother          ?larynx or lung        Allergies:  Patient has no known allergies.    Outpatient Medications:  Current Outpatient Medications   Medication Instructions    apixaban (ELIQUIS) 5 mg, oral, 2 times daily    clopidogrel (PLAVIX) 75 mg, oral, Daily    digoxin (LANOXIN) 125 mcg, oral, Daily    fenofibrate (Tricor) 145 mg tablet Take 1 tablet (145 mg) by mouth once daily.    furosemide (LASIX) 20 mg, oral, See admin instructions, 1 tablet as needed for swelling, weight gain of 3# or increased shortness of breath.    gabapentin (NEURONTIN) 300 mg, oral, Nightly    metoprolol succinate XL (TOPROL-XL) 25 mg, oral, Daily, Do not crush or chew.    rosuvastatin (CRESTOR) 5 mg, oral, Daily    sacubitriL-valsartan (Entresto) 24-26 mg tablet 0.5 tablets, oral, 2 times daily    spironolactone (ALDACTONE) 12.5 mg, oral, Daily    tiZANidine (ZANAFLEX) 2 mg, oral, 2 times daily PRN       Physical Exam:  Physical Exam  HENT:      Head: Normocephalic.      Nose: Nose normal.      Mouth/Throat:      Mouth: Mucous membranes are moist.   Cardiovascular:      Rate and Rhythm:  Normal rate and regular rhythm.   Pulmonary:      Effort: Pulmonary effort is normal.      Breath sounds: Normal breath sounds.   Abdominal:      General: Bowel sounds are normal.      Palpations: Abdomen is soft.   Musculoskeletal:         General: Normal range of motion.      Cervical back: Normal range of motion.   Skin:     General: Skin is warm and dry.   Neurological:      General: No focal deficit present.      Mental Status: He is alert.   Psychiatric:         Mood and Affect: Mood normal.            Last Labs:  CBC -  Lab Results   Component Value Date    WBC 6.8 06/05/2024    HGB 9.1 (L) 06/05/2024    HCT 29.6 (L) 06/05/2024    MCV 82 06/05/2024     06/05/2024       CMP -  Lab Results   Component Value Date    CALCIUM 8.3 (L) 06/05/2024    PHOS 1.8 (L) 04/15/2024    PROT 6.8 06/03/2024    ALBUMIN 3.5 06/03/2024    AST 12 06/03/2024    ALT 11 06/03/2024    ALKPHOS 40 06/03/2024    BILITOT 0.3 06/03/2024       LIPID PANEL -   Lab Results   Component Value Date    CHOL 140 10/23/2023    TRIG 122 10/23/2023    HDL 41.3 10/23/2023    CHHDL 3.4 10/23/2023    LDLF 39 04/01/2023    VLDL 24 10/23/2023    NHDL 99 10/23/2023       RENAL FUNCTION PANEL -   Lab Results   Component Value Date    GLUCOSE 72 (L) 06/05/2024     (L) 06/05/2024    K 3.6 06/05/2024     06/05/2024    CO2 23 06/05/2024    ANIONGAP 12 06/05/2024    BUN 15 06/05/2024    CREATININE 0.82 06/05/2024    GFRMALE CANCELED 09/16/2023    CALCIUM 8.3 (L) 06/05/2024    PHOS 1.8 (L) 04/15/2024    ALBUMIN 3.5 06/03/2024        Lab Results   Component Value Date     (H) 05/01/2024    HGBA1C 6.9 (H) 05/01/2024             Assessment/Plan   Abdominal pain:  Chronic mesenteric ischemia  Patiyenni has history of celiac artery stent in 8/23 by Dr. Hayes - with 70% stenosis.  Follows with Dr. Hayes, who has recommended referral to aortic center.  -continue antiplatelet therapy      HFrEF > HFpEF (LVEF 20% > 60-65%)  -entresto 24-26mg half  tab bid  -meto XL 25mg daily  -digoxin 125 mcg daily  -spironolactone 12.5mg daily  -no current indication for additional diuresis     Mild CAD:  no angina currently.  -not on ASA due to clopidogrel/apixaban  -rosuvastatin 5mg daily  -metop XL 25mg daily     pAfib on apixaban:  -c/w apixaban  -BB for rate control  -tele monitoring     Ascending aortic aneurysm (measured 4.4 cm on CT 3/2021)  -serial monitoring and BP control     Bicuspid AV  -serial monitoring on TTE     DL:    -Statin     Recent PNA:  infiltrate on CXR , patient denies cough/fever  -mgt per primary service     Lung CA s/p CTX/XRT on immunotherapy

## 2024-07-26 NOTE — PATIENT INSTRUCTIONS
Thanks for following up in office today.    1)  Today you are in atrial fib, but your heart rate is not high.     2)  I don't think that we need to change anything today since you are telling me that you are not having any symptoms. I do want you to keep any eye on your blood pressures,.  I do not want to see the systolic number less than 100.   Range should be 110-130/60-70.  I want you to make sure that you are drinking enough fluid.     3)  Please continue your cardiac medications as prescribed.  The spironolactone is what is keeping you dry so that you are not fluid overloaded. The spironolactone you should be taking every day in the morning. The furosemide is a pill that you should only be taking as you need it as per instructions from hospital.     4) When you get up to walk  when getting out of bed.  Sit for a couple seconds when you are getting out of bed and then get up.      Follow up with BEBETO Muhammad NP in  6  months and me in one year.  If you have any questions, please call [phone] and choose option for Dr. Rosario's nurse Janneth Rothman

## 2024-08-02 ENCOUNTER — HOSPITAL ENCOUNTER (OUTPATIENT)
Dept: RADIOLOGY | Facility: HOSPITAL | Age: 69
Discharge: HOME | End: 2024-08-02
Payer: COMMERCIAL

## 2024-08-02 DIAGNOSIS — C34.32 PRIMARY CANCER OF LEFT LOWER LOBE OF LUNG (MULTI): Chronic | ICD-10-CM

## 2024-08-02 PROCEDURE — 2550000001 HC RX 255 CONTRASTS: Performed by: INTERNAL MEDICINE

## 2024-08-02 PROCEDURE — 71260 CT THORAX DX C+: CPT

## 2024-08-07 ENCOUNTER — OFFICE VISIT (OUTPATIENT)
Dept: HEMATOLOGY/ONCOLOGY | Facility: CLINIC | Age: 69
End: 2024-08-07
Payer: MEDICARE

## 2024-08-07 VITALS
WEIGHT: 153.66 LBS | SYSTOLIC BLOOD PRESSURE: 81 MMHG | OXYGEN SATURATION: 100 % | TEMPERATURE: 97.5 F | HEART RATE: 84 BPM | DIASTOLIC BLOOD PRESSURE: 51 MMHG | BODY MASS INDEX: 20.84 KG/M2 | RESPIRATION RATE: 16 BRPM

## 2024-08-07 DIAGNOSIS — C34.32 PRIMARY CANCER OF LEFT LOWER LOBE OF LUNG (MULTI): Primary | ICD-10-CM

## 2024-08-07 PROCEDURE — 1157F ADVNC CARE PLAN IN RCRD: CPT | Performed by: INTERNAL MEDICINE

## 2024-08-07 PROCEDURE — 3078F DIAST BP <80 MM HG: CPT | Performed by: INTERNAL MEDICINE

## 2024-08-07 PROCEDURE — 3074F SYST BP LT 130 MM HG: CPT | Performed by: INTERNAL MEDICINE

## 2024-08-07 PROCEDURE — 1159F MED LIST DOCD IN RCRD: CPT | Performed by: INTERNAL MEDICINE

## 2024-08-07 PROCEDURE — 99215 OFFICE O/P EST HI 40 MIN: CPT | Performed by: INTERNAL MEDICINE

## 2024-08-07 PROCEDURE — 99215 OFFICE O/P EST HI 40 MIN: CPT | Mod: GC | Performed by: INTERNAL MEDICINE

## 2024-08-07 PROCEDURE — 1126F AMNT PAIN NOTED NONE PRSNT: CPT | Performed by: INTERNAL MEDICINE

## 2024-08-07 ASSESSMENT — ENCOUNTER SYMPTOMS
LOSS OF SENSATION IN FEET: 0
DEPRESSION: 0
OCCASIONAL FEELINGS OF UNSTEADINESS: 0

## 2024-08-07 ASSESSMENT — PAIN SCALES - GENERAL: PAINLEVEL: 0-NO PAIN

## 2024-08-07 NOTE — PROGRESS NOTES
Patient ID: Kevin Rubi is a 68 y.o. male    Primary Care Provider: Chen Olea, APRN-CNS    DIAGNOSIS AND STAGING   Stage IIIC ([id number]) NSCLC (adenocarcinoma, TTF1+) of the LLL - dx on 09/15/23   Primary tumor measures 3.5 cm (+satellite nodule)   2R+ for adenocarcinoma cells     SITES OF DISEASE   LLL   Mediastinal nodes (including contralateral)    Has a couple of other spiculated nodules (0.8 cm WILDA and 0.7 cm RUL) that are potential  synchronous primaries and will be addressed when needed      MOLECULAR GENOMICS   KRAS G12D mutation    PD-L1 TPS 95%     PRIOR THERAPIES  Concurrent chemo RT (60 Beyer in 30 fractions, completed on 12/26/2023 using carboplatin/pemetrexed   Consolidative druvalumab (started 01/31/24) - only received 2 cycles due to frequent admissions and other coinciding medical issues     CURRENT THERAPY  Surveillance     CURRENT ONCOLOGICAL PROBLEMS  None     HISTORY OF PRESENT ILLNESS  Former smoker, (quit in 2000), who while undergoing w/u for abdominal pain related to SMA occlusion, was found to have a LLL nodule   Based on his records he was noted to have abnormal imaging back in 2021, was seen by pulmonary and was told to complete w/u, including PET scan and potential biopsy but did not follow recommendations.    A CT chest on 09/12/23 showed a 3.5 cm spiculated LLL nodule abutting the pericardium. There was also a satellite nodule suspicious for disease and a couple of other spiculated nodules in the WILDA and contralateral right lung.4R node measured 1.4 cm and level 7 2.4 cm.   PET scan on 08/04/23 showed no findings concerning for extra-thoracic disease. LLL nodule was FDG avid as well as multiple mediastinal nodes (including contralateral nodes). The WILDA nodule has mild hypermetabolic activity.  An EBUS procedure on 09/15/23 demonstrated 2R to be involved with adenocarcinoma cells, TTF1+. KRAS G12D +, TPS 95%.  The pt sees medical oncology on 09/27/23 -   States he is feeling  well and denies any systemic sx related to this malignancy - including fatigue and weight loss. The abdominal pain resolved after vascular procedure/stenting performed and he is now on Xarelto and Plavix. Denies any h/a or cough. Denies dyspnea.   10/23/23: C1  Carboplatin/pemetrexed  11/23/23: C2 carboplatin/pemetrexed with concurrent RT    12/04/23: C3 carboplatin/pemetrexed with concurrent RT   12/18/2023: Hypotension due to dehydration, requiring ED visit.  12/26/2023: Completes concurrent chemo RT (60 Gray in 30 fractions)   01/08/2024: CT chest with IV contrast demonstrates response to concurrent chemo RT with no signs of disease progression  01/10/2024: Consents for consolidative durvalumab  01/31/2024: C1 D1 (every 28-day cycles) of durvalumab  03/2024: started on prednisone for concerns regarding pneumonitis (70 mg/day)   04/16/2024: Discharged from the hospital after being operated for incarcerated right inguinal hernia repair with small bowel resection and anastomosis on 04/09/2024 05/01/24 - 05/03/24: hospitalized with cellulitis and pneumonia - discharged home on doxycycline and Augmentin   06/03/24 - 06/06/24: admitted for epigastric pain with complete SMA stenosis - had outpatient follow up where aortomesenteric bypass was discussed - however deferring at this time  08/02/2024: CT chest shows no signs of PD       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  Status post exploratory laparotomy for incarcerated right inguinal hernia repair with small bowel resection on 04/09/2024  HFpEF - EF 60%   Atrial fibrillation - on Eliquis  Ascending aortic aneurysm   PUD (gastric)  HLD        SOCIAL HISTORY  + Tobacco - quit in 2000 - mostly 1/2 ppd starting at age 22  Occasional ETOH intake  Worked as a  for 35 years    for 43 years and has 2 children - son who is 41 yo and daughter who is 35  He has just retired      FAMILY HISTORY  Mother seems to have had H/N or  lung cancer - unclear   Father may also have had cancer   1 sister  from complications of GSW  1 brother  of CA (?liver CA)     CURRENT MEDS REVIEWED        ALLERGIES REVIEWED   NKDA     SUBJECTIVE:  In  was hospitalized for SMA occlusion leading to epigastric pain. Was seen outpatient by vascular surgery and aortomesenteric bypass was offered, however patient deferring at this time. He feels that the abdominal pain has improved and is only intermittent. Appetite is better and weight is up 9 lbs since early . Eating 3 meals per day. Reports some dyspnea on exertion but not changed from prior. Activity level is on the low end but he denies requiring sleep during the day. No new neuro symptoms. No rash. His BP is soft today however this is normal for him in relation to antihypertensives used to treat HFrEF. Had a recent cardiology appointment without changes to regimen. He endorses occasional dizziness upon standing but knows to take his time.       A 13 point review of systems was performed, with significant findings documented above in subjective history.    OBJECTIVE:  Vitals:    24 1314   BP: 81/51   Pulse: 84   Resp: 16   Temp:    SpO2:         Body surface area is 1.88 meters squared.     Wt Readings from Last 5 Encounters:   24 69.7 kg (153 lb 10.6 oz)   24 66.4 kg (146 lb 6.4 oz)   24 67.6 kg (149 lb)   24 65.8 kg (145 lb)   24 65.6 kg (144 lb 11.2 oz)       ECOGSCORE: 3    Physical Exam  HENT:      Head: Normocephalic and atraumatic.   Eyes:      General: No scleral icterus.     Extraocular Movements: Extraocular movements intact.      Conjunctiva/sclera: Conjunctivae normal.   Cardiovascular:      Rate and Rhythm: Normal rate and regular rhythm.   Pulmonary:      Effort: Pulmonary effort is normal.      Breath sounds: Normal breath sounds.   Abdominal:      General: Abdomen is flat.      Palpations: Abdomen is soft.      Tenderness: There is no abdominal  tenderness.   Musculoskeletal:      Right lower leg: No edema.      Left lower leg: No edema.   Neurological:      General: No focal deficit present.      Mental Status: He is alert and oriented to person, place, and time. Mental status is at baseline.      Motor: No weakness.   Psychiatric:         Behavior: Behavior normal.         Thought Content: Thought content normal.         Judgment: Judgment normal.          Diagnostic Results   Results:  Labs:  Lab Results   Component Value Date    WBC 6.8 06/05/2024    HGB 9.1 (L) 06/05/2024    HCT 29.6 (L) 06/05/2024    MCV 82 06/05/2024     06/05/2024      Lab Results   Component Value Date    NEUTROABS 9.07 (H) 06/03/2024        Lab Results   Component Value Date    GLUCOSE 72 (L) 06/05/2024    CALCIUM 8.3 (L) 06/05/2024     (L) 06/05/2024    K 3.6 06/05/2024    CO2 23 06/05/2024     06/05/2024    BUN 15 06/05/2024    CREATININE 0.82 06/05/2024    MG 1.47 (L) 05/03/2024     Lab Results   Component Value Date    ALT 11 06/03/2024    AST 12 06/03/2024    ALKPHOS 40 06/03/2024    BILITOT 0.3 06/03/2024    BILIDIR 0.1 06/03/2024      Lab Results   Component Value Date    ACTH 11.8 01/23/2024    CORTISOL 16.6 03/26/2024    TSH 0.01 (L) 03/26/2024    FREET4 1.48 (H) 03/26/2024     CT CHEST W IV CONTRAST; 8/2/2024 8:39 am   FINDINGS:  LUNGS and AIRWAYS:  Linear opacity persists at the right base posteromedially, most  likely residual atelectasis from otherwise resolved additional  basilar infiltrate and atelectasis previously. There is also residual  focal opacity and bronchiectasis at the middle lobe centrally, also  residual from larger consolidative infiltrate on the prior exam.  There is a small area of subpleural reticulation and opacity at the  right upper lobe anteromedially probably from peripheral atelectasis  since the prior exam.      An irregular subsolid nodule in the left upper lung measuring  approximately 1.4 cm on image 70 of series 3  appears relatively  stable. Consolidative opacity with air bronchograms extending to the  lateral segment of the left lower lung persists but has improved  inferiorly near the base. There has been resolution of additional  infiltrate at the left lower lung particularly at the posterior basal  segment. However there is additional central and posterior left  perihilar consolidated infiltrate with central air bronchograms the  bulk of which measures approximately 3 cm. There is also additional  consolidative infiltrate or atelectasis along the left mid/upper  mediastinum. These 2 latter areas may be infectious with pneumonia,  but postobstructive pneumonitis from underlying malignancy or  fibrosis from treatment given the patient's history is possible.  Follow-up as clinically warranted. Ground-glass attenuation probably  due to pneumonic infiltrate at the anterior segment of the left upper  lobe appears slightly improved.      MEDIASTINUM and GET, LOWER NECK AND AXILLA:  The visualized thyroid gland is within normal limits.      No evidence of thoracic lymphadenopathy by CT criteria.      HEART and VESSELS:  The ascending aorta is mildly dilated measuring up to 4.6 cm in  diameter, without significant atherosclerotic calcification.      Main pulmonary artery and its branches are normal in caliber.      There is at least moderate coronary artery atherosclerotic  calcification.      The cardiac chambers are not enlarged.      UPPER ABDOMEN:  The visualized subdiaphragmatic structures demonstrate no remarkable  findings.      CHEST WALL, OSSEOUS STRUCTURES AND OTHER FINDINGS:  Sclerosis at the superior right lateral aspect of the T6 vertebral  body is similar to the prior examination, but decreased from exam of  01/08/2024 and probably from blastic metastasis. There is also  suggestion of faint more diffuse sclerosis of the thoracic spine  similar to prior exam. Sclerotic foci at the lateral aspect of the  left 5th rib  is stable.      IMPRESSION:  1.  Interval development of additional consolidative inferior left  posterior perihilar infiltrate, and opacity along the left anterior  mediastinum. While possibly due to pneumonia or fibrosis from  treatment, postobstructive pneumonitis from underlying malignancy is  not excluded. Follow-up as clinically warranted.  2. Otherwise improvement of consolidative infiltrate at the lower  lungs greater on the left and at the middle lobe.  3. Persistent subsolid nodule at the left upper lobe.  4. Stable sclerosis at the T6 vertebral body that was probably a  blastic metastasis.    Assessment/Plan     Primary cancer of left lower lobe of lung (Multi), Clinical: Stage IIIC (cT3, cN3, cM0)  Status post completion of concurrent chemo RT on 12/26/2023 (60 Beyer in 30 fractions) using carboplatin/pemetrexed (s/p 3 cycles). His tumor harbors a KRAS G12 D mutation in the PD-L1 expression TPS 95%.  His scans from 01/08/2024 demonstrating a favorable response, with a decrease in size of the dominant left lower lobe primary tumor by approximately 20%, compatible with stable disease.   The left upper lobe nodule remains a stable.  He was a started on consolidative durvalumab on 01/31/2024 but only received 2 cycles  Treatment course was interrupted due to an incarcerated right inguinal hernia leading to an exploratory laparotomy and small bowel resection on 04/09/2024 -  This was followed by another admission related to cellulitis and PNA   And again another admission in June 2024 due to SMA stenosis  Given significant treatment delays we will continue following with surveillance  Most recent scans from 08/2024 show no evidence of PD  If PD does occur immunotherapy may be indicated as PDL1 is 95%  Repeat scans in 3 months with follow up     Depression/unintentional weight loss  -this has resolved   -on 08/07 weight has improved by 9lbs in 2 months and patient reporting that mood is also stable      Patient  seen and discussed with Dr. Von Buenrostro MD  Hematology-Oncology Fellow - PGY5    I saw and evaluated the patient. I personally obtained the key and critical portions of the history and physical exam or was physically present for key and critical portions performed by the resident/fellow. I reviewed the resident/fellow's documentation and discussed the patient with the resident/fellow. I agree with the resident/fellow's medical decision making as documented in the note.     Janell Longoria MD, MS

## 2024-08-24 DIAGNOSIS — R10.84 GENERALIZED ABDOMINAL PAIN: ICD-10-CM

## 2024-08-26 RX ORDER — TIZANIDINE 2 MG/1
2 TABLET ORAL 2 TIMES DAILY PRN
Qty: 180 TABLET | Refills: 1 | Status: SHIPPED | OUTPATIENT
Start: 2024-08-26

## 2024-09-23 DIAGNOSIS — I48.91 ATRIAL FIBRILLATION, UNSPECIFIED TYPE (MULTI): ICD-10-CM

## 2024-09-23 DIAGNOSIS — I50.22 CHRONIC SYSTOLIC CONGESTIVE HEART FAILURE: ICD-10-CM

## 2024-09-27 ENCOUNTER — TELEPHONE (OUTPATIENT)
Dept: PRIMARY CARE | Facility: CLINIC | Age: 69
End: 2024-09-27
Payer: MEDICARE

## 2024-09-27 DIAGNOSIS — R73.03 PREDIABETES: ICD-10-CM

## 2024-09-27 DIAGNOSIS — I10 ESSENTIAL HYPERTENSION: ICD-10-CM

## 2024-09-27 DIAGNOSIS — E78.2 MIXED HYPERLIPIDEMIA: Primary | ICD-10-CM

## 2024-09-27 NOTE — TELEPHONE ENCOUNTER
There was already lab orders in there to have done but they were underneath all of his hospitalizations, so I reentered orders. Can get done before appointment. Thank you!

## 2024-09-30 ENCOUNTER — LAB (OUTPATIENT)
Dept: LAB | Facility: LAB | Age: 69
End: 2024-09-30
Payer: MEDICARE

## 2024-09-30 DIAGNOSIS — E78.2 MIXED HYPERLIPIDEMIA: Primary | ICD-10-CM

## 2024-09-30 DIAGNOSIS — I10 ESSENTIAL HYPERTENSION: ICD-10-CM

## 2024-09-30 DIAGNOSIS — R73.03 PREDIABETES: ICD-10-CM

## 2024-09-30 DIAGNOSIS — E78.2 MIXED HYPERLIPIDEMIA: ICD-10-CM

## 2024-09-30 LAB
ALBUMIN SERPL BCP-MCNC: 3.8 G/DL (ref 3.4–5)
ALP SERPL-CCNC: 38 U/L (ref 33–136)
ALT SERPL W P-5'-P-CCNC: 6 U/L (ref 10–52)
ANION GAP SERPL CALC-SCNC: 11 MMOL/L (ref 10–20)
AST SERPL W P-5'-P-CCNC: 11 U/L (ref 9–39)
BILIRUB SERPL-MCNC: 0.3 MG/DL (ref 0–1.2)
BUN SERPL-MCNC: 17 MG/DL (ref 6–23)
CALCIUM SERPL-MCNC: 8.9 MG/DL (ref 8.6–10.3)
CHLORIDE SERPL-SCNC: 106 MMOL/L (ref 98–107)
CHOLEST SERPL-MCNC: 115 MG/DL (ref 0–199)
CHOLESTEROL/HDL RATIO: 3.5
CO2 SERPL-SCNC: 25 MMOL/L (ref 21–32)
CREAT SERPL-MCNC: 1.28 MG/DL (ref 0.5–1.3)
EGFRCR SERPLBLD CKD-EPI 2021: 62 ML/MIN/1.73M*2
ERYTHROCYTE [DISTWIDTH] IN BLOOD BY AUTOMATED COUNT: 15.7 % (ref 11.5–14.5)
EST. AVERAGE GLUCOSE BLD GHB EST-MCNC: 154 MG/DL
GLUCOSE SERPL-MCNC: 89 MG/DL (ref 74–99)
HBA1C MFR BLD: 7 %
HCT VFR BLD AUTO: 33.2 % (ref 41–52)
HCV AB SER QL: NONREACTIVE
HDLC SERPL-MCNC: 33.1 MG/DL
HGB BLD-MCNC: 10.3 G/DL (ref 13.5–17.5)
LDLC SERPL CALC-MCNC: 58 MG/DL
MCH RBC QN AUTO: 27 PG (ref 26–34)
MCHC RBC AUTO-ENTMCNC: 31 G/DL (ref 32–36)
MCV RBC AUTO: 87 FL (ref 80–100)
NON HDL CHOLESTEROL: 82 MG/DL (ref 0–149)
NRBC BLD-RTO: 0 /100 WBCS (ref 0–0)
PLATELET # BLD AUTO: 220 X10*3/UL (ref 150–450)
POTASSIUM SERPL-SCNC: 4.2 MMOL/L (ref 3.5–5.3)
PROT SERPL-MCNC: 6.3 G/DL (ref 6.4–8.2)
RBC # BLD AUTO: 3.81 X10*6/UL (ref 4.5–5.9)
SODIUM SERPL-SCNC: 138 MMOL/L (ref 136–145)
T4 FREE SERPL-MCNC: 0.79 NG/DL (ref 0.61–1.12)
TRIGL SERPL-MCNC: 118 MG/DL (ref 0–149)
TSH SERPL-ACNC: 5.08 MIU/L (ref 0.44–3.98)
VIT B12 SERPL-MCNC: 184 PG/ML (ref 211–911)
VLDL: 24 MG/DL (ref 0–40)
WBC # BLD AUTO: 7.1 X10*3/UL (ref 4.4–11.3)

## 2024-09-30 PROCEDURE — 83036 HEMOGLOBIN GLYCOSYLATED A1C: CPT

## 2024-09-30 PROCEDURE — 84443 ASSAY THYROID STIM HORMONE: CPT

## 2024-09-30 PROCEDURE — 86803 HEPATITIS C AB TEST: CPT

## 2024-09-30 PROCEDURE — 84439 ASSAY OF FREE THYROXINE: CPT

## 2024-09-30 PROCEDURE — 85027 COMPLETE CBC AUTOMATED: CPT

## 2024-09-30 PROCEDURE — 82607 VITAMIN B-12: CPT

## 2024-09-30 PROCEDURE — 80053 COMPREHEN METABOLIC PANEL: CPT

## 2024-09-30 PROCEDURE — 36415 COLL VENOUS BLD VENIPUNCTURE: CPT

## 2024-09-30 PROCEDURE — 80061 LIPID PANEL: CPT

## 2024-09-30 RX ORDER — FENOFIBRATE 145 MG/1
145 TABLET, FILM COATED ORAL DAILY
Qty: 90 TABLET | Refills: 1 | Status: SHIPPED | OUTPATIENT
Start: 2024-09-30

## 2024-10-02 ENCOUNTER — APPOINTMENT (OUTPATIENT)
Dept: PRIMARY CARE | Facility: CLINIC | Age: 69
End: 2024-10-02
Payer: COMMERCIAL

## 2024-10-02 VITALS
BODY MASS INDEX: 21.13 KG/M2 | HEART RATE: 64 BPM | SYSTOLIC BLOOD PRESSURE: 90 MMHG | DIASTOLIC BLOOD PRESSURE: 54 MMHG | HEIGHT: 72 IN | WEIGHT: 156 LBS

## 2024-10-02 DIAGNOSIS — E78.2 MIXED HYPERLIPIDEMIA: ICD-10-CM

## 2024-10-02 DIAGNOSIS — R73.03 PREDIABETES: ICD-10-CM

## 2024-10-02 DIAGNOSIS — I77.4 CELIAC ARTERY STENOSIS: ICD-10-CM

## 2024-10-02 DIAGNOSIS — I25.10 CORONARY ARTERY DISEASE INVOLVING NATIVE CORONARY ARTERY, UNSPECIFIED WHETHER ANGINA PRESENT, UNSPECIFIED WHETHER NATIVE OR TRANSPLANTED HEART: ICD-10-CM

## 2024-10-02 DIAGNOSIS — Z23 NEED FOR PNEUMOCOCCAL 20-VALENT CONJUGATE VACCINATION: ICD-10-CM

## 2024-10-02 DIAGNOSIS — Z12.11 COLON CANCER SCREENING: ICD-10-CM

## 2024-10-02 DIAGNOSIS — I10 PRIMARY HYPERTENSION: ICD-10-CM

## 2024-10-02 DIAGNOSIS — E11.65 TYPE 2 DIABETES MELLITUS WITH HYPERGLYCEMIA, WITHOUT LONG-TERM CURRENT USE OF INSULIN: Primary | ICD-10-CM

## 2024-10-02 PROCEDURE — 1159F MED LIST DOCD IN RCRD: CPT | Performed by: CLINICAL NURSE SPECIALIST

## 2024-10-02 PROCEDURE — 99214 OFFICE O/P EST MOD 30 MIN: CPT | Performed by: CLINICAL NURSE SPECIALIST

## 2024-10-02 PROCEDURE — 1160F RVW MEDS BY RX/DR IN RCRD: CPT | Performed by: CLINICAL NURSE SPECIALIST

## 2024-10-02 PROCEDURE — 3074F SYST BP LT 130 MM HG: CPT | Performed by: CLINICAL NURSE SPECIALIST

## 2024-10-02 PROCEDURE — 1157F ADVNC CARE PLAN IN RCRD: CPT | Performed by: CLINICAL NURSE SPECIALIST

## 2024-10-02 PROCEDURE — 3048F LDL-C <100 MG/DL: CPT | Performed by: CLINICAL NURSE SPECIALIST

## 2024-10-02 PROCEDURE — 3078F DIAST BP <80 MM HG: CPT | Performed by: CLINICAL NURSE SPECIALIST

## 2024-10-02 PROCEDURE — 1036F TOBACCO NON-USER: CPT | Performed by: CLINICAL NURSE SPECIALIST

## 2024-10-02 PROCEDURE — 3008F BODY MASS INDEX DOCD: CPT | Performed by: CLINICAL NURSE SPECIALIST

## 2024-10-02 PROCEDURE — 3051F HG A1C>EQUAL 7.0%<8.0%: CPT | Performed by: CLINICAL NURSE SPECIALIST

## 2024-10-02 RX ORDER — IBUPROFEN 200 MG
1 CAPSULE ORAL DAILY
Qty: 100 STRIP | Refills: 11 | Status: SHIPPED | OUTPATIENT
Start: 2024-10-02

## 2024-10-02 RX ORDER — LANCETS
EACH MISCELLANEOUS
Qty: 100 EACH | Refills: 3 | Status: SHIPPED | OUTPATIENT
Start: 2024-10-02

## 2024-10-02 RX ORDER — DEXTROSE 4 G
TABLET,CHEWABLE ORAL
Qty: 1 EACH | Refills: 0 | Status: SHIPPED | OUTPATIENT
Start: 2024-10-02

## 2024-10-02 ASSESSMENT — ENCOUNTER SYMPTOMS
ABDOMINAL PAIN: 0
OCCASIONAL FEELINGS OF UNSTEADINESS: 0
BACK PAIN: 0
COUGH: 0
EYE PAIN: 0
ARTHRALGIAS: 0
LOSS OF SENSATION IN FEET: 0
CHEST TIGHTNESS: 0
HEADACHES: 0
FEVER: 0
DYSURIA: 0
PALPITATIONS: 0
NECK PAIN: 0
APPETITE CHANGE: 0
UNEXPECTED WEIGHT CHANGE: 0
POLYDIPSIA: 0
PHOTOPHOBIA: 0
BRUISES/BLEEDS EASILY: 0
SORE THROAT: 0
WOUND: 0
VOMITING: 0
SLEEP DISTURBANCE: 0
DIZZINESS: 0
JOINT SWELLING: 0
DIARRHEA: 0
FLANK PAIN: 0
DEPRESSION: 0
ACTIVITY CHANGE: 0
MYALGIAS: 0
CONFUSION: 0
SHORTNESS OF BREATH: 0
CHILLS: 0
TROUBLE SWALLOWING: 0
WHEEZING: 0
HEMATURIA: 0
NAUSEA: 0
SEIZURES: 0
BLOOD IN STOOL: 0
CONSTIPATION: 0
FATIGUE: 0

## 2024-10-02 ASSESSMENT — COLUMBIA-SUICIDE SEVERITY RATING SCALE - C-SSRS
6. HAVE YOU EVER DONE ANYTHING, STARTED TO DO ANYTHING, OR PREPARED TO DO ANYTHING TO END YOUR LIFE?: NO
1. IN THE PAST MONTH, HAVE YOU WISHED YOU WERE DEAD OR WISHED YOU COULD GO TO SLEEP AND NOT WAKE UP?: NO
2. HAVE YOU ACTUALLY HAD ANY THOUGHTS OF KILLING YOURSELF?: NO

## 2024-10-02 NOTE — PROGRESS NOTES
Subjective   Patient ID: Kevin Rubi is a 68 y.o. male who presents for Follow-up (Follow up ).  HPI     Here today as a follow up appointment.      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      Follows with Cardiology. Diuretics adjusted, monitoring blood pressure due to issues with Hypotension.      Has continued on medications as prescribed.      Primary Cancer of LLL. Completed Chemo and RT on 12/26/2023. Stable on most recent imaging. Immunotherapy.     Lab work completed.     Review of Systems   Constitutional:  Negative for activity change, appetite change, chills, fatigue, fever and unexpected weight change.   HENT:  Negative for ear pain, hearing loss, nosebleeds, sore throat, tinnitus and trouble swallowing.    Eyes:  Negative for photophobia, pain and visual disturbance.   Respiratory:  Negative for cough, chest tightness, shortness of breath and wheezing.    Cardiovascular:  Negative for chest pain, palpitations and leg swelling.   Gastrointestinal:  Negative for abdominal pain, blood in stool, constipation, diarrhea, nausea and vomiting.   Endocrine: Negative for cold intolerance, heat intolerance, polydipsia and polyuria.   Genitourinary:  Negative for dysuria, flank pain and hematuria.   Musculoskeletal:  Negative for arthralgias, back pain, joint swelling, myalgias and neck pain.   Skin:  Negative for pallor, rash and wound.   Allergic/Immunologic: Negative for immunocompromised state.   Neurological:  Negative for dizziness, seizures and headaches.   Hematological:  Does not bruise/bleed easily.   Psychiatric/Behavioral:  Negative for confusion and sleep disturbance.        Objective   Physical Exam  Vitals and nursing note reviewed.   Constitutional:       General: He is not in acute distress.     Appearance: Normal appearance.   HENT:      Head: Normocephalic.      Nose: Nose normal.   Eyes:       Conjunctiva/sclera: Conjunctivae normal.   Neck:      Vascular: No carotid bruit.   Cardiovascular:      Rate and Rhythm: Normal rate and regular rhythm.      Pulses: Normal pulses.      Heart sounds: Normal heart sounds.   Pulmonary:      Effort: Pulmonary effort is normal.      Breath sounds: Normal breath sounds.   Abdominal:      General: Bowel sounds are normal.      Palpations: Abdomen is soft.   Musculoskeletal:         General: Normal range of motion.      Cervical back: Normal range of motion.   Skin:     General: Skin is warm and dry.   Neurological:      Mental Status: He is alert and oriented to person, place, and time. Mental status is at baseline.   Psychiatric:         Mood and Affect: Mood normal.         Behavior: Behavior normal.         Assessment/Plan        Reviewed recent lab work completed with patient.      HFpEF, pAfib, Hypertension: Following with Cardiology. Blood pressure controlled at OV today. Diuretics adjusted. Continue to monitor. Continue medications as prescribed. Follow up with Cardiology clinic.   CAD, Celiac Artery Stenosis, Abdominal Pain, Dysuria: Recent CT completed. Continue to follow with Specialists as previously determined.    AAA: Monitoring with Cardiology.   Malignant Neoplasm of Lung: Continue to follow with Specialists as previously determined. Stable per patient on most recent imaging.   Hyperlipidemia: Continue Rosuvastatin.   Insomnia: Discussed Melatonin at last OV.   Diabetes: A1C 7.0%. Supplies ordered. Encouraged updated vision exam. Albumin ordered. Declined medication management, would like to try and work on lifestyle modifications. Follow up lab work ordered.       Flu Vaccine: September 2024.   Prevnar 20: September 2023.   COVID Vaccine: January 2022.   RSV Vaccine: October 2023.   Medicare Wellness: April 2024.   Cologuard: October 2023, positive. Declined moving forward.   Discussed Tdap, Shingrix, COVID.      Chen Olea, RUKHSANA-CNS 10/02/24 1:53 PM

## 2024-10-07 DIAGNOSIS — E78.5 HYPERLIPIDEMIA, UNSPECIFIED HYPERLIPIDEMIA TYPE: ICD-10-CM

## 2024-10-08 RX ORDER — ROSUVASTATIN CALCIUM 5 MG/1
TABLET, COATED ORAL
Qty: 90 TABLET | Refills: 0 | Status: SHIPPED | OUTPATIENT
Start: 2024-10-08

## 2024-10-14 DIAGNOSIS — I48.91 ATRIAL FIBRILLATION, UNSPECIFIED TYPE (MULTI): ICD-10-CM

## 2024-10-14 DIAGNOSIS — I50.22 CHRONIC SYSTOLIC CONGESTIVE HEART FAILURE: ICD-10-CM

## 2024-10-14 RX ORDER — SPIRONOLACTONE 25 MG/1
12.5 TABLET ORAL DAILY
Qty: 45 TABLET | Refills: 2 | Status: SHIPPED | OUTPATIENT
Start: 2024-10-14 | End: 2024-10-14 | Stop reason: SDUPTHER

## 2024-10-14 RX ORDER — SPIRONOLACTONE 25 MG/1
12.5 TABLET ORAL DAILY
Qty: 45 TABLET | Refills: 2 | Status: SHIPPED | OUTPATIENT
Start: 2024-10-14

## 2024-11-08 ENCOUNTER — HOSPITAL ENCOUNTER (OUTPATIENT)
Dept: RADIOLOGY | Facility: HOSPITAL | Age: 69
Discharge: HOME | End: 2024-11-08
Payer: MEDICARE

## 2024-11-08 DIAGNOSIS — C34.32 PRIMARY CANCER OF LEFT LOWER LOBE OF LUNG (MULTI): ICD-10-CM

## 2024-11-08 PROCEDURE — 2550000001 HC RX 255 CONTRASTS: Performed by: ANESTHESIOLOGIST ASSISTANT

## 2024-11-08 PROCEDURE — 71260 CT THORAX DX C+: CPT

## 2024-11-13 ENCOUNTER — OFFICE VISIT (OUTPATIENT)
Dept: HEMATOLOGY/ONCOLOGY | Facility: CLINIC | Age: 69
End: 2024-11-13
Payer: MEDICARE

## 2024-11-13 VITALS
HEART RATE: 85 BPM | WEIGHT: 161.82 LBS | DIASTOLIC BLOOD PRESSURE: 65 MMHG | TEMPERATURE: 98.1 F | SYSTOLIC BLOOD PRESSURE: 103 MMHG | RESPIRATION RATE: 18 BRPM | BODY MASS INDEX: 21.95 KG/M2 | OXYGEN SATURATION: 98 %

## 2024-11-13 DIAGNOSIS — C34.32 PRIMARY CANCER OF LEFT LOWER LOBE OF LUNG (MULTI): ICD-10-CM

## 2024-11-13 PROCEDURE — 99215 OFFICE O/P EST HI 40 MIN: CPT | Performed by: STUDENT IN AN ORGANIZED HEALTH CARE EDUCATION/TRAINING PROGRAM

## 2024-11-13 PROCEDURE — 1159F MED LIST DOCD IN RCRD: CPT | Performed by: STUDENT IN AN ORGANIZED HEALTH CARE EDUCATION/TRAINING PROGRAM

## 2024-11-13 PROCEDURE — 99417 PROLNG OP E/M EACH 15 MIN: CPT | Performed by: STUDENT IN AN ORGANIZED HEALTH CARE EDUCATION/TRAINING PROGRAM

## 2024-11-13 PROCEDURE — 1157F ADVNC CARE PLAN IN RCRD: CPT | Performed by: STUDENT IN AN ORGANIZED HEALTH CARE EDUCATION/TRAINING PROGRAM

## 2024-11-13 PROCEDURE — 1126F AMNT PAIN NOTED NONE PRSNT: CPT | Performed by: STUDENT IN AN ORGANIZED HEALTH CARE EDUCATION/TRAINING PROGRAM

## 2024-11-13 PROCEDURE — 3078F DIAST BP <80 MM HG: CPT | Performed by: STUDENT IN AN ORGANIZED HEALTH CARE EDUCATION/TRAINING PROGRAM

## 2024-11-13 PROCEDURE — 3074F SYST BP LT 130 MM HG: CPT | Performed by: STUDENT IN AN ORGANIZED HEALTH CARE EDUCATION/TRAINING PROGRAM

## 2024-11-13 ASSESSMENT — PAIN SCALES - GENERAL: PAINLEVEL_OUTOF10: 0-NO PAIN

## 2024-11-13 NOTE — PROGRESS NOTES
Patient ID: Kevin Rubi is a 69 y.o. male    Primary Care Provider: Chen Olea, APRN-CNS    DIAGNOSIS AND STAGING  Stage IIIC (cT1cO3T9) NSCLC (adenocarcinoma, TTF1+) of the LLL - dx on 09/15/23  Primary tumor measures 3.5 cm (+satellite nodule)  2R+ for adenocarcinoma cells     SITES OF DISEASE  LLL  Mediastinal nodes (including contralateral)   Has a couple of other spiculated nodules (0.8 cm WILDA and 0.7 cm RUL) that are potential synchronous primaries and will be addressed when needed      MOLECULAR GENOMICS  KRAS G12D mutation   PD-L1 TPS 95%     PRIOR THERAPIES  12/26/2023: Completion concurrent chemo RT (60 Gray in 30 fractions using carboplatin/pemetrexed)  01/31/2024: Consolidative durvalumab - only received 2 cycles due to frequent admissions and other coinciding medical issues     CURRENT THERAPY  Surveillance     CURRENT ONCOLOGICAL PROBLEMS  None     HISTORY OF PRESENT ILLNESS  Former smoker, (quit in 2000), who while undergoing w/u for abdominal pain related to SMA occlusion, was found to have a LLL nodule   Based on his records he was noted to have abnormal imaging back in 2021, was seen by pulmonary and was told to complete w/u, including PET scan and potential biopsy but did not follow recommendations.    A CT chest on 09/12/23 showed a 3.5 cm spiculated LLL nodule abutting the pericardium. There was also a satellite nodule suspicious for disease and a couple of other spiculated nodules in the WILDA and contralateral right lung.4R node measured 1.4 cm and level 7 2.4 cm.   PET scan on 08/04/23 showed no findings concerning for extra-thoracic disease. LLL nodule was FDG avid as well as multiple mediastinal nodes (including contralateral nodes). The WILDA nodule has mild hypermetabolic activity.  An EBUS procedure on 09/15/23 demonstrated 2R to be involved with adenocarcinoma cells, TTF1+. KRAS G12D +, TPS 95%.  10/23/23: C1  Carboplatin/pemetrexed  11/23/23: C2 carboplatin/pemetrexed with  concurrent RT    2023: Completes concurrent chemo RT (60 Gray in 30 fractions)   2024: C1 D1 (every 28-day cycles) of durvalumab  2024: started on prednisone for concerns regarding pneumonitis (70 mg/day)   2024: Discharged from the hospital after being operated for incarcerated right inguinal hernia repair with small bowel resection and anastomosis on 2024 - 24: hospitalized with cellulitis and pneumonia - discharged home on doxycycline and Augmentin   24 - 24: admitted for epigastric pain with complete SMA stenosis - had outpatient follow up where aortomesenteric bypass was discussed - however deferring at this time    PAST MEDICAL HISTORY  HTN, 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 Afib - on Eliquis, AAA, PUD (gastric), HLD      SOCIAL HISTORY  + Tobacco - quit in  - mostly 1/2 ppd starting at age 22  Occasional ETOH intake  Worked as a  for 35 years, retired just before diagnosis   for 43 years and has 2 children - son who is 41 yo and daughter who is 35     FAMILY HISTORY  Mother seems to have had H/N or lung cancer - unclear   Father may also have had cancer   1 sister  from complications of GSW  1 brother  of CA (?liver CA)  1 sister  of metastatic lung cancer (to brain)     CURRENT MEDS REVIEWED        ALLERGIES REVIEWED   NKDA     SUBJECTIVE: Patient doing well today. He is regaining strength and weight after small bowel resection and is now 161 pounds.    A 13 point review of systems was performed, with significant findings documented above in subjective history.    OBJECTIVE:  Vitals:    24 1239   BP: 103/65   Pulse: 85   Resp: 18   Temp: 36.7 °C (98.1 °F)   SpO2: 98%      Body surface area is 1.93 meters squared.     Wt Readings from Last 5 Encounters:   24 73.4 kg (161 lb 13.1  oz)   10/02/24 70.8 kg (156 lb)   08/07/24 69.7 kg (153 lb 10.6 oz)   07/26/24 66.4 kg (146 lb 6.4 oz)   06/27/24 67.6 kg (149 lb)     ECOGSCORE: 2  Gen: A&O, NAD  Head: Normocephalic, atraumatic  Eyes: no scleral icterus  ENT: mucous membranes moist, no oropharyngeal lesions  Resp: Lungs CTAB  Cardiac: Normal rate, regular rhythm, no murmurs appreciated  Abdomen: Soft, nondistended, nontender, +BS  Neuro: CNII-XII grossly intact  Psych: appropriate mood & affect  Skin: warm, dry, no apparent rashes    Diagnostic Results   Results:  Labs:  Lab Results   Component Value Date    WBC 7.1 09/30/2024    HGB 10.3 (L) 09/30/2024    HCT 33.2 (L) 09/30/2024    MCV 87 09/30/2024     09/30/2024      Lab Results   Component Value Date    NEUTROABS 9.07 (H) 06/03/2024        Lab Results   Component Value Date    GLUCOSE 89 09/30/2024    CALCIUM 8.9 09/30/2024     09/30/2024    K 4.2 09/30/2024    CO2 25 09/30/2024     09/30/2024    BUN 17 09/30/2024    CREATININE 1.28 09/30/2024    MG 1.47 (L) 05/03/2024     Lab Results   Component Value Date    ALT 6 (L) 09/30/2024    AST 11 09/30/2024    ALKPHOS 38 09/30/2024    BILITOT 0.3 09/30/2024    BILIDIR 0.1 06/03/2024      Lab Results   Component Value Date    ACTH 11.8 01/23/2024    CORTISOL 16.6 03/26/2024    TSH 5.08 (H) 09/30/2024    FREET4 0.79 09/30/2024     CT CAP 11/8/24  IMPRESSION:  1. Stage III non-small-cell lung carcinoma left lower lobe-treated  with concurrent chemotherapy and radiation therapy.  2. Posttreatment fibrosis along the left paramediastinal region and  left hilum.  3. The previously seen mass in the lingula is not appreciated on the  current study, in part because of treatment change but also there is  lingular opacity. This could obscure the mass.  4. 1.3 cm irregular subsolid nodule left upper lobe more retracted on  the current study.  5. 1.2 cm irregular nodule left lower lobe, unchanged compared to  01/08/2024.  6. Lingular opacity  unchanged compared to 08/02/2024 but more  prominent compared to 04/02/2024. This could represent posttreatment  fibrosis but should be correlated with any symptoms of infection.  7. Small pericardial effusion.  8. Stable with sclerosis T6 consistent with a blastic metastasis.  There is no pathologic fracture.    Assessment/Plan     Primary cancer of left lower lobe of lung (Multi), Clinical: Stage IIIC (cT3, cN3, cM0)  Mr. 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 Afib - on Eliquis, AAA, PUD (gastric), HLD seen for his Stage IIIC ([id number]) NSCLC (adenocarcinoma, TTF1+) of the LLL - dx on 09/15/23.    He is s/p CCRT completed 12/26/2023 and 2 cycles durvalumab which has been held due to hospitalizations for pneumonitis (unclear if radiation or checkpoint inhibitor sequelae), small bowel resection, SMA stenosis, pneumonia and cellulitis. Most recent scans reviewed without evidence of recurrence and clinically he is doing well, gaining weight. We will follow in 3 months with repeat scans.    #Stage IIIC ([id number]) NSCLC (adenocarcinoma, TTF1+) of the LLL   -Sites of disease include LLL, 2R  -His tumor harbors a KRAS G12 D mutation in the PD-L1 expression TPS 95%.  -S/p CCRT and 2 doses durvalumab, Dced for unrelated issue  - Monitoring other spiculated nodules (0.8 cm WILDA and 0.7 cm RUL) that are not showing any growth   -Repeat scans in 3 months with follow up     #Depression/unintentional weight loss  -this has resolved   -on 08/07 weight has improved by 9lbs in 2 months and patient reporting that mood is also stable    Stu Khan MD  Thoracic Medical Oncology   [address] [facility] [address]  Phone: [phone]

## 2024-11-28 DIAGNOSIS — E78.5 HYPERLIPIDEMIA, UNSPECIFIED HYPERLIPIDEMIA TYPE: ICD-10-CM

## 2024-12-01 RX ORDER — ROSUVASTATIN CALCIUM 5 MG/1
5 TABLET, COATED ORAL DAILY
Qty: 90 TABLET | Refills: 1 | Status: SHIPPED | OUTPATIENT
Start: 2024-12-01

## 2024-12-26 DIAGNOSIS — E78.2 MIXED HYPERLIPIDEMIA: ICD-10-CM

## 2025-01-02 RX ORDER — FENOFIBRATE 145 MG/1
145 TABLET, FILM COATED ORAL DAILY
Qty: 100 TABLET | Refills: 2 | Status: SHIPPED | OUTPATIENT
Start: 2025-01-02

## 2025-01-04 DIAGNOSIS — E78.5 HYPERLIPIDEMIA, UNSPECIFIED HYPERLIPIDEMIA TYPE: ICD-10-CM

## 2025-01-07 ENCOUNTER — LAB (OUTPATIENT)
Dept: LAB | Facility: LAB | Age: 70
End: 2025-01-07
Payer: MEDICARE

## 2025-01-07 DIAGNOSIS — E11.65 TYPE 2 DIABETES MELLITUS WITH HYPERGLYCEMIA, WITHOUT LONG-TERM CURRENT USE OF INSULIN: ICD-10-CM

## 2025-01-07 DIAGNOSIS — I25.10 CORONARY ARTERY DISEASE INVOLVING NATIVE CORONARY ARTERY, UNSPECIFIED WHETHER ANGINA PRESENT, UNSPECIFIED WHETHER NATIVE OR TRANSPLANTED HEART: ICD-10-CM

## 2025-01-07 DIAGNOSIS — I10 PRIMARY HYPERTENSION: ICD-10-CM

## 2025-01-07 DIAGNOSIS — R73.03 PREDIABETES: ICD-10-CM

## 2025-01-07 DIAGNOSIS — I77.4 CELIAC ARTERY STENOSIS: ICD-10-CM

## 2025-01-07 DIAGNOSIS — Z23 NEED FOR PNEUMOCOCCAL 20-VALENT CONJUGATE VACCINATION: ICD-10-CM

## 2025-01-07 DIAGNOSIS — E78.2 MIXED HYPERLIPIDEMIA: ICD-10-CM

## 2025-01-07 DIAGNOSIS — Z12.11 COLON CANCER SCREENING: ICD-10-CM

## 2025-01-07 LAB
ALBUMIN SERPL BCP-MCNC: 3.9 G/DL (ref 3.4–5)
ALP SERPL-CCNC: 44 U/L (ref 33–136)
ALT SERPL W P-5'-P-CCNC: 7 U/L (ref 10–52)
ANION GAP SERPL CALC-SCNC: 13 MMOL/L (ref 10–20)
AST SERPL W P-5'-P-CCNC: 12 U/L (ref 9–39)
BILIRUB SERPL-MCNC: 0.4 MG/DL (ref 0–1.2)
BUN SERPL-MCNC: 21 MG/DL (ref 6–23)
CALCIUM SERPL-MCNC: 9 MG/DL (ref 8.6–10.3)
CHLORIDE SERPL-SCNC: 103 MMOL/L (ref 98–107)
CO2 SERPL-SCNC: 23 MMOL/L (ref 21–32)
CREAT SERPL-MCNC: 1.34 MG/DL (ref 0.5–1.3)
EGFRCR SERPLBLD CKD-EPI 2021: 58 ML/MIN/1.73M*2
GLUCOSE SERPL-MCNC: 125 MG/DL (ref 74–99)
POTASSIUM SERPL-SCNC: 4.1 MMOL/L (ref 3.5–5.3)
PROT SERPL-MCNC: 6.6 G/DL (ref 6.4–8.2)
SODIUM SERPL-SCNC: 135 MMOL/L (ref 136–145)
VIT B12 SERPL-MCNC: 540 PG/ML (ref 211–911)

## 2025-01-07 PROCEDURE — 83036 HEMOGLOBIN GLYCOSYLATED A1C: CPT

## 2025-01-07 PROCEDURE — 82607 VITAMIN B-12: CPT

## 2025-01-07 PROCEDURE — 80053 COMPREHEN METABOLIC PANEL: CPT

## 2025-01-08 ENCOUNTER — TELEPHONE (OUTPATIENT)
Dept: PRIMARY CARE | Facility: CLINIC | Age: 70
End: 2025-01-08
Payer: MEDICARE

## 2025-01-08 LAB
EST. AVERAGE GLUCOSE BLD GHB EST-MCNC: 137 MG/DL
HBA1C MFR BLD: 6.4 %

## 2025-01-08 RX ORDER — ROSUVASTATIN CALCIUM 5 MG/1
5 TABLET, COATED ORAL DAILY
Qty: 90 TABLET | Refills: 2 | Status: SHIPPED | OUTPATIENT
Start: 2025-01-08

## 2025-01-08 NOTE — TELEPHONE ENCOUNTER
----- Message from Chen Olea sent at 1/7/2025  6:26 PM EST -----  Looks like appointment was cancelled for 1/9. Please make sure patient reschedules for refills and to discuss lab work. Thank you!  ----- Message -----  From: Lab, Background User  Sent: 1/7/2025  11:09 AM EST  To: Chen Oela, RUKHSANA-CNS

## 2025-01-09 ENCOUNTER — APPOINTMENT (OUTPATIENT)
Dept: PRIMARY CARE | Facility: CLINIC | Age: 70
End: 2025-01-09
Payer: MEDICARE

## 2025-01-23 ENCOUNTER — APPOINTMENT (OUTPATIENT)
Dept: PRIMARY CARE | Facility: CLINIC | Age: 70
End: 2025-01-23
Payer: MEDICARE

## 2025-01-23 VITALS
SYSTOLIC BLOOD PRESSURE: 110 MMHG | WEIGHT: 168 LBS | BODY MASS INDEX: 22.75 KG/M2 | HEIGHT: 72 IN | DIASTOLIC BLOOD PRESSURE: 60 MMHG | HEART RATE: 75 BPM

## 2025-01-23 DIAGNOSIS — E55.9 VITAMIN D DEFICIENCY: ICD-10-CM

## 2025-01-23 DIAGNOSIS — I48.91 ATRIAL FIBRILLATION, UNSPECIFIED TYPE (MULTI): ICD-10-CM

## 2025-01-23 DIAGNOSIS — E11.65 TYPE 2 DIABETES MELLITUS WITH HYPERGLYCEMIA, WITHOUT LONG-TERM CURRENT USE OF INSULIN: ICD-10-CM

## 2025-01-23 DIAGNOSIS — Z00.00 MEDICARE ANNUAL WELLNESS VISIT, SUBSEQUENT: Primary | ICD-10-CM

## 2025-01-23 DIAGNOSIS — I25.10 ARTERIOSCLEROSIS OF CORONARY ARTERY: ICD-10-CM

## 2025-01-23 DIAGNOSIS — I25.10 MILD CAD: ICD-10-CM

## 2025-01-23 DIAGNOSIS — I10 ESSENTIAL HYPERTENSION: ICD-10-CM

## 2025-01-23 DIAGNOSIS — C34.92 MALIGNANT NEOPLASM OF UNSPECIFIED PART OF LEFT BRONCHUS OR LUNG (MULTI): ICD-10-CM

## 2025-01-23 DIAGNOSIS — I50.33 ACUTE ON CHRONIC DIASTOLIC (CONGESTIVE) HEART FAILURE: ICD-10-CM

## 2025-01-23 DIAGNOSIS — I77.4 CELIAC ARTERY STENOSIS: ICD-10-CM

## 2025-01-23 DIAGNOSIS — I50.30 HEART FAILURE WITH PRESERVED EJECTION FRACTION, UNSPECIFIED HF CHRONICITY: ICD-10-CM

## 2025-01-23 DIAGNOSIS — I10 PRIMARY HYPERTENSION: ICD-10-CM

## 2025-01-23 PROCEDURE — 1123F ACP DISCUSS/DSCN MKR DOCD: CPT | Performed by: CLINICAL NURSE SPECIALIST

## 2025-01-23 PROCEDURE — G0439 PPPS, SUBSEQ VISIT: HCPCS | Performed by: CLINICAL NURSE SPECIALIST

## 2025-01-23 PROCEDURE — 1160F RVW MEDS BY RX/DR IN RCRD: CPT | Performed by: CLINICAL NURSE SPECIALIST

## 2025-01-23 PROCEDURE — 1170F FXNL STATUS ASSESSED: CPT | Performed by: CLINICAL NURSE SPECIALIST

## 2025-01-23 PROCEDURE — 3078F DIAST BP <80 MM HG: CPT | Performed by: CLINICAL NURSE SPECIALIST

## 2025-01-23 PROCEDURE — 3044F HG A1C LEVEL LT 7.0%: CPT | Performed by: CLINICAL NURSE SPECIALIST

## 2025-01-23 PROCEDURE — G0444 DEPRESSION SCREEN ANNUAL: HCPCS | Performed by: CLINICAL NURSE SPECIALIST

## 2025-01-23 PROCEDURE — 1158F ADVNC CARE PLAN TLK DOCD: CPT | Performed by: CLINICAL NURSE SPECIALIST

## 2025-01-23 PROCEDURE — 3008F BODY MASS INDEX DOCD: CPT | Performed by: CLINICAL NURSE SPECIALIST

## 2025-01-23 PROCEDURE — 1036F TOBACCO NON-USER: CPT | Performed by: CLINICAL NURSE SPECIALIST

## 2025-01-23 PROCEDURE — 1159F MED LIST DOCD IN RCRD: CPT | Performed by: CLINICAL NURSE SPECIALIST

## 2025-01-23 PROCEDURE — 3074F SYST BP LT 130 MM HG: CPT | Performed by: CLINICAL NURSE SPECIALIST

## 2025-01-23 PROCEDURE — 1157F ADVNC CARE PLAN IN RCRD: CPT | Performed by: CLINICAL NURSE SPECIALIST

## 2025-01-23 PROCEDURE — 99214 OFFICE O/P EST MOD 30 MIN: CPT | Performed by: CLINICAL NURSE SPECIALIST

## 2025-01-23 ASSESSMENT — ENCOUNTER SYMPTOMS
WOUND: 0
BLOOD IN STOOL: 0
SEIZURES: 0
APPETITE CHANGE: 0
EYE PAIN: 0
NECK PAIN: 0
CHEST TIGHTNESS: 0
MYALGIAS: 0
POLYDIPSIA: 0
SORE THROAT: 0
PALPITATIONS: 0
ACTIVITY CHANGE: 0
ABDOMINAL PAIN: 0
COUGH: 0
ARTHRALGIAS: 0
CONSTIPATION: 0
BRUISES/BLEEDS EASILY: 0
PHOTOPHOBIA: 0
FATIGUE: 0
NAUSEA: 0
OCCASIONAL FEELINGS OF UNSTEADINESS: 0
SLEEP DISTURBANCE: 0
WHEEZING: 0
DEPRESSION: 0
DIZZINESS: 0
DYSURIA: 0
BACK PAIN: 0
UNEXPECTED WEIGHT CHANGE: 0
TROUBLE SWALLOWING: 0
LOSS OF SENSATION IN FEET: 0
DIARRHEA: 0
SHORTNESS OF BREATH: 0
HEADACHES: 0
FEVER: 0
HEMATURIA: 0
CHILLS: 0
FLANK PAIN: 0
JOINT SWELLING: 0
CONFUSION: 0
VOMITING: 0

## 2025-01-23 ASSESSMENT — ACTIVITIES OF DAILY LIVING (ADL)
BATHING: INDEPENDENT
MANAGING_FINANCES: INDEPENDENT
GROCERY_SHOPPING: INDEPENDENT
DOING_HOUSEWORK: INDEPENDENT
TAKING_MEDICATION: INDEPENDENT
DRESSING: INDEPENDENT

## 2025-01-23 NOTE — PROGRESS NOTES
Subjective   Reason for Visit: Kevin Rubi is an 69 y.o. male here for a Medicare Wellness visit.     Past Medical, Surgical, and Family History reviewed and updated in chart.    Reviewed all medications by prescribing practitioner or clinical pharmacist (such as prescriptions, OTCs, herbal therapies and supplements) and documented in the medical record.    HPI    Here today as a follow up appointment. Due for Medicare Wellness.      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      Follows with Cardiology. Diuretics adjusted, monitoring blood pressure due to issues with Hypotension.      Has continued on medications as prescribed.      Primary Cancer of LLL. Completed Chemo and RT on 12/26/2023. Stable on most recent imaging. Immunotherapy.     Lab work completed.     Overall feels well over the past few months. States that he has been ambulating well, independent. Eating well, tolerating oral intake.     Patient Care Team:  RUKHSANA Fried-CNS as PCP - General (Internal Medicine)  Madelyn Perrin MD as Radiation Oncologist (Radiation Oncology)  Jose Juan Hayes DO as Surgeon (Vascular Surgery)  Stu Khan MD as Consulting Physician (Hematology and Oncology)  Leann Rosario MD as Consulting Physician (Cardiology)     Review of Systems   Constitutional:  Negative for activity change, appetite change, chills, fatigue, fever and unexpected weight change.   HENT:  Negative for ear pain, hearing loss, nosebleeds, sore throat, tinnitus and trouble swallowing.    Eyes:  Negative for photophobia, pain and visual disturbance.   Respiratory:  Negative for cough, chest tightness, shortness of breath and wheezing.    Cardiovascular:  Negative for chest pain, palpitations and leg swelling.   Gastrointestinal:  Negative for abdominal pain, blood in stool, constipation, diarrhea, nausea and vomiting.   Endocrine: Negative  for cold intolerance, heat intolerance, polydipsia and polyuria.   Genitourinary:  Negative for dysuria, flank pain and hematuria.   Musculoskeletal:  Negative for arthralgias, back pain, joint swelling, myalgias and neck pain.   Skin:  Negative for pallor, rash and wound.   Allergic/Immunologic: Negative for immunocompromised state.   Neurological:  Negative for dizziness, seizures and headaches.   Hematological:  Does not bruise/bleed easily.   Psychiatric/Behavioral:  Negative for confusion and sleep disturbance.        Objective   Vitals:  /60 (BP Location: Right arm)   Pulse 75   Ht 1.829 m (6')   Wt 76.2 kg (168 lb)   BMI 22.78 kg/m²       Physical Exam  Vitals and nursing note reviewed.   Constitutional:       General: He is not in acute distress.     Appearance: Normal appearance.   HENT:      Head: Normocephalic.      Nose: Nose normal.   Eyes:      Conjunctiva/sclera: Conjunctivae normal.   Neck:      Vascular: No carotid bruit.   Cardiovascular:      Rate and Rhythm: Normal rate and regular rhythm.      Pulses: Normal pulses.      Heart sounds: Normal heart sounds.   Pulmonary:      Effort: Pulmonary effort is normal.      Breath sounds: Normal breath sounds.   Abdominal:      General: Bowel sounds are normal.      Palpations: Abdomen is soft.   Musculoskeletal:         General: Normal range of motion.      Cervical back: Normal range of motion.   Skin:     General: Skin is warm and dry.   Neurological:      Mental Status: He is alert and oriented to person, place, and time. Mental status is at baseline.   Psychiatric:         Mood and Affect: Mood normal.         Behavior: Behavior normal.         Assessment & Plan  Medicare annual wellness visit, subsequent    Orders:    Follow Up In [facility] - PCP - Established; Future    CBC; Future    Comprehensive Metabolic Panel; Future    Lipid Panel; Future    TSH with reflex to Free T4 if abnormal; Future    Vitamin B12; Future     Hemoglobin A1C; Future    Vitamin D 25-Hydroxy,Total (for eval of Vitamin D levels); Future    Albumin-Creatinine Ratio, Urine Random; Future    Primary hypertension    Orders:    CBC; Future    Comprehensive Metabolic Panel; Future    Lipid Panel; Future    TSH with reflex to Free T4 if abnormal; Future    Vitamin B12; Future    Hemoglobin A1C; Future    Vitamin D 25-Hydroxy,Total (for eval of Vitamin D levels); Future    Albumin-Creatinine Ratio, Urine Random; Future    Celiac artery stenosis    Orders:    CBC; Future    Comprehensive Metabolic Panel; Future    Lipid Panel; Future    TSH with reflex to Free T4 if abnormal; Future    Vitamin B12; Future    Hemoglobin A1C; Future    Vitamin D 25-Hydroxy,Total (for eval of Vitamin D levels); Future    Albumin-Creatinine Ratio, Urine Random; Future    Type 2 diabetes mellitus with hyperglycemia, without long-term current use of insulin    Orders:    CBC; Future    Comprehensive Metabolic Panel; Future    Lipid Panel; Future    TSH with reflex to Free T4 if abnormal; Future    Vitamin B12; Future    Hemoglobin A1C; Future    Vitamin D 25-Hydroxy,Total (for eval of Vitamin D levels); Future    Albumin-Creatinine Ratio, Urine Random; Future    Mild CAD    Orders:    CBC; Future    Comprehensive Metabolic Panel; Future    Lipid Panel; Future    TSH with reflex to Free T4 if abnormal; Future    Vitamin B12; Future    Hemoglobin A1C; Future    Vitamin D 25-Hydroxy,Total (for eval of Vitamin D levels); Future    Albumin-Creatinine Ratio, Urine Random; Future    Essential hypertension    Orders:    CBC; Future    Comprehensive Metabolic Panel; Future    Lipid Panel; Future    TSH with reflex to Free T4 if abnormal; Future    Vitamin B12; Future    Hemoglobin A1C; Future    Vitamin D 25-Hydroxy,Total (for eval of Vitamin D levels); Future    Albumin-Creatinine Ratio, Urine Random; Future    Heart failure with preserved ejection fraction, unspecified HF chronicity    Orders:     CBC; Future    Comprehensive Metabolic Panel; Future    Lipid Panel; Future    TSH with reflex to Free T4 if abnormal; Future    Vitamin B12; Future    Hemoglobin A1C; Future    Vitamin D 25-Hydroxy,Total (for eval of Vitamin D levels); Future    Albumin-Creatinine Ratio, Urine Random; Future    Arteriosclerosis of coronary artery    Orders:    CBC; Future    Comprehensive Metabolic Panel; Future    Lipid Panel; Future    TSH with reflex to Free T4 if abnormal; Future    Vitamin B12; Future    Hemoglobin A1C; Future    Vitamin D 25-Hydroxy,Total (for eval of Vitamin D levels); Future    Albumin-Creatinine Ratio, Urine Random; Future    Vitamin D deficiency    Orders:    Vitamin D 25-Hydroxy,Total (for eval of Vitamin D levels); Future    Acute on chronic diastolic (congestive) heart failure         Malignant neoplasm of unspecified part of left bronchus or lung (Multi)         Atrial fibrillation, unspecified type (Multi)                   Reviewed recent lab work completed with patient.      HFpEF, pAfib, Hypertension: Following with Cardiology. Blood pressure controlled at OV today. Diuretics adjusted. Continue to monitor. Continue medications as prescribed. Follow up with Cardiology clinic.   CAD, Celiac Artery Stenosis, Abdominal Pain, Dysuria: Continue to follow with Specialists as previously determined.    AAA: Monitoring with Cardiology.   Malignant Neoplasm of Lung: Continue to follow with Specialists as previously determined. Stable per patient on most recent imaging.   Hyperlipidemia: Continue Rosuvastatin.   Insomnia: Discussed Melatonin at last OV.   Diabetes: A1C 6.4%. Encouraged updated vision exam. Albumin ordered. Declined medication management, would like to try and work on lifestyle modifications. Follow up lab work ordered.   Medicare Wellness: Routine and age appropriate recommendations discussed with the patient today and patient verbalized understanding of the recommendations.  Questions  answered.  Age appropriate immunizations and preventative screenings discussed with the patient and ordered as appropriate. Labs updated and ordered as indicated. Recommend healthy diet and daily exercise to maintain healthy body weight. 5 minutes  were spent screening for depression using PHQ2/PHQ9 as documented in the chart.       Flu Vaccine: September 2024.   Prevnar 20: September 2023.   COVID Vaccine: January 2022.   RSV Vaccine: October 2023.   Medicare Wellness: January 2025.   Cologuard: October 2023, positive. Declined moving forward.   Discussed Tdap, Shingrix, COVID. Declined updated immunizations.

## 2025-01-23 NOTE — ASSESSMENT & PLAN NOTE
Orders:    CBC; Future    Comprehensive Metabolic Panel; Future    Lipid Panel; Future    TSH with reflex to Free T4 if abnormal; Future    Vitamin B12; Future    Hemoglobin A1C; Future    Vitamin D 25-Hydroxy,Total (for eval of Vitamin D levels); Future    Albumin-Creatinine Ratio, Urine Random; Future

## 2025-01-23 NOTE — ASSESSMENT & PLAN NOTE
Orders:    Follow Up In [facility] - PCP - Established; Future    CBC; Future    Comprehensive Metabolic Panel; Future    Lipid Panel; Future    TSH with reflex to Free T4 if abnormal; Future    Vitamin B12; Future    Hemoglobin A1C; Future    Vitamin D 25-Hydroxy,Total (for eval of Vitamin D levels); Future    Albumin-Creatinine Ratio, Urine Random; Future

## 2025-02-10 ENCOUNTER — APPOINTMENT (OUTPATIENT)
Dept: HEMATOLOGY/ONCOLOGY | Facility: CLINIC | Age: 70
End: 2025-02-10
Payer: MEDICARE

## 2025-02-10 ENCOUNTER — HOSPITAL ENCOUNTER (OUTPATIENT)
Dept: RADIOLOGY | Facility: HOSPITAL | Age: 70
Discharge: HOME | End: 2025-02-10
Payer: MEDICARE

## 2025-02-10 DIAGNOSIS — C34.32 PRIMARY CANCER OF LEFT LOWER LOBE OF LUNG (MULTI): ICD-10-CM

## 2025-02-10 PROCEDURE — 71260 CT THORAX DX C+: CPT

## 2025-02-10 PROCEDURE — 2550000001 HC RX 255 CONTRASTS: Performed by: STUDENT IN AN ORGANIZED HEALTH CARE EDUCATION/TRAINING PROGRAM

## 2025-02-10 PROCEDURE — 71260 CT THORAX DX C+: CPT | Performed by: STUDENT IN AN ORGANIZED HEALTH CARE EDUCATION/TRAINING PROGRAM

## 2025-02-10 PROCEDURE — 70553 MRI BRAIN STEM W/O & W/DYE: CPT | Performed by: RADIOLOGY

## 2025-02-10 PROCEDURE — 70553 MRI BRAIN STEM W/O & W/DYE: CPT

## 2025-02-10 PROCEDURE — A9575 INJ GADOTERATE MEGLUMI 0.1ML: HCPCS | Performed by: STUDENT IN AN ORGANIZED HEALTH CARE EDUCATION/TRAINING PROGRAM

## 2025-02-10 RX ORDER — GADOTERATE MEGLUMINE 376.9 MG/ML
0.2 INJECTION INTRAVENOUS
Status: COMPLETED | OUTPATIENT
Start: 2025-02-10 | End: 2025-02-10

## 2025-02-10 RX ADMIN — IOHEXOL 50 ML: 350 INJECTION, SOLUTION INTRAVENOUS at 13:24

## 2025-02-10 RX ADMIN — GADOTERATE MEGLUMINE 14 ML: 376.9 INJECTION INTRAVENOUS at 12:30

## 2025-02-11 NOTE — PROGRESS NOTES
Patient ID: Kevin Rubi is a 69 y.o. male    Primary Care Provider: Chen Olea, APRN-CNS    DIAGNOSIS AND STAGING  Stage IIIC ([id number]) NSCLC (adenocarcinoma, TTF1+) of the LLL - dx on 09/15/23  Primary tumor measures 3.5 cm (+satellite nodule)  2R+ for adenocarcinoma cells     SITES OF DISEASE  LLL  Mediastinal nodes (including contralateral)   Has a couple of other spiculated nodules (0.8 cm WILDA and 0.7 cm RUL) that are potential synchronous primaries and will be addressed when needed      MOLECULAR GENOMICS  KRAS G12D mutation   PD-L1 TPS 95%     PRIOR THERAPIES  12/26/2023: Completion concurrent chemo RT (60 Gray in 30 fractions using carboplatin/pemetrexed)  01/31/2024: Consolidative durvalumab - only received 2 cycles due to frequent admissions and other coinciding medical issues     CURRENT THERAPY  Surveillance     CURRENT ONCOLOGICAL PROBLEMS  None     HISTORY OF PRESENT ILLNESS  Former smoker, (quit in 2000), who while undergoing w/u for abdominal pain related to SMA occlusion, was found to have a LLL nodule   Based on his records he was noted to have abnormal imaging back in 2021, was seen by pulmonary and was told to complete w/u, including PET scan and potential biopsy but did not follow recommendations.    A CT chest on 09/12/23 showed a 3.5 cm spiculated LLL nodule abutting the pericardium. There was also a satellite nodule suspicious for disease and a couple of other spiculated nodules in the WILDA and contralateral right lung.4R node measured 1.4 cm and level 7 2.4 cm.   PET scan on 08/04/23 showed no findings concerning for extra-thoracic disease. LLL nodule was FDG avid as well as multiple mediastinal nodes (including contralateral nodes). The WILDA nodule has mild hypermetabolic activity.  An EBUS procedure on 09/15/23 demonstrated 2R to be involved with adenocarcinoma cells, TTF1+. KRAS G12D +, TPS 95%.  10/23/23: C1  Carboplatin/pemetrexed  11/23/23: C2 carboplatin/pemetrexed with  concurrent RT    2023: Completes concurrent chemo RT (60 Gray in 30 fractions)   2024: C1 D1 (every 28-day cycles) of durvalumab  2024: started on prednisone for concerns regarding pneumonitis (70 mg/day)   2024: Discharged from the hospital after being operated for incarcerated right inguinal hernia repair with small bowel resection and anastomosis on 2024 - 24: hospitalized with cellulitis and pneumonia - discharged home on doxycycline and Augmentin   24 - 24: admitted for epigastric pain with complete SMA stenosis - had outpatient follow up where aortomesenteric bypass was discussed - however deferring at this time    PAST MEDICAL HISTORY  HTN, 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 Afib - on Eliquis, AAA, PUD (gastric), HLD      SOCIAL HISTORY  + Tobacco - quit in  - mostly 1/2 ppd starting at age 22  Occasional ETOH intake  Worked as a  for 35 years, retired just before diagnosis   for 43 years and has 2 children - son who is 39 yo and daughter who is 35     FAMILY HISTORY  Mother seems to have had H/N or lung cancer - unclear   Father may also have had cancer   1 sister  from complications of GSW  1 brother  of CA (?liver CA)  1 sister  of metastatic lung cancer (to brain)     CURRENT MEDS REVIEWED        ALLERGIES REVIEWED   NKDA     SUBJECTIVE: Patient doing well today.  He is gaining weight and back to baseline. He has no cough or dyspnea.    A 13 point review of systems was performed, with significant findings documented above in subjective history.    OBJECTIVE:  Vitals:    25 0844   BP: 93/61   Pulse: 72   Resp: 16   Temp: 35.8 °C (96.4 °F)        Body surface area is 1.97 meters squared.     Wt Readings from Last 5 Encounters:   25 76.3 kg (168 lb 3.4 oz)   25 76.2 kg  (168 lb)   02/10/25 70.3 kg (155 lb)   11/13/24 73.4 kg (161 lb 13.1 oz)   10/02/24 70.8 kg (156 lb)     ECOGSCORE: 2  Gen: A&O, NAD  Head: Normocephalic, atraumatic  Eyes: no scleral icterus  ENT: mucous membranes moist, no oropharyngeal lesions  Resp: Lungs CTAB  Cardiac: Normal rate, regular rhythm, no murmurs appreciated  Abdomen: Soft, nondistended, nontender, +BS  Neuro: CNII-XII grossly intact  Psych: appropriate mood & affect  Skin: warm, dry, no apparent rashes    Diagnostic Results   Results:  Labs:  Lab Results   Component Value Date    WBC 7.1 09/30/2024    HGB 10.3 (L) 09/30/2024    HCT 33.2 (L) 09/30/2024    MCV 87 09/30/2024     09/30/2024      Lab Results   Component Value Date    NEUTROABS 9.07 (H) 06/03/2024        Lab Results   Component Value Date    GLUCOSE 125 (H) 01/07/2025    CALCIUM 9.0 01/07/2025     (L) 01/07/2025    K 4.1 01/07/2025    CO2 23 01/07/2025     01/07/2025    BUN 21 01/07/2025    CREATININE 1.34 (H) 01/07/2025    MG 1.47 (L) 05/03/2024     Lab Results   Component Value Date    ALT 7 (L) 01/07/2025    AST 12 01/07/2025    ALKPHOS 44 01/07/2025    BILITOT 0.4 01/07/2025    BILIDIR 0.1 06/03/2024      Lab Results   Component Value Date    ACTH 11.8 01/23/2024    CORTISOL 16.6 03/26/2024    TSH 5.08 (H) 09/30/2024    FREET4 0.79 09/30/2024     CT Chest 2/10/25  IMPRESSION:  1.  Stable post radiation changes in the left lower lobe with stable  pulmonary nodules as described.  2. Stable aneurysmal dilatation of the ascending thoracic aorta  measuring 4.5 cm in diameter.  3. Stable sclerosis in the T6 vertebral body.  4. Additional findings as described.        Assessment/Plan     Primary cancer of left lower lobe of lung (Multi), Clinical: Stage IIIC (cT3, cN3, cM0)  Mr. 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 Afib - on Eliquis, AAA, PUD (gastric), HLD seen for his Stage IIIC (aM0sT4H6) NSCLC (adenocarcinoma, TTF1+) of the LLL - dx on 09/15/23.    He is s/p CCRT completed 12/26/2023 and 2 cycles durvalumab which has been held due to hospitalizations for pneumonitis (unclear if radiation or checkpoint inhibitor sequelae), small bowel resection, SMA stenosis, pneumonia and cellulitis. Most recent scans reviewed without evidence of recurrence and clinically he is doing well, gaining weight. We will follow in 3 months with repeat scans.    Unfortunately MRI brain showing new 5 mm indeterminate R occipital lobe lesion. We will refer to radiation oncology for possible GKRS.    #Stage IIIC (cC5uP2L0) NSCLC (adenocarcinoma, TTF1+) of the LLL   -Sites of disease include LLL, 2R  -His tumor harbors a KRAS G12 D mutation in the PD-L1 expression TPS 95%.  -S/p CCRT and 2 doses durvalumab, Dced for unrelated issue  - Monitoring other spiculated nodules (0.8 cm WILDA and 0.7 cm RUL) that are not showing any growth   -Repeat scans in 3 months with follow up with Scott BARNETT  -Referral to rad onc for possible GKRS    #Depression/unintentional weight loss  -this has resolved   -on 08/07 weight has improved by 9lbs in 2 months and patient reporting that mood is also stable    Stu Khan MD  Thoracic Medical Oncology   [address] [address]  Phone: [phone]

## 2025-02-12 ENCOUNTER — OFFICE VISIT (OUTPATIENT)
Dept: HEMATOLOGY/ONCOLOGY | Facility: CLINIC | Age: 70
End: 2025-02-12
Payer: MEDICARE

## 2025-02-12 VITALS
WEIGHT: 168.21 LBS | DIASTOLIC BLOOD PRESSURE: 61 MMHG | TEMPERATURE: 96.4 F | HEART RATE: 72 BPM | SYSTOLIC BLOOD PRESSURE: 93 MMHG | BODY MASS INDEX: 22.81 KG/M2 | RESPIRATION RATE: 16 BRPM

## 2025-02-12 DIAGNOSIS — C34.32 PRIMARY CANCER OF LEFT LOWER LOBE OF LUNG (MULTI): ICD-10-CM

## 2025-02-12 PROCEDURE — G2211 COMPLEX E/M VISIT ADD ON: HCPCS | Performed by: STUDENT IN AN ORGANIZED HEALTH CARE EDUCATION/TRAINING PROGRAM

## 2025-02-12 PROCEDURE — 1126F AMNT PAIN NOTED NONE PRSNT: CPT | Performed by: STUDENT IN AN ORGANIZED HEALTH CARE EDUCATION/TRAINING PROGRAM

## 2025-02-12 PROCEDURE — 99215 OFFICE O/P EST HI 40 MIN: CPT | Performed by: STUDENT IN AN ORGANIZED HEALTH CARE EDUCATION/TRAINING PROGRAM

## 2025-02-12 PROCEDURE — 1123F ACP DISCUSS/DSCN MKR DOCD: CPT | Performed by: STUDENT IN AN ORGANIZED HEALTH CARE EDUCATION/TRAINING PROGRAM

## 2025-02-12 PROCEDURE — 1157F ADVNC CARE PLAN IN RCRD: CPT | Performed by: STUDENT IN AN ORGANIZED HEALTH CARE EDUCATION/TRAINING PROGRAM

## 2025-02-12 PROCEDURE — 1159F MED LIST DOCD IN RCRD: CPT | Performed by: STUDENT IN AN ORGANIZED HEALTH CARE EDUCATION/TRAINING PROGRAM

## 2025-02-12 PROCEDURE — 1036F TOBACCO NON-USER: CPT | Performed by: STUDENT IN AN ORGANIZED HEALTH CARE EDUCATION/TRAINING PROGRAM

## 2025-02-12 PROCEDURE — 3078F DIAST BP <80 MM HG: CPT | Performed by: STUDENT IN AN ORGANIZED HEALTH CARE EDUCATION/TRAINING PROGRAM

## 2025-02-12 PROCEDURE — 3074F SYST BP LT 130 MM HG: CPT | Performed by: STUDENT IN AN ORGANIZED HEALTH CARE EDUCATION/TRAINING PROGRAM

## 2025-02-12 PROCEDURE — 99214 OFFICE O/P EST MOD 30 MIN: CPT | Performed by: STUDENT IN AN ORGANIZED HEALTH CARE EDUCATION/TRAINING PROGRAM

## 2025-02-12 ASSESSMENT — PAIN SCALES - GENERAL: PAINLEVEL_OUTOF10: 0-NO PAIN

## 2025-02-12 NOTE — PROGRESS NOTES
Kevin is here today with his wife Priya for follow up with Dr Khan. He is doing well with no new complaints. Meds and allergies reviewed and updated. MD made aware.   Education Documentation  Healthy Lifestyle, taught by Monica Curry RN at 2/12/2025  9:05 AM.  Learner: Significant Other, Patient  Readiness: Acceptance  Method: Explanation  Response: Verbalizes Understanding    Treatment Plan and Schedule, taught by Monica Curry RN at 2/12/2025  9:05 AM.  Learner: Significant Other, Patient  Readiness: Acceptance  Method: Explanation  Response: Verbalizes Understanding    General Medication Information, taught by Monica Curry RN at 2/12/2025  9:05 AM.  Learner: Significant Other, Patient  Readiness: Acceptance  Method: Explanation  Response: Verbalizes Understanding    Supportive Medications, taught by Monica Curry RN at 2/12/2025  9:05 AM.  Learner: Significant Other, Patient  Readiness: Acceptance  Method: Explanation  Response: Verbalizes Understanding    Education Comments  No comments found.

## 2025-02-13 ENCOUNTER — TELEPHONE (OUTPATIENT)
Dept: RADIATION ONCOLOGY | Facility: HOSPITAL | Age: 70
End: 2025-02-13
Payer: MEDICARE

## 2025-02-14 ENCOUNTER — APPOINTMENT (OUTPATIENT)
Dept: RADIATION ONCOLOGY | Facility: HOSPITAL | Age: 70
End: 2025-02-14
Payer: MEDICARE

## 2025-02-14 ENCOUNTER — HOSPITAL ENCOUNTER (OUTPATIENT)
Dept: RADIATION ONCOLOGY | Facility: HOSPITAL | Age: 70
Setting detail: RADIATION/ONCOLOGY SERIES
Discharge: HOME | End: 2025-02-14
Payer: MEDICARE

## 2025-02-14 VITALS
RESPIRATION RATE: 18 BRPM | HEART RATE: 84 BPM | TEMPERATURE: 96.4 F | OXYGEN SATURATION: 97 % | WEIGHT: 167.6 LBS | DIASTOLIC BLOOD PRESSURE: 64 MMHG | SYSTOLIC BLOOD PRESSURE: 100 MMHG | BODY MASS INDEX: 22.73 KG/M2

## 2025-02-14 DIAGNOSIS — C79.31 MALIGNANT NEOPLASM METASTATIC TO BRAIN (MULTI): Primary | ICD-10-CM

## 2025-02-14 DIAGNOSIS — C34.32 PRIMARY CANCER OF LEFT LOWER LOBE OF LUNG (MULTI): ICD-10-CM

## 2025-02-14 PROCEDURE — 99215 OFFICE O/P EST HI 40 MIN: CPT | Performed by: STUDENT IN AN ORGANIZED HEALTH CARE EDUCATION/TRAINING PROGRAM

## 2025-02-14 PROCEDURE — G2211 COMPLEX E/M VISIT ADD ON: HCPCS | Performed by: STUDENT IN AN ORGANIZED HEALTH CARE EDUCATION/TRAINING PROGRAM

## 2025-02-14 PROCEDURE — 77263 THER RADIOLOGY TX PLNG CPLX: CPT | Performed by: STUDENT IN AN ORGANIZED HEALTH CARE EDUCATION/TRAINING PROGRAM

## 2025-02-14 ASSESSMENT — ENCOUNTER SYMPTOMS
FEVER: 0
NECK STIFFNESS: 0
GASTROINTESTINAL NEGATIVE: 1
TROUBLE SWALLOWING: 0
HEMOPTYSIS: 0
WHEEZING: 0
SORE THROAT: 0
LOSS OF SENSATION IN FEET: 0
BRUISES/BLEEDS EASILY: 1
FLANK PAIN: 0
DECREASED CONCENTRATION: 0
NEUROLOGICAL NEGATIVE: 1
NECK PAIN: 0
EYE PROBLEMS: 1
OCCASIONAL FEELINGS OF UNSTEADINESS: 0
NERVOUS/ANXIOUS: 1
DEPRESSION: 0
BACK PAIN: 1
SLEEP DISTURBANCE: 0
CONFUSION: 0
CHILLS: 0
FATIGUE: 1
MYALGIAS: 0
ARTHRALGIAS: 1
VOICE CHANGE: 0
DIAPHORESIS: 0
COUGH: 0
UNEXPECTED WEIGHT CHANGE: 0
SHORTNESS OF BREATH: 1
CARDIOVASCULAR NEGATIVE: 1
CHEST TIGHTNESS: 0
APPETITE CHANGE: 0

## 2025-02-14 ASSESSMENT — PATIENT HEALTH QUESTIONNAIRE - PHQ9
2. FEELING DOWN, DEPRESSED OR HOPELESS: NOT AT ALL
1. LITTLE INTEREST OR PLEASURE IN DOING THINGS: NOT AT ALL
SUM OF ALL RESPONSES TO PHQ9 QUESTIONS 1 AND 2: 0

## 2025-02-14 ASSESSMENT — PAIN SCALES - GENERAL: PAINLEVEL_OUTOF10: 0-NO PAIN

## 2025-02-14 NOTE — PROGRESS NOTES
Radiation Oncology Nursing Note    Prior Radiotherapy:  Yes, describe: see below  IMRT: Left Lower lobe of lung, Mediastinum (Resolved)    Treatment Period Technique Fraction Dose Fractions Total Dose   Course 1 11/13/2023-12/26/2023  (days elapsed: 43)         Left lung_Med 11/13/2023-11/20/2023 IMRT 200 / 200 cGy 7 / 7 1400 / 1,400 cGy         Replan Lt Lung/Med 11/21/2023-12/26/2023 IMRT 200 / 200 cGy 23 / 23 4600 / 4,600 cGy     Current Systemic Treatment:  No     Presence of Pacemaker or ICD:  No    History of Autoimmune or Connective Tissue Disorders:  No    Pain: The patient's current pain level was assessed.  They report currently having a pain of 0 out of 10.  They feel their pain is under control without the use of pain medications.    Review of Systems:  Review of Systems   Constitutional:  Positive for fatigue (slight). Negative for appetite change, chills, diaphoresis, fever and unexpected weight change.   HENT:   Positive for tinnitus. Negative for hearing loss, mouth sores, nosebleeds, sore throat, trouble swallowing and voice change.    Eyes:  Positive for eye problems (wears reading glasses.).   Respiratory:  Positive for shortness of breath (more than when younger.). Negative for chest tightness, cough, hemoptysis and wheezing.    Cardiovascular: Negative.    Gastrointestinal: Negative.    Musculoskeletal:  Positive for arthralgias (muscle soreness in am.) and back pain (in am). Negative for flank pain, myalgias, neck pain and neck stiffness.   Skin: Negative.    Neurological: Negative.    Hematological:  Bruises/bleeds easily.   Psychiatric/Behavioral:  Negative for confusion, decreased concentration, depression, sleep disturbance (up and down through the night.) and suicidal ideas. The patient is nervous/anxious.

## 2025-02-14 NOTE — PROGRESS NOTES
RADIATION ONCOLOGY HISTORY & PHYSICAL:    Consults     HISTORY OF PRESENT ILLNESS:   Kevin Rubi [id number] is a 69-year-old male with lung adenocarcinoma and new brain metastases.  He is initially diagnosed in 2023 and completed concurrent chemoradiation to 60 Gy in 30 fractions with carboplatin-pemetrexed in late 2023.  In 2024 he only received 2 cycles of consolidative Durvalumab due to frequent admissions and other coinciding medical issues.  He was on surveillance under the direction of Dr. Khan. CT chest performed on 2/10/2025 showed no evidence of systemic progression but a brain MRI performed on the same day showed a new 5 mm enhancing nodule within the right occipital lobe that was not visible on his previous MRI performed on 10/2/2023.  The patient presents today for consultation to discuss gamma knife radiosurgery.    Interval History:  Patient presents with his wife and son and is overall doing well this morning. He denies any new or worsening neurologic symptoms.     I have reviewed Kevin Rubi medical, surgical and other pertinent history in detail, and have updated medication and allergy information as needed in the computerized patient record.     PAST MEDICAL HISTORY:  Past Medical History:   Diagnosis Date    Aortic aneurysm (CMS-HCC)     Atrial fibrillation (Multi)     Coronary artery disease     Hyperlipidemia     Hypertension     Lung cancer (Multi)     Personal history of peptic ulcer disease     History of gastric ulcer    Superior mesenteric artery stenosis (Multi)     Tinnitus, bilateral 08/14/2016    Tinnitus of both ears     PAST SURGICAL HISTORY:  Past Surgical History:   Procedure Laterality Date    CT ABDOMEN PELVIS ANGIOGRAM W AND/OR WO IV CONTRAST  08/08/2023    CT ABDOMEN PELVIS ANGIOGRAM W AND/OR WO IV CONTRAST 8/8/2023 POR CT    EXPLORATORY LAPAROTOMY W/ BOWEL RESECTION  04/09/2024    HERNIA REPAIR Right 04/09/2024    inguinal hernia    OTHER SURGICAL HISTORY   02/13/2019    Esophagogastroduodenoscopy    OTHER SURGICAL HISTORY  02/13/2019    Stomach surgery    SUPERIOR MESTENTERIC ARTERY STENT       MEDICATIONS:  Current Outpatient Medications   Medication Sig Dispense Refill    apixaban (Eliquis) 5 mg tablet Take 1 tablet (5 mg) by mouth 2 times a day. 180 tablet 1    blood sugar diagnostic (Blood Glucose Test) strip 1 strip once daily. 100 strip 11    blood-glucose meter misc Use daily to test blood sugar 1 each 0    clopidogrel (Plavix) 75 mg tablet Take 1 tablet (75 mg) by mouth once daily. 30 tablet 2    digoxin (Lanoxin) 125 MCG tablet TAKE 1 TABLET DAILY 90 tablet 0    fenofibrate (Tricor) 145 mg tablet TAKE 1 TABLET BY MOUTH ONCE  DAILY 100 tablet 2    furosemide (Lasix) 20 mg tablet Take 1 tablet (20 mg) by mouth see administration instructions. 1 tablet as needed for swelling, weight gain of 3# or increased shortness of breath. 90 tablet 0    gabapentin (Neurontin) 300 mg capsule TAKE 1 CAPSULE (300 MG) BY MOUTH ONCE DAILY AT BEDTIME. 60 capsule 2    lancets misc Use daily to test blood sugar 100 each 3    metoprolol succinate XL (Toprol-XL) 25 mg 24 hr tablet Take 1 tablet (25 mg) by mouth once daily. Do not crush or chew. 90 tablet 3    rosuvastatin (Crestor) 5 mg tablet TAKE 1 TABLET BY MOUTH ONCE  DAILY 90 tablet 2    sacubitriL-valsartan (Entresto) 24-26 mg tablet Take 0.5 tablets by mouth 2 times a day.      spironolactone (Aldactone) 25 mg tablet Take 0.5 tablets (12.5 mg) by mouth once daily. 45 tablet 2    tiZANidine (Zanaflex) 2 mg tablet TAKE 1 TABLET BY MOUTH 2 TIMES A DAY AS NEEDED FOR MUSCLE SPASMS. 180 tablet 1     No current facility-administered medications for this visit.     ALLERGIES:  Patient has no known allergies.  SOCIAL HISTORY:  Social History     Socioeconomic History    Marital status:      Spouse name: Not on file    Number of children: Not on file    Years of education: Not on file    Highest education level: Not on file    Occupational History    Not on file   Tobacco Use    Smoking status: Former     Current packs/day: 0.00     Average packs/day: 0.5 packs/day for 30.0 years (15.0 ttl pk-yrs)     Types: Cigarettes     Start date:      Quit date: 2013     Years since quittin.1     Passive exposure: Past    Smokeless tobacco: Never   Vaping Use    Vaping status: Never Used   Substance and Sexual Activity    Alcohol use: Not Currently    Drug use: Never    Sexual activity: Defer   Other Topics Concern    Not on file   Social History Narrative    Not on file     Social Drivers of Health     Financial Resource Strain: Low Risk  (6/3/2024)    Overall Financial Resource Strain (CARDIA)     Difficulty of Paying Living Expenses: Not very hard   Food Insecurity: No Food Insecurity (6/3/2024)    Hunger Vital Sign     Worried About Running Out of Food in the Last Year: Never true     Ran Out of Food in the Last Year: Never true   Transportation Needs: No Transportation Needs (6/3/2024)    PRAPARE - Transportation     Lack of Transportation (Medical): No     Lack of Transportation (Non-Medical): No   Physical Activity: Not on file   Stress: Not on file   Social Connections: Low Risk  (2023)    [facility]    Social Connections     How often do you feel that you lack companionship?: Not on file     How often do you feel left out?: Not on file     How often to you feel isolated from others?: Not on file   Intimate Partner Violence: Not on file   Housing Stability: Low Risk  (6/3/2024)    Housing Stability Vital Sign     Unable to Pay for Housing in the Last Year: No     Number of Places Lived in the Last Year: 1     Unstable Housing in the Last Year: No     FAMILY HISTORY:  Family History   Problem Relation Name Age of Onset    Cancer Mother          ?larynx or lung       REVIEW OF SYSTEMS:    A 14 point review of systems was negative except for what is noted in the HPI.    There were no vitals  filed for this visit.    PHYSICAL EXAM:   KPS:90  General: NAD.  CV: Regular rate.  Resp: Breathing is non-labored. Breathing comfortably on RA.  Abdomen: Soft, ND.  Extremities: No acute findings. No edema   Skin: Color, texture and turgor wnl. No rashes and lesions.  Neuro: AAO x 3, appropriately interactive, normal affect, speech fluent. Cranial nerves II through XII are intact grossly and symmetrically. No focal neurologic deficit. Normal strength and sensation to LT intact bilaterally in the upper and lower extremities. No dysmetria or dysdiadochokinesia.     RADIOGRAPHIC FINDINGS:  I reviewed all the images associated with this patient and the current presentation.    PATHOLOGIC FINDINGS:  I have reviewed all the pertinent pathological studies and results associated with this case.    LABORATORY FINDINGS:  All pertinent lab values have been reviewed.    IMPRESSION AND PLAN:   Kevin Rubi [id number] is a 69-year-old male with metastatic lung adenocarcinoma status post concurrent chemoradiation and 2 cycles of consolidative Durvalumab, currently on surveillance with stable systemic imaging, now with a new right occipital brain metastasis.  He was referred today to discuss gamma knife radiosurgery.  I discussed the benefits, goals, risks, and side effects of treatment.  He signed consent after discussion today and is willing to proceed with treatment.    The potential side effects of Gamma Knife radiosurgery were discussed including short-term adverse effects associated with stereotactic frame placement such as headache following frame removal, bleeding or infection at the sites of pin insertion and a few days of periorbital swelling. Adverse effects specific to radiosurgery include short term sequelae such as fatigue and a small risk of alopecia approximately 3 weeks after treatment which would likely regrow within 3 months. Long-term risks associated with radiosurgery include fatigue, and  treatment-associated brain edema and/or radionecrosis potentially causing headaches or other neurological symptoms requiring corticosteroids, anti-angiogenic agents, or even surgical intervention to control. We also discussed that there is a risk of additional brain metastases being found on the day of Gamma Knife treatment; in this scenario, additional brain lesions may be treated, or if the additional disease is extensive, the procedure may need to be aborted and  alternative treatments may be recommended.    PLAN:  - Patient is overall doing well and ready to proceed with gamma knife radiosurgery.  - Will plan to proceed on 2/25/2025    Natalie Plunkett MD  , Radiation Oncology    LONGITUDINAL CARE, EXTRA EFFORT/ : The patient will be followed longitudinally by providers (including APPs) in the department of radiation oncology for monitoring treatment effects during and after radiation. Additional effort needed in the setting of his diagnosis and coordination of care with other treating physicians.

## 2025-02-24 ENCOUNTER — APPOINTMENT (OUTPATIENT)
Dept: NEUROSURGERY | Facility: CLINIC | Age: 70
End: 2025-02-24
Payer: MEDICARE

## 2025-02-24 DIAGNOSIS — C34.90 MALIGNANT NEOPLASM OF LUNG, UNSPECIFIED LATERALITY, UNSPECIFIED PART OF LUNG (MULTI): Primary | ICD-10-CM

## 2025-02-24 PROCEDURE — 99202 OFFICE O/P NEW SF 15 MIN: CPT | Performed by: NEUROLOGICAL SURGERY

## 2025-02-24 PROCEDURE — 1157F ADVNC CARE PLAN IN RCRD: CPT | Performed by: NEUROLOGICAL SURGERY

## 2025-02-24 PROCEDURE — 1123F ACP DISCUSS/DSCN MKR DOCD: CPT | Performed by: NEUROLOGICAL SURGERY

## 2025-02-24 NOTE — PROGRESS NOTES
Telemedicine consult for stereotactic radiosurgery.  Both the patient and the provider are in the state [facility].    68 yo M with history of lung cancer has telemedicine consultation for stereotactic radiosurgery.  The patient underwent an MRI for staging.  He denies any headaches, visual changes, weakness, or other focal neurological symptoms.    An MRI of the brain I personally reviewed demonstrate a 5 mm enhancing mass in the right occipital lobe.    The enhancing mass most likely represents a focus of metastasis.  We discussed the options of observation, open surgery, and stereotactic radiosurgery.  We reviewed the risk and benefits of each of these.  Given the size and location of the mass I believe stereotactic radiosurgery is the best treatment.  The patient wishes to proceed with the procedure.

## 2025-02-25 ENCOUNTER — DOCUMENTATION (OUTPATIENT)
Dept: RADIATION ONCOLOGY | Facility: HOSPITAL | Age: 70
End: 2025-02-25

## 2025-02-25 ENCOUNTER — RADIATION ONCOLOGY OTV (OUTPATIENT)
Dept: RADIATION ONCOLOGY | Facility: HOSPITAL | Age: 70
End: 2025-02-25

## 2025-02-25 ENCOUNTER — HOSPITAL ENCOUNTER (OUTPATIENT)
Dept: RADIATION ONCOLOGY | Facility: HOSPITAL | Age: 70
Setting detail: RADIATION/ONCOLOGY SERIES
Discharge: HOME | End: 2025-02-25
Payer: MEDICARE

## 2025-02-25 ENCOUNTER — APPOINTMENT (OUTPATIENT)
Dept: RADIATION ONCOLOGY | Facility: HOSPITAL | Age: 70
End: 2025-02-25
Payer: MEDICARE

## 2025-02-25 ENCOUNTER — RADIATION ONCOLOGY OTV (OUTPATIENT)
Dept: NEUROSURGERY | Facility: HOSPITAL | Age: 70
End: 2025-02-25

## 2025-02-25 ENCOUNTER — HOSPITAL ENCOUNTER (OUTPATIENT)
Dept: RADIOLOGY | Facility: HOSPITAL | Age: 70
Discharge: HOME | End: 2025-02-25
Payer: MEDICARE

## 2025-02-25 VITALS
SYSTOLIC BLOOD PRESSURE: 93 MMHG | HEIGHT: 72 IN | RESPIRATION RATE: 18 BRPM | WEIGHT: 160 LBS | TEMPERATURE: 97.2 F | OXYGEN SATURATION: 98 % | DIASTOLIC BLOOD PRESSURE: 52 MMHG | HEART RATE: 76 BPM | BODY MASS INDEX: 21.67 KG/M2

## 2025-02-25 VITALS — HEART RATE: 82 BPM | OXYGEN SATURATION: 97 % | DIASTOLIC BLOOD PRESSURE: 56 MMHG | SYSTOLIC BLOOD PRESSURE: 105 MMHG

## 2025-02-25 DIAGNOSIS — C79.31 MALIGNANT NEOPLASM METASTATIC TO BRAIN (MULTI): Primary | ICD-10-CM

## 2025-02-25 DIAGNOSIS — C34.32 MALIGNANT NEOPLASM OF LOWER LOBE, LEFT BRONCHUS OR LUNG: ICD-10-CM

## 2025-02-25 DIAGNOSIS — C79.31 SECONDARY MALIGNANT NEOPLASM OF BRAIN (MULTI): ICD-10-CM

## 2025-02-25 DIAGNOSIS — C79.31 MALIGNANT NEOPLASM METASTATIC TO BRAIN (MULTI): ICD-10-CM

## 2025-02-25 DIAGNOSIS — C34.90 MALIGNANT NEOPLASM OF LUNG, UNSPECIFIED LATERALITY, UNSPECIFIED PART OF LUNG (MULTI): Primary | ICD-10-CM

## 2025-02-25 LAB
RAD ONC MSQ ACTUAL FRACTIONS DELIVERED: 1
RAD ONC MSQ ACTUAL SESSION DELIVERED DOSE: 2200 CGRAY
RAD ONC MSQ ACTUAL TOTAL DOSE: 2200 CGRAY
RAD ONC MSQ ELAPSED DAYS: 0
RAD ONC MSQ LAST DATE: NORMAL
RAD ONC MSQ PRESCRIBED FRACTIONAL DOSE: 2200 CGRAY
RAD ONC MSQ PRESCRIBED NUMBER OF FRACTIONS: 1
RAD ONC MSQ PRESCRIBED TECHNIQUE: NORMAL
RAD ONC MSQ PRESCRIBED TOTAL DOSE: 2200 CGRAY
RAD ONC MSQ START DATE: NORMAL
RAD ONC MSQ TREATMENT COURSE NUMBER: 2
RAD ONC MSQ TREATMENT SITE: NORMAL

## 2025-02-25 PROCEDURE — 77371 SRS MULTISOURCE: CPT | Performed by: STUDENT IN AN ORGANIZED HEALTH CARE EDUCATION/TRAINING PROGRAM

## 2025-02-25 PROCEDURE — 77370 RADIATION PHYSICS CONSULT: CPT | Mod: XE | Performed by: STUDENT IN AN ORGANIZED HEALTH CARE EDUCATION/TRAINING PROGRAM

## 2025-02-25 PROCEDURE — 77334 RADIATION TREATMENT AID(S): CPT | Performed by: STUDENT IN AN ORGANIZED HEALTH CARE EDUCATION/TRAINING PROGRAM

## 2025-02-25 PROCEDURE — 77300 RADIATION THERAPY DOSE PLAN: CPT | Performed by: STUDENT IN AN ORGANIZED HEALTH CARE EDUCATION/TRAINING PROGRAM

## 2025-02-25 PROCEDURE — 61796 SRS CRANIAL LESION SIMPLE: CPT | Performed by: NEUROLOGICAL SURGERY

## 2025-02-25 PROCEDURE — 70553 MRI BRAIN STEM W/O & W/DYE: CPT | Performed by: RADIOLOGY

## 2025-02-25 PROCEDURE — 61800 APPLY SRS HEADFRAME ADD-ON: CPT | Performed by: NEUROLOGICAL SURGERY

## 2025-02-25 PROCEDURE — 2500000004 HC RX 250 GENERAL PHARMACY W/ HCPCS (ALT 636 FOR OP/ED): Performed by: STUDENT IN AN ORGANIZED HEALTH CARE EDUCATION/TRAINING PROGRAM

## 2025-02-25 PROCEDURE — 77295 3-D RADIOTHERAPY PLAN: CPT | Performed by: STUDENT IN AN ORGANIZED HEALTH CARE EDUCATION/TRAINING PROGRAM

## 2025-02-25 PROCEDURE — 61797 SRS CRAN LES SIMPLE ADDL: CPT | Performed by: NEUROLOGICAL SURGERY

## 2025-02-25 PROCEDURE — 2500000001 HC RX 250 WO HCPCS SELF ADMINISTERED DRUGS (ALT 637 FOR MEDICARE OP): Performed by: STUDENT IN AN ORGANIZED HEALTH CARE EDUCATION/TRAINING PROGRAM

## 2025-02-25 RX ORDER — HYDROMORPHONE HYDROCHLORIDE 1 MG/ML
0.4 INJECTION, SOLUTION INTRAMUSCULAR; INTRAVENOUS; SUBCUTANEOUS ONCE
Status: CANCELLED | OUTPATIENT
Start: 2025-02-25 | End: 2025-02-25

## 2025-02-25 RX ORDER — ONDANSETRON HYDROCHLORIDE 2 MG/ML
4 INJECTION, SOLUTION INTRAVENOUS EVERY 4 HOURS PRN
Status: DISCONTINUED | OUTPATIENT
Start: 2025-02-25 | End: 2025-02-26 | Stop reason: HOSPADM

## 2025-02-25 RX ORDER — MIDAZOLAM HYDROCHLORIDE 1 MG/ML
1 INJECTION, SOLUTION INTRAMUSCULAR; INTRAVENOUS ONCE
Status: COMPLETED | OUTPATIENT
Start: 2025-02-25 | End: 2025-02-25

## 2025-02-25 RX ORDER — ONDANSETRON HYDROCHLORIDE 2 MG/ML
4 INJECTION, SOLUTION INTRAVENOUS EVERY 4 HOURS PRN
Status: CANCELLED | OUTPATIENT
Start: 2025-02-25

## 2025-02-25 RX ORDER — MIDAZOLAM HYDROCHLORIDE 1 MG/ML
0.5 INJECTION, SOLUTION INTRAMUSCULAR; INTRAVENOUS ONCE
Status: COMPLETED | OUTPATIENT
Start: 2025-02-25 | End: 2025-02-25

## 2025-02-25 RX ORDER — LIDOCAINE AND PRILOCAINE 25; 25 MG/G; MG/G
1 CREAM TOPICAL ONCE
Status: CANCELLED | OUTPATIENT
Start: 2025-02-25 | End: 2025-02-25

## 2025-02-25 RX ORDER — MIDAZOLAM HYDROCHLORIDE 1 MG/ML
1 INJECTION, SOLUTION INTRAMUSCULAR; INTRAVENOUS ONCE
Status: CANCELLED | OUTPATIENT
Start: 2025-02-25 | End: 2025-02-25

## 2025-02-25 RX ORDER — HYDROMORPHONE HYDROCHLORIDE 1 MG/ML
0.4 INJECTION, SOLUTION INTRAMUSCULAR; INTRAVENOUS; SUBCUTANEOUS ONCE
Status: COMPLETED | OUTPATIENT
Start: 2025-02-25 | End: 2025-02-25

## 2025-02-25 RX ORDER — MIDAZOLAM HYDROCHLORIDE 1 MG/ML
0.5 INJECTION, SOLUTION INTRAMUSCULAR; INTRAVENOUS ONCE
Status: CANCELLED | OUTPATIENT
Start: 2025-02-25 | End: 2025-02-25

## 2025-02-25 RX ORDER — GADOTERATE MEGLUMINE 376.9 MG/ML
30 INJECTION INTRAVENOUS
Status: COMPLETED | OUTPATIENT
Start: 2025-02-25 | End: 2025-02-25

## 2025-02-25 RX ORDER — HYDROMORPHONE HYDROCHLORIDE 1 MG/ML
0.6 INJECTION, SOLUTION INTRAMUSCULAR; INTRAVENOUS; SUBCUTANEOUS ONCE
Status: COMPLETED | OUTPATIENT
Start: 2025-02-25 | End: 2025-02-25

## 2025-02-25 RX ORDER — ACETAMINOPHEN 325 MG/1
650 TABLET ORAL EVERY 4 HOURS PRN
Status: CANCELLED | OUTPATIENT
Start: 2025-02-25

## 2025-02-25 RX ORDER — LIDOCAINE AND PRILOCAINE 25; 25 MG/G; MG/G
1 CREAM TOPICAL ONCE
Status: COMPLETED | OUTPATIENT
Start: 2025-02-25 | End: 2025-02-25

## 2025-02-25 RX ORDER — ACETAMINOPHEN 325 MG/1
650 TABLET ORAL EVERY 4 HOURS PRN
Status: DISCONTINUED | OUTPATIENT
Start: 2025-02-25 | End: 2025-02-26 | Stop reason: HOSPADM

## 2025-02-25 RX ORDER — HYDROMORPHONE HYDROCHLORIDE 1 MG/ML
0.6 INJECTION, SOLUTION INTRAMUSCULAR; INTRAVENOUS; SUBCUTANEOUS ONCE
Status: CANCELLED | OUTPATIENT
Start: 2025-02-25 | End: 2025-02-25

## 2025-02-25 RX ADMIN — MIDAZOLAM 0.5 MG: 1 INJECTION INTRAMUSCULAR; INTRAVENOUS at 07:45

## 2025-02-25 RX ADMIN — HYDROMORPHONE HYDROCHLORIDE 0.6 MG: 1 INJECTION, SOLUTION INTRAMUSCULAR; INTRAVENOUS; SUBCUTANEOUS at 10:54

## 2025-02-25 RX ADMIN — HYDROMORPHONE HYDROCHLORIDE 0.4 MG: 1 INJECTION, SOLUTION INTRAMUSCULAR; INTRAVENOUS; SUBCUTANEOUS at 07:45

## 2025-02-25 RX ADMIN — LIDOCAINE AND PRILOCAINE 10 APPLICATION: 25; 25 CREAM TOPICAL at 06:45

## 2025-02-25 RX ADMIN — GADOTERATE MEGLUMINE 28 ML: 376.9 INJECTION INTRAVENOUS at 08:43

## 2025-02-25 RX ADMIN — MIDAZOLAM 1 MG: 1 INJECTION INTRAMUSCULAR; INTRAVENOUS at 10:54

## 2025-02-25 ASSESSMENT — PAIN SCALES - GENERAL
PAINLEVEL_OUTOF10: 0-NO PAIN
PAINLEVEL_OUTOF10: 0-NO PAIN

## 2025-02-25 NOTE — PROGRESS NOTES
Radiation Oncology On Treatment Visit    Patient Name:  Kevin Rubi  MRN:  [id number]  :  1955    Referring Provider: No ref. provider found  Primary Care Provider: RUKHSANA Fried-CNS  Care Team: Patient Care Team:  BRENDON FriedCNS as PCP - General (Internal Medicine)  Madelyn Perrin MD as Radiation Oncologist (Radiation Oncology)  Jose Juan Hayes DO as Surgeon (Vascular Surgery)  Stu Khan MD as Consulting Physician (Hematology and Oncology)  Leann Rosario MD as Consulting Physician (Cardiology)    Date of Service: 2025     Diagnosis:   Specialty Problems          Radiation Oncology Problems    Primary cancer of left lower lobe of lung (Multi)        Malignant neoplasm of lung (Multi)        Malignant neoplasm of unspecified part of unspecified bronchus or lung (Multi)        Malignant neoplasm metastatic to brain (Multi)         Treatment Summary:  IMRT: Left Lower lobe of lung, Mediastinum (Resolved)    Treatment Period Technique Fraction Dose Fractions Total Dose   Course 1 2023-2023  (days elapsed: 43)         Left lung_Med 2023-2023 IMRT 200 / 200 cGy  1400 / 1,400 cGy         Replan Lt Lung/Med 2023-2023 IMRT 200 / 200 cGy  4600 / 4,600 cGy      Radiation Therapy    Treatment Period Technique Fraction Dose Fractions Total Dose   Course 2 2025-2025  (days elapsed: 0)         A: 2025-2025 Gamma-Knife 2200 / 2,200 cGy  2200 / 2,200 cGy         B: 2025-2025 Gamma-Knife 2200 / 2,200 cGy  2200 / 2,200 cGy         C: 2025-2025 Gamma-Knife 2200 / 2,200 cGy  2200 / 2,200 cGy     SUBJECTIVE: Patient tolerated treatment well.       OBJECTIVE:   Vital Signs:  /56   Pulse 82   SpO2 97% Comment: RA    Other Pertinent Findings:     Toxicity Assessment          2025    14:38   Toxicity Assessment   Adverse Events Reviewed (WDL) No (Exceptions to WDL)    Treatment Site Brain   Anorexia Grade 0   Anxiety Grade 1   Dehydration Grade 0   Depression Grade 0   Dermatitis Radiation Grade 0   Diarrhea Grade 0   Fatigue Grade 1       periods of rest   Fibrosis Deep Connective Tissue Grade 0   Fracture Grade 0   Nausea Grade 0   Pain Grade 0   Treatment Related Secondary Malignancy Grade 0   Tumor Pain Grade 0   Vomiting Grade 0   Hearing Impaired Grade 1       bl tinnitus   Middle Ear Inflammation Grade 0   Endocrine Disorders - Other, Specify Grade 0   Blurred Vision Grade 0   Dry Eye Grade 0   Eye Pain Grade 0   Optic Nerve Disorder Grade 0   Retinopathy Grade 0   Eye Disorders - Other, Specify Grade 0   Central Nervous System Necrosis Grade 0   Cognitive Disturbance Grade 0   Edema Cerebral Grade 0   Headache Grade 0   Ischemia Cerebrovascular Grade 0   Memory Impairment Grade 0   Seizure Grade 0        Assessment / Plan:  The patient is tolerating radiation therapy as anticipated.  Continue per current treatment plan.

## 2025-02-28 NOTE — PROGRESS NOTES
Date of Procedure: 2/24/2025  Diagnosis: Multiple cerebral metastases  Procedure: application of radiosurgery frame and radiosurgery treatment of cerebral metasases  Surgeon: Landon Lima    After the risks, benefits, and alternatives to stereotactic radiosurgery were discussed, the patient provided informed consent.  He received versed and dilaudid for relaxation and analgesia.  Four areas along his scalp were anesthetized using buffered lidocaine.  The radiosurgery frame was then fixed to the patient's head with percutaneous screws at the anesthetized sites.  He underwent high resolution MRI that demonstrated four lesions.  These were labelled RF90, , , and .  He underwent planning for treatment of the lesions.  Each lesion received a dose of 22 Gy to its periphery.  The patient tolerated the procedure well.

## 2025-03-07 NOTE — PROGRESS NOTES
Radiation Oncology Treatment Summary    Patient Name:  Kevin Rubi  MRN:  [id number]  :  1955    Radiation Oncologist: Madelyn Perrin MD   Referring Provider: No ref. provider found  Primary Care Provider: RUKHSANA Fried-CNS    Brief History: Kevin Rubi is a 69 y.o. male with Primary cancer of left lower lobe of lung (Multi), Clinical: Stage IIIC (cT3, cN3, cM0).  The patient completed radiotherapy as outlined below.    Radiation Treatment Summary:    IMRT: Left Lower lobe of lung, Mediastinum (Resolved)    Treatment Period Technique Fraction Dose Fractions Total Dose   Course 1 2023-2023  (days elapsed: 43)         Left lung_Med 2023-2023 IMRT 200 / 200 cGy  1400 / 1,400 cGy         Replan Lt Lung/Med 2023-2023 IMRT 200 / 200 cGy  4600 / 4,600 cGy      Radiation Therapy    Treatment Period Technique Fraction Dose Fractions Total Dose   Course 2 2025-2025  (days elapsed: 0)         A: 2025-2025 Gamma-Knife 2200 / 2,200 cGy  2200 / 2,200 cGy         B: 2025-2025 Gamma-Knife 2200 / 2,200 cGy  2200 / 2,200 cGy         C: 2025-2025 Gamma-Knife 2200 / 2,200 cGy  2200 / 2,200 cGy       Concurrent Chemotherapy:  [No matching plan found]    CTCAE Toxicity Overview:   Toxicity Assessment          2025    14:38   Toxicity Assessment   Adverse Events Reviewed (WDL) No (Exceptions to WDL)   Treatment Site Brain   Anorexia Grade 0   Anxiety Grade 1   Dehydration Grade 0   Depression Grade 0   Dermatitis Radiation Grade 0   Diarrhea Grade 0   Fatigue Grade 1       periods of rest   Fibrosis Deep Connective Tissue Grade 0   Fracture Grade 0   Nausea Grade 0   Pain Grade 0   Treatment Related Secondary Malignancy Grade 0   Tumor Pain Grade 0   Vomiting Grade 0   Hearing Impaired Grade 1       bl tinnitus   Middle Ear Inflammation Grade 0   Endocrine Disorders - Other, Specify  Grade 0   Blurred Vision Grade 0   Dry Eye Grade 0   Eye Pain Grade 0   Optic Nerve Disorder Grade 0   Retinopathy Grade 0   Eye Disorders - Other, Specify Grade 0   Central Nervous System Necrosis Grade 0   Cognitive Disturbance Grade 0   Edema Cerebral Grade 0   Headache Grade 0   Ischemia Cerebrovascular Grade 0   Memory Impairment Grade 0   Seizure Grade 0     Patient Disposition: 2 month Volumetric MRI and follow up with Dr. Plunkett  Future Appointments       Date / Time Provider Department Dept Phone    5/1/2025 1:00 PM (Arrive by 12:45 PM) [facility] 3 [facility] [phone]    5/1/2025 2:30 PM Natalie Plunkett MD [facility] [phone]    5/16/2025 11:00 AM (Arrive by 10:45 AM) [facility] CT 1 [facility] [phone]    5/21/2025 11:00 AM (Arrive by 10:45 AM) Siddhartha Chavez, APRN-CNP [facility] [phone]    7/15/2025 9:30 AM Leann Rosario MD [facility] [phone]    7/28/2025 11:40 AM (Arrive by 11:25 AM) Chen Olea, APRN-CNS [facility] [phone]

## 2025-03-31 ENCOUNTER — PATIENT MESSAGE (OUTPATIENT)
Dept: PRIMARY CARE | Facility: CLINIC | Age: 70
End: 2025-03-31
Payer: MEDICARE

## 2025-03-31 DIAGNOSIS — K55.1 CHRONIC MESENTERIC ISCHEMIA (MULTI): Primary | ICD-10-CM

## 2025-04-02 LAB
25(OH)D3+25(OH)D2 SERPL-MCNC: 34 NG/ML (ref 30–100)
ALBUMIN SERPL-MCNC: 4.2 G/DL (ref 3.6–5.1)
ALP SERPL-CCNC: 54 U/L (ref 35–144)
ALT SERPL-CCNC: 10 U/L (ref 9–46)
ANION GAP SERPL CALCULATED.4IONS-SCNC: 9 MMOL/L (CALC) (ref 7–17)
AST SERPL-CCNC: 16 U/L (ref 10–35)
BILIRUB SERPL-MCNC: 0.4 MG/DL (ref 0.2–1.2)
BUN SERPL-MCNC: 19 MG/DL (ref 7–25)
CALCIUM SERPL-MCNC: 9.3 MG/DL (ref 8.6–10.3)
CHLORIDE SERPL-SCNC: 103 MMOL/L (ref 98–110)
CHOLEST SERPL-MCNC: 134 MG/DL
CHOLEST/HDLC SERPL: 3.5 (CALC)
CO2 SERPL-SCNC: 25 MMOL/L (ref 20–32)
CREAT SERPL-MCNC: 1.24 MG/DL (ref 0.7–1.35)
EGFRCR SERPLBLD CKD-EPI 2021: 64 ML/MIN/1.73M2
ERYTHROCYTE [DISTWIDTH] IN BLOOD BY AUTOMATED COUNT: 15.1 % (ref 11–15)
EST. AVERAGE GLUCOSE BLD GHB EST-MCNC: 148 MG/DL
EST. AVERAGE GLUCOSE BLD GHB EST-SCNC: 8.2 MMOL/L
GLUCOSE SERPL-MCNC: 103 MG/DL (ref 65–99)
HBA1C MFR BLD: 6.8 % OF TOTAL HGB
HCT VFR BLD AUTO: 37.4 % (ref 38.5–50)
HDLC SERPL-MCNC: 38 MG/DL
HGB BLD-MCNC: 11.7 G/DL (ref 13.2–17.1)
LDLC SERPL CALC-MCNC: 75 MG/DL (CALC)
MCH RBC QN AUTO: 26 PG (ref 27–33)
MCHC RBC AUTO-ENTMCNC: 31.3 G/DL (ref 32–36)
MCV RBC AUTO: 83.1 FL (ref 80–100)
NONHDLC SERPL-MCNC: 96 MG/DL (CALC)
PLATELET # BLD AUTO: 256 THOUSAND/UL (ref 140–400)
PMV BLD REES-ECKER: 12.1 FL (ref 7.5–12.5)
POTASSIUM SERPL-SCNC: 3.9 MMOL/L (ref 3.5–5.3)
PROT SERPL-MCNC: 6.8 G/DL (ref 6.1–8.1)
RBC # BLD AUTO: 4.5 MILLION/UL (ref 4.2–5.8)
SODIUM SERPL-SCNC: 137 MMOL/L (ref 135–146)
T4 FREE SERPL-MCNC: 1 NG/DL (ref 0.8–1.8)
TRIGL SERPL-MCNC: 127 MG/DL
TSH SERPL-ACNC: 5.69 MIU/L (ref 0.4–4.5)
VIT B12 SERPL-MCNC: 582 PG/ML (ref 200–1100)
WBC # BLD AUTO: 7.6 THOUSAND/UL (ref 3.8–10.8)

## 2025-04-03 ENCOUNTER — TELEPHONE (OUTPATIENT)
Dept: PRIMARY CARE | Facility: CLINIC | Age: 70
End: 2025-04-03
Payer: MEDICARE

## 2025-04-03 DIAGNOSIS — Z00.00 MEDICARE ANNUAL WELLNESS VISIT, SUBSEQUENT: ICD-10-CM

## 2025-04-03 DIAGNOSIS — R73.09 ELEVATED GLUCOSE: ICD-10-CM

## 2025-04-03 DIAGNOSIS — E78.2 MIXED HYPERLIPIDEMIA: ICD-10-CM

## 2025-04-03 DIAGNOSIS — E11.65 TYPE 2 DIABETES MELLITUS WITH HYPERGLYCEMIA, WITHOUT LONG-TERM CURRENT USE OF INSULIN: ICD-10-CM

## 2025-04-03 DIAGNOSIS — C34.92 MALIGNANT NEOPLASM OF UNSPECIFIED PART OF LEFT BRONCHUS OR LUNG (MULTI): ICD-10-CM

## 2025-04-03 DIAGNOSIS — E55.9 VITAMIN D DEFICIENCY: ICD-10-CM

## 2025-04-03 DIAGNOSIS — I10 PRIMARY HYPERTENSION: ICD-10-CM

## 2025-04-03 NOTE — TELEPHONE ENCOUNTER
----- Message from Chen Olea sent at 4/2/2025  9:41 PM EDT -----  Updated patient via Portal regarding results. Please reorder lab work to be done prior to follow up appointment. Thank you!

## 2025-04-04 ENCOUNTER — APPOINTMENT (OUTPATIENT)
Dept: VASCULAR MEDICINE | Facility: HOSPITAL | Age: 70
End: 2025-04-04
Payer: MEDICARE

## 2025-04-08 ENCOUNTER — APPOINTMENT (OUTPATIENT)
Dept: VASCULAR SURGERY | Facility: HOSPITAL | Age: 70
End: 2025-04-08
Payer: MEDICARE

## 2025-04-10 ENCOUNTER — HOSPITAL ENCOUNTER (OUTPATIENT)
Dept: VASCULAR MEDICINE | Facility: HOSPITAL | Age: 70
Discharge: HOME | End: 2025-04-10
Payer: COMMERCIAL

## 2025-04-10 DIAGNOSIS — K55.1 CHRONIC MESENTERIC ISCHEMIA (MULTI): ICD-10-CM

## 2025-04-10 PROCEDURE — 93975 VASCULAR STUDY: CPT | Performed by: INTERNAL MEDICINE

## 2025-04-10 PROCEDURE — 93975 VASCULAR STUDY: CPT

## 2025-04-11 ENCOUNTER — HOSPITAL ENCOUNTER (INPATIENT)
Facility: HOSPITAL | Age: 70
LOS: 4 days | Discharge: HOME | End: 2025-04-15
Attending: EMERGENCY MEDICINE | Admitting: SURGERY
Payer: COMMERCIAL

## 2025-04-11 ENCOUNTER — APPOINTMENT (OUTPATIENT)
Dept: RADIOLOGY | Facility: HOSPITAL | Age: 70
End: 2025-04-11
Payer: COMMERCIAL

## 2025-04-11 ENCOUNTER — TELEPHONE (OUTPATIENT)
Dept: CARDIOLOGY | Facility: HOSPITAL | Age: 70
End: 2025-04-11
Payer: COMMERCIAL

## 2025-04-11 ENCOUNTER — HOSPITAL ENCOUNTER (EMERGENCY)
Facility: HOSPITAL | Age: 70
Discharge: ANOTHER HOSPITAL | End: 2025-04-11
Attending: EMERGENCY MEDICINE
Payer: COMMERCIAL

## 2025-04-11 ENCOUNTER — ANESTHESIA EVENT (OUTPATIENT)
Dept: OPERATING ROOM | Facility: HOSPITAL | Age: 70
End: 2025-04-11
Payer: COMMERCIAL

## 2025-04-11 VITALS
TEMPERATURE: 98.6 F | OXYGEN SATURATION: 99 % | DIASTOLIC BLOOD PRESSURE: 59 MMHG | HEART RATE: 74 BPM | HEIGHT: 72 IN | SYSTOLIC BLOOD PRESSURE: 107 MMHG | WEIGHT: 160 LBS | RESPIRATION RATE: 16 BRPM | BODY MASS INDEX: 21.67 KG/M2

## 2025-04-11 DIAGNOSIS — I71.02 ABDOMINAL AORTIC ANEURYSM DISSECTION (MULTI): Primary | ICD-10-CM

## 2025-04-11 DIAGNOSIS — I48.91 ATRIAL FIBRILLATION, UNSPECIFIED TYPE (MULTI): ICD-10-CM

## 2025-04-11 DIAGNOSIS — I73.9 PERIPHERAL VASCULAR DISEASE (CMS-HCC): ICD-10-CM

## 2025-04-11 DIAGNOSIS — I50.22 CHRONIC SYSTOLIC CONGESTIVE HEART FAILURE: ICD-10-CM

## 2025-04-11 DIAGNOSIS — K55.1: ICD-10-CM

## 2025-04-11 DIAGNOSIS — K40.30 INCARCERATED RIGHT INGUINAL HERNIA: ICD-10-CM

## 2025-04-11 DIAGNOSIS — K55.9 ACUTE VASCULAR DISORDER OF INTESTINE (MULTI): ICD-10-CM

## 2025-04-11 DIAGNOSIS — I50.30 DIASTOLIC CONGESTIVE HEART FAILURE, UNSPECIFIED HF CHRONICITY: ICD-10-CM

## 2025-04-11 DIAGNOSIS — I71.00 DISSECTION OF AORTA, UNSPECIFIED PORTION OF AORTA (MULTI): ICD-10-CM

## 2025-04-11 DIAGNOSIS — K55.1 SUPERIOR MESENTERIC ARTERY STENOSIS (MULTI): Primary | ICD-10-CM

## 2025-04-11 LAB
ABO GROUP (TYPE) IN BLOOD: NORMAL
ALBUMIN SERPL BCP-MCNC: 4 G/DL (ref 3.4–5)
ALP SERPL-CCNC: 51 U/L (ref 33–136)
ALT SERPL W P-5'-P-CCNC: 7 U/L (ref 10–52)
ANION GAP SERPL CALC-SCNC: 12 MMOL/L (ref 10–20)
ANION GAP SERPL CALC-SCNC: 13 MMOL/L (ref 10–20)
ANTIBODY SCREEN: NORMAL
APTT PPP: 34 SECONDS (ref 26–36)
APTT PPP: 36 SECONDS (ref 26–36)
AST SERPL W P-5'-P-CCNC: 13 U/L (ref 9–39)
BASOPHILS # BLD AUTO: 0.05 X10*3/UL (ref 0–0.1)
BASOPHILS NFR BLD AUTO: 0.5 %
BILIRUB SERPL-MCNC: 0.5 MG/DL (ref 0–1.2)
BUN SERPL-MCNC: 14 MG/DL (ref 6–23)
BUN SERPL-MCNC: 18 MG/DL (ref 6–23)
CALCIUM SERPL-MCNC: 7.8 MG/DL (ref 8.6–10.6)
CALCIUM SERPL-MCNC: 9.3 MG/DL (ref 8.6–10.3)
CHLORIDE SERPL-SCNC: 101 MMOL/L (ref 98–107)
CHLORIDE SERPL-SCNC: 110 MMOL/L (ref 98–107)
CO2 SERPL-SCNC: 20 MMOL/L (ref 21–32)
CO2 SERPL-SCNC: 22 MMOL/L (ref 21–32)
CREAT SERPL-MCNC: 0.95 MG/DL (ref 0.5–1.3)
CREAT SERPL-MCNC: 1.28 MG/DL (ref 0.5–1.3)
EGFRCR SERPLBLD CKD-EPI 2021: 61 ML/MIN/1.73M*2
EGFRCR SERPLBLD CKD-EPI 2021: 88 ML/MIN/1.73M*2
EOSINOPHIL # BLD AUTO: 0.08 X10*3/UL (ref 0–0.7)
EOSINOPHIL NFR BLD AUTO: 0.8 %
ERYTHROCYTE [DISTWIDTH] IN BLOOD BY AUTOMATED COUNT: 15.2 % (ref 11.5–14.5)
ERYTHROCYTE [DISTWIDTH] IN BLOOD BY AUTOMATED COUNT: 15.3 % (ref 11.5–14.5)
GLUCOSE SERPL-MCNC: 119 MG/DL (ref 74–99)
GLUCOSE SERPL-MCNC: 71 MG/DL (ref 74–99)
HCT VFR BLD AUTO: 28.2 % (ref 41–52)
HCT VFR BLD AUTO: 35.2 % (ref 41–52)
HGB BLD-MCNC: 11.2 G/DL (ref 13.5–17.5)
HGB BLD-MCNC: 8.9 G/DL (ref 13.5–17.5)
IMM GRANULOCYTES # BLD AUTO: 0.03 X10*3/UL (ref 0–0.7)
IMM GRANULOCYTES NFR BLD AUTO: 0.3 % (ref 0–0.9)
INR PPP: 1.7 (ref 0.9–1.1)
INR PPP: 1.9 (ref 0.9–1.1)
LACTATE SERPL-SCNC: 0.9 MMOL/L (ref 0.4–2)
LACTATE SERPL-SCNC: 1.2 MMOL/L (ref 0.4–2)
LIPASE SERPL-CCNC: 12 U/L (ref 9–82)
LMWH PPP CHRO-ACNC: >2 IU/ML
LYMPHOCYTES # BLD AUTO: 1.64 X10*3/UL (ref 1.2–4.8)
LYMPHOCYTES NFR BLD AUTO: 15.9 %
MAGNESIUM SERPL-MCNC: 1.47 MG/DL (ref 1.6–2.4)
MCH RBC QN AUTO: 25.9 PG (ref 26–34)
MCH RBC QN AUTO: 26.1 PG (ref 26–34)
MCHC RBC AUTO-ENTMCNC: 31.6 G/DL (ref 32–36)
MCHC RBC AUTO-ENTMCNC: 31.8 G/DL (ref 32–36)
MCV RBC AUTO: 81 FL (ref 80–100)
MCV RBC AUTO: 83 FL (ref 80–100)
MONOCYTES # BLD AUTO: 0.68 X10*3/UL (ref 0.1–1)
MONOCYTES NFR BLD AUTO: 6.6 %
NEUTROPHILS # BLD AUTO: 7.84 X10*3/UL (ref 1.2–7.7)
NEUTROPHILS NFR BLD AUTO: 75.9 %
NRBC BLD-RTO: 0 /100 WBCS (ref 0–0)
NRBC BLD-RTO: 0 /100 WBCS (ref 0–0)
PLATELET # BLD AUTO: 178 X10*3/UL (ref 150–450)
PLATELET # BLD AUTO: 238 X10*3/UL (ref 150–450)
POTASSIUM SERPL-SCNC: 3.8 MMOL/L (ref 3.5–5.3)
POTASSIUM SERPL-SCNC: 4.1 MMOL/L (ref 3.5–5.3)
PROT SERPL-MCNC: 7.2 G/DL (ref 6.4–8.2)
PROTHROMBIN TIME: 18.8 SECONDS (ref 9.8–12.4)
PROTHROMBIN TIME: 20.9 SECONDS (ref 9.8–12.4)
RBC # BLD AUTO: 3.41 X10*6/UL (ref 4.5–5.9)
RBC # BLD AUTO: 4.33 X10*6/UL (ref 4.5–5.9)
RH FACTOR (ANTIGEN D): NORMAL
SODIUM SERPL-SCNC: 132 MMOL/L (ref 136–145)
SODIUM SERPL-SCNC: 138 MMOL/L (ref 136–145)
UFH PPP CHRO-ACNC: 1.5 IU/ML
UFH PPP CHRO-ACNC: 2 IU/ML
UFH PPP CHRO-ACNC: >2 IU/ML
WBC # BLD AUTO: 10.3 X10*3/UL (ref 4.4–11.3)
WBC # BLD AUTO: 5.2 X10*3/UL (ref 4.4–11.3)

## 2025-04-11 PROCEDURE — 99285 EMERGENCY DEPT VISIT HI MDM: CPT | Performed by: EMERGENCY MEDICINE

## 2025-04-11 PROCEDURE — 2500000001 HC RX 250 WO HCPCS SELF ADMINISTERED DRUGS (ALT 637 FOR MEDICARE OP)

## 2025-04-11 PROCEDURE — 36415 COLL VENOUS BLD VENIPUNCTURE: CPT | Performed by: EMERGENCY MEDICINE

## 2025-04-11 PROCEDURE — 96374 THER/PROPH/DIAG INJ IV PUSH: CPT | Mod: 59

## 2025-04-11 PROCEDURE — 85520 HEPARIN ASSAY: CPT

## 2025-04-11 PROCEDURE — 74174 CTA ABD&PLVS W/CONTRAST: CPT | Mod: FOREIGN READ | Performed by: RADIOLOGY

## 2025-04-11 PROCEDURE — 83735 ASSAY OF MAGNESIUM: CPT

## 2025-04-11 PROCEDURE — 96375 TX/PRO/DX INJ NEW DRUG ADDON: CPT | Mod: 59

## 2025-04-11 PROCEDURE — 86850 RBC ANTIBODY SCREEN: CPT

## 2025-04-11 PROCEDURE — 96361 HYDRATE IV INFUSION ADD-ON: CPT

## 2025-04-11 PROCEDURE — 2500000004 HC RX 250 GENERAL PHARMACY W/ HCPCS (ALT 636 FOR OP/ED)

## 2025-04-11 PROCEDURE — 2500000004 HC RX 250 GENERAL PHARMACY W/ HCPCS (ALT 636 FOR OP/ED): Performed by: EMERGENCY MEDICINE

## 2025-04-11 PROCEDURE — 2550000001 HC RX 255 CONTRASTS: Performed by: EMERGENCY MEDICINE

## 2025-04-11 PROCEDURE — 1200000002 HC GENERAL ROOM WITH TELEMETRY DAILY

## 2025-04-11 PROCEDURE — 99221 1ST HOSP IP/OBS SF/LOW 40: CPT | Performed by: SURGERY

## 2025-04-11 PROCEDURE — 85730 THROMBOPLASTIN TIME PARTIAL: CPT | Performed by: EMERGENCY MEDICINE

## 2025-04-11 PROCEDURE — 80048 BASIC METABOLIC PNL TOTAL CA: CPT

## 2025-04-11 PROCEDURE — 83690 ASSAY OF LIPASE: CPT | Performed by: EMERGENCY MEDICINE

## 2025-04-11 PROCEDURE — 83605 ASSAY OF LACTIC ACID: CPT | Performed by: EMERGENCY MEDICINE

## 2025-04-11 PROCEDURE — 85027 COMPLETE CBC AUTOMATED: CPT

## 2025-04-11 PROCEDURE — 74174 CTA ABD&PLVS W/CONTRAST: CPT

## 2025-04-11 PROCEDURE — 83605 ASSAY OF LACTIC ACID: CPT

## 2025-04-11 PROCEDURE — 99291 CRITICAL CARE FIRST HOUR: CPT | Performed by: EMERGENCY MEDICINE

## 2025-04-11 PROCEDURE — 84075 ASSAY ALKALINE PHOSPHATASE: CPT | Performed by: EMERGENCY MEDICINE

## 2025-04-11 PROCEDURE — 85025 COMPLETE CBC W/AUTO DIFF WBC: CPT | Performed by: EMERGENCY MEDICINE

## 2025-04-11 PROCEDURE — 85610 PROTHROMBIN TIME: CPT | Performed by: EMERGENCY MEDICINE

## 2025-04-11 PROCEDURE — 85610 PROTHROMBIN TIME: CPT

## 2025-04-11 RX ORDER — BISACODYL 5 MG
10 TABLET, DELAYED RELEASE (ENTERIC COATED) ORAL DAILY PRN
Status: DISCONTINUED | OUTPATIENT
Start: 2025-04-11 | End: 2025-04-15 | Stop reason: HOSPADM

## 2025-04-11 RX ORDER — OXYCODONE HYDROCHLORIDE 5 MG/1
5 TABLET ORAL EVERY 6 HOURS PRN
Status: DISCONTINUED | OUTPATIENT
Start: 2025-04-11 | End: 2025-04-15 | Stop reason: HOSPADM

## 2025-04-11 RX ORDER — DIGOXIN 125 MCG
125 TABLET ORAL DAILY
Status: DISCONTINUED | OUTPATIENT
Start: 2025-04-12 | End: 2025-04-15 | Stop reason: HOSPADM

## 2025-04-11 RX ORDER — METOPROLOL SUCCINATE 50 MG/1
50 TABLET, EXTENDED RELEASE ORAL DAILY
Status: DISCONTINUED | OUTPATIENT
Start: 2025-04-12 | End: 2025-04-15 | Stop reason: HOSPADM

## 2025-04-11 RX ORDER — MORPHINE SULFATE 4 MG/ML
4 INJECTION INTRAVENOUS ONCE
Status: DISCONTINUED | OUTPATIENT
Start: 2025-04-11 | End: 2025-04-11

## 2025-04-11 RX ORDER — TIZANIDINE 2 MG/1
2 TABLET ORAL 2 TIMES DAILY PRN
Status: DISCONTINUED | OUTPATIENT
Start: 2025-04-11 | End: 2025-04-15 | Stop reason: HOSPADM

## 2025-04-11 RX ORDER — ONDANSETRON HYDROCHLORIDE 2 MG/ML
4 INJECTION, SOLUTION INTRAVENOUS ONCE
Status: COMPLETED | OUTPATIENT
Start: 2025-04-11 | End: 2025-04-11

## 2025-04-11 RX ORDER — HEPARIN SODIUM 10000 [USP'U]/100ML
0-4500 INJECTION, SOLUTION INTRAVENOUS CONTINUOUS
Status: DISCONTINUED | OUTPATIENT
Start: 2025-04-11 | End: 2025-04-13

## 2025-04-11 RX ORDER — SODIUM CHLORIDE, SODIUM LACTATE, POTASSIUM CHLORIDE, CALCIUM CHLORIDE 600; 310; 30; 20 MG/100ML; MG/100ML; MG/100ML; MG/100ML
100 INJECTION, SOLUTION INTRAVENOUS CONTINUOUS
Status: ACTIVE | OUTPATIENT
Start: 2025-04-11 | End: 2025-04-12

## 2025-04-11 RX ORDER — FENTANYL CITRATE 50 UG/ML
25 INJECTION, SOLUTION INTRAMUSCULAR; INTRAVENOUS ONCE
Status: COMPLETED | OUTPATIENT
Start: 2025-04-11 | End: 2025-04-11

## 2025-04-11 RX ORDER — GABAPENTIN 300 MG/1
300 CAPSULE ORAL NIGHTLY
Status: DISCONTINUED | OUTPATIENT
Start: 2025-04-11 | End: 2025-04-15 | Stop reason: HOSPADM

## 2025-04-11 RX ORDER — ACETAMINOPHEN 325 MG/1
650 TABLET ORAL EVERY 4 HOURS PRN
Status: DISCONTINUED | OUTPATIENT
Start: 2025-04-11 | End: 2025-04-15 | Stop reason: HOSPADM

## 2025-04-11 RX ORDER — NALOXONE HYDROCHLORIDE 0.4 MG/ML
0.2 INJECTION, SOLUTION INTRAMUSCULAR; INTRAVENOUS; SUBCUTANEOUS EVERY 5 MIN PRN
Status: DISCONTINUED | OUTPATIENT
Start: 2025-04-11 | End: 2025-04-15 | Stop reason: HOSPADM

## 2025-04-11 RX ORDER — ROSUVASTATIN CALCIUM 10 MG/1
5 TABLET, COATED ORAL DAILY
Status: DISCONTINUED | OUTPATIENT
Start: 2025-04-12 | End: 2025-04-15 | Stop reason: HOSPADM

## 2025-04-11 RX ORDER — SPIRONOLACTONE 25 MG/1
12.5 TABLET ORAL DAILY
Status: DISCONTINUED | OUTPATIENT
Start: 2025-04-12 | End: 2025-04-15 | Stop reason: HOSPADM

## 2025-04-11 RX ORDER — HEPARIN SODIUM 10000 [USP'U]/100ML
0-4500 INJECTION, SOLUTION INTRAVENOUS CONTINUOUS
Status: DISCONTINUED | OUTPATIENT
Start: 2025-04-11 | End: 2025-04-11 | Stop reason: HOSPADM

## 2025-04-11 RX ORDER — METOPROLOL SUCCINATE 50 MG/1
50 TABLET, EXTENDED RELEASE ORAL DAILY
Status: ON HOLD | COMMUNITY
End: 2025-04-15

## 2025-04-11 RX ADMIN — GABAPENTIN 300 MG: 300 CAPSULE ORAL at 21:07

## 2025-04-11 RX ADMIN — HEPARIN SODIUM 1300 UNITS/HR: 10000 INJECTION, SOLUTION INTRAVENOUS at 14:32

## 2025-04-11 RX ADMIN — SODIUM CHLORIDE, SODIUM LACTATE, POTASSIUM CHLORIDE, AND CALCIUM CHLORIDE 100 ML/HR: .6; .31; .03; .02 INJECTION, SOLUTION INTRAVENOUS at 17:15

## 2025-04-11 RX ADMIN — IOHEXOL 100 ML: 350 INJECTION, SOLUTION INTRAVENOUS at 12:35

## 2025-04-11 RX ADMIN — ONDANSETRON 4 MG: 2 INJECTION INTRAMUSCULAR; INTRAVENOUS at 11:24

## 2025-04-11 RX ADMIN — SODIUM CHLORIDE 500 ML: 0.9 INJECTION, SOLUTION INTRAVENOUS at 13:19

## 2025-04-11 RX ADMIN — SODIUM CHLORIDE 1000 ML: 0.9 INJECTION, SOLUTION INTRAVENOUS at 11:24

## 2025-04-11 RX ADMIN — FENTANYL CITRATE 25 MCG: 50 INJECTION INTRAMUSCULAR; INTRAVENOUS at 11:32

## 2025-04-11 SDOH — SOCIAL STABILITY: SOCIAL NETWORK
IN A TYPICAL WEEK, HOW MANY TIMES DO YOU TALK ON THE PHONE WITH FAMILY, FRIENDS, OR NEIGHBORS?: MORE THAN THREE TIMES A WEEK

## 2025-04-11 SDOH — SOCIAL STABILITY: SOCIAL INSECURITY: WITHIN THE LAST YEAR, HAVE YOU BEEN AFRAID OF YOUR PARTNER OR EX-PARTNER?: NO

## 2025-04-11 SDOH — ECONOMIC STABILITY: TRANSPORTATION INSECURITY: IN THE PAST 12 MONTHS, HAS LACK OF TRANSPORTATION KEPT YOU FROM MEDICAL APPOINTMENTS OR FROM GETTING MEDICATIONS?: NO

## 2025-04-11 SDOH — SOCIAL STABILITY: SOCIAL NETWORK: HOW OFTEN DO YOU GET TOGETHER WITH FRIENDS OR RELATIVES?: MORE THAN THREE TIMES A WEEK

## 2025-04-11 SDOH — SOCIAL STABILITY: SOCIAL INSECURITY: ABUSE: ADULT

## 2025-04-11 SDOH — SOCIAL STABILITY: SOCIAL INSECURITY: DO YOU FEEL UNSAFE GOING BACK TO THE PLACE WHERE YOU ARE LIVING?: NO

## 2025-04-11 SDOH — HEALTH STABILITY: MENTAL HEALTH: HOW OFTEN DO YOU HAVE SIX OR MORE DRINKS ON ONE OCCASION?: NEVER

## 2025-04-11 SDOH — SOCIAL STABILITY: SOCIAL INSECURITY
WITHIN THE LAST YEAR, HAVE YOU BEEN RAPED OR FORCED TO HAVE ANY KIND OF SEXUAL ACTIVITY BY YOUR PARTNER OR EX-PARTNER?: NO

## 2025-04-11 SDOH — SOCIAL STABILITY: SOCIAL NETWORK
DO YOU BELONG TO ANY CLUBS OR ORGANIZATIONS SUCH AS CHURCH GROUPS, UNIONS, FRATERNAL OR ATHLETIC GROUPS, OR SCHOOL GROUPS?: PATIENT DECLINED

## 2025-04-11 SDOH — HEALTH STABILITY: MENTAL HEALTH: HOW MANY DRINKS CONTAINING ALCOHOL DO YOU HAVE ON A TYPICAL DAY WHEN YOU ARE DRINKING?: PATIENT DOES NOT DRINK

## 2025-04-11 SDOH — HEALTH STABILITY: MENTAL HEALTH: HOW OFTEN DO YOU HAVE A DRINK CONTAINING ALCOHOL?: NEVER

## 2025-04-11 SDOH — ECONOMIC STABILITY: HOUSING INSECURITY: IN THE LAST 12 MONTHS, WAS THERE A TIME WHEN YOU WERE NOT ABLE TO PAY THE MORTGAGE OR RENT ON TIME?: NO

## 2025-04-11 SDOH — SOCIAL STABILITY: SOCIAL INSECURITY: ARE YOU MARRIED, WIDOWED, DIVORCED, SEPARATED, NEVER MARRIED, OR LIVING WITH A PARTNER?: PATIENT DECLINED

## 2025-04-11 SDOH — SOCIAL STABILITY: SOCIAL INSECURITY
WITHIN THE LAST YEAR, HAVE YOU BEEN KICKED, HIT, SLAPPED, OR OTHERWISE PHYSICALLY HURT BY YOUR PARTNER OR EX-PARTNER?: NO

## 2025-04-11 SDOH — HEALTH STABILITY: PHYSICAL HEALTH
HOW OFTEN DO YOU NEED TO HAVE SOMEONE HELP YOU WHEN YOU READ INSTRUCTIONS, PAMPHLETS, OR OTHER WRITTEN MATERIAL FROM YOUR DOCTOR OR PHARMACY?: NEVER

## 2025-04-11 SDOH — SOCIAL STABILITY: SOCIAL INSECURITY: WITHIN THE LAST YEAR, HAVE YOU BEEN HUMILIATED OR EMOTIONALLY ABUSED IN OTHER WAYS BY YOUR PARTNER OR EX-PARTNER?: NO

## 2025-04-11 SDOH — ECONOMIC STABILITY: INCOME INSECURITY: IN THE PAST 12 MONTHS HAS THE ELECTRIC, GAS, OIL, OR WATER COMPANY THREATENED TO SHUT OFF SERVICES IN YOUR HOME?: NO

## 2025-04-11 SDOH — ECONOMIC STABILITY: FOOD INSECURITY: WITHIN THE PAST 12 MONTHS, THE FOOD YOU BOUGHT JUST DIDN'T LAST AND YOU DIDN'T HAVE MONEY TO GET MORE.: NEVER TRUE

## 2025-04-11 SDOH — ECONOMIC STABILITY: HOUSING INSECURITY: AT ANY TIME IN THE PAST 12 MONTHS, WERE YOU HOMELESS OR LIVING IN A SHELTER (INCLUDING NOW)?: NO

## 2025-04-11 SDOH — SOCIAL STABILITY: SOCIAL NETWORK: HOW OFTEN DO YOU ATTEND CHURCH OR RELIGIOUS SERVICES?: PATIENT DECLINED

## 2025-04-11 SDOH — ECONOMIC STABILITY: FOOD INSECURITY: WITHIN THE PAST 12 MONTHS, YOU WORRIED THAT YOUR FOOD WOULD RUN OUT BEFORE YOU GOT THE MONEY TO BUY MORE.: NEVER TRUE

## 2025-04-11 SDOH — ECONOMIC STABILITY: HOUSING INSECURITY: IN THE PAST 12 MONTHS, HOW MANY TIMES HAVE YOU MOVED WHERE YOU WERE LIVING?: 0

## 2025-04-11 SDOH — SOCIAL STABILITY: SOCIAL INSECURITY: DOES ANYONE TRY TO KEEP YOU FROM HAVING/CONTACTING OTHER FRIENDS OR DOING THINGS OUTSIDE YOUR HOME?: NO

## 2025-04-11 SDOH — SOCIAL STABILITY: SOCIAL INSECURITY: HAVE YOU HAD THOUGHTS OF HARMING ANYONE ELSE?: YES

## 2025-04-11 SDOH — HEALTH STABILITY: PHYSICAL HEALTH: ON AVERAGE, HOW MANY DAYS PER WEEK DO YOU ENGAGE IN MODERATE TO STRENUOUS EXERCISE (LIKE A BRISK WALK)?: 5 DAYS

## 2025-04-11 SDOH — ECONOMIC STABILITY: FOOD INSECURITY: HOW HARD IS IT FOR YOU TO PAY FOR THE VERY BASICS LIKE FOOD, HOUSING, MEDICAL CARE, AND HEATING?: NOT HARD AT ALL

## 2025-04-11 SDOH — HEALTH STABILITY: MENTAL HEALTH
DO YOU FEEL STRESS - TENSE, RESTLESS, NERVOUS, OR ANXIOUS, OR UNABLE TO SLEEP AT NIGHT BECAUSE YOUR MIND IS TROUBLED ALL THE TIME - THESE DAYS?: ONLY A LITTLE

## 2025-04-11 SDOH — SOCIAL STABILITY: SOCIAL NETWORK: HOW OFTEN DO YOU ATTEND MEETINGS OF THE CLUBS OR ORGANIZATIONS YOU BELONG TO?: PATIENT DECLINED

## 2025-04-11 SDOH — SOCIAL STABILITY: SOCIAL INSECURITY: HAS ANYONE EVER THREATENED TO HURT YOUR FAMILY OR YOUR PETS?: NO

## 2025-04-11 SDOH — HEALTH STABILITY: PHYSICAL HEALTH: ON AVERAGE, HOW MANY MINUTES DO YOU ENGAGE IN EXERCISE AT THIS LEVEL?: 30 MIN

## 2025-04-11 SDOH — SOCIAL STABILITY: SOCIAL INSECURITY: ARE THERE ANY APPARENT SIGNS OF INJURIES/BEHAVIORS THAT COULD BE RELATED TO ABUSE/NEGLECT?: NO

## 2025-04-11 SDOH — SOCIAL STABILITY: SOCIAL INSECURITY: WERE YOU ABLE TO COMPLETE ALL THE BEHAVIORAL HEALTH SCREENINGS?: YES

## 2025-04-11 SDOH — SOCIAL STABILITY: SOCIAL INSECURITY: DO YOU FEEL ANYONE HAS EXPLOITED OR TAKEN ADVANTAGE OF YOU FINANCIALLY OR OF YOUR PERSONAL PROPERTY?: NO

## 2025-04-11 SDOH — SOCIAL STABILITY: SOCIAL INSECURITY: HAVE YOU HAD ANY THOUGHTS OF HARMING ANYONE ELSE?: NO

## 2025-04-11 SDOH — SOCIAL STABILITY: SOCIAL INSECURITY: ARE YOU OR HAVE YOU BEEN THREATENED OR ABUSED PHYSICALLY, EMOTIONALLY, OR SEXUALLY BY ANYONE?: NO

## 2025-04-11 ASSESSMENT — ACTIVITIES OF DAILY LIVING (ADL)
HEARING - LEFT EAR: FUNCTIONAL
LACK_OF_TRANSPORTATION: NO
JUDGMENT_ADEQUATE_SAFELY_COMPLETE_DAILY_ACTIVITIES: YES
FEEDING YOURSELF: INDEPENDENT
PATIENT'S MEMORY ADEQUATE TO SAFELY COMPLETE DAILY ACTIVITIES?: YES
TOILETING: INDEPENDENT
DRESSING YOURSELF: INDEPENDENT
GROOMING: INDEPENDENT
BATHING: INDEPENDENT
ADEQUATE_TO_COMPLETE_ADL: YES
HEARING - RIGHT EAR: FUNCTIONAL
LACK_OF_TRANSPORTATION: NO
WALKS IN HOME: INDEPENDENT

## 2025-04-11 ASSESSMENT — LIFESTYLE VARIABLES
SUBSTANCE_ABUSE_PAST_12_MONTHS: NO
HAVE YOU EVER FELT YOU SHOULD CUT DOWN ON YOUR DRINKING: NO
PRESCIPTION_ABUSE_PAST_12_MONTHS: NO
EVER FELT BAD OR GUILTY ABOUT YOUR DRINKING: NO
HOW OFTEN DO YOU HAVE 6 OR MORE DRINKS ON ONE OCCASION: NEVER
AUDIT-C TOTAL SCORE: 0
EVER FELT BAD OR GUILTY ABOUT YOUR DRINKING: NO
SKIP TO QUESTIONS 9-10: 1
HAVE PEOPLE ANNOYED YOU BY CRITICIZING YOUR DRINKING: NO
EVER HAD A DRINK FIRST THING IN THE MORNING TO STEADY YOUR NERVES TO GET RID OF A HANGOVER: NO
HAVE PEOPLE ANNOYED YOU BY CRITICIZING YOUR DRINKING: NO
HOW OFTEN DO YOU HAVE A DRINK CONTAINING ALCOHOL: NEVER
HAVE YOU EVER FELT YOU SHOULD CUT DOWN ON YOUR DRINKING: NO
TOTAL SCORE: 0
TOTAL SCORE: 0
EVER HAD A DRINK FIRST THING IN THE MORNING TO STEADY YOUR NERVES TO GET RID OF A HANGOVER: NO

## 2025-04-11 ASSESSMENT — PAIN DESCRIPTION - LOCATION: LOCATION: ABDOMEN

## 2025-04-11 ASSESSMENT — COGNITIVE AND FUNCTIONAL STATUS - GENERAL
DAILY ACTIVITIY SCORE: 24
MOBILITY SCORE: 24
PATIENT BASELINE BEDBOUND: NO

## 2025-04-11 ASSESSMENT — PAIN - FUNCTIONAL ASSESSMENT
PAIN_FUNCTIONAL_ASSESSMENT: 0-10

## 2025-04-11 ASSESSMENT — PAIN SCALES - GENERAL
PAINLEVEL_OUTOF10: 5 - MODERATE PAIN
PAINLEVEL_OUTOF10: 1
PAINLEVEL_OUTOF10: 3
PAINLEVEL_OUTOF10: 1
PAINLEVEL_OUTOF10: 0 - NO PAIN
PAINLEVEL_OUTOF10: 0 - NO PAIN

## 2025-04-11 ASSESSMENT — PATIENT HEALTH QUESTIONNAIRE - PHQ9
1. LITTLE INTEREST OR PLEASURE IN DOING THINGS: NOT AT ALL
SUM OF ALL RESPONSES TO PHQ9 QUESTIONS 1 & 2: 0
2. FEELING DOWN, DEPRESSED OR HOPELESS: NOT AT ALL

## 2025-04-11 ASSESSMENT — COLUMBIA-SUICIDE SEVERITY RATING SCALE - C-SSRS
1. IN THE PAST MONTH, HAVE YOU WISHED YOU WERE DEAD OR WISHED YOU COULD GO TO SLEEP AND NOT WAKE UP?: NO
2. HAVE YOU ACTUALLY HAD ANY THOUGHTS OF KILLING YOURSELF?: NO
6. HAVE YOU EVER DONE ANYTHING, STARTED TO DO ANYTHING, OR PREPARED TO DO ANYTHING TO END YOUR LIFE?: NO

## 2025-04-11 ASSESSMENT — PAIN DESCRIPTION - ORIENTATION: ORIENTATION: MID;LOWER

## 2025-04-11 ASSESSMENT — PAIN DESCRIPTION - PAIN TYPE: TYPE: ACUTE PAIN

## 2025-04-11 NOTE — ED PROVIDER NOTES
HPI   No chief complaint on file.      Patient is a  69-year-old male with reported past medical history of aortic aneurysm, atrial fibrillation on Eliquis, CAD, HLD, HTN, lung cancer with mets to the brain, SMA stenosis with prior stent placement presenting as a transfer from outside hospital with concern for infrarenal aortic dissection and worsening of SMA occlusion proximal to stent with reported collateral flow on CTA.  Patient reports he initially presented to outside ED with concern for 2 weeks of gradually worsening lower abdominal pain.  Reports it is similar to pain he has intermittently including when he needed the stent placed last time.              Patient History   Past Medical History:   Diagnosis Date    Aortic aneurysm     Atrial fibrillation (Multi)     Coronary artery disease     Hyperlipidemia     Hypertension     Lung cancer (Multi)     Personal history of peptic ulcer disease     History of gastric ulcer    Superior mesenteric artery stenosis (Multi)     Tinnitus, bilateral 2016    Tinnitus of both ears     Past Surgical History:   Procedure Laterality Date    CT ABDOMEN PELVIS ANGIOGRAM W AND/OR WO IV CONTRAST  2023    CT ABDOMEN PELVIS ANGIOGRAM W AND/OR WO IV CONTRAST 2023 POR CT    EXPLORATORY LAPAROTOMY W/ BOWEL RESECTION  2024    HERNIA REPAIR Right 2024    inguinal hernia    OTHER SURGICAL HISTORY  2019    Esophagogastroduodenoscopy    OTHER SURGICAL HISTORY  2019    Stomach surgery    SUPERIOR MESTENTERIC ARTERY STENT       Family History   Problem Relation Name Age of Onset    Cancer Mother          ?larynx or lung    Brain cancer Sister paulo bonilla         passed away 10/2024 treatment at      Social History     Tobacco Use    Smoking status: Former     Current packs/day: 0.00     Average packs/day: 0.5 packs/day for 30.0 years (15.0 ttl pk-yrs)     Types: Cigarettes     Start date:      Quit date: 2013     Years since quittin.2      Passive exposure: Past    Smokeless tobacco: Never   Vaping Use    Vaping status: Former    Substances: Nicotine   Substance Use Topics    Alcohol use: Yes     Alcohol/week: 1.0 standard drink of alcohol     Types: 1 Cans of beer per week     Comment: 1/2 beer every once in a while    Drug use: Never       Physical Exam   ED Triage Vitals   Temp Pulse Resp BP   -- -- -- --      SpO2 Temp src Heart Rate Source Patient Position   -- -- -- --      BP Location FiO2 (%)     -- --       Physical Exam  Constitutional:       Appearance: Normal appearance.   HENT:      Head: Normocephalic and atraumatic.   Eyes:      Extraocular Movements: Extraocular movements intact.   Cardiovascular:      Rate and Rhythm: Normal rate.   Pulmonary:      Effort: Pulmonary effort is normal.   Abdominal:      Comments: Tenses abdomen with reported pain predominantly in suprapubic region.  No rebound tenderness or guarding.   Musculoskeletal:         General: Normal range of motion.      Cervical back: Normal range of motion.   Skin:     General: Skin is warm and dry.   Neurological:      General: No focal deficit present.      Mental Status: He is alert and oriented to person, place, and time.   Psychiatric:         Mood and Affect: Mood normal.         Behavior: Behavior normal.           ED Course & MDM   Diagnoses as of 04/11/25 1705   Superior mesenteric artery stenosis (Multi)   Dissection of aorta, unspecified portion of aorta (Multi)                 No data recorded                                 Medical Decision Making  Patient is a  69-year-old male with reported past medical history of aortic aneurysm, atrial fibrillation on Eliquis, CAD, HLD, HTN, lung cancer with mets to the brain, SMA stenosis with prior stent placement presenting as a transfer from outside hospital with concern for infrarenal aortic dissection and worsening of SMA occlusion proximal to stent with reported collateral flow on CTA.  Abdominal pain consistent with  symptomatic mesenteric ischemia.  Lactate 1.4 at outside hospital.  Hypotensive near baseline but no acute decompensation.  Vascular surgery consulted who recommended continuing heparin, admission to their service for likely surgery tomorrow and patient admitted for further management.    Patient seen and discussed with Dr. Edwar Barrett MD, PhD  Emergency Medicine PGY3          Procedure  Procedures     Anand Barrett MD  Resident  04/11/25 5077

## 2025-04-11 NOTE — NURSING NOTE
Received call from lab to report critical heparin assay of 2.0  on call vascular team resident notified (paged and return telephone call).  Heparin gtt remains off at present.

## 2025-04-11 NOTE — ED TRIAGE NOTES
Pt. Arrived to the ED via private car for c/o abdominal pain. Pt. States that the pain is intermittent in his mid lower rating it a 1/10 in triage. Pt denies any n/v/d. States that he had a bowel movement yesterday around 1400. Pt. Has a hx of lung cancer and brain cancer. Pt. Is not receiving treatment currently. Pt. States that is last treatment for his brain cancer was two months ago

## 2025-04-11 NOTE — ED TRIAGE NOTES
Pt transfer from [facility] ED. Pt arrived to [facility] for abd pain over the last 3 weeks, pt has PMH of abdominal anuerysm, AAA s/p recent stent placement. [facility] ED abd CT showed abdominal dissection and pt was transferred here on Heparin going at 13 mL/hr. Upon arrival, RN stopped heparin to redraw assay. Pt is A&O x3.

## 2025-04-11 NOTE — CONSULTS
"    VASCULAR SURGERY CONSULT NOTE    Assessment/Plan   Kevin Rubi is 69 y.o. male with history of atrial fibrillation, CAD, HLD, HTN, lung cancer with mets to brain, SMA stenosis status post \"stenting with a biliary stent deployed across stenosis\" (8/10/2023), incarcerated R inguinal hernia s/p exlap, SBR, open repair who presents with progressively worsening abdominal pain.     Patient presenting with intermittent post prandial abdominal pain, worsened in the last 2-3wks, in setting of prior stenting to SMA, and recent antiplatelet nonadherence. CT scan from OSH demonstrates no flow seen in proximal SMA with collaterals from the celiac artery, small dissection of infrarenal aorta. Abdominal pain in setting of CTA and vascular history likely representing chronic mesenteric ischemia. Will need revascularization with aortogram and celiac artery stenting.      Plan:  Admit to vascular surgery  Repeat CBC, BMP, coags, mag, and lactate at 2000. Type and screen ordered.   Hold eliquis  Start heparin gtt  Plan for OR for angiogram, celiac artery stenting, and all other indicated procedures with Dr. Vidal  Maintain NPO, LR at 100 ml/h   Patient consented    D/w attending, Dr. Vidal    Subjective   HPI:  Kevin Rubi is 69 y.o. male with history of atrial fibrillation, CAD, HLD, HTN, lung cancer with mets to brain, SMA stenosis status post \"stenting with a biliary stent deployed across stenosis\" (8/10/2023), incarcerated R inguinal hernia s/p exlap, SBR, open repair who presents with progressively worsening abdominal pain.     Patient states that he stopped taking Plavix a couple of weeks ago due to  prescription. He has been having intermittent abdominal pains for atleast 3 weeks, occurring more frequently since onset. States that having daily abdominal pain, pain lasts about 4 hours at a time. It is worse after eating. No nausea/ no vomiting.     Vascular History:  SMA stenosis status post \"stenting " "with a biliary stent deployed across stenosis\" (8/10/2023)    PMH:   Past Medical History:   Diagnosis Date    Aortic aneurysm     Atrial fibrillation (Multi)     Coronary artery disease     Hyperlipidemia     Hypertension     Lung cancer (Multi)     Personal history of peptic ulcer disease     History of gastric ulcer    Superior mesenteric artery stenosis (Multi)     Tinnitus, bilateral 08/14/2016    Tinnitus of both ears        PSH:     Past Surgical History:   Procedure Laterality Date    CT ABDOMEN PELVIS ANGIOGRAM W AND/OR WO IV CONTRAST  08/08/2023    CT ABDOMEN PELVIS ANGIOGRAM W AND/OR WO IV CONTRAST 8/8/2023 POR CT    EXPLORATORY LAPAROTOMY W/ BOWEL RESECTION  04/09/2024    HERNIA REPAIR Right 04/09/2024    inguinal hernia    OTHER SURGICAL HISTORY  02/13/2019    Esophagogastroduodenoscopy    OTHER SURGICAL HISTORY  02/13/2019    Stomach surgery    SUPERIOR MESTENTERIC ARTERY STENT          Home Meds:  Current Facility-Administered Medications on File Prior to Encounter   Medication Dose Route Frequency Provider Last Rate Last Admin    [COMPLETED] fentaNYL PF (Sublimaze) injection 25 mcg  25 mcg intravenous Once Hakeem Yu MD   25 mcg at 04/11/25 1132    [COMPLETED] heparin bolus from bag 5,800 Units  80 Units/kg intravenous Once Hakeem Yu MD   5,800 Units at 04/11/25 1432    [COMPLETED] iohexol (OMNIPaque) 350 mg iodine/mL solution 100 mL  100 mL intravenous Once in imaging Hakeem Yu MD   100 mL at 04/11/25 1235    [COMPLETED] ondansetron (Zofran) injection 4 mg  4 mg intravenous Once Hakeem Yu MD   4 mg at 04/11/25 1124    [COMPLETED] sodium chloride 0.9 % bolus 1,000 mL  1,000 mL intravenous Once Hakeem Yu MD   Stopped at 04/11/25 1230    [COMPLETED] sodium chloride 0.9 % bolus 500 mL  500 mL intravenous Once Hakeem Yu MD   Stopped at 04/11/25 1352    [DISCONTINUED] heparin 25,000 Units in dextrose 5% 250 mL (100 Units/mL) infusion " (premix)  0-4,500 Units/hr intravenous Continuous Hakeem Yu MD 13 mL/hr at 04/11/25 1432 1,300 Units/hr at 04/11/25 1432    [DISCONTINUED] heparin bolus from bag 3,000-6,000 Units  3,000-6,000 Units intravenous q4h PRN Hakeem Yu MD        [DISCONTINUED] morphine injection 4 mg  4 mg intravenous Once Hakeem Yu MD         Current Outpatient Medications on File Prior to Encounter   Medication Sig Dispense Refill    apixaban (Eliquis) 5 mg tablet Take 1 tablet (5 mg) by mouth 2 times a day. 180 tablet 1    blood sugar diagnostic (Blood Glucose Test) strip 1 strip once daily. 100 strip 11    blood-glucose meter misc Use daily to test blood sugar 1 each 0    clopidogrel (Plavix) 75 mg tablet Take 1 tablet (75 mg) by mouth once daily. 30 tablet 2    dexAMETHasone (Decadron) 2 mg tablet Please take 4mg daily for 3 days, then 2mg daily for 3 days, then stop. 9 tablet 0    digoxin (Lanoxin) 125 MCG tablet TAKE 1 TABLET DAILY 90 tablet 0    fenofibrate (Tricor) 145 mg tablet TAKE 1 TABLET BY MOUTH ONCE  DAILY 100 tablet 2    furosemide (Lasix) 20 mg tablet Take 1 tablet (20 mg) by mouth see administration instructions. 1 tablet as needed for swelling, weight gain of 3# or increased shortness of breath. 90 tablet 0    gabapentin (Neurontin) 300 mg capsule TAKE 1 CAPSULE (300 MG) BY MOUTH ONCE DAILY AT BEDTIME. 60 capsule 2    lancets misc Use daily to test blood sugar 100 each 3    metoprolol succinate XL (Toprol-XL) 25 mg 24 hr tablet Take 1 tablet (25 mg) by mouth once daily. Do not crush or chew. 90 tablet 3    rosuvastatin (Crestor) 5 mg tablet TAKE 1 TABLET BY MOUTH ONCE  DAILY 90 tablet 2    sacubitriL-valsartan (Entresto) 24-26 mg tablet Take 0.5 tablets by mouth 2 times a day.      spironolactone (Aldactone) 25 mg tablet Take 0.5 tablets (12.5 mg) by mouth once daily. 45 tablet 2    tiZANidine (Zanaflex) 2 mg tablet TAKE 1 TABLET BY MOUTH 2 TIMES A DAY AS NEEDED FOR MUSCLE SPASMS. 180  tablet 1        Allergies:  No Known Allergies    SH/FH:   Former smoker (quit in 2000)     ROS: 12 system negative except HPI    Objective   Vitals:  Heart Rate:  [51-95]   Temperature:  [35.6 °C (96.1 °F)-37 °C (98.6 °F)]   Respirations:  [16-18]   BP: ()/(40-62)   Height:  [182.9 cm (6')]   Weight:  [72.6 kg (160 lb)]   Pulse Ox:  [92 %-100 %]     Exam:  Constitutional: No acute distress, resting comfortably  Neuro:  AOx3, grossly intact  ENMT: moist mucous membranes  Head/neck: atraumatic  CV: no tachycardia  Pulm: non-labored on room air  GI: soft, non-tender, non-distended. Well healed midline scar on abdomen.   Skin: warm and dry  Musculoskeletal: moving all extremities  Extremities: Palpable radial pulses. Biphasic PT signals, monophasic DP signals.     Labs:  Results from last 7 days   Lab Units 04/11/25  1118   WBC AUTO x10*3/uL 10.3   HEMOGLOBIN g/dL 11.2*   PLATELETS AUTO x10*3/uL 238      Results from last 7 days   Lab Units 04/11/25  1118   SODIUM mmol/L 132*   POTASSIUM mmol/L 3.8   CHLORIDE mmol/L 101   CO2 mmol/L 22   BUN mg/dL 18   CREATININE mg/dL 1.28   GLUCOSE mg/dL 119*      Results from last 7 days   Lab Units 04/11/25  1428   INR  1.9*   PROTIME seconds 20.9*   APTT seconds 36           Imaging:  Reviewed independently by vascular team:  CT angio abdomen pelvis w and or wo IV IV contrast [[id number]]     Impression:     1. There is a stent in the proximal celiac artery. Good flow seen  through the stent. No flow seen in the proximal SMA, but flow is seen  in the mid and distal SMA. This has progressed since the previous CTA.  Good flow seen throughout the SMA, likely through collaterals from the  from the celiac artery. There is a severe stenosis in the origin of  the TIESHA, but good flow seen distal to this stenosis.  2. 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 Good flow seen in the  superior artery. The inferior artery appears to be obstructed.  3. There is a small dissection of the  infrarenal aorta just above the  bifurcation.  4. Stable consolidation at the left lung base with a small left  pleural effusion.  Signed by Stephen Hollins MD

## 2025-04-11 NOTE — TELEPHONE ENCOUNTER
Patients spouse stopped into office asking if she could speak with Dr. Hayes's care team because her husbands has not felt well. Patient has 70% blockage and can't eat. I called Dr. Hayes's team and was advised to offer the ED to the spouse. Spouse verbalized understanding and said she would speak with her .

## 2025-04-11 NOTE — ED PROVIDER NOTES
HPI   Chief Complaint   Patient presents with    Abdominal Pain       69-year-old male with a history of SMA stent.  He states that he had recent outpatient imaging that showed that the stent could be occluded.  He presents with worsening lower abdominal pain essentially for 2 weeks.  Intermittent in nature.  Fairly severe this morning.  Bowel movements normal.              Patient History   Past Medical History:   Diagnosis Date    Aortic aneurysm     Atrial fibrillation (Multi)     Coronary artery disease     Hyperlipidemia     Hypertension     Lung cancer (Multi)     Personal history of peptic ulcer disease     History of gastric ulcer    Superior mesenteric artery stenosis (Multi)     Tinnitus, bilateral 2016    Tinnitus of both ears     Past Surgical History:   Procedure Laterality Date    CT ABDOMEN PELVIS ANGIOGRAM W AND/OR WO IV CONTRAST  2023    CT ABDOMEN PELVIS ANGIOGRAM W AND/OR WO IV CONTRAST 2023 P[facility] CT    EXPLORATORY LAPAROTOMY W/ BOWEL RESECTION  2024    HERNIA REPAIR Right 2024    inguinal hernia    OTHER SURGICAL HISTORY  2019    Esophagogastroduodenoscopy    OTHER SURGICAL HISTORY  2019    Stomach surgery    SUPERIOR MESTENTERIC ARTERY STENT       Family History   Problem Relation Name Age of Onset    Cancer Mother          ?larynx or lung    Brain cancer Sister paulo bonilla         passed away 10/2024 treatment at      Social History     Tobacco Use    Smoking status: Former     Current packs/day: 0.00     Average packs/day: 0.5 packs/day for 30.0 years (15.0 ttl pk-yrs)     Types: Cigarettes     Start date:      Quit date: 2013     Years since quittin.2     Passive exposure: Past    Smokeless tobacco: Never   Vaping Use    Vaping status: Former    Substances: Nicotine   Substance Use Topics    Alcohol use: Yes     Alcohol/week: 1.0 standard drink of alcohol     Types: 1 Cans of beer per week     Comment: 1/2 beer every once in a while    Drug  use: Never       Physical Exam   ED Triage Vitals   Temperature Heart Rate Respirations BP   04/11/25 1052 04/11/25 1052 04/11/25 1052 04/11/25 1052   37 °C (98.6 °F) 95 16 99/52      Pulse Ox Temp src Heart Rate Source Patient Position   04/11/25 1052 -- 04/11/25 1307 --   100 %  Monitor       BP Location FiO2 (%)     -- --             Physical Exam  Vitals and nursing note reviewed.   Constitutional:       General: He is in acute distress.      Appearance: He is well-developed. He is ill-appearing.   HENT:      Head: Normocephalic and atraumatic.   Eyes:      Conjunctiva/sclera: Conjunctivae normal.   Cardiovascular:      Rate and Rhythm: Normal rate and regular rhythm.      Heart sounds: No murmur heard.  Pulmonary:      Effort: Pulmonary effort is normal. No respiratory distress.      Breath sounds: Normal breath sounds.   Abdominal:      Tenderness: There is abdominal tenderness.   Musculoskeletal:         General: No swelling.      Cervical back: Neck supple.   Skin:     General: Skin is warm and dry.      Capillary Refill: Capillary refill takes less than 2 seconds.   Neurological:      Mental Status: He is alert.   Psychiatric:         Mood and Affect: Mood normal.           ED Course & MDM   ED Course as of 04/11/25 1610   Fri Apr 11, 2025   1330 Lactic acid is normal.  No leukocytosis.  Hemoglobin 11.2.  Sodium 132 but BMP otherwise unremarkable.  Lipase 12.  CT still pending.  He was given IV fluids and 25 mcg of fentanyl as well as 4 mg of Zofran.  1 L normal saline bolus.  Was slightly hypertensive on recheck so was given another 500 cc bolus. [CD]      ED Course User Index  [CD] Hakeem Yu MD         Diagnoses as of 04/11/25 1610   Abdominal aortic aneurysm dissection (Multi)                 No data recorded     Idalmis Coma Scale Score: 15 (04/11/25 1054 : Megan Alexander RN)                           Medical Decision Making      Procedure  Critical Care    Performed by: Hakeem HATFIELD  MD Aimee  Authorized by: Hakeem Yu MD    Critical care provider statement:     Critical care time (minutes):  41    Critical care time was exclusive of:  Separately billable procedures and treating other patients and teaching time    Critical care was necessary to treat or prevent imminent or life-threatening deterioration of the following conditions:  CNS failure or compromise    Critical care was time spent personally by me on the following activities:  Ordering and review of radiographic studies, ordering and review of laboratory studies, review of old charts, examination of patient, evaluation of patient's response to treatment, discussions with primary provider, discussions with consultants and obtaining history from patient or surrogate    Care discussed with: admitting provider         Hakeem Yu MD  04/11/25 9720

## 2025-04-12 ENCOUNTER — ANESTHESIA (OUTPATIENT)
Dept: OPERATING ROOM | Facility: HOSPITAL | Age: 70
End: 2025-04-12
Payer: COMMERCIAL

## 2025-04-12 LAB
PREALB SERPL-MCNC: 9.8 MG/DL (ref 18–40)
UFH PPP CHRO-ACNC: 0.7 IU/ML
UFH PPP CHRO-ACNC: 0.8 IU/ML
UFH PPP CHRO-ACNC: 0.9 IU/ML
UFH PPP CHRO-ACNC: 1.2 IU/ML

## 2025-04-12 PROCEDURE — 84134 ASSAY OF PREALBUMIN: CPT | Performed by: STUDENT IN AN ORGANIZED HEALTH CARE EDUCATION/TRAINING PROGRAM

## 2025-04-12 PROCEDURE — 85520 HEPARIN ASSAY: CPT

## 2025-04-12 PROCEDURE — 2500000001 HC RX 250 WO HCPCS SELF ADMINISTERED DRUGS (ALT 637 FOR MEDICARE OP)

## 2025-04-12 PROCEDURE — 36415 COLL VENOUS BLD VENIPUNCTURE: CPT

## 2025-04-12 PROCEDURE — 2500000004 HC RX 250 GENERAL PHARMACY W/ HCPCS (ALT 636 FOR OP/ED)

## 2025-04-12 PROCEDURE — P9045 ALBUMIN (HUMAN), 5%, 250 ML: HCPCS | Mod: JZ,TB

## 2025-04-12 PROCEDURE — 3E0336Z INTRODUCTION OF NUTRITIONAL SUBSTANCE INTO PERIPHERAL VEIN, PERCUTANEOUS APPROACH: ICD-10-PCS

## 2025-04-12 PROCEDURE — 2500000004 HC RX 250 GENERAL PHARMACY W/ HCPCS (ALT 636 FOR OP/ED): Performed by: STUDENT IN AN ORGANIZED HEALTH CARE EDUCATION/TRAINING PROGRAM

## 2025-04-12 PROCEDURE — 99232 SBSQ HOSP IP/OBS MODERATE 35: CPT | Performed by: SURGERY

## 2025-04-12 PROCEDURE — 1200000002 HC GENERAL ROOM WITH TELEMETRY DAILY

## 2025-04-12 RX ORDER — ALBUMIN HUMAN 50 G/1000ML
25 SOLUTION INTRAVENOUS ONCE
Status: COMPLETED | OUTPATIENT
Start: 2025-04-12 | End: 2025-04-12

## 2025-04-12 RX ORDER — ALBUMIN HUMAN 50 G/1000ML
25 SOLUTION INTRAVENOUS ONCE
Status: CANCELLED | OUTPATIENT
Start: 2025-04-12 | End: 2025-04-12

## 2025-04-12 RX ORDER — PHYTONADIONE 10 MG/ML
5 INJECTION, EMULSION INTRAMUSCULAR; INTRAVENOUS; SUBCUTANEOUS ONCE
Status: COMPLETED | OUTPATIENT
Start: 2025-04-12 | End: 2025-04-12

## 2025-04-12 RX ORDER — SODIUM CHLORIDE, SODIUM LACTATE, POTASSIUM CHLORIDE, CALCIUM CHLORIDE 600; 310; 30; 20 MG/100ML; MG/100ML; MG/100ML; MG/100ML
100 INJECTION, SOLUTION INTRAVENOUS CONTINUOUS
Status: ACTIVE | OUTPATIENT
Start: 2025-04-12 | End: 2025-04-13

## 2025-04-12 RX ADMIN — ALBUMIN HUMAN 25 G: 0.05 INJECTION, SOLUTION INTRAVENOUS at 14:51

## 2025-04-12 RX ADMIN — SODIUM CHLORIDE, SODIUM LACTATE, POTASSIUM CHLORIDE, AND CALCIUM CHLORIDE 100 ML/HR: .6; .31; .03; .02 INJECTION, SOLUTION INTRAVENOUS at 23:03

## 2025-04-12 RX ADMIN — OXYCODONE HYDROCHLORIDE 5 MG: 5 TABLET ORAL at 16:11

## 2025-04-12 RX ADMIN — PHYTONADIONE 5 MG: 10 INJECTION, EMULSION INTRAMUSCULAR; INTRAVENOUS; SUBCUTANEOUS at 11:08

## 2025-04-12 RX ADMIN — SODIUM CHLORIDE, SODIUM LACTATE, POTASSIUM CHLORIDE, AND CALCIUM CHLORIDE 500 ML: 600; 310; 30; 20 INJECTION, SOLUTION INTRAVENOUS at 09:20

## 2025-04-12 RX ADMIN — GABAPENTIN 300 MG: 300 CAPSULE ORAL at 20:36

## 2025-04-12 RX ADMIN — HEPARIN SODIUM 1300 UNITS/HR: 10000 INJECTION, SOLUTION INTRAVENOUS at 20:35

## 2025-04-12 ASSESSMENT — COGNITIVE AND FUNCTIONAL STATUS - GENERAL
MOBILITY SCORE: 24
MOBILITY SCORE: 24
DAILY ACTIVITIY SCORE: 24
DAILY ACTIVITIY SCORE: 24

## 2025-04-12 ASSESSMENT — PAIN SCALES - GENERAL
PAINLEVEL_OUTOF10: 0 - NO PAIN
PAINLEVEL_OUTOF10: 3
PAINLEVEL_OUTOF10: 0 - NO PAIN

## 2025-04-12 ASSESSMENT — PAIN DESCRIPTION - ORIENTATION: ORIENTATION: LOWER

## 2025-04-12 ASSESSMENT — ACTIVITIES OF DAILY LIVING (ADL): LACK_OF_TRANSPORTATION: NO

## 2025-04-12 ASSESSMENT — PAIN DESCRIPTION - LOCATION: LOCATION: ABDOMEN

## 2025-04-12 NOTE — H&P
The Vascular Surgery H&P from 4/11 reviewed. There are no major changes to the H&P. Plan for aortogram, possible celiac artery stent, and all other indicated procedures with Dr. Vidal. Patient consent was obtained.     Kevon Tong MD  PGY-1 General Surgery  Vascular Surgery [id number]

## 2025-04-12 NOTE — PROGRESS NOTES
04/12/25 1043   Discharge Planning   Living Arrangements Spouse/significant other   Support Systems Spouse/significant other   Type of Residence Private residence   Number of Stairs to Enter Residence 6   Number of Stairs Within Residence 0   Do you have animals or pets at home? No   Who is requesting discharge planning? Patient   Home or Post Acute Services None   Expected Discharge Disposition Home   Does the patient need discharge transport arranged? No   Financial Resource Strain   How hard is it for you to pay for the very basics like food, housing, medical care, and heating? Not hard   Housing Stability   In the last 12 months, was there a time when you were not able to pay the mortgage or rent on time? N   In the past 12 months, how many times have you moved where you were living? 0   At any time in the past 12 months, were you homeless or living in a shelter (including now)? N   Transportation Needs   In the past 12 months, has lack of transportation kept you from medical appointments or from getting medications? no   In the past 12 months, has lack of transportation kept you from meetings, work, or from getting things needed for daily living? No   Patient Choice   Provider Choice list and CMS website (https://medicare.gov/care-compare#search) for post-acute Quality and Resource Measure Data were provided and reviewed with: Family   Patient / Family choosing to utilize agency / facility established prior to hospitalization No   Stroke Family Assessment   Stroke Family Assessment Needed No     TCC admission assessment completed. Explained role of TCC - verbalized understanding.      Demographics/Insurance: Address, phone number, and insurance verified in chart.  Living Environment: Single family home  Primary Support Person: Wife Priya [phone]  PCP: Clarice De La Fuente CNP  Recent Falls: Denies  Transportation at Discharge: Wife will transport  Pharmacy: [facility]  Concerns about discharge: none  Spoke with patient  wife to complete admission assessment, patient from home with wife patient denies having discharge needs at this time. Patient waiting to go to OR, will continue to follow for discharge planning. Carolyne Medel RN TCC

## 2025-04-12 NOTE — CARE PLAN
The patient's goals for the shift include to remain comfortable throughout shift    The clinical goals for the shift include to remain free from falls throughout shift    Problem: Pain - Adult  Goal: Verbalizes/displays adequate comfort level or baseline comfort level  Outcome: Progressing     Problem: Safety - Adult  Goal: Free from fall injury  Outcome: Progressing     Problem: Discharge Planning  Goal: Discharge to home or other facility with appropriate resources  Outcome: Progressing     Problem: Chronic Conditions and Co-morbidities  Goal: Patient's chronic conditions and co-morbidity symptoms are monitored and maintained or improved  Outcome: Progressing     Problem: Nutrition  Goal: Nutrient intake appropriate for maintaining nutritional needs  Outcome: Progressing

## 2025-04-12 NOTE — PROGRESS NOTES
"  VASCULAR SURGERY PROGRESS NOTE  Assessment/Plan   Kevin Rubi is 69 y.o. male with history of atrial fibrillation, CAD, HLD, HTN, lung cancer with mets to brain, SMA stenosis status post \"stenting with a biliary stent deployed across stenosis\" (8/10/2023), incarcerated R inguinal hernia s/p exlap, SBR, open repair who presents with progressively worsening abdominal pain. CT scan from OSH demonstrates no flow seen in proximal SMA with collaterals from the celiac artery, small dissection of infrarenal aorta. Abdominal pain in setting of CTA and vascular history likely representing chronic mesenteric ischemia.     Update 4/12: Having some hypotension to the SBP 70s. Asymptomatic. Not orthostatic.     Plan:    Neuro:  Pain: tylenol, oxy5-10 PRN   Continue gabapentin PRN   Continue home tizanidine PRN    Cardiovascular:   #hypotension  #afib   #CAD  #hx HTN  - Continue digoxin and metoprolol with hold parameters in setting of hypotension  - Hold home apixaban, plavix   - hold rosuvastatin  - hold home entresto, spirinolactone  - Plan for OR with Dr. Vidal for aortogram, possible celiac artery stent, and all other indicated procedures this weekend   - Give 500 ml LR bolus, evaluative fluid responsiveness  - Give 1x albumin for hypotension  - Orthostatics obtained at bedside 4/12: Laying 88/50 HR 82, Sitting 76/46 HR99, Standing 71/46 HR50. Negative for orthostatics.    #SMA stent occlusion  #celiac artery stenosis  #c/f chronic mesenteric ischemia  - continue hep gtt  - q4h NV    Pulmonary:   #lung cancer with mets to brain (NSLC, stage III C), LLL  - Pulmonary hygiene  - Incentive spirometry    GI:  #s/p prior exlap, SBR, open R inguinal hernia repair  #chronic mesenteric ischemia  #weight loss in setting of malignancy  - NPO while awaiting OR   - bowel regimen: dulcolax PRN   - Obtain prealbumin, LFTs to determine if malnutrition contributing to high INR.     :  Voiding    MSK:  PT/OT evaluation    Endo:  No " "insulin needs  Q4h POCT    Heme:  #INR 1.7  - vitamin K x1   - LFTs   - prealbumin     ID:  No indication of antibiotics    PPX: on hep gtt  F: /h   E: Replete PRN  N: NPO    Dispo: RNF    Discussed with Dr. Young Tong MD  PGY-1 General Surgery  Vascular Surgery [id number]    Subjective   Night team called about hypotension to SBP 70s. Metoprolol and digoxin held this morning with continued hypotension. Asymptomatic. Improved abdominal pain.     Objective   Vitals:  Heart Rate:  [51-95]   Temp:  [35.6 °C (96.1 °F)-37 °C (98.6 °F)]   Resp:  [16-18]   BP: ()/(40-62)   Height:  [182.9 cm (6')-182.9 cm (6' 0.01\")]   Weight:  [67.4 kg (148 lb 7.7 oz)-72.6 kg (160 lb)]   SpO2:  [92 %-100 %]     Exam:  Constitutional: No acute distress, resting comfortably  Neuro:  AOx3, grossly intact  ENMT: moist mucous membranes  Head/neck: atraumatic  CV: no tachycardia  Pulm: non-labored on room air  GI: soft, non-tender, non-distended. Well healed midline scar on abdomen.   Skin: warm and dry  Musculoskeletal: moving all extremities  Extremities: Palpable radial pulses. Biphasic PT signals, monophasic DP signals.     Labs:  Results from last 7 days   Lab Units 04/11/25  2103 04/11/25  1118   WBC AUTO x10*3/uL 5.2 10.3   HEMOGLOBIN g/dL 8.9* 11.2*   PLATELETS AUTO x10*3/uL 178 238      Results from last 7 days   Lab Units 04/11/25 2103 04/11/25  1118   SODIUM mmol/L 138 132*   POTASSIUM mmol/L 4.1 3.8   CHLORIDE mmol/L 110* 101   CO2 mmol/L 20* 22   BUN mg/dL 14 18   CREATININE mg/dL 0.95 1.28   GLUCOSE mg/dL 71* 119*   MAGNESIUM mg/dL 1.47*  --       Results from last 7 days   Lab Units 04/11/25 2103 04/11/25  1428   INR  1.7* 1.9*   PROTIME seconds 18.8* 20.9*   APTT seconds 34 36      Results from last 7 days   Lab Units 04/12/25  0416 04/11/25  2251 04/11/25  1828   ANTI XA UNFRACTIONATED IU/mL 1.2* 1.5* 2.0*      "

## 2025-04-12 NOTE — CARE PLAN
The patient's goals for the shift include to remain comfortable throughout shift    The clinical goals for the shift include to remain free from falls throughout shift

## 2025-04-12 NOTE — PROGRESS NOTES
Pharmacy Medication History Review    Kevin Rubi is a 69 y.o. male admitted for Chronic mesenteric insufficiency (Multi). Pharmacy reviewed the patient's gpivy-zi-meqhdibuh medications and allergies for accuracy.    Medications ADDED:  Metoprolol 50 mg - see note  Medications CHANGED:  None  Medications REMOVED:   None     The list below reflects the updated PTA list.   Prior to Admission Medications   Prescriptions Informant   apixaban (Eliquis) 5 mg tablet Self, Spouse/Significant Other      Sig: Take 1 tablet (5 mg) by mouth 2 times a day.   blood sugar diagnostic (Blood Glucose Test) strip Self, Spouse/Significant Other   Si strip once daily.   blood-glucose meter misc Self, Spouse/Significant Other   Sig: Use daily to test blood sugar   clopidogrel (Plavix) 75 mg tablet Self, Spouse/Significant Other   Sig: Take 1 tablet (75 mg) by mouth once daily.   dexAMETHasone (Decadron) 2 mg tablet Self, Spouse/Significant Other   Sig: Please take 4mg daily for 3 days, then 2mg daily for 3 days, then stop.   Patient not taking: Reported on 2025 for 6 day course   digoxin (Lanoxin) 125 MCG tablet Self, Spouse/Significant Other   Sig: TAKE 1 TABLET DAILY   fenofibrate (Tricor) 145 mg tablet Self, Spouse/Significant Other   Sig: TAKE 1 TABLET BY MOUTH ONCE  DAILY   furosemide (Lasix) 20 mg tablet Self, Spouse/Significant Other   Sig: Take 1 tablet (20 mg) by mouth see administration instructions. 1 tablet as needed for swelling, weight gain of 3# or increased shortness of breath.   gabapentin (Neurontin) 300 mg capsule Self, Spouse/Significant Other   Sig: TAKE 1 CAPSULE (300 MG) BY MOUTH ONCE DAILY AT BEDTIME.   lancets misc Self, Spouse/Significant Other   Sig: Use daily to test blood sugar   metoprolol succinate XL (Toprol-XL) 25 mg 24 hr tablet Self, Spouse/Significant Other      Sig: Take 1 tablet (25 mg) by mouth once daily. Do not crush or chew.   metoprolol succinate XL (Toprol-XL) 50 mg  "24 hr tablet Self, Spouse/Significant Other   Sig: Take 1 tablet (50 mg) by mouth once daily. Do not crush or chew.   rosuvastatin (Crestor) 5 mg tablet Self, Spouse/Significant Other   Sig: TAKE 1 TABLET BY MOUTH ONCE  DAILY   sacubitriL-valsartan (Entresto) 24-26 mg tablet Self, Spouse/Significant Other   Sig: Take 0.5 tablets by mouth 2 times a day.   spironolactone (Aldactone) 25 mg tablet Self, Spouse/Significant Other   Sig: Take 0.5 tablets (12.5 mg) by mouth once daily.   tiZANidine (Zanaflex) 2 mg tablet Self, Spouse/Significant Other   Sig: TAKE 1 TABLET BY MOUTH 2 TIMES A DAY AS NEEDED FOR MUSCLE SPASMS.      Facility-Administered Medications: None        The list below reflects the updated allergy list. Please review each documented allergy for additional clarification and justification.  Allergies  Reviewed by Lisy Santana on 4/11/2025   No Known Allergies         Sources:   [facility]  Pharmacy dispense history  Patient Interview Moderate historian  Spouse  Moderate historian  Chart Review    Additional Comments:  No fill hx for Eliquis, Entresto. Per patient takes 1/2 tablet of Entresto, unclear where source of medications as this was brought up during interview, however patient confirmed taking both  Per spouse, and patient, taking 25 mg & 50 mg of metoprolol = 75 mg daily dose  Plavix prescription was cancelled from provider or pharmacy per spouse  Confirmed all of meds above, including medications that don't have recent fill hx. Of note only gabapentin, Crestor, spironolactone have recent fill hx on chart       Tre QuintanillaD  Transitions of Care Pharmacist  04/11/25     Secure Chat preferred   If no response call [phone] or Vocera \"Med Rec\"    "

## 2025-04-13 ENCOUNTER — APPOINTMENT (OUTPATIENT)
Dept: RADIOLOGY | Facility: HOSPITAL | Age: 70
End: 2025-04-13
Payer: COMMERCIAL

## 2025-04-13 LAB
ACT BLD: 134 SEC (ref 82–174)
ACT BLD: 187 SEC (ref 83–199)
ACT BLD: 208 SEC (ref 83–199)
ACT BLD: 263 SEC (ref 83–199)
ACT BLD: 292 SEC (ref 83–199)
ALBUMIN SERPL BCP-MCNC: 3.4 G/DL (ref 3.4–5)
ANION GAP SERPL CALC-SCNC: 12 MMOL/L (ref 10–20)
BUN SERPL-MCNC: 13 MG/DL (ref 6–23)
CALCIUM SERPL-MCNC: 8.9 MG/DL (ref 8.6–10.6)
CHLORIDE SERPL-SCNC: 106 MMOL/L (ref 98–107)
CO2 SERPL-SCNC: 25 MMOL/L (ref 21–32)
CREAT SERPL-MCNC: 1 MG/DL (ref 0.5–1.3)
EGFRCR SERPLBLD CKD-EPI 2021: 82 ML/MIN/1.73M*2
ERYTHROCYTE [DISTWIDTH] IN BLOOD BY AUTOMATED COUNT: 15.2 % (ref 11.5–14.5)
GLUCOSE SERPL-MCNC: 94 MG/DL (ref 74–99)
HCT VFR BLD AUTO: 27.5 % (ref 41–52)
HGB BLD-MCNC: 8.5 G/DL (ref 13.5–17.5)
INR PPP: 1.4 (ref 0.9–1.1)
MAGNESIUM SERPL-MCNC: 1.7 MG/DL (ref 1.6–2.4)
MCH RBC QN AUTO: 25.8 PG (ref 26–34)
MCHC RBC AUTO-ENTMCNC: 30.9 G/DL (ref 32–36)
MCV RBC AUTO: 84 FL (ref 80–100)
NRBC BLD-RTO: 0 /100 WBCS (ref 0–0)
PHOSPHATE SERPL-MCNC: 2.9 MG/DL (ref 2.5–4.9)
PLATELET # BLD AUTO: 194 X10*3/UL (ref 150–450)
POTASSIUM SERPL-SCNC: 3.9 MMOL/L (ref 3.5–5.3)
PROTHROMBIN TIME: 15.3 SECONDS (ref 9.8–12.4)
RBC # BLD AUTO: 3.29 X10*6/UL (ref 4.5–5.9)
SODIUM SERPL-SCNC: 139 MMOL/L (ref 136–145)
UFH PPP CHRO-ACNC: 0.5 IU/ML
UFH PPP CHRO-ACNC: 0.7 IU/ML
UFH PPP CHRO-ACNC: 0.8 IU/ML
WBC # BLD AUTO: 5.1 X10*3/UL (ref 4.4–11.3)

## 2025-04-13 PROCEDURE — 7100000002 HC RECOVERY ROOM TIME - EACH INCREMENTAL 1 MINUTE: Performed by: SURGERY

## 2025-04-13 PROCEDURE — 3700000001 HC GENERAL ANESTHESIA TIME - INITIAL BASE CHARGE: Performed by: SURGERY

## 2025-04-13 PROCEDURE — 3600000009 HC OR TIME - EACH INCREMENTAL 1 MINUTE - PROCEDURE LEVEL FOUR: Performed by: SURGERY

## 2025-04-13 PROCEDURE — 2720000007 HC OR 272 NO HCPCS: Performed by: SURGERY

## 2025-04-13 PROCEDURE — 85347 COAGULATION TIME ACTIVATED: CPT

## 2025-04-13 PROCEDURE — 36415 COLL VENOUS BLD VENIPUNCTURE: CPT

## 2025-04-13 PROCEDURE — 1200000002 HC GENERAL ROOM WITH TELEMETRY DAILY

## 2025-04-13 PROCEDURE — B4141ZZ FLUOROSCOPY OF SUPERIOR MESENTERIC ARTERY USING LOW OSMOLAR CONTRAST: ICD-10-PCS | Performed by: SURGERY

## 2025-04-13 PROCEDURE — 36247 INS CATH ABD/L-EXT ART 3RD: CPT | Performed by: SURGERY

## 2025-04-13 PROCEDURE — 37246 TRLUML BALO ANGIOP 1ST ART: CPT | Performed by: SURGERY

## 2025-04-13 PROCEDURE — C2623 CATH, TRANSLUMIN, DRUG-COAT: HCPCS | Performed by: SURGERY

## 2025-04-13 PROCEDURE — 2500000002 HC RX 250 W HCPCS SELF ADMINISTERED DRUGS (ALT 637 FOR MEDICARE OP, ALT 636 FOR OP/ED)

## 2025-04-13 PROCEDURE — 85610 PROTHROMBIN TIME: CPT | Performed by: STUDENT IN AN ORGANIZED HEALTH CARE EDUCATION/TRAINING PROGRAM

## 2025-04-13 PROCEDURE — 80069 RENAL FUNCTION PANEL: CPT | Performed by: STUDENT IN AN ORGANIZED HEALTH CARE EDUCATION/TRAINING PROGRAM

## 2025-04-13 PROCEDURE — 76937 US GUIDE VASCULAR ACCESS: CPT | Performed by: SURGERY

## 2025-04-13 PROCEDURE — C1894 INTRO/SHEATH, NON-LASER: HCPCS | Performed by: SURGERY

## 2025-04-13 PROCEDURE — 2500000004 HC RX 250 GENERAL PHARMACY W/ HCPCS (ALT 636 FOR OP/ED)

## 2025-04-13 PROCEDURE — 2500000005 HC RX 250 GENERAL PHARMACY W/O HCPCS

## 2025-04-13 PROCEDURE — 3600000004 HC OR TIME - INITIAL BASE CHARGE - PROCEDURE LEVEL FOUR: Performed by: SURGERY

## 2025-04-13 PROCEDURE — 3700000002 HC GENERAL ANESTHESIA TIME - EACH INCREMENTAL 1 MINUTE: Performed by: SURGERY

## 2025-04-13 PROCEDURE — 04713Z1 DILATION OF CELIAC ARTERY USING DRUG-COATED BALLOON, PERCUTANEOUS APPROACH: ICD-10-PCS | Performed by: SURGERY

## 2025-04-13 PROCEDURE — 85520 HEPARIN ASSAY: CPT

## 2025-04-13 PROCEDURE — C1725 CATH, TRANSLUMIN NON-LASER: HCPCS | Performed by: SURGERY

## 2025-04-13 PROCEDURE — C1769 GUIDE WIRE: HCPCS | Performed by: SURGERY

## 2025-04-13 PROCEDURE — 7100000001 HC RECOVERY ROOM TIME - INITIAL BASE CHARGE: Performed by: SURGERY

## 2025-04-13 PROCEDURE — 2500000001 HC RX 250 WO HCPCS SELF ADMINISTERED DRUGS (ALT 637 FOR MEDICARE OP)

## 2025-04-13 PROCEDURE — A37236 PR OPEN/PERQ PLACEMENT INTRAVASCULAR STENT INITIAL: Performed by: STUDENT IN AN ORGANIZED HEALTH CARE EDUCATION/TRAINING PROGRAM

## 2025-04-13 PROCEDURE — 36221 PLACE CATH THORACIC AORTA: CPT | Performed by: SURGERY

## 2025-04-13 PROCEDURE — B4101ZZ FLUOROSCOPY OF ABDOMINAL AORTA USING LOW OSMOLAR CONTRAST: ICD-10-PCS | Performed by: SURGERY

## 2025-04-13 PROCEDURE — 83735 ASSAY OF MAGNESIUM: CPT | Performed by: STUDENT IN AN ORGANIZED HEALTH CARE EDUCATION/TRAINING PROGRAM

## 2025-04-13 PROCEDURE — 85027 COMPLETE CBC AUTOMATED: CPT | Performed by: STUDENT IN AN ORGANIZED HEALTH CARE EDUCATION/TRAINING PROGRAM

## 2025-04-13 PROCEDURE — C1887 CATHETER, GUIDING: HCPCS | Performed by: SURGERY

## 2025-04-13 RX ORDER — ACETAMINOPHEN 325 MG/1
650 TABLET ORAL EVERY 4 HOURS PRN
Status: DISCONTINUED | OUTPATIENT
Start: 2025-04-13 | End: 2025-04-13 | Stop reason: HOSPADM

## 2025-04-13 RX ORDER — HYDROMORPHONE HYDROCHLORIDE 0.2 MG/ML
0.2 INJECTION INTRAMUSCULAR; INTRAVENOUS; SUBCUTANEOUS EVERY 5 MIN PRN
Status: DISCONTINUED | OUTPATIENT
Start: 2025-04-13 | End: 2025-04-13 | Stop reason: HOSPADM

## 2025-04-13 RX ORDER — SODIUM CHLORIDE, SODIUM LACTATE, POTASSIUM CHLORIDE, CALCIUM CHLORIDE 600; 310; 30; 20 MG/100ML; MG/100ML; MG/100ML; MG/100ML
100 INJECTION, SOLUTION INTRAVENOUS CONTINUOUS
Status: DISCONTINUED | OUTPATIENT
Start: 2025-04-13 | End: 2025-04-13 | Stop reason: HOSPADM

## 2025-04-13 RX ORDER — CLOPIDOGREL BISULFATE 75 MG/1
150 TABLET ORAL DAILY
Status: DISCONTINUED | OUTPATIENT
Start: 2025-04-14 | End: 2025-04-14

## 2025-04-13 RX ORDER — CLOPIDOGREL BISULFATE 75 MG/1
75 TABLET ORAL DAILY
Status: DISCONTINUED | OUTPATIENT
Start: 2025-04-14 | End: 2025-04-15 | Stop reason: HOSPADM

## 2025-04-13 RX ORDER — HEPARIN SODIUM 1000 [USP'U]/ML
INJECTION, SOLUTION INTRAVENOUS; SUBCUTANEOUS AS NEEDED
Status: DISCONTINUED | OUTPATIENT
Start: 2025-04-13 | End: 2025-04-13

## 2025-04-13 RX ORDER — MIDAZOLAM HYDROCHLORIDE 1 MG/ML
INJECTION INTRAMUSCULAR; INTRAVENOUS AS NEEDED
Status: DISCONTINUED | OUTPATIENT
Start: 2025-04-13 | End: 2025-04-13

## 2025-04-13 RX ORDER — PROPOFOL 10 MG/ML
INJECTION, EMULSION INTRAVENOUS CONTINUOUS PRN
Status: DISCONTINUED | OUTPATIENT
Start: 2025-04-13 | End: 2025-04-13

## 2025-04-13 RX ORDER — LIDOCAINE HYDROCHLORIDE 10 MG/ML
0.1 INJECTION, SOLUTION INFILTRATION; PERINEURAL ONCE
Status: DISCONTINUED | OUTPATIENT
Start: 2025-04-13 | End: 2025-04-13 | Stop reason: HOSPADM

## 2025-04-13 RX ORDER — CEFAZOLIN 1 G/1
INJECTION, POWDER, FOR SOLUTION INTRAVENOUS AS NEEDED
Status: DISCONTINUED | OUTPATIENT
Start: 2025-04-13 | End: 2025-04-13

## 2025-04-13 RX ORDER — FENTANYL CITRATE 50 UG/ML
INJECTION, SOLUTION INTRAMUSCULAR; INTRAVENOUS AS NEEDED
Status: DISCONTINUED | OUTPATIENT
Start: 2025-04-13 | End: 2025-04-13

## 2025-04-13 RX ORDER — PROTAMINE SULFATE 10 MG/ML
INJECTION, SOLUTION INTRAVENOUS AS NEEDED
Status: DISCONTINUED | OUTPATIENT
Start: 2025-04-13 | End: 2025-04-13

## 2025-04-13 RX ORDER — ONDANSETRON HYDROCHLORIDE 2 MG/ML
INJECTION, SOLUTION INTRAVENOUS AS NEEDED
Status: DISCONTINUED | OUTPATIENT
Start: 2025-04-13 | End: 2025-04-13

## 2025-04-13 RX ADMIN — PROPOFOL 20 MCG/KG/MIN: 10 INJECTION, EMULSION INTRAVENOUS at 13:42

## 2025-04-13 RX ADMIN — I.V. FAT EMULSION 50 G: 20 EMULSION INTRAVENOUS at 20:45

## 2025-04-13 RX ADMIN — HEPARIN SODIUM 3000 UNITS: 1000 INJECTION, SOLUTION INTRAVENOUS; SUBCUTANEOUS at 14:53

## 2025-04-13 RX ADMIN — HEPARIN SODIUM 1100 UNITS/HR: 10000 INJECTION, SOLUTION INTRAVENOUS at 01:50

## 2025-04-13 RX ADMIN — ONDANSETRON 4 MG: 2 INJECTION, SOLUTION INTRAMUSCULAR; INTRAVENOUS at 13:50

## 2025-04-13 RX ADMIN — CEFAZOLIN 2 G: 1 INJECTION, POWDER, FOR SOLUTION INTRAMUSCULAR; INTRAVENOUS at 13:48

## 2025-04-13 RX ADMIN — PROTAMINE SULFATE 30 MG: 10 INJECTION, SOLUTION INTRAVENOUS at 15:24

## 2025-04-13 RX ADMIN — ROSUVASTATIN CALCIUM 5 MG: 10 TABLET, FILM COATED ORAL at 09:28

## 2025-04-13 RX ADMIN — OXYCODONE HYDROCHLORIDE 5 MG: 5 TABLET ORAL at 09:28

## 2025-04-13 RX ADMIN — ASCORBIC ACID, VITAMIN A PALMITATE, CHOLECALCIFEROL, THIAMINE HYDROCHLORIDE, RIBOFLAVIN-5 PHOSPHATE SODIUM, PYRIDOXINE HYDROCHLORIDE, NIACINAMIDE, DEXPANTHENOL, ALPHA-TOCOPHEROL ACETATE, VITAMIN K1, FOLIC ACID, BIOTIN, CYANOCOBALAMIN: 200; 3300; 200; 6; 3.6; 6; 40; 15; 10; 150; 600; 60; 5 INJECTION, SOLUTION INTRAVENOUS at 20:45

## 2025-04-13 RX ADMIN — OXYCODONE HYDROCHLORIDE 5 MG: 5 TABLET ORAL at 19:09

## 2025-04-13 RX ADMIN — METOPROLOL SUCCINATE 50 MG: 50 TABLET, EXTENDED RELEASE ORAL at 09:28

## 2025-04-13 RX ADMIN — HEPARIN SODIUM 4000 UNITS: 1000 INJECTION, SOLUTION INTRAVENOUS; SUBCUTANEOUS at 14:21

## 2025-04-13 RX ADMIN — GABAPENTIN 300 MG: 300 CAPSULE ORAL at 20:45

## 2025-04-13 RX ADMIN — FENTANYL CITRATE 25 MCG: 50 INJECTION, SOLUTION INTRAMUSCULAR; INTRAVENOUS at 13:43

## 2025-04-13 RX ADMIN — HEPARIN SODIUM 4000 UNITS: 1000 INJECTION, SOLUTION INTRAVENOUS; SUBCUTANEOUS at 14:37

## 2025-04-13 RX ADMIN — MIDAZOLAM HYDROCHLORIDE 2 MG: 2 INJECTION, SOLUTION INTRAMUSCULAR; INTRAVENOUS at 13:28

## 2025-04-13 RX ADMIN — DIGOXIN 125 MCG: 125 TABLET ORAL at 09:28

## 2025-04-13 ASSESSMENT — PAIN SCALES - GENERAL
PAINLEVEL_OUTOF10: 0 - NO PAIN
PAINLEVEL_OUTOF10: 4
PAINLEVEL_OUTOF10: 0 - NO PAIN
PAINLEVEL_OUTOF10: 2
PAINLEVEL_OUTOF10: 3
PAINLEVEL_OUTOF10: 0 - NO PAIN
PAINLEVEL_OUTOF10: 0 - NO PAIN

## 2025-04-13 ASSESSMENT — COGNITIVE AND FUNCTIONAL STATUS - GENERAL
MOBILITY SCORE: 24
DAILY ACTIVITIY SCORE: 24
DAILY ACTIVITIY SCORE: 24

## 2025-04-13 NOTE — ANESTHESIA PREPROCEDURE EVALUATION
Patient: Kevin Rubi    Procedure Information       Date/Time: 04/13/25 1300    Procedure: Aortogram, possible celiac artery stent    Location: [facility] OR 17 / Virtual [facility] OR    Surgeons: Rainer Vidal MD            Relevant Problems   Cardiac   (+) Abdominal aortic aneurysm, without rupture, unspecified   (+) Arteriosclerosis of coronary artery   (+) Ascending aortic aneurysm   (+) Atrial fibrillation (Multi)   (+) Benign hypertension   (+) Bicuspid aortic valve   (+) Chronic systolic congestive heart failure   (+) Congestive heart failure   (+) HLD (hyperlipidemia)   (+) Mild CAD   (+) Peripheral vascular disease (CMS-HCC)   (+) Superior mesenteric artery stenosis (Multi)      Pulmonary   (+) Lung nodules   (+) Malignant neoplasm of lung (Multi)   (+) Primary cancer of left lower lobe of lung (Multi)      Neuro   (+) Anxiety disorder, unspecified   (+) Depression   (+) Malignant neoplasm metastatic to brain (Multi)      Hematology   (+) Anemia in other chronic diseases classified elsewhere   (+) Normocytic anemia      ID   (+) Acute upper respiratory infection       Clinical information reviewed:   Tobacco  Allergies  Meds   Med Hx  Surg Hx   Fam Hx  Soc Hx        NPO Detail:  NPO/Void Status  Carbohydrate Drink Given Prior to Surgery? : N  Date of Last Liquid: 04/13/25  Time of Last Liquid: 0900  Date of Last Solid: 04/12/25  Time of Last Solid: 1700  Last Intake Type: Clear fluids  Time of Last Void: 1130         Physical Exam    Airway  Mallampati: II  TM distance: >3 FB  Neck ROM: full     Cardiovascular   Rhythm: regular  Rate: normal     Dental    Pulmonary - normal exam     Abdominal      Other findings: Multiple missing teeth.           Anesthesia Plan    History of general anesthesia?: yes  History of complications of general anesthesia?: no    ASA 3     MAC     intravenous induction   Postoperative administration of opioids is intended.  Trial extubation is planned.  Anesthetic  plan and risks discussed with patient and spouse.  Use of blood products discussed with patient and spouse who.    Plan discussed with resident and attending.

## 2025-04-13 NOTE — BRIEF OP NOTE
Date: 2025  OR Location: [facility] OR    Name: Kevin Rubi, : 1955, Age: 69 y.o., MRN: [id number], Sex: male    Diagnosis  Pre-op Diagnosis      * Peripheral vascular disease (CMS-HCC) [I73.9]     * Chronic mesenteric insufficiency (Multi) [K55.1] Post-op Diagnosis     * Peripheral vascular disease (CMS-HCC) [I73.9]     * Chronic mesenteric insufficiency (Multi) [K55.1]     Procedures  Ultrasound guided access left brachial artery  Aortic arch angiogram  Aortogram with selective catheterization of celiac artery  Balloon angioplasty celiac artery stent with 5mm POBA followed by 5mm DCB    Surgeons      * Rainer Vidal - Primary    Resident/Fellow/Other Assistant:  Surgeons and Role:     * Don Pino MD - Resident - Assisting    Staff:   Divinaulator: Reed Rojas Person: Stu    Anesthesia Staff: Anesthesiologist: Waylon Blackburn MD  Anesthesia Resident: Christiano Boyd MD    Procedure Summary  Anesthesia: Monitor Anesthesia Care  ASA: III  Estimated Blood Loss: 15mL  Intra-op Medications:   Administrations occurring from 1212 to 1608 on 25:   Medication Name Total Dose   acetaminophen (Tylenol) tablet 650 mg Cannot be calculated   Adult Clinimix Parenteral Nutrition Continuous Cannot be calculated   bisacodyl (Dulcolax) EC tablet 10 mg Cannot be calculated   digoxin (Lanoxin) tablet 125 mcg Cannot be calculated   fat emulsion-plant based (Intralipid) 20 % infusion 50 g Cannot be calculated   gabapentin (Neurontin) capsule 300 mg Cannot be calculated   heparin bolus from bag 3,000-6,000 Units Cannot be calculated   metoprolol succinate XL (Toprol-XL) 24 hr tablet 50 mg Cannot be calculated   naloxone (Narcan) injection 0.2 mg Cannot be calculated   oxyCODONE (Roxicodone) immediate release tablet 5 mg Cannot be calculated   rosuvastatin (Crestor) tablet 5 mg Cannot be calculated   tiZANidine (Zanaflex) tablet 2 mg Cannot be calculated   ceFAZolin (Ancef) 1 g 2 g    dexmedeTOMIDine (Precedex) bolus from bag 8 mcg   fentaNYL (Sublimaze) injection 50 mcg/mL 25 mcg   heparin 1,000 units/mL 11,000 Units   midazolam PF (Versed) injection 1 mg/mL 2 mg   ondansetron (Zofran) 2 mg/mL injection 4 mg   propofol (Diprivan) infusion 10 mg/mL 20.56 mg   protamine 10 mg/mL 30 mg          Anesthesia Record               Intraprocedure I/O Totals          Intake    Dexmedetomidine 0.00 mL    The total shown is the total volume documented since Anesthesia Start was filed.    Propofol Drip 0.00 mL    The total shown is the total volume documented since Anesthesia Start was filed.    Total Intake 0 mL          Specimen: No specimens collected     Findings:   Stenosis of the celiac artery origin.  Patent celiac artery stent.  Subsequent to angioplasty, improvement     Complications:  None; patient tolerated the procedure well.     Disposition: PACU - hemodynamically stable.  Condition: stable  Specimens Collected: No specimens collected during this procedure.  Attending Attestation: I was present and scrubbed for the entire procedure.    Rainer Vidal  Phone Number: [phone]    Don Pino MD  Vascular Surgery, PGY4  Team Pager: [phone]

## 2025-04-13 NOTE — OP NOTE
Aortogram, possible celiac artery stent Operative Note     Date: 2025 - 2025  OR Location: [facility] OR    Name: Kevin Rubi, : 1955, Age: 69 y.o., MRN: [id number], Sex: male    Diagnosis  Pre-op Diagnosis      * Peripheral vascular disease (CMS-HCC) [I73.9]     * Chronic mesenteric insufficiency (Multi) [K55.1] Post-op Diagnosis     * Peripheral vascular disease (CMS-HCC) [I73.9]     * Chronic mesenteric insufficiency (Multi) [K55.1]     Procedures  US access left brachial artery  Aortic arch angiogram  Aortogram with selective catheterization of celiac artery  Balloon angioplasty celiac artery stent with 5mm POBA followed by 5mm DCB    Surgeons      * Rainer Vidal - Primary    Resident/Fellow/Other Assistant:  Surgeons and Role:  * No surgeons found with a matching role *    Staff:   Circulator: Reed Rojas Person: Stu    Anesthesia Staff: Anesthesiologist: Waylon Blackburn MD  Anesthesia Resident: Christiano Boyd MD    Procedure Summary  Anesthesia: Monitor Anesthesia Care  ASA: III  Estimated Blood Loss: 15 mL  Intra-op Medications:   Administrations occurring from 1300 to 1445 on 25:   Medication Name Total Dose   ceFAZolin (Ancef) 1 g 2 g   dexmedeTOMIDine (Precedex) bolus from bag 8 mcg   fentaNYL (Sublimaze) injection 50 mcg/mL 25 mcg   heparin 1,000 units/mL 8,000 Units   midazolam PF (Versed) injection 1 mg/mL 2 mg   ondansetron (Zofran) 2 mg/mL injection 4 mg   propofol (Diprivan) infusion 10 mg/mL 20.56 mg              Anesthesia Record               Intraprocedure I/O Totals          Intake    Dexmedetomidine 0.00 mL    The total shown is the total volume documented since Anesthesia Start was filed.    Propofol Drip 0.00 mL    The total shown is the total volume documented since Anesthesia Start was filed.    Total Intake 0 mL          Specimen: No specimens collected     Drains and/or Catheters: * None in log *    Indications: Kevin Rubi is an 69 y.o.  male who is having surgery for Peripheral vascular disease (CMS-HCC) [I73.9]  Chronic mesenteric insufficiency (Multi) [K55.1].  He presents with 2 weeks of progressive abdominal pain.  He has a history of a celiac artery stent that was placed about 3 years ago.  He has had some in-stent restenosis and this has progressed as well on duplex surveillance which she had just before presenting to the emergency room for further evaluation.  He was transferred to [facility] on a heparin drip.  He had normal white count and a normal lactate.  His prealbumin was 8 and he reported a history of about 10+ pounds weight loss in the recent few months.  He has symptoms consistent with chronic mesenteric ischemia with some recent progressive symptoms as noted.  He was taking Eliquis at home for arrhythmia.  He ran out of his Plavix about 2 weeks ago which coincided with the new symptom onset.  On admission he additionally had abnormal LFTs and so he received 5 of vitamin K with the suspicion that this was due to his overall malnutrition and some liver dysfunction.    The patient was seen in the preoperative area. The risks, benefits, complications, treatment options, non-operative alternatives, expected recovery and outcomes were discussed with the patient. The possibilities of reaction to medication, pulmonary aspiration, injury to surrounding structures, bleeding, recurrent infection, the need for additional procedures, failure to diagnose a condition, and creating a complication requiring transfusion or operation were discussed with the patient. The patient concurred with the proposed plan, giving informed consent.  The site of surgery was properly noted/marked if necessary per policy. The patient has been actively warmed in preoperative area. Preoperative antibiotics have been ordered and given within 1 hours of incision. Venous thrombosis prophylaxis have been ordered including bilateral sequential compression devices the  patient was additionally heparinized during the case.    Procedure Details: The patient was brought to the operating room.  He was placed in the operative table in supine position with his left arm out.  Appropriate timeout huddle was performed and ended up with the medical record number.  He underwent MAC anesthesia.  We prepped and draped his left arm into the field in standard fashion.  He received a dose of antibiotics.  After giving local anesthetic we used ultrasound-guided access to cannulate the left brachial artery.  We advanced the wire and confirmed its location with ultrasound up towards the axillary artery.  We then made a small incision in the skin to accommodate a 5 American sheath.  We upsized to the micropuncture sheath then advanced the Bentson wire after removing the dilator and microwire.  We watched this under fluoroscopy.  We then placed a 5 American 10 cm long sheath.  We first advanced the Bentson wire down to the aorta with a an angled glide catheter.  And then required a nonstiff Glidewire with the angled glide wire to get down towards the thoracic aorta.  Prior to doing so we did get an arch angiogram to confirm direction of our wire.    Her wire was visualized to go down towards the infrarenal aorta.  We followed this with the catheter.  At this point we started heparinizing the patient and overall maintained and achieved an ACT around 300.  We then exchanged the glide wire for the Bentson wire and removed the angled glide catheter.  We also removed the short 5 American sheath.  We replaced this with a 5fr 55 cm Rabee sheath to the thoracic aorta.  We then advanced the straight marker catheter ended aortic angiography of the abdominal aorta with an obliquity of about 40 degrees.  We were limited with the obliquity given the edge of the bed had railings.  We could see the stent at around the L1 level.  On this angiogram we could see that the distal celiac and branches filled but through a  irregular stenotic stent as we knew from prior duplex and CTA.  We then took the [employer] wire and exchanged for the 9 stiff Glidewire.  And we also brought down the angled glide catheter and using angiogram and slight magnification we were able to cannulate the stent and advance our angled glide catheter past the stenosis which was noted as we advanced our wire.  We remove the wire once our angled glide catheter got past the stent and did an angiogram confirming intraluminal location within the celiac trunk.  We then used a 018 command wire and placed this towards the splenic artery to get support.  We removed the angled glide catheter.    We for started off with a 5 mm balloon angioplasty across the stent for a minute and then post angiogram looked improved though again it was challenging to see with the portable C arm.  We then proceeded to do a drug-coated balloon angioplasty with a 5 mm Impact balloon and insufflated this across the stent for a period of 3 minutes.  We removed the balloon and then shot an angiogram with power injection through the sheath with excellent flow through the branches and an improvement in overall caliber within the lumen of the stent.  The patient did really well during the case.  He never had any abdominal pain or discomfort and vitals were stable.    We then gave 30 mg of protamine.  We watched the sheath come back towards the left subclavian and once his ACT was reversed went ahead and pulled the sheath and wire and held pressure for about 35 minutes with good hemostasis.  He also had a palpable radial pulse.  We placed dressing and secured his arm to a gentle armboard for recovery.  At this point the patient was then taken to PACU for recovery where he will undergo hematoma checks.  I was present for the entire case.  All counts were correct.  There were no complications.    Complications:  None; patient tolerated the procedure well.    Disposition: PACU - hemodynamically  stable.  Condition: stable     Attending Attestation: I was present and scrubbed for the entire procedure.    Rainer Vidal  Phone Number: [phone]

## 2025-04-13 NOTE — ANESTHESIA POSTPROCEDURE EVALUATION
Patient: Kevin Rubi    Procedure Summary       Date: 04/13/25 Room / Location: [facility] OR 17 / Virtual [facility]    Anesthesia Start: 1322 Anesthesia Stop: 1609    Procedure: Aortogram, possible celiac artery stent Diagnosis:       Peripheral vascular disease (CMS-HCC)      Chronic mesenteric insufficiency (Multi)      (Peripheral vascular disease (CMS-HCC) [I73.9])      (Chronic mesenteric insufficiency (Multi) [K55.1])    Surgeons: Rainer Vidal MD Responsible Provider: Waylon Blackburn MD    Anesthesia Type: MAC ASA Status: 3            Anesthesia Type: MAC    Vitals Value Taken Time   /80 04/13/25 1610   Temp 36.0 04/13/25 1610   Pulse 67 04/13/25 1610   Resp 14 04/13/25 1610   SpO2 97 04/13/25 1610       Anesthesia Post Evaluation    Patient location during evaluation: PACU  Patient participation: complete - patient participated  Level of consciousness: awake and alert  Pain management: adequate  Airway patency: patent  Cardiovascular status: acceptable  Respiratory status: acceptable  Hydration status: acceptable  Postoperative Nausea and Vomiting: none        There were no known notable events for this encounter.

## 2025-04-13 NOTE — H&P
The Vascular Surgery H&P from 4/11 reviewed. There are no major changes to the H&P. Plan for aortogram, possible celiac artery stent, and all other indicated procedures with Dr. Vidal. Patient consent was obtained.      Kevon Tong MD  PGY-1 General Surgery  Vascular Surgery [id number]

## 2025-04-13 NOTE — CARE PLAN
The patient's goals for the shift include to remain comfortable throughout shift    The clinical goals for the shift include safety, remain hds

## 2025-04-13 NOTE — CARE PLAN
The patient's goals for the shift include to remain comfortable throughout shift    The clinical goals for the shift include safety, remain hds      Problem: Skin  Goal: Promote skin healing  Outcome: Progressing     Problem: Skin  Goal: Promote/optimize nutrition  Outcome: Progressing     Problem: Skin  Goal: Prevent/manage excess moisture  Outcome: Progressing     Problem: Safety - Adult  Goal: Free from fall injury  Outcome: Progressing     Problem: Pain - Adult  Goal: Verbalizes/displays adequate comfort level or baseline comfort level  Outcome: Progressing     Problem: Chronic Conditions and Co-morbidities  Goal: Patient's chronic conditions and co-morbidity symptoms are monitored and maintained or improved  Outcome: Progressing     Problem: Nutrition  Goal: Nutrient intake appropriate for maintaining nutritional needs  Outcome: Progressing

## 2025-04-14 ENCOUNTER — APPOINTMENT (OUTPATIENT)
Dept: VASCULAR MEDICINE | Facility: HOSPITAL | Age: 70
End: 2025-04-14
Payer: COMMERCIAL

## 2025-04-14 LAB
ALBUMIN SERPL BCP-MCNC: 3.4 G/DL (ref 3.4–5)
ANION GAP SERPL CALC-SCNC: 15 MMOL/L (ref 10–20)
BUN SERPL-MCNC: 10 MG/DL (ref 6–23)
CALCIUM SERPL-MCNC: 8.6 MG/DL (ref 8.6–10.6)
CHLORIDE SERPL-SCNC: 99 MMOL/L (ref 98–107)
CO2 SERPL-SCNC: 21 MMOL/L (ref 21–32)
CREAT SERPL-MCNC: 1.03 MG/DL (ref 0.5–1.3)
EGFRCR SERPLBLD CKD-EPI 2021: 80 ML/MIN/1.73M*2
ERYTHROCYTE [DISTWIDTH] IN BLOOD BY AUTOMATED COUNT: 15.5 % (ref 11.5–14.5)
GLUCOSE BLD MANUAL STRIP-MCNC: 127 MG/DL (ref 74–99)
GLUCOSE BLD MANUAL STRIP-MCNC: 89 MG/DL (ref 74–99)
GLUCOSE SERPL-MCNC: 244 MG/DL (ref 74–99)
HCT VFR BLD AUTO: 30.9 % (ref 41–52)
HGB BLD-MCNC: 10.7 G/DL (ref 13.5–17.5)
INR PPP: 1.2 (ref 0.9–1.1)
MAGNESIUM SERPL-MCNC: 2.68 MG/DL (ref 1.6–2.4)
MCH RBC QN AUTO: 29.7 PG (ref 26–34)
MCHC RBC AUTO-ENTMCNC: 34.6 G/DL (ref 32–36)
MCV RBC AUTO: 86 FL (ref 80–100)
NRBC BLD-RTO: 0 /100 WBCS (ref 0–0)
PHOSPHATE SERPL-MCNC: 5.1 MG/DL (ref 2.5–4.9)
PLATELET # BLD AUTO: 203 X10*3/UL (ref 150–450)
POTASSIUM SERPL-SCNC: 5 MMOL/L (ref 3.5–5.3)
PROTHROMBIN TIME: 13.3 SECONDS (ref 9.8–12.4)
RBC # BLD AUTO: 3.6 X10*6/UL (ref 4.5–5.9)
SODIUM SERPL-SCNC: 130 MMOL/L (ref 136–145)
WBC # BLD AUTO: 8.3 X10*3/UL (ref 4.4–11.3)

## 2025-04-14 PROCEDURE — 2500000004 HC RX 250 GENERAL PHARMACY W/ HCPCS (ALT 636 FOR OP/ED)

## 2025-04-14 PROCEDURE — 36415 COLL VENOUS BLD VENIPUNCTURE: CPT | Performed by: STUDENT IN AN ORGANIZED HEALTH CARE EDUCATION/TRAINING PROGRAM

## 2025-04-14 PROCEDURE — 2500000001 HC RX 250 WO HCPCS SELF ADMINISTERED DRUGS (ALT 637 FOR MEDICARE OP)

## 2025-04-14 PROCEDURE — 85027 COMPLETE CBC AUTOMATED: CPT | Performed by: STUDENT IN AN ORGANIZED HEALTH CARE EDUCATION/TRAINING PROGRAM

## 2025-04-14 PROCEDURE — 2500000004 HC RX 250 GENERAL PHARMACY W/ HCPCS (ALT 636 FOR OP/ED): Performed by: STUDENT IN AN ORGANIZED HEALTH CARE EDUCATION/TRAINING PROGRAM

## 2025-04-14 PROCEDURE — 93975 VASCULAR STUDY: CPT

## 2025-04-14 PROCEDURE — 93975 VASCULAR STUDY: CPT | Performed by: SURGERY

## 2025-04-14 PROCEDURE — 83735 ASSAY OF MAGNESIUM: CPT | Performed by: STUDENT IN AN ORGANIZED HEALTH CARE EDUCATION/TRAINING PROGRAM

## 2025-04-14 PROCEDURE — 82947 ASSAY GLUCOSE BLOOD QUANT: CPT

## 2025-04-14 PROCEDURE — 1200000002 HC GENERAL ROOM WITH TELEMETRY DAILY

## 2025-04-14 PROCEDURE — RXMED WILLOW AMBULATORY MEDICATION CHARGE

## 2025-04-14 PROCEDURE — 85610 PROTHROMBIN TIME: CPT | Performed by: STUDENT IN AN ORGANIZED HEALTH CARE EDUCATION/TRAINING PROGRAM

## 2025-04-14 PROCEDURE — 80069 RENAL FUNCTION PANEL: CPT | Performed by: STUDENT IN AN ORGANIZED HEALTH CARE EDUCATION/TRAINING PROGRAM

## 2025-04-14 PROCEDURE — 2500000005 HC RX 250 GENERAL PHARMACY W/O HCPCS

## 2025-04-14 PROCEDURE — 2500000002 HC RX 250 W HCPCS SELF ADMINISTERED DRUGS (ALT 637 FOR MEDICARE OP, ALT 636 FOR OP/ED)

## 2025-04-14 RX ORDER — INSULIN LISPRO 100 [IU]/ML
0-10 INJECTION, SOLUTION INTRAVENOUS; SUBCUTANEOUS
Status: DISCONTINUED | OUTPATIENT
Start: 2025-04-14 | End: 2025-04-15 | Stop reason: HOSPADM

## 2025-04-14 RX ORDER — MAGNESIUM SULFATE HEPTAHYDRATE 40 MG/ML
4 INJECTION, SOLUTION INTRAVENOUS ONCE
Status: COMPLETED | OUTPATIENT
Start: 2025-04-14 | End: 2025-04-14

## 2025-04-14 RX ORDER — CLOPIDOGREL BISULFATE 75 MG/1
75 TABLET ORAL DAILY
Qty: 90 TABLET | Refills: 0 | Status: SHIPPED | OUTPATIENT
Start: 2025-04-14 | End: 2025-07-14

## 2025-04-14 RX ORDER — DEXTROSE 50 % IN WATER (D50W) INTRAVENOUS SYRINGE
25
Status: DISCONTINUED | OUTPATIENT
Start: 2025-04-14 | End: 2025-04-15 | Stop reason: HOSPADM

## 2025-04-14 RX ORDER — DEXTROSE 50 % IN WATER (D50W) INTRAVENOUS SYRINGE
12.5
Status: DISCONTINUED | OUTPATIENT
Start: 2025-04-14 | End: 2025-04-15 | Stop reason: HOSPADM

## 2025-04-14 RX ORDER — CLOPIDOGREL BISULFATE 75 MG/1
150 TABLET ORAL ONCE
Status: COMPLETED | OUTPATIENT
Start: 2025-04-14 | End: 2025-04-14

## 2025-04-14 RX ADMIN — ROSUVASTATIN CALCIUM 5 MG: 10 TABLET, FILM COATED ORAL at 08:48

## 2025-04-14 RX ADMIN — OXYCODONE HYDROCHLORIDE 5 MG: 5 TABLET ORAL at 00:52

## 2025-04-14 RX ADMIN — OXYCODONE HYDROCHLORIDE 5 MG: 5 TABLET ORAL at 08:52

## 2025-04-14 RX ADMIN — METOPROLOL SUCCINATE 50 MG: 50 TABLET, EXTENDED RELEASE ORAL at 08:49

## 2025-04-14 RX ADMIN — APIXABAN 5 MG: 5 TABLET, FILM COATED ORAL at 21:20

## 2025-04-14 RX ADMIN — SODIUM CHLORIDE 500 ML: 0.9 INJECTION, SOLUTION INTRAVENOUS at 12:14

## 2025-04-14 RX ADMIN — GABAPENTIN 300 MG: 300 CAPSULE ORAL at 21:20

## 2025-04-14 RX ADMIN — DIGOXIN 125 MCG: 125 TABLET ORAL at 08:48

## 2025-04-14 RX ADMIN — MAGNESIUM SULFATE HEPTAHYDRATE 4 G: 40 INJECTION, SOLUTION INTRAVENOUS at 01:14

## 2025-04-14 RX ADMIN — I.V. FAT EMULSION 50 G: 20 EMULSION INTRAVENOUS at 21:20

## 2025-04-14 RX ADMIN — APIXABAN 5 MG: 5 TABLET, FILM COATED ORAL at 08:48

## 2025-04-14 RX ADMIN — CLOPIDOGREL BISULFATE 75 MG: 75 TABLET, FILM COATED ORAL at 08:48

## 2025-04-14 RX ADMIN — CLOPIDOGREL BISULFATE 150 MG: 75 TABLET ORAL at 00:46

## 2025-04-14 ASSESSMENT — COGNITIVE AND FUNCTIONAL STATUS - GENERAL
MOBILITY SCORE: 24
DAILY ACTIVITIY SCORE: 24

## 2025-04-14 ASSESSMENT — PAIN DESCRIPTION - ORIENTATION: ORIENTATION: LEFT

## 2025-04-14 ASSESSMENT — PAIN DESCRIPTION - LOCATION: LOCATION: ARM

## 2025-04-14 ASSESSMENT — PAIN SCALES - GENERAL: PAINLEVEL_OUTOF10: 4

## 2025-04-14 NOTE — CONSULTS
Nutrition Initial Assessment:   Nutrition Assessment    Reason for Assessment: Admission nursing screening, Provider consult order, Parenteral assessment/recommendation (TPN/PPN)    Patient is a 69 y.o. male presenting with worsening lower abdominal pain in the setting of SMA stent with possible occlusion. Pt is s/p balloon angioplasty of the celiac artery as of 4/13.     Per MD note, pt to remain NPO until mesenteric duplex today. If able to advance diet, recommend weaning PPN. If unable to advance diet, recommend placing PICC line to transition to TPN to better meet pt's estimated needs.     Nutrition History:  Food and Nutrient History: Attempted to call pt - no answer. Pt is currently receiving Clinimix 4.25/5 at 40 mL/hr via peripheral IV. He is also recieving Intralipids 20%, 250 mL daily. This regimen provides 1180 kcal and 85 gm protein daily, This meets only 58% of pt's estimated energy needs, but 100% of estimated protein needs. Per MD notes, pt is to remain NPO until mesenteric duplex today.       Anthropometrics:  Height: 182.9 cm (6')   Weight: 67.4 kg (148 lb 7.7 oz)   BMI (Calculated): 20.13  IBW/kg (Dietitian Calculated): 80.91 kg  Percent of IBW: 83 %                      Weight History:   Wt Readings from Last 10 Encounters:   04/13/25 67.4 kg (148 lb 7.7 oz)   04/11/25 72.6 kg (160 lb)   02/25/25 72.6 kg (160 lb)   02/14/25 76 kg (167 lb 9.6 oz)   02/12/25 76.3 kg (168 lb 3.4 oz)   01/23/25 76.2 kg (168 lb)   02/10/25 70.3 kg (155 lb)   11/13/24 73.4 kg (161 lb 13.1 oz)   10/02/24 70.8 kg (156 lb)   08/07/24 69.7 kg (153 lb 10.6 oz)       Weight Change %:  Weight History / % Weight Change: Noted a 3.4 kg LOSS (4.8% change) in 6 months  Significant Weight Loss: No    Nutrition Focused Physical Exam Findings:  Defer: remote assessment  Subcutaneous Fat Loss:      Muscle Wasting:     Edema:     Physical Findings:  Skin: Positive (puncture to left upper arm)    Nutrition Significant Labs:  BMP Trend:    Results from last 7 days   Lab Units 04/14/25  0733 04/13/25  1107 04/11/25  2103 04/11/25  1118   GLUCOSE mg/dL 244* 94 71* 119*   CALCIUM mg/dL 8.6 8.9 7.8* 9.3   SODIUM mmol/L 130* 139 138 132*   POTASSIUM mmol/L 5.0 3.9 4.1 3.8   CO2 mmol/L 21 25 20* 22   CHLORIDE mmol/L 99 106 110* 101   BUN mg/dL 10 13 14 18   CREATININE mg/dL 1.03 1.00 0.95 1.28    , TPN/PPN Labs:   Results from last 7 days   Lab Units 04/14/25  0733 04/13/25  1107 04/11/25 2103 04/11/25 2103 04/11/25  1118 04/11/25  1118   GLUCOSE mg/dL 244* 94  --  71*  --  119*   POTASSIUM mmol/L 5.0 3.9  --  4.1  --  3.8   PHOSPHORUS mg/dL 5.1* 2.9   < >  --   --   --    MAGNESIUM mg/dL 2.68* 1.70  --  1.47*   < >  --    SODIUM mmol/L 130* 139  --  138  --  132*   CHLORIDE mmol/L 99 106  --  110*  --  101   ALT U/L  --   --   --   --   --  7*   AST U/L  --   --   --   --   --  13   ALK PHOS U/L  --   --   --   --   --  51   BILIRUBIN TOTAL mg/dL  --   --   --   --   --  0.5    < > = values in this interval not displayed.        Nutrition Specific Medications:  Noted dulcolax, plavix, crestor    I/O:    ;      Dietary Orders (From admission, onward)       Start     Ordered    04/14/25 0001  NPO Diet Except: Sips with meds, Ice chips; Effective midnight  Diet effective midnight        Question Answer Comment   Except: Sips with meds    Except: Ice chips        04/13/25 2004 04/11/25 1950  May Participate in Room Service  ( ROOM SERVICE MAY PARTICIPATE)  Once        Question:  .  Answer:  Yes    04/11/25 1949                     Estimated Needs:   Total Energy Estimated Needs in 24 hours (kCal): 2022 kCal  Method for Estimating Needs: ABW  Total Protein Estimated Needs in 24 Hours (g): 84 g  Method for Estimating 24 Hour Protein Needs: ABW  Total Fluid Estimated Needs in 24 Hours (mL): 2022 mL  Method for Estimating 24 Hour Fluid Needs: ABW        Nutrition Diagnosis        Nutrition Diagnosis  Patient has Nutrition Diagnosis: Yes  Diagnosis Status  (1): New  Nutrition Diagnosis 1: Inadequate energy intake  Related to (1): occlusion of SMA stent  As Evidenced by (1): pt is NPO, on PPN for nutrition at this time       Nutrition Interventions/Recommendations   Nutrition prescription for parenteral nutrition    Nutrition Recommendations:  Individualized Nutrition Prescription Provided for : Recommend continuing current PPN regimen until findings from mesenteric duplex are available. If diet is able to be advanced, then recommend weaning PPN. If diet is not able to be advanced, recommend placing PICC line to transition to TPN to better meet pt's estimated needs.    Nutrition Interventions/Goals:   Meals and Snacks: General healthful diet  Goal: Recommend advancing diet if able per MD after mesenteric duplex findings  Parenteral Nutrition: Management of composition of parenteral nutrition, Management of concentration of parenteral nutrition  Goal: If unable to advance diet today, recommend placing PICC line for TPN to better meet pt's estimated needs  Coordination of Care with Providers: Nursing  Goal: Spoke with RNAlek      Education Documentation  No documentation found.            Nutrition Monitoring and Evaluation   Food/Nutrient Related History Monitoring  Monitoring and Evaluation Plan: Enteral and parenteral nutrition intake determination  Enteral and Parenteral Nutrition Intake Determination: Parenteral nutrition intake - To meet > 75% estimated energy needs    Anthropometric Measurements  Monitoring and Evaluation Plan: Body weight  Body Weight: Body weight - Maintain stable weight    Biochemical Data, Medical Tests and Procedures  Monitoring and Evaluation Plan: Electrolyte/renal panel, Glucose/endocrine profile  Electrolyte and Renal Panel: Electrolytes within normal limits  Glucose/Endocrine Profile: Glucose within normal limits ( mg/dL)         Goal Status: New goal(s) identified    Time Spent (min): 60 minutes         04/14/25 at 12:52 PM -  YFN SCOTT, RDN, LD

## 2025-04-14 NOTE — CARE PLAN
The patient's goals for the shift include to remain comfortable throughout shift    The clinical goals for the shift include safety, remain hds      Problem: Skin  Goal: Promote skin healing  Outcome: Progressing     Problem: Safety - Adult  Goal: Free from fall injury  Outcome: Progressing     Problem: Pain - Adult  Goal: Verbalizes/displays adequate comfort level or baseline comfort level  Outcome: Progressing     Problem: Chronic Conditions and Co-morbidities  Goal: Patient's chronic conditions and co-morbidity symptoms are monitored and maintained or improved  Outcome: Progressing     Problem: Discharge Planning  Goal: Discharge to home or other facility with appropriate resources  Outcome: Progressing     Problem: Nutrition  Goal: Nutrient intake appropriate for maintaining nutritional needs  Outcome: Progressing     Problem: Skin  Goal: Participates in plan/prevention/treatment measures  Outcome: Progressing

## 2025-04-14 NOTE — SIGNIFICANT EVENT
Postoperative Check Note    Subjective  Kevin Rubi is a 69 y.o. male who is now POD0 s/p Celiac stenting, left brachial artery. Postoperatively, patient feels well, and denies fevers/chills, n/v, chest pain, shortness of breath. Feels his pain is well-controlled and appropriate for this time. Has no other concerns.    Objective  Vitals:  Visit Vitals  /59   Pulse 75   Temp 36.7 °C (98.1 °F)   Resp 18       Physical Exam:  NAD  RR  Nml work of breathing  Vasc: left arm brachial and ulner pulses palpable, Motor and Sensation intact    Most recent labs:            8.5     5.1>-----<194              27.5     139  106  13                  ----------------<94     3.9  25  1.00            Mg 1.70         Assessment  Kevin Rubi is a 69 y.o. male who is now POD0 s/p celiac stent via L brachial access.  Patient is in fair condition, appropriate for postoperative course. The plan is as follows:    Pain control  Plavix load 150, 75qday starting tomorrow  STEFANIE Maxwell MD  PGY-1 General Surgery  Vascular surgery

## 2025-04-14 NOTE — PROGRESS NOTES
"  VASCULAR SURGERY PROGRESS NOTE  Assessment/Plan   Kevin Rubi is 69 y.o. male with history of atrial fibrillation, CAD, HLD, HTN, lung cancer with mets to brain, SMA stenosis status post \"stenting with a biliary stent deployed across stenosis\" (8/10/2023), incarcerated R inguinal hernia s/p exlap, SBR, open repair who presents with progressively worsening abdominal pain. CT scan from OSH demonstrates no flow seen in proximal SMA with collaterals from the celiac artery, small dissection of infrarenal aorta. Abdominal pain in setting of CTA and vascular history likely representing chronic mesenteric ischemia.     POD1 from balloon angioplasty of the celiac artery with DCB placement  (4/13). Recovering well, on PPN; plan for mesenteric duplex today.     Plan:    Neuro:  Pain: tylenol, oxy5 prn   Continue gabapentin PRN   Continue home tizanidine PRN    Cardiovascular:   #hypotension  #afib   #CAD  #hx HTN  - Continue digoxin and metoprolol with hold parameters in setting of hypotension  - Resume home eliquis, plavix   - continue rosuvastatin  - continue to hold hold home entresto, spirinolactone    #SMA stent occlusion  #celiac artery stenosis  #c/f chronic mesenteric ischemia  - off hep gtt, resume home eliquis + plavix (no asa)  - q4h NV    Pulmonary:   #lung cancer with mets to brain ([facility], stage III C), LLL  - Pulmonary hygiene  - Incentive spirometry    GI:  #s/p prior exlap, SBR, open R inguinal hernia repair  #chronic mesenteric ischemia  #weight loss in setting of malignancy  - plan for mesenteric duplex today  - NPO, will consider PO challenge pending duplex   - continue PPN  - bowel regimen: dulcolax PRN     :  Voiding    MSK:  PT/OT evaluation    Endo:  No insulin needs  Q4h POCT    Heme:  #INR 1.4  - trend coags as needed, no indication for FFP/vit k at this time     ID:  No indication of antibiotics    PPX: eliquis, asa   F: PPN @ 40 ml/hr   E: Replete PRN  N: NPO pending mesenteric duplex "     Dispo: RNF    Discussed with Dr. Young Spicer MD  PGY-1 General Surgery  Vascular Surgery [id number]    Subjective   NAEON. Vitally stable overnight, no additional episodes of hypotension. He states that his abdominal pain this morning is minimal, much improved from pre-op. Passing flatus. No nausea, vomiting, has been tolerating PPN with no issues.     Objective   Vitals:  Heart Rate:  [60-81]   Temp:  [36 °C (96.8 °F)-36.8 °C (98.2 °F)]   Resp:  [0-18]   BP: (103-139)/(55-75)   Height:  [182.9 cm (6')]   Weight:  [67.4 kg (148 lb 7.7 oz)]   SpO2:  [92 %-100 %]     Exam:  Constitutional: No acute distress, resting comfortably  Neuro:  AOx3, grossly intact  ENMT: moist mucous membranes  Head/neck: atraumatic  CV: no tachycardia  Pulm: non-labored on room air  GI: soft, non-tender, non-distended. Well healed midline scar on abdomen.   Skin: warm and dry  Musculoskeletal: moving all extremities  Extremities: Palpable radial pulses. Biphasic PT signals, monophasic DP signals. L brachial access site with overlying occlusive dressing is hemostatic, no e/o swelling or hematoma     Labs:  Results from last 7 days   Lab Units 04/13/25  1107 04/11/25 2103 04/11/25  1118   WBC AUTO x10*3/uL 5.1 5.2 10.3   HEMOGLOBIN g/dL 8.5* 8.9* 11.2*   PLATELETS AUTO x10*3/uL 194 178 238      Results from last 7 days   Lab Units 04/13/25  1107 04/11/25  2103 04/11/25  1118 04/11/25  1118   SODIUM mmol/L 139 138  --  132*   POTASSIUM mmol/L 3.9 4.1  --  3.8   CHLORIDE mmol/L 106 110*  --  101   CO2 mmol/L 25 20*  --  22   BUN mg/dL 13 14  --  18   CREATININE mg/dL 1.00 0.95  --  1.28   GLUCOSE mg/dL 94 71*  --  119*   MAGNESIUM mg/dL 1.70 1.47*   < >  --    PHOSPHORUS mg/dL 2.9  --   --   --     < > = values in this interval not displayed.      Results from last 7 days   Lab Units 04/13/25  0032 04/11/25  2103 04/11/25  1428   INR  1.4* 1.7* 1.9*   PROTIME seconds 15.3* 18.8* 20.9*   APTT seconds  --  34 36      Results from last 7  days   Lab Units 04/13/25  1107 04/13/25  0640 04/13/25  0032   ANTI XA UNFRACTIONATED IU/mL 0.5 0.7 0.8

## 2025-04-15 ENCOUNTER — PHARMACY VISIT (OUTPATIENT)
Dept: PHARMACY | Facility: CLINIC | Age: 70
End: 2025-04-15
Payer: COMMERCIAL

## 2025-04-15 VITALS
TEMPERATURE: 98.1 F | RESPIRATION RATE: 18 BRPM | OXYGEN SATURATION: 96 % | HEIGHT: 72 IN | BODY MASS INDEX: 20.11 KG/M2 | SYSTOLIC BLOOD PRESSURE: 96 MMHG | WEIGHT: 148.48 LBS | HEART RATE: 81 BPM | DIASTOLIC BLOOD PRESSURE: 53 MMHG

## 2025-04-15 PROBLEM — K55.1: Status: RESOLVED | Noted: 2025-04-11 | Resolved: 2025-04-15

## 2025-04-15 LAB — GLUCOSE BLD MANUAL STRIP-MCNC: 125 MG/DL (ref 74–99)

## 2025-04-15 PROCEDURE — 2500000001 HC RX 250 WO HCPCS SELF ADMINISTERED DRUGS (ALT 637 FOR MEDICARE OP)

## 2025-04-15 PROCEDURE — 82947 ASSAY GLUCOSE BLOOD QUANT: CPT

## 2025-04-15 PROCEDURE — 99239 HOSP IP/OBS DSCHRG MGMT >30: CPT | Performed by: NURSE PRACTITIONER

## 2025-04-15 PROCEDURE — 2500000002 HC RX 250 W HCPCS SELF ADMINISTERED DRUGS (ALT 637 FOR MEDICARE OP, ALT 636 FOR OP/ED)

## 2025-04-15 RX ORDER — METOPROLOL SUCCINATE 50 MG/1
50 TABLET, EXTENDED RELEASE ORAL DAILY
Start: 2025-04-15

## 2025-04-15 RX ORDER — ACETAMINOPHEN 325 MG/1
650 TABLET ORAL EVERY 6 HOURS PRN
Start: 2025-04-15

## 2025-04-15 RX ORDER — SPIRONOLACTONE 25 MG/1
12.5 TABLET ORAL DAILY
Start: 2025-04-15

## 2025-04-15 RX ADMIN — METOPROLOL SUCCINATE 50 MG: 50 TABLET, EXTENDED RELEASE ORAL at 09:02

## 2025-04-15 RX ADMIN — ROSUVASTATIN CALCIUM 5 MG: 10 TABLET, FILM COATED ORAL at 09:03

## 2025-04-15 RX ADMIN — CLOPIDOGREL BISULFATE 75 MG: 75 TABLET, FILM COATED ORAL at 09:03

## 2025-04-15 RX ADMIN — APIXABAN 5 MG: 5 TABLET, FILM COATED ORAL at 09:03

## 2025-04-15 RX ADMIN — DIGOXIN 125 MCG: 125 TABLET ORAL at 09:03

## 2025-04-15 ASSESSMENT — COGNITIVE AND FUNCTIONAL STATUS - GENERAL
MOBILITY SCORE: 24
DAILY ACTIVITIY SCORE: 24
MOBILITY SCORE: 24
PERSONAL GROOMING: A LITTLE
DAILY ACTIVITIY SCORE: 23

## 2025-04-15 ASSESSMENT — PAIN SCALES - GENERAL
PAINLEVEL_OUTOF10: 0 - NO PAIN
PAINLEVEL_OUTOF10: 0 - NO PAIN

## 2025-04-15 NOTE — NURSING NOTE
Removed pt Ivs. Went over discharge instruction with pt and family,and went over meds. Pt expressed understanding. Answered questions and concerns. Waiting on meds to beds to bring meds. Pt packed all belongings up. Will be transported home via vehicle by family member. Taken down by  transport to front lobby.    Janessa Monroe RN   04/15/2025 10:30

## 2025-04-15 NOTE — CARE PLAN
The patient's goals for the shift include to remain comfortable throughout shift    The clinical goals for the shift include pt will remain HDS throughout shift      Problem: Skin  Goal: Decreased wound size/increased tissue granulation at next dressing change  Outcome: Progressing  Goal: Participates in plan/prevention/treatment measures  Outcome: Progressing  Goal: Prevent/manage excess moisture  Outcome: Progressing  Goal: Prevent/minimize sheer/friction injuries  Outcome: Progressing  Goal: Promote/optimize nutrition  Outcome: Progressing  Goal: Promote skin healing  Outcome: Progressing     Problem: Pain - Adult  Goal: Verbalizes/displays adequate comfort level or baseline comfort level  Outcome: Progressing     Problem: Safety - Adult  Goal: Free from fall injury  Outcome: Progressing     Problem: Discharge Planning  Goal: Discharge to home or other facility with appropriate resources  Outcome: Progressing     Problem: Chronic Conditions and Co-morbidities  Goal: Patient's chronic conditions and co-morbidity symptoms are monitored and maintained or improved  Outcome: Progressing     Problem: Nutrition  Goal: Nutrient intake appropriate for maintaining nutritional needs  Outcome: Progressing

## 2025-04-15 NOTE — DISCHARGE SUMMARY
"Discharge Diagnosis  Chronic mesenteric insufficiency (Multi)    Test Results Pending At Discharge  Pending Labs       No current pending labs.            Hospital Course   Kevin Rubi is 69 y.o. male with history of atrial fibrillation, CAD, HLD, HTN, lung cancer with mets to brain, SMA stenosis status post \"stenting with a biliary stent deployed across stenosis\" (8/10/2023), incarcerated R inguinal hernia s/p exlap, SBR, open repair who presents with progressively worsening abdominal pain. CT scan from OSH demonstrates no flow seen in proximal SMA with collaterals from the celiac artery, small dissection of infrarenal aorta. He underwent balloon angioplasty of the celiac artery with DCB (4/13 with Dr. Vidal. Post operative course was uncomplicated with stable VS and labs, able to tolerate diet without pain. Post op mesenteric duplex showed patency of celiac stent with elevated velocity  of 383 at origin. He was discharged on POD#2. He will follow up in clinic with Dr. Martel and repeat mesenteric duplex US. Discharged with resumption of Plavix and Eliquis.    Pertinent Physical Exam At Time of Discharge  Physical Exam  Constitutional: No acute distress, resting comfortably  Neuro:  AOx3, grossly intact  ENMT: moist mucous membranes  Head/neck: atraumatic  CV: no tachycardia  Pulm: non-labored on room air  GI: soft, non-tender, non-distended.    Skin: warm and dry  Musculoskeletal: moving all extremities. L brachial access site without hematoma, palpable radial pulse.     Home Medications     Medication List      CHANGE how you take these medications     metoprolol succinate XL 50 mg 24 hr tablet; Commonly known as:   Toprol-XL; What changed: Another medication with the same name was   removed. Continue taking this medication, and follow the directions you   see here.     CONTINUE taking these medications     apixaban 5 mg tablet; Commonly known as: Eliquis; Take 1 tablet (5 mg)   by mouth 2 times a day.   " Blood Glucose Test; Generic drug: blood sugar diagnostic; 1 strip once   daily.   blood-glucose meter misc; Use daily to test blood sugar   clopidogrel 75 mg tablet; Commonly known as: Plavix; Take 1 tablet (75   mg) by mouth once daily.   digoxin 125 MCG tablet; Commonly known as: Lanoxin; TAKE 1 TABLET DAILY   fenofibrate 145 mg tablet; Commonly known as: Tricor; TAKE 1 TABLET BY   MOUTH ONCE  DAILY   furosemide 20 mg tablet; Commonly known as: Lasix; Take 1 tablet (20 mg)   by mouth see administration instructions. 1 tablet as needed for swelling,   weight gain of 3# or increased shortness of breath.   gabapentin 300 mg capsule; Commonly known as: Neurontin; TAKE 1 CAPSULE   (300 MG) BY MOUTH ONCE DAILY AT BEDTIME.   lancets misc; Use daily to test blood sugar   rosuvastatin 5 mg tablet; Commonly known as: Crestor; TAKE 1 TABLET BY   MOUTH ONCE  DAILY   sacubitriL-valsartan 24-26 mg tablet; Commonly known as: Entresto; Take   0.5 tablets by mouth 2 times a day.   spironolactone 25 mg tablet; Commonly known as: Aldactone; Take 0.5   tablets (12.5 mg) by mouth once daily.   tiZANidine 2 mg tablet; Commonly known as: Zanaflex; TAKE 1 TABLET BY   MOUTH 2 TIMES A DAY AS NEEDED FOR MUSCLE SPASMS.     STOP taking these medications     dexAMETHasone 2 mg tablet; Commonly known as: Decadron       Outpatient Follow-Up  Future Appointments   Date Time Provider Department Center   4/22/2025  9:45 AM Jose Juan Hayes DO [id number] [facility]   5/1/2025  1:00 PM [id number]   5/1/2025  2:30 PM Natalie Plunkett MD [id number] [facility]   5/16/2025 11:00 AM POR CT 1 PORCT [place] [facility]   5/21/2025 11:00 AM RUKHSANA Delgado-CNP [id number] [facility]   7/15/2025  9:30 AM Leann Rosario MD [id number] [facility]   7/28/2025 11:40 AM RUKHSANA Fried-CNS [id number] [facility]       RUKHSANA Gregory-CNP

## 2025-04-16 RX ORDER — IODIXANOL 320 MG/ML
INJECTION, SOLUTION INTRAVASCULAR AS NEEDED
Status: DISCONTINUED | OUTPATIENT
Start: 2025-04-16 | End: 2025-04-16 | Stop reason: HOSPADM

## 2025-04-22 ENCOUNTER — APPOINTMENT (OUTPATIENT)
Dept: VASCULAR SURGERY | Facility: HOSPITAL | Age: 70
End: 2025-04-22
Payer: COMMERCIAL

## 2025-04-22 NOTE — DOCUMENTATION CLARIFICATION NOTE
"    PATIENT:               VON KELLY  ACCT #:                  [id number]  MRN:                       [id number]  :                       1955  ADMIT DATE:       2025 3:27 PM  DISCH DATE:        4/15/2025 10:39 AM  RESPONDING PROVIDER #:        [id number]          PROVIDER RESPONSE TEXT:    Severe malnutrition present    CDI QUERY TEXT:    Clarification    Instruction:    Based on your assessment of the patient and the clinical information, please provide the requested documentation by clicking on the appropriate radio button and enter any additional information if prompted.    Question: Please further clarify this patient nutritional status as    When answering this query, please exercise your independent professional judgment. The fact that a question is being asked, does not imply that any particular answer is desired or expected.    The patient's clinical indicators include:  Clinical Information:  4/15 DCS: 69 YOM w/PMH s/f lung cancer w/ brain mets, SMA stenosis s/p stenting (), incarcerated R inguinal hernia s/p exploratory lap and small bowel resection. Presented with progressive worsening of abdominal pain.    Clinical Indicators:   PN \"weight loss in setting of malignancy\"  \"Obtain prealbumin, LFTs to determine if malnutrition contributing\"     Op Note \"suspicion that this was due to his overall malnutrition\"     PN \"PPN at 40 ml/hr\"     Dietician consult \"Inadequate energy intake  Related to (1): occlusion of SMA stent  As Evidenced by (1): pt is NPO, on PPN for nutrition at this time\"  \"BMI (Calculated): 20.13\"    MAR order: [id number]  Adult Clinimix Parenteral Nutrition Continuous  Indication:    Severe malnutrition    MAR order: [id number]  Intralipid 20%    Labs:   Lipase 12   Albumin 4.0   Total Protein 7.2   Prealbumin 9.8    Treatment:   Clinimix Parenteral Nutrition and Intralipids started  Additional labwork to determine nutrition " status  Dietician consult recommends continuing PPN regimen; if able to advance diet, recommend weaning PPN; if unable to advance diet, recommend placing PICC line to transition to TPN.    Risk Factors:  Older patient with weight loss/abd pain  Hx lung cancer with mets  Chronic mesenteric ischemia  s/p small bowel resection  Options provided:  -- Severe malnutrition present  -- Other - I will add my own diagnosis  -- Refer to Clinical Documentation Reviewer    Query created by: Lainey Paulino on 4/21/2025 3:49 PM      Electronically signed by:  SELVIN ADKINS 4/22/2025 7:52 AM

## 2025-04-23 ENCOUNTER — APPOINTMENT (OUTPATIENT)
Dept: VASCULAR MEDICINE | Facility: HOSPITAL | Age: 70
End: 2025-04-23
Payer: MEDICARE

## 2025-05-01 ENCOUNTER — HOSPITAL ENCOUNTER (OUTPATIENT)
Dept: RADIATION ONCOLOGY | Facility: HOSPITAL | Age: 70
Setting detail: RADIATION/ONCOLOGY SERIES
Discharge: HOME | End: 2025-05-01
Payer: COMMERCIAL

## 2025-05-01 ENCOUNTER — HOSPITAL ENCOUNTER (OUTPATIENT)
Dept: RADIOLOGY | Facility: HOSPITAL | Age: 70
Discharge: HOME | End: 2025-05-01
Payer: MEDICARE

## 2025-05-01 VITALS
WEIGHT: 166.4 LBS | OXYGEN SATURATION: 95 % | BODY MASS INDEX: 22.57 KG/M2 | SYSTOLIC BLOOD PRESSURE: 95 MMHG | TEMPERATURE: 97 F | HEART RATE: 67 BPM | RESPIRATION RATE: 18 BRPM | DIASTOLIC BLOOD PRESSURE: 61 MMHG

## 2025-05-01 DIAGNOSIS — C79.31 MALIGNANT NEOPLASM METASTATIC TO BRAIN (MULTI): ICD-10-CM

## 2025-05-01 DIAGNOSIS — C79.31 MALIGNANT NEOPLASM METASTATIC TO BRAIN (MULTI): Primary | ICD-10-CM

## 2025-05-01 PROCEDURE — A9575 INJ GADOTERATE MEGLUMI 0.1ML: HCPCS | Performed by: STUDENT IN AN ORGANIZED HEALTH CARE EDUCATION/TRAINING PROGRAM

## 2025-05-01 PROCEDURE — 2550000001 HC RX 255 CONTRASTS: Performed by: STUDENT IN AN ORGANIZED HEALTH CARE EDUCATION/TRAINING PROGRAM

## 2025-05-01 PROCEDURE — 70553 MRI BRAIN STEM W/O & W/DYE: CPT

## 2025-05-01 RX ORDER — GADOTERATE MEGLUMINE 376.9 MG/ML
14 INJECTION INTRAVENOUS
Status: COMPLETED | OUTPATIENT
Start: 2025-05-01 | End: 2025-05-01

## 2025-05-01 RX ADMIN — GADOTERATE MEGLUMINE 14 ML: 376.9 INJECTION INTRAVENOUS at 12:55

## 2025-05-01 ASSESSMENT — PAIN SCALES - GENERAL: PAINLEVEL_OUTOF10: 0-NO PAIN

## 2025-05-01 ASSESSMENT — ENCOUNTER SYMPTOMS
EXTREMITY WEAKNESS: 0
VOMITING: 0
EYE PROBLEMS: 1
DIZZINESS: 0
BACK PAIN: 0
NERVOUS/ANXIOUS: 0
NAUSEA: 0
HEADACHES: 0
SEIZURES: 0
ARTHRALGIAS: 0
MYALGIAS: 0
NUMBNESS: 1
LOSS OF SENSATION IN FEET: 0
FATIGUE: 0
COUGH: 0
SPEECH DIFFICULTY: 0
DIARRHEA: 0
DEPRESSION: 0
DECREASED CONCENTRATION: 0
LIGHT-HEADEDNESS: 0
LEG SWELLING: 1
UNEXPECTED WEIGHT CHANGE: 0
CONSTIPATION: 0
CONFUSION: 0
SHORTNESS OF BREATH: 0
APPETITE CHANGE: 1
OCCASIONAL FEELINGS OF UNSTEADINESS: 0

## 2025-05-01 NOTE — PROGRESS NOTES
RADIATION ONCOLOGY FOLLOW UP NOTE    Patient Name: Kevin Rubi  Provider: Natalie Plunkett MD  MRN: [id number]  : 1955  Date: 2025    DIAGNOSIS: lung adenocarcinoma with brain metastases    CURRENT STATUS OF DISEASE: no progressing intracranial disease    PRIOR RADIATION TREATMENT:    Treatment Summary:  IMRT: Left Lower lobe of lung, Mediastinum (Resolved)    Treatment Period Technique Fraction Dose Fractions Total Dose   Course 1 2023-2023  (days elapsed: 43)         Left lung_Med 2023-2023 IMRT 200 / 200 cGy 7 /  1,400 / 1,400 cGy         Replan Lt Lung/Med 2023-2023 IMRT 200 / 200 cGy  4,600 / 4,600 cGy      Radiation Therapy    Treatment Period Technique Fraction Dose Fractions Total Dose   Course 2 2025-2025  (days elapsed: 0)         A: 2025-2025 Gamma-Knife 2,200 / 2,200 cGy 1 / 1 2,200 / 2,200 cGy         B: 2025-2025 Gamma-Knife 2,200 / 2,200 cGy 1 / 1 2,200 / 2,200 cGy         C: 2025-2025 Gamma-Knife 2,200 / 2,200 cGy 1 / 1 2,200 / 2,200 cGy       INTERVAL SINCE COMPLETION OF TREATMENT: 2 months    Interval History:  Patient presents today with his wife.  He has no recent systemic staging.  He is currently on surveillance and not on any active systemic agent.  His brain MRI today shows no evidence of new or progressing disease intracranially.  Patient denies any new or worsening neurologic symptoms.    Review of Systems:  A complete 14 system review was negative except as noted in the HPI above.    Allergies: Allergies[1]    Medications: Current Medications[2]    Physical Exam:   KPS: 90  General: NAD, patient well appearing.  HEENT: Trachea midline  CV: Well perfused  Resp: Breathing is non-labored.   Abdomen: Soft, ND  Extremities: No acute findings. No edema   Skin: color, texture and turgor wnl. No rashes and lesions.  Neuro: AAO x 3, appropriately interactive, normal affect, speech  fluent. Cranial nerves II through XII are intact grossly and symmetrically. No focal neurologic deficit. Normal strength and sensation to LT intact bilaterally in the upper and lower extremities. No dysmetria or dysdiadochokinesia.     Imaging and Diagnostic Studies:   See HPI above    TOXICITY: none    Assessment/Plan:    Kevin Rubi is a 69-year-old male with metastatic lung adenocarcinoma, currently on surveillance, here today for follow-up visit. The patient presents without evidence of intracranial recurrence    1) RTC in 3 months  2) MRI Brain (W/WO Contrast) in 3 months    All the patient's questions were answered to verbalized satisfaction. We instructed the patient to call with any questions or concerns in the interim.    Natalie Plunkett MD    LONGITUDINAL CARE, EXTRA EFFORT/ : The patient will be followed longitudinally by providers (including APPs) in the department of radiation oncology for monitoring treatment effects during and after radiation. Additional effort needed in the setting of his diagnosis and coordination of care with other treating physicians.            [1] No Known Allergies  [2]   Current Outpatient Medications:     acetaminophen (Tylenol) 325 mg tablet, Take 2 tablets (650 mg) by mouth every 6 hours if needed for moderate pain (4 - 6) or mild pain (1 - 3)., Disp: , Rfl:     apixaban (Eliquis) 5 mg tablet, Take 1 tablet (5 mg) by mouth 2 times a day., Disp: 60 tablet, Rfl: 11    blood sugar diagnostic (Blood Glucose Test) strip, 1 strip once daily., Disp: 100 strip, Rfl: 11    blood-glucose meter misc, Use daily to test blood sugar, Disp: 1 each, Rfl: 0    clopidogrel (Plavix) 75 mg tablet, Take 1 tablet (75 mg) by mouth once daily., Disp: 90 tablet, Rfl: 0    digoxin (Lanoxin) 125 MCG tablet, TAKE 1 TABLET DAILY, Disp: 90 tablet, Rfl: 0    fenofibrate (Tricor) 145 mg tablet, TAKE 1 TABLET BY MOUTH ONCE  DAILY, Disp: 100 tablet, Rfl: 2    furosemide (Lasix) 20 mg tablet, Take  1 tablet (20 mg) by mouth see administration instructions. 1 tablet as needed for swelling, weight gain of 3# or increased shortness of breath., Disp: 90 tablet, Rfl: 0    gabapentin (Neurontin) 300 mg capsule, TAKE 1 CAPSULE (300 MG) BY MOUTH ONCE DAILY AT BEDTIME., Disp: 60 capsule, Rfl: 2    lancets misc, Use daily to test blood sugar, Disp: 100 each, Rfl: 3    metoprolol succinate XL (Toprol-XL) 50 mg 24 hr tablet, Take 1 tablet (50 mg) by mouth once daily. Please follow up with cardiologist or PCP prior to resuming this medication, Disp: , Rfl:     rosuvastatin (Crestor) 5 mg tablet, TAKE 1 TABLET BY MOUTH ONCE  DAILY, Disp: 90 tablet, Rfl: 2    sacubitriL-valsartan (Entresto) 24-26 mg tablet, Take 0.5 tablets by mouth 2 times a day. Please follow up with cardiologist or PCP prior to resuming this medication, Disp: , Rfl:     spironolactone (Aldactone) 25 mg tablet, Take 0.5 tablets (12.5 mg) by mouth once daily. Please follow up with cardiologist or PCP prior to resuming this medication, Disp: , Rfl:     tiZANidine (Zanaflex) 2 mg tablet, TAKE 1 TABLET BY MOUTH 2 TIMES A DAY AS NEEDED FOR MUSCLE SPASMS., Disp: 180 tablet, Rfl: 1

## 2025-05-01 NOTE — PROGRESS NOTES
Radiation Oncology Nursing Note    Pain: The patient's current pain level was assessed.  They report currently having a pain of 0 out of 10.  They feel their pain is under control without the use of pain medications.    Review of Systems:  Review of Systems   Constitutional:  Positive for appetite change (due to abdominal pain). Negative for fatigue and unexpected weight change (weight has been stable).   HENT:   Positive for tinnitus (chronic, no changes). Negative for hearing loss.    Eyes:  Positive for eye problems (hx of cataracts in left eye).   Respiratory:  Negative for cough and shortness of breath.    Cardiovascular:  Positive for leg swelling (intermittently, none current). Negative for chest pain.   Gastrointestinal:  Negative for constipation, diarrhea, nausea and vomiting.   Musculoskeletal:  Negative for arthralgias, back pain, gait problem and myalgias.   Neurological:  Positive for numbness (occasionally in feet and hands, comes and goes). Negative for dizziness, extremity weakness, gait problem, headaches, light-headedness, seizures and speech difficulty.        Pt reports occasionally legs falling asleep     Psychiatric/Behavioral:  Negative for confusion, decreased concentration, depression and suicidal ideas. The patient is not nervous/anxious.

## 2025-05-14 ENCOUNTER — HOSPITAL ENCOUNTER (OUTPATIENT)
Dept: VASCULAR MEDICINE | Facility: HOSPITAL | Age: 70
Discharge: HOME | End: 2025-05-14
Payer: COMMERCIAL

## 2025-05-14 ENCOUNTER — OFFICE VISIT (OUTPATIENT)
Dept: VASCULAR SURGERY | Facility: HOSPITAL | Age: 70
End: 2025-05-14
Payer: COMMERCIAL

## 2025-05-14 VITALS
WEIGHT: 167 LBS | HEART RATE: 68 BPM | OXYGEN SATURATION: 83 % | BODY MASS INDEX: 22.65 KG/M2 | SYSTOLIC BLOOD PRESSURE: 84 MMHG | DIASTOLIC BLOOD PRESSURE: 53 MMHG

## 2025-05-14 DIAGNOSIS — K55.1 SUPERIOR MESENTERIC ARTERY STENOSIS (MULTI): ICD-10-CM

## 2025-05-14 DIAGNOSIS — K55.9 ACUTE VASCULAR DISORDER OF INTESTINE (MULTI): ICD-10-CM

## 2025-05-14 DIAGNOSIS — K55.1 CHRONIC MESENTERIC ISCHEMIA (MULTI): Primary | ICD-10-CM

## 2025-05-14 PROCEDURE — 3074F SYST BP LT 130 MM HG: CPT | Performed by: NURSE PRACTITIONER

## 2025-05-14 PROCEDURE — 3078F DIAST BP <80 MM HG: CPT | Performed by: NURSE PRACTITIONER

## 2025-05-14 PROCEDURE — 99212 OFFICE O/P EST SF 10 MIN: CPT | Performed by: NURSE PRACTITIONER

## 2025-05-14 PROCEDURE — 93975 VASCULAR STUDY: CPT | Performed by: SURGERY

## 2025-05-14 PROCEDURE — 1159F MED LIST DOCD IN RCRD: CPT | Performed by: NURSE PRACTITIONER

## 2025-05-14 PROCEDURE — 1126F AMNT PAIN NOTED NONE PRSNT: CPT | Performed by: NURSE PRACTITIONER

## 2025-05-14 PROCEDURE — 1111F DSCHRG MED/CURRENT MED MERGE: CPT | Performed by: NURSE PRACTITIONER

## 2025-05-14 PROCEDURE — 93975 VASCULAR STUDY: CPT

## 2025-05-14 RX ORDER — ROSUVASTATIN CALCIUM 20 MG/1
20 TABLET, COATED ORAL NIGHTLY
Qty: 30 TABLET | Refills: 11 | Status: SHIPPED | OUTPATIENT
Start: 2025-05-14 | End: 2026-05-14

## 2025-05-14 ASSESSMENT — PAIN SCALES - GENERAL: PAINLEVEL_OUTOF10: 0-NO PAIN

## 2025-05-14 ASSESSMENT — ENCOUNTER SYMPTOMS
OCCASIONAL FEELINGS OF UNSTEADINESS: 0
DEPRESSION: 0
LOSS OF SENSATION IN FEET: 0

## 2025-05-15 NOTE — PROGRESS NOTES
Vascular Surgery Clinic Note  Date of visit: 05/14/2025  1:30 PM EDT  Location of visit: [facility]    CC: POV     History Of Present Illness:   Kevin Rubi is a 69 y.o. male here postoperatively. He underwent a balloon angioplasty of his celiac artery stent on 4/13/2025 by Dr. Rainer Vidal for in-stent stenosis with CMI symptoms including postprandial pain and food fear. He no longer has symptoms. He has gained a few pounds back since the operation. He does not smoke.     Medical History:  Problem List[1]     SH:    Social Drivers of Health     Tobacco Use: Medium Risk (5/1/2025)    Patient History     Smoking Tobacco Use: Former     Smokeless Tobacco Use: Never     Passive Exposure: Past   Alcohol Use: Not At Risk (4/11/2025)    AUDIT-C     Frequency of Alcohol Consumption: Never     Average Number of Drinks: Patient does not drink     Frequency of Binge Drinking: Never   Financial Resource Strain: Low Risk  (4/12/2025)    Overall Financial Resource Strain (CARDIA)     Difficulty of Paying Living Expenses: Not hard at all   Food Insecurity: No Food Insecurity (4/11/2025)    Hunger Vital Sign     Worried About Running Out of Food in the Last Year: Never true     Ran Out of Food in the Last Year: Never true   Transportation Needs: No Transportation Needs (4/12/2025)    PRAPARE - Transportation     Lack of Transportation (Medical): No     Lack of Transportation (Non-Medical): No   Physical Activity: Sufficiently Active (4/11/2025)    Exercise Vital Sign     Days of Exercise per Week: 5 days     Minutes of Exercise per Session: 30 min   Stress: No Stress Concern Present (4/11/2025)    [employer] - Occupational Stress Questionnaire     Feeling of Stress : Only a little   Social Connections: Unknown (4/11/2025)    Social Connection and Isolation Panel [NHANES]     Frequency of Communication with Friends and Family: More than three times a week     Frequency of Social  Gatherings with Friends and Family: More than three times a week     Attends Taoism Services: Patient declined     Active Member of Clubs or Organizations: Patient declined     Attends Club or Organization Meetings: Patient declined     Marital Status: Patient declined   Intimate Partner Violence: Not At Risk (4/11/2025)    Humiliation, Afraid, Rape, and Kick questionnaire     Fear of Current or Ex-Partner: No     Emotionally Abused: No     Physically Abused: No     Sexually Abused: No   Depression: Not at risk (5/1/2025)    PHQ-2     PHQ-2 Score: 0   Housing Stability: Low Risk  (4/12/2025)    Housing Stability Vital Sign     Unable to Pay for Housing in the Last Year: No     Number of Times Moved in the Last Year: 0     Homeless in the Last Year: No   Utilities: Not At Risk (4/11/2025)    [facility] Utilities     Threatened with loss of utilities: No   Digital Equity: Not on file   Health Literacy: Adequate Health Literacy (4/11/2025)     Health Literacy     Frequency of need for help with medical instructions: Never        FH:  Family History[2]     Allergies:   RX Allergies[3]    ROS:  All systems were reviewed and noted to be negative, other than described above.     Objective:  Last Recorded Vitals  Blood pressure 84/53, pulse 68, weight 75.8 kg (167 lb), SpO2 (!) 83%.    Meds:   Current Outpatient Medications   Medication Instructions    acetaminophen (TYLENOL) 650 mg, oral, Every 6 hours PRN    apixaban (ELIQUIS) 5 mg, oral, 2 times daily    blood sugar diagnostic (Blood Glucose Test) strip 1 strip, miscellaneous, Daily    blood-glucose meter misc Use daily to test blood sugar    clopidogrel (PLAVIX) 75 mg, oral, Daily    digoxin (LANOXIN) 125 mcg, oral, Daily    fenofibrate (TRICOR) 145 mg, oral, Daily    furosemide (LASIX) 20 mg, oral, See admin instructions, 1 tablet as needed for swelling, weight gain of 3# or increased shortness of breath.    gabapentin (NEURONTIN) 300 mg, oral, Nightly    lancets misc  Use daily to test blood sugar    metoprolol succinate XL (TOPROL-XL) 50 mg, oral, Daily, Please follow up with cardiologist or PCP prior to resuming this medication    rosuvastatin (CRESTOR) 20 mg, oral, Nightly    sacubitriL-valsartan (Entresto) 24-26 mg tablet 0.5 tablets, oral, 2 times daily, Please follow up with cardiologist or PCP prior to resuming this medication    spironolactone (ALDACTONE) 12.5 mg, oral, Daily, Please follow up with cardiologist or PCP prior to resuming this medication    tiZANidine (ZANAFLEX) 2 mg, oral, 2 times daily PRN       Exam:  Constitutional: Well appearing, NAD   PSYCH: Appropriate mood and affect  Eyes: Sclera clear  Neck: Supple   CV: No tachycardia  RESP: Unlabored breathing  GI: Soft, nontender, non-distended  SKIN: No lesions  NEURO: No focal deficits noted.    EXTREMITIES: Warm & well perfused. No leg edema. No evidence of arterial ischemia. No evidence of venous insufficiency.   PULSES: Normal pulse exam throughout     Imaging Reviewed:  Vascular US mesenteric artery duplex complete 05/14/2025    [place] [id number]  Tel [phone] and Fax [phone]      Vascular Lab Report  [facility] US MESENTERIC ARTERY DUPLEX COMPLETE      Patient Name:     VON Novak Physician: [id number] Stephen Fofana MD  Study Date:       5/14/2025            Ordering           [id number] SELVIN AGUERO  Physician:         YOSEPH  MRN/PID:          [id number]             Technologist:      Seferino Torres S  Accession#:       [id number]         Technologist 2:  Date of           1955 / 69      Encounter#:        [id number]  Birth/Age:        years  Gender:           M  Admission Status: Outpatient           Location           [facility]  Performed:      Diagnosis/ICD: Chronic mesenteric ischemia-K55.1  Indication:    S/P Angioplasty celiac stent  CPT Codes:     86186 Mesenteric Duplex scan      Patient History 4-10-25  proximal Celiac artery >70% stenosis at proximal and  orgin of stent. SMA at orgin and proxmial no flow and TIESHA  increased velocity noted.      CONCLUSIONS:  Mesenteric: Hepatic artery appears widely patent, Splenic artery appears widely patent and TIESHA demonstrates increased velocities which may be suggestive of stenosis. Elevated veloicities noted in the celiac artery stent. The SMA at orgin and proximal blunted doppler noted. The SMA at mid and distal appear patent with no evidence of stenosis. Technically difficult to clearly visualize celiac artery stent clearly due to bowel gas.    Comparison:  Compared with study from 4/14/2025, no significant change.    Imaging & Doppler Findings:    AORTA    PSV  Mid  62.5 cm/s      Aorta PSV         63 cm/s  Celiac Origin  cm/s  Celiac Prox PSV   328 cm/s  Celiac Mid PSV    280 cm/s  Celiac Dist PSV   375 cm/s  SMA Origin PSV    18 cm/s  SMA Prox PSV      11 cm/s  SMA Mid PSV       160 cm/s  SMA Dist PSV      60 cm/s  TIESHA Prox PSV      220 cm/s  Hepatic PSV       150 cm/s  Splenic PSV       194 cm/s        [id number] Stephen Fofana MD  Electronically signed by [id number] Stephen Fofana MD on 5/14/2025 at 3:11:05 PM        ** Final **      Assessment & Plan:  1. Chronic mesenteric ischemia (Multi)  Vascular US Mesenteric Artery Duplex Complete    rosuvastatin (Crestor) 20 mg tablet        Chronic mesenteric ischemia s/p celiac artery stent (2023) followed by balloon angioplasty of the celiac artery stent 4/13/2025.   Symptoms have resolved.   Duplex demonstrates patent celiac stent with PSV of 325 cm/s. SMA and TIESHA are widely patent.   Continue plavix & eliquis (afib)  Increase statin to high-intensity   RTC in 3 months with mesenteric duplex     Le Herring, APRN-CNP         [1]   Patient Active Problem List  Diagnosis    Atrial fibrillation (Multi)    Abdominal pain    Epigastric abdominal pain of unknown etiology    Ascending aortic aneurysm    Arteriosclerosis of coronary  artery    Mild CAD    Cataract    Celiac artery stenosis    Superior mesenteric artery stenosis (Multi)    Chronic epigastric pain    Chronic systolic congestive heart failure    Cigarette smoker    Congestive heart failure    HLD (hyperlipidemia)    Benign hypertension    Intractable nausea and vomiting    Malignant neoplasm of unspecified part of unspecified bronchus or lung (Multi)    Lung nodules    Mass of lower lobe of left lung    Pulmonary nodule    Mediastinal lymphadenopathy    Malignant neoplasm of lung (Multi)    Insomnia    Primary cancer of left lower lobe of lung (Multi)    Bicuspid aortic valve    Medicare annual wellness visit, subsequent    Abdominal aortic aneurysm, without rupture, unspecified    Acute upper respiratory infection    Anxiety disorder, unspecified    Dysuria    Inguinal hernia    Prediabetes    Radiation-induced esophagitis    Incarcerated right inguinal hernia    Activity, unspecified    Adjustment disorder with mixed anxiety and depressed mood    Depression    Dizziness    Peripheral vascular disease (CMS-HCC)    Pneumonitis    Presbyopia    Heart failure with improved ejection fraction (HFimpEF)    Anemia in other chronic diseases classified elsewhere    Normocytic anemia    Acute on chronic diastolic (congestive) heart failure    Malignant neoplasm metastatic to brain (Multi)   [2]   Family History  Problem Relation Name Age of Onset    Cancer Mother          ?larynx or lung    Brain cancer Sister paulo bonilla         passed away 10/2024 treatment at    [3] No Known Allergies

## 2025-05-16 ENCOUNTER — HOSPITAL ENCOUNTER (OUTPATIENT)
Dept: RADIOLOGY | Facility: HOSPITAL | Age: 70
Discharge: HOME | End: 2025-05-16
Payer: COMMERCIAL

## 2025-05-16 DIAGNOSIS — C34.32 PRIMARY CANCER OF LEFT LOWER LOBE OF LUNG (MULTI): ICD-10-CM

## 2025-05-16 PROCEDURE — 2550000001 HC RX 255 CONTRASTS: Mod: JZ | Performed by: STUDENT IN AN ORGANIZED HEALTH CARE EDUCATION/TRAINING PROGRAM

## 2025-05-16 PROCEDURE — 71260 CT THORAX DX C+: CPT

## 2025-05-16 RX ADMIN — IOHEXOL 75 ML: 350 INJECTION, SOLUTION INTRAVENOUS at 11:17

## 2025-05-21 ENCOUNTER — OFFICE VISIT (OUTPATIENT)
Dept: HEMATOLOGY/ONCOLOGY | Facility: CLINIC | Age: 70
End: 2025-05-21
Payer: COMMERCIAL

## 2025-05-21 VITALS
OXYGEN SATURATION: 98 % | HEART RATE: 74 BPM | DIASTOLIC BLOOD PRESSURE: 49 MMHG | RESPIRATION RATE: 18 BRPM | WEIGHT: 167.2 LBS | BODY MASS INDEX: 22.68 KG/M2 | SYSTOLIC BLOOD PRESSURE: 101 MMHG | TEMPERATURE: 97.5 F

## 2025-05-21 DIAGNOSIS — C34.32 PRIMARY CANCER OF LEFT LOWER LOBE OF LUNG (MULTI): Primary | ICD-10-CM

## 2025-05-21 PROCEDURE — 99213 OFFICE O/P EST LOW 20 MIN: CPT | Performed by: NURSE PRACTITIONER

## 2025-05-21 PROCEDURE — 3078F DIAST BP <80 MM HG: CPT | Performed by: NURSE PRACTITIONER

## 2025-05-21 PROCEDURE — 1126F AMNT PAIN NOTED NONE PRSNT: CPT | Performed by: NURSE PRACTITIONER

## 2025-05-21 PROCEDURE — 3074F SYST BP LT 130 MM HG: CPT | Performed by: NURSE PRACTITIONER

## 2025-05-21 PROCEDURE — 1036F TOBACCO NON-USER: CPT | Performed by: NURSE PRACTITIONER

## 2025-05-21 PROCEDURE — 1159F MED LIST DOCD IN RCRD: CPT | Performed by: NURSE PRACTITIONER

## 2025-05-21 ASSESSMENT — PAIN SCALES - GENERAL: PAINLEVEL_OUTOF10: 0-NO PAIN

## 2025-05-21 ASSESSMENT — ENCOUNTER SYMPTOMS
OCCASIONAL FEELINGS OF UNSTEADINESS: 0
DEPRESSION: 0
LOSS OF SENSATION IN FEET: 0

## 2025-05-21 NOTE — PROGRESS NOTES
Patient ID: Kevin Rubi is a 69 y.o. male    Primary Care Provider: Chen Olea, APRN-CNS    DIAGNOSIS AND STAGING  Stage IIIC ([id number]) NSCLC (adenocarcinoma, TTF1+) of the LLL - dx on 09/15/23  Primary tumor measures 3.5 cm (+satellite nodule)  2R+ for adenocarcinoma cells     SITES OF DISEASE  LLL  Mediastinal nodes (including contralateral)   Has a couple of other spiculated nodules (0.8 cm WILDA and 0.7 cm RUL) that are potential synchronous primaries and will be addressed when needed      MOLECULAR GENOMICS  KRAS G12D mutation   PD-L1 TPS 95%     PRIOR THERAPIES  12/26/2023: Completion concurrent chemo RT (60 Gray in 30 fractions using carboplatin/pemetrexed)  01/31/2024: Consolidative durvalumab - only received 2 cycles due to frequent admissions and other coinciding medical issues     CURRENT THERAPY  Surveillance     CURRENT ONCOLOGICAL PROBLEMS  None     HISTORY OF PRESENT ILLNESS  Former smoker, (quit in 2000), who while undergoing w/u for abdominal pain related to SMA occlusion, was found to have a LLL nodule   Based on his records he was noted to have abnormal imaging back in 2021, was seen by pulmonary and was told to complete w/u, including PET scan and potential biopsy but did not follow recommendations.    A CT chest on 09/12/23 showed a 3.5 cm spiculated LLL nodule abutting the pericardium. There was also a satellite nodule suspicious for disease and a couple of other spiculated nodules in the WILDA and contralateral right lung.4R node measured 1.4 cm and level 7 2.4 cm.   PET scan on 08/04/23 showed no findings concerning for extra-thoracic disease. LLL nodule was FDG avid as well as multiple mediastinal nodes (including contralateral nodes). The WILDA nodule has mild hypermetabolic activity.  An EBUS procedure on 09/15/23 demonstrated 2R to be involved with adenocarcinoma cells, TTF1+. KRAS G12D +, TPS 95%.  10/23/23: C1  Carboplatin/pemetrexed  11/23/23: C2 carboplatin/pemetrexed with  concurrent RT    2023: Completes concurrent chemo RT (60 Gray in 30 fractions)   2024: C1 D1 (every 28-day cycles) of durvalumab  2024: started on prednisone for concerns regarding pneumonitis (70 mg/day)   2024: Discharged from the hospital after being operated for incarcerated right inguinal hernia repair with small bowel resection and anastomosis on 2024 - 24: hospitalized with cellulitis and pneumonia - discharged home on doxycycline and Augmentin   24 - 24: admitted for epigastric pain with complete SMA stenosis - had outpatient follow up where aortomesenteric bypass was discussed - however deferring at this time    PAST MEDICAL HISTORY  HTN, 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 Afib - on Eliquis, AAA, PUD (gastric), HLD      SOCIAL HISTORY  + Tobacco - quit in  - mostly 1/2 ppd starting at age 22  Occasional ETOH intake  Worked as a  for 35 years, retired just before diagnosis   for 43 years and has 2 children - son who is 41 yo and daughter who is 35     FAMILY HISTORY  Mother seems to have had H/N or lung cancer - unclear   Father may also have had cancer   1 sister  from complications of GSW  1 brother  of CA (?liver CA)  1 sister  of metastatic lung cancer (to brain)     CURRENT MEDS REVIEWED   Medications Ordered Prior to Encounter[1]       ALLERGIES REVIEWED   NKDA     SUBJECTIVE:   Hospitalized  - 15 for chronic mesenteric insufficiency. He underwent balloon angioplasty celiac artery stent placement. At this time he is doing very well. He is gaining weight and back to baseline. He has no cough or dyspnea. No nausea or vomiting. No constipation or diarrhea. Remains active around his home. Fatigue is primary complaint.   Strongly encouraged to quit vaping.       OBJECTIVE:  BP (!)  101/49   Pulse 74   Temp 36.4 °C (97.5 °F) (Core)   Resp 18   Wt 75.8 kg (167 lb 3.2 oz)   SpO2 98%   BMI 22.68 kg/m² '    Wt Readings from Last 5 Encounters:   25 75.8 kg (167 lb 3.2 oz)   25 75.8 kg (167 lb)   25 75.5 kg (166 lb 6.4 oz)   25 67.4 kg (148 lb 7.7 oz)   25 72.6 kg (160 lb)     Physical Exam:  ECO  Pain: 0  Constitutional: Well developed, awake/alert/oriented x3, no distress, alert and cooperative  Eyes: PER. sclera anicteric  ENMT: Oral mucosa moist, no lesions or thrush identified  Respiratory/Thorax: Breathing is non-labored. Lungs are clear to auscultation bilaterally. No adventitious breath sounds  Cardiovascular: S1-S2. Regular rate and rhythm. No murmurs, rubs, or gallops appreciated  Gastrointestinal: Abdomen soft nontender, nondistended, normal active bowel sounds.  Musculoskeletal: ROM intact, no joint swelling, normal strength  Extremities: normal extremities, no cyanosis, no edema, no clubbing  Lymphatics: no palpable lymphadenopathy   Skin: no rash  Neurologic: alert and oriented x3. Nonfocal exam. No myoclonus  Psychological: Pleasant, appropriate and easily engaged         Diagnostic Results   Lab Results   Component Value Date    WBC 8.3 2025    HGB 10.7 (L) 2025    HCT 30.9 (L) 2025    MCV 86 2025     2025      Lab Results   Component Value Date    NEUTROABS 7.84 (H) 2025      Lab Results   Component Value Date    GLUCOSE 244 (H) 2025    CALCIUM 8.6 2025     (L) 2025    K 5.0 2025    CO2 21 2025    CL 99 2025    BUN 10 2025    CREATININE 1.03 2025    MG 2.68 (H) 2025     Lab Results   Component Value Date    ALT 7 (L) 2025    AST 13 2025    ALKPHOS 51 2025    BILITOT 0.5 2025    BILIDIR 0.1 2024      Lab Results   Component Value Date    ACTH 11.8 2024    CORTISOL 16.6 2024    TSH 5.69 (H) 2025     FREET4 1.0 04/01/2025         Imaging:  May 16, 2025 - reviewed with pt and his wife  IMPRESSION:  1.  Stable post radiation changes in the left hemithorax.  2. Interval increase in small left-sided pleural effusion.  3. Stable pulmonary nodules.  4. Stable aneurysmal dilatation of the ascending thoracic aorta.  5. Additional findings as described.    CT Chest 2/10/25  IMPRESSION:  1.  Stable post radiation changes in the left lower lobe with stable pulmonary nodules as described.  2. Stable aneurysmal dilatation of the ascending thoracic aorta measuring 4.5 cm in diameter.  3. Stable sclerosis in the T6 vertebral body.  4. Additional findings as described.        Assessment/Plan     Primary cancer of left lower lobe of lung (Multi), Clinical: Stage IIIC (cT3, cN3, cM0)  Mr. 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 Afib - on Eliquis, AAA, PUD (gastric), HLD seen for his Stage IIIC ([id number]) NSCLC (adenocarcinoma, TTF1+) of the LLL - dx on 09/15/23.    He is s/p CCRT completed 12/26/2023 and 2 cycles durvalumab which has been held due to hospitalizations for pneumonitis (unclear if radiation or checkpoint inhibitor sequelae), small bowel resection, SMA stenosis, pneumonia and cellulitis. Most recent scans reviewed without evidence of recurrence and clinically he is doing well, gaining weight. We will follow in 3 months with repeat scans.    Unfortunately MRI brain showing new 5 mm indeterminate R occipital lobe lesion. We will refer to radiation oncology for possible GKRS.    #Stage IIIC ([id number]) NSCLC (adenocarcinoma, TTF1+) of the LLL   -Sites of disease include LLL, 2R  -His tumor harbors a KRAS G12 D mutation in the PD-L1 expression TPS 95%.  -S/p CCRT and 2 doses durvalumab, Dced for unrelated issue  - Monitoring other spiculated  nodules (0.8 cm WILDA and 0.7 cm RUL) that are not showing any growth   -Repeat scans in 3 months   -Referral to rad onc for possible GKRS    #Depression/unintentional weight loss  -this has resolved   -on 08/07 weight has improved by 9lbs in 2 months and patient reporting that mood is also stable    Dispo  - Repeat CT scan and fuv in 3months for review    Siddhartha Chavez, APRN-CNP  [facility]  Thoracic + H&N Medical Oncology     I spent a total of 30+ minutes on the date of the service which included preparing to see the patient, face-to-face patient care, completing clinical documentation, obtaining and / or reviewing separately obtained history, counseling and educating the patient/family/caregiver, ordering medications, tests, or procedures, communicating with other healthcare providers (not separately reported), independently interpreting results, not separately reported, and communicating results to the patient/family/caregiver, Name and date of birth verified.          [1]   Current Outpatient Medications on File Prior to Visit   Medication Sig Dispense Refill    acetaminophen (Tylenol) 325 mg tablet Take 2 tablets (650 mg) by mouth every 6 hours if needed for moderate pain (4 - 6) or mild pain (1 - 3).      apixaban (Eliquis) 5 mg tablet Take 1 tablet (5 mg) by mouth 2 times a day. 60 tablet 11    blood sugar diagnostic (Blood Glucose Test) strip 1 strip once daily. 100 strip 11    blood-glucose meter misc Use daily to test blood sugar 1 each 0    clopidogrel (Plavix) 75 mg tablet Take 1 tablet (75 mg) by mouth once daily. 90 tablet 0    digoxin (Lanoxin) 125 MCG tablet TAKE 1 TABLET DAILY 90 tablet 0    fenofibrate (Tricor) 145 mg tablet TAKE 1 TABLET BY MOUTH ONCE  DAILY 100 tablet 2    gabapentin (Neurontin) 300 mg capsule TAKE 1 CAPSULE (300 MG) BY MOUTH ONCE DAILY AT BEDTIME. 60 capsule 2    lancets misc Use daily to test blood sugar 100 each 3    metoprolol succinate XL (Toprol-XL) 50 mg 24 hr  tablet Take 1 tablet (50 mg) by mouth once daily. Please follow up with cardiologist or PCP prior to resuming this medication      rosuvastatin (Crestor) 20 mg tablet Take 1 tablet (20 mg) by mouth once daily at bedtime. 30 tablet 11    sacubitriL-valsartan (Entresto) 24-26 mg tablet Take 0.5 tablets by mouth 2 times a day. Please follow up with cardiologist or PCP prior to resuming this medication      spironolactone (Aldactone) 25 mg tablet Take 0.5 tablets (12.5 mg) by mouth once daily. Please follow up with cardiologist or PCP prior to resuming this medication      tiZANidine (Zanaflex) 2 mg tablet TAKE 1 TABLET BY MOUTH 2 TIMES A DAY AS NEEDED FOR MUSCLE SPASMS. 180 tablet 1    furosemide (Lasix) 20 mg tablet Take 1 tablet (20 mg) by mouth see administration instructions. 1 tablet as needed for swelling, weight gain of 3# or increased shortness of breath. (Patient not taking: Reported on 5/1/2025) 90 tablet 0     No current facility-administered medications on file prior to visit.

## 2025-05-21 NOTE — PROGRESS NOTES
Patient here today for follow up and to review recent imaging. He has no new or worsening symptoms.     Fatigue: general fatigue, able to perform ADLs and iADLs without issuesl; does not nap daily  PO intake: he admits to being a picky eater which contributes to the amount he's able to eat  Weight: stable   N/V/D/C: denies    Pharmacy preference reviewed with patient. Medications reviewed by MA.

## 2025-05-22 DIAGNOSIS — C34.32 PRIMARY CANCER OF LEFT LOWER LOBE OF LUNG (MULTI): ICD-10-CM

## 2025-06-24 DIAGNOSIS — E78.2 MIXED HYPERLIPIDEMIA: ICD-10-CM

## 2025-07-03 ENCOUNTER — TELEPHONE (OUTPATIENT)
Dept: PRIMARY CARE | Facility: CLINIC | Age: 70
End: 2025-07-03
Payer: COMMERCIAL

## 2025-07-03 DIAGNOSIS — K40.30 INCARCERATED RIGHT INGUINAL HERNIA: ICD-10-CM

## 2025-07-03 RX ORDER — CLOPIDOGREL BISULFATE 75 MG/1
75 TABLET ORAL DAILY
Qty: 90 TABLET | Refills: 0 | Status: SHIPPED | OUTPATIENT
Start: 2025-07-03 | End: 2025-10-01

## 2025-07-03 NOTE — TELEPHONE ENCOUNTER
Med Refill   clopidogrel (Plavix) 75 mg tablet [[id number]]   metoprolol succinate XL (Toprol-XL) 50 mg 24 hr tablet [[id number]]     [facility]/pharmacy #[id number] - [address] AT [place] [id number]  Phone: [phone]  Fax: [phone]  RENNY #: [id number]

## 2025-07-09 DIAGNOSIS — I50.22 CHRONIC SYSTOLIC CONGESTIVE HEART FAILURE: ICD-10-CM

## 2025-07-09 DIAGNOSIS — I50.30 DIASTOLIC CONGESTIVE HEART FAILURE, UNSPECIFIED HF CHRONICITY: ICD-10-CM

## 2025-07-09 NOTE — TELEPHONE ENCOUNTER
----- Message from Nurse Destiney CASSIDY sent at 7/9/2025  3:48 PM EDT -----  Regarding: Refill  Refill metoprolol to [facility] in [place]. Patient phone [phone].

## 2025-07-10 RX ORDER — METOPROLOL SUCCINATE 50 MG/1
50 TABLET, EXTENDED RELEASE ORAL DAILY
Qty: 90 TABLET | Refills: 3 | Status: SHIPPED | OUTPATIENT
Start: 2025-07-10 | End: 2026-07-10

## 2025-07-13 DIAGNOSIS — I48.91 ATRIAL FIBRILLATION, UNSPECIFIED TYPE (MULTI): ICD-10-CM

## 2025-07-13 DIAGNOSIS — I50.22 CHRONIC SYSTOLIC CONGESTIVE HEART FAILURE: ICD-10-CM

## 2025-07-13 DIAGNOSIS — E78.5 HYPERLIPIDEMIA, UNSPECIFIED HYPERLIPIDEMIA TYPE: ICD-10-CM

## 2025-07-14 NOTE — PROGRESS NOTES
Chief Complaint:   No chief complaint on file.     History Of Present Illness:    Kevin Rubi is a 69 y.o. male presenting for yearly follow up.  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 At the time, Pt had been hospitalized for abdominal pain, concern for mesenteric ischemia.  Imaging demonstrated occlusion of SMA and celiac stent with 70% stenosis - no inpatient surgical intervention recommended at that time. However, Pt presented to ER on 25 with progressively worsening abdominal pain. CT scan from OSH demonstrated no flow seen in proximal SMA with collaterals from the celiac artery, small dissection of infrarenal aorta. He underwent balloon angioplasty of the celiac artery with DCB ( with Dr. Vidal. He was advised to FU with Dr. Martel and repeat mesenteric duplex US. Discharged with resumption of Plavix and Eliquis.     Today, patient is doing well from cardiovascular perspective.  Denies chest pain/pressure, SOB, dyspnea on exertion, LE edema, orthopnea, PND, palpitations, LH/dizziness, presyncope, overt syncope.  Compliant with home cardiac medications as prescribed. He does not really notice when he is in A-Fib. S/p Gamma Knife radiation in , and will FU with specialist in 3 mos. Family is concerned about low BP when taking medication. Prior to taking meds, family reports BP is around 100/80s, but then becomes very low once he takes meds. He c/o fatigue with low BP. Reviewed last digoxin order  in .    ROS:  The remainder of the review of systems was obtained, as was negative as pertains to the chief complaint.    CV testing and labs reviewed:  EKG today personally reviewed and demonstrated A-Fib, HR 83 bpm     Labs 2025:  K 5.0  BUN 10  Cr 1.03   H&H 10.7 30.9  5/24: digoixn 0.64  Lipid labs 4/2025:    HDL 38  LDL 75      Vascular US Mesenteric Artery Duplex Complete 5/2025:  CONCLUSIONS:  Mesenteric: Hepatic artery appears widely patent, Splenic artery appears widely patent and TIESHA demonstrates increased velocities which may be suggestive of stenosis. Elevated veloicities noted in the celiac artery stent. The SMA at orgin and proximal blunted doppler noted. The SMA at mid and distal appear patent with no evidence of stenosis. Technically difficult to clearly visualize celiac artery stent clearly due to bowel gas.    6/2024 mesenteric artery duplex  CONCLUSIONS:  Mesenteric: Celiac artery demonstrates a hemodynamically significant stenosis of greater than 70%. Velocities did not change with inspiration and/or change to sitting position which may be suggestive of intrinsic stenosis. The patient was NPO for this study. There are elevated velocities noted in the splenic artery. Turbulent flow is noted in the splenic, hepatic and throughout the entire celiac artery.  The stent walls of the celiac are not seen.  The SMA is not visualized(possibly chronically occluded).  The TIESHA is patent with turbulent flow.    TTE limited 4/2024  EF 60-65%     CT chest  4/2024    HEART AND VESSELS:  Mild atherosclerotic calcifications of the thoracic aorta with stable  aneurysmal dilation of the ascending segment measuring 4.5 cm. Main pulmonary artery is normal in caliber.  Moderate to severe coronary artery calcifications in the LAD and  circumflex arteries. The study is not optimized for evaluation of  coronary arteries.  Stable biatrial enlargement.  Stable trace pericardial effusion.     TTE 3/2024  EF 55-60% LV global longitudinal strain -15.2% , stage II DD , left atrium is mild to moderately dilated, right atrium mildly dilated, aortic valve appears bicuspid, mils aortic valve regurg, trace to mild mitral valve regurg, mild tricuspid regurg,      8/2023   Peripheral artery JUSTIN moderate disease in the RLE, mild disease in LLE.       LHC in 2019 - mild CAD     Stress test 2/2019  nondiagnostic d/t inadequate heart rate     Prior history:  I first met Kevin when he was admitted to [facility] in 1/19 with progressive SOB. He was found to have new-onset Afib that we treated with rate control and anticoagulation. TTE 1/2019 demonstrated increased LV cavity size, moderate cLVH, severe generalized LV dysfunction EF 20%, stage II-IV diastolic dysfcn, dilated RV with moderate dysfcn, mod MR, ? bicuspid aortic valve with fused R and noncoronary cusps (peak grad 17/mean 11), trace AR, mild PHTN. I performed a coronary angiogram which demonstrated mild nonobstructive CAD. Plan was made for FARZANA-DCCV which was performed by Dr. Soto, however patient unable to be cardioverted successfully. He was started on amiodarone PO with plan for 6 weeks of loading. He is now off of amiodarone    Last Recorded Vitals:  There were no vitals filed for this visit.    Past Medical History:  He has a past medical history of Aortic aneurysm, Atrial fibrillation (Multi), Coronary artery disease, Hyperlipidemia, Hypertension, Lung cancer (Multi), Personal history of peptic ulcer disease, Superior mesenteric artery stenosis (Multi), and Tinnitus, bilateral (08/14/2016).    He has no past medical history of Stroke (Multi).    Past Surgical History:  He has a past surgical history that includes Other surgical history (02/13/2019); Other surgical history (02/13/2019); CT angio abdomen pelvis w and or wo IV IV contrast (08/08/2023); Superior mestenteric artery stent; Exploratory laparotomy w/ bowel resection (04/09/2024); Hernia repair (Right, 04/09/2024); and Invasive Vascular Procedure (N/A, 4/13/2025).      Social History:  He reports that he quit smoking about 12 years ago. His smoking use included cigarettes. He started smoking about 42 years ago. He has a 15 pack-year smoking history. He has been  exposed to tobacco smoke. He has never used smokeless tobacco. He reports that he does not currently use alcohol after a past usage of about 1.0 standard drink of alcohol per week. He reports that he does not use drugs.    Family History:  Family History   Problem Relation Name Age of Onset    Cancer Mother          ?larynx or lung    Brain cancer Sister paulo bonilla         passed away 10/2024 treatment at         Allergies:  Patient has no known allergies.    Outpatient Medications:  Current Outpatient Medications   Medication Instructions    acetaminophen (TYLENOL) 650 mg, oral, Every 6 hours PRN    apixaban (ELIQUIS) 5 mg, oral, 2 times daily    blood sugar diagnostic (Blood Glucose Test) strip 1 strip, miscellaneous, Daily    blood-glucose meter misc Use daily to test blood sugar    clopidogrel (PLAVIX) 75 mg, oral, Daily    digoxin (LANOXIN) 125 mcg, oral, Daily    fenofibrate (TRICOR) 145 mg, oral, Daily    furosemide (LASIX) 20 mg, oral, See admin instructions, 1 tablet as needed for swelling, weight gain of 3# or increased shortness of breath.    gabapentin (NEURONTIN) 300 mg, oral, Nightly    lancets misc Use daily to test blood sugar    metoprolol succinate XL (TOPROL-XL) 50 mg, oral, Daily    rosuvastatin (CRESTOR) 20 mg, oral, Nightly    sacubitriL-valsartan (Entresto) 24-26 mg tablet 0.5 tablets, oral, 2 times daily, Please follow up with cardiologist or PCP prior to resuming this medication    spironolactone (ALDACTONE) 12.5 mg, oral, Daily, Please follow up with cardiologist or PCP prior to resuming this medication    tiZANidine (ZANAFLEX) 2 mg, oral, 2 times daily PRN       Physical Exam:  Physical Exam  HENT:      Head: Normocephalic.      Nose: Nose normal.      Mouth/Throat:      Mouth: Mucous membranes are moist.   Neck:      Vascular: No carotid bruit or JVD.   Cardiovascular:      Rate and Rhythm: Normal rate. Rhythm irregularly irregular.      Heart sounds: Murmur heard.      Comments: 1/6  systolic murmur heard at apex  2/6 systolic murmur heard at RUSB   Pulmonary:      Effort: Pulmonary effort is normal.      Breath sounds: Decreased breath sounds present.      Comments: Decreased breath sounds to L base  Abdominal:      General: Bowel sounds are normal.      Palpations: Abdomen is soft.   Musculoskeletal:         General: Normal range of motion.      Cervical back: Normal range of motion.   Skin:     General: Skin is warm and dry.   Neurological:      General: No focal deficit present.      Mental Status: He is alert.   Psychiatric:         Mood and Affect: Mood normal.            Last Labs:  CBC -  Lab Results   Component Value Date    WBC 8.3 04/14/2025    WBC 7.6 04/01/2025    HGB 10.7 (L) 04/14/2025    HGB 11.7 (L) 04/01/2025    HCT 30.9 (L) 04/14/2025    HCT 37.4 (L) 04/01/2025    MCV 86 04/14/2025    MCV 83.1 04/01/2025     04/14/2025     04/01/2025       CMP -  Lab Results   Component Value Date    CALCIUM 8.6 04/14/2025    CALCIUM 9.3 04/01/2025    PHOS 5.1 (H) 04/14/2025    PROT 7.2 04/11/2025    PROT 6.8 04/01/2025    ALBUMIN 3.4 04/14/2025    ALBUMIN 4.2 04/01/2025    AST 13 04/11/2025    AST 16 04/01/2025    ALT 7 (L) 04/11/2025    ALT 10 04/01/2025    ALKPHOS 51 04/11/2025    ALKPHOS 54 04/01/2025    BILITOT 0.5 04/11/2025    BILITOT 0.4 04/01/2025       LIPID PANEL -   Lab Results   Component Value Date    CHOL 134 04/01/2025    TRIG 127 04/01/2025    HDL 38 (L) 04/01/2025    CHHDL 3.5 04/01/2025    LDLF 39 04/01/2023    VLDL 24 09/30/2024    NHDL 96 04/01/2025       RENAL FUNCTION PANEL -   Lab Results   Component Value Date    GLUCOSE 244 (H) 04/14/2025    GLUCOSE 103 (H) 04/01/2025     (L) 04/14/2025     04/01/2025    K 5.0 04/14/2025    K 3.9 04/01/2025    CL 99 04/14/2025     04/01/2025    CO2 21 04/14/2025    CO2 25 04/01/2025    ANIONGAP 15 04/14/2025    ANIONGAP 9 04/01/2025    BUN 10 04/14/2025    BUN 19 04/01/2025    CREATININE 1.03 04/14/2025     CREATININE 1.24 04/01/2025    GFRMALE CANCELED 09/16/2023    CALCIUM 8.6 04/14/2025    CALCIUM 9.3 04/01/2025    PHOS 5.1 (H) 04/14/2025    ALBUMIN 3.4 04/14/2025    ALBUMIN 4.2 04/01/2025        Lab Results   Component Value Date     (H) 05/01/2024    HGBA1C 6.8 (H) 04/01/2025     Assessment/Plan   Abdominal pain:  Chronic mesenteric ischemia  Patient has history of celiac artery stent in 8/23 by Dr. Hayes - with 70% stenosis.  No s/p balloon angioplasty of the celiac artery with DCB 4/13 with Dr. Vidal.  Hg 10.7 in 4/25 which is stable.  -follows with Dr. Martel   -continue antiplatelet therapy     HFrEF > HFpEF (LVEF 20% > 60-65%).  Doing well, euvolemic, NYHA class II.  Of note, telling me today his BP occasionally runs low, in the 's systolic.  -entresto 24-26mg half tab bid  -meto XL 25mg daily  -digoxin 125 mcg daily - check digoxin level, last checked in 5/24  -stop spironolactone 12.5mg daily d/t low BP and advised to continue monitoring for swelling  -no current indication for additional diuresis, euvolemic on exam today     Mild CAD:  no angina currently.  -not on ASA due to clopidogrel/apixaban  -rosuvastatin 5mg daily  -metop XL 25mg daily     pAfib on apixaban:  now appearing to be in a more chronic Afib, however has been rate controlled and is largely asymptomatic from this.  -c/w apixaban  -continue BB for rate control  -continue digoxin, but overdue for level - has been ordered and pt plans to have drawn today  -tele monitoring     Ascending aortic aneurysm (measured 4.4 cm on CT 3/2021) - he has a CT chest with IV contrast ordered by natan Chavez and is still pending  -serial monitoring and BP control     Bicuspid AV  -serial monitoring on TTE, will repeat in 4/26     DL:    FLP  4/2025:   LDL 75   - at goal  -continue rosuvastatin 20 mg daily     Recent PNA:  infiltrate on CXR , patient denies cough/fever  -mgt per primary service     Lung CA s/p CTX/XRT on  immunotherapy    Medication Refills  Pt gets refills for their cardiac medicines from this office. Dr. Rosario has reviewed labs and has given them 1-year refills for their medications. [facility] Will refill digoxin once BW    Scribe Attestation  By signing my name below, I Tressa Eilas, Scribe   attest that this documentation has been prepared under the direction and in the presence of Leann Rosario MD.

## 2025-07-15 ENCOUNTER — OFFICE VISIT (OUTPATIENT)
Dept: CARDIOLOGY | Facility: HOSPITAL | Age: 70
End: 2025-07-15
Payer: MEDICARE

## 2025-07-15 VITALS
WEIGHT: 168 LBS | DIASTOLIC BLOOD PRESSURE: 76 MMHG | SYSTOLIC BLOOD PRESSURE: 120 MMHG | HEART RATE: 83 BPM | HEIGHT: 72 IN | BODY MASS INDEX: 22.75 KG/M2

## 2025-07-15 DIAGNOSIS — K55.1 CHRONIC MESENTERIC ISCHEMIA (MULTI): ICD-10-CM

## 2025-07-15 DIAGNOSIS — E78.2 MIXED HYPERLIPIDEMIA: ICD-10-CM

## 2025-07-15 DIAGNOSIS — I50.30 DIASTOLIC CONGESTIVE HEART FAILURE, UNSPECIFIED HF CHRONICITY: ICD-10-CM

## 2025-07-15 DIAGNOSIS — I10 BENIGN HYPERTENSION: ICD-10-CM

## 2025-07-15 DIAGNOSIS — I48.91 ATRIAL FIBRILLATION, UNSPECIFIED TYPE (MULTI): ICD-10-CM

## 2025-07-15 DIAGNOSIS — I50.22 CHRONIC SYSTOLIC CONGESTIVE HEART FAILURE: ICD-10-CM

## 2025-07-15 LAB
Q ONSET: 228 MS
QRS COUNT: 14 BEATS
QRS DURATION: 70 MS
QT INTERVAL: 364 MS
QTC CALCULATION(BAZETT): 427 MS
QTC FREDERICIA: 405 MS
R AXIS: -15 DEGREES
T AXIS: 7 DEGREES
T OFFSET: 410 MS
VENTRICULAR RATE: 83 BPM

## 2025-07-15 PROCEDURE — 3008F BODY MASS INDEX DOCD: CPT | Performed by: INTERNAL MEDICINE

## 2025-07-15 PROCEDURE — 93005 ELECTROCARDIOGRAM TRACING: CPT | Performed by: INTERNAL MEDICINE

## 2025-07-15 PROCEDURE — 3078F DIAST BP <80 MM HG: CPT | Performed by: INTERNAL MEDICINE

## 2025-07-15 PROCEDURE — 99215 OFFICE O/P EST HI 40 MIN: CPT | Performed by: INTERNAL MEDICINE

## 2025-07-15 PROCEDURE — 3074F SYST BP LT 130 MM HG: CPT | Performed by: INTERNAL MEDICINE

## 2025-07-15 PROCEDURE — 93010 ELECTROCARDIOGRAM REPORT: CPT | Performed by: INTERNAL MEDICINE

## 2025-07-15 PROCEDURE — 1159F MED LIST DOCD IN RCRD: CPT | Performed by: INTERNAL MEDICINE

## 2025-07-15 PROCEDURE — 99213 OFFICE O/P EST LOW 20 MIN: CPT | Mod: 25 | Performed by: INTERNAL MEDICINE

## 2025-07-15 PROCEDURE — 1036F TOBACCO NON-USER: CPT | Performed by: INTERNAL MEDICINE

## 2025-07-15 RX ORDER — METOPROLOL SUCCINATE 50 MG/1
50 TABLET, EXTENDED RELEASE ORAL DAILY
Qty: 90 TABLET | Refills: 3 | Status: SHIPPED | OUTPATIENT
Start: 2025-07-15 | End: 2026-07-15

## 2025-07-15 RX ORDER — FENOFIBRATE 145 MG/1
145 TABLET, FILM COATED ORAL DAILY
Qty: 90 TABLET | Refills: 3 | Status: SHIPPED | OUTPATIENT
Start: 2025-07-15 | End: 2026-07-15

## 2025-07-15 RX ORDER — ROSUVASTATIN CALCIUM 20 MG/1
20 TABLET, COATED ORAL NIGHTLY
Qty: 90 TABLET | Refills: 3 | Status: SHIPPED | OUTPATIENT
Start: 2025-07-15 | End: 2026-07-15

## 2025-07-15 RX ORDER — FENOFIBRATE 145 MG/1
145 TABLET, FILM COATED ORAL DAILY
Qty: 90 TABLET | Refills: 3 | OUTPATIENT
Start: 2025-07-15

## 2025-07-15 NOTE — PATIENT INSTRUCTIONS
Thanks for following up in office today.    1)  Please stop taking spironolactone. This should help with your low blood pressure. Continue monitoring your swelling off of this medication.     2)  I ordered blood work for you to get done. I am checking your digoxin level to be able to refill your medication.    3)  Please continue your cardiac medications as prescribed. [facility]    Follow up with Amado Burkett in 3 months  If you have any questions, please call us at [phone]    No

## 2025-07-16 LAB
25(OH)D3+25(OH)D2 SERPL-MCNC: 40 NG/ML (ref 30–100)
ALBUMIN SERPL-MCNC: 4.4 G/DL (ref 3.6–5.1)
ALP SERPL-CCNC: 61 U/L (ref 35–144)
ALT SERPL-CCNC: 15 U/L (ref 9–46)
ANION GAP SERPL CALCULATED.4IONS-SCNC: 10 MMOL/L (CALC) (ref 7–17)
AST SERPL-CCNC: 23 U/L (ref 10–35)
BILIRUB SERPL-MCNC: 0.5 MG/DL (ref 0.2–1.2)
BUN SERPL-MCNC: 19 MG/DL (ref 7–25)
CALCIUM SERPL-MCNC: 9.4 MG/DL (ref 8.6–10.3)
CHLORIDE SERPL-SCNC: 103 MMOL/L (ref 98–110)
CHOLEST SERPL-MCNC: 103 MG/DL
CHOLEST/HDLC SERPL: 2.3 (CALC)
CO2 SERPL-SCNC: 23 MMOL/L (ref 20–32)
CREAT SERPL-MCNC: 1.06 MG/DL (ref 0.7–1.35)
DIGOXIN SERPL-MCNC: 0.5 MCG/L (ref 0.8–2)
EGFRCR SERPLBLD CKD-EPI 2021: 77 ML/MIN/1.73M2
ERYTHROCYTE [DISTWIDTH] IN BLOOD BY AUTOMATED COUNT: 15.4 % (ref 11–15)
EST. AVERAGE GLUCOSE BLD GHB EST-MCNC: 140 MG/DL
EST. AVERAGE GLUCOSE BLD GHB EST-SCNC: 7.7 MMOL/L
GLUCOSE SERPL-MCNC: 95 MG/DL (ref 65–99)
HBA1C MFR BLD: 6.5 %
HCT VFR BLD AUTO: 33.3 % (ref 38.5–50)
HDLC SERPL-MCNC: 45 MG/DL
HGB BLD-MCNC: 10.1 G/DL (ref 13.2–17.1)
LDLC SERPL CALC-MCNC: 38 MG/DL (CALC)
MCH RBC QN AUTO: 25.8 PG (ref 27–33)
MCHC RBC AUTO-ENTMCNC: 30.3 G/DL (ref 32–36)
MCV RBC AUTO: 85.2 FL (ref 80–100)
NONHDLC SERPL-MCNC: 58 MG/DL (CALC)
PLATELET # BLD AUTO: 204 THOUSAND/UL (ref 140–400)
PMV BLD REES-ECKER: 11.4 FL (ref 7.5–12.5)
POTASSIUM SERPL-SCNC: 4.6 MMOL/L (ref 3.5–5.3)
PROT SERPL-MCNC: 7.5 G/DL (ref 6.1–8.1)
RBC # BLD AUTO: 3.91 MILLION/UL (ref 4.2–5.8)
SODIUM SERPL-SCNC: 136 MMOL/L (ref 135–146)
TRIGL SERPL-MCNC: 116 MG/DL
TSH SERPL-ACNC: 3.73 MIU/L (ref 0.4–4.5)
VIT B12 SERPL-MCNC: 469 PG/ML (ref 200–1100)
WBC # BLD AUTO: 8.2 THOUSAND/UL (ref 3.8–10.8)

## 2025-07-17 ENCOUNTER — RESULTS FOLLOW-UP (OUTPATIENT)
Dept: CARDIOLOGY | Facility: HOSPITAL | Age: 70
End: 2025-07-17
Payer: MEDICARE

## 2025-07-18 RX ORDER — ROSUVASTATIN CALCIUM 5 MG/1
5 TABLET, COATED ORAL DAILY
Qty: 90 TABLET | Refills: 1 | OUTPATIENT
Start: 2025-07-18

## 2025-07-21 RX ORDER — DIGOXIN 125 MCG
125 TABLET ORAL DAILY
Qty: 90 TABLET | Refills: 1 | Status: SHIPPED | OUTPATIENT
Start: 2025-07-21

## 2025-07-27 DIAGNOSIS — F41.9 ANXIETY: ICD-10-CM

## 2025-07-28 ENCOUNTER — APPOINTMENT (OUTPATIENT)
Dept: PRIMARY CARE | Facility: CLINIC | Age: 70
End: 2025-07-28
Payer: MEDICARE

## 2025-07-28 VITALS
HEIGHT: 72 IN | WEIGHT: 170 LBS | BODY MASS INDEX: 23.03 KG/M2 | DIASTOLIC BLOOD PRESSURE: 60 MMHG | SYSTOLIC BLOOD PRESSURE: 102 MMHG | HEART RATE: 84 BPM

## 2025-07-28 DIAGNOSIS — I10 ESSENTIAL HYPERTENSION: ICD-10-CM

## 2025-07-28 DIAGNOSIS — Z00.00 MEDICARE ANNUAL WELLNESS VISIT, SUBSEQUENT: ICD-10-CM

## 2025-07-28 DIAGNOSIS — E11.65 TYPE 2 DIABETES MELLITUS WITH HYPERGLYCEMIA, WITHOUT LONG-TERM CURRENT USE OF INSULIN: ICD-10-CM

## 2025-07-28 DIAGNOSIS — I10 PRIMARY HYPERTENSION: ICD-10-CM

## 2025-07-28 DIAGNOSIS — I25.10 MILD CAD: ICD-10-CM

## 2025-07-28 DIAGNOSIS — D64.9 ANEMIA, UNSPECIFIED TYPE: Primary | ICD-10-CM

## 2025-07-28 DIAGNOSIS — E55.9 VITAMIN D DEFICIENCY: ICD-10-CM

## 2025-07-28 DIAGNOSIS — E78.2 MIXED HYPERLIPIDEMIA: ICD-10-CM

## 2025-07-28 PROCEDURE — 1159F MED LIST DOCD IN RCRD: CPT | Performed by: CLINICAL NURSE SPECIALIST

## 2025-07-28 PROCEDURE — 3074F SYST BP LT 130 MM HG: CPT | Performed by: CLINICAL NURSE SPECIALIST

## 2025-07-28 PROCEDURE — G2211 COMPLEX E/M VISIT ADD ON: HCPCS | Performed by: CLINICAL NURSE SPECIALIST

## 2025-07-28 PROCEDURE — 3078F DIAST BP <80 MM HG: CPT | Performed by: CLINICAL NURSE SPECIALIST

## 2025-07-28 PROCEDURE — 1160F RVW MEDS BY RX/DR IN RCRD: CPT | Performed by: CLINICAL NURSE SPECIALIST

## 2025-07-28 PROCEDURE — 99214 OFFICE O/P EST MOD 30 MIN: CPT | Performed by: CLINICAL NURSE SPECIALIST

## 2025-07-28 PROCEDURE — 3008F BODY MASS INDEX DOCD: CPT | Performed by: CLINICAL NURSE SPECIALIST

## 2025-07-28 RX ORDER — GABAPENTIN 300 MG/1
300 CAPSULE ORAL
Qty: 60 CAPSULE | Refills: 2 | Status: SHIPPED | OUTPATIENT
Start: 2025-07-28

## 2025-07-28 ASSESSMENT — ENCOUNTER SYMPTOMS
ABDOMINAL PAIN: 0
DIZZINESS: 0
CONFUSION: 0
SHORTNESS OF BREATH: 0
SEIZURES: 0
FLANK PAIN: 0
HEADACHES: 0
DYSURIA: 0
CHILLS: 0
JOINT SWELLING: 0
WHEEZING: 0
NECK PAIN: 0
VOMITING: 0
MYALGIAS: 0
SORE THROAT: 0
NAUSEA: 0
APPETITE CHANGE: 0
SLEEP DISTURBANCE: 0
DEPRESSION: 0
COUGH: 0
HEMATURIA: 0
FEVER: 0
BACK PAIN: 0
POLYDIPSIA: 0
LOSS OF SENSATION IN FEET: 0
WOUND: 0
OCCASIONAL FEELINGS OF UNSTEADINESS: 0
PHOTOPHOBIA: 0
PALPITATIONS: 0
BRUISES/BLEEDS EASILY: 0
CONSTIPATION: 0
ACTIVITY CHANGE: 0
EYE PAIN: 0
ARTHRALGIAS: 0
DIARRHEA: 0
UNEXPECTED WEIGHT CHANGE: 0
CHEST TIGHTNESS: 0
BLOOD IN STOOL: 0
FATIGUE: 0
TROUBLE SWALLOWING: 0

## 2025-07-28 NOTE — PROGRESS NOTES
Subjective   Patient ID: Kevin uRbi is a 69 y.o. male who presents for Follow-up (Follow up).  HPI    Here today as a follow up appointment.      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      Follows with Cardiology. Medications adjusted, monitoring blood pressure due to issues with Hypotension.      Has continued on medications as prescribed.      Primary Cancer of LLL. Completed Chemo and RT on 12/26/2023. Stable on most recent imaging. Immunotherapy.     Lab work completed.      Overall feels well over the past few months. States that he has been ambulating well, independent. Eating well, tolerating oral intake.     Review of Systems   Constitutional:  Negative for activity change, appetite change, chills, fatigue, fever and unexpected weight change.   HENT:  Negative for ear pain, hearing loss, nosebleeds, sore throat, tinnitus and trouble swallowing.    Eyes:  Negative for photophobia, pain and visual disturbance.   Respiratory:  Negative for cough, chest tightness, shortness of breath and wheezing.    Cardiovascular:  Negative for chest pain, palpitations and leg swelling.   Gastrointestinal:  Negative for abdominal pain, blood in stool, constipation, diarrhea, nausea and vomiting.   Endocrine: Negative for cold intolerance, heat intolerance, polydipsia and polyuria.   Genitourinary:  Negative for dysuria, flank pain and hematuria.   Musculoskeletal:  Negative for arthralgias, back pain, joint swelling, myalgias and neck pain.   Skin:  Negative for pallor, rash and wound.   Allergic/Immunologic: Negative for immunocompromised state.   Neurological:  Negative for dizziness, seizures and headaches.   Hematological:  Does not bruise/bleed easily.   Psychiatric/Behavioral:  Negative for confusion and sleep disturbance.        Objective   Physical Exam  Vitals and nursing note reviewed.   Constitutional:        General: He is not in acute distress.     Appearance: Normal appearance.   HENT:      Head: Normocephalic.      Nose: Nose normal.     Eyes:      Conjunctiva/sclera: Conjunctivae normal.     Neck:      Vascular: No carotid bruit.     Cardiovascular:      Rate and Rhythm: Normal rate and regular rhythm.      Pulses: Normal pulses.      Heart sounds: Normal heart sounds.   Pulmonary:      Effort: Pulmonary effort is normal.      Breath sounds: Normal breath sounds.   Abdominal:      General: Bowel sounds are normal.      Palpations: Abdomen is soft.     Musculoskeletal:         General: Normal range of motion.      Cervical back: Normal range of motion.     Skin:     General: Skin is warm and dry.     Neurological:      Mental Status: He is alert and oriented to person, place, and time. Mental status is at baseline.     Psychiatric:         Mood and Affect: Mood normal.         Behavior: Behavior normal.         Assessment/Plan        Reviewed recent lab work completed with patient.      HFpEF, pAfib, Hypertension: Following with Cardiology. Blood pressure controlled at OV today. Continue to monitor. Continue medications as prescribed. Follow up with Cardiology clinic.   CAD, Celiac Artery Stenosis, Abdominal Pain, Dysuria: Continue to follow with Specialists as previously determined.    AAA: Monitoring with Cardiology.   Malignant Neoplasm of Lung: Continue to follow with Specialists as previously determined. Stable per patient on most recent imaging.   Hyperlipidemia: Continue Rosuvastatin.   Insomnia: Discussed Melatonin at last OV.   Diabetes: A1C 6.5%. Encouraged updated vision exam. Albumin ordered. Declined medication management, would like to try and work on lifestyle modifications. Follow up lab work ordered. Vision exam scheduled for this week. Albumin: Ordered.   Anemia: Denies any signs/symptoms of bleeding. Follow up lab work ordered.       Flu Vaccine: September 2024.   Prevnar 20: September 2023.    COVID Vaccine: January 2022.   RSV Vaccine: October 2023.   Medicare Wellness: January 2025.   Cologuard: October 2023, positive. Declined moving forward.   Discussed Tdap, Shingrix, COVID. Declined updated immunizations.     Chen Olea, APRN-CNS 07/28/25 12:05 PM

## 2025-07-30 ENCOUNTER — TELEPHONE (OUTPATIENT)
Dept: RADIATION ONCOLOGY | Facility: CLINIC | Age: 70
End: 2025-07-30
Payer: MEDICARE

## 2025-08-01 ENCOUNTER — HOSPITAL ENCOUNTER (OUTPATIENT)
Dept: RADIOLOGY | Facility: HOSPITAL | Age: 70
Discharge: HOME | End: 2025-08-01
Payer: MEDICARE

## 2025-08-01 ENCOUNTER — HOSPITAL ENCOUNTER (OUTPATIENT)
Dept: RADIATION ONCOLOGY | Facility: HOSPITAL | Age: 70
Setting detail: RADIATION/ONCOLOGY SERIES
Discharge: HOME | End: 2025-08-01
Payer: COMMERCIAL

## 2025-08-01 VITALS
WEIGHT: 168.5 LBS | SYSTOLIC BLOOD PRESSURE: 108 MMHG | DIASTOLIC BLOOD PRESSURE: 57 MMHG | OXYGEN SATURATION: 97 % | HEART RATE: 74 BPM | BODY MASS INDEX: 22.85 KG/M2 | TEMPERATURE: 96.6 F | RESPIRATION RATE: 18 BRPM

## 2025-08-01 DIAGNOSIS — C79.31 MALIGNANT NEOPLASM METASTATIC TO BRAIN (MULTI): Primary | ICD-10-CM

## 2025-08-01 DIAGNOSIS — C79.31 MALIGNANT NEOPLASM METASTATIC TO BRAIN (MULTI): ICD-10-CM

## 2025-08-01 PROCEDURE — 70553 MRI BRAIN STEM W/O & W/DYE: CPT

## 2025-08-01 PROCEDURE — A9575 INJ GADOTERATE MEGLUMI 0.1ML: HCPCS | Performed by: STUDENT IN AN ORGANIZED HEALTH CARE EDUCATION/TRAINING PROGRAM

## 2025-08-01 PROCEDURE — G2211 COMPLEX E/M VISIT ADD ON: HCPCS | Performed by: STUDENT IN AN ORGANIZED HEALTH CARE EDUCATION/TRAINING PROGRAM

## 2025-08-01 PROCEDURE — 2550000001 HC RX 255 CONTRASTS: Performed by: STUDENT IN AN ORGANIZED HEALTH CARE EDUCATION/TRAINING PROGRAM

## 2025-08-01 PROCEDURE — 99214 OFFICE O/P EST MOD 30 MIN: CPT | Performed by: STUDENT IN AN ORGANIZED HEALTH CARE EDUCATION/TRAINING PROGRAM

## 2025-08-01 RX ORDER — GADOTERATE MEGLUMINE 376.9 MG/ML
15 INJECTION INTRAVENOUS
Status: COMPLETED | OUTPATIENT
Start: 2025-08-01 | End: 2025-08-01

## 2025-08-01 RX ADMIN — GADOTERATE MEGLUMINE 15 ML: 376.9 INJECTION INTRAVENOUS at 14:27

## 2025-08-01 ASSESSMENT — PAIN SCALES - GENERAL: PAINLEVEL_OUTOF10: 0-NO PAIN

## 2025-08-01 ASSESSMENT — ENCOUNTER SYMPTOMS
EYE PROBLEMS: 1
DECREASED CONCENTRATION: 0
VOMITING: 0
CONFUSION: 0
COUGH: 0
NAUSEA: 0
LEG SWELLING: 0
UNEXPECTED WEIGHT CHANGE: 0
HEADACHES: 0
MYALGIAS: 0
SEIZURES: 0
APPETITE CHANGE: 0
ARTHRALGIAS: 0
DIARRHEA: 0
DIZZINESS: 0
NUMBNESS: 0
SPEECH DIFFICULTY: 0
SHORTNESS OF BREATH: 0
BACK PAIN: 0
EXTREMITY WEAKNESS: 0
FATIGUE: 0
LIGHT-HEADEDNESS: 0
CONSTIPATION: 0

## 2025-08-01 NOTE — PROGRESS NOTES
Radiation Oncology Nursing Note    Pain: The patient's current pain level was assessed.  They report currently having a pain of 0 out of 10.  They feel their pain is under control without the use of pain medications.    Review of Systems:  Review of Systems   Constitutional:  Negative for appetite change, fatigue and unexpected weight change (weight has been stable).   HENT:   Positive for tinnitus (chronic, no changes). Negative for hearing loss.    Eyes:  Positive for eye problems (hx of cataracts in left eye).   Respiratory:  Negative for cough and shortness of breath.    Cardiovascular:  Negative for chest pain and leg swelling.   Gastrointestinal:  Negative for constipation, diarrhea, nausea and vomiting.   Genitourinary: Negative.     Musculoskeletal:  Negative for arthralgias, back pain, gait problem and myalgias.        Generalized     Neurological:  Negative for dizziness, extremity weakness, gait problem, headaches, light-headedness, numbness, seizures and speech difficulty.             Psychiatric/Behavioral:  Negative for confusion and decreased concentration.

## 2025-08-01 NOTE — PROGRESS NOTES
RADIATION ONCOLOGY FOLLOW UP NOTE    Patient Name: Kevin Rubi  Provider: Natalie Plunkett MD  MRN: [id number]  : 1955  Date: 2025    DIAGNOSIS: lung adenocarcinoma with brain metastases    CURRENT STATUS OF DISEASE: no progressing intracranial disease    PRIOR RADIATION TREATMENT:    Treatment Summary:  IMRT: Left Lower lobe of lung, Mediastinum (Resolved)    Treatment Period Technique Fraction Dose Fractions Total Dose   Course 1 2023-2023  (days elapsed: 43)         Left lung_Med 2023-2023 IMRT 200 / 200 cGy 7 /  1,400 / 1,400 cGy         Replan Lt Lung/Med 2023-2023 IMRT 200 / 200 cGy  4,600 / 4,600 cGy      Radiation Therapy    Treatment Period Technique Fraction Dose Fractions Total Dose   Course 2 2025-2025  (days elapsed: 0)         A: 2025-2025 Gamma-Knife 2,200 / 2,200 cGy 1 / 1 2,200 / 2,200 cGy         B: 2025-2025 Gamma-Knife 2,200 / 2,200 cGy 1 / 1 2,200 / 2,200 cGy         C: 2025-2025 Gamma-Knife 2,200 / 2,200 cGy 1 / 1 2,200 / 2,200 cGy       INTERVAL SINCE COMPLETION OF TREATMENT: 5 months    Interval History:  Patient presents today with his wife.  He has no recent systemic staging.  He is currently on surveillance and not on any active systemic agent.  His brain MRI today shows no new or progressing disease. Patient denies any new or worsening neurologic symptoms.    Review of Systems:  A complete 14 system review was negative except as noted in the HPI above.    Allergies: Allergies[1]    Medications: Current Medications[2]    Physical Exam:   KPS: 90  General: NAD, patient well appearing.  HEENT: Trachea midline  CV: Well perfused  Resp: Breathing is non-labored.   Abdomen: Soft, ND  Extremities: No acute findings. No edema   Skin: color, texture and turgor wnl. No rashes and lesions.  Neuro: AAO x 3, appropriately interactive, normal affect, speech fluent. Cranial nerves II through  XII are intact grossly and symmetrically. No focal neurologic deficit. Normal strength and sensation to LT intact bilaterally in the upper and lower extremities. No dysmetria or dysdiadochokinesia.     Imaging and Diagnostic Studies:   See HPI above    TOXICITY: none    Assessment/Plan:    Kevin Rubi is a 69-year-old male with metastatic lung adenocarcinoma, currently on surveillance, here today for follow-up visit. The patient presents without evidence of intracranial recurrence.    1) RTC in 3 months  2) MRI Brain (W/WO Contrast) in 3 months    All the patient's questions were answered to verbalized satisfaction. We instructed the patient to call with any questions or concerns in the interim.    Natalie Plunkett MD    LONGITUDINAL CARE, EXTRA EFFORT/ : The patient will be followed longitudinally by providers (including APPs) in the department of radiation oncology for monitoring treatment effects during and after radiation. Additional effort needed in the setting of his diagnosis and coordination of care with other treating physicians.            [1] No Known Allergies  [2]   Current Outpatient Medications:     apixaban (Eliquis) 5 mg tablet, Take 1 tablet (5 mg) by mouth 2 times a day., Disp: 180 tablet, Rfl: 3    blood sugar diagnostic (Blood Glucose Test) strip, 1 strip once daily., Disp: 100 strip, Rfl: 11    clopidogrel (Plavix) 75 mg tablet, Take 1 tablet (75 mg) by mouth once daily., Disp: 90 tablet, Rfl: 0    digoxin (Lanoxin) 125 MCG tablet, Take 1 tablet (125 mcg) by mouth once daily., Disp: 90 tablet, Rfl: 1    fenofibrate (Tricor) 145 mg tablet, Take 1 tablet (145 mg) by mouth once daily., Disp: 90 tablet, Rfl: 3    furosemide (Lasix) 20 mg tablet, Take 1 tablet (20 mg) by mouth see administration instructions. 1 tablet as needed for swelling, weight gain of 3# or increased shortness of breath., Disp: 90 tablet, Rfl: 0    gabapentin (Neurontin) 300 mg capsule, TAKE 1 CAPSULE BY MOUTH ONCE  DAILY AT BEDTIME., Disp: 60 capsule, Rfl: 2    metoprolol succinate XL (Toprol-XL) 50 mg 24 hr tablet, Take 1 tablet (50 mg) by mouth once daily., Disp: 90 tablet, Rfl: 3    rosuvastatin (Crestor) 20 mg tablet, Take 1 tablet (20 mg) by mouth once daily at bedtime., Disp: 90 tablet, Rfl: 3    sacubitriL-valsartan (Entresto) 24-26 mg tablet, Take 0.5 tablets by mouth 2 times a day. Please follow up with cardiologist or PCP prior to resuming this medication, Disp: 90 tablet, Rfl: 3    tiZANidine (Zanaflex) 2 mg tablet, TAKE 1 TABLET BY MOUTH 2 TIMES A DAY AS NEEDED FOR MUSCLE SPASMS., Disp: 180 tablet, Rfl: 1

## 2025-08-05 ENCOUNTER — TELEPHONE (OUTPATIENT)
Dept: PRIMARY CARE | Facility: CLINIC | Age: 70
End: 2025-08-05
Payer: MEDICARE

## 2025-08-05 NOTE — TELEPHONE ENCOUNTER
This should be acutely evaluated. Ok to schedule an acute with myself or another provider if there are openings. Thank you!

## 2025-08-05 NOTE — TELEPHONE ENCOUNTER
Patient says he does not want to come in, he just wants something to be prescribed. Patient was advised that in order for medication to be sent he would need to be evaluated.

## 2025-08-05 NOTE — TELEPHONE ENCOUNTER
Patient called and says he has a boil on left butt cheek, for about a week its painful and cannot sit please advise. Big and red about 4 inches looks like something is in the middle, warm to the touch.    [facility] [place] [place]

## 2025-08-05 NOTE — TELEPHONE ENCOUNTER
[facility]. Patient needs to call back I did not just schedule him without confirming with patient

## 2025-08-11 ENCOUNTER — OFFICE VISIT (OUTPATIENT)
Dept: URGENT CARE | Age: 70
End: 2025-08-11
Payer: MEDICARE

## 2025-08-11 VITALS
RESPIRATION RATE: 18 BRPM | TEMPERATURE: 97.1 F | HEART RATE: 104 BPM | SYSTOLIC BLOOD PRESSURE: 107 MMHG | OXYGEN SATURATION: 96 % | DIASTOLIC BLOOD PRESSURE: 54 MMHG

## 2025-08-11 DIAGNOSIS — L02.32 BOIL OF BUTTOCK: Primary | ICD-10-CM

## 2025-08-11 PROCEDURE — 99213 OFFICE O/P EST LOW 20 MIN: CPT

## 2025-08-11 PROCEDURE — 1125F AMNT PAIN NOTED PAIN PRSNT: CPT

## 2025-08-11 PROCEDURE — 1159F MED LIST DOCD IN RCRD: CPT

## 2025-08-11 PROCEDURE — 96372 THER/PROPH/DIAG INJ SC/IM: CPT

## 2025-08-11 PROCEDURE — 3078F DIAST BP <80 MM HG: CPT

## 2025-08-11 PROCEDURE — 1160F RVW MEDS BY RX/DR IN RCRD: CPT

## 2025-08-11 PROCEDURE — 3074F SYST BP LT 130 MM HG: CPT

## 2025-08-11 RX ORDER — DOXYCYCLINE 100 MG/1
100 CAPSULE ORAL 2 TIMES DAILY
Qty: 20 CAPSULE | Refills: 0 | Status: SHIPPED | OUTPATIENT
Start: 2025-08-11 | End: 2025-08-21

## 2025-08-11 RX ORDER — PREDNISONE 20 MG/1
40 TABLET ORAL DAILY
Qty: 10 TABLET | Refills: 0 | Status: SHIPPED | OUTPATIENT
Start: 2025-08-11 | End: 2025-08-16

## 2025-08-11 RX ORDER — KETOROLAC TROMETHAMINE 30 MG/ML
30 INJECTION, SOLUTION INTRAMUSCULAR; INTRAVENOUS ONCE
Status: COMPLETED | OUTPATIENT
Start: 2025-08-11 | End: 2025-08-11

## 2025-08-11 RX ADMIN — KETOROLAC TROMETHAMINE 30 MG: 30 INJECTION, SOLUTION INTRAMUSCULAR; INTRAVENOUS at 11:36

## 2025-08-11 ASSESSMENT — PAIN SCALES - GENERAL: PAINLEVEL_OUTOF10: 10-WORST PAIN EVER

## 2025-08-14 ENCOUNTER — HOSPITAL ENCOUNTER (OUTPATIENT)
Dept: VASCULAR MEDICINE | Facility: HOSPITAL | Age: 70
Discharge: HOME | End: 2025-08-14
Payer: MEDICARE

## 2025-08-14 DIAGNOSIS — K55.1 CHRONIC MESENTERIC ISCHEMIA (MULTI): ICD-10-CM

## 2025-08-14 DIAGNOSIS — R10.30 LOWER ABDOMINAL PAIN, UNSPECIFIED: ICD-10-CM

## 2025-08-14 PROCEDURE — 93975 VASCULAR STUDY: CPT

## 2025-08-14 PROCEDURE — 93975 VASCULAR STUDY: CPT | Performed by: INTERNAL MEDICINE

## 2025-08-20 ENCOUNTER — APPOINTMENT (OUTPATIENT)
Dept: VASCULAR SURGERY | Facility: HOSPITAL | Age: 70
End: 2025-08-20
Payer: MEDICARE

## 2025-08-21 ENCOUNTER — APPOINTMENT (OUTPATIENT)
Dept: HEMATOLOGY/ONCOLOGY | Facility: CLINIC | Age: 70
End: 2025-08-21
Payer: MEDICARE

## 2025-08-21 ENCOUNTER — HOSPITAL ENCOUNTER (OUTPATIENT)
Dept: RADIOLOGY | Facility: HOSPITAL | Age: 70
Discharge: HOME | End: 2025-08-21
Payer: MEDICARE

## 2025-08-21 DIAGNOSIS — C34.32 PRIMARY CANCER OF LEFT LOWER LOBE OF LUNG (MULTI): ICD-10-CM

## 2025-08-21 PROCEDURE — 2550000001 HC RX 255 CONTRASTS: Performed by: NURSE PRACTITIONER

## 2025-08-21 PROCEDURE — 71260 CT THORAX DX C+: CPT

## 2025-08-21 RX ADMIN — IOHEXOL 50 ML: 350 INJECTION, SOLUTION INTRAVENOUS at 10:01

## 2025-08-27 ENCOUNTER — OFFICE VISIT (OUTPATIENT)
Dept: HEMATOLOGY/ONCOLOGY | Facility: CLINIC | Age: 70
End: 2025-08-27
Payer: MEDICARE

## 2025-08-27 VITALS
WEIGHT: 171.9 LBS | DIASTOLIC BLOOD PRESSURE: 62 MMHG | BODY MASS INDEX: 23.31 KG/M2 | OXYGEN SATURATION: 98 % | TEMPERATURE: 97.5 F | SYSTOLIC BLOOD PRESSURE: 95 MMHG | HEART RATE: 67 BPM

## 2025-08-27 DIAGNOSIS — C34.32 PRIMARY CANCER OF LEFT LOWER LOBE OF LUNG (MULTI): ICD-10-CM

## 2025-08-27 PROCEDURE — 1159F MED LIST DOCD IN RCRD: CPT | Performed by: STUDENT IN AN ORGANIZED HEALTH CARE EDUCATION/TRAINING PROGRAM

## 2025-08-27 PROCEDURE — 99214 OFFICE O/P EST MOD 30 MIN: CPT | Performed by: STUDENT IN AN ORGANIZED HEALTH CARE EDUCATION/TRAINING PROGRAM

## 2025-08-27 PROCEDURE — G2211 COMPLEX E/M VISIT ADD ON: HCPCS | Performed by: STUDENT IN AN ORGANIZED HEALTH CARE EDUCATION/TRAINING PROGRAM

## 2025-08-27 PROCEDURE — 99215 OFFICE O/P EST HI 40 MIN: CPT | Performed by: STUDENT IN AN ORGANIZED HEALTH CARE EDUCATION/TRAINING PROGRAM

## 2025-08-27 PROCEDURE — 1126F AMNT PAIN NOTED NONE PRSNT: CPT | Performed by: STUDENT IN AN ORGANIZED HEALTH CARE EDUCATION/TRAINING PROGRAM

## 2025-08-27 PROCEDURE — 3078F DIAST BP <80 MM HG: CPT | Performed by: STUDENT IN AN ORGANIZED HEALTH CARE EDUCATION/TRAINING PROGRAM

## 2025-08-27 PROCEDURE — 3074F SYST BP LT 130 MM HG: CPT | Performed by: STUDENT IN AN ORGANIZED HEALTH CARE EDUCATION/TRAINING PROGRAM

## 2025-08-27 ASSESSMENT — PAIN SCALES - GENERAL: PAINLEVEL_OUTOF10: 0-NO PAIN

## 2025-08-28 DIAGNOSIS — D64.9 ANEMIA, UNSPECIFIED TYPE: ICD-10-CM

## 2025-09-03 ENCOUNTER — TELEMEDICINE (OUTPATIENT)
Dept: VASCULAR SURGERY | Facility: HOSPITAL | Age: 70
End: 2025-09-03
Payer: MEDICARE

## 2025-09-03 DIAGNOSIS — I71.02: ICD-10-CM

## 2025-09-03 DIAGNOSIS — K55.1 MESENTERIC ARTERY STENOSIS (MULTI): Primary | ICD-10-CM

## 2025-09-03 PROCEDURE — 99214 OFFICE O/P EST MOD 30 MIN: CPT | Performed by: NURSE PRACTITIONER

## 2025-09-04 ENCOUNTER — TELEPHONE (OUTPATIENT)
Dept: PRIMARY CARE | Facility: CLINIC | Age: 70
End: 2025-09-04
Payer: MEDICARE

## 2026-02-17 ENCOUNTER — APPOINTMENT (OUTPATIENT)
Dept: PRIMARY CARE | Facility: CLINIC | Age: 71
End: 2026-02-17
Payer: MEDICARE

## (undated) DEVICE — GLOVE, SURGICAL, PROTEXIS PI BLUE W/NEUTHERA, 6.5, PF, LF

## (undated) DEVICE — BINDER, ABDOMINAL, 3 PANEL, 9 X 30-45 IN, SM/MED

## (undated) DEVICE — SYRINGE KIT, ANGIO0ARTERION, 150ML, W/QFT,MC

## (undated) DEVICE — CUTTER,  PROXIMATE LINEAR RELOAD, 55MM, BLUE

## (undated) DEVICE — PROTECTOR, NERVE, ULNAR, PINK

## (undated) DEVICE — COVER HANDLE LIGHT, STERIS, BLUE, STERILE

## (undated) DEVICE — DRAPE, SHEET, MINOR PROCEDURE, T, PEDIATRIC, 100X122X77

## (undated) DEVICE — EXTENSION SET W/MALE LUER LOCK ADAPTER, VOLUME 2.4ML

## (undated) DEVICE — DRAPE, SHEET, ENDOSCOPY, GENERAL, FENESTRATED, ARMBOARD COVER, 98 X 123.5 IN, DISPOSABLE, LF, STERILE

## (undated) DEVICE — GLOVE, SURGICAL, PROTEXIS PI , 6.5, PF, LF

## (undated) DEVICE — STOPCOCK, 4 WAY, SMALL BODY, W/SWIVEL, ULTRA, LIPD RESISTANT, LUER LOCK, MALE, LF

## (undated) DEVICE — Device

## (undated) DEVICE — GLOVE, PROTEXIS PI CLASSIC, SZ-6.5, PF, LF

## (undated) DEVICE — SPONGE, LAP, XRAY DECT, 4IN X 18IN, W/MASTER DMT, STERILE

## (undated) DEVICE — ADHESIVE, SKIN, LIQUIBAND EXCEED

## (undated) DEVICE — GUIDEWIRE, COMMAND ST, HI-TORQUE, 0.18 X 300CM

## (undated) DEVICE — PAD, GROUNDING, ELECTROSURGICAL, W/9 FT CABLE, POLYHESIVE II, ADULT, LF

## (undated) DEVICE — STRAP, CIRCUMFERENTIAL, 2 X 76""

## (undated) DEVICE — PREP TRAY, DRY SKIN SCRUB KIT

## (undated) DEVICE — CATHETER, BALLOON, INPACT AV, DCB 018 5.0 MM X 40 MM X 130 CM

## (undated) DEVICE — CATHETER, 5 FR. 65CM, ANGLE TAPER AT TIP

## (undated) DEVICE — SPONGE, LAP, XRAY DECT, 18IN X 18IN, W/MASTER DMT, STERILE

## (undated) DEVICE — MANIFOLD, 4 PORT NEPTUNE STANDARD

## (undated) DEVICE — TRAY, MINOR, SINGLE BASIN, STERILE

## (undated) DEVICE — GUIDEWIRE, .035 X 180 BENTSON STR

## (undated) DEVICE — SUTURE, PDSII, 1, TP-1, VIL, MONO, 48LP

## (undated) DEVICE — TRAY, SURESTEP, URINE METER, 16FR, COMPLETE, W/STATLOCK

## (undated) DEVICE — STAPLER, LINEAR, 3.5 60MM, RELOADABLE, BLUE

## (undated) DEVICE — ADHESIVE, SKIN, DERMABOND ADVANCED, 15CM, PEN-STYLE

## (undated) DEVICE — DRAIN, PENROSE, 1/2 IN X 36 IN, STERILE, LF

## (undated) DEVICE — COVER PROBE, SOFT FLEX W/ GEL, 5 X 48 IN (13X122CM)

## (undated) DEVICE — GUIDEWIRE, BENTSON, COATED, 0.035 IN X 260 CM

## (undated) DEVICE — CUTTER,  PROX LINEAR, 55MM, REG TISSUE, W/ SAFETY LOCK OUT

## (undated) DEVICE — SHEATH, PINNACLE, 10 CM,  5FR INTRODUCER, 5FR DIA, 2.5 CM DIALATOR

## (undated) DEVICE — SOLUTION, IRRIGATION, SODIUM CHLORIDE 0.9%, 1000 ML, POUR BOTTLE

## (undated) DEVICE — DEVICE, INFLATION, ENCORE 20

## (undated) DEVICE — STAPLER, SKIN, MULTIFIRE, PREMIUM, WIDE, 35 W

## (undated) DEVICE — CATHETER, 5 FR. 100CM, ANGLE TAPER AT TIP

## (undated) DEVICE — CATHETER, BALLOON, PACIFIC PLUS PTA, 5.0 X 40 X 150

## (undated) DEVICE — SYRINGE, 10 CC, LUER LOCK

## (undated) DEVICE — DRESSING, WOUND, POST OP, 10 X 4

## (undated) DEVICE — VALVE, CONTROL BLEEDBACK

## (undated) DEVICE — GOWN, ASTOUND, XL

## (undated) DEVICE — COVER, CART, 45 X 27 X 48 IN, CLEAR

## (undated) DEVICE — STOPCOCK, MORSE CLASSIC

## (undated) DEVICE — INTRODUCER SET, MICROPUNCT, STIFF, 4FR 10CM,PLATINUM TIP,NITINOLWIRE

## (undated) DEVICE — TORQUE DEVICE, ACCOMODATES WIRES W/DIA .010 TO .038"."

## (undated) DEVICE — LIGASURE IMPACT, 18CM

## (undated) DEVICE — TUBING, HIGH PRESSURE, 72 IN (1200 PSI)

## (undated) DEVICE — GUIDEWIRE, ANGLE TIP,  .035 DIA, 260 CM, 3 CM TIP"